# Patient Record
Sex: FEMALE | Race: BLACK OR AFRICAN AMERICAN | Employment: UNEMPLOYED
[De-identification: names, ages, dates, MRNs, and addresses within clinical notes are randomized per-mention and may not be internally consistent; named-entity substitution may affect disease eponyms.]

---

## 2017-02-21 ENCOUNTER — OFFICE VISIT (OUTPATIENT)
Dept: FAMILY MEDICINE CLINIC | Facility: CLINIC | Age: 37
End: 2017-02-21

## 2017-02-21 VITALS
HEIGHT: 63 IN | SYSTOLIC BLOOD PRESSURE: 107 MMHG | DIASTOLIC BLOOD PRESSURE: 70 MMHG | TEMPERATURE: 98.1 F | BODY MASS INDEX: 47.84 KG/M2 | HEART RATE: 91 BPM | WEIGHT: 270 LBS

## 2017-02-21 DIAGNOSIS — G89.29 CHRONIC BILATERAL LOW BACK PAIN WITHOUT SCIATICA: Primary | ICD-10-CM

## 2017-02-21 DIAGNOSIS — Z23 NEED FOR PROPHYLACTIC VACCINATION AGAINST DIPHTHERIA-TETANUS-PERTUSSIS (DTP): ICD-10-CM

## 2017-02-21 DIAGNOSIS — K21.9 GASTROESOPHAGEAL REFLUX DISEASE WITHOUT ESOPHAGITIS: ICD-10-CM

## 2017-02-21 DIAGNOSIS — M54.50 CHRONIC BILATERAL LOW BACK PAIN WITHOUT SCIATICA: Primary | ICD-10-CM

## 2017-02-21 DIAGNOSIS — G43.009 MIGRAINE WITHOUT AURA AND WITHOUT STATUS MIGRAINOSUS, NOT INTRACTABLE: ICD-10-CM

## 2017-02-21 DIAGNOSIS — E66.01 MORBID OBESITY DUE TO EXCESS CALORIES (HCC): ICD-10-CM

## 2017-02-21 DIAGNOSIS — Z12.4 CERVICAL CANCER SCREENING: ICD-10-CM

## 2017-02-21 DIAGNOSIS — Z11.4 SCREENING FOR HIV (HUMAN IMMUNODEFICIENCY VIRUS): ICD-10-CM

## 2017-02-21 PROCEDURE — 90471 IMMUNIZATION ADMIN: CPT | Performed by: INTERNAL MEDICINE

## 2017-02-21 PROCEDURE — 90715 TDAP VACCINE 7 YRS/> IM: CPT | Performed by: INTERNAL MEDICINE

## 2017-02-21 PROCEDURE — 99204 OFFICE O/P NEW MOD 45 MIN: CPT | Performed by: INTERNAL MEDICINE

## 2017-02-21 RX ORDER — OMEPRAZOLE 20 MG/1
20 CAPSULE, DELAYED RELEASE ORAL DAILY
Qty: 30 CAPSULE | Refills: 2 | Status: SHIPPED | OUTPATIENT
Start: 2017-02-21 | End: 2017-06-06

## 2017-02-21 RX ORDER — TOPIRAMATE 100 MG/1
100 TABLET, FILM COATED ORAL 2 TIMES DAILY
Qty: 60 TABLET | Refills: 2 | Status: SHIPPED | OUTPATIENT
Start: 2017-02-21 | End: 2017-03-28

## 2017-02-21 RX ORDER — ACETAMINOPHEN 500 MG
500 TABLET ORAL 3 TIMES DAILY PRN
Qty: 90 TABLET | Refills: 2 | Status: SHIPPED | OUTPATIENT
Start: 2017-02-21 | End: 2017-06-06

## 2017-02-21 RX ORDER — IBUPROFEN 800 MG/1
TABLET ORAL
Refills: 0 | COMMUNITY
Start: 2016-11-14 | End: 2017-03-28

## 2017-02-28 ENCOUNTER — HOSPITAL ENCOUNTER (OUTPATIENT)
Age: 37
Discharge: HOME | End: 2017-02-28
Payer: MEDICARE

## 2017-02-28 DIAGNOSIS — E66.01 MORBID OBESITY DUE TO EXCESS CALORIES (HCC): ICD-10-CM

## 2017-02-28 DIAGNOSIS — Z11.4 SCREENING FOR HIV (HUMAN IMMUNODEFICIENCY VIRUS): ICD-10-CM

## 2017-02-28 LAB
ANION GAP SERPL CALCULATED.3IONS-SCNC: 14 MMOL/L (ref 9–17)
BUN BLDV-MCNC: 11 MG/DL (ref 6–20)
BUN/CREAT BLD: NORMAL (ref 9–20)
CALCIUM SERPL-MCNC: 9.1 MG/DL (ref 8.6–10.4)
CHLORIDE BLD-SCNC: 104 MMOL/L (ref 98–107)
CHOLESTEROL/HDL RATIO: 5.8
CHOLESTEROL: 168 MG/DL
CO2: 23 MMOL/L (ref 20–31)
CREAT SERPL-MCNC: 0.55 MG/DL (ref 0.5–0.9)
ESTIMATED AVERAGE GLUCOSE: 120 MG/DL
GFR AFRICAN AMERICAN: >60 ML/MIN
GFR NON-AFRICAN AMERICAN: >60 ML/MIN
GFR SERPL CREATININE-BSD FRML MDRD: NORMAL ML/MIN/{1.73_M2}
GFR SERPL CREATININE-BSD FRML MDRD: NORMAL ML/MIN/{1.73_M2}
GLUCOSE BLD-MCNC: 76 MG/DL (ref 70–99)
HBA1C MFR BLD: 5.8 % (ref 4–6)
HCT VFR BLD CALC: 40.7 % (ref 36–46)
HDLC SERPL-MCNC: 29 MG/DL
HEMOGLOBIN: 13.7 G/DL (ref 12–16)
HIV AG/AB: NONREACTIVE
LDL CHOLESTEROL: 117 MG/DL (ref 0–130)
MCH RBC QN AUTO: 27.7 PG (ref 26–34)
MCHC RBC AUTO-ENTMCNC: 33.7 G/DL (ref 31–37)
MCV RBC AUTO: 82.4 FL (ref 80–100)
PDW BLD-RTO: 14.4 % (ref 12.5–15.4)
PLATELET # BLD: 318 K/UL (ref 140–450)
PMV BLD AUTO: 8.6 FL (ref 6–12)
POTASSIUM SERPL-SCNC: 3.7 MMOL/L (ref 3.7–5.3)
RBC # BLD: 4.94 M/UL (ref 4–5.2)
SODIUM BLD-SCNC: 141 MMOL/L (ref 135–144)
TRIGL SERPL-MCNC: 111 MG/DL
VLDLC SERPL CALC-MCNC: ABNORMAL MG/DL (ref 1–30)
WBC # BLD: 9.9 K/UL (ref 3.5–11)

## 2017-02-28 PROCEDURE — 80061 LIPID PANEL: CPT

## 2017-02-28 PROCEDURE — 85027 COMPLETE CBC AUTOMATED: CPT

## 2017-02-28 PROCEDURE — 83036 HEMOGLOBIN GLYCOSYLATED A1C: CPT

## 2017-02-28 PROCEDURE — 87389 HIV-1 AG W/HIV-1&-2 AB AG IA: CPT

## 2017-02-28 PROCEDURE — 36415 COLL VENOUS BLD VENIPUNCTURE: CPT

## 2017-02-28 PROCEDURE — 80048 BASIC METABOLIC PNL TOTAL CA: CPT

## 2017-03-28 ENCOUNTER — OFFICE VISIT (OUTPATIENT)
Dept: FAMILY MEDICINE CLINIC | Age: 37
End: 2017-03-28
Payer: MEDICARE

## 2017-03-28 VITALS
HEART RATE: 103 BPM | BODY MASS INDEX: 47.84 KG/M2 | WEIGHT: 270 LBS | TEMPERATURE: 98.1 F | DIASTOLIC BLOOD PRESSURE: 87 MMHG | HEIGHT: 63 IN | SYSTOLIC BLOOD PRESSURE: 125 MMHG

## 2017-03-28 DIAGNOSIS — R73.03 PRE-DIABETES: Primary | ICD-10-CM

## 2017-03-28 PROCEDURE — 99213 OFFICE O/P EST LOW 20 MIN: CPT | Performed by: INTERNAL MEDICINE

## 2017-04-17 ENCOUNTER — OFFICE VISIT (OUTPATIENT)
Dept: OBGYN CLINIC | Age: 37
End: 2017-04-17
Payer: MEDICARE

## 2017-04-17 ENCOUNTER — HOSPITAL ENCOUNTER (OUTPATIENT)
Age: 37
Setting detail: SPECIMEN
Discharge: HOME | End: 2017-04-17
Payer: MEDICARE

## 2017-04-17 VITALS
DIASTOLIC BLOOD PRESSURE: 70 MMHG | BODY MASS INDEX: 46.78 KG/M2 | SYSTOLIC BLOOD PRESSURE: 118 MMHG | HEIGHT: 63 IN | WEIGHT: 264 LBS

## 2017-04-17 DIAGNOSIS — O03.9 SAB (SPONTANEOUS ABORTION): ICD-10-CM

## 2017-04-17 DIAGNOSIS — Z01.419 ENCOUNTER FOR GYNECOLOGICAL EXAMINATION: Primary | ICD-10-CM

## 2017-04-17 PROCEDURE — 99385 PREV VISIT NEW AGE 18-39: CPT | Performed by: OBSTETRICS & GYNECOLOGY

## 2017-04-18 DIAGNOSIS — O03.9 SAB (SPONTANEOUS ABORTION): ICD-10-CM

## 2017-04-21 LAB
HPV SAMPLE: NORMAL
HPV SOURCE: NORMAL
HPV, GENOTYPE 16: NOT DETECTED
HPV, GENOTYPE 18: NOT DETECTED
HPV, HIGH RISK OTHER: NOT DETECTED
HPV, INTERPRETATION: NORMAL

## 2017-04-24 LAB — CYTOLOGY REPORT: NORMAL

## 2017-04-26 ENCOUNTER — HOSPITAL ENCOUNTER (OUTPATIENT)
Age: 37
Discharge: HOME | End: 2017-04-26
Payer: MEDICARE

## 2017-04-26 LAB — HCG QUANTITATIVE: <1 IU/L

## 2017-04-26 PROCEDURE — 36415 COLL VENOUS BLD VENIPUNCTURE: CPT

## 2017-04-26 PROCEDURE — 84702 CHORIONIC GONADOTROPIN TEST: CPT

## 2017-05-16 ENCOUNTER — TELEPHONE (OUTPATIENT)
Dept: FAMILY MEDICINE CLINIC | Age: 37
End: 2017-05-16

## 2017-05-16 DIAGNOSIS — G43.009 MIGRAINE WITHOUT AURA AND WITHOUT STATUS MIGRAINOSUS, NOT INTRACTABLE: Primary | ICD-10-CM

## 2017-05-16 RX ORDER — SUMATRIPTAN 50 MG/1
50 TABLET, FILM COATED ORAL
Qty: 9 TABLET | Refills: 3 | Status: SHIPPED | OUTPATIENT
Start: 2017-05-16 | End: 2017-09-19

## 2017-05-23 RX ORDER — METRONIDAZOLE 500 MG/1
500 TABLET ORAL 2 TIMES DAILY
Qty: 14 TABLET | Refills: 0 | Status: SHIPPED | OUTPATIENT
Start: 2017-05-23 | End: 2017-05-30

## 2017-06-06 ENCOUNTER — OFFICE VISIT (OUTPATIENT)
Dept: FAMILY MEDICINE CLINIC | Age: 37
End: 2017-06-06
Payer: MEDICARE

## 2017-06-06 VITALS
HEART RATE: 91 BPM | RESPIRATION RATE: 16 BRPM | HEIGHT: 63 IN | SYSTOLIC BLOOD PRESSURE: 111 MMHG | BODY MASS INDEX: 46.21 KG/M2 | TEMPERATURE: 98.5 F | DIASTOLIC BLOOD PRESSURE: 84 MMHG | WEIGHT: 260.8 LBS

## 2017-06-06 DIAGNOSIS — N30.00 ACUTE CYSTITIS WITHOUT HEMATURIA: Primary | ICD-10-CM

## 2017-06-06 DIAGNOSIS — E66.01 MORBID OBESITY DUE TO EXCESS CALORIES (HCC): ICD-10-CM

## 2017-06-06 DIAGNOSIS — R73.03 PRE-DIABETES: ICD-10-CM

## 2017-06-06 PROCEDURE — 99214 OFFICE O/P EST MOD 30 MIN: CPT | Performed by: INTERNAL MEDICINE

## 2017-06-06 RX ORDER — NITROFURANTOIN 25; 75 MG/1; MG/1
100 CAPSULE ORAL 2 TIMES DAILY
Qty: 6 CAPSULE | Refills: 0 | Status: SHIPPED | OUTPATIENT
Start: 2017-06-06 | End: 2017-06-09

## 2017-06-06 RX ORDER — PRENATAL VIT 75/IRON/FOLIC/OM3 28-800-223
COMBINATION PACKAGE (EA) ORAL
Refills: 1 | COMMUNITY
Start: 2017-04-19 | End: 2018-03-05

## 2017-06-06 RX ORDER — IBUPROFEN 800 MG/1
800 TABLET ORAL
COMMUNITY
Start: 2017-05-27 | End: 2017-06-06

## 2017-06-06 ASSESSMENT — PATIENT HEALTH QUESTIONNAIRE - PHQ9
SUM OF ALL RESPONSES TO PHQ9 QUESTIONS 1 & 2: 0
SUM OF ALL RESPONSES TO PHQ QUESTIONS 1-9: 0
2. FEELING DOWN, DEPRESSED OR HOPELESS: 0
1. LITTLE INTEREST OR PLEASURE IN DOING THINGS: 0

## 2017-06-22 ENCOUNTER — NURSE ONLY (OUTPATIENT)
Dept: FAMILY MEDICINE CLINIC | Age: 37
End: 2017-06-22

## 2017-06-22 VITALS
DIASTOLIC BLOOD PRESSURE: 75 MMHG | WEIGHT: 259.92 LBS | HEART RATE: 88 BPM | HEIGHT: 63 IN | SYSTOLIC BLOOD PRESSURE: 107 MMHG | RESPIRATION RATE: 16 BRPM | TEMPERATURE: 99.1 F | BODY MASS INDEX: 46.05 KG/M2

## 2017-06-22 DIAGNOSIS — Z11.1 ENCOUNTER FOR PPD TEST: Primary | ICD-10-CM

## 2017-06-29 ENCOUNTER — NURSE ONLY (OUTPATIENT)
Dept: FAMILY MEDICINE CLINIC | Age: 37
End: 2017-06-29
Payer: MEDICARE

## 2017-06-29 VITALS
HEIGHT: 63 IN | HEART RATE: 85 BPM | WEIGHT: 259 LBS | SYSTOLIC BLOOD PRESSURE: 123 MMHG | BODY MASS INDEX: 45.89 KG/M2 | DIASTOLIC BLOOD PRESSURE: 79 MMHG

## 2017-06-29 DIAGNOSIS — Z11.1 ENCOUNTER FOR PPD TEST: Primary | ICD-10-CM

## 2017-06-29 PROCEDURE — 99211 OFF/OP EST MAY X REQ PHY/QHP: CPT | Performed by: INTERNAL MEDICINE

## 2017-06-29 PROCEDURE — 86580 TB INTRADERMAL TEST: CPT | Performed by: INTERNAL MEDICINE

## 2017-06-29 RX ORDER — LIDOCAINE AND PRILOCAINE 25; 25 MG/G; MG/G
CREAM TOPICAL
COMMUNITY
Start: 2017-06-06 | End: 2017-09-19

## 2017-07-01 ENCOUNTER — OFFICE VISIT (OUTPATIENT)
Dept: FAMILY MEDICINE CLINIC | Age: 37
End: 2017-07-01
Payer: MEDICARE

## 2017-07-01 DIAGNOSIS — Z11.1 ENCOUNTER FOR PPD SKIN TEST READING: Primary | ICD-10-CM

## 2017-07-01 PROCEDURE — 86580 TB INTRADERMAL TEST: CPT | Performed by: INTERNAL MEDICINE

## 2017-07-19 ENCOUNTER — HOSPITAL ENCOUNTER (EMERGENCY)
Age: 37
Discharge: HOME | End: 2017-07-19
Attending: EMERGENCY MEDICINE
Payer: MEDICARE

## 2017-07-19 VITALS
SYSTOLIC BLOOD PRESSURE: 140 MMHG | DIASTOLIC BLOOD PRESSURE: 94 MMHG | HEART RATE: 91 BPM | WEIGHT: 260 LBS | OXYGEN SATURATION: 98 % | BODY MASS INDEX: 46.07 KG/M2 | RESPIRATION RATE: 14 BRPM | HEIGHT: 63 IN | TEMPERATURE: 99.1 F

## 2017-07-19 DIAGNOSIS — M05.60: Primary | ICD-10-CM

## 2017-07-19 LAB — HCG QUALITATIVE: NEGATIVE

## 2017-07-19 PROCEDURE — 6370000000 HC RX 637 (ALT 250 FOR IP): Performed by: EMERGENCY MEDICINE

## 2017-07-19 PROCEDURE — G0382 LEV 3 HOSP TYPE B ED VISIT: HCPCS

## 2017-07-19 PROCEDURE — 84703 CHORIONIC GONADOTROPIN ASSAY: CPT

## 2017-07-19 RX ORDER — IBUPROFEN 800 MG/1
800 TABLET ORAL ONCE
Status: COMPLETED | OUTPATIENT
Start: 2017-07-19 | End: 2017-07-19

## 2017-07-19 RX ORDER — IBUPROFEN 800 MG/1
800 TABLET ORAL EVERY 8 HOURS PRN
Qty: 30 TABLET | Refills: 0 | Status: SHIPPED | OUTPATIENT
Start: 2017-07-19 | End: 2018-03-05

## 2017-07-19 RX ADMIN — IBUPROFEN 800 MG: 800 TABLET, FILM COATED ORAL at 12:03

## 2017-07-19 ASSESSMENT — PAIN DESCRIPTION - ORIENTATION: ORIENTATION: LEFT

## 2017-07-19 ASSESSMENT — PAIN DESCRIPTION - DESCRIPTORS: DESCRIPTORS: ACHING

## 2017-07-19 ASSESSMENT — PAIN DESCRIPTION - LOCATION: LOCATION: SHOULDER

## 2017-07-19 ASSESSMENT — PAIN DESCRIPTION - PAIN TYPE: TYPE: ACUTE PAIN;CHRONIC PAIN

## 2017-07-19 ASSESSMENT — PAIN SCALES - GENERAL
PAINLEVEL_OUTOF10: 10
PAINLEVEL_OUTOF10: 8

## 2017-08-29 ENCOUNTER — OFFICE VISIT (OUTPATIENT)
Dept: OBGYN CLINIC | Age: 37
End: 2017-08-29
Payer: MEDICARE

## 2017-08-29 VITALS
HEIGHT: 63 IN | SYSTOLIC BLOOD PRESSURE: 122 MMHG | DIASTOLIC BLOOD PRESSURE: 60 MMHG | BODY MASS INDEX: 46.6 KG/M2 | WEIGHT: 263 LBS

## 2017-08-29 DIAGNOSIS — Z12.39 SCREENING BREAST EXAMINATION: Primary | ICD-10-CM

## 2017-08-29 PROCEDURE — 99214 OFFICE O/P EST MOD 30 MIN: CPT | Performed by: NURSE PRACTITIONER

## 2017-08-29 RX ORDER — CLOTRIMAZOLE AND BETAMETHASONE DIPROPIONATE 10; .64 MG/G; MG/G
CREAM TOPICAL
Qty: 15 G | Refills: 1 | Status: SHIPPED | OUTPATIENT
Start: 2017-08-29 | End: 2017-09-19

## 2017-08-29 ASSESSMENT — ENCOUNTER SYMPTOMS
CONSTIPATION: 0
BLOOD IN STOOL: 0
CHEST TIGHTNESS: 0
DIARRHEA: 0
SHORTNESS OF BREATH: 0
ABDOMINAL DISTENTION: 0
COUGH: 0
BACK PAIN: 0
VOMITING: 0
NAUSEA: 0
WHEEZING: 0
ABDOMINAL PAIN: 0
COLOR CHANGE: 0

## 2017-09-18 DIAGNOSIS — R73.03 PRE-DIABETES: ICD-10-CM

## 2017-09-19 ENCOUNTER — OFFICE VISIT (OUTPATIENT)
Dept: FAMILY MEDICINE CLINIC | Age: 37
End: 2017-09-19
Payer: MEDICARE

## 2017-09-19 VITALS
HEIGHT: 63 IN | DIASTOLIC BLOOD PRESSURE: 67 MMHG | WEIGHT: 263.6 LBS | SYSTOLIC BLOOD PRESSURE: 101 MMHG | HEART RATE: 81 BPM | TEMPERATURE: 97.6 F | BODY MASS INDEX: 46.71 KG/M2 | RESPIRATION RATE: 16 BRPM

## 2017-09-19 DIAGNOSIS — K21.9 GASTROESOPHAGEAL REFLUX DISEASE WITHOUT ESOPHAGITIS: ICD-10-CM

## 2017-09-19 DIAGNOSIS — M54.50 CHRONIC BILATERAL LOW BACK PAIN WITHOUT SCIATICA: ICD-10-CM

## 2017-09-19 DIAGNOSIS — Z23 NEED FOR INFLUENZA VACCINATION: ICD-10-CM

## 2017-09-19 DIAGNOSIS — E66.01 MORBID OBESITY DUE TO EXCESS CALORIES (HCC): ICD-10-CM

## 2017-09-19 DIAGNOSIS — G43.009 MIGRAINE WITHOUT AURA AND WITHOUT STATUS MIGRAINOSUS, NOT INTRACTABLE: ICD-10-CM

## 2017-09-19 DIAGNOSIS — G89.29 CHRONIC BILATERAL LOW BACK PAIN WITHOUT SCIATICA: ICD-10-CM

## 2017-09-19 DIAGNOSIS — R73.03 PRE-DIABETES: ICD-10-CM

## 2017-09-19 DIAGNOSIS — N39.3 STRESS INCONTINENCE OF URINE: Primary | ICD-10-CM

## 2017-09-19 PROCEDURE — 99214 OFFICE O/P EST MOD 30 MIN: CPT | Performed by: INTERNAL MEDICINE

## 2017-09-19 PROCEDURE — 90471 IMMUNIZATION ADMIN: CPT | Performed by: INTERNAL MEDICINE

## 2017-09-19 PROCEDURE — 90686 IIV4 VACC NO PRSV 0.5 ML IM: CPT | Performed by: INTERNAL MEDICINE

## 2017-09-19 RX ORDER — FOLIC ACID 1 MG/1
TABLET ORAL
Refills: 0 | COMMUNITY
Start: 2017-08-24 | End: 2018-03-05

## 2017-09-19 RX ORDER — NABUMETONE 750 MG/1
TABLET, FILM COATED ORAL
Refills: 0 | COMMUNITY
Start: 2017-08-29 | End: 2017-09-19

## 2017-09-19 RX ORDER — OMEPRAZOLE 20 MG/1
CAPSULE, DELAYED RELEASE ORAL
COMMUNITY
Start: 2017-02-21 | End: 2018-03-05

## 2017-10-23 ENCOUNTER — TELEPHONE (OUTPATIENT)
Dept: OBGYN CLINIC | Age: 37
End: 2017-10-23

## 2017-10-23 ENCOUNTER — HOSPITAL ENCOUNTER (EMERGENCY)
Age: 37
Discharge: HOME | End: 2017-10-23
Attending: EMERGENCY MEDICINE
Payer: MEDICARE

## 2017-10-23 VITALS
OXYGEN SATURATION: 98 % | HEIGHT: 63 IN | HEART RATE: 108 BPM | TEMPERATURE: 98.2 F | BODY MASS INDEX: 46.07 KG/M2 | RESPIRATION RATE: 18 BRPM | WEIGHT: 260 LBS

## 2017-10-23 DIAGNOSIS — O46.90 VAGINAL BLEEDING IN PREGNANCY: ICD-10-CM

## 2017-10-23 DIAGNOSIS — O20.0 THREATENED MISCARRIAGE: Primary | ICD-10-CM

## 2017-10-23 DIAGNOSIS — R10.30 LOWER ABDOMINAL PAIN: Primary | ICD-10-CM

## 2017-10-23 DIAGNOSIS — N30.00 ACUTE CYSTITIS WITHOUT HEMATURIA: ICD-10-CM

## 2017-10-23 LAB
-: ABNORMAL
ABSOLUTE EOS #: 0.2 K/UL (ref 0–0.4)
ABSOLUTE IMMATURE GRANULOCYTE: ABNORMAL K/UL (ref 0–0.3)
ABSOLUTE LYMPH #: 1.9 K/UL (ref 1–4.8)
ABSOLUTE MONO #: 0.3 K/UL (ref 0.1–1.2)
AMORPHOUS: ABNORMAL
BACTERIA: ABNORMAL
BASOPHILS # BLD: 0 %
BASOPHILS ABSOLUTE: 0 K/UL (ref 0–0.2)
BILIRUBIN URINE: NEGATIVE
CASTS UA: ABNORMAL /LPF (ref 0–8)
COLOR: YELLOW
COMMENT UA: ABNORMAL
CRYSTALS, UA: ABNORMAL /HPF
DIFFERENTIAL TYPE: ABNORMAL
DIRECT EXAM: ABNORMAL
EOSINOPHILS RELATIVE PERCENT: 2 %
EPITHELIAL CELLS UA: ABNORMAL /HPF (ref 0–5)
GLUCOSE URINE: NEGATIVE
HCG QUANTITATIVE: 524 IU/L
HCT VFR BLD CALC: 37.7 % (ref 36–46)
HEMOGLOBIN: 12.6 G/DL (ref 12–16)
IMMATURE GRANULOCYTES: ABNORMAL %
KETONES, URINE: ABNORMAL
LEUKOCYTE ESTERASE, URINE: ABNORMAL
LYMPHOCYTES # BLD: 17 %
Lab: ABNORMAL
MCH RBC QN AUTO: 27.6 PG (ref 26–34)
MCHC RBC AUTO-ENTMCNC: 33.5 G/DL (ref 31–37)
MCV RBC AUTO: 82.3 FL (ref 80–100)
MONOCYTES # BLD: 3 %
MUCUS: ABNORMAL
NITRITE, URINE: NEGATIVE
OTHER OBSERVATIONS UA: ABNORMAL
PDW BLD-RTO: 14.5 % (ref 12.5–15.4)
PH UA: 5 (ref 5–8)
PLATELET # BLD: 320 K/UL (ref 140–450)
PLATELET ESTIMATE: ABNORMAL
PMV BLD AUTO: 7.9 FL (ref 6–12)
PROTEIN UA: NEGATIVE
RBC # BLD: 4.58 M/UL (ref 4–5.2)
RBC # BLD: ABNORMAL 10*6/UL
RBC UA: ABNORMAL /HPF (ref 0–4)
RENAL EPITHELIAL, UA: ABNORMAL /HPF
SEG NEUTROPHILS: 78 %
SEGMENTED NEUTROPHILS ABSOLUTE COUNT: 8.4 K/UL (ref 1.8–7.7)
SPECIFIC GRAVITY UA: 1.02 (ref 1–1.03)
SPECIMEN DESCRIPTION: ABNORMAL
STATUS: ABNORMAL
TRICHOMONAS: ABNORMAL
TURBIDITY: ABNORMAL
URINE HGB: ABNORMAL
UROBILINOGEN, URINE: NORMAL
WBC # BLD: 10.7 K/UL (ref 3.5–11)
WBC # BLD: ABNORMAL 10*3/UL
WBC UA: ABNORMAL /HPF (ref 0–5)
YEAST: ABNORMAL

## 2017-10-23 PROCEDURE — 85025 COMPLETE CBC W/AUTO DIFF WBC: CPT

## 2017-10-23 PROCEDURE — 87591 N.GONORRHOEAE DNA AMP PROB: CPT

## 2017-10-23 PROCEDURE — 81001 URINALYSIS AUTO W/SCOPE: CPT

## 2017-10-23 PROCEDURE — 87491 CHLMYD TRACH DNA AMP PROBE: CPT

## 2017-10-23 PROCEDURE — 87510 GARDNER VAG DNA DIR PROBE: CPT

## 2017-10-23 PROCEDURE — 87480 CANDIDA DNA DIR PROBE: CPT

## 2017-10-23 PROCEDURE — 99284 EMERGENCY DEPT VISIT MOD MDM: CPT

## 2017-10-23 PROCEDURE — 87660 TRICHOMONAS VAGIN DIR PROBE: CPT

## 2017-10-23 PROCEDURE — 84702 CHORIONIC GONADOTROPIN TEST: CPT

## 2017-10-23 PROCEDURE — 87086 URINE CULTURE/COLONY COUNT: CPT

## 2017-10-23 RX ORDER — CEPHALEXIN 500 MG/1
500 CAPSULE ORAL 4 TIMES DAILY
Qty: 12 CAPSULE | Refills: 0 | Status: SHIPPED | OUTPATIENT
Start: 2017-10-23 | End: 2017-10-26

## 2017-10-23 ASSESSMENT — ENCOUNTER SYMPTOMS
CHEST TIGHTNESS: 0
ABDOMINAL PAIN: 1
RHINORRHEA: 0
NAUSEA: 0
SORE THROAT: 0
VOMITING: 0
SHORTNESS OF BREATH: 0

## 2017-10-23 NOTE — ED NOTES
Rec'd report from [facility]. Pt resting on cot, no distress, awaiting orders.       Roseanne Arvizu RN  10/23/17 8020

## 2017-10-23 NOTE — TELEPHONE ENCOUNTER
Patient phoned office stating has lower abd pain- hx of diarrhea yesterday and feeling some pressure, poss sx of UTI.    Order for UA C&S entered

## 2017-10-23 NOTE — ED PROVIDER NOTES
[facility] ED  Emergency Department Encounter  Non Emergency Medicine Resident     Pt Name: Yan Kinney  MRN: [id number]  [place] 1980  Date of evaluation: 10/23/17  PCP:  Margarette Calloway MD    [address] [place] [address]       Chief Complaint   Patient presents with    Abdominal Pain    Vaginal Bleeding     5 weeks pregnant       HISTORY OF PRESENT ILLNESS  (Location/Symptom, Timing/Onset, Context/Setting, Quality, Duration, Modifying Factors, Severity.)      Yan Kinney is a 30year old [id number] female @5w3/7d based on LMP here with bilateral lower abdominal discomfort and vaginal bleeding. She reports the abdominal pain started last night at 2AM and today she started to have vaginal bleeding which she describes as spotting. She denies passing clots or fetal tissue. She is wearing a panty liner and denies soaking through any pads. She had a positive pregnancy test at home last week and has an appointment with her [facility] provider on November 15th. She has a history of two miscarriages, one as recent as this past April. She has associated dizziness and lightheadedness. She denies any fever, chills, headache, vision changes, CP, palpitations, SOB, N/V/D, or dysuria. She denies any vaginal discharge. PAST MEDICAL / SURGICAL / SOCIAL / FAMILY HISTORY      has a past medical history of Chronic back pain; GERD (gastroesophageal reflux disease); Headache; and Obesity. has no past surgical history on file. Social History     Social History    Marital status: Single     Spouse name: N/A    Number of children: N/A    Years of education: N/A     Occupational History    Not on file. Social History Main Topics    Smoking status: Never Smoker    Smokeless tobacco: Never Used    Alcohol use Yes    Drug use: No    Sexual activity: No     Other Topics Concern    Not on file     Social History Narrative    No narrative on file       No family history on file.     Allergies:  Iodine    Home Pulmonary/Chest: Effort normal and breath sounds normal.   Abdominal: Soft. Bowel sounds are normal. She exhibits no distension. There is no rebound and no guarding. Minimal lower abdominal tenderness bilaterally    Genitourinary:   Genitourinary Comments: Speculum: Normal appearing external genitalia, vulva and vagina without edema, erythema or masses, minimal dark blood in vault, cervix visually closed with thickness  Bimanual: cervix closed, thick, and high. No CMT. No adnexal enlargement. Skin: She is not diaphoretic. DIFFERENTIAL  DIAGNOSIS     PLAN (LABS / IMAGING / EKG):  Orders Placed This Encounter   Procedures    C.trachomatis N.gonorrhoeae DNA    VAGINITIS DNA PROBE    Urine culture clean catch    CBC WITH AUTO DIFFERENTIAL    HCG, QUANTITATIVE, PREGNANCY    UA W/REFLEX CULTURE    Microscopic Urinalysis    HCG, Quantitative, Pregnancy       MEDICATIONS ORDERED:  Orders Placed This Encounter   Medications    cephALEXin (KEFLEX) 500 MG capsule     Sig: Take 1 capsule by mouth 4 times daily for 3 days     Dispense:  12 capsule     Refill:  0       DDX: UTI, STD, miscarriage, ectopic      DIAGNOSTIC RESULTS / EMERGENCY DEPARTMENT COURSE / MDM     LABS:  Results for orders placed or performed during the hospital encounter of 10/23/17   VAGINITIS DNA PROBE   Result Value Ref Range    Specimen Description . VAGINA     Special Requests NOT REPORTED     Direct Exam POSITIVE for Candida sp. (A)     Direct Exam NEGATIVE for Gardnerella vaginalis     Direct Exam NEGATIVE for Trichomonas vaginalis     Direct Exam       Method of testing is a DNA probe intended for detection and identification of    Direct Exam        Candida species, Gardnerella vaginalis, and Trichomonas vaginalis nucleic acid    Direct Exam        in vaginal fluid specimens from patients with symptoms of vaginitis/vaginosis.     Direct Exam       [facility] [address] [address] [phone]    Status FINAL 10/23/2017    CBC WITH AUTO DIFFERENTIAL   Result Value Ref Range    WBC 10.7 3.5 - 11.0 k/uL    RBC 4.58 4.0 - 5.2 m/uL    Hemoglobin 12.6 12.0 - 16.0 g/dL    Hematocrit 37.7 36 - 46 %    MCV 82.3 80 - 100 fL    MCH 27.6 26 - 34 pg    MCHC 33.5 31 - 37 g/dL    RDW 14.5 12.5 - 15.4 %    Platelets 416 809 - 423 k/uL    MPV 7.9 6.0 - 12.0 fL    Differential Type NOT REPORTED     Seg Neutrophils 78 %    Lymphocytes 17 %    Monocytes 3 %    Eosinophils % 2 %    Basophils 0 %    Immature Granulocytes NOT REPORTED 0 %    Segs Absolute 8.40 (H) 1.8 - 7.7 k/uL    Absolute Lymph # 1.90 1.0 - 4.8 k/uL    Absolute Mono # 0.30 0.1 - 1.2 k/uL    Absolute Eos # 0.20 0.0 - 0.4 k/uL    Basophils # 0.00 0.0 - 0.2 k/uL    Absolute Immature Granulocyte NOT REPORTED 0.00 - 0.30 k/uL    WBC Morphology NOT REPORTED     RBC Morphology NOT REPORTED     Platelet Estimate NOT REPORTED    HCG, QUANTITATIVE, PREGNANCY   Result Value Ref Range    hCG Quant 524 (H) <5 IU/L   UA W/REFLEX CULTURE   Result Value Ref Range    Color, UA YELLOW YEL    Turbidity UA CLOUDY (A) CLEAR    Glucose, Ur NEGATIVE NEG    Bilirubin Urine NEGATIVE NEG    Ketones, Urine TRACE (A) NEG    Specific Gravity, UA 1.024 1.005 - 1.030    Urine Hgb LARGE (A) NEG    pH, UA 5.0 5.0 - 8.0    Protein, UA NEGATIVE NEG    Urobilinogen, Urine Normal NORM    Nitrite, Urine NEGATIVE NEG    Leukocyte Esterase, Urine SMALL (A) NEG    Urinalysis Comments NOT REPORTED    Microscopic Urinalysis   Result Value Ref Range    -          WBC, UA 20 TO 50 0 - 5 /HPF    RBC, UA 2 TO 5 0 - 4 /HPF    Casts UA NOT REPORTED 0 - 8 /LPF    Crystals UA NOT REPORTED NONE /HPF    Epithelial Cells UA 10 TO 20 0 - 5 /HPF    Renal Epithelial, Urine NOT REPORTED 0 /HPF    Bacteria, UA FEW (A) NONE    Mucus, UA NOT REPORTED NONE    Trichomonas, UA NOT REPORTED NONE    Amorphous, UA NOT REPORTED NONE    Other Observations UA NOT REPORTED NREQ    Yeast, UA NOT REPORTED NONE       IMPRESSION: UTI, Probably for 3 days, Disp-12 capsule, R-0Print             Ayush Tidwell [facility]  [address] [facility] medicine resident     Ayush Tidwell, [address] [place] [address] Avenue  10/23/17 3737

## 2017-10-24 LAB
C TRACH DNA GENITAL QL NAA+PROBE: NEGATIVE
CULTURE: ABNORMAL
CULTURE: ABNORMAL
Lab: ABNORMAL
N. GONORRHOEAE DNA: NEGATIVE
SPECIMEN DESCRIPTION: ABNORMAL
STATUS: ABNORMAL

## 2017-10-25 ENCOUNTER — HOSPITAL ENCOUNTER (OUTPATIENT)
Age: 37
Discharge: HOME | End: 2017-10-25
Payer: MEDICARE

## 2017-10-25 DIAGNOSIS — O20.0 THREATENED MISCARRIAGE: ICD-10-CM

## 2017-10-25 DIAGNOSIS — O46.90 VAGINAL BLEEDING IN PREGNANCY: ICD-10-CM

## 2017-10-25 LAB — HCG QUANTITATIVE: 493 IU/L

## 2017-10-25 PROCEDURE — 84702 CHORIONIC GONADOTROPIN TEST: CPT

## 2017-10-25 PROCEDURE — 36415 COLL VENOUS BLD VENIPUNCTURE: CPT

## 2017-10-25 NOTE — PROGRESS NOTES
Dear Екатерина Rawls    At the time of your visit to [address] [place] [address] Emergency Room on 10/23/2017 a urine culture was obtained. It was positive for candida. For your treatment you need to start taking fluconazole. Please fill the enclosed prescription at a pharmacy and start taking fluconazole 150 mg one time.         [facility] ED  [address] [facility] [address]  [phone]

## 2017-10-30 ENCOUNTER — TELEPHONE (OUTPATIENT)
Dept: OBGYN CLINIC | Age: 37
End: 2017-10-30

## 2017-10-30 DIAGNOSIS — O03.9 MISCARRIAGE: Primary | ICD-10-CM

## 2017-11-01 NOTE — PROGRESS NOTES
Subjective:      Patient ID: Carolee Bills is a 30 y.o. female. HPIG 3 P0 Presents for office visit after recent miscarriage . This would be her 3rd loss, will follow hCg to negative , then on day 3 check labs , and on day 21 check progesterone,. Lets wait 1-2 cycles to get pregnant so we can check labs & HSG . I did tell her that we will want a semen analysis, also. There would be nor harm her taking extra Folic acid along with her PNV, as well as baby asa til we get results. No family history of pregnancy loss or clotting disorders   Allergy : Iodine  Med HX: GERD  Surgery : None    Review of Systems   Constitutional: Negative for chills, fatigue and fever. HENT: Negative for sinus pressure, sneezing, sore throat, trouble swallowing and voice change. Gastrointestinal: Negative for abdominal distention, abdominal pain, constipation, diarrhea, nausea and vomiting. Genitourinary: Negative for difficulty urinating, dyspareunia, dysuria, flank pain, frequency, genital sores, hematuria, menstrual problem, pelvic pain, vaginal bleeding, vaginal discharge and vaginal pain. Musculoskeletal: Negative for arthralgias, back pain, gait problem and joint swelling. Skin: Negative for color change, pallor and rash. Neurological: Negative for dizziness, tremors, facial asymmetry, light-headedness, numbness and headaches. Hematological: Negative for adenopathy. Does not bruise/bleed easily. Psychiatric/Behavioral: Negative for agitation, behavioral problems, confusion, decreased concentration, dysphoric mood and hallucinations. The patient is not nervous/anxious. Objective:   Physical Exam   Constitutional: She is oriented to person, place, and time. She appears well-developed and well-nourished. HENT:   Head: Normocephalic and atraumatic. Eyes: Pupils are equal, round, and reactive to light. Neck: Normal range of motion. Neck supple.    Cardiovascular: Normal rate, regular rhythm and normal heart

## 2017-11-06 ENCOUNTER — TELEPHONE (OUTPATIENT)
Dept: OBGYN CLINIC | Age: 37
End: 2017-11-06

## 2017-11-06 ENCOUNTER — HOSPITAL ENCOUNTER (OUTPATIENT)
Age: 37
Discharge: HOME | End: 2017-11-06
Payer: MEDICARE

## 2017-11-06 ENCOUNTER — OFFICE VISIT (OUTPATIENT)
Dept: OBGYN CLINIC | Age: 37
End: 2017-11-06
Payer: MEDICARE

## 2017-11-06 DIAGNOSIS — O03.9 MISCARRIAGE: ICD-10-CM

## 2017-11-06 DIAGNOSIS — O03.9 SPONTANEOUS MISCARRIAGE: Primary | ICD-10-CM

## 2017-11-06 LAB — HCG QUANTITATIVE: 140 IU/L

## 2017-11-06 PROCEDURE — 1036F TOBACCO NON-USER: CPT | Performed by: NURSE PRACTITIONER

## 2017-11-06 PROCEDURE — 36415 COLL VENOUS BLD VENIPUNCTURE: CPT

## 2017-11-06 PROCEDURE — G8417 CALC BMI ABV UP PARAM F/U: HCPCS | Performed by: NURSE PRACTITIONER

## 2017-11-06 PROCEDURE — G8484 FLU IMMUNIZE NO ADMIN: HCPCS | Performed by: NURSE PRACTITIONER

## 2017-11-06 PROCEDURE — 99214 OFFICE O/P EST MOD 30 MIN: CPT | Performed by: NURSE PRACTITIONER

## 2017-11-06 PROCEDURE — G8427 DOCREV CUR MEDS BY ELIG CLIN: HCPCS | Performed by: NURSE PRACTITIONER

## 2017-11-06 PROCEDURE — 84702 CHORIONIC GONADOTROPIN TEST: CPT

## 2017-11-06 ASSESSMENT — ENCOUNTER SYMPTOMS
BACK PAIN: 0
ABDOMINAL DISTENTION: 0
VOICE CHANGE: 0
SORE THROAT: 0
NAUSEA: 0
SINUS PRESSURE: 0
CONSTIPATION: 0
DIARRHEA: 0
ABDOMINAL PAIN: 0
TROUBLE SWALLOWING: 0
VOMITING: 0
COLOR CHANGE: 0

## 2017-11-06 NOTE — TELEPHONE ENCOUNTER
patient called stated you was going to call in a prescription to her pharamcy and when she went there was nothing there. Looked into chart/notes nothing was written as to what medication. Patient stated it was serotonin?  (SP)

## 2017-11-07 ENCOUNTER — TELEPHONE (OUTPATIENT)
Dept: OBGYN CLINIC | Age: 37
End: 2017-11-07

## 2017-11-07 DIAGNOSIS — O02.1 MISSED AB: Primary | ICD-10-CM

## 2017-11-15 ENCOUNTER — HOSPITAL ENCOUNTER (OUTPATIENT)
Age: 37
Discharge: HOME | End: 2017-11-15
Payer: MEDICARE

## 2017-11-15 DIAGNOSIS — O02.1 MISSED AB: ICD-10-CM

## 2017-11-15 LAB — HCG QUANTITATIVE: 1 IU/L

## 2017-11-15 PROCEDURE — 36415 COLL VENOUS BLD VENIPUNCTURE: CPT

## 2017-11-15 PROCEDURE — 84702 CHORIONIC GONADOTROPIN TEST: CPT

## 2017-11-16 ENCOUNTER — TELEPHONE (OUTPATIENT)
Dept: OBGYN CLINIC | Age: 37
End: 2017-11-16

## 2017-11-30 NOTE — TELEPHONE ENCOUNTER
My chart message sent- believe patient phoned office and has been aware- will confirm with mychart message

## 2017-12-07 ENCOUNTER — TELEPHONE (OUTPATIENT)
Dept: OBGYN CLINIC | Age: 37
End: 2017-12-07

## 2017-12-07 DIAGNOSIS — N96 MULTIPLE PREGNANCY LOSS, NOT CURRENTLY PREGNANT: Primary | ICD-10-CM

## 2017-12-09 ENCOUNTER — HOSPITAL ENCOUNTER (OUTPATIENT)
Age: 37
Discharge: HOME | End: 2017-12-09
Payer: MEDICARE

## 2017-12-09 LAB
FOLLICLE STIMULATING HORMONE: 8.2 U/L (ref 1.7–21.5)
GLUCOSE FASTING: 100 MG/DL (ref 70–99)
INSULIN COMMENT: NORMAL
INSULIN REFERENCE RANGE:: NORMAL
INSULIN: 14.7 MU/L
PROLACTIN: 8.69 UG/L (ref 4.79–23.3)
THYROXINE, FREE: 1.31 NG/DL (ref 0.93–1.7)
TSH SERPL DL<=0.05 MIU/L-ACNC: 2.39 MIU/L (ref 0.3–5)

## 2017-12-09 PROCEDURE — 85610 PROTHROMBIN TIME: CPT

## 2017-12-09 PROCEDURE — 86147 CARDIOLIPIN ANTIBODY EA IG: CPT

## 2017-12-09 PROCEDURE — 86225 DNA ANTIBODY NATIVE: CPT

## 2017-12-09 PROCEDURE — 81240 F2 GENE: CPT

## 2017-12-09 PROCEDURE — 82947 ASSAY GLUCOSE BLOOD QUANT: CPT

## 2017-12-09 PROCEDURE — 86038 ANTINUCLEAR ANTIBODIES: CPT

## 2017-12-09 PROCEDURE — 85415 FIBRINOLYTIC PLASMINOGEN: CPT

## 2017-12-09 PROCEDURE — 36415 COLL VENOUS BLD VENIPUNCTURE: CPT

## 2017-12-09 PROCEDURE — 81241 F5 GENE: CPT

## 2017-12-09 PROCEDURE — 84146 ASSAY OF PROLACTIN: CPT

## 2017-12-09 PROCEDURE — 83525 ASSAY OF INSULIN: CPT

## 2017-12-09 PROCEDURE — 85300 ANTITHROMBIN III ACTIVITY: CPT

## 2017-12-09 PROCEDURE — 84443 ASSAY THYROID STIM HORMONE: CPT

## 2017-12-09 PROCEDURE — 83001 ASSAY OF GONADOTROPIN (FSH): CPT

## 2017-12-09 PROCEDURE — 86235 NUCLEAR ANTIGEN ANTIBODY: CPT

## 2017-12-09 PROCEDURE — 85730 THROMBOPLASTIN TIME PARTIAL: CPT

## 2017-12-09 PROCEDURE — 81291 MTHFR GENE: CPT

## 2017-12-09 PROCEDURE — 84439 ASSAY OF FREE THYROXINE: CPT

## 2017-12-09 PROCEDURE — 85613 RUSSELL VIPER VENOM DILUTED: CPT

## 2017-12-11 LAB
ANTI DNA DOUBLE STRANDED: 56 IU/ML
ANTI SSA: 22 U/ML
ANTI SSB: 29 U/ML
ANTI-NUCLEAR ANTIBODY (ANA): ABNORMAL
ANTI-SMITH: 24 U/ML

## 2017-12-12 ENCOUNTER — TELEPHONE (OUTPATIENT)
Dept: OBGYN CLINIC | Age: 37
End: 2017-12-12

## 2017-12-12 LAB
ANTICARDIOLIPIN IGA ANTIBODY: 3.3 APU
ANTICARDIOLIPIN IGG ANTIBODY: 12.4 GPU
AT-III ACTIVITY: 73 % (ref 83–122)
CARDIOLIPIN AB IGM: 5.7 MPU
DILUTE RUSSELL VIPER VENOM TIME: NORMAL
INR BLD: 1
LUPUS ANTICOAG: NORMAL
PARTIAL THROMBOPLASTIN TIME: 30.8 SEC (ref 23–31)
PROTHROMBIN TIME: 10 SEC (ref 9.7–11.6)

## 2017-12-15 LAB
FACTOR V LEIDEN MUTATION: NORMAL
MTHFR MUTATION 677T/A1298C: NORMAL
PROTHROMBIN G20210A MUTATION: NORMAL

## 2017-12-18 ENCOUNTER — INITIAL CONSULT (OUTPATIENT)
Dept: OBGYN CLINIC | Age: 37
End: 2017-12-18
Payer: MEDICARE

## 2017-12-18 VITALS
WEIGHT: 261 LBS | BODY MASS INDEX: 46.25 KG/M2 | SYSTOLIC BLOOD PRESSURE: 116 MMHG | HEIGHT: 63 IN | DIASTOLIC BLOOD PRESSURE: 60 MMHG

## 2017-12-18 DIAGNOSIS — E88.02 PLASMINOGEN DEFICIENCY: ICD-10-CM

## 2017-12-18 DIAGNOSIS — M05.20 RHEUMATOID ARTERITIS (HCC): ICD-10-CM

## 2017-12-18 DIAGNOSIS — D68.59 ANTITHROMBIN 3 DEFICIENCY (HCC): ICD-10-CM

## 2017-12-18 DIAGNOSIS — R76.8 ANA POSITIVE: Primary | ICD-10-CM

## 2017-12-18 LAB
SEND OUT REPORT: NORMAL
TEST NAME: NORMAL

## 2017-12-18 PROCEDURE — G8484 FLU IMMUNIZE NO ADMIN: HCPCS | Performed by: OBSTETRICS & GYNECOLOGY

## 2017-12-18 PROCEDURE — G8427 DOCREV CUR MEDS BY ELIG CLIN: HCPCS | Performed by: OBSTETRICS & GYNECOLOGY

## 2017-12-18 PROCEDURE — G8417 CALC BMI ABV UP PARAM F/U: HCPCS | Performed by: OBSTETRICS & GYNECOLOGY

## 2017-12-18 PROCEDURE — 99214 OFFICE O/P EST MOD 30 MIN: CPT | Performed by: OBSTETRICS & GYNECOLOGY

## 2017-12-18 PROCEDURE — 1036F TOBACCO NON-USER: CPT | Performed by: OBSTETRICS & GYNECOLOGY

## 2017-12-18 NOTE — PROGRESS NOTES
Yannick Flores  2017    YOB: 1980          The patient was seen today. She is here regarding labs . Her bowels are regular and she is voiding without difficulty. HPI:  Yannick Flores is a 30 y.o. female [id number] here for lab results-Equivocal JOSE (pt does admit to hx of rheumatoid arthritis), Antithrombin III level, elevated MARIELA-1, is wanting future pregnancy, neg ROS otherwise      Obstetric History       T2      L0     SAB2   TAB0   Ectopic0   Molar0   Multiple0   Live Births0       # Outcome Date GA Lbr Sebastián/2nd Weight Sex Delivery Anes PTL Lv   6 Current            5 Term            4 Term            3 TAB            2 TAB            1 SAB                   Past Medical History:   Diagnosis Date    Chronic back pain     GERD (gastroesophageal reflux disease)     Headache     Obesity     Rheumatoid arteritis        No past surgical history on file. No family history on file. Social History     Social History    Marital status: Single     Spouse name: N/A    Number of children: N/A    Years of education: N/A     Occupational History    Not on file.      Social History Main Topics    Smoking status: Never Smoker    Smokeless tobacco: Never Used    Alcohol use Yes    Drug use: No    Sexual activity: No     Other Topics Concern    Not on file     Social History Narrative    No narrative on file         MEDICATIONS:  Current Outpatient Prescriptions   Medication Sig Dispense Refill    sertraline (ZOLOFT) 50 MG tablet Take 1 tablet by mouth daily 30 tablet 3    omeprazole (PRILOSEC) 20 MG delayed release capsule Take by mouth      folic acid (FOLVITE) 1 MG tablet take 1 tablet by mouth once daily  0    metFORMIN (GLUCOPHAGE) 500 MG tablet take 1 tablet by mouth once daily with food 30 tablet 2    ibuprofen (ADVIL;MOTRIN) 800 MG tablet Take 1 tablet by mouth every 8 hours as needed for Pain 30 tablet 0    Prenatal Vit-Fe Fum-FA-Omega (ONE-A-DAY WOMENS Result Value Ref Range    JOSE (A) NEG     This test is equivocal, which represents a borderline JOSE result.   Suggest   Salas (HARSHAD) Antibody IgG   Result Value Ref Range    Anti-Smith 24 <100 U/mL   Antithrombin III Activity   Result Value Ref Range    AT-III Activity 73 (L) 83 - 122 %   MISCELLANEOUS TESTING   Result Value Ref Range    Test Name PLASMINOGEN ACTIVATOR INHIBITOR TYPE 1     Send Out Report          Anti-DNA Antibody, Double-Stranded   Result Value Ref Range    Anti ds DNA 56 <508 IU/mL   Follicle Stimulating Hormone   Result Value Ref Range    FSH 8.2 1.7 - 21.5 U/L   T4, Free   Result Value Ref Range    Thyroxine, Free 1.31 0.93 - 1.70 ng/dL   Glucose, Fasting   Result Value Ref Range    Glucose, Fasting 100 (H) 70 - 99 mg/dL   Insulin, total   Result Value Ref Range    Insulin Comment FASTING AM     Insulin 14.7 mU/L    Insulin Reference Range:         Lupus Anticoagulant   Result Value Ref Range    Lupus Anticoag NOT REPORTED     Protime 10.0 9.7 - 11.6 sec    INR 1.0     PTT 30.8 23 - 31 sec    Anticardiolipin IgG 12.4 <20 GPU    Cardiolipin Ab IgM 5.7 <20 MPU    Anticardiolipin IgA 3.3 <12 APU    Dilute Viper Venom Time NEGATIVE for Lupus Anticoagulant NLUP   Prolactin   Result Value Ref Range    Prolactin 8.69 4.79 - 23.30 ug/L   Sjogrens syndrome-A extractable nuclear antibody   Result Value Ref Range    Anti SSA 22 <100 U/mL   Sjogrens syndrome-B extractable nuclear antibody   Result Value Ref Range    Anti SSB 29 <100 U/mL   TSH without Reflex   Result Value Ref Range    TSH 2.39 0.30 - 5.00 mIU/L   Factor V Leiden Mutation   Result Value Ref Range    Factor V Leiden Mutation       (NOTE)  [id number] [facility]  MOLECULAR PATHOLOGY LABORATORY  [address] [place] [address], [address] [place], [address]  Tel [phone]) [facility] for Giovanna Bonds MD,   Irina Monreal MD  Specimen(s) Received:  Peripheral blood, FVLI  Clinical Information:  Recurrent Pregnancy Loss    RESULTS:    MOLECULAR GENETIC DIAGNOSIS:     Negative for Factor V Leiden Mutation    INTERPRETATION:    The Factor V Leiden mutation (1691GA)  [c.1601G>A(p.Xmf617Sgu)] was not detected in this study. This patient  may, however, still be at risk for venous thrombosis due to another  genetic predisposition including the Factor II (Prothrombin 30159UL)  mutation or either of the 5, 10-methylenetetrahydrofolate reductase  (MTHFR)  mutations (677T and U5055O)  Additional molecular testing is  available for these mutations in this laboratory. In addition, other  etiologies not studied in this assay may predispose this  patient to  venous thrombosis. Patients and their families undergoing molecular diagnostic testing  should understand that a normal result for such tests does not  preclude the unlikely possibility of genotyping errors occurring as a  result of rare genetic variants which can interfere with the analysis  or an unusual mutation of the gene(s) being studied. This molecular test has been approved for in vitro diagnostic use by  the U.S. FDA. This test is used for clinical purposes. Pursuant to  the requirements of CLIA '88, this laboratory has established and  verified the test's accuracy and precision. It should not be regarded  as investigational or for research. This laboratory is certified  under the [address] (CLIA  '88) as qualified to perform high complexity clinical laboratory  testing. METHODOLOGY: Factor V Leiden is a single point mutation in the Factor  V gene at nucleotide position 1691 involving a G to A substitution  ( 1691GA),  which predicts a single amino acid replacement ([id number])  at one of three activated protein C (APC) cleavage sites in the Factor  Va molecule.   Factor V Leiden is inactivated at an approximately  10-fold slower rate than normal Factor V and persists Negative for MTHFR 677T and  D1419I Mutations    INTERPRETATION:   Using the methodology described, no MTHFR 677T  [c.665C>T(p.Qdx565Ity)] or H4348O [c.1286A>C(p.Cwv215Znp)] mutation  was identified. The patient may, however, still be at risk for venous  thrombosis due to another genetic predisposition including the Factor  V Leiden mutation or Prothrombin 84721F mutation. Additional  molecular testing is available for these mutations in this laboratory. In addition, other etiologies not studied in this assay may  predispose this patient to venous thrombosis. Patients a nd their families undergoing molecular diagnostic testing  should understand that a normal result for such tests does not   preclude the unlikely possibility of genotyping errors occurring as a  result of rare genetic variants which can interfere with the analysis  or an unusual mutation of the gene(s) being studied. This molecular test has been approved for in vitro diagnostic use by  the U.S. FDA. This test is used for clinical purposes. Pursuant to  the requirements of CLIA '88, this laboratory has established and  verified the test's accuracy and precision. It should not be regarded  as investigational or for research. This laboratory is certified  under the [address] (CLIA  '88) as qualified to perform high complexity clinical laboratory  testing. METHODOLOGY: Hyperhomocysteinemia is associated with an increased risk  for cerebrovascular, peripheral vascular, and coronary heart disease. The 5, 10-methylenetetrahydrofolate reduct ase (MTHFR) gene produces an  enzyme that catalyzes the remethylation of homocysteine. The C677T  and L4243V mutations in this gene in either a heterozygous or  homozygous state correlate with reduced enzyme activity.   Increased  plasma homocysteine levels (hyperhomocysteinemia) as a result of  reduced enzyme activity and increased thermolability occur in  individuals homozygous for the C677T mutation or in individuals  carrying one C677T  mutation and one J5964K mutation (compound  heterozygosity). Patient DNA is assayed for the presence of wild type or mutant gene  sequences in the MTHFR gene by the Invader MTHFR test that utilizes  [employer] chemistry for detecting gene-specific sequences. Target  amplification is followed by the signal generation (Invader) phase of  the assay. The Invader assay employs a genetically engineered  nuclease, known as a Cleavasee enzyme, to recognize and cleave  specific genomic structures formed by the addition of two  oligonucleotide probes [W ild Type (WT) or Mutant (Mut)] to a nucleic  acid target. Fluorescence signal intensity is enhanced through a  second Cleavasee cleavage reaction and fluorescence resonance energy  transfer (FRET), with detection of fluorescent signal using a  fluorescence plate reader. Invader and Cleavase are registered  [facility].  This test is performed pursuant to an  agreement with [address] [place] [address].           **Electronically Signed Out**         Belen Tidwell M.D. Prothrombin 11902 Mutation Invader   Result Value Ref Range    Prothrombin Mutation       (NOTE)  [id number]  [facility]  MOLECULAR PATHOLOGY LABORATORY  [address], [address] [place], [address]  Tel [phone] ([facility]) [facility] for Eliecer Islas MD,   Belen Tidwell MD  Specimen(s) Received:  Peripheral blood, PTI  Clinical Information:  Recurrent Pregnancy Loss    RESULTS:  MOLECULAR GENETIC DIAGNOSIS:          Negative for Prothrombin 27641V  Mutation    INTERPRETATION:  The Factor II (Prothrombin) mutation (10894QJ)  [c.*97G>A] was not detected in this study.   This patient may, however,  still be at risk for venous thrombosis due to another genetic  predisposition including the Factor V Leiden (1691GA) mutation or  either of the 5, 10-methylenetetrahydrofolate reductase (MTHFR)  mutations (677T and D2982U). Additional molecular testing is  available for these mutations in this laboratory. In addition, other  etiologies not studied in this assay may predispose this  patient to  venous thrombosis. Patients and their families undergoing molecular diagnostic testing  should understand that a normal result for such tests  does not  preclude the unlikely possibility of genotyping errors occurring as a  result of rare genetic variants which can interfere with the analysis  or an unusual mutation of the gene(s) being studied. This molecular test has been approved for in vitro diagnostic use by  the U.S. FDA. This test is used for clinical purposes. Pursuant to  the requirements of CLIA '88, this laboratory has established and  verified the test's accuracy and precision. It should not be regarded  as investigational or for research. This laboratory is certified  under the [address] (CLIA  '88) as qualified to perform high complexity clinical laboratory  testing. METHODOLOGY:  Prothrombin is a precursor of the enzyme serine protease  thrombin that has procoagulant, anticoagulant, and antifibrinolytic   activities. A single point mutation has been identified in exon 14 at  position 11205 of the prothrombin gene ([id number]). This allele has a  population frequency of 1.2% and is associated with a 2.8 fold  increased risk of venous thrombosis in individuals of [place]   ancestry. Patient DNA is assayed for the presence of wild type or mutant gene  sequences in the Factor II (Prothrombin) gene by the Invader Factor II  test that utilizes [employer] chemistry for detecting gene-specific  sequences. Target amplification is followed by the signal generation  (Invader) phase of the assay.   The Invader assay employs a genetically  engineered nuclease, known as a Cleavasee enzyme, to recognize and  cleave specific genomic structures formed by the addition of two  oligonucleotide probes [Wild Type (WT) or Mutant (Mut)] to a nucleic  acid target. Fluorescence signal intensity is enhanced through a  second Cleavasee cleavage reaction and fluorescence resonance energy  transfer (FRET ), with detection of fluorescent signal using a  fluorescence plate reader. Invader and Cleavase are registered  [facility].  This test is performed pursuant to an  agreement with [address] [place] [address].          **Electronically Signed Out**         Alo Gooden M.D. Assessment:  1. JOSE positive  Mague Nielsen MD, [facility]*    JOSE Screen With Reflex    Antithrombin III Activity   2. Antithrombin 3 deficiency [facility])  Mague Nielsen MD, [facility]*    JOSE Screen With Reflex    Antithrombin III Activity   3. Plasminogen deficiency [facility])  Mague Nielsen MD, [facility]*   4. Rheumatoid arteritis       Patient Active Problem List    Diagnosis Date Noted    JOSE positive 12/18/2017    Antithrombin 3 deficiency ([facility] [address] 12/18/2017    Rheumatoid arteritis 12/18/2017    Pre-diabetes 09/19/2017    Chronic bilateral low back pain without sciatica 02/21/2017    Migraine without aura and without status migrainosus, not intractable 02/21/2017    Gastroesophageal reflux disease without esophagitis 02/21/2017    Morbid obesity due to excess calories ([facility] [address] 02/21/2017           PLAN:  No Follow-up on file. Referral to [facility] for preconceptional counseling,   Begin baby ASA daily, continue folic acid  Repeat labs 3-5 weeks  Repeat Annual every 1 year  Cervical Cytology Evaluation begins at 24years old. If Negative Cytology, Follow-up screening per current guidelines. Return to the office in prn weeks. Counseled on preventative health maintenance follow-up.   Orders Placed This Encounter Procedures    JOSE Screen With Reflex     Standing Status:   Future     Standing Expiration Date:   12/18/2018    Antithrombin III Activity    Tello Pyle MD, [facility] [place]*     Referral Priority:   Routine     Referral Type:   Consult for Advice and Opinion     Referral Reason:   Specialty Services Required     Referred to Provider:   Jewell Pitt MD     Requested Specialty:   Perinatology     Number of Visits Requested:   1       Patient was seen with total face to face time of 25 minutes. More than 50% of this visit was counseling and education regarding The primary encounter diagnosis was JOSE positive. Diagnoses of Antithrombin 3 deficiency ([address] Plasminogen deficiency ([address] and Rheumatoid arteritis were also pertinent to this visit. and Other (go over labs- recent SAB's)   as well as  counseling on preventative health maintenance follow-up.

## 2017-12-27 ENCOUNTER — HOSPITAL ENCOUNTER (OUTPATIENT)
Age: 37
Discharge: HOME | End: 2017-12-27
Payer: MEDICARE

## 2017-12-27 DIAGNOSIS — D68.59 ANTITHROMBIN 3 DEFICIENCY (HCC): ICD-10-CM

## 2017-12-27 DIAGNOSIS — N96 MULTIPLE PREGNANCY LOSS, NOT CURRENTLY PREGNANT: ICD-10-CM

## 2017-12-27 DIAGNOSIS — R76.8 ANA POSITIVE: ICD-10-CM

## 2017-12-27 LAB — PROGESTERONE LEVEL: 5.93 NG/ML

## 2017-12-27 PROCEDURE — 85300 ANTITHROMBIN III ACTIVITY: CPT

## 2017-12-27 PROCEDURE — 86038 ANTINUCLEAR ANTIBODIES: CPT

## 2017-12-27 PROCEDURE — 84144 ASSAY OF PROGESTERONE: CPT

## 2017-12-27 PROCEDURE — 36415 COLL VENOUS BLD VENIPUNCTURE: CPT

## 2017-12-28 LAB — ANTI-NUCLEAR ANTIBODY (ANA): NEGATIVE

## 2017-12-29 LAB — AT-III ACTIVITY: 91 % (ref 83–122)

## 2018-01-23 NOTE — PROGRESS NOTES
Subjective:      Patient ID: Farzaneh Officer is a 30 y.o. female. HPI  G 6 P 2 presents for office visit , with complaints of tan vag discharge, skin is irritated  LMP 1/7/18   , no fever chills, pelvic pain dysuria,  Using Olay body wash switch to unscented . 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 HENT: Negative for sneezing, sore throat, tinnitus and trouble swallowing. Gastrointestinal: Negative for abdominal distention, abdominal pain, constipation, diarrhea, nausea and vomiting. Genitourinary: Negative for decreased urine volume, difficulty urinating, dyspareunia, dysuria, flank pain, frequency, genital sores, menstrual problem, pelvic pain, vaginal bleeding and vaginal discharge. Musculoskeletal: Negative for arthralgias, back pain, gait problem and joint swelling. Skin: Negative for color change, pallor and rash. Neurological: Negative for dizziness, tremors, seizures, syncope, facial asymmetry, light-headedness, numbness and headaches. Hematological: Negative for adenopathy. Does not bruise/bleed easily. Psychiatric/Behavioral: Negative for agitation, behavioral problems, confusion, decreased concentration and dysphoric mood. Objective:   Physical Exam   Constitutional: She is oriented to person, place, and time. She appears well-developed and well-nourished. Abdominal: Soft. Bowel sounds are normal. She exhibits no distension. There is no tenderness. There is no rebound and no guarding. Genitourinary: Uterus normal. Vaginal discharge found. Genitourinary Comments: Mod amount white discharge  Cultures collected no CMT    Musculoskeletal: Normal range of motion. She exhibits no edema or tenderness. Neurological: She is alert and oriented to person, place, and time. Skin: Skin is warm and dry. No rash noted. No erythema.

## 2018-01-24 ENCOUNTER — OFFICE VISIT (OUTPATIENT)
Dept: OBGYN CLINIC | Age: 38
End: 2018-01-24
Payer: MEDICARE

## 2018-01-24 ENCOUNTER — HOSPITAL ENCOUNTER (OUTPATIENT)
Age: 38
Setting detail: SPECIMEN
Discharge: HOME | End: 2018-01-24
Payer: MEDICARE

## 2018-01-24 VITALS
DIASTOLIC BLOOD PRESSURE: 70 MMHG | HEART RATE: 68 BPM | RESPIRATION RATE: 18 BRPM | WEIGHT: 263 LBS | BODY MASS INDEX: 46.59 KG/M2 | SYSTOLIC BLOOD PRESSURE: 116 MMHG

## 2018-01-24 DIAGNOSIS — N89.8 VAGINAL DISCHARGE: Primary | ICD-10-CM

## 2018-01-24 DIAGNOSIS — N89.8 VAGINAL IRRITATION: ICD-10-CM

## 2018-01-24 DIAGNOSIS — N89.8 VAGINAL DISCHARGE: ICD-10-CM

## 2018-01-24 LAB
DIRECT EXAM: ABNORMAL
Lab: ABNORMAL
SPECIMEN DESCRIPTION: ABNORMAL
STATUS: ABNORMAL

## 2018-01-24 PROCEDURE — G8484 FLU IMMUNIZE NO ADMIN: HCPCS | Performed by: NURSE PRACTITIONER

## 2018-01-24 PROCEDURE — 1036F TOBACCO NON-USER: CPT | Performed by: NURSE PRACTITIONER

## 2018-01-24 PROCEDURE — G8417 CALC BMI ABV UP PARAM F/U: HCPCS | Performed by: NURSE PRACTITIONER

## 2018-01-24 PROCEDURE — 99213 OFFICE O/P EST LOW 20 MIN: CPT | Performed by: NURSE PRACTITIONER

## 2018-01-24 PROCEDURE — G8427 DOCREV CUR MEDS BY ELIG CLIN: HCPCS | Performed by: NURSE PRACTITIONER

## 2018-01-24 RX ORDER — CLOTRIMAZOLE AND BETAMETHASONE DIPROPIONATE 10; .64 MG/G; MG/G
CREAM TOPICAL
Qty: 15 G | Refills: 0 | Status: SHIPPED | OUTPATIENT
Start: 2018-01-24 | End: 2018-03-05 | Stop reason: ALTCHOICE

## 2018-01-24 ASSESSMENT — ENCOUNTER SYMPTOMS
CONSTIPATION: 0
SORE THROAT: 0
ABDOMINAL DISTENTION: 0
TROUBLE SWALLOWING: 0
VOMITING: 0
ABDOMINAL PAIN: 0
BACK PAIN: 0
NAUSEA: 0
DIARRHEA: 0
COLOR CHANGE: 0

## 2018-01-25 LAB
C TRACH DNA GENITAL QL NAA+PROBE: NEGATIVE
N. GONORRHOEAE DNA: NEGATIVE

## 2018-01-27 LAB
CULTURE: NORMAL
CULTURE: NORMAL
Lab: NORMAL
SPECIMEN DESCRIPTION: NORMAL
STATUS: NORMAL

## 2018-01-30 ENCOUNTER — TELEPHONE (OUTPATIENT)
Dept: OBGYN CLINIC | Age: 38
End: 2018-01-30

## 2018-01-30 RX ORDER — FLUCONAZOLE 150 MG/1
150 TABLET ORAL WEEKLY
Qty: 2 TABLET | Refills: 0 | Status: SHIPPED | OUTPATIENT
Start: 2018-01-30 | End: 2018-03-05 | Stop reason: ALTCHOICE

## 2018-01-30 NOTE — TELEPHONE ENCOUNTER
CALLED IN TO GET RESULTS ON HER TESTS.  WAS TOLD THAT YEAST WAS FOUND AND THAT WE NEEDED TO KNOW WHAT PHARMACY TO CALL IN A RX.   SHE WOULD LIKE IT CALLED IN TO THE [facility] [address]

## 2018-03-05 ENCOUNTER — INITIAL CONSULT (OUTPATIENT)
Dept: PERINATAL CARE | Age: 38
End: 2018-03-05
Payer: MEDICARE

## 2018-03-05 VITALS
WEIGHT: 261 LBS | BODY MASS INDEX: 46.25 KG/M2 | HEIGHT: 63 IN | SYSTOLIC BLOOD PRESSURE: 100 MMHG | TEMPERATURE: 98.4 F | HEART RATE: 88 BPM | DIASTOLIC BLOOD PRESSURE: 68 MMHG | RESPIRATION RATE: 20 BRPM

## 2018-03-05 DIAGNOSIS — O09.529 ANTEPARTUM MULTIGRAVIDA OF ADVANCED MATERNAL AGE: ICD-10-CM

## 2018-03-05 DIAGNOSIS — Z31.69 GENERAL COUNSELING AND ADVICE FOR PROCREATIVE MANAGEMENT: Primary | ICD-10-CM

## 2018-03-05 DIAGNOSIS — O99.810 ABNORMAL MATERNAL GLUCOSE TOLERANCE, ANTEPARTUM: ICD-10-CM

## 2018-03-05 DIAGNOSIS — O26.20 RECURRENT PREGNANCY LOSS, ANTEPARTUM CONDITION OR COMPLICATION: ICD-10-CM

## 2018-03-05 DIAGNOSIS — O99.210 OBESITY AFFECTING PREGNANCY, ANTEPARTUM: ICD-10-CM

## 2018-03-05 PROCEDURE — G8417 CALC BMI ABV UP PARAM F/U: HCPCS | Performed by: OBSTETRICS & GYNECOLOGY

## 2018-03-05 PROCEDURE — 99242 OFF/OP CONSLTJ NEW/EST SF 20: CPT | Performed by: OBSTETRICS & GYNECOLOGY

## 2018-03-05 PROCEDURE — G8484 FLU IMMUNIZE NO ADMIN: HCPCS | Performed by: OBSTETRICS & GYNECOLOGY

## 2018-03-05 PROCEDURE — G8428 CUR MEDS NOT DOCUMENT: HCPCS | Performed by: OBSTETRICS & GYNECOLOGY

## 2018-03-05 RX ORDER — IBUPROFEN 800 MG/1
800 TABLET ORAL EVERY 6 HOURS PRN
COMMUNITY
End: 2018-06-19

## 2018-03-05 RX ORDER — FOLIC ACID 1 MG/1
1 TABLET ORAL DAILY
Status: ON HOLD | COMMUNITY
End: 2021-11-24 | Stop reason: HOSPADM

## 2018-03-19 ENCOUNTER — HOSPITAL ENCOUNTER (OUTPATIENT)
Age: 38
Discharge: HOME | End: 2018-03-19
Payer: MEDICARE

## 2018-03-19 LAB
GLUCOSE ADMINISTRATION: 50
GLUCOSE TOLERANCE SCREEN 50G: 145 MG/DL (ref 70–135)

## 2018-03-19 PROCEDURE — 82950 GLUCOSE TEST: CPT

## 2018-03-19 PROCEDURE — 83036 HEMOGLOBIN GLYCOSYLATED A1C: CPT

## 2018-03-19 PROCEDURE — 36415 COLL VENOUS BLD VENIPUNCTURE: CPT

## 2018-03-20 LAB
ESTIMATED AVERAGE GLUCOSE: 123 MG/DL
HBA1C MFR BLD: 5.9 % (ref 4–6)

## 2018-03-22 ENCOUNTER — OFFICE VISIT (OUTPATIENT)
Dept: OBGYN CLINIC | Age: 38
End: 2018-03-22
Payer: MEDICARE

## 2018-03-22 VITALS
BODY MASS INDEX: 45.07 KG/M2 | WEIGHT: 264 LBS | SYSTOLIC BLOOD PRESSURE: 116 MMHG | HEIGHT: 64 IN | DIASTOLIC BLOOD PRESSURE: 78 MMHG

## 2018-03-22 DIAGNOSIS — E88.81 INSULIN RESISTANCE: Primary | ICD-10-CM

## 2018-03-22 PROCEDURE — G8482 FLU IMMUNIZE ORDER/ADMIN: HCPCS | Performed by: OBSTETRICS & GYNECOLOGY

## 2018-03-22 PROCEDURE — G8417 CALC BMI ABV UP PARAM F/U: HCPCS | Performed by: OBSTETRICS & GYNECOLOGY

## 2018-03-22 PROCEDURE — G8427 DOCREV CUR MEDS BY ELIG CLIN: HCPCS | Performed by: OBSTETRICS & GYNECOLOGY

## 2018-03-22 PROCEDURE — 99213 OFFICE O/P EST LOW 20 MIN: CPT | Performed by: OBSTETRICS & GYNECOLOGY

## 2018-03-22 PROCEDURE — 1036F TOBACCO NON-USER: CPT | Performed by: OBSTETRICS & GYNECOLOGY

## 2018-03-22 NOTE — PROGRESS NOTES
of 03/22/2018 - Review Complete 03/22/2018   Allergen Reaction Noted    Iodine Hives and Itching 02/21/2017         REVIEW OF SYSTEMS:    neg   A minimum of an eleven point review of systems was completed. Review Of Systems (11 point):  Constitutional: No fever, chills or malaise; No weight change or fatigue  Head and Eyes: No vision, Headache, Dizziness or trauma in last 12 months  ENT ROS: No hearing, Tinnitis, sinus or taste problems  Hematological and Lymphatic ROS:No Lymphoma, Von Willebrand's, Hemophillia or Bleeding History  Psych ROS: No Depression, Homicidal thoughts,suicidal thoughts, or anxiety  Breast ROS: No prior breast abnormalities or lumps  Respiratory ROS: No SOB, Pneumoniae,Cough, or Pulmonary Embolism History  Cardiovascular ROS: No Chest Pain with Exertion, Palpitations, Syncope, Edema, Arrhythmia  Gastrointestinal ROS: No Indigestion, Heartburn, Nausea, vomiting, Diarrhea, Constipation,or Bowel Changes; No Bloody Stools or melena  Genito-Urinary ROS: No Dysuria, Hematuria or Nocturia. No Urinary Incontinence or Vaginal Discharge  Musculoskeletal ROS: No Arthralgia, Arthritis,Gout,Osteoporosis or Rheumatism  Neurological ROS: No CVA, Migraines, Epilepsy, Seizure Hx, or Limb Weakness  Dermatological ROS: No Rash, Itching, Hives, Mole Changes or Cancer          Blood pressure 116/78, height 5' 4\" (1.626 m), weight 264 lb (119.7 kg), last menstrual period 03/10/2018, not currently breastfeeding. Abdomen: Soft non-tender; good bowel sounds. No guarding, rebound or rigidity. No CVA tenderness bilaterally. Extremities: No calf tenderness, DTR 2/4, and No edema bilaterally    Pelvic: Exam deferred. Diagnostics:  No results found.     Lab Results:  Results for orders placed or performed during the hospital encounter of 03/19/18   Glucose tolerance, 1 hour   Result Value Ref Range    GLU ADMN 50     Glucose tolerance screen 50g 145 (H) 70 - 135 mg/dL   Hemoglobin A1C   Result Value Ref

## 2018-04-19 NOTE — TELEPHONE ENCOUNTER
----- Message from Troy Terrell CNP sent at 12/11/2017 10:15 AM EST -----  [facility] is great  Needs to be under 20 when trying for pregnancy ,  She is going to get Progesterone on day 21   Her Blood sugar is slightly elevated  The blood sugar  Insulin ratio is 6 not true insulin resistance , 4 and under is  Insulin resistance [facility] PAIN PROGRAM  FOLLOW UP VISIT    Date of Initial Visit: 3/21/2017    PRIMARY CARE PHYSICIAN:  Indra Dyer MD      Chief Complaint   Patient presents with   • Back Pain   • Hand Pain     susi   • Neck Pain       INTERVAL HISTORY:  Patient is a 52 year old male, who was last seen on 10/19/2017 for low back, neck and diffuse pain. Patient presents today for follow-up of the same.    At the patient's last visit, he reported ongoing pain unimproved.  He was given the formal diagnosis of fibromyalgia, most of the visit was spent counseling and educating.  He reported restless legs as very bothersome, he was switched back from the Lyrica to the gabapentin in hopes of improving this.  He was started on duloxetine.     In the interim, he reported pain unchanged.  He did not tolerate the duloxetine and stopped taking it.     Today, he tells me that his pain has been \"horrible\", especially the back pain.  He is having pain in his cuticles, hands, legs, low back and various places.  He sometimes has pain in his shoulders if he raises his arm too high.  He likes to sit on the computer for about 3 hours at night and he has to shift positions constantly and then when he goes up to bed he is in even more pain.  He has noticed the restless legs seem better since resuming the gabapentin.  He talks the entire visit and then some.  He asks me today if he can have an opioid medication at the next visit to take the edge off.  He did get the TENS unit in the mail, yesterday and hasn't had an opportunity to try it out yet.  He tells me that he smokes \"a bowl every night\" as it helps him to relax.  He doesn't feel that the diclofenac gel he was given by Dr. Viveros does \"anything\" and the lidoderm patches are useless.  He then tells me that he is smoking again and has tried the nicotine patches which don't help either.  He thinks he has a theory that he was once told he has thick skin.  He goes on in this  fashion the entire visit.     REVIEW OF SYSTEMS    General: None  Musculoskeletal:  Joint swelling, Back pain and Joint stiffness  Neurological:  None  Psychological:  None    AVG PAIN LEVEL 6/10 (PREVIOUS 6/10)  FUNCTIONAL PAIN SCORE 65/70 (PREVIOUS 65/70)  MOOD SCORE 7/10 (PREVIOUS 5/10)  PHQ2: 6 :  PHQ9: 22, Letter previously sent to PCP      PREVIOUS TREATMENTS/RESPONSE:  Physical therapy: Not Helpful  Meloxicam: Not Helpful  Tizanidine: Not Helpful  Duloxetine: Nausea  Diclofenac: Not Helpful  Lidoderm patches: Not Helpful    CURRENT MEDICATIONS:  Per Epic Record.    Pain Management prescribes the following medications:  Gabapentin 600 mg tid   Baclofen 10 mg TID PRN   Ibuprofen 800 mg TID PRN (Prilosec 20 mg daily)  Lidoderm Patches PRN       ALLERGIES:   Allergen Reactions   • Sulfa Antibiotics HIVES   • Metformin GI UPSET and NAUSEA     Bloating        PHYSICAL EXAM:  Visit Vitals  /72 (BP Location: [facility], Patient Position: Sitting, Cuff Size: Regular)   Pulse 97   Ht 5' 7\" (1.702 m)   Wt 113.4 kg   SpO2 95%   BMI 39.16 kg/m²     GENERAL: Patient is a 52 year old male. Alert and answers questions appropriately. Does not appear somnolent nor intoxicated. In no apparent distress. AAO x 3. Obese.  HEENT: Pupils equal, are not inappropriately constricted or dilated. Sclerae anicteric without conjunctival injection, no increased lacrimation, no discharge.  RESPIRATORY:  Normal respiration unlabored.   SKIN: Skin is warm and dry. Skin and fingernails are without trophic changes. No visible erythema, lesions, or wounds.  PERIPHERAL VASCULAR: No peripheral edema.   MUSCULOSKELETAL: Patient is able to rise independently to a standing position and ambulates with a cane.        PERTINENT IMAGING AND DIAGNOSTIC STUDIES:  No new tests relevant to today's visit.      CONTROLLED SUBSTANCE RISK ASSESSMENT:  SOAPP= 12  Evidence of past/present substance abuse: None identified  Past aberrant opiate behaviors: Marijuana  use, current  Any new aberrant opiate behaviors: Asking for opioids today 4/19/2018    Last UDS: NA, we are not prescribing opioids.    [place] Drug Rx Monitoring Report Reviewed?: yes, no abberancies noted.    Is the patient a potentially fertile female? No    Has patient been prescribed naloxone for use at home if necessary?  NA      ASSESSMENT:  1. Fibromyalgia    2. Uses marijuana    3. DDD (degenerative disc disease), cervical    4.  Opioid Risk Assessment: Very High        PLAN:   1. Continue gabapentin, Baclofen and Ibuprofen, Lidoderm patches Daily PRN.   2. TENS unit use  3. Letter to be sent to patient for CBD oil use  4. Information regarding low dose naltrexone provided to patient today, will consider at follow up  5. Patient reports he smokes \"a bowl every night\", marijuana use as it helps him to relax.  I did discourage this, gave him information regarding CBD oil use.  He is asking today if the TENS unit and CBD oil don't help, maybe next visit he could be prescribed an opioid.  This is considered drug seeking.    6. Follow up in 3 months    WOODY Lugo

## 2018-04-24 ENCOUNTER — OFFICE VISIT (OUTPATIENT)
Dept: FAMILY MEDICINE CLINIC | Age: 38
End: 2018-04-24
Payer: MEDICARE

## 2018-04-24 VITALS
DIASTOLIC BLOOD PRESSURE: 69 MMHG | HEIGHT: 64 IN | HEART RATE: 102 BPM | BODY MASS INDEX: 46.1 KG/M2 | SYSTOLIC BLOOD PRESSURE: 94 MMHG | TEMPERATURE: 98.1 F | WEIGHT: 270 LBS | RESPIRATION RATE: 16 BRPM

## 2018-04-24 DIAGNOSIS — K58.2 IRRITABLE BOWEL SYNDROME WITH BOTH CONSTIPATION AND DIARRHEA: Primary | ICD-10-CM

## 2018-04-24 PROCEDURE — G8417 CALC BMI ABV UP PARAM F/U: HCPCS | Performed by: INTERNAL MEDICINE

## 2018-04-24 PROCEDURE — G8427 DOCREV CUR MEDS BY ELIG CLIN: HCPCS | Performed by: INTERNAL MEDICINE

## 2018-04-24 PROCEDURE — 1036F TOBACCO NON-USER: CPT | Performed by: INTERNAL MEDICINE

## 2018-04-24 PROCEDURE — 99213 OFFICE O/P EST LOW 20 MIN: CPT | Performed by: INTERNAL MEDICINE

## 2018-04-24 RX ORDER — ZINC OXIDE 13 %
1 CREAM (GRAM) TOPICAL DAILY
Qty: 30 CAPSULE | Refills: 2 | Status: SHIPPED | OUTPATIENT
Start: 2018-04-24 | End: 2018-06-19

## 2018-04-24 RX ORDER — DICYCLOMINE HYDROCHLORIDE 10 MG/1
10 CAPSULE ORAL 2 TIMES DAILY
Qty: 60 CAPSULE | Refills: 2 | Status: SHIPPED | OUTPATIENT
Start: 2018-04-24 | End: 2018-06-19 | Stop reason: SDUPTHER

## 2018-04-24 ASSESSMENT — ENCOUNTER SYMPTOMS
HEMOPTYSIS: 0
DOUBLE VISION: 0
COUGH: 0
ABDOMINAL PAIN: 0
NAUSEA: 0
BLURRED VISION: 0
HEARTBURN: 0

## 2018-05-21 ENCOUNTER — HOSPITAL ENCOUNTER (OUTPATIENT)
Age: 38
Setting detail: SPECIMEN
Discharge: HOME | End: 2018-05-21
Payer: MEDICARE

## 2018-05-21 ENCOUNTER — OFFICE VISIT (OUTPATIENT)
Dept: OBGYN CLINIC | Age: 38
End: 2018-05-21
Payer: MEDICARE

## 2018-05-21 VITALS
WEIGHT: 260 LBS | BODY MASS INDEX: 46.07 KG/M2 | DIASTOLIC BLOOD PRESSURE: 60 MMHG | SYSTOLIC BLOOD PRESSURE: 118 MMHG | HEIGHT: 63 IN

## 2018-05-21 DIAGNOSIS — R10.2 PELVIC PAIN IN FEMALE: ICD-10-CM

## 2018-05-21 DIAGNOSIS — Z01.419 PAP SMEAR, AS PART OF ROUTINE GYNECOLOGICAL EXAMINATION: Primary | ICD-10-CM

## 2018-05-21 PROCEDURE — 99395 PREV VISIT EST AGE 18-39: CPT | Performed by: NURSE PRACTITIONER

## 2018-05-21 ASSESSMENT — ENCOUNTER SYMPTOMS
DIARRHEA: 0
BACK PAIN: 0
SORE THROAT: 0
NAUSEA: 0
CONSTIPATION: 0
TROUBLE SWALLOWING: 0
ABDOMINAL PAIN: 0
STRIDOR: 0
ABDOMINAL DISTENTION: 0
COLOR CHANGE: 0
SHORTNESS OF BREATH: 0
COUGH: 0
VOICE CHANGE: 0

## 2018-05-30 ENCOUNTER — HOSPITAL ENCOUNTER (OUTPATIENT)
Dept: ULTRASOUND IMAGING | Age: 38
Discharge: HOME | End: 2018-06-01
Payer: MEDICARE

## 2018-05-30 DIAGNOSIS — R10.2 PELVIC PAIN IN FEMALE: ICD-10-CM

## 2018-05-30 PROCEDURE — 76856 US EXAM PELVIC COMPLETE: CPT

## 2018-05-30 PROCEDURE — 76830 TRANSVAGINAL US NON-OB: CPT

## 2018-06-16 LAB — CYTOLOGY REPORT: NORMAL

## 2018-06-19 ENCOUNTER — HOSPITAL ENCOUNTER (OUTPATIENT)
Age: 38
Setting detail: SPECIMEN
Discharge: HOME | End: 2018-06-19
Payer: MEDICARE

## 2018-06-19 ENCOUNTER — OFFICE VISIT (OUTPATIENT)
Dept: FAMILY MEDICINE CLINIC | Age: 38
End: 2018-06-19
Payer: MEDICARE

## 2018-06-19 VITALS
TEMPERATURE: 98.5 F | BODY MASS INDEX: 45.79 KG/M2 | DIASTOLIC BLOOD PRESSURE: 84 MMHG | WEIGHT: 258.4 LBS | SYSTOLIC BLOOD PRESSURE: 110 MMHG | HEART RATE: 98 BPM | RESPIRATION RATE: 16 BRPM | HEIGHT: 63 IN

## 2018-06-19 DIAGNOSIS — G89.29 CHRONIC BILATERAL LOW BACK PAIN WITHOUT SCIATICA: Primary | ICD-10-CM

## 2018-06-19 DIAGNOSIS — G89.29 CHRONIC BILATERAL LOW BACK PAIN WITHOUT SCIATICA: ICD-10-CM

## 2018-06-19 DIAGNOSIS — M54.50 CHRONIC BILATERAL LOW BACK PAIN WITHOUT SCIATICA: ICD-10-CM

## 2018-06-19 DIAGNOSIS — M54.50 CHRONIC BILATERAL LOW BACK PAIN WITHOUT SCIATICA: Primary | ICD-10-CM

## 2018-06-19 DIAGNOSIS — K58.2 IRRITABLE BOWEL SYNDROME WITH BOTH CONSTIPATION AND DIARRHEA: ICD-10-CM

## 2018-06-19 PROBLEM — O99.810 ABNORMAL MATERNAL GLUCOSE TOLERANCE, ANTEPARTUM: Status: RESOLVED | Noted: 2018-03-05 | Resolved: 2018-06-19

## 2018-06-19 PROBLEM — Z31.69 GENERAL COUNSELING AND ADVICE FOR PROCREATIVE MANAGEMENT: Status: RESOLVED | Noted: 2018-03-05 | Resolved: 2018-06-19

## 2018-06-19 PROBLEM — O09.529 ANTEPARTUM MULTIGRAVIDA OF ADVANCED MATERNAL AGE: Status: RESOLVED | Noted: 2018-03-05 | Resolved: 2018-06-19

## 2018-06-19 LAB
-: NORMAL
AMORPHOUS: NORMAL
BACTERIA: NORMAL
BILIRUBIN URINE: NEGATIVE
BILIRUBIN, POC: NEGATIVE
BLOOD URINE, POC: NEGATIVE
CASTS UA: NORMAL /LPF (ref 0–8)
CLARITY, POC: ABNORMAL
COLOR, POC: YELLOW
COLOR: YELLOW
COMMENT UA: ABNORMAL
CRYSTALS, UA: NORMAL /HPF
EPITHELIAL CELLS UA: NORMAL /HPF (ref 0–5)
GLUCOSE URINE, POC: NEGATIVE
GLUCOSE URINE: NEGATIVE
KETONES, POC: NEGATIVE
KETONES, URINE: NEGATIVE
LEUKOCYTE EST, POC: ABNORMAL
LEUKOCYTE ESTERASE, URINE: ABNORMAL
MUCUS: NORMAL
NITRITE, POC: NEGATIVE
NITRITE, URINE: NEGATIVE
OTHER OBSERVATIONS UA: NORMAL
PH UA: 5 (ref 5–8)
PH, POC: 5.5
PROTEIN UA: NEGATIVE
PROTEIN, POC: NEGATIVE
RBC UA: NORMAL /HPF (ref 0–4)
RENAL EPITHELIAL, UA: NORMAL /HPF
SPECIFIC GRAVITY UA: 1.02 (ref 1–1.03)
SPECIFIC GRAVITY, POC: 1.03
TRICHOMONAS: NORMAL
TURBIDITY: ABNORMAL
URINE HGB: NEGATIVE
UROBILINOGEN, POC: 0.2
UROBILINOGEN, URINE: NORMAL
WBC UA: NORMAL /HPF (ref 0–5)
YEAST: NORMAL

## 2018-06-19 PROCEDURE — 1036F TOBACCO NON-USER: CPT | Performed by: INTERNAL MEDICINE

## 2018-06-19 PROCEDURE — G8417 CALC BMI ABV UP PARAM F/U: HCPCS | Performed by: INTERNAL MEDICINE

## 2018-06-19 PROCEDURE — 99213 OFFICE O/P EST LOW 20 MIN: CPT | Performed by: INTERNAL MEDICINE

## 2018-06-19 PROCEDURE — G8427 DOCREV CUR MEDS BY ELIG CLIN: HCPCS | Performed by: INTERNAL MEDICINE

## 2018-06-19 PROCEDURE — 96160 PT-FOCUSED HLTH RISK ASSMT: CPT | Performed by: INTERNAL MEDICINE

## 2018-06-19 RX ORDER — NABUMETONE 750 MG/1
TABLET, FILM COATED ORAL
Refills: 0 | COMMUNITY
Start: 2018-04-30 | End: 2018-06-19

## 2018-06-19 RX ORDER — VITAMIN A 3000 MCG
CAPSULE ORAL
Refills: 0 | COMMUNITY
Start: 2018-04-25 | End: 2018-06-19

## 2018-06-19 RX ORDER — DICYCLOMINE HYDROCHLORIDE 10 MG/1
10 CAPSULE ORAL 2 TIMES DAILY
Qty: 60 CAPSULE | Refills: 2 | Status: SHIPPED | OUTPATIENT
Start: 2018-06-19 | End: 2018-07-31 | Stop reason: SDUPTHER

## 2018-06-19 RX ORDER — CEPHALEXIN 500 MG/1
CAPSULE ORAL
Refills: 0 | COMMUNITY
Start: 2018-06-06 | End: 2018-06-19

## 2018-06-19 ASSESSMENT — ENCOUNTER SYMPTOMS
BLURRED VISION: 0
NAUSEA: 0
ABDOMINAL PAIN: 0
HEARTBURN: 0
HEMOPTYSIS: 0
COUGH: 0
DOUBLE VISION: 0

## 2018-06-19 ASSESSMENT — PATIENT HEALTH QUESTIONNAIRE - PHQ9
SUM OF ALL RESPONSES TO PHQ9 QUESTIONS 1 & 2: 0
6. FEELING BAD ABOUT YOURSELF - OR THAT YOU ARE A FAILURE OR HAVE LET YOURSELF OR YOUR FAMILY DOWN: 0
10. IF YOU CHECKED OFF ANY PROBLEMS, HOW DIFFICULT HAVE THESE PROBLEMS MADE IT FOR YOU TO DO YOUR WORK, TAKE CARE OF THINGS AT HOME, OR GET ALONG WITH OTHER PEOPLE: 0
2. FEELING DOWN, DEPRESSED OR HOPELESS: 0
4. FEELING TIRED OR HAVING LITTLE ENERGY: 0
9. THOUGHTS THAT YOU WOULD BE BETTER OFF DEAD, OR OF HURTING YOURSELF: 0
SUM OF ALL RESPONSES TO PHQ QUESTIONS 1-9: 0
3. TROUBLE FALLING OR STAYING ASLEEP: 0
1. LITTLE INTEREST OR PLEASURE IN DOING THINGS: 0
5. POOR APPETITE OR OVEREATING: 0
7. TROUBLE CONCENTRATING ON THINGS, SUCH AS READING THE NEWSPAPER OR WATCHING TELEVISION: 0
8. MOVING OR SPEAKING SO SLOWLY THAT OTHER PEOPLE COULD HAVE NOTICED. OR THE OPPOSITE, BEING SO FIGETY OR RESTLESS THAT YOU HAVE BEEN MOVING AROUND A LOT MORE THAN USUAL: 0

## 2018-06-20 LAB
CULTURE: NORMAL
Lab: NORMAL
SPECIMEN DESCRIPTION: NORMAL
STATUS: NORMAL

## 2018-07-30 ENCOUNTER — TELEPHONE (OUTPATIENT)
Dept: FAMILY MEDICINE CLINIC | Age: 38
End: 2018-07-30

## 2018-07-30 NOTE — TELEPHONE ENCOUNTER
[facility] Transitions Initial Follow Up Call    Outreach made within 2 business days of discharge: Yes    Patient: Koko Alegre Patient : 1980   MRN: [id number]  Reason for Admission: There are no discharge diagnoses documented for the most recent discharge. Discharge Date: 10/23/17       Spoke with: Patient    Discharge department/facility: [facility] Interactive Patient Contact:  Was patient able to fill all prescriptions: Yes, patient prescribed norco   Was patient instructed to bring all medications to the follow-up visit: Yes  Is patient taking all medications as directed in the discharge summary?  Yes  Does patient understand their discharge instructions: unknown   Does patient have questions or concerns that need addressed prior to 7-14 day follow up office visit: no    Scheduled appointment with PCP within 7-14 days    Follow Up  Future Appointments  Date Time Provider Ted Echeverria   2018 3:00 PM MD Prachi Moran [id number]   2018 1:00 PM MD Prachi Moran Ped [id number] [address]

## 2018-07-31 ENCOUNTER — OFFICE VISIT (OUTPATIENT)
Dept: FAMILY MEDICINE CLINIC | Age: 38
End: 2018-07-31
Payer: MEDICARE

## 2018-07-31 ENCOUNTER — TELEPHONE (OUTPATIENT)
Dept: OBGYN CLINIC | Age: 38
End: 2018-07-31

## 2018-07-31 VITALS
HEART RATE: 88 BPM | TEMPERATURE: 98.2 F | DIASTOLIC BLOOD PRESSURE: 76 MMHG | SYSTOLIC BLOOD PRESSURE: 108 MMHG | WEIGHT: 259.4 LBS | RESPIRATION RATE: 16 BRPM | BODY MASS INDEX: 45.96 KG/M2 | HEIGHT: 63 IN

## 2018-07-31 DIAGNOSIS — Z13.220 LIPID SCREENING: ICD-10-CM

## 2018-07-31 DIAGNOSIS — R73.03 PRE-DIABETES: ICD-10-CM

## 2018-07-31 DIAGNOSIS — K80.20 CALCULUS OF GALLBLADDER WITHOUT CHOLECYSTITIS WITHOUT OBSTRUCTION: Primary | ICD-10-CM

## 2018-07-31 DIAGNOSIS — K58.2 IRRITABLE BOWEL SYNDROME WITH BOTH CONSTIPATION AND DIARRHEA: ICD-10-CM

## 2018-07-31 PROBLEM — O26.20 RECURRENT PREGNANCY LOSS, ANTEPARTUM CONDITION OR COMPLICATION: Status: RESOLVED | Noted: 2018-03-05 | Resolved: 2018-07-31

## 2018-07-31 PROBLEM — O99.210 OBESITY AFFECTING PREGNANCY, ANTEPARTUM: Status: RESOLVED | Noted: 2018-03-05 | Resolved: 2018-07-31

## 2018-07-31 PROCEDURE — 1036F TOBACCO NON-USER: CPT | Performed by: INTERNAL MEDICINE

## 2018-07-31 PROCEDURE — G8427 DOCREV CUR MEDS BY ELIG CLIN: HCPCS | Performed by: INTERNAL MEDICINE

## 2018-07-31 PROCEDURE — G8417 CALC BMI ABV UP PARAM F/U: HCPCS | Performed by: INTERNAL MEDICINE

## 2018-07-31 PROCEDURE — 99214 OFFICE O/P EST MOD 30 MIN: CPT | Performed by: INTERNAL MEDICINE

## 2018-07-31 RX ORDER — DICYCLOMINE HYDROCHLORIDE 10 MG/1
10 CAPSULE ORAL 2 TIMES DAILY
Qty: 60 CAPSULE | Refills: 5 | Status: SHIPPED | OUTPATIENT
Start: 2018-07-31 | End: 2018-11-06

## 2018-07-31 RX ORDER — IBUPROFEN 800 MG/1
800 TABLET ORAL EVERY 8 HOURS PRN
Qty: 60 TABLET | Refills: 0 | Status: SHIPPED | OUTPATIENT
Start: 2018-07-31 | End: 2018-10-08

## 2018-07-31 ASSESSMENT — ENCOUNTER SYMPTOMS
BLURRED VISION: 0
NAUSEA: 0
HEARTBURN: 0
HEMOPTYSIS: 0
ABDOMINAL PAIN: 0
DOUBLE VISION: 0
COUGH: 0

## 2018-07-31 NOTE — PROGRESS NOTES
[address] [facility] AND [address] [place] [address]  Dept: [phone]  Dept Fax: [phone]    Office Progress/Follow Up Note  Date of patient's visit: 7/31/2018  Patient's Name:  Srinath Levine YOB: 1980            Patient Care Team:  Margarette De La Garza MD as PCP - General (Internal Medicine)  MIKHAIL Hawkins CNP as Nurse Practitioner (Nurse Practitioner)  ================================================================    REASON FOR VISIT/CHIEF COMPLAINT:  Follow-Up from Hospital ([facility])    HISTORY OF PRESENTING ILLNESS:  History was obtained from: patient. Srinath Levine is a 30 y.o. is here for a follow-up visit. Patient was recently seen at [place] [address] ER for epigastric pain associated with nausea and had a workup done and was told that she has gallstones. She was asked to follow-up with us. She reported her symptoms has improved. She has a history of morbid obesity. Her cholesterol levels within normal limits during last visit a year ago. She has history of prediabetes for which she is on metformin. She is on Bentyl and Motrin for irritable bowel syndrome. She is due for medication refills today. Problem list, medications and blood work reviewed       Patient Active Problem List   Diagnosis    Chronic bilateral low back pain without sciatica    Migraine without aura and without status migrainosus, not intractable    Gastroesophageal reflux disease without esophagitis    Morbid obesity due to excess calories (HCC)    Pre-diabetes    JOSE positive    Antithrombin 3 deficiency (HCC)    Rheumatoid arteritis       There are no preventive care reminders to display for this patient.     Allergies   Allergen Reactions    Iodine Hives and Itching         Current Outpatient Prescriptions   Medication Sig Dispense Refill    metFORMIN (GLUCOPHAGE) 1000 MG tablet Take 1 tablet by mouth 2 times daily (with meals) 60 tablet 5    dicyclomine (BENTYL) 10 MG capsule Take 1 capsule by mouth 2 times daily 60 capsule 5    ibuprofen (ADVIL;MOTRIN) 800 MG tablet Take 1 tablet by mouth every 8 hours as needed for Pain With food 60 tablet 0    Prenatal Multivit-Min-Fe-FA (PRENATAL VITAMINS PO) Take 1 tablet by mouth daily      folic acid (FOLVITE) 1 MG tablet Take 1 mg by mouth daily       No current facility-administered medications for this visit. Social History   Substance Use Topics    Smoking status: Never Smoker    Smokeless tobacco: Never Used    Alcohol use Yes       History reviewed. No pertinent family history. REVIEW OF SYSTEMS:  Review of Systems   Constitutional: Negative for chills and fever. HENT: Negative for congestion, ear discharge and nosebleeds. Eyes: Negative for blurred vision and double vision. Respiratory: Negative for cough and hemoptysis. Cardiovascular: Negative for chest pain and palpitations. Gastrointestinal: Negative for abdominal pain, heartburn and nausea. Genitourinary: Negative for dysuria and urgency. Musculoskeletal: Negative for myalgias and neck pain. Neurological: Negative for dizziness and headaches. Endo/Heme/Allergies: Does not bruise/bleed easily. Psychiatric/Behavioral: Negative for depression and suicidal ideas. PHYSICAL EXAM:  Vitals:    07/31/18 1515   BP: 108/76   Site: Left Arm   Position: Sitting   Cuff Size: Large Adult   Pulse: 88   Resp: 16   Temp: 98.2 °F (36.8 °C)   TempSrc: Oral   Weight: 259 lb 6.4 oz (117.7 kg)   Height: 5' 3\" (1.6 m)     BP Readings from Last 3 Encounters:   07/31/18 108/76   06/19/18 110/84   05/21/18 118/60        Physical Exam   Constitutional: She is oriented to person, place, and time and well-developed, well-nourished, and in no distress. No distress. HENT:   Mouth/Throat: No oropharyngeal exudate. Neck: Normal range of motion. Neck supple. No thyromegaly present.    Cardiovascular: Normal rate, regular rhythm, normal exercise    · Discussed use, benefit, and side effects of prescribed medications. Barriers to medication compliance addressed. All patient questions answered. Pt voiced understanding. · Patient given educational materials - see patient instructions    Margarette Russell MD, KIRILL, [address] [place] [address] Internal Medicine Associate  7/31/2018, 3:52 PM    This note is created with the assistance of a speech-recognition program. While intending to generate a document that actually reflects the content of the visit, the document can still have some mistakes which may not have been identified and corrected by editing.

## 2018-07-31 NOTE — PROGRESS NOTES
Visit Information    Have you changed or started any medications since your last visit including any over-the-counter medicines, vitamins, or herbal medicines? no   Are you having any side effects from any of your medications? -  no  Have you stopped taking any of your medications? Is so, why? -  no    Have you seen any other physician or provider since your last visit? No  Have you had any other diagnostic tests since your last visit? No  Have you been seen in the emergency room and/or had an admission to a hospital since we last saw you? Yes - Records Requested  Have you had your routine dental cleaning in the past 6 months? yes -     Have you activated your [employer] account? If not, what are your barriers?  Yes     Patient Care Team:  Margarette Mercedes MD as PCP - General (Internal Medicine)  MIKHAIL Cooper - CNP as Nurse Practitioner (Nurse Practitioner)    Medical History Review  Past Medical, Family, and Social History reviewed and does not contribute to the patient presenting condition    Health Maintenance   Topic Date Due    Flu vaccine (1) 09/01/2018    A1C test (Diabetic or Prediabetic)  03/19/2019    Cervical cancer screen  05/21/2023    DTaP/Tdap/Td vaccine (2 - Td) 02/21/2027    HIV screen  Completed

## 2018-08-02 ENCOUNTER — HOSPITAL ENCOUNTER (OUTPATIENT)
Age: 38
Discharge: HOME | End: 2018-08-02
Payer: MEDICARE

## 2018-08-02 DIAGNOSIS — Z13.220 LIPID SCREENING: ICD-10-CM

## 2018-08-02 LAB
CHOLESTEROL/HDL RATIO: 5.6
CHOLESTEROL: 158 MG/DL
HDLC SERPL-MCNC: 28 MG/DL
LDL CHOLESTEROL: 103 MG/DL (ref 0–130)
TRIGL SERPL-MCNC: 134 MG/DL
VLDLC SERPL CALC-MCNC: ABNORMAL MG/DL (ref 1–30)

## 2018-08-02 PROCEDURE — 80061 LIPID PANEL: CPT

## 2018-08-02 PROCEDURE — 36415 COLL VENOUS BLD VENIPUNCTURE: CPT

## 2018-08-06 ENCOUNTER — HOSPITAL ENCOUNTER (OUTPATIENT)
Dept: ULTRASOUND IMAGING | Age: 38
Discharge: HOME | End: 2018-08-08
Payer: MEDICARE

## 2018-08-06 DIAGNOSIS — K80.20 CALCULUS OF GALLBLADDER WITHOUT CHOLECYSTITIS WITHOUT OBSTRUCTION: ICD-10-CM

## 2018-08-06 PROCEDURE — 76705 ECHO EXAM OF ABDOMEN: CPT

## 2018-08-07 ENCOUNTER — TELEPHONE (OUTPATIENT)
Dept: FAMILY MEDICINE CLINIC | Age: 38
End: 2018-08-07

## 2018-08-15 ENCOUNTER — TELEPHONE (OUTPATIENT)
Dept: FAMILY MEDICINE CLINIC | Age: 38
End: 2018-08-15

## 2018-08-15 DIAGNOSIS — R10.84 GENERALIZED ABDOMINAL PAIN: Primary | ICD-10-CM

## 2018-08-15 NOTE — TELEPHONE ENCOUNTER
Pt calling office stating that she is still having abdominal pain and would like to know what is the next step in regards to her pain. Please advise thank you.     Health Maintenance   Topic Date Due    Flu vaccine (1) 09/01/2018    A1C test (Diabetic or Prediabetic)  03/19/2019    Cervical cancer screen  05/21/2023    DTaP/Tdap/Td vaccine (2 - Td) 02/21/2027    HIV screen  Completed             (applicable per patient's age: Cancer Screenings, Depression Screening, Fall Risk Screening, Immunizations)    Hemoglobin A1C (%)   Date Value   03/19/2018 5.9   02/28/2017 5.8     LDL Cholesterol (mg/dL)   Date Value   08/02/2018 103     AST (U/L)   Date Value   04/01/2013 25     ALT (U/L)   Date Value   04/01/2013 31     BUN (mg/dL)   Date Value   02/28/2017 11      (goal A1C is < 7)   (goal LDL is <100) need 30-50% reduction from baseline     BP Readings from Last 3 Encounters:   07/31/18 108/76   06/19/18 110/84   05/21/18 118/60    (goal /80)      All Future Testing planned in [facility]:  Lab Frequency Next Occurrence   hCG, Quantitative, Pregnancy     HCG, Quantitative, Pregnancy         Next Visit Date:  Future Appointments  Date Time Provider Ted Echeverria   10/30/2018 3:50 PM Mbonu Hassel Hodgkins, MD [facility]            Patient Active Problem List:     Chronic bilateral low back pain without sciatica     Migraine without aura and without status migrainosus, not intractable     Gastroesophageal reflux disease without esophagitis     Morbid obesity due to excess calories (HCC)     Pre-diabetes     JOSE positive     Antithrombin 3 deficiency (HCC)     Rheumatoid arteritis

## 2018-09-14 ENCOUNTER — OFFICE VISIT (OUTPATIENT)
Dept: OBGYN CLINIC | Age: 38
End: 2018-09-14
Payer: MEDICARE

## 2018-09-14 VITALS
SYSTOLIC BLOOD PRESSURE: 122 MMHG | WEIGHT: 258 LBS | DIASTOLIC BLOOD PRESSURE: 60 MMHG | BODY MASS INDEX: 45.71 KG/M2 | HEIGHT: 63 IN

## 2018-09-14 DIAGNOSIS — Z87.59 HISTORY OF MISCARRIAGE: ICD-10-CM

## 2018-09-14 DIAGNOSIS — K58.9 SYMPTOMS CONSISTENT WITH IRRITABLE BOWEL SYNDROME: Primary | ICD-10-CM

## 2018-09-14 PROCEDURE — 99213 OFFICE O/P EST LOW 20 MIN: CPT | Performed by: OBSTETRICS & GYNECOLOGY

## 2018-09-14 PROCEDURE — 1036F TOBACCO NON-USER: CPT | Performed by: OBSTETRICS & GYNECOLOGY

## 2018-09-14 PROCEDURE — G8417 CALC BMI ABV UP PARAM F/U: HCPCS | Performed by: OBSTETRICS & GYNECOLOGY

## 2018-09-14 PROCEDURE — G8427 DOCREV CUR MEDS BY ELIG CLIN: HCPCS | Performed by: OBSTETRICS & GYNECOLOGY

## 2018-09-14 NOTE — PROGRESS NOTES
Guillermo Sloan  2018    YOB: 1980      The patient was seen today. She is here regarding pelvic/back pain. Discussion about endometriosis . Her bowels are regular and she is voiding without difficulty. HPI:  Guillermo Sloan is a 41 y.o. female [id number]     Pt. Is seen to day to discuss pain. She states that over the past 2 years she has noticed worsening back and pelvic pain. She states that it is in her mid back and that she has suprapubic pain. She states that both just come and go without any real exacerbating factor. She states it is not related to her period. It is sharp pain. She will have diarrhea and constipation that is intermittent. She states that she has also noted fatigue, headaches, and has had 2 miscarriages with the last one being Oct 2017. Her periods are every 30 days and she states she will bleedin for 3 days. She has postcoital pain she states that is in her suprapubic area. She is not on birth control. She states that she is interested in pregnancy in the foreseeable future. She does not want birth control or medications. She is not interested in surgery. Discussed endometriosis and surgical management and diagnosis. Recommend LH test strips to test for ovulation. Recommend GI consult for probable IBS. Also I think it would be good idea to get EMB as patient has had miscarriage x2, risk factors for hyperplasia, pelvic pain, and changes in her period. Obstetric History       T2      L2     SAB1   TAB2   Ectopic0   Molar0   Multiple0   Live Births0       # Outcome Date GA Lbr Sebastián/2nd Weight Sex Delivery Anes PTL Lv   5 SAB            4 Term      Vag-Spont      3 Term      Vag-Spont      2 TAB            1 TAB                   Past Medical History:   Diagnosis Date    Chronic back pain     GERD (gastroesophageal reflux disease)     Headache     Obesity     Rheumatoid arteritis        History reviewed.  No pertinent surgical Palpitations, Syncope, Edema, Arrhythmia  Gastrointestinal ROS: No Indigestion, Heartburn, Nausea, vomiting; No Bloody Stools or melena +diarrhea +constipation +pelvic pain post coital  Genito-Urinary ROS: No Dysuria, Hematuria or Nocturia. No Urinary Incontinence or Vaginal Discharge  Musculoskeletal ROS: No Arthralgia, Arthritis,Gout,Osteoporosis or Rheumatism +back pain  Neurological ROS: No CVA, Migraines, Epilepsy, Seizure Hx, or Limb Weakness  Dermatological ROS: No Rash, Itching, Hives, Mole Changes or Cancer    Blood pressure 122/60, height 5' 3\" (1.6 m), weight 258 lb (117 kg), last menstrual period 08/01/2018, not currently breastfeeding. Abdomen: Soft non-tender; good bowel sounds. No guarding, rebound or rigidity. No CVA tenderness bilaterally. Extremities: No calf tenderness, DTR 2/4, and No edema bilaterally    Pelvic: pt declined until time of EMB    Diagnostics:  No results found. Lab Results:  Results for orders placed or performed during the hospital encounter of 08/02/18   Lipid Panel   Result Value Ref Range    Cholesterol 158 <200 mg/dL    HDL 28 (L) >40 mg/dL    LDL Cholesterol 103 0 - 130 mg/dL    Chol/HDL Ratio 5.6 (H) <5    Triglycerides 134 <150 mg/dL    VLDL NOT REPORTED 1 - 30 mg/dL         Assessment:   Diagnosis Orders   1. Symptoms consistent with irritable bowel syndrome  Tello Travis MD, [facility] [place]   2.  History of miscarriage       Patient Active Problem List    Diagnosis Date Noted    JOSE positive 12/18/2017    Antithrombin 3 deficiency ([facility] [address] 12/18/2017    Rheumatoid arteritis 12/18/2017    Pre-diabetes 09/19/2017    Chronic bilateral low back pain without sciatica 02/21/2017    Migraine without aura and without status migrainosus, not intractable 02/21/2017    Gastroesophageal reflux disease without esophagitis 02/21/2017    Morbid obesity due to excess calories ([address] 02/21/2017       PLAN:  Return in about 2 weeks (around 9/28/2018) for

## 2018-09-26 ENCOUNTER — HOSPITAL ENCOUNTER (OUTPATIENT)
Age: 38
Discharge: HOME | End: 2018-09-26
Payer: MEDICARE

## 2018-09-26 DIAGNOSIS — Z34.90 EARLY STAGE OF PREGNANCY: ICD-10-CM

## 2018-09-26 DIAGNOSIS — Z34.90 EARLY STAGE OF PREGNANCY: Primary | ICD-10-CM

## 2018-09-26 DIAGNOSIS — O03.9 SPONTANEOUS MISCARRIAGE: ICD-10-CM

## 2018-09-26 LAB
HCG QUANTITATIVE: 5863 IU/L
PROGESTERONE LEVEL: 9.04 NG/ML

## 2018-09-26 PROCEDURE — 84144 ASSAY OF PROGESTERONE: CPT

## 2018-09-26 PROCEDURE — 36415 COLL VENOUS BLD VENIPUNCTURE: CPT

## 2018-09-26 PROCEDURE — 84702 CHORIONIC GONADOTROPIN TEST: CPT

## 2018-09-27 ENCOUNTER — TELEPHONE (OUTPATIENT)
Dept: OBGYN CLINIC | Age: 38
End: 2018-09-27

## 2018-10-08 ENCOUNTER — HOSPITAL ENCOUNTER (OUTPATIENT)
Age: 38
Discharge: HOME | End: 2018-10-08
Payer: MEDICARE

## 2018-10-08 DIAGNOSIS — R79.89 LOW SERUM PROGESTERONE: ICD-10-CM

## 2018-10-08 DIAGNOSIS — Z34.90 EARLY STAGE OF PREGNANCY: Primary | ICD-10-CM

## 2018-10-08 DIAGNOSIS — Z34.90 EARLY STAGE OF PREGNANCY: ICD-10-CM

## 2018-10-08 LAB
HCG QUANTITATIVE: ABNORMAL IU/L
PROGESTERONE LEVEL: 12.63 NG/ML

## 2018-10-08 PROCEDURE — 84702 CHORIONIC GONADOTROPIN TEST: CPT

## 2018-10-08 PROCEDURE — 84144 ASSAY OF PROGESTERONE: CPT

## 2018-10-08 PROCEDURE — 36415 COLL VENOUS BLD VENIPUNCTURE: CPT

## 2018-10-10 ENCOUNTER — HOSPITAL ENCOUNTER (OUTPATIENT)
Age: 38
Discharge: HOME | End: 2018-10-10
Payer: MEDICARE

## 2018-10-10 ENCOUNTER — HOSPITAL ENCOUNTER (OUTPATIENT)
Dept: ULTRASOUND IMAGING | Age: 38
Discharge: HOME | End: 2018-10-12
Payer: MEDICARE

## 2018-10-10 PROBLEM — O09.91 HIGH-RISK PREGNANCY IN FIRST TRIMESTER: Status: ACTIVE | Noted: 2018-10-10

## 2018-10-10 PROBLEM — O09.521 AMA (ADVANCED MATERNAL AGE) MULTIGRAVIDA 35+, FIRST TRIMESTER: Status: ACTIVE | Noted: 2018-10-10

## 2018-10-10 LAB
-: ABNORMAL
ABO/RH: NORMAL
ABSOLUTE EOS #: 0.32 K/UL (ref 0–0.44)
ABSOLUTE IMMATURE GRANULOCYTE: 0.08 K/UL (ref 0–0.3)
ABSOLUTE LYMPH #: 3.11 K/UL (ref 1.1–3.7)
ABSOLUTE MONO #: 0.66 K/UL (ref 0.1–1.2)
AMORPHOUS: ABNORMAL
ANTIBODY SCREEN: NEGATIVE
BACTERIA: ABNORMAL
BASOPHILS # BLD: 0 % (ref 0–2)
BASOPHILS ABSOLUTE: 0.06 K/UL (ref 0–0.2)
BILIRUBIN URINE: NEGATIVE
CASTS UA: ABNORMAL /LPF (ref 0–2)
COLOR: YELLOW
COMMENT UA: ABNORMAL
CRYSTALS, UA: ABNORMAL /HPF
DIFFERENTIAL TYPE: ABNORMAL
EOSINOPHILS RELATIVE PERCENT: 2 % (ref 1–4)
EPITHELIAL CELLS UA: ABNORMAL /HPF (ref 0–5)
GLUCOSE BLD-MCNC: 88 MG/DL (ref 70–99)
GLUCOSE URINE: NEGATIVE
HCG QUANTITATIVE: ABNORMAL IU/L
HCT VFR BLD CALC: 37.7 % (ref 36.3–47.1)
HEMOGLOBIN: 12.1 G/DL (ref 11.9–15.1)
HEPATITIS B SURFACE ANTIGEN: NONREACTIVE
HIV AG/AB: NONREACTIVE
IMMATURE GRANULOCYTES: 1 %
KETONES, URINE: NEGATIVE
LEUKOCYTE ESTERASE, URINE: ABNORMAL
LYMPHOCYTES # BLD: 23 % (ref 24–43)
MCH RBC QN AUTO: 27.2 PG (ref 25.2–33.5)
MCHC RBC AUTO-ENTMCNC: 32.1 G/DL (ref 28.4–34.8)
MCV RBC AUTO: 84.7 FL (ref 82.6–102.9)
MONOCYTES # BLD: 5 % (ref 3–12)
MUCUS: ABNORMAL
NITRITE, URINE: NEGATIVE
NRBC AUTOMATED: 0 PER 100 WBC
OTHER OBSERVATIONS UA: ABNORMAL
PDW BLD-RTO: 13.8 % (ref 11.8–14.4)
PH UA: 5 (ref 5–8)
PLATELET # BLD: 340 K/UL (ref 138–453)
PLATELET ESTIMATE: ABNORMAL
PMV BLD AUTO: 10.3 FL (ref 8.1–13.5)
PROTEIN UA: NEGATIVE
RBC # BLD: 4.45 M/UL (ref 3.95–5.11)
RBC # BLD: ABNORMAL 10*6/UL
RBC UA: ABNORMAL /HPF (ref 0–2)
RENAL EPITHELIAL, UA: ABNORMAL /HPF
RUBV IGG SER QL: 295.5 IU/ML
SEG NEUTROPHILS: 69 % (ref 36–65)
SEGMENTED NEUTROPHILS ABSOLUTE COUNT: 9.49 K/UL (ref 1.5–8.1)
SPECIFIC GRAVITY UA: 1.03 (ref 1–1.03)
T. PALLIDUM, IGG: NONREACTIVE
TRICHOMONAS: ABNORMAL
TSH SERPL DL<=0.05 MIU/L-ACNC: 1.54 MIU/L (ref 0.3–5)
TURBIDITY: ABNORMAL
URINE HGB: NEGATIVE
UROBILINOGEN, URINE: NORMAL
WBC # BLD: 13.7 K/UL (ref 3.5–11.3)
WBC # BLD: ABNORMAL 10*3/UL
WBC UA: ABNORMAL /HPF (ref 0–5)
YEAST: ABNORMAL

## 2018-10-10 PROCEDURE — 86901 BLOOD TYPING SEROLOGIC RH(D): CPT

## 2018-10-10 PROCEDURE — 36415 COLL VENOUS BLD VENIPUNCTURE: CPT

## 2018-10-10 PROCEDURE — 86850 RBC ANTIBODY SCREEN: CPT

## 2018-10-10 PROCEDURE — 86900 BLOOD TYPING SEROLOGIC ABO: CPT

## 2018-10-10 PROCEDURE — 86762 RUBELLA ANTIBODY: CPT

## 2018-10-10 PROCEDURE — 85025 COMPLETE CBC W/AUTO DIFF WBC: CPT

## 2018-10-10 PROCEDURE — 76817 TRANSVAGINAL US OBSTETRIC: CPT

## 2018-10-10 PROCEDURE — 87340 HEPATITIS B SURFACE AG IA: CPT

## 2018-10-10 PROCEDURE — 87086 URINE CULTURE/COLONY COUNT: CPT

## 2018-10-10 PROCEDURE — 84443 ASSAY THYROID STIM HORMONE: CPT

## 2018-10-10 PROCEDURE — 84702 CHORIONIC GONADOTROPIN TEST: CPT

## 2018-10-10 PROCEDURE — 82947 ASSAY GLUCOSE BLOOD QUANT: CPT

## 2018-10-10 PROCEDURE — 86780 TREPONEMA PALLIDUM: CPT

## 2018-10-10 PROCEDURE — 81001 URINALYSIS AUTO W/SCOPE: CPT

## 2018-10-10 PROCEDURE — 76801 OB US < 14 WKS SINGLE FETUS: CPT

## 2018-10-10 PROCEDURE — 87389 HIV-1 AG W/HIV-1&-2 AB AG IA: CPT

## 2018-10-11 LAB
CULTURE: NORMAL
Lab: NORMAL
SPECIMEN DESCRIPTION: NORMAL
STATUS: NORMAL

## 2018-10-12 ENCOUNTER — INITIAL PRENATAL (OUTPATIENT)
Dept: OBGYN CLINIC | Age: 38
End: 2018-10-12
Payer: MEDICARE

## 2018-10-12 ENCOUNTER — HOSPITAL ENCOUNTER (OUTPATIENT)
Age: 38
Setting detail: SPECIMEN
Discharge: HOME | End: 2018-10-12
Payer: MEDICARE

## 2018-10-12 VITALS
DIASTOLIC BLOOD PRESSURE: 62 MMHG | SYSTOLIC BLOOD PRESSURE: 124 MMHG | WEIGHT: 260 LBS | HEART RATE: 80 BPM | BODY MASS INDEX: 46.06 KG/M2

## 2018-10-12 DIAGNOSIS — O09.521 AMA (ADVANCED MATERNAL AGE) MULTIGRAVIDA 35+, FIRST TRIMESTER: Primary | ICD-10-CM

## 2018-10-12 DIAGNOSIS — R76.8 ANA POSITIVE: ICD-10-CM

## 2018-10-12 DIAGNOSIS — O09.91 HIGH-RISK PREGNANCY IN FIRST TRIMESTER: ICD-10-CM

## 2018-10-12 DIAGNOSIS — D68.59 ANTITHROMBIN 3 DEFICIENCY (HCC): ICD-10-CM

## 2018-10-12 DIAGNOSIS — O09.521 AMA (ADVANCED MATERNAL AGE) MULTIGRAVIDA 35+, FIRST TRIMESTER: ICD-10-CM

## 2018-10-12 DIAGNOSIS — Z3A.08 8 WEEKS GESTATION OF PREGNANCY: ICD-10-CM

## 2018-10-12 LAB
DIRECT EXAM: ABNORMAL
Lab: ABNORMAL
SPECIMEN DESCRIPTION: ABNORMAL
STATUS: ABNORMAL

## 2018-10-12 PROCEDURE — G8417 CALC BMI ABV UP PARAM F/U: HCPCS | Performed by: NURSE PRACTITIONER

## 2018-10-12 PROCEDURE — G8484 FLU IMMUNIZE NO ADMIN: HCPCS | Performed by: NURSE PRACTITIONER

## 2018-10-12 PROCEDURE — 99214 OFFICE O/P EST MOD 30 MIN: CPT | Performed by: NURSE PRACTITIONER

## 2018-10-12 PROCEDURE — 1036F TOBACCO NON-USER: CPT | Performed by: NURSE PRACTITIONER

## 2018-10-12 PROCEDURE — G8428 CUR MEDS NOT DOCUMENT: HCPCS | Performed by: NURSE PRACTITIONER

## 2018-10-15 LAB
C TRACH DNA GENITAL QL NAA+PROBE: NEGATIVE
CULTURE: NORMAL
Lab: NORMAL
N. GONORRHOEAE DNA: NEGATIVE
SPECIMEN DESCRIPTION: NORMAL
STATUS: NORMAL

## 2018-10-18 ENCOUNTER — TELEPHONE (OUTPATIENT)
Dept: OBGYN CLINIC | Age: 38
End: 2018-10-18

## 2018-10-20 RX ORDER — FLUCONAZOLE 150 MG/1
150 TABLET ORAL ONCE
Qty: 1 TABLET | Refills: 0 | OUTPATIENT
Start: 2018-10-20 | End: 2018-10-20

## 2018-10-30 ENCOUNTER — OFFICE VISIT (OUTPATIENT)
Dept: FAMILY MEDICINE CLINIC | Age: 38
End: 2018-10-30
Payer: MEDICARE

## 2018-10-30 VITALS
HEART RATE: 95 BPM | HEIGHT: 63 IN | DIASTOLIC BLOOD PRESSURE: 70 MMHG | SYSTOLIC BLOOD PRESSURE: 98 MMHG | WEIGHT: 260.6 LBS | TEMPERATURE: 97.9 F | RESPIRATION RATE: 16 BRPM | BODY MASS INDEX: 46.18 KG/M2

## 2018-10-30 DIAGNOSIS — R73.03 PRE-DIABETES: Primary | ICD-10-CM

## 2018-10-30 LAB — HBA1C MFR BLD: 5.9 %

## 2018-10-30 PROCEDURE — G8484 FLU IMMUNIZE NO ADMIN: HCPCS | Performed by: INTERNAL MEDICINE

## 2018-10-30 PROCEDURE — 83036 HEMOGLOBIN GLYCOSYLATED A1C: CPT | Performed by: INTERNAL MEDICINE

## 2018-10-30 PROCEDURE — G8427 DOCREV CUR MEDS BY ELIG CLIN: HCPCS | Performed by: INTERNAL MEDICINE

## 2018-10-30 PROCEDURE — 99213 OFFICE O/P EST LOW 20 MIN: CPT | Performed by: INTERNAL MEDICINE

## 2018-10-30 PROCEDURE — G8417 CALC BMI ABV UP PARAM F/U: HCPCS | Performed by: INTERNAL MEDICINE

## 2018-10-30 PROCEDURE — 1036F TOBACCO NON-USER: CPT | Performed by: INTERNAL MEDICINE

## 2018-10-31 ASSESSMENT — ENCOUNTER SYMPTOMS
DIARRHEA: 0
SHORTNESS OF BREATH: 0
ABDOMINAL PAIN: 0
COLOR CHANGE: 0
NAUSEA: 0
VOMITING: 0
SORE THROAT: 0
PHOTOPHOBIA: 0
CONSTIPATION: 0
EYE PAIN: 0
COUGH: 0
BLOOD IN STOOL: 0
WHEEZING: 0
EYE DISCHARGE: 0

## 2018-10-31 NOTE — PROGRESS NOTES
Visit Information    Have you changed or started any medications since your last visit including any over-the-counter medicines, vitamins, or herbal medicines? no   Are you having any side effects from any of your medications? -  no  Have you stopped taking any of your medications? Is so, why? -  no    Have you seen any other physician or provider since your last visit? No  Have you had any other diagnostic tests since your last visit? No  Have you been seen in the emergency room and/or had an admission to a hospital since we last saw you? No  Have you had your routine dental cleaning in the past 6 months? yes -     Have you activated your [employer] account? If not, what are your barriers?  Yes     Patient Care Team:  Margarette Vasquez MD as PCP - General (Internal Medicine)  MIKHAIL Miller - CNP as Nurse Practitioner (Nurse Practitioner)    Medical History Review  Past Medical, Family, and Social History reviewed and does not contribute to the patient presenting condition    Health Maintenance   Topic Date Due    A1C test (Diabetic or Prediabetic)  03/19/2019    Cervical cancer screen  05/21/2023    DTaP/Tdap/Td vaccine (2 - Td) 02/21/2027    Flu vaccine  Completed    HIV screen  Completed
folic acid (FOLVITE) 1 MG tablet Take 1 mg by mouth daily       No current facility-administered medications for this visit. Social History   Substance Use Topics    Smoking status: Never Smoker    Smokeless tobacco: Never Used    Alcohol use Yes       No family history on file. REVIEW OF SYSTEMS:  Review of Systems   Constitutional: Negative for fatigue and fever. HENT: Negative for congestion and sore throat. Eyes: Negative for photophobia, pain, discharge and visual disturbance. Respiratory: Negative for cough, shortness of breath and wheezing. Cardiovascular: Negative for chest pain, palpitations and leg swelling. Gastrointestinal: Negative for abdominal pain, blood in stool, constipation, diarrhea, nausea and vomiting. Genitourinary: Negative for dysuria, frequency and urgency. Musculoskeletal: Negative for arthralgias and myalgias. Skin: Negative for color change and rash. Neurological: Negative for dizziness, tremors, seizures, syncope, weakness, numbness and headaches. Psychiatric/Behavioral: Negative for agitation, behavioral problems, confusion, sleep disturbance and suicidal ideas. PHYSICAL EXAM:  Vitals:    10/30/18 1606   BP: 98/70   Site: Left Wrist   Position: Sitting   Cuff Size: Small Adult   Pulse: 95   Resp: 16   Temp: 97.9 °F (36.6 °C)   TempSrc: Oral   Weight: 260 lb 9.6 oz (118.2 kg)   Height: 5' 2.99\" (1.6 m)     BP Readings from Last 3 Encounters:   10/30/18 98/70   10/12/18 124/62   09/14/18 122/60        Physical Exam   Constitutional: She is oriented to person, place, and time. No distress. HENT:   Head: Normocephalic and atraumatic. Right Ear: External ear normal.   Left Ear: External ear normal.   Nose: Nose normal.   Mouth/Throat: No oropharyngeal exudate. Eyes: Pupils are equal, round, and reactive to light. EOM are normal.   Neck: Normal range of motion. Neck supple. No thyromegaly present.    Cardiovascular: Normal rate, regular rhythm,

## 2018-11-02 ENCOUNTER — TELEPHONE (OUTPATIENT)
Dept: FAMILY MEDICINE CLINIC | Age: 38
End: 2018-11-02

## 2018-11-02 DIAGNOSIS — H92.03 DISCOMFORT OF BOTH EARS: Primary | ICD-10-CM

## 2018-11-02 NOTE — TELEPHONE ENCOUNTER
Patient would like a referral to ENT stuffiness in right ear. Please advise thank you!        Last visit: 10/30/18  Last Med refill:     Next Visit Date:  Future Appointments  Date Time Provider Ted Echeverria   11/6/2018 8:15 AM ULTRASONOGRAPHER 1 Ulysses Fetal [facility]   11/7/2018 10:15 AM MIKHAIL Flores CNP OB/Gyn [facility]   11/12/2018 9:00 AM CONSULTS Mat Fetal [facility]   11/26/2018 10:00 AM Jenna OfficerMD [facility] [facility] lks [facility]   4/30/2019 1:20 PM Margarette Liang MD [facility] [address] Maintenance   Topic Date Due    A1C test (Diabetic or Prediabetic)  03/19/2019    Cervical cancer screen  05/21/2023    DTaP/Tdap/Td vaccine (2 - Td) 02/21/2027    Flu vaccine  Completed    HIV screen  Completed       Hemoglobin A1C (%)   Date Value   03/19/2018 5.9   02/28/2017 5.8             ( goal A1C is < 7)   No results found for: LABMICR  LDL Cholesterol (mg/dL)   Date Value   08/02/2018 103   02/28/2017 117       (goal LDL is <100)   AST (U/L)   Date Value   04/01/2013 25     ALT (U/L)   Date Value   04/01/2013 31     BUN (mg/dL)   Date Value   02/28/2017 11     BP Readings from Last 3 Encounters:   10/30/18 98/70   10/12/18 124/62   09/14/18 122/60          (goal 120/80)    All Future Testing planned in [facility]  Lab Frequency Next Occurrence   Progesterone Once 10/08/2018   HCG, Quantitative, Pregnancy                 Patient Active Problem List:     Chronic bilateral low back pain without sciatica     Migraine without aura and without status migrainosus, not intractable     Gastroesophageal reflux disease without esophagitis     Morbid obesity due to excess calories (HCC)     Pre-diabetes     JOSE positive     Antithrombin 3 deficiency (HCC)     Rheumatoid arteritis     AMA (advanced maternal age) multigravida 35+, first trimester     High-risk pregnancy in first trimester

## 2018-11-05 ENCOUNTER — TELEPHONE (OUTPATIENT)
Dept: OBGYN CLINIC | Age: 38
End: 2018-11-05

## 2018-11-06 ENCOUNTER — TELEPHONE (OUTPATIENT)
Dept: GASTROENTEROLOGY | Age: 38
End: 2018-11-06

## 2018-11-06 ENCOUNTER — ROUTINE PRENATAL (OUTPATIENT)
Dept: PERINATAL CARE | Age: 38
End: 2018-11-06
Payer: MEDICARE

## 2018-11-06 VITALS
WEIGHT: 262 LBS | HEART RATE: 80 BPM | TEMPERATURE: 98.2 F | SYSTOLIC BLOOD PRESSURE: 113 MMHG | BODY MASS INDEX: 46.42 KG/M2 | RESPIRATION RATE: 20 BRPM | DIASTOLIC BLOOD PRESSURE: 70 MMHG | HEIGHT: 63 IN

## 2018-11-06 DIAGNOSIS — O99.211 OBESITY AFFECTING PREGNANCY IN FIRST TRIMESTER: ICD-10-CM

## 2018-11-06 DIAGNOSIS — O09.521 AMA (ADVANCED MATERNAL AGE) MULTIGRAVIDA 35+, FIRST TRIMESTER: ICD-10-CM

## 2018-11-06 DIAGNOSIS — Z3A.11 11 WEEKS GESTATION OF PREGNANCY: ICD-10-CM

## 2018-11-06 DIAGNOSIS — O02.1 MISSED ABORTION WITH FETAL DEMISE BEFORE 20 COMPLETED WEEKS OF GESTATION: Primary | ICD-10-CM

## 2018-11-06 PROCEDURE — 76817 TRANSVAGINAL US OBSTETRIC: CPT | Performed by: OBSTETRICS & GYNECOLOGY

## 2018-11-07 ENCOUNTER — ROUTINE PRENATAL (OUTPATIENT)
Dept: OBGYN CLINIC | Age: 38
End: 2018-11-07
Payer: MEDICARE

## 2018-11-07 VITALS
SYSTOLIC BLOOD PRESSURE: 116 MMHG | WEIGHT: 262 LBS | HEART RATE: 96 BPM | BODY MASS INDEX: 46.41 KG/M2 | DIASTOLIC BLOOD PRESSURE: 72 MMHG

## 2018-11-07 DIAGNOSIS — R76.8 ANA POSITIVE: ICD-10-CM

## 2018-11-07 DIAGNOSIS — O09.91 HIGH-RISK PREGNANCY IN FIRST TRIMESTER: ICD-10-CM

## 2018-11-07 DIAGNOSIS — Z3A.12 12 WEEKS GESTATION OF PREGNANCY: ICD-10-CM

## 2018-11-07 DIAGNOSIS — O02.1 MISSED ABORTION WITH FETAL DEMISE BEFORE 20 COMPLETED WEEKS OF GESTATION: Primary | ICD-10-CM

## 2018-11-07 DIAGNOSIS — D68.59 ANTITHROMBIN 3 DEFICIENCY (HCC): ICD-10-CM

## 2018-11-07 PROCEDURE — 99213 OFFICE O/P EST LOW 20 MIN: CPT | Performed by: NURSE PRACTITIONER

## 2018-11-07 PROCEDURE — G8428 CUR MEDS NOT DOCUMENT: HCPCS | Performed by: NURSE PRACTITIONER

## 2018-11-07 PROCEDURE — G8484 FLU IMMUNIZE NO ADMIN: HCPCS | Performed by: NURSE PRACTITIONER

## 2018-11-07 PROCEDURE — G8417 CALC BMI ABV UP PARAM F/U: HCPCS | Performed by: NURSE PRACTITIONER

## 2018-11-07 PROCEDURE — 1036F TOBACCO NON-USER: CPT | Performed by: NURSE PRACTITIONER

## 2018-11-09 ENCOUNTER — HOSPITAL ENCOUNTER (OUTPATIENT)
Age: 38
Discharge: HOME | End: 2018-11-09
Payer: MEDICARE

## 2018-11-09 DIAGNOSIS — O02.1 MISSED ABORTION WITH FETAL DEMISE BEFORE 20 COMPLETED WEEKS OF GESTATION: ICD-10-CM

## 2018-11-09 LAB — HCG QUANTITATIVE: 768 IU/L

## 2018-11-09 PROCEDURE — 88280 CHROMOSOME KARYOTYPE STUDY: CPT

## 2018-11-09 PROCEDURE — 36415 COLL VENOUS BLD VENIPUNCTURE: CPT

## 2018-11-09 PROCEDURE — 88305 TISSUE EXAM BY PATHOLOGIST: CPT

## 2018-11-09 PROCEDURE — 84702 CHORIONIC GONADOTROPIN TEST: CPT

## 2018-11-09 PROCEDURE — 88262 CHROMOSOME ANALYSIS 15-20: CPT

## 2018-11-09 PROCEDURE — 88233 TISSUE CULTURE SKIN/BIOPSY: CPT

## 2018-11-12 LAB — SURGICAL PATHOLOGY REPORT: NORMAL

## 2018-11-19 ENCOUNTER — OFFICE VISIT (OUTPATIENT)
Dept: GASTROENTEROLOGY | Age: 38
End: 2018-11-19
Payer: MEDICARE

## 2018-11-19 ENCOUNTER — TELEPHONE (OUTPATIENT)
Dept: OBGYN CLINIC | Age: 38
End: 2018-11-19

## 2018-11-19 VITALS
DIASTOLIC BLOOD PRESSURE: 76 MMHG | WEIGHT: 259 LBS | BODY MASS INDEX: 45.88 KG/M2 | HEART RATE: 86 BPM | SYSTOLIC BLOOD PRESSURE: 118 MMHG

## 2018-11-19 DIAGNOSIS — R10.9 ABDOMINAL PAIN, UNSPECIFIED ABDOMINAL LOCATION: ICD-10-CM

## 2018-11-19 DIAGNOSIS — R19.7 DIARRHEA, UNSPECIFIED TYPE: ICD-10-CM

## 2018-11-19 DIAGNOSIS — K59.00 CONSTIPATION, UNSPECIFIED CONSTIPATION TYPE: ICD-10-CM

## 2018-11-19 PROCEDURE — 99204 OFFICE O/P NEW MOD 45 MIN: CPT | Performed by: INTERNAL MEDICINE

## 2018-11-19 RX ORDER — OMEPRAZOLE 20 MG/1
40 CAPSULE, DELAYED RELEASE ORAL DAILY
Qty: 30 CAPSULE | Refills: 3 | Status: SHIPPED | OUTPATIENT
Start: 2018-11-19 | End: 2019-01-09

## 2018-11-19 RX ORDER — PREDNISONE 1 MG/1
5 TABLET ORAL DAILY
COMMUNITY
End: 2019-08-28

## 2018-11-19 ASSESSMENT — ENCOUNTER SYMPTOMS
SORE THROAT: 0
ABDOMINAL DISTENTION: 0
BACK PAIN: 1
SHORTNESS OF BREATH: 0
BLOOD IN STOOL: 0
ABDOMINAL PAIN: 1
VOMITING: 0
CHEST TIGHTNESS: 0
COUGH: 0
NAUSEA: 1
CONSTIPATION: 1
RECTAL PAIN: 0
DIARRHEA: 1
TROUBLE SWALLOWING: 0
ANAL BLEEDING: 0
SINUS PRESSURE: 0

## 2018-11-19 NOTE — TELEPHONE ENCOUNTER
Patient has hx of SAB in November. Patient phoned office stating that Gaston Ramirez had mentioned she could have genetic testing done if miscarriage occurred. She is now asking for referral to [facility] for genetic testing.

## 2018-11-21 ENCOUNTER — HOSPITAL ENCOUNTER (OUTPATIENT)
Age: 38
Discharge: HOME | End: 2018-11-21
Payer: MEDICARE

## 2018-11-21 DIAGNOSIS — O02.1 MISSED ABORTION WITH FETAL DEMISE BEFORE 20 COMPLETED WEEKS OF GESTATION: ICD-10-CM

## 2018-11-21 DIAGNOSIS — R79.89 LOW SERUM PROGESTERONE: ICD-10-CM

## 2018-11-21 DIAGNOSIS — K59.00 CONSTIPATION, UNSPECIFIED CONSTIPATION TYPE: ICD-10-CM

## 2018-11-21 LAB
ABSOLUTE EOS #: 0.2 K/UL (ref 0–0.44)
ABSOLUTE IMMATURE GRANULOCYTE: 0.06 K/UL (ref 0–0.3)
ABSOLUTE LYMPH #: 1.99 K/UL (ref 1.1–3.7)
ABSOLUTE MONO #: 0.64 K/UL (ref 0.1–1.2)
ALBUMIN SERPL-MCNC: 4.2 G/DL (ref 3.5–5.2)
ALBUMIN/GLOBULIN RATIO: 1.1 (ref 1–2.5)
ALP BLD-CCNC: 109 U/L (ref 35–104)
ALT SERPL-CCNC: 9 U/L (ref 5–33)
ANION GAP SERPL CALCULATED.3IONS-SCNC: 20 MMOL/L (ref 9–17)
AST SERPL-CCNC: 12 U/L
BASOPHILS # BLD: 0 % (ref 0–2)
BASOPHILS ABSOLUTE: 0.04 K/UL (ref 0–0.2)
BILIRUB SERPL-MCNC: <0.1 MG/DL (ref 0.3–1.2)
BILIRUBIN DIRECT: <0.08 MG/DL
BILIRUBIN, INDIRECT: ABNORMAL MG/DL (ref 0–1)
BUN BLDV-MCNC: 10 MG/DL (ref 6–20)
BUN/CREAT BLD: ABNORMAL (ref 9–20)
CALCIUM SERPL-MCNC: 9.2 MG/DL (ref 8.6–10.4)
CHLORIDE BLD-SCNC: 101 MMOL/L (ref 98–107)
CO2: 21 MMOL/L (ref 20–31)
CREAT SERPL-MCNC: 0.57 MG/DL (ref 0.5–0.9)
DIFFERENTIAL TYPE: ABNORMAL
EOSINOPHILS RELATIVE PERCENT: 2 % (ref 1–4)
FOLATE: 13.3 NG/ML
GFR AFRICAN AMERICAN: >60 ML/MIN
GFR NON-AFRICAN AMERICAN: >60 ML/MIN
GFR SERPL CREATININE-BSD FRML MDRD: ABNORMAL ML/MIN/{1.73_M2}
GFR SERPL CREATININE-BSD FRML MDRD: ABNORMAL ML/MIN/{1.73_M2}
GLOBULIN: ABNORMAL G/DL (ref 1.5–3.8)
GLUCOSE BLD-MCNC: 86 MG/DL (ref 70–99)
HCG QUANTITATIVE: 4 IU/L
HCT VFR BLD CALC: 41.2 % (ref 36.3–47.1)
HEMOGLOBIN: 12.7 G/DL (ref 11.9–15.1)
IGA: 332 MG/DL (ref 70–400)
IMMATURE GRANULOCYTES: 1 %
IRON SATURATION: 16 % (ref 20–55)
IRON: 45 UG/DL (ref 37–145)
LYMPHOCYTES # BLD: 16 % (ref 24–43)
MCH RBC QN AUTO: 26.6 PG (ref 25.2–33.5)
MCHC RBC AUTO-ENTMCNC: 30.8 G/DL (ref 28.4–34.8)
MCV RBC AUTO: 86.2 FL (ref 82.6–102.9)
MONOCYTES # BLD: 5 % (ref 3–12)
NRBC AUTOMATED: 0 PER 100 WBC
PDW BLD-RTO: 13.3 % (ref 11.8–14.4)
PLATELET # BLD: 393 K/UL (ref 138–453)
PLATELET ESTIMATE: ABNORMAL
PMV BLD AUTO: 10.4 FL (ref 8.1–13.5)
POTASSIUM SERPL-SCNC: 4.3 MMOL/L (ref 3.7–5.3)
PROGESTERONE LEVEL: <0.05 NG/ML
RBC # BLD: 4.78 M/UL (ref 3.95–5.11)
RBC # BLD: ABNORMAL 10*6/UL
SEG NEUTROPHILS: 76 % (ref 36–65)
SEGMENTED NEUTROPHILS ABSOLUTE COUNT: 9.93 K/UL (ref 1.5–8.1)
SODIUM BLD-SCNC: 142 MMOL/L (ref 135–144)
THYROXINE, FREE: 1.18 NG/DL (ref 0.93–1.7)
TOTAL IRON BINDING CAPACITY: 278 UG/DL (ref 250–450)
TOTAL PROTEIN: 7.9 G/DL (ref 6.4–8.3)
TSH SERPL DL<=0.05 MIU/L-ACNC: 0.49 MIU/L (ref 0.3–5)
UNSATURATED IRON BINDING CAPACITY: 233 UG/DL (ref 112–347)
WBC # BLD: 12.9 K/UL (ref 3.5–11.3)
WBC # BLD: ABNORMAL 10*3/UL

## 2018-11-21 PROCEDURE — 81479 UNLISTED MOLECULAR PATHOLOGY: CPT

## 2018-11-21 PROCEDURE — 86762 RUBELLA ANTIBODY: CPT

## 2018-11-21 PROCEDURE — 36415 COLL VENOUS BLD VENIPUNCTURE: CPT

## 2018-11-21 PROCEDURE — 82397 CHEMILUMINESCENT ASSAY: CPT

## 2018-11-21 PROCEDURE — 88346 IMFLUOR 1ST 1ANTB STAIN PX: CPT

## 2018-11-21 PROCEDURE — 80076 HEPATIC FUNCTION PANEL: CPT

## 2018-11-21 PROCEDURE — 84144 ASSAY OF PROGESTERONE: CPT

## 2018-11-21 PROCEDURE — 82746 ASSAY OF FOLIC ACID SERUM: CPT

## 2018-11-21 PROCEDURE — 86694 HERPES SIMPLEX NES ANTBDY: CPT

## 2018-11-21 PROCEDURE — 86778 TOXOPLASMA ANTIBODY IGM: CPT

## 2018-11-21 PROCEDURE — 80048 BASIC METABOLIC PNL TOTAL CA: CPT

## 2018-11-21 PROCEDURE — 84439 ASSAY OF FREE THYROXINE: CPT

## 2018-11-21 PROCEDURE — 83516 IMMUNOASSAY NONANTIBODY: CPT

## 2018-11-21 PROCEDURE — 88350 IMFLUOR EA ADDL 1ANTB STN PX: CPT

## 2018-11-21 PROCEDURE — 84443 ASSAY THYROID STIM HORMONE: CPT

## 2018-11-21 PROCEDURE — 83550 IRON BINDING TEST: CPT

## 2018-11-21 PROCEDURE — 86645 CMV ANTIBODY IGM: CPT

## 2018-11-21 PROCEDURE — 86140 C-REACTIVE PROTEIN: CPT

## 2018-11-21 PROCEDURE — 83540 ASSAY OF IRON: CPT

## 2018-11-21 PROCEDURE — 83520 IMMUNOASSAY QUANT NOS NONAB: CPT

## 2018-11-21 PROCEDURE — 82784 ASSAY IGA/IGD/IGG/IGM EACH: CPT

## 2018-11-21 PROCEDURE — 86644 CMV ANTIBODY: CPT

## 2018-11-21 PROCEDURE — 86777 TOXOPLASMA ANTIBODY: CPT

## 2018-11-21 PROCEDURE — 85025 COMPLETE CBC W/AUTO DIFF WBC: CPT

## 2018-11-21 PROCEDURE — 84702 CHORIONIC GONADOTROPIN TEST: CPT

## 2018-11-21 PROCEDURE — 82607 VITAMIN B-12: CPT

## 2018-11-22 LAB — VITAMIN B-12: 684 PG/ML (ref 232–1245)

## 2018-11-23 LAB
CMV IGM: 0.3
CYTOMEGALOVIRUS IGG ANTIBODY: 0.2
HERPES SIMPLEX VIRUS 1 IGG: 5.66
HERPES SIMPLEX VIRUS 2 IGG: 0.78
HERPES TYPE 1/2 IGM COMBINED: 0.61
RUBV IGG SER QL: 321.9 IU/ML
TISSUE TRANSGLUTAMINASE IGA: 0.6 U/ML
TOXOPLASM IGM: 0.55 INDEX
TOXOPLASMA BLOOD FOR RATIO: <0.5 IU/ML

## 2018-11-30 LAB — IBD PANEL: NORMAL

## 2018-12-10 DIAGNOSIS — R10.32 LLQ PAIN: Primary | ICD-10-CM

## 2018-12-11 ENCOUNTER — OFFICE VISIT (OUTPATIENT)
Dept: FAMILY MEDICINE CLINIC | Age: 38
End: 2018-12-11
Payer: MEDICARE

## 2018-12-11 VITALS
SYSTOLIC BLOOD PRESSURE: 107 MMHG | HEART RATE: 85 BPM | DIASTOLIC BLOOD PRESSURE: 67 MMHG | RESPIRATION RATE: 16 BRPM | TEMPERATURE: 98 F | WEIGHT: 259 LBS | BODY MASS INDEX: 45.89 KG/M2 | HEIGHT: 63 IN

## 2018-12-11 DIAGNOSIS — M77.8 THUMB TENDONITIS: Primary | ICD-10-CM

## 2018-12-11 LAB — CHROMOSOME STUDY: NORMAL

## 2018-12-11 PROCEDURE — G8427 DOCREV CUR MEDS BY ELIG CLIN: HCPCS | Performed by: PHYSICIAN ASSISTANT

## 2018-12-11 PROCEDURE — G8417 CALC BMI ABV UP PARAM F/U: HCPCS | Performed by: PHYSICIAN ASSISTANT

## 2018-12-11 PROCEDURE — 1036F TOBACCO NON-USER: CPT | Performed by: PHYSICIAN ASSISTANT

## 2018-12-11 PROCEDURE — G8484 FLU IMMUNIZE NO ADMIN: HCPCS | Performed by: PHYSICIAN ASSISTANT

## 2018-12-11 PROCEDURE — 99213 OFFICE O/P EST LOW 20 MIN: CPT | Performed by: PHYSICIAN ASSISTANT

## 2018-12-11 RX ORDER — SUCRALFATE 1 G/1
1 TABLET ORAL
COMMUNITY
Start: 2018-12-02 | End: 2019-07-10

## 2018-12-11 RX ORDER — RANITIDINE 300 MG/1
300 TABLET ORAL
COMMUNITY
Start: 2018-12-02 | End: 2019-07-10

## 2018-12-17 ENCOUNTER — HOSPITAL ENCOUNTER (OUTPATIENT)
Dept: ULTRASOUND IMAGING | Age: 38
Discharge: HOME | End: 2018-12-19
Payer: MEDICARE

## 2018-12-17 DIAGNOSIS — R10.32 LLQ PAIN: ICD-10-CM

## 2018-12-17 PROCEDURE — 76856 US EXAM PELVIC COMPLETE: CPT

## 2018-12-17 PROCEDURE — 76830 TRANSVAGINAL US NON-OB: CPT

## 2019-01-09 ENCOUNTER — OFFICE VISIT (OUTPATIENT)
Dept: OBGYN CLINIC | Age: 39
End: 2019-01-09
Payer: MEDICARE

## 2019-01-09 ENCOUNTER — HOSPITAL ENCOUNTER (OUTPATIENT)
Age: 39
Setting detail: SPECIMEN
Discharge: HOME | End: 2019-01-09
Payer: MEDICARE

## 2019-01-09 VITALS
WEIGHT: 261.25 LBS | RESPIRATION RATE: 16 BRPM | SYSTOLIC BLOOD PRESSURE: 116 MMHG | BODY MASS INDEX: 46.29 KG/M2 | DIASTOLIC BLOOD PRESSURE: 72 MMHG

## 2019-01-09 DIAGNOSIS — Z30.014 ENCOUNTER FOR INITIAL PRESCRIPTION OF INTRAUTERINE CONTRACEPTIVE DEVICE (IUD): Primary | ICD-10-CM

## 2019-01-09 DIAGNOSIS — N89.8 VAGINAL DISCHARGE: ICD-10-CM

## 2019-01-09 DIAGNOSIS — Z01.812 PRE-PROCEDURE LAB EXAM: ICD-10-CM

## 2019-01-09 PROBLEM — M47.816 LUMBAR SPONDYLOSIS: Status: ACTIVE | Noted: 2019-01-09

## 2019-01-09 PROBLEM — M79.10 MUSCLE PAIN: Status: ACTIVE | Noted: 2019-01-09

## 2019-01-09 PROBLEM — M54.16 LUMBAR RADICULOPATHY: Status: ACTIVE | Noted: 2019-01-09

## 2019-01-09 PROBLEM — M06.4 INFLAMMATORY POLYARTHROPATHY (HCC): Status: ACTIVE | Noted: 2019-01-09

## 2019-01-09 PROBLEM — R07.0 PAIN IN THROAT: Status: ACTIVE | Noted: 2019-01-09

## 2019-01-09 LAB
DIRECT EXAM: NORMAL
Lab: NORMAL
SPECIMEN DESCRIPTION: NORMAL
STATUS: NORMAL

## 2019-01-09 PROCEDURE — G8427 DOCREV CUR MEDS BY ELIG CLIN: HCPCS | Performed by: OBSTETRICS & GYNECOLOGY

## 2019-01-09 PROCEDURE — G8417 CALC BMI ABV UP PARAM F/U: HCPCS | Performed by: OBSTETRICS & GYNECOLOGY

## 2019-01-09 PROCEDURE — G8484 FLU IMMUNIZE NO ADMIN: HCPCS | Performed by: OBSTETRICS & GYNECOLOGY

## 2019-01-09 PROCEDURE — 1036F TOBACCO NON-USER: CPT | Performed by: OBSTETRICS & GYNECOLOGY

## 2019-01-09 PROCEDURE — 99213 OFFICE O/P EST LOW 20 MIN: CPT | Performed by: OBSTETRICS & GYNECOLOGY

## 2019-01-09 RX ORDER — OMEPRAZOLE 40 MG/1
CAPSULE, DELAYED RELEASE ORAL
Refills: 0 | COMMUNITY
Start: 2018-12-15 | End: 2019-07-10

## 2019-01-09 RX ORDER — MISOPROSTOL 200 UG/1
TABLET ORAL
Qty: 2 TABLET | Refills: 0 | Status: SHIPPED | OUTPATIENT
Start: 2019-01-09 | End: 2019-07-10

## 2019-01-11 LAB
C TRACH DNA GENITAL QL NAA+PROBE: NEGATIVE
N. GONORRHOEAE DNA: NEGATIVE

## 2019-01-15 ENCOUNTER — OFFICE VISIT (OUTPATIENT)
Dept: FAMILY MEDICINE CLINIC | Age: 39
End: 2019-01-15
Payer: MEDICARE

## 2019-01-15 VITALS
SYSTOLIC BLOOD PRESSURE: 124 MMHG | DIASTOLIC BLOOD PRESSURE: 82 MMHG | BODY MASS INDEX: 46.46 KG/M2 | WEIGHT: 262.2 LBS | HEIGHT: 63 IN | RESPIRATION RATE: 16 BRPM | TEMPERATURE: 98.4 F | HEART RATE: 82 BPM

## 2019-01-15 DIAGNOSIS — H65.01 RIGHT ACUTE SEROUS OTITIS MEDIA, RECURRENCE NOT SPECIFIED: ICD-10-CM

## 2019-01-15 DIAGNOSIS — R10.9 LEFT FLANK PAIN: ICD-10-CM

## 2019-01-15 DIAGNOSIS — J02.9 ACUTE PHARYNGITIS, UNSPECIFIED ETIOLOGY: ICD-10-CM

## 2019-01-15 DIAGNOSIS — R30.0 DYSURIA: Primary | ICD-10-CM

## 2019-01-15 LAB
BILIRUBIN, POC: NEGATIVE
BLOOD URINE, POC: NEGATIVE
CLARITY, POC: NORMAL
COLOR, POC: YELLOW
GLUCOSE URINE, POC: NEGATIVE
KETONES, POC: NEGATIVE
LEUKOCYTE EST, POC: NEGATIVE
NITRITE, POC: NEGATIVE
PH, POC: 6
PROTEIN, POC: NEGATIVE
S PYO AG THROAT QL: NORMAL
SPECIFIC GRAVITY, POC: 1.03
UROBILINOGEN, POC: 0.2

## 2019-01-15 PROCEDURE — G8484 FLU IMMUNIZE NO ADMIN: HCPCS | Performed by: PHYSICIAN ASSISTANT

## 2019-01-15 PROCEDURE — 99213 OFFICE O/P EST LOW 20 MIN: CPT | Performed by: PHYSICIAN ASSISTANT

## 2019-01-15 PROCEDURE — 1036F TOBACCO NON-USER: CPT | Performed by: PHYSICIAN ASSISTANT

## 2019-01-15 PROCEDURE — 87880 STREP A ASSAY W/OPTIC: CPT | Performed by: PHYSICIAN ASSISTANT

## 2019-01-15 PROCEDURE — G8427 DOCREV CUR MEDS BY ELIG CLIN: HCPCS | Performed by: PHYSICIAN ASSISTANT

## 2019-01-15 PROCEDURE — G8417 CALC BMI ABV UP PARAM F/U: HCPCS | Performed by: PHYSICIAN ASSISTANT

## 2019-01-15 RX ORDER — SULFAMETHOXAZOLE AND TRIMETHOPRIM 800; 160 MG/1; MG/1
1 TABLET ORAL 2 TIMES DAILY
Qty: 14 TABLET | Refills: 0 | Status: SHIPPED | OUTPATIENT
Start: 2019-01-15 | End: 2019-01-22

## 2019-01-15 ASSESSMENT — ENCOUNTER SYMPTOMS
BLOOD IN STOOL: 0
EYE DISCHARGE: 0
DIARRHEA: 1
SINUS PRESSURE: 0
CONSTIPATION: 1
VOMITING: 0
TROUBLE SWALLOWING: 0
VOICE CHANGE: 0
EYE ITCHING: 0
NAUSEA: 0
SHORTNESS OF BREATH: 0
EYE REDNESS: 0
SINUS PAIN: 0
BACK PAIN: 1
SORE THROAT: 1
FACIAL SWELLING: 0
ABDOMINAL PAIN: 1
RHINORRHEA: 0
ABDOMINAL DISTENTION: 0
WHEEZING: 0
COUGH: 0
COLOR CHANGE: 0

## 2019-01-21 ENCOUNTER — HOSPITAL ENCOUNTER (OUTPATIENT)
Age: 39
Discharge: HOME | End: 2019-01-21
Payer: MEDICARE

## 2019-01-21 DIAGNOSIS — Z01.812 PRE-PROCEDURE LAB EXAM: ICD-10-CM

## 2019-01-21 LAB — HCG QUANTITATIVE: <1 IU/L

## 2019-01-21 PROCEDURE — 84702 CHORIONIC GONADOTROPIN TEST: CPT

## 2019-01-23 ENCOUNTER — PROCEDURE VISIT (OUTPATIENT)
Dept: OBGYN CLINIC | Age: 39
End: 2019-01-23
Payer: MEDICARE

## 2019-01-23 VITALS — WEIGHT: 262 LBS | BODY MASS INDEX: 46.42 KG/M2 | DIASTOLIC BLOOD PRESSURE: 74 MMHG | SYSTOLIC BLOOD PRESSURE: 128 MMHG

## 2019-01-23 DIAGNOSIS — Z30.431 IUD CHECK UP: ICD-10-CM

## 2019-01-23 DIAGNOSIS — Z30.430 ENCOUNTER FOR INSERTION OF CONTRACEPTIVE DEVICE: Primary | ICD-10-CM

## 2019-01-23 PROCEDURE — 58300 INSERT INTRAUTERINE DEVICE: CPT | Performed by: OBSTETRICS & GYNECOLOGY

## 2019-02-06 ENCOUNTER — HOSPITAL ENCOUNTER (OUTPATIENT)
Dept: ULTRASOUND IMAGING | Facility: CLINIC | Age: 39
Discharge: HOME | End: 2019-02-08
Payer: MEDICARE

## 2019-02-06 DIAGNOSIS — Z30.431 IUD CHECK UP: ICD-10-CM

## 2019-02-06 PROCEDURE — 76856 US EXAM PELVIC COMPLETE: CPT

## 2019-02-07 DIAGNOSIS — K80.20 CALCULUS OF GALLBLADDER WITHOUT CHOLECYSTITIS WITHOUT OBSTRUCTION: ICD-10-CM

## 2019-02-07 RX ORDER — IBUPROFEN 800 MG/1
TABLET ORAL
Qty: 60 TABLET | Refills: 0 | Status: SHIPPED | OUTPATIENT
Start: 2019-02-07 | End: 2019-07-10

## 2019-04-26 ENCOUNTER — OFFICE VISIT (OUTPATIENT)
Dept: GASTROENTEROLOGY | Age: 39
End: 2019-04-26
Payer: MEDICARE

## 2019-04-26 VITALS
WEIGHT: 266 LBS | SYSTOLIC BLOOD PRESSURE: 127 MMHG | BODY MASS INDEX: 47.13 KG/M2 | HEART RATE: 89 BPM | DIASTOLIC BLOOD PRESSURE: 92 MMHG

## 2019-04-26 DIAGNOSIS — R19.7 DIARRHEA, UNSPECIFIED TYPE: Primary | ICD-10-CM

## 2019-04-26 PROCEDURE — 99213 OFFICE O/P EST LOW 20 MIN: CPT | Performed by: INTERNAL MEDICINE

## 2019-04-26 PROCEDURE — G8417 CALC BMI ABV UP PARAM F/U: HCPCS | Performed by: INTERNAL MEDICINE

## 2019-04-26 PROCEDURE — G8427 DOCREV CUR MEDS BY ELIG CLIN: HCPCS | Performed by: INTERNAL MEDICINE

## 2019-04-26 PROCEDURE — 1036F TOBACCO NON-USER: CPT | Performed by: INTERNAL MEDICINE

## 2019-04-26 ASSESSMENT — ENCOUNTER SYMPTOMS
TROUBLE SWALLOWING: 0
ANAL BLEEDING: 0
WHEEZING: 0
RESPIRATORY NEGATIVE: 1
CONSTIPATION: 1
VOICE CHANGE: 0
RECTAL PAIN: 0
BLOOD IN STOOL: 0
ABDOMINAL DISTENTION: 0
ABDOMINAL PAIN: 0
BACK PAIN: 1
NAUSEA: 0
SORE THROAT: 0
SINUS PRESSURE: 0
COUGH: 0
DIARRHEA: 1
VOMITING: 0
CHOKING: 0

## 2019-04-26 NOTE — PROGRESS NOTES
GI CLINIC FOLLOW UP    INTERVAL HISTORY:     dyspepsia, intermittent constipation and diarrhea\"        Chief Complaint   Patient presents with    Constipation     Patient is here today for a 6 month follow up from abdominal pain and constipation. Patient states she is no longer having the abdominal pain but still gets constipated. Patient states that she has approximately 5-8 bowel movements per week. Patient states that it is usually normal and not too hard to get out. HISTORY OF PRESENT ILLNESS: Kandice Lema is a 41 y.o. female with a pasthistory remarkable for intermittent dyspepsia symptoms, non-specific abdominal pain, and intermittent diarrhea, referred for evaluation of her chronic GI symptoms. Patient denies any infectious symptoms       Past Medical,Family, and Social History reviewed and does contribute to the patient presentingcondition. Patient's PMH/PSH,SH,PSYCH Hx, MEDs, ALLERGIES, and ROS were all reviewed and updated in the appropriate sections.     PAST MEDICAL HISTORY:  Past Medical History:   Diagnosis Date    Chronic back pain     GERD (gastroesophageal reflux disease)     Headache     Lumbar radiculopathy 1/9/2019    Lumbar spondylosis 1/9/2019    Obesity     Rheumatoid arteritis        Past Surgical History:   Procedure Laterality Date    OTHER SURGICAL HISTORY  12/10/2018    Fluoroscopy- with esophagas- possible delay of gastric emptying        CURRENT MEDICATIONS:    Current Outpatient Medications:     ibuprofen (ADVIL;MOTRIN) 800 MG tablet, take 1 tablet by mouth every 8 hours if needed for pain with food, Disp: 60 tablet, Rfl: 0    omeprazole (PRILOSEC) 40 MG delayed release capsule, , Disp: , Rfl: 0    predniSONE (DELTASONE) 5 MG tablet, Take 5 mg by mouth daily, Disp: , Rfl:     metFORMIN (GLUCOPHAGE) 1000 MG tablet, Take 1 tablet by mouth 2 times daily (with meals), Disp: 60 tablet, Rfl: 5    folic acid (FOLVITE) 1 MG tablet, Take 1 mg by mouth daily, Disp: , Rfl:     misoprostol (CYTOTEC) 200 MCG tablet, Take 1 tablet by mouth night prior to procedure and 1 tablet by mouth morning of procedure, Disp: 2 tablet, Rfl: 0    ranitidine (ZANTAC) 300 MG tablet, Take 300 mg by mouth, Disp: , Rfl:     sucralfate (CARAFATE) 1 GM tablet, Take 1 g by mouth, Disp: , Rfl:     ALLERGIES:   Allergies   Allergen Reactions    Iodine Hives and Itching       FAMILY HISTORY:       Problem Relation Age of Onset    Elevated Lipids Mother          SOCIAL HISTORY:   Social History     Socioeconomic History    Marital status:      Spouse name: Not on file    Number of children: Not on file    Years of education: Not on file    Highest education level: Not on file   Occupational History    Not on file   Social Needs    Financial resource strain: Not on file    Food insecurity:     Worry: Not on file     Inability: Not on file    Transportation needs:     Medical: Not on file     Non-medical: Not on file   Tobacco Use    Smoking status: Never Smoker    Smokeless tobacco: Never Used   Substance and Sexual Activity    Alcohol use: No    Drug use: No    Sexual activity: Yes     Partners: Male   Lifestyle    Physical activity:     Days per week: Not on file     Minutes per session: Not on file    Stress: Not on file   Relationships    Social connections:     Talks on phone: Not on file     Gets together: Not on file     Attends [employer] service: Not on file     Active member of club or organization: Not on file     Attends meetings of clubs or organizations: Not on file     Relationship status: Not on file    Intimate partner violence:     Fear of current or ex partner: Not on file     Emotionally abused: Not on file     Physically abused: Not on file     Forced sexual activity: Not on file   Other Topics Concern    Not on file   Social History Narrative    Not on file       REVIEW OF SYSTEMS: A 12-point review of systemswas obtained and pertinent positives and negatives were enumerated above in the history of present illness. All other reviewed systems / symptoms were negative. Review of Systems    PHYSICAL EXAMINATION: Vital signs reviewed per the nursing documentation. BP (!) 127/92   Pulse 89   Wt 266 lb (120.7 kg)   BMI 47.13 kg/m²   Body mass index is 47.13 kg/m². Physical Exam   Constitutional: She is oriented to person, place, and time. She appears well-developed and well-nourished. HENT:   Head: Normocephalic and atraumatic. Eyes: Pupils are equal, round, and reactive to light. EOM are normal.   Neck: Normal range of motion. Neck supple. Cardiovascular: Normal rate, regular rhythm, normal heart sounds and intact distal pulses. Pulmonary/Chest: Effort normal and breath sounds normal.   Abdominal: Soft. Bowel sounds are normal. She exhibits no distension and no mass. There is no tenderness. There is no rebound and no guarding. No hernia. Musculoskeletal: Normal range of motion. Neurological: She is alert and oriented to person, place, and time. Skin: Skin is warm. Psychiatric: She has a normal mood and affect.  Her behavior is normal.         LABORATORY DATA: Reviewed  Lab Results   Component Value Date    WBC 14.15 (H) 03/05/2019    HGB 13.1 03/05/2019    HCT 41.0 03/05/2019    MCV 83.5 03/05/2019     03/05/2019     03/05/2019    K 4.0 03/05/2019     03/05/2019    CO2 25 03/05/2019    BUN 11 03/05/2019    CREATININE 0.61 03/05/2019    LABALBU 4.0 03/05/2019    BILITOT 0.2 (L) 03/05/2019    ALKPHOS 98 03/05/2019    AST 14 03/05/2019    ALT 16 03/05/2019    INR 1.0 12/09/2017         Lab Results   Component Value Date    RBC 4.91 03/05/2019    HGB 13.1 03/05/2019    MCV 83.5 03/05/2019    MCH 26.7 (L) 03/05/2019    MCHC 32.0 03/05/2019    RDW 14.4 03/05/2019    MPV 10.4 11/21/2018    BASOPCT 0.2 03/05/2019    LYMPHSABS 1.2 03/05/2019    MONOSABS 0.4 03/05/2019    NEUTROABS 12.4 (H) 03/05/2019    EOSABS 0.1 03/05/2019 BASOSABS 0.0 03/05/2019         DIAGNOSTIC TESTING:           IMPRESSION: Ms. Louann Simmonds is a 41 y.o. female with history RA, on prednisone and DMARD therapy, methotrexate, mild elevation in AP, on prednisone, low dose, 5mg daily, found to have probability of IBD/Crohns disease based on IBD index, seen here for alternative diarrhea and constipation with suspicion for IBS. Clinically doing well. Recent miscarriage, found to have suspicions for Anti-cardiolipin d/o, elevated levels, pending repeat exam. Not on [facility] therapy at this time. Recommendation:  -Clinically doing well from GI standpoint, there may concern for IBD/Crohns given her history and probability index, she also may be partially treated for this with her low dose prednisone, some overlap with RA.  -Will plan on EGD and Colonoscopy as dx procedure, will need duodenal bx and TI bx, RBA explained.  -Will perform in 2 weeks  -RTC in 2 months, may consider MRE if endoscopic procedures are negative. No indication for empiric treatment at this time given dx remains questionable. Thank you for allowing me to participate in the care of Ms. Louann Simmonds. For any further questions please do not hesitate to contact me. I have reviewed and agree with the ROS entered by the MA/LPN.          Connie Brandon MD, MPH   [facility]  Office #: [phone]

## 2019-04-26 NOTE — PROGRESS NOTES
Review of Systems   Constitutional: Positive for fatigue. Negative for appetite change and unexpected weight change. HENT: Negative. Negative for dental problem, postnasal drip, sinus pressure, sore throat, trouble swallowing and voice change. Eyes: Positive for visual disturbance (glasses). Respiratory: Negative. Negative for cough, choking and wheezing. Cardiovascular: Negative. Negative for chest pain, palpitations and leg swelling. Gastrointestinal: Positive for constipation and diarrhea. Negative for abdominal distention, abdominal pain, anal bleeding, blood in stool, nausea, rectal pain and vomiting. Genitourinary: Negative. Negative for difficulty urinating. Musculoskeletal: Positive for arthralgias and back pain. Negative for gait problem and myalgias. Allergic/Immunologic: Negative for environmental allergies and food allergies. Neurological: Positive for headaches. Negative for dizziness, weakness, light-headedness and numbness. Hematological: Negative. Does not bruise/bleed easily. Psychiatric/Behavioral: Positive for sleep disturbance. The patient is not nervous/anxious.

## 2019-05-16 ENCOUNTER — PROCEDURE VISIT (OUTPATIENT)
Dept: OBGYN CLINIC | Age: 39
End: 2019-05-16
Payer: MEDICARE

## 2019-05-16 ENCOUNTER — TELEPHONE (OUTPATIENT)
Dept: GASTROENTEROLOGY | Age: 39
End: 2019-05-16

## 2019-05-16 VITALS
SYSTOLIC BLOOD PRESSURE: 110 MMHG | DIASTOLIC BLOOD PRESSURE: 62 MMHG | BODY MASS INDEX: 49.13 KG/M2 | WEIGHT: 267 LBS | RESPIRATION RATE: 16 BRPM | HEIGHT: 62 IN

## 2019-05-16 DIAGNOSIS — Z30.432 ENCOUNTER FOR IUD REMOVAL: Primary | ICD-10-CM

## 2019-05-16 PROCEDURE — 58301 REMOVE INTRAUTERINE DEVICE: CPT | Performed by: NURSE PRACTITIONER

## 2019-05-16 NOTE — PROGRESS NOTES
SUBJECTIVE:            CC:   Chief Complaint   Patient presents with    Other     IUD Removal       HPI:  The patient is requesting that her IUD be removed as she is planning on becoming pregnant. She is being evaluated by rheumatology again in June for abnormal antithrombin 3 deficiency, is having additional labs done. IS planning after that to try conceive. They also denies any histories of spina bifida or abdominal wall defects; omphalocele's or gastroschisis. The patient was counseled on the procedure. Risks, benefits and alternatives were reviewed. Consent for removal was obtained. She has no other chief complaint today. States has noted increased headaches since IUD placed. She also would like to try to conceive in near future, now that having further evaluation by her rheumatologist.      Objective:  not currently breastfeeding. No LMP recorded. The patient was counseled on the procedure. Risks, benefits and alternatives were reviewed. Consent for removal was obtained. The patient was positioned comfortably on the exam table. After a bi-manual exam; the uterus was found to be  anteverted. There was no cervical motion tenderness or adnexal masses. The bladder was smooth, non-tender and without palpable masses. A sterile speculum was placed without incident and the string was identified at the cervical portio. The strings were grasped with a ring forcep and the IUD was removed without incident. Assessment:  IUD removal   Desire for conception    Plan:  1) Post procedure restrictions were reviewed and given to the patient. 2) She is to have 2-3 normal menstrual cycles before attempting conception. She was instructed to use barrier protection   3) She was given a prescription for prenatal vitamins.    4) She will return for her annual around 1 month  5) The patient is to notify the office when she has had a positive home pregnancy test.

## 2019-05-16 NOTE — TELEPHONE ENCOUNTER
Alondrar received a voicemail from the patient questioning if her insurance would cover a blood pressure machine for her since her and Dr. Deejay Bosch were discussing her blood pressure at her last appointment. Writer attempted to contact the patient back to inform her that I am unsure of that information and that she would need to contact her insurance company to determine her eligibility for that, however, the patient was unavailable and her voicemail box was full.

## 2019-05-16 NOTE — PATIENT INSTRUCTIONS
Patient Education        IUD Removal: Care Instructions  Your Care Instructions    The intrauterine device (IUD) is a method of birth control. It is a small, plastic, T-shaped device that contains copper or hormones. It is placed in your uterus. You may have had your IUD removed because you want to become pregnant. Or maybe it caused pain, bleeding, or an infection. You may have chosen another method of birth control. If you don't want to get pregnant, make sure to use another form of birth control now that your IUD is not in place. Talk to your doctor about other forms of birth control. Follow-up care is a key part of your treatment and safety. Be sure to make and go to all appointments, and call your doctor if you are having problems. It's also a good idea to know your test results and keep a list of the medicines you take. How can you care for yourself at home? · IUD removal does not usually cause any pain or problems if the IUD is removed because you want to become pregnant or because of bleeding. · Once the IUD is taken out, you can become pregnant. If you want to become pregnant, you can start trying to have a baby as soon as you like. · If your doctor prescribed antibiotics because of an infection, take them as directed. Do not stop taking them just because you feel better. You need to take the full course of antibiotics. When should you call for help? Call your doctor now or seek immediate medical care if:    · You have pain in your belly or pelvis.     · You have severe vaginal bleeding. This means that you are soaking through your usual pads or tampons every hour for 2 or more hours.     · You have a fever.     · You have a vaginal discharge that smells bad.    Watch closely for changes in your health, and be sure to contact your doctor if you have any problems. Where can you learn more? Go to https://singh.healthProtoExchange. org and sign in to your [employer] account.  Enter S993 in the Search Health Information box to learn more about \"IUD Removal: Care Instructions. \"     If you do not have an account, please click on the \"Sign Up Now\" link. Current as of: September 5, 2018  Content Version: 12.0  © 5102-6967 Healthwise, Incorporated. Care instructions adapted under license by [facility] ([facility]). If you have questions about a medical condition or this instruction, always ask your healthcare professional. [address] any warranty or liability for your use of this information.

## 2019-05-17 ENCOUNTER — TELEPHONE (OUTPATIENT)
Dept: GASTROENTEROLOGY | Age: 39
End: 2019-05-17

## 2019-05-17 NOTE — TELEPHONE ENCOUNTER
Writer spoke with patient and explained the need to reschedule 5/23 colon/egd due to scheduling restraints at [facility]. Offered patient same date at [facility] but patient was unable to take a later time. She is now r/s to 6/19/19 and office visit moved to 7/17/19. New instructions and appointment reminder mailed to patient.

## 2019-06-11 ENCOUNTER — TELEPHONE (OUTPATIENT)
Dept: GASTROENTEROLOGY | Age: 39
End: 2019-06-11

## 2019-06-11 NOTE — TELEPHONE ENCOUNTER
Called [facility] to confirm egd colon on 06/19/19 with Dr Allie Moffett at [facility], no answer/no voicemail.

## 2019-06-12 NOTE — TELEPHONE ENCOUNTER
Goldie Hernandez cancelled egd colon on 06/19/19 with Dr Ashok Hodges at [facility], no reason given.   [facility] notified

## 2019-06-26 DIAGNOSIS — D68.61 ANTIPHOSPHOLIPID ANTIBODY SYNDROME (HCC): Primary | ICD-10-CM

## 2019-06-27 ENCOUNTER — HOSPITAL ENCOUNTER (OUTPATIENT)
Age: 39
Discharge: HOME | End: 2019-06-27
Payer: MEDICARE

## 2019-06-27 ENCOUNTER — TELEPHONE (OUTPATIENT)
Dept: OBGYN CLINIC | Age: 39
End: 2019-06-27

## 2019-06-27 DIAGNOSIS — N91.2 AMENORRHEA: ICD-10-CM

## 2019-06-27 DIAGNOSIS — Z34.90 EARLY STAGE OF PREGNANCY: Primary | ICD-10-CM

## 2019-06-27 DIAGNOSIS — Z32.01 POSITIVE PREGNANCY TEST: ICD-10-CM

## 2019-06-27 DIAGNOSIS — Z32.01 POSITIVE PREGNANCY TEST: Primary | ICD-10-CM

## 2019-06-27 DIAGNOSIS — O02.1 MISSED ABORTION WITH FETAL DEMISE BEFORE 20 COMPLETED WEEKS OF GESTATION: ICD-10-CM

## 2019-06-27 LAB — HCG QUANTITATIVE: 366 IU/L

## 2019-06-27 PROCEDURE — 36415 COLL VENOUS BLD VENIPUNCTURE: CPT

## 2019-06-27 PROCEDURE — 84702 CHORIONIC GONADOTROPIN TEST: CPT

## 2019-06-27 NOTE — TELEPHONE ENCOUNTER
----- Message from MIKHAIL Joe CNP sent at 6/27/2019  1:01 PM EDT -----  hcg positive very early pregnancy needs us once atleast 2500 I know she is concerned and wants to start lovenox repeat hcg in 3 days

## 2019-06-30 ENCOUNTER — HOSPITAL ENCOUNTER (OUTPATIENT)
Age: 39
Discharge: HOME | End: 2019-06-30
Payer: MEDICARE

## 2019-06-30 LAB — HCG QUANTITATIVE: 1164 IU/L

## 2019-06-30 PROCEDURE — 84702 CHORIONIC GONADOTROPIN TEST: CPT

## 2019-07-01 ENCOUNTER — TELEPHONE (OUTPATIENT)
Dept: OBGYN CLINIC | Age: 39
End: 2019-07-01

## 2019-07-02 ENCOUNTER — TELEPHONE (OUTPATIENT)
Dept: OBGYN CLINIC | Age: 39
End: 2019-07-02

## 2019-07-02 DIAGNOSIS — D68.59 ANTITHROMBIN 3 DEFICIENCY (HCC): ICD-10-CM

## 2019-07-02 DIAGNOSIS — O09.291 HISTORY OF MISCARRIAGE, CURRENTLY PREGNANT, FIRST TRIMESTER: Primary | ICD-10-CM

## 2019-07-02 DIAGNOSIS — R79.89 LOW SERUM PROGESTERONE: ICD-10-CM

## 2019-07-02 NOTE — TELEPHONE ENCOUNTER
Patient had 2nd quant level drawn on Sunday which was 1164.    1st quant on 6-27 was 366. Patient was informed to continue w/q3d quant levels. Has  MFM consult appt on 7-10  Has  IPV appt here 7-22 and U/S here 7-25. Patient phoned office asking if Lovenox was going to be ordered now for her?

## 2019-07-03 ENCOUNTER — HOSPITAL ENCOUNTER (OUTPATIENT)
Age: 39
Discharge: HOME | End: 2019-07-03
Payer: MEDICARE

## 2019-07-03 ENCOUNTER — HOSPITAL ENCOUNTER (OUTPATIENT)
Dept: ULTRASOUND IMAGING | Age: 39
Discharge: HOME | End: 2019-07-05
Payer: MEDICARE

## 2019-07-03 DIAGNOSIS — O09.291 HISTORY OF MISCARRIAGE, CURRENTLY PREGNANT, FIRST TRIMESTER: ICD-10-CM

## 2019-07-03 DIAGNOSIS — Z34.90 EARLY STAGE OF PREGNANCY: ICD-10-CM

## 2019-07-03 LAB — HCG QUANTITATIVE: 3029 IU/L

## 2019-07-03 PROCEDURE — 76801 OB US < 14 WKS SINGLE FETUS: CPT

## 2019-07-03 PROCEDURE — 84702 CHORIONIC GONADOTROPIN TEST: CPT

## 2019-07-03 PROCEDURE — 36415 COLL VENOUS BLD VENIPUNCTURE: CPT

## 2019-07-03 NOTE — TELEPHONE ENCOUNTER
----- Message from Frederick Shields CNP sent at 11/16/2017  7:16 AM EST -----  Neg HCg   She is going to get lab work on day 3 of cycle that includes Fsh, tsh, fbs fi, prolactin, & clotting profile , then on day 21 we will check Progesterone , for now cont PNV and extra folic acid NAUSEA

## 2019-07-03 NOTE — TELEPHONE ENCOUNTER
Phoned patient and made her aware of preliminary results of ultrasound she had done @ [facility] ED Radiology this am.  Informed her of Dr. Marylin Witt recommendations and that scripts have been sent to pharmacy for her for the Lovenox and the vaginal progesterone. If she has any questions she is to phone office. Patient states understanding.

## 2019-07-03 NOTE — TELEPHONE ENCOUNTER
Agree with plan of care. sEcondary to thrombophilia and recurrent miscarriage start Lovenox porphylactic dose once IUP confirmed.

## 2019-07-08 ENCOUNTER — HOSPITAL ENCOUNTER (OUTPATIENT)
Age: 39
Discharge: HOME | End: 2019-07-08
Payer: MEDICARE

## 2019-07-08 DIAGNOSIS — Z34.90 EARLY STAGE OF PREGNANCY: ICD-10-CM

## 2019-07-08 LAB — HCG QUANTITATIVE: ABNORMAL IU/L

## 2019-07-08 PROCEDURE — 84702 CHORIONIC GONADOTROPIN TEST: CPT

## 2019-07-10 ENCOUNTER — ROUTINE PRENATAL (OUTPATIENT)
Dept: PERINATAL CARE | Age: 39
End: 2019-07-10
Payer: MEDICARE

## 2019-07-10 VITALS
TEMPERATURE: 98.3 F | HEIGHT: 63 IN | WEIGHT: 262 LBS | DIASTOLIC BLOOD PRESSURE: 76 MMHG | SYSTOLIC BLOOD PRESSURE: 128 MMHG | RESPIRATION RATE: 20 BRPM | BODY MASS INDEX: 46.42 KG/M2 | HEART RATE: 96 BPM

## 2019-07-10 DIAGNOSIS — O99.211 OBESITY AFFECTING PREGNANCY IN FIRST TRIMESTER: ICD-10-CM

## 2019-07-10 DIAGNOSIS — R76.0 ANTICARDIOLIPIN ANTIBODY AFFECTING PREGNANCY, ANTEPARTUM: ICD-10-CM

## 2019-07-10 DIAGNOSIS — O99.891 ANTICARDIOLIPIN ANTIBODY AFFECTING PREGNANCY, ANTEPARTUM: ICD-10-CM

## 2019-07-10 DIAGNOSIS — O09.521 MULTIGRAVIDA OF ADVANCED MATERNAL AGE IN FIRST TRIMESTER: ICD-10-CM

## 2019-07-10 DIAGNOSIS — O26.20 RECURRENT PREGNANCY LOSS, ANTEPARTUM CONDITION OR COMPLICATION: ICD-10-CM

## 2019-07-10 DIAGNOSIS — O36.80X0 ENCOUNTER TO DETERMINE FETAL VIABILITY OF PREGNANCY, SINGLE OR UNSPECIFIED FETUS: Primary | ICD-10-CM

## 2019-07-10 PROCEDURE — 76817 TRANSVAGINAL US OBSTETRIC: CPT | Performed by: OBSTETRICS & GYNECOLOGY

## 2019-07-10 PROCEDURE — 99242 OFF/OP CONSLTJ NEW/EST SF 20: CPT | Performed by: OBSTETRICS & GYNECOLOGY

## 2019-07-10 PROCEDURE — G8417 CALC BMI ABV UP PARAM F/U: HCPCS | Performed by: OBSTETRICS & GYNECOLOGY

## 2019-07-10 PROCEDURE — G8427 DOCREV CUR MEDS BY ELIG CLIN: HCPCS | Performed by: OBSTETRICS & GYNECOLOGY

## 2019-07-15 ENCOUNTER — HOSPITAL ENCOUNTER (OUTPATIENT)
Age: 39
Discharge: HOME | End: 2019-07-15
Payer: MEDICARE

## 2019-07-15 LAB — HCG QUANTITATIVE: ABNORMAL IU/L

## 2019-07-15 PROCEDURE — 36415 COLL VENOUS BLD VENIPUNCTURE: CPT

## 2019-07-15 PROCEDURE — 84702 CHORIONIC GONADOTROPIN TEST: CPT

## 2019-07-17 ENCOUNTER — TELEPHONE (OUTPATIENT)
Dept: OBGYN CLINIC | Age: 39
End: 2019-07-17

## 2019-07-18 ENCOUNTER — TELEPHONE (OUTPATIENT)
Dept: OBGYN CLINIC | Age: 39
End: 2019-07-18

## 2019-07-18 DIAGNOSIS — O09.291 HISTORY OF MISCARRIAGE, CURRENTLY PREGNANT, FIRST TRIMESTER: Primary | ICD-10-CM

## 2019-07-18 NOTE — TELEPHONE ENCOUNTER
----- Message from Annemarie Haro DO sent at 7/17/2019  4:45 PM EDT -----  Agree, need to follow her clinically and she is supposed to have a follow up ultrasound at [facility]. Thank you.

## 2019-07-22 ENCOUNTER — HOSPITAL ENCOUNTER (OUTPATIENT)
Age: 39
Discharge: HOME | End: 2019-07-22
Payer: MEDICARE

## 2019-07-22 DIAGNOSIS — O09.291 HISTORY OF MISCARRIAGE, CURRENTLY PREGNANT, FIRST TRIMESTER: ICD-10-CM

## 2019-07-22 LAB — HCG QUANTITATIVE: ABNORMAL IU/L

## 2019-07-22 PROCEDURE — 36415 COLL VENOUS BLD VENIPUNCTURE: CPT

## 2019-07-22 PROCEDURE — 84702 CHORIONIC GONADOTROPIN TEST: CPT

## 2019-07-24 ENCOUNTER — ROUTINE PRENATAL (OUTPATIENT)
Dept: PERINATAL CARE | Age: 39
End: 2019-07-24
Payer: MEDICARE

## 2019-07-24 ENCOUNTER — TELEPHONE (OUTPATIENT)
Dept: OBGYN CLINIC | Age: 39
End: 2019-07-24

## 2019-07-24 VITALS
WEIGHT: 266 LBS | DIASTOLIC BLOOD PRESSURE: 70 MMHG | SYSTOLIC BLOOD PRESSURE: 100 MMHG | RESPIRATION RATE: 16 BRPM | BODY MASS INDEX: 47.13 KG/M2 | HEART RATE: 88 BPM | TEMPERATURE: 98.6 F | HEIGHT: 63 IN

## 2019-07-24 DIAGNOSIS — R76.0 ANTICARDIOLIPIN ANTIBODY AFFECTING PREGNANCY, ANTEPARTUM: ICD-10-CM

## 2019-07-24 DIAGNOSIS — O99.211 OBESITY AFFECTING PREGNANCY IN FIRST TRIMESTER: ICD-10-CM

## 2019-07-24 DIAGNOSIS — O99.891 ANTICARDIOLIPIN ANTIBODY AFFECTING PREGNANCY, ANTEPARTUM: ICD-10-CM

## 2019-07-24 DIAGNOSIS — Z3A.08 8 WEEKS GESTATION OF PREGNANCY: ICD-10-CM

## 2019-07-24 DIAGNOSIS — O09.521 MULTIGRAVIDA OF ADVANCED MATERNAL AGE IN FIRST TRIMESTER: ICD-10-CM

## 2019-07-24 DIAGNOSIS — O36.80X0 ENCOUNTER TO DETERMINE FETAL VIABILITY OF PREGNANCY, SINGLE OR UNSPECIFIED FETUS: Primary | ICD-10-CM

## 2019-07-24 PROCEDURE — 76817 TRANSVAGINAL US OBSTETRIC: CPT | Performed by: OBSTETRICS & GYNECOLOGY

## 2019-07-30 ENCOUNTER — HOSPITAL ENCOUNTER (OUTPATIENT)
Age: 39
Setting detail: SPECIMEN
Discharge: HOME | End: 2019-07-30
Payer: MEDICARE

## 2019-07-30 ENCOUNTER — INITIAL PRENATAL (OUTPATIENT)
Dept: OBGYN CLINIC | Age: 39
End: 2019-07-30
Payer: MEDICARE

## 2019-07-30 VITALS — DIASTOLIC BLOOD PRESSURE: 70 MMHG | WEIGHT: 268 LBS | SYSTOLIC BLOOD PRESSURE: 118 MMHG | BODY MASS INDEX: 47.47 KG/M2

## 2019-07-30 DIAGNOSIS — O09.91 HRP (HIGH RISK PREGNANCY), FIRST TRIMESTER: ICD-10-CM

## 2019-07-30 DIAGNOSIS — O09.521 AMA (ADVANCED MATERNAL AGE) MULTIGRAVIDA 35+, FIRST TRIMESTER: ICD-10-CM

## 2019-07-30 DIAGNOSIS — R76.8 ANA POSITIVE: ICD-10-CM

## 2019-07-30 DIAGNOSIS — Z3A.09 9 WEEKS GESTATION OF PREGNANCY: ICD-10-CM

## 2019-07-30 DIAGNOSIS — O09.91 HRP (HIGH RISK PREGNANCY), FIRST TRIMESTER: Primary | ICD-10-CM

## 2019-07-30 DIAGNOSIS — D68.59 ANTITHROMBIN 3 DEFICIENCY (HCC): ICD-10-CM

## 2019-07-30 PROBLEM — R10.9 ABDOMINAL PAIN: Status: RESOLVED | Noted: 2018-11-19 | Resolved: 2019-07-30

## 2019-07-30 PROBLEM — O02.1 MISSED ABORTION WITH FETAL DEMISE BEFORE 20 COMPLETED WEEKS OF GESTATION: Status: RESOLVED | Noted: 2018-11-06 | Resolved: 2019-07-30

## 2019-07-30 PROBLEM — R07.0 PAIN IN THROAT: Status: RESOLVED | Noted: 2019-01-09 | Resolved: 2019-07-30

## 2019-07-30 PROBLEM — K59.00 CONSTIPATION: Status: RESOLVED | Noted: 2018-11-19 | Resolved: 2019-07-30

## 2019-07-30 PROBLEM — R19.7 DIARRHEA: Status: RESOLVED | Noted: 2018-11-19 | Resolved: 2019-07-30

## 2019-07-30 LAB
-: ABNORMAL
AMORPHOUS: ABNORMAL
AMPHETAMINE SCREEN URINE: NEGATIVE
BACTERIA: ABNORMAL
BARBITURATE SCREEN URINE: NEGATIVE
BENZODIAZEPINE SCREEN, URINE: NEGATIVE
BILIRUBIN URINE: NEGATIVE
BUPRENORPHINE URINE: NORMAL
CANNABINOID SCREEN URINE: NEGATIVE
CASTS UA: ABNORMAL /LPF (ref 0–8)
COCAINE METABOLITE, URINE: NEGATIVE
COLOR: YELLOW
COMMENT UA: ABNORMAL
CRYSTALS, UA: ABNORMAL /HPF
DIRECT EXAM: NORMAL
EPITHELIAL CELLS UA: ABNORMAL /HPF (ref 0–5)
GLUCOSE URINE: NEGATIVE
KETONES, URINE: NEGATIVE
LEUKOCYTE ESTERASE, URINE: NEGATIVE
Lab: NORMAL
MDMA URINE: NORMAL
METHADONE SCREEN, URINE: NEGATIVE
METHAMPHETAMINE, URINE: NORMAL
MUCUS: ABNORMAL
NITRITE, URINE: NEGATIVE
OPIATES, URINE: NEGATIVE
OTHER OBSERVATIONS UA: ABNORMAL
OXYCODONE SCREEN URINE: NEGATIVE
PH UA: 5 (ref 5–8)
PHENCYCLIDINE, URINE: NEGATIVE
PROPOXYPHENE, URINE: NORMAL
PROTEIN UA: NEGATIVE
RBC UA: ABNORMAL /HPF (ref 0–4)
RENAL EPITHELIAL, UA: ABNORMAL /HPF
SPECIFIC GRAVITY UA: 1.02 (ref 1–1.03)
SPECIMEN DESCRIPTION: NORMAL
TEST INFORMATION: NORMAL
TRICHOMONAS: ABNORMAL
TRICYCLIC ANTIDEPRESSANTS, UR: NORMAL
TURBIDITY: ABNORMAL
URINE HGB: NEGATIVE
UROBILINOGEN, URINE: NORMAL
WBC UA: ABNORMAL /HPF (ref 0–5)
YEAST: ABNORMAL

## 2019-07-30 PROCEDURE — 99213 OFFICE O/P EST LOW 20 MIN: CPT | Performed by: NURSE PRACTITIONER

## 2019-07-30 NOTE — PATIENT INSTRUCTIONS
knees, place your hands directly under your shoulders and straighten your arms. 2. Tighten your belly muscles by pulling in your belly button toward your spine. Be sure you continue to breathe normally and do not hold your breath. 3. Lift your left knee and bring it toward your elbow. 4. Slowly extend your leg behind you without completely straightening it. Be careful not to let your hip drop down. Avoid arching your back. 5. Hold your leg behind you for about 6 seconds. 6. Return to your starting position. 7. Do the same exercise with your other leg. 8. Repeat 8 to 12 times for each leg. Tailor sitting    1. Sit on the floor. 2. Bring your feet close to your body while crossing your ankles. 3. Hold this position for as long as you are comfortable. Tailor stretching    1. Sit on the floor with your back straight, legs about 12 inches apart, and feet relaxed outward. 2. Stretch your hands forward toward your left foot, then sit up. 3. Stretch your hands straight forward, then sit up. 4. Stretch your hands forward toward your right foot, then sit up. 5. Hold each stretch for 15 to 30 seconds. 6. Repeat 2 to 4 times. Follow-up care is a key part of your treatment and safety. Be sure to make and go to all appointments, and call your doctor if you are having problems. It's also a good idea to know your test results and keep a list of the medicines you take. Where can you learn more? Go to https://AusrapeParking Panda.Secure Computing. org and sign in to your [employer] account. Enter Z462 in the [employer] box to learn more about \"During Pregnancy: Exercises. \"     If you do not have an account, please click on the \"Sign Up Now\" link. Current as of: September 5, 2018  Content Version: 12.0  © 5350-6132 Healthwise, Incorporated. Care instructions adapted under license by [facility] ([facility]).  If you have questions about a medical condition or this instruction, always ask your healthcare professional.

## 2019-07-30 NOTE — PROGRESS NOTES
OB History    Para Term  AB Living   7 2 2 0 4 2   SAB TAB Ectopic Molar Multiple Live Births   2 2              # Outcome Date GA Lbr Sebastián/2nd Weight Sex Delivery Anes PTL Lv   7 Current            6 SAB 18 11w1d          5 SAB            4 Term      Vag-Spont      3 Term      Vag-Spont      2 TAB            1 TAB              Flower  Presents for initial Prenatal Visit. States this was a planned pregnancy. Different FOB from previous 2 deliveries. Gestation 9w1d  Estimated Date of Delivery: 3/2/20  Last PAP MAy 2018- normal, states has had abnormal in past but denies ever having LEEP. HAs hx of Anticardiolipin antibody affecting pregnancy, antepartum she has followed with [facility] already for dating/viability US and consult, given complicated hx,  Her last US on 19 showed IUP  confirmed dating. HAs first trimester screening scheduled for 19.  HAs had 3 SAB and 1 TAB. She was on lovenox but states Dr. Jacki Rodríguez at [facility] discontinued this. She is still on progesterone suppositories currently. 2 previous deliveries at full term and vaginal.   Sister has type 2 diabetes, she is morbidly obese- already given order by [facility] for early 1 hr GTT. Denies hx preeclampsia. Hx RA- on prednisone and cimzia. Allergies   Allergen Reactions    Iodine Hives and Itching     Current Outpatient Medications   Medication Sig Dispense Refill    CIMZIA STARTER KIT 6 X 200 MG/ML KIT       enoxaparin (LOVENOX) 40 MG/0.4ML injection Inject 0.4 mLs into the skin daily (Patient not taking: Reported on 2019) 30 Syringe 1    progesterone (PROMETRIUM) 100 MG capsule Place 1 capsule vaginally nightly 30 capsule 1    predniSONE (DELTASONE) 5 MG tablet Take 5 mg by mouth daily      folic acid (FOLVITE) 1 MG tablet Take 1 mg by mouth daily       No current facility-administered medications for this visit.       Past Medical History:   Diagnosis Date    Chronic back pain     GERD (gastroesophageal reflux disease)     Headache     Lumbar radiculopathy 1/9/2019    Lumbar spondylosis 1/9/2019    Obesity     Rheumatoid arteritis      Past Surgical History:   Procedure Laterality Date    DILATION AND CURETTAGE OF UTERUS  2018    OTHER SURGICAL HISTORY  12/10/2018    Fluoroscopy- with esophagas- possible delay of gastric emptying      Social History     Socioeconomic History    Marital status:      Spouse name: Not on file    Number of children: Not on file    Years of education: Not on file    Highest education level: Not on file   Occupational History    Not on file   Social Needs    Financial resource strain: Not on file    Food insecurity:     Worry: Not on file     Inability: Not on file    Transportation needs:     Medical: Not on file     Non-medical: Not on file   Tobacco Use    Smoking status: Never Smoker    Smokeless tobacco: Never Used   Substance and Sexual Activity    Alcohol use: No    Drug use: No    Sexual activity: Yes     Partners: Male   Lifestyle    Physical activity:     Days per week: Not on file     Minutes per session: Not on file    Stress: Not on file   Relationships    Social connections:     Talks on phone: Not on file     Gets together: Not on file     Attends Latter day service: Not on file     Active member of club or organization: Not on file     Attends meetings of clubs or organizations: Not on file     Relationship status: Not on file    Intimate partner violence:     Fear of current or ex partner: Not on file     Emotionally abused: Not on file     Physically abused: Not on file     Forced sexual activity: Not on file   Other Topics Concern    Not on file   Social History Narrative    Not on file       Swelling: no  Bleeding: no  Discharge: yes - last night states noted some mucus type discharge and some this am no odor or itching.     Dysuria: no  Nausea: yes -  encouraged small frequent meals, and vitamin B6 and unisom if

## 2019-07-31 LAB
CULTURE: NORMAL
Lab: NORMAL
SPECIMEN DESCRIPTION: NORMAL

## 2019-08-01 ENCOUNTER — TELEPHONE (OUTPATIENT)
Dept: OBGYN CLINIC | Age: 39
End: 2019-08-01

## 2019-08-01 LAB
CHLAMYDIA BY THIN PREP: NEGATIVE
N. GONORRHOEAE DNA, THIN PREP: NEGATIVE
SPECIMEN DESCRIPTION: NORMAL

## 2019-08-02 LAB
CYTOLOGY REPORT: NORMAL
HPV SAMPLE: NORMAL
HPV, GENOTYPE 16: NOT DETECTED
HPV, GENOTYPE 18: NOT DETECTED
HPV, HIGH RISK OTHER: NOT DETECTED
HPV, INTERPRETATION: NORMAL
SPECIMEN DESCRIPTION: NORMAL

## 2019-08-05 ENCOUNTER — HOSPITAL ENCOUNTER (OUTPATIENT)
Age: 39
Discharge: HOME | End: 2019-08-05
Payer: MEDICARE

## 2019-08-05 DIAGNOSIS — Z3A.09 9 WEEKS GESTATION OF PREGNANCY: ICD-10-CM

## 2019-08-05 DIAGNOSIS — O09.91 HRP (HIGH RISK PREGNANCY), FIRST TRIMESTER: ICD-10-CM

## 2019-08-05 LAB
GLUCOSE ADMINISTRATION: ABNORMAL
GLUCOSE TOLERANCE SCREEN 50G: 154 MG/DL (ref 70–135)

## 2019-08-05 PROCEDURE — 86762 RUBELLA ANTIBODY: CPT

## 2019-08-05 PROCEDURE — 86901 BLOOD TYPING SEROLOGIC RH(D): CPT

## 2019-08-05 PROCEDURE — 86780 TREPONEMA PALLIDUM: CPT

## 2019-08-05 PROCEDURE — 85025 COMPLETE CBC W/AUTO DIFF WBC: CPT

## 2019-08-05 PROCEDURE — 87340 HEPATITIS B SURFACE AG IA: CPT

## 2019-08-05 PROCEDURE — 86850 RBC ANTIBODY SCREEN: CPT

## 2019-08-05 PROCEDURE — 87389 HIV-1 AG W/HIV-1&-2 AB AG IA: CPT

## 2019-08-05 PROCEDURE — 36415 COLL VENOUS BLD VENIPUNCTURE: CPT

## 2019-08-05 PROCEDURE — 86900 BLOOD TYPING SEROLOGIC ABO: CPT

## 2019-08-05 PROCEDURE — 82950 GLUCOSE TEST: CPT

## 2019-08-06 LAB
ABO/RH: NORMAL
ABSOLUTE EOS #: 0.34 K/UL (ref 0–0.44)
ABSOLUTE IMMATURE GRANULOCYTE: 0.04 K/UL (ref 0–0.3)
ABSOLUTE LYMPH #: 2.41 K/UL (ref 1.1–3.7)
ABSOLUTE MONO #: 0.45 K/UL (ref 0.1–1.2)
ANTIBODY SCREEN: NEGATIVE
BASOPHILS # BLD: 0 % (ref 0–2)
BASOPHILS ABSOLUTE: 0.04 K/UL (ref 0–0.2)
DIFFERENTIAL TYPE: ABNORMAL
EOSINOPHILS RELATIVE PERCENT: 3 % (ref 1–4)
HCT VFR BLD CALC: 38.5 % (ref 36.3–47.1)
HEMOGLOBIN: 12 G/DL (ref 11.9–15.1)
HEPATITIS B SURFACE ANTIGEN: NONREACTIVE
HIV AG/AB: NONREACTIVE
IMMATURE GRANULOCYTES: 0 %
LYMPHOCYTES # BLD: 24 % (ref 24–43)
MCH RBC QN AUTO: 27 PG (ref 25.2–33.5)
MCHC RBC AUTO-ENTMCNC: 31.2 G/DL (ref 28.4–34.8)
MCV RBC AUTO: 86.7 FL (ref 82.6–102.9)
MONOCYTES # BLD: 5 % (ref 3–12)
NRBC AUTOMATED: 0 PER 100 WBC
PDW BLD-RTO: 14.2 % (ref 11.8–14.4)
PLATELET # BLD: 320 K/UL (ref 138–453)
PLATELET ESTIMATE: ABNORMAL
PMV BLD AUTO: 10.6 FL (ref 8.1–13.5)
RBC # BLD: 4.44 M/UL (ref 3.95–5.11)
RBC # BLD: ABNORMAL 10*6/UL
RUBV IGG SER QL: 264.3 IU/ML
SEG NEUTROPHILS: 68 % (ref 36–65)
SEGMENTED NEUTROPHILS ABSOLUTE COUNT: 6.64 K/UL (ref 1.5–8.1)
T. PALLIDUM, IGG: NONREACTIVE
WBC # BLD: 9.9 K/UL (ref 3.5–11.3)
WBC # BLD: ABNORMAL 10*3/UL

## 2019-08-07 ENCOUNTER — TELEPHONE (OUTPATIENT)
Dept: PERINATAL CARE | Age: 39
End: 2019-08-07

## 2019-08-16 ENCOUNTER — TELEPHONE (OUTPATIENT)
Dept: OBGYN CLINIC | Age: 39
End: 2019-08-16

## 2019-08-22 ENCOUNTER — TELEPHONE (OUTPATIENT)
Dept: OBGYN CLINIC | Age: 39
End: 2019-08-22

## 2019-08-22 RX ORDER — FLUCONAZOLE 150 MG/1
150 TABLET ORAL ONCE
Qty: 1 TABLET | Refills: 0 | Status: SHIPPED | OUTPATIENT
Start: 2019-08-22 | End: 2019-08-22

## 2019-08-26 ENCOUNTER — ROUTINE PRENATAL (OUTPATIENT)
Dept: OBGYN CLINIC | Age: 39
End: 2019-08-26
Payer: MEDICARE

## 2019-08-26 VITALS
SYSTOLIC BLOOD PRESSURE: 110 MMHG | DIASTOLIC BLOOD PRESSURE: 70 MMHG | WEIGHT: 276.25 LBS | BODY MASS INDEX: 48.94 KG/M2

## 2019-08-26 DIAGNOSIS — R73.03 PRE-DIABETES: ICD-10-CM

## 2019-08-26 DIAGNOSIS — D68.59 ANTITHROMBIN 3 DEFICIENCY (HCC): ICD-10-CM

## 2019-08-26 DIAGNOSIS — O09.521 AMA (ADVANCED MATERNAL AGE) MULTIGRAVIDA 35+, FIRST TRIMESTER: ICD-10-CM

## 2019-08-26 DIAGNOSIS — O09.91 HIGH-RISK PREGNANCY IN FIRST TRIMESTER: Primary | ICD-10-CM

## 2019-08-26 DIAGNOSIS — R76.8 ANA POSITIVE: ICD-10-CM

## 2019-08-26 DIAGNOSIS — Z3A.13 13 WEEKS GESTATION OF PREGNANCY: ICD-10-CM

## 2019-08-26 DIAGNOSIS — M05.20 RHEUMATOID ARTERITIS (HCC): ICD-10-CM

## 2019-08-26 PROCEDURE — 99213 OFFICE O/P EST LOW 20 MIN: CPT | Performed by: OBSTETRICS & GYNECOLOGY

## 2019-08-27 ENCOUNTER — HOSPITAL ENCOUNTER (OUTPATIENT)
Age: 39
Discharge: HOME | End: 2019-08-27
Payer: MEDICARE

## 2019-08-27 LAB
3 HR GLUCOSE: 116 MG/DL (ref 65–139)
AMOUNT GLUCOSE GIVEN: 100 G
GLUCOSE FASTING: 94 MG/DL (ref 65–94)
GLUCOSE TOLERANCE TEST 1 HOUR: 157 MG/DL (ref 65–179)
GLUCOSE TOLERANCE TEST 2 HOUR: 137 MG/DL (ref 65–154)

## 2019-08-27 PROCEDURE — 82952 GTT-ADDED SAMPLES: CPT

## 2019-08-27 PROCEDURE — 36415 COLL VENOUS BLD VENIPUNCTURE: CPT

## 2019-08-27 PROCEDURE — 82951 GLUCOSE TOLERANCE TEST (GTT): CPT

## 2019-08-28 ENCOUNTER — ROUTINE PRENATAL (OUTPATIENT)
Dept: PERINATAL CARE | Age: 39
End: 2019-08-28
Payer: MEDICARE

## 2019-08-28 ENCOUNTER — HOSPITAL ENCOUNTER (OUTPATIENT)
Age: 39
Setting detail: SPECIMEN
Discharge: HOME | End: 2019-08-28
Payer: MEDICARE

## 2019-08-28 VITALS
WEIGHT: 274 LBS | TEMPERATURE: 98.7 F | HEART RATE: 96 BPM | SYSTOLIC BLOOD PRESSURE: 122 MMHG | RESPIRATION RATE: 16 BRPM | HEIGHT: 63 IN | BODY MASS INDEX: 48.55 KG/M2 | DIASTOLIC BLOOD PRESSURE: 72 MMHG

## 2019-08-28 DIAGNOSIS — O09.521 MULTIGRAVIDA OF ADVANCED MATERNAL AGE IN FIRST TRIMESTER: ICD-10-CM

## 2019-08-28 DIAGNOSIS — Z3A.13 13 WEEKS GESTATION OF PREGNANCY: ICD-10-CM

## 2019-08-28 DIAGNOSIS — R76.0 ANTICARDIOLIPIN ANTIBODY AFFECTING PREGNANCY, ANTEPARTUM: ICD-10-CM

## 2019-08-28 DIAGNOSIS — O99.211 OBESITY AFFECTING PREGNANCY IN FIRST TRIMESTER: ICD-10-CM

## 2019-08-28 DIAGNOSIS — O99.891 ANTICARDIOLIPIN ANTIBODY AFFECTING PREGNANCY, ANTEPARTUM: ICD-10-CM

## 2019-08-28 DIAGNOSIS — Z36.9 FIRST TRIMESTER SCREENING: Primary | ICD-10-CM

## 2019-08-28 PROCEDURE — 76801 OB US < 14 WKS SINGLE FETUS: CPT | Performed by: OBSTETRICS & GYNECOLOGY

## 2019-08-28 PROCEDURE — 1036F TOBACCO NON-USER: CPT | Performed by: OBSTETRICS & GYNECOLOGY

## 2019-08-28 PROCEDURE — G8428 CUR MEDS NOT DOCUMENT: HCPCS | Performed by: OBSTETRICS & GYNECOLOGY

## 2019-08-28 PROCEDURE — G8417 CALC BMI ABV UP PARAM F/U: HCPCS | Performed by: OBSTETRICS & GYNECOLOGY

## 2019-08-28 PROCEDURE — 76813 OB US NUCHAL MEAS 1 GEST: CPT | Performed by: OBSTETRICS & GYNECOLOGY

## 2019-08-28 PROCEDURE — 99213 OFFICE O/P EST LOW 20 MIN: CPT | Performed by: OBSTETRICS & GYNECOLOGY

## 2019-08-29 LAB
SEND OUT REPORT: NORMAL
TEST NAME: NORMAL

## 2019-09-06 ENCOUNTER — TELEPHONE (OUTPATIENT)
Dept: PERINATAL CARE | Age: 39
End: 2019-09-06

## 2019-09-06 NOTE — TELEPHONE ENCOUNTER
Unable to reach patient for results review. Left call back number to schedule results review in person appointment or by phone.

## 2019-09-09 ENCOUNTER — TELEPHONE (OUTPATIENT)
Dept: PERINATAL CARE | Age: 39
End: 2019-09-09

## 2019-09-09 DIAGNOSIS — O28.9 ABNORMAL 1ST TRIMESTER SCREEN: Primary | ICD-10-CM

## 2019-09-10 ENCOUNTER — ROUTINE PRENATAL (OUTPATIENT)
Dept: PERINATAL CARE | Age: 39
End: 2019-09-10
Payer: MEDICARE

## 2019-09-10 VITALS
RESPIRATION RATE: 16 BRPM | BODY MASS INDEX: 48.9 KG/M2 | WEIGHT: 276 LBS | DIASTOLIC BLOOD PRESSURE: 72 MMHG | TEMPERATURE: 98.6 F | HEART RATE: 120 BPM | HEIGHT: 63 IN | SYSTOLIC BLOOD PRESSURE: 114 MMHG

## 2019-09-10 DIAGNOSIS — O28.5 ABNORMAL GENETIC TEST DURING PREGNANCY: Primary | ICD-10-CM

## 2019-09-10 DIAGNOSIS — O09.522 MULTIGRAVIDA OF ADVANCED MATERNAL AGE IN SECOND TRIMESTER: ICD-10-CM

## 2019-09-10 DIAGNOSIS — Z3A.15 15 WEEKS GESTATION OF PREGNANCY: ICD-10-CM

## 2019-09-10 PROCEDURE — G8417 CALC BMI ABV UP PARAM F/U: HCPCS | Performed by: OBSTETRICS & GYNECOLOGY

## 2019-09-10 PROCEDURE — 1036F TOBACCO NON-USER: CPT | Performed by: OBSTETRICS & GYNECOLOGY

## 2019-09-10 PROCEDURE — G8427 DOCREV CUR MEDS BY ELIG CLIN: HCPCS | Performed by: OBSTETRICS & GYNECOLOGY

## 2019-09-10 PROCEDURE — 99213 OFFICE O/P EST LOW 20 MIN: CPT | Performed by: OBSTETRICS & GYNECOLOGY

## 2019-09-10 RX ORDER — MULTIVIT-MIN/IRON/FOLIC ACID/K 18-600-40
CAPSULE ORAL
COMMUNITY
End: 2021-03-29

## 2019-09-13 ENCOUNTER — TELEPHONE (OUTPATIENT)
Dept: OBGYN CLINIC | Age: 39
End: 2019-09-13

## 2019-09-18 ENCOUNTER — TELEPHONE (OUTPATIENT)
Dept: OBGYN CLINIC | Age: 39
End: 2019-09-18

## 2019-09-18 RX ORDER — AZITHROMYCIN 250 MG/1
250 TABLET, FILM COATED ORAL SEE ADMIN INSTRUCTIONS
Qty: 6 TABLET | Refills: 0 | Status: SHIPPED | OUTPATIENT
Start: 2019-09-18 | End: 2019-09-23

## 2019-09-25 ENCOUNTER — ROUTINE PRENATAL (OUTPATIENT)
Dept: OBGYN CLINIC | Age: 39
End: 2019-09-25
Payer: MEDICARE

## 2019-09-25 VITALS
BODY MASS INDEX: 49.44 KG/M2 | SYSTOLIC BLOOD PRESSURE: 116 MMHG | DIASTOLIC BLOOD PRESSURE: 70 MMHG | WEIGHT: 279.13 LBS

## 2019-09-25 DIAGNOSIS — O09.521 AMA (ADVANCED MATERNAL AGE) MULTIGRAVIDA 35+, FIRST TRIMESTER: ICD-10-CM

## 2019-09-25 DIAGNOSIS — Z3A.17 17 WEEKS GESTATION OF PREGNANCY: ICD-10-CM

## 2019-09-25 DIAGNOSIS — O09.91 HIGH-RISK PREGNANCY IN FIRST TRIMESTER: Primary | ICD-10-CM

## 2019-09-25 DIAGNOSIS — D68.59 ANTITHROMBIN 3 DEFICIENCY (HCC): ICD-10-CM

## 2019-09-25 DIAGNOSIS — R76.8 ANA POSITIVE: ICD-10-CM

## 2019-09-25 DIAGNOSIS — R73.03 PRE-DIABETES: ICD-10-CM

## 2019-09-25 PROBLEM — I10 HYPERTENSION: Status: ACTIVE | Noted: 2019-09-25

## 2019-09-25 PROBLEM — L73.2 HIDRADENITIS: Status: ACTIVE | Noted: 2017-01-04

## 2019-09-25 PROBLEM — F98.8 ADD (ATTENTION DEFICIT DISORDER) WITHOUT HYPERACTIVITY: Status: ACTIVE | Noted: 2019-09-25

## 2019-09-25 PROCEDURE — 99213 OFFICE O/P EST LOW 20 MIN: CPT | Performed by: NURSE PRACTITIONER

## 2019-09-25 PROCEDURE — G8427 DOCREV CUR MEDS BY ELIG CLIN: HCPCS | Performed by: NURSE PRACTITIONER

## 2019-09-25 PROCEDURE — G8417 CALC BMI ABV UP PARAM F/U: HCPCS | Performed by: NURSE PRACTITIONER

## 2019-09-25 PROCEDURE — 1036F TOBACCO NON-USER: CPT | Performed by: NURSE PRACTITIONER

## 2019-09-25 NOTE — PATIENT INSTRUCTIONS
your [employer] account. Enter S570 in the [facility] box to learn more about \"Weeks 18 to 22 of Your Pregnancy: Care Instructions. \"     If you do not have an account, please click on the \"Sign Up Now\" link. Current as of: September 5, 2018  Content Version: 12.1  © 8022-7286 [employer]. Care instructions adapted under license by [facility] ([facility]). If you have questions about a medical condition or this instruction, always ask your healthcare professional. [address] any warranty or liability for your use of this information. Patient Education        Iron-Rich Diet: Care Instructions  Your Care Instructions    Your body needs iron to make hemoglobin. Hemoglobin is a substance in red blood cells that carries oxygen from the lungs to cells all through your body. If you do not get enough iron, your body makes fewer and smaller red blood cells. As a result, your body's cells may not get enough oxygen. Adult men need 8 milligrams of iron a day; adult women need 18 milligrams of iron a day. After menopause, women need 8 milligrams of iron a day. A pregnant woman needs 27 milligrams of iron a day. Infants and young children have higher iron needs relative to their size than other age groups. People who have lost blood because of ulcers or heavy menstrual periods may become very low in iron and may develop anemia. Most people can get the iron their bodies need by eating enough of certain iron-rich foods. Your doctor may recommend that you take an iron supplement along with eating an iron-rich diet. Follow-up care is a key part of your treatment and safety. Be sure to make and go to all appointments, and call your doctor if you are having problems. It's also a good idea to know your test results and keep a list of the medicines you take. How can you care for yourself at home? · Make iron-rich foods a part of your daily diet. Iron-rich foods include:  ?  All meats, such

## 2019-10-16 ENCOUNTER — ROUTINE PRENATAL (OUTPATIENT)
Dept: PERINATAL CARE | Age: 39
End: 2019-10-16
Payer: MEDICARE

## 2019-10-16 VITALS
RESPIRATION RATE: 16 BRPM | BODY MASS INDEX: 49.26 KG/M2 | TEMPERATURE: 99 F | WEIGHT: 278 LBS | DIASTOLIC BLOOD PRESSURE: 70 MMHG | SYSTOLIC BLOOD PRESSURE: 106 MMHG | HEART RATE: 104 BPM | HEIGHT: 63 IN

## 2019-10-16 DIAGNOSIS — O35.BXX0 FETAL CARDIAC ECHOGENIC FOCUS, ANTEPARTUM, SINGLE OR UNSPECIFIED FETUS: ICD-10-CM

## 2019-10-16 DIAGNOSIS — Z3A.20 20 WEEKS GESTATION OF PREGNANCY: ICD-10-CM

## 2019-10-16 DIAGNOSIS — O99.212 OBESITY AFFECTING PREGNANCY IN SECOND TRIMESTER: ICD-10-CM

## 2019-10-16 DIAGNOSIS — Z36.86 ENCOUNTER FOR SCREENING FOR RISK OF PRE-TERM LABOR: ICD-10-CM

## 2019-10-16 DIAGNOSIS — O09.522 MULTIGRAVIDA OF ADVANCED MATERNAL AGE IN SECOND TRIMESTER: Primary | ICD-10-CM

## 2019-10-16 DIAGNOSIS — O28.5 ABNORMAL GENETIC TEST DURING PREGNANCY: ICD-10-CM

## 2019-10-16 PROCEDURE — 76817 TRANSVAGINAL US OBSTETRIC: CPT | Performed by: OBSTETRICS & GYNECOLOGY

## 2019-10-16 PROCEDURE — 76811 OB US DETAILED SNGL FETUS: CPT | Performed by: OBSTETRICS & GYNECOLOGY

## 2019-10-21 ENCOUNTER — HOSPITAL ENCOUNTER (OUTPATIENT)
Age: 39
Discharge: HOME | End: 2019-10-21
Payer: MEDICARE

## 2019-10-21 ENCOUNTER — ROUTINE PRENATAL (OUTPATIENT)
Dept: OBGYN CLINIC | Age: 39
End: 2019-10-21
Payer: MEDICARE

## 2019-10-21 VITALS — SYSTOLIC BLOOD PRESSURE: 122 MMHG | WEIGHT: 284 LBS | DIASTOLIC BLOOD PRESSURE: 64 MMHG | BODY MASS INDEX: 50.31 KG/M2

## 2019-10-21 DIAGNOSIS — Z3A.21 21 WEEKS GESTATION OF PREGNANCY: ICD-10-CM

## 2019-10-21 DIAGNOSIS — O09.91 HIGH-RISK PREGNANCY IN FIRST TRIMESTER: Primary | ICD-10-CM

## 2019-10-21 DIAGNOSIS — M05.20 RHEUMATOID ARTERITIS (HCC): ICD-10-CM

## 2019-10-21 DIAGNOSIS — R73.03 PRE-DIABETES: ICD-10-CM

## 2019-10-21 DIAGNOSIS — D68.59 ANTITHROMBIN 3 DEFICIENCY (HCC): ICD-10-CM

## 2019-10-21 DIAGNOSIS — R76.8 ANA POSITIVE: ICD-10-CM

## 2019-10-21 PROCEDURE — 36415 COLL VENOUS BLD VENIPUNCTURE: CPT

## 2019-10-21 PROCEDURE — 99213 OFFICE O/P EST LOW 20 MIN: CPT | Performed by: OBSTETRICS & GYNECOLOGY

## 2019-10-21 PROCEDURE — 82105 ALPHA-FETOPROTEIN SERUM: CPT

## 2019-10-23 LAB
AFP INTERPRETATION: NORMAL
AFP MOM: 0.38
AFP SPECIMEN: NORMAL
AFP: 18 NG/ML
DATE OF BIRTH: NORMAL
DATING METHOD: NORMAL
DETERMINED BY: NORMAL
DIABETIC: NEGATIVE
DUE DATE: NORMAL
ESTIMATED DUE DATE: NORMAL
FAMILY HISTORY NTD: NEGATIVE
GESTATIONAL AGE: NORMAL
INSULIN REQ DIABETES: NO
LAST MENSTRUAL PERIOD: NORMAL
MATERNAL AGE AT EDD: 37.6 YR
MATERNAL WEIGHT: 284
MONOCHORIONIC TWINS: NORMAL
NUMBER OF FETUSES: NORMAL
PATIENT WEIGHT UNITS: NORMAL
PATIENT WEIGHT: NORMAL
RACE (MATERNAL): NORMAL
RACE: NORMAL
REPEAT SPECIMEN?: NORMAL
SMOKING: NORMAL
SMOKING: NORMAL
VALPROIC/CARBAMAZEP: NORMAL
ZZ NTE CLEAN UP: HISTORY: NO

## 2019-11-04 DIAGNOSIS — R09.81 HEAD CONGESTION: Primary | ICD-10-CM

## 2019-11-11 ENCOUNTER — ROUTINE PRENATAL (OUTPATIENT)
Dept: OBGYN CLINIC | Age: 39
End: 2019-11-11
Payer: MEDICARE

## 2019-11-11 VITALS
BODY MASS INDEX: 50.88 KG/M2 | DIASTOLIC BLOOD PRESSURE: 72 MMHG | WEIGHT: 287.25 LBS | SYSTOLIC BLOOD PRESSURE: 116 MMHG

## 2019-11-11 DIAGNOSIS — O09.92 HIGH-RISK PREGNANCY IN SECOND TRIMESTER: Primary | ICD-10-CM

## 2019-11-11 PROCEDURE — 1036F TOBACCO NON-USER: CPT | Performed by: NURSE PRACTITIONER

## 2019-11-11 PROCEDURE — G8417 CALC BMI ABV UP PARAM F/U: HCPCS | Performed by: NURSE PRACTITIONER

## 2019-11-11 PROCEDURE — G8427 DOCREV CUR MEDS BY ELIG CLIN: HCPCS | Performed by: NURSE PRACTITIONER

## 2019-11-11 PROCEDURE — 99213 OFFICE O/P EST LOW 20 MIN: CPT | Performed by: NURSE PRACTITIONER

## 2019-11-11 PROCEDURE — G8484 FLU IMMUNIZE NO ADMIN: HCPCS | Performed by: NURSE PRACTITIONER

## 2019-11-19 ENCOUNTER — HOSPITAL ENCOUNTER (OUTPATIENT)
Age: 39
Discharge: HOME | End: 2019-11-19
Payer: MEDICARE

## 2019-11-19 DIAGNOSIS — O09.92 HIGH-RISK PREGNANCY IN SECOND TRIMESTER: ICD-10-CM

## 2019-11-19 LAB
-: ABNORMAL
3 HR GLUCOSE: 120 MG/DL (ref 65–139)
ABSOLUTE EOS #: 0.34 K/UL (ref 0–0.44)
ABSOLUTE IMMATURE GRANULOCYTE: 0.06 K/UL (ref 0–0.3)
ABSOLUTE LYMPH #: 1.53 K/UL (ref 1.1–3.7)
ABSOLUTE MONO #: 0.45 K/UL (ref 0.1–1.2)
AMORPHOUS: ABNORMAL
AMOUNT GLUCOSE GIVEN: 100 G
BACTERIA: ABNORMAL
BASOPHILS # BLD: 0 % (ref 0–2)
BASOPHILS ABSOLUTE: 0.03 K/UL (ref 0–0.2)
BILIRUBIN URINE: NEGATIVE
CASTS UA: ABNORMAL /LPF (ref 0–8)
COLOR: ABNORMAL
COMMENT UA: ABNORMAL
CRYSTALS, UA: ABNORMAL /HPF
DIFFERENTIAL TYPE: ABNORMAL
EOSINOPHILS RELATIVE PERCENT: 3 % (ref 1–4)
EPITHELIAL CELLS UA: ABNORMAL /HPF (ref 0–5)
GLUCOSE FASTING: 100 MG/DL (ref 65–94)
GLUCOSE TOLERANCE TEST 1 HOUR: 145 MG/DL (ref 65–179)
GLUCOSE TOLERANCE TEST 2 HOUR: 135 MG/DL (ref 65–154)
GLUCOSE URINE: NEGATIVE
HCT VFR BLD CALC: 36.4 % (ref 36.3–47.1)
HEMOGLOBIN: 11.4 G/DL (ref 11.9–15.1)
IMMATURE GRANULOCYTES: 1 %
KETONES, URINE: ABNORMAL
LEUKOCYTE ESTERASE, URINE: ABNORMAL
LYMPHOCYTES # BLD: 15 % (ref 24–43)
MCH RBC QN AUTO: 26.8 PG (ref 25.2–33.5)
MCHC RBC AUTO-ENTMCNC: 31.3 G/DL (ref 28.4–34.8)
MCV RBC AUTO: 85.6 FL (ref 82.6–102.9)
MONOCYTES # BLD: 4 % (ref 3–12)
MUCUS: ABNORMAL
NITRITE, URINE: NEGATIVE
NRBC AUTOMATED: 0 PER 100 WBC
OTHER OBSERVATIONS UA: ABNORMAL
PDW BLD-RTO: 14.2 % (ref 11.8–14.4)
PH UA: 5.5 (ref 5–8)
PLATELET # BLD: 315 K/UL (ref 138–453)
PLATELET ESTIMATE: ABNORMAL
PMV BLD AUTO: 10.4 FL (ref 8.1–13.5)
PROTEIN UA: ABNORMAL
RBC # BLD: 4.25 M/UL (ref 3.95–5.11)
RBC # BLD: ABNORMAL 10*6/UL
RBC UA: ABNORMAL /HPF (ref 0–4)
RENAL EPITHELIAL, UA: ABNORMAL /HPF
SEG NEUTROPHILS: 77 % (ref 36–65)
SEGMENTED NEUTROPHILS ABSOLUTE COUNT: 7.73 K/UL (ref 1.5–8.1)
SPECIFIC GRAVITY UA: 1.02 (ref 1–1.03)
TRICHOMONAS: ABNORMAL
TURBIDITY: ABNORMAL
URINE HGB: NEGATIVE
UROBILINOGEN, URINE: NORMAL
WBC # BLD: 10.1 K/UL (ref 3.5–11.3)
WBC # BLD: ABNORMAL 10*3/UL
WBC UA: ABNORMAL /HPF (ref 0–5)
YEAST: ABNORMAL

## 2019-11-19 PROCEDURE — 85025 COMPLETE CBC W/AUTO DIFF WBC: CPT

## 2019-11-19 PROCEDURE — 36415 COLL VENOUS BLD VENIPUNCTURE: CPT

## 2019-11-19 PROCEDURE — 87086 URINE CULTURE/COLONY COUNT: CPT

## 2019-11-19 PROCEDURE — 82952 GTT-ADDED SAMPLES: CPT

## 2019-11-19 PROCEDURE — 82951 GLUCOSE TOLERANCE TEST (GTT): CPT

## 2019-11-19 PROCEDURE — 81001 URINALYSIS AUTO W/SCOPE: CPT

## 2019-11-20 LAB
CULTURE: NORMAL
Lab: NORMAL
SPECIMEN DESCRIPTION: NORMAL

## 2019-11-25 ENCOUNTER — TELEPHONE (OUTPATIENT)
Dept: OBGYN CLINIC | Age: 39
End: 2019-11-25

## 2019-11-27 ENCOUNTER — ROUTINE PRENATAL (OUTPATIENT)
Dept: PERINATAL CARE | Age: 39
DRG: 566 | End: 2019-11-27
Payer: MEDICARE

## 2019-11-27 VITALS
RESPIRATION RATE: 16 BRPM | HEART RATE: 104 BPM | HEIGHT: 63 IN | SYSTOLIC BLOOD PRESSURE: 100 MMHG | BODY MASS INDEX: 51.56 KG/M2 | WEIGHT: 291 LBS | TEMPERATURE: 98.1 F | DIASTOLIC BLOOD PRESSURE: 68 MMHG

## 2019-11-27 DIAGNOSIS — O99.212 OBESITY AFFECTING PREGNANCY IN SECOND TRIMESTER: ICD-10-CM

## 2019-11-27 DIAGNOSIS — O35.BXX0 FETAL CARDIAC ECHOGENIC FOCUS, ANTEPARTUM, SINGLE OR UNSPECIFIED FETUS: ICD-10-CM

## 2019-11-27 DIAGNOSIS — O99.891 ANTICARDIOLIPIN ANTIBODY AFFECTING PREGNANCY, ANTEPARTUM: ICD-10-CM

## 2019-11-27 DIAGNOSIS — Z13.89 ENCOUNTER FOR ROUTINE SCREENING FOR MALFORMATION USING ULTRASONICS: ICD-10-CM

## 2019-11-27 DIAGNOSIS — R76.0 ANTICARDIOLIPIN ANTIBODY AFFECTING PREGNANCY, ANTEPARTUM: ICD-10-CM

## 2019-11-27 DIAGNOSIS — Z36.4 ANTENATAL SCREENING FOR FETAL GROWTH RETARDATION USING ULTRASONICS: ICD-10-CM

## 2019-11-27 DIAGNOSIS — O09.522 MULTIGRAVIDA OF ADVANCED MATERNAL AGE IN SECOND TRIMESTER: ICD-10-CM

## 2019-11-27 DIAGNOSIS — O28.5 ABNORMAL GENETIC TEST DURING PREGNANCY: Primary | ICD-10-CM

## 2019-11-27 DIAGNOSIS — O40.9XX0 POLYHYDRAMNIOS, ANTEPARTUM, SINGLE OR UNSPECIFIED FETUS: ICD-10-CM

## 2019-11-27 DIAGNOSIS — Z3A.26 26 WEEKS GESTATION OF PREGNANCY: ICD-10-CM

## 2019-11-27 PROCEDURE — 76816 OB US FOLLOW-UP PER FETUS: CPT | Performed by: OBSTETRICS & GYNECOLOGY

## 2019-11-27 PROCEDURE — 76820 UMBILICAL ARTERY ECHO: CPT | Performed by: OBSTETRICS & GYNECOLOGY

## 2019-11-27 PROCEDURE — 76825 ECHO EXAM OF FETAL HEART: CPT | Performed by: OBSTETRICS & GYNECOLOGY

## 2019-11-27 PROCEDURE — G8427 DOCREV CUR MEDS BY ELIG CLIN: HCPCS | Performed by: OBSTETRICS & GYNECOLOGY

## 2019-11-27 PROCEDURE — G8417 CALC BMI ABV UP PARAM F/U: HCPCS | Performed by: OBSTETRICS & GYNECOLOGY

## 2019-11-27 PROCEDURE — 76827 ECHO EXAM OF FETAL HEART: CPT | Performed by: OBSTETRICS & GYNECOLOGY

## 2019-11-27 PROCEDURE — 93325 DOPPLER ECHO COLOR FLOW MAPG: CPT | Performed by: OBSTETRICS & GYNECOLOGY

## 2019-11-27 PROCEDURE — 99211 OFF/OP EST MAY X REQ PHY/QHP: CPT | Performed by: OBSTETRICS & GYNECOLOGY

## 2019-11-29 ENCOUNTER — HOSPITAL ENCOUNTER (OUTPATIENT)
Age: 39
Discharge: HOME | End: 2019-11-29
Payer: MEDICARE

## 2019-11-29 LAB
TOXOPLASM IGM: 0.41 INDEX
TOXOPLASMA BLOOD FOR RATIO: 1.1 IU/ML

## 2019-11-29 PROCEDURE — 86645 CMV ANTIBODY IGM: CPT

## 2019-11-29 PROCEDURE — 86778 TOXOPLASMA ANTIBODY IGM: CPT

## 2019-11-29 PROCEDURE — 86644 CMV ANTIBODY: CPT

## 2019-11-29 PROCEDURE — 86777 TOXOPLASMA ANTIBODY: CPT

## 2019-11-29 PROCEDURE — 36415 COLL VENOUS BLD VENIPUNCTURE: CPT

## 2019-11-29 PROCEDURE — 86747 PARVOVIRUS ANTIBODY: CPT

## 2019-11-29 PROCEDURE — 86658 ENTEROVIRUS ANTIBODY: CPT

## 2019-11-30 ENCOUNTER — APPOINTMENT (OUTPATIENT)
Dept: CT IMAGING | Age: 39
DRG: 566 | End: 2019-11-30
Payer: MEDICARE

## 2019-11-30 ENCOUNTER — HOSPITAL ENCOUNTER (INPATIENT)
Age: 39
LOS: 2 days | Discharge: HOME | DRG: 566 | End: 2019-12-02
Attending: EMERGENCY MEDICINE | Admitting: OBSTETRICS & GYNECOLOGY
Payer: MEDICARE

## 2019-11-30 DIAGNOSIS — O88.212 PULMONARY EMBOLISM AFFECTING PREGNANCY IN SECOND TRIMESTER: Primary | ICD-10-CM

## 2019-11-30 PROBLEM — N96 RECURRENT PREGNANCY LOSS: Status: ACTIVE | Noted: 2019-11-30

## 2019-11-30 PROBLEM — O35.BXX0 ECHOGENIC FOCUS OF HEART OF FETUS AFFECTING ANTEPARTUM CARE OF MOTHER: Status: ACTIVE | Noted: 2019-11-30

## 2019-11-30 PROBLEM — R73.09 ABNORMAL GLUCOSE: Status: ACTIVE | Noted: 2019-11-30

## 2019-11-30 PROBLEM — R76.0 ANTICARDIOLIPIN ANTIBODY POSITIVE: Status: ACTIVE | Noted: 2019-11-30

## 2019-11-30 PROBLEM — I10 HYPERTENSION: Status: RESOLVED | Noted: 2019-09-25 | Resolved: 2019-11-30

## 2019-11-30 PROBLEM — M05.20 RHEUMATOID ARTERITIS (HCC): Status: RESOLVED | Noted: 2017-12-18 | Resolved: 2019-11-30

## 2019-11-30 PROBLEM — R03.0 ELEVATED BP WITHOUT DIAGNOSIS OF HYPERTENSION: Status: ACTIVE | Noted: 2019-11-30

## 2019-11-30 PROBLEM — O28.9 ABNORMAL 1ST TRIMESTER SCREEN: Status: ACTIVE | Noted: 2019-11-30

## 2019-11-30 PROBLEM — R76.8 ANA POSITIVE: Status: RESOLVED | Noted: 2017-12-18 | Resolved: 2019-11-30

## 2019-11-30 PROBLEM — O09.91 HIGH-RISK PREGNANCY IN FIRST TRIMESTER: Status: RESOLVED | Noted: 2018-10-10 | Resolved: 2019-11-30

## 2019-11-30 LAB
-: ABNORMAL
ABSOLUTE EOS #: 0.36 K/UL (ref 0–0.44)
ABSOLUTE IMMATURE GRANULOCYTE: 0.07 K/UL (ref 0–0.3)
ABSOLUTE LYMPH #: 1.72 K/UL (ref 1.1–3.7)
ABSOLUTE MONO #: 0.56 K/UL (ref 0.1–1.2)
ALBUMIN SERPL-MCNC: 3.3 G/DL (ref 3.5–5.2)
ALBUMIN/GLOBULIN RATIO: 0.9 (ref 1–2.5)
ALP BLD-CCNC: 107 U/L (ref 35–104)
ALT SERPL-CCNC: 10 U/L (ref 5–33)
AMORPHOUS: ABNORMAL
ANION GAP SERPL CALCULATED.3IONS-SCNC: 12 MMOL/L (ref 9–17)
AST SERPL-CCNC: 13 U/L
BACTERIA: ABNORMAL
BASOPHILS # BLD: 0 % (ref 0–2)
BASOPHILS ABSOLUTE: <0.03 K/UL (ref 0–0.2)
BILIRUB SERPL-MCNC: <0.1 MG/DL (ref 0.3–1.2)
BILIRUBIN DIRECT: <0.08 MG/DL
BILIRUBIN URINE: NEGATIVE
BILIRUBIN, INDIRECT: ABNORMAL MG/DL (ref 0–1)
BNP INTERPRETATION: NORMAL
BUN BLDV-MCNC: 6 MG/DL (ref 6–20)
BUN/CREAT BLD: ABNORMAL (ref 9–20)
CALCIUM SERPL-MCNC: 9 MG/DL (ref 8.6–10.4)
CASTS UA: ABNORMAL /LPF (ref 0–8)
CHLORIDE BLD-SCNC: 104 MMOL/L (ref 98–107)
CO2: 20 MMOL/L (ref 20–31)
COLOR: YELLOW
CREAT SERPL-MCNC: 0.35 MG/DL (ref 0.5–0.9)
CREATININE URINE: 222.3 MG/DL (ref 28–217)
CRYSTALS, UA: ABNORMAL /HPF
CRYSTALS, UA: ABNORMAL /HPF
DIFFERENTIAL TYPE: ABNORMAL
EOSINOPHILS RELATIVE PERCENT: 3 % (ref 1–4)
EPITHELIAL CELLS UA: ABNORMAL /HPF (ref 0–5)
GFR AFRICAN AMERICAN: >60 ML/MIN
GFR NON-AFRICAN AMERICAN: >60 ML/MIN
GFR SERPL CREATININE-BSD FRML MDRD: ABNORMAL ML/MIN/{1.73_M2}
GFR SERPL CREATININE-BSD FRML MDRD: ABNORMAL ML/MIN/{1.73_M2}
GLOBULIN: ABNORMAL G/DL (ref 1.5–3.8)
GLUCOSE BLD-MCNC: 113 MG/DL (ref 70–99)
GLUCOSE URINE: NEGATIVE
HCT VFR BLD CALC: 35.4 % (ref 36.3–47.1)
HEMOGLOBIN: 11.2 G/DL (ref 11.9–15.1)
IMMATURE GRANULOCYTES: 1 %
KETONES, URINE: NEGATIVE
LACTATE DEHYDROGENASE: 157 U/L (ref 135–214)
LEUKOCYTE ESTERASE, URINE: NEGATIVE
LYMPHOCYTES # BLD: 16 % (ref 24–43)
MCH RBC QN AUTO: 26.8 PG (ref 25.2–33.5)
MCHC RBC AUTO-ENTMCNC: 31.6 G/DL (ref 28.4–34.8)
MCV RBC AUTO: 84.7 FL (ref 82.6–102.9)
MONOCYTES # BLD: 5 % (ref 3–12)
MUCUS: ABNORMAL
NITRITE, URINE: NEGATIVE
NRBC AUTOMATED: 0 PER 100 WBC
OTHER OBSERVATIONS UA: ABNORMAL
PDW BLD-RTO: 14.1 % (ref 11.8–14.4)
PH UA: 6 (ref 5–8)
PLATELET # BLD: 345 K/UL (ref 138–453)
PLATELET ESTIMATE: ABNORMAL
PMV BLD AUTO: 10 FL (ref 8.1–13.5)
POTASSIUM SERPL-SCNC: 3.5 MMOL/L (ref 3.7–5.3)
PRO-BNP: <20 PG/ML
PROTEIN UA: ABNORMAL
RBC # BLD: 4.18 M/UL (ref 3.95–5.11)
RBC # BLD: ABNORMAL 10*6/UL
RBC UA: ABNORMAL /HPF (ref 0–2)
RENAL EPITHELIAL, UA: ABNORMAL /HPF
SEG NEUTROPHILS: 75 % (ref 36–65)
SEGMENTED NEUTROPHILS ABSOLUTE COUNT: 8.12 K/UL (ref 1.5–8.1)
SODIUM BLD-SCNC: 136 MMOL/L (ref 135–144)
SPECIFIC GRAVITY UA: 1.03 (ref 1–1.03)
TOTAL PROTEIN, URINE: 32 MG/DL
TOTAL PROTEIN: 7.1 G/DL (ref 6.4–8.3)
TRICHOMONAS: ABNORMAL
TURBIDITY: ABNORMAL
URIC ACID: 3.7 MG/DL (ref 2.4–5.7)
URINE HGB: NEGATIVE
URINE TOTAL PROTEIN CREATININE RATIO: 0.14 (ref 0–0.2)
UROBILINOGEN, URINE: NORMAL
WBC # BLD: 10.9 K/UL (ref 3.5–11.3)
WBC # BLD: ABNORMAL 10*3/UL
WBC UA: ABNORMAL /HPF (ref 0–5)
YEAST: ABNORMAL

## 2019-11-30 PROCEDURE — 99285 EMERGENCY DEPT VISIT HI MDM: CPT

## 2019-11-30 PROCEDURE — 81001 URINALYSIS AUTO W/SCOPE: CPT

## 2019-11-30 PROCEDURE — 71260 CT THORAX DX C+: CPT

## 2019-11-30 PROCEDURE — 84550 ASSAY OF BLOOD/URIC ACID: CPT

## 2019-11-30 PROCEDURE — 6370000000 HC RX 637 (ALT 250 FOR IP): Performed by: STUDENT IN AN ORGANIZED HEALTH CARE EDUCATION/TRAINING PROGRAM

## 2019-11-30 PROCEDURE — 6360000004 HC RX CONTRAST MEDICATION: Performed by: STUDENT IN AN ORGANIZED HEALTH CARE EDUCATION/TRAINING PROGRAM

## 2019-11-30 PROCEDURE — 6360000002 HC RX W HCPCS: Performed by: STUDENT IN AN ORGANIZED HEALTH CARE EDUCATION/TRAINING PROGRAM

## 2019-11-30 PROCEDURE — 83880 ASSAY OF NATRIURETIC PEPTIDE: CPT

## 2019-11-30 PROCEDURE — 99253 IP/OBS CNSLTJ NEW/EST LOW 45: CPT | Performed by: NURSE PRACTITIONER

## 2019-11-30 PROCEDURE — 80048 BASIC METABOLIC PNL TOTAL CA: CPT

## 2019-11-30 PROCEDURE — 80076 HEPATIC FUNCTION PANEL: CPT

## 2019-11-30 PROCEDURE — 1220000000 HC SEMI PRIVATE OB R&B

## 2019-11-30 PROCEDURE — 84156 ASSAY OF PROTEIN URINE: CPT

## 2019-11-30 PROCEDURE — 87086 URINE CULTURE/COLONY COUNT: CPT

## 2019-11-30 PROCEDURE — 93005 ELECTROCARDIOGRAM TRACING: CPT | Performed by: STUDENT IN AN ORGANIZED HEALTH CARE EDUCATION/TRAINING PROGRAM

## 2019-11-30 PROCEDURE — 85025 COMPLETE CBC W/AUTO DIFF WBC: CPT

## 2019-11-30 PROCEDURE — 2580000003 HC RX 258: Performed by: STUDENT IN AN ORGANIZED HEALTH CARE EDUCATION/TRAINING PROGRAM

## 2019-11-30 PROCEDURE — 83615 LACTATE (LD) (LDH) ENZYME: CPT

## 2019-11-30 PROCEDURE — 96374 THER/PROPH/DIAG INJ IV PUSH: CPT

## 2019-11-30 PROCEDURE — 82570 ASSAY OF URINE CREATININE: CPT

## 2019-11-30 RX ORDER — FOLIC ACID 1 MG/1
1 TABLET ORAL DAILY
Status: DISCONTINUED | OUTPATIENT
Start: 2019-12-01 | End: 2019-12-02 | Stop reason: HOSPADM

## 2019-11-30 RX ORDER — ACETAMINOPHEN 325 MG/1
650 TABLET ORAL EVERY 4 HOURS PRN
Status: DISCONTINUED | OUTPATIENT
Start: 2019-11-30 | End: 2019-12-02 | Stop reason: HOSPADM

## 2019-11-30 RX ORDER — ASPIRIN 81 MG/1
81 TABLET ORAL DAILY
Status: DISCONTINUED | OUTPATIENT
Start: 2019-12-01 | End: 2019-12-02 | Stop reason: HOSPADM

## 2019-11-30 RX ORDER — VITAMIN A, ASCORBIC ACID, CHOLECALCIFEROL, .ALPHA.-TOCOPHEROL ACETATE, DL-, THIAMINE MONONITRATE, RIBOFLAVIN, NIACINAMIDE, PYRIDOXINE HYDROCHLORIDE, FOLIC ACID, CYANOCOBALAMIN, CALCIUM CARBONATE, IRON, ZINC OXIDE, AND CUPRIC OXIDE 4000; 120; 400; 22; 1.84; 3; 20; 10; 1; 12; 200; 29; 25; 2 [IU]/1; MG/1; [IU]/1; [IU]/1; MG/1; MG/1; MG/1; MG/1; MG/1; UG/1; MG/1; MG/1; MG/1; MG/1
1 TABLET ORAL DAILY
Status: DISCONTINUED | OUTPATIENT
Start: 2019-12-01 | End: 2019-12-02 | Stop reason: HOSPADM

## 2019-11-30 RX ORDER — CEPHALEXIN 500 MG/1
500 CAPSULE ORAL EVERY 6 HOURS SCHEDULED
Status: DISCONTINUED | OUTPATIENT
Start: 2019-11-30 | End: 2019-12-02 | Stop reason: HOSPADM

## 2019-11-30 RX ORDER — POTASSIUM CHLORIDE 20 MEQ/1
40 TABLET, EXTENDED RELEASE ORAL ONCE
Status: COMPLETED | OUTPATIENT
Start: 2019-11-30 | End: 2019-11-30

## 2019-11-30 RX ORDER — DIPHENHYDRAMINE HYDROCHLORIDE 50 MG/ML
50 INJECTION INTRAMUSCULAR; INTRAVENOUS ONCE
Status: COMPLETED | OUTPATIENT
Start: 2019-11-30 | End: 2019-11-30

## 2019-11-30 RX ORDER — SODIUM CHLORIDE 0.9 % (FLUSH) 0.9 %
10 SYRINGE (ML) INJECTION EVERY 12 HOURS SCHEDULED
Status: DISCONTINUED | OUTPATIENT
Start: 2019-11-30 | End: 2019-12-02 | Stop reason: HOSPADM

## 2019-11-30 RX ORDER — SODIUM CHLORIDE 0.9 % (FLUSH) 0.9 %
10 SYRINGE (ML) INJECTION PRN
Status: DISCONTINUED | OUTPATIENT
Start: 2019-11-30 | End: 2019-12-02 | Stop reason: HOSPADM

## 2019-11-30 RX ADMIN — IOHEXOL 75 ML: 350 INJECTION, SOLUTION INTRAVENOUS at 17:59

## 2019-11-30 RX ADMIN — ENOXAPARIN SODIUM 135 MG: 150 INJECTION SUBCUTANEOUS at 20:05

## 2019-11-30 RX ADMIN — SODIUM CHLORIDE, PRESERVATIVE FREE 10 ML: 5 INJECTION INTRAVENOUS at 22:00

## 2019-11-30 RX ADMIN — POTASSIUM CHLORIDE 40 MEQ: 1500 TABLET, EXTENDED RELEASE ORAL at 22:00

## 2019-11-30 RX ADMIN — CEPHALEXIN 500 MG: 500 CAPSULE ORAL at 22:00

## 2019-11-30 RX ADMIN — DIPHENHYDRAMINE HYDROCHLORIDE 50 MG: 50 INJECTION, SOLUTION INTRAMUSCULAR; INTRAVENOUS at 16:12

## 2019-11-30 ASSESSMENT — ENCOUNTER SYMPTOMS
VOMITING: 0
ABDOMINAL PAIN: 0
SHORTNESS OF BREATH: 1
COUGH: 0
NAUSEA: 0
BACK PAIN: 0
WHEEZING: 0

## 2019-12-01 LAB
CULTURE: NORMAL
Lab: NORMAL
SPECIMEN DESCRIPTION: NORMAL

## 2019-12-01 PROCEDURE — 94761 N-INVAS EAR/PLS OXIMETRY MLT: CPT

## 2019-12-01 PROCEDURE — 6360000002 HC RX W HCPCS: Performed by: STUDENT IN AN ORGANIZED HEALTH CARE EDUCATION/TRAINING PROGRAM

## 2019-12-01 PROCEDURE — 6370000000 HC RX 637 (ALT 250 FOR IP): Performed by: STUDENT IN AN ORGANIZED HEALTH CARE EDUCATION/TRAINING PROGRAM

## 2019-12-01 PROCEDURE — 1220000000 HC SEMI PRIVATE OB R&B

## 2019-12-01 PROCEDURE — 94640 AIRWAY INHALATION TREATMENT: CPT

## 2019-12-01 PROCEDURE — 6360000002 HC RX W HCPCS

## 2019-12-01 PROCEDURE — 99222 1ST HOSP IP/OBS MODERATE 55: CPT | Performed by: OBSTETRICS & GYNECOLOGY

## 2019-12-01 PROCEDURE — 99232 SBSQ HOSP IP/OBS MODERATE 35: CPT | Performed by: FAMILY MEDICINE

## 2019-12-01 PROCEDURE — 99254 IP/OBS CNSLTJ NEW/EST MOD 60: CPT | Performed by: INTERNAL MEDICINE

## 2019-12-01 PROCEDURE — 2580000003 HC RX 258: Performed by: STUDENT IN AN ORGANIZED HEALTH CARE EDUCATION/TRAINING PROGRAM

## 2019-12-01 RX ORDER — ALBUTEROL SULFATE 2.5 MG/3ML
SOLUTION RESPIRATORY (INHALATION)
Status: COMPLETED
Start: 2019-12-01 | End: 2019-12-01

## 2019-12-01 RX ORDER — ALBUTEROL SULFATE 2.5 MG/3ML
2.5 SOLUTION RESPIRATORY (INHALATION)
Status: DISCONTINUED | OUTPATIENT
Start: 2019-12-01 | End: 2019-12-02 | Stop reason: HOSPADM

## 2019-12-01 RX ADMIN — CEPHALEXIN 500 MG: 500 CAPSULE ORAL at 18:05

## 2019-12-01 RX ADMIN — FOLIC ACID 1 MG: 1 TABLET ORAL at 08:16

## 2019-12-01 RX ADMIN — Medication 1 TABLET: at 08:15

## 2019-12-01 RX ADMIN — CEPHALEXIN 500 MG: 500 CAPSULE ORAL at 13:36

## 2019-12-01 RX ADMIN — Medication 81 MG: at 08:15

## 2019-12-01 RX ADMIN — SODIUM CHLORIDE, PRESERVATIVE FREE 10 ML: 5 INJECTION INTRAVENOUS at 20:39

## 2019-12-01 RX ADMIN — ENOXAPARIN SODIUM 135 MG: 150 INJECTION SUBCUTANEOUS at 20:38

## 2019-12-01 RX ADMIN — ALBUTEROL SULFATE 2.5 MG: 2.5 SOLUTION RESPIRATORY (INHALATION) at 14:37

## 2019-12-01 RX ADMIN — CEPHALEXIN 500 MG: 500 CAPSULE ORAL at 08:16

## 2019-12-01 RX ADMIN — SODIUM CHLORIDE, PRESERVATIVE FREE 10 ML: 5 INJECTION INTRAVENOUS at 08:15

## 2019-12-01 RX ADMIN — Medication 10 ML: at 18:07

## 2019-12-01 RX ADMIN — ENOXAPARIN SODIUM 135 MG: 150 INJECTION SUBCUTANEOUS at 08:15

## 2019-12-02 VITALS
TEMPERATURE: 100.6 F | HEART RATE: 106 BPM | WEIGHT: 291 LBS | OXYGEN SATURATION: 96 % | RESPIRATION RATE: 16 BRPM | BODY MASS INDEX: 51.56 KG/M2 | SYSTOLIC BLOOD PRESSURE: 141 MMHG | DIASTOLIC BLOOD PRESSURE: 81 MMHG | HEIGHT: 63 IN

## 2019-12-02 LAB
CMV IGM: 0.3
CYTOMEGALOVIRUS IGG ANTIBODY: 0.2
EKG ATRIAL RATE: 107 BPM
EKG P AXIS: 18 DEGREES
EKG P-R INTERVAL: 140 MS
EKG Q-T INTERVAL: 338 MS
EKG QRS DURATION: 78 MS
EKG QTC CALCULATION (BAZETT): 451 MS
EKG R AXIS: 18 DEGREES
EKG T AXIS: 4 DEGREES
EKG VENTRICULAR RATE: 107 BPM
ESTIMATED AVERAGE GLUCOSE: 108 MG/DL
HBA1C MFR BLD: 5.4 % (ref 4–6)
PARVOVIRUS B19 IGG ANTIBODY: 6.72 IV
PARVOVIRUS B19 IGM ANTIBODY: 0.18 IV

## 2019-12-02 PROCEDURE — 6360000002 HC RX W HCPCS: Performed by: STUDENT IN AN ORGANIZED HEALTH CARE EDUCATION/TRAINING PROGRAM

## 2019-12-02 PROCEDURE — 99232 SBSQ HOSP IP/OBS MODERATE 35: CPT | Performed by: FAMILY MEDICINE

## 2019-12-02 PROCEDURE — 6370000000 HC RX 637 (ALT 250 FOR IP): Performed by: STUDENT IN AN ORGANIZED HEALTH CARE EDUCATION/TRAINING PROGRAM

## 2019-12-02 PROCEDURE — 83036 HEMOGLOBIN GLYCOSYLATED A1C: CPT

## 2019-12-02 PROCEDURE — 99238 HOSP IP/OBS DSCHRG MGMT 30/<: CPT | Performed by: OBSTETRICS & GYNECOLOGY

## 2019-12-02 PROCEDURE — 2580000003 HC RX 258: Performed by: STUDENT IN AN ORGANIZED HEALTH CARE EDUCATION/TRAINING PROGRAM

## 2019-12-02 PROCEDURE — 93970 EXTREMITY STUDY: CPT

## 2019-12-02 PROCEDURE — 36415 COLL VENOUS BLD VENIPUNCTURE: CPT

## 2019-12-02 RX ORDER — GUAIFENESIN 600 MG/1
600 TABLET, EXTENDED RELEASE ORAL 2 TIMES DAILY
Status: DISCONTINUED | OUTPATIENT
Start: 2019-12-02 | End: 2019-12-02 | Stop reason: HOSPADM

## 2019-12-02 RX ORDER — ALBUTEROL SULFATE 2.5 MG/3ML
2.5 SOLUTION RESPIRATORY (INHALATION) DAILY PRN
Qty: 120 EACH | Refills: 3 | Status: SHIPPED | OUTPATIENT
Start: 2019-12-02 | End: 2021-12-06

## 2019-12-02 RX ORDER — LIDOCAINE AND PRILOCAINE 25; 25 MG/G; MG/G
CREAM TOPICAL
Qty: 1 TUBE | Refills: 1 | Status: SHIPPED | OUTPATIENT
Start: 2019-12-02 | End: 2020-03-11

## 2019-12-02 RX ORDER — ECHINACEA PURPUREA EXTRACT 125 MG
1 TABLET ORAL PRN
Status: DISCONTINUED | OUTPATIENT
Start: 2019-12-02 | End: 2019-12-02 | Stop reason: HOSPADM

## 2019-12-02 RX ADMIN — CEPHALEXIN 500 MG: 500 CAPSULE ORAL at 02:32

## 2019-12-02 RX ADMIN — SODIUM CHLORIDE, PRESERVATIVE FREE 10 ML: 5 INJECTION INTRAVENOUS at 08:48

## 2019-12-02 RX ADMIN — Medication 1 TABLET: at 08:48

## 2019-12-02 RX ADMIN — FOLIC ACID 1 MG: 1 TABLET ORAL at 08:48

## 2019-12-02 RX ADMIN — ENOXAPARIN SODIUM 135 MG: 150 INJECTION SUBCUTANEOUS at 07:30

## 2019-12-02 RX ADMIN — Medication 81 MG: at 08:49

## 2019-12-02 RX ADMIN — GUAIFENESIN 600 MG: 600 TABLET, EXTENDED RELEASE ORAL at 08:48

## 2019-12-02 RX ADMIN — CEPHALEXIN 500 MG: 500 CAPSULE ORAL at 08:48

## 2019-12-02 ASSESSMENT — ENCOUNTER SYMPTOMS
BLOOD IN STOOL: 0
VOMITING: 0
NAUSEA: 0
RHINORRHEA: 0
SHORTNESS OF BREATH: 0
COUGH: 0
DIARRHEA: 0
WHEEZING: 0
CHEST TIGHTNESS: 0
CONSTIPATION: 0
ABDOMINAL PAIN: 0

## 2019-12-03 LAB — COXSACKIE A9 AB: NORMAL

## 2019-12-04 ENCOUNTER — TELEPHONE (OUTPATIENT)
Dept: OBGYN CLINIC | Age: 39
End: 2019-12-04

## 2019-12-04 RX ORDER — FLUCONAZOLE 150 MG/1
150 TABLET ORAL ONCE
Qty: 1 TABLET | Refills: 0 | Status: SHIPPED | OUTPATIENT
Start: 2019-12-04 | End: 2019-12-04

## 2019-12-07 LAB
COXSACKIE B1 ANTIBODY: NORMAL
COXSACKIE B2 ANTIBODY: NORMAL
COXSACKIE B3 ANTIBODY: NORMAL
COXSACKIE B4 ANTIBODY: NORMAL
COXSACKIE B5 ANTIBODY: NORMAL
COXSACKIE B6 ANTIBODY: NORMAL

## 2019-12-09 ENCOUNTER — TELEPHONE (OUTPATIENT)
Dept: ONCOLOGY | Age: 39
End: 2019-12-09

## 2019-12-09 ENCOUNTER — ROUTINE PRENATAL (OUTPATIENT)
Dept: OBGYN CLINIC | Age: 39
End: 2019-12-09
Payer: MEDICARE

## 2019-12-09 VITALS — DIASTOLIC BLOOD PRESSURE: 64 MMHG | WEIGHT: 291 LBS | SYSTOLIC BLOOD PRESSURE: 128 MMHG | BODY MASS INDEX: 51.55 KG/M2

## 2019-12-09 DIAGNOSIS — M05.20 RHEUMATOID ARTERITIS (HCC): ICD-10-CM

## 2019-12-09 DIAGNOSIS — D68.59 ANTITHROMBIN 3 DEFICIENCY (HCC): ICD-10-CM

## 2019-12-09 DIAGNOSIS — O88.212 PULMONARY EMBOLISM AFFECTING PREGNANCY IN SECOND TRIMESTER: ICD-10-CM

## 2019-12-09 DIAGNOSIS — R76.8 ANA POSITIVE: ICD-10-CM

## 2019-12-09 DIAGNOSIS — O09.92 HIGH-RISK PREGNANCY IN SECOND TRIMESTER: Primary | ICD-10-CM

## 2019-12-09 DIAGNOSIS — Z3A.28 28 WEEKS GESTATION OF PREGNANCY: ICD-10-CM

## 2019-12-09 DIAGNOSIS — O09.521 AMA (ADVANCED MATERNAL AGE) MULTIGRAVIDA 35+, FIRST TRIMESTER: ICD-10-CM

## 2019-12-09 DIAGNOSIS — R73.03 PRE-DIABETES: ICD-10-CM

## 2019-12-09 PROCEDURE — 99213 OFFICE O/P EST LOW 20 MIN: CPT | Performed by: NURSE PRACTITIONER

## 2019-12-09 PROCEDURE — G8427 DOCREV CUR MEDS BY ELIG CLIN: HCPCS | Performed by: NURSE PRACTITIONER

## 2019-12-09 PROCEDURE — G8417 CALC BMI ABV UP PARAM F/U: HCPCS | Performed by: NURSE PRACTITIONER

## 2019-12-09 PROCEDURE — G8484 FLU IMMUNIZE NO ADMIN: HCPCS | Performed by: NURSE PRACTITIONER

## 2019-12-09 PROCEDURE — 1111F DSCHRG MED/CURRENT MED MERGE: CPT | Performed by: NURSE PRACTITIONER

## 2019-12-09 PROCEDURE — 1036F TOBACCO NON-USER: CPT | Performed by: NURSE PRACTITIONER

## 2019-12-12 DIAGNOSIS — R89.9 ABNORMAL LABORATORY TEST RESULT: Primary | ICD-10-CM

## 2019-12-14 ENCOUNTER — HOSPITAL ENCOUNTER (OUTPATIENT)
Facility: MEDICAL CENTER | Age: 39
End: 2019-12-14
Payer: MEDICARE

## 2019-12-16 ENCOUNTER — OFFICE VISIT (OUTPATIENT)
Dept: ONCOLOGY | Age: 39
End: 2019-12-16
Payer: MEDICARE

## 2019-12-16 ENCOUNTER — TELEPHONE (OUTPATIENT)
Dept: ONCOLOGY | Age: 39
End: 2019-12-16

## 2019-12-16 VITALS
DIASTOLIC BLOOD PRESSURE: 69 MMHG | WEIGHT: 293 LBS | BODY MASS INDEX: 52.06 KG/M2 | RESPIRATION RATE: 18 BRPM | HEART RATE: 108 BPM | SYSTOLIC BLOOD PRESSURE: 101 MMHG | TEMPERATURE: 98.7 F

## 2019-12-16 DIAGNOSIS — M06.4 INFLAMMATORY POLYARTHROPATHY (HCC): ICD-10-CM

## 2019-12-16 DIAGNOSIS — R76.0 ANTICARDIOLIPIN ANTIBODY POSITIVE: ICD-10-CM

## 2019-12-16 DIAGNOSIS — O88.212 PULMONARY EMBOLISM AFFECTING PREGNANCY IN SECOND TRIMESTER: ICD-10-CM

## 2019-12-16 DIAGNOSIS — E66.01 MORBID OBESITY DUE TO EXCESS CALORIES (HCC): Primary | ICD-10-CM

## 2019-12-16 PROCEDURE — G8427 DOCREV CUR MEDS BY ELIG CLIN: HCPCS | Performed by: INTERNAL MEDICINE

## 2019-12-16 PROCEDURE — 1036F TOBACCO NON-USER: CPT | Performed by: INTERNAL MEDICINE

## 2019-12-16 PROCEDURE — 99211 OFF/OP EST MAY X REQ PHY/QHP: CPT | Performed by: INTERNAL MEDICINE

## 2019-12-16 PROCEDURE — 1111F DSCHRG MED/CURRENT MED MERGE: CPT | Performed by: INTERNAL MEDICINE

## 2019-12-16 PROCEDURE — G8484 FLU IMMUNIZE NO ADMIN: HCPCS | Performed by: INTERNAL MEDICINE

## 2019-12-16 PROCEDURE — G8417 CALC BMI ABV UP PARAM F/U: HCPCS | Performed by: INTERNAL MEDICINE

## 2019-12-16 PROCEDURE — 99214 OFFICE O/P EST MOD 30 MIN: CPT | Performed by: INTERNAL MEDICINE

## 2019-12-18 ENCOUNTER — ROUTINE PRENATAL (OUTPATIENT)
Dept: PERINATAL CARE | Age: 39
End: 2019-12-18
Payer: MEDICARE

## 2019-12-18 VITALS
TEMPERATURE: 98.1 F | SYSTOLIC BLOOD PRESSURE: 104 MMHG | BODY MASS INDEX: 51.74 KG/M2 | DIASTOLIC BLOOD PRESSURE: 78 MMHG | WEIGHT: 292 LBS | HEART RATE: 104 BPM | HEIGHT: 63 IN | RESPIRATION RATE: 16 BRPM

## 2019-12-18 DIAGNOSIS — O09.523 MULTIGRAVIDA OF ADVANCED MATERNAL AGE IN THIRD TRIMESTER: ICD-10-CM

## 2019-12-18 DIAGNOSIS — O99.213 OBESITY AFFECTING PREGNANCY IN THIRD TRIMESTER: ICD-10-CM

## 2019-12-18 DIAGNOSIS — O88.213 PULMONARY EMBOLISM AFFECTING PREGNANCY IN THIRD TRIMESTER: Primary | ICD-10-CM

## 2019-12-18 DIAGNOSIS — O40.9XX0 POLYHYDRAMNIOS, ANTEPARTUM, SINGLE OR UNSPECIFIED FETUS: ICD-10-CM

## 2019-12-18 DIAGNOSIS — O28.5 ABNORMAL GENETIC TEST DURING PREGNANCY: ICD-10-CM

## 2019-12-18 DIAGNOSIS — Z3A.29 29 WEEKS GESTATION OF PREGNANCY: ICD-10-CM

## 2019-12-18 DIAGNOSIS — Z13.89 ENCOUNTER FOR ROUTINE SCREENING FOR MALFORMATION USING ULTRASONICS: ICD-10-CM

## 2019-12-18 DIAGNOSIS — O98.513 VIRAL INFECTION AFFECTING PREGNANCY IN THIRD TRIMESTER: ICD-10-CM

## 2019-12-18 PROCEDURE — G8417 CALC BMI ABV UP PARAM F/U: HCPCS | Performed by: OBSTETRICS & GYNECOLOGY

## 2019-12-18 PROCEDURE — 99243 OFF/OP CNSLTJ NEW/EST LOW 30: CPT | Performed by: OBSTETRICS & GYNECOLOGY

## 2019-12-18 PROCEDURE — G8484 FLU IMMUNIZE NO ADMIN: HCPCS | Performed by: OBSTETRICS & GYNECOLOGY

## 2019-12-18 PROCEDURE — 76821 MIDDLE CEREBRAL ARTERY ECHO: CPT | Performed by: OBSTETRICS & GYNECOLOGY

## 2019-12-18 PROCEDURE — G8427 DOCREV CUR MEDS BY ELIG CLIN: HCPCS | Performed by: OBSTETRICS & GYNECOLOGY

## 2019-12-18 PROCEDURE — 76805 OB US >/= 14 WKS SNGL FETUS: CPT | Performed by: OBSTETRICS & GYNECOLOGY

## 2019-12-18 PROCEDURE — 76820 UMBILICAL ARTERY ECHO: CPT | Performed by: OBSTETRICS & GYNECOLOGY

## 2019-12-18 PROCEDURE — 76819 FETAL BIOPHYS PROFIL W/O NST: CPT | Performed by: OBSTETRICS & GYNECOLOGY

## 2019-12-30 ENCOUNTER — HOSPITAL ENCOUNTER (OUTPATIENT)
Age: 39
Setting detail: SPECIMEN
Discharge: HOME | End: 2019-12-30
Payer: MEDICARE

## 2019-12-30 PROCEDURE — 36415 COLL VENOUS BLD VENIPUNCTURE: CPT

## 2019-12-30 PROCEDURE — 86147 CARDIOLIPIN ANTIBODY EA IG: CPT

## 2019-12-31 LAB
ANTICARDIOLIPIN IGA ANTIBODY: 1.2 APU
ANTICARDIOLIPIN IGG ANTIBODY: 3.7 GPU
CARDIOLIPIN AB IGM: 3.8 MPU

## 2020-01-08 ENCOUNTER — ROUTINE PRENATAL (OUTPATIENT)
Dept: PERINATAL CARE | Age: 40
DRG: 540 | End: 2020-01-08
Payer: MEDICARE

## 2020-01-08 ENCOUNTER — HOSPITAL ENCOUNTER (INPATIENT)
Age: 40
LOS: 4 days | Discharge: HOME | DRG: 540 | End: 2020-01-15
Attending: OBSTETRICS & GYNECOLOGY | Admitting: OBSTETRICS & GYNECOLOGY
Payer: MEDICARE

## 2020-01-08 VITALS
BODY MASS INDEX: 51.91 KG/M2 | DIASTOLIC BLOOD PRESSURE: 78 MMHG | WEIGHT: 293 LBS | SYSTOLIC BLOOD PRESSURE: 108 MMHG | TEMPERATURE: 97.9 F | HEART RATE: 100 BPM | HEIGHT: 63 IN | RESPIRATION RATE: 20 BRPM

## 2020-01-08 PROBLEM — Z3A.32 32 WEEKS GESTATION OF PREGNANCY: Status: ACTIVE | Noted: 2020-01-08

## 2020-01-08 LAB
GLUCOSE BLD-MCNC: 139 MG/DL (ref 65–105)
GLUCOSE BLD-MCNC: 150 MG/DL (ref 65–105)

## 2020-01-08 PROCEDURE — G0378 HOSPITAL OBSERVATION PER HR: HCPCS

## 2020-01-08 PROCEDURE — 6370000000 HC RX 637 (ALT 250 FOR IP): Performed by: STUDENT IN AN ORGANIZED HEALTH CARE EDUCATION/TRAINING PROGRAM

## 2020-01-08 PROCEDURE — 76816 OB US FOLLOW-UP PER FETUS: CPT | Performed by: OBSTETRICS & GYNECOLOGY

## 2020-01-08 PROCEDURE — 82947 ASSAY GLUCOSE BLOOD QUANT: CPT

## 2020-01-08 PROCEDURE — 96372 THER/PROPH/DIAG INJ SC/IM: CPT

## 2020-01-08 PROCEDURE — 76820 UMBILICAL ARTERY ECHO: CPT | Performed by: OBSTETRICS & GYNECOLOGY

## 2020-01-08 PROCEDURE — 76821 MIDDLE CEREBRAL ARTERY ECHO: CPT | Performed by: OBSTETRICS & GYNECOLOGY

## 2020-01-08 PROCEDURE — 76819 FETAL BIOPHYS PROFIL W/O NST: CPT | Performed by: OBSTETRICS & GYNECOLOGY

## 2020-01-08 PROCEDURE — 6360000002 HC RX W HCPCS: Performed by: STUDENT IN AN ORGANIZED HEALTH CARE EDUCATION/TRAINING PROGRAM

## 2020-01-08 RX ORDER — FOLIC ACID 1 MG/1
1 TABLET ORAL DAILY
Status: DISCONTINUED | OUTPATIENT
Start: 2020-01-08 | End: 2020-01-11

## 2020-01-08 RX ORDER — ACETAMINOPHEN 500 MG
1000 TABLET ORAL EVERY 6 HOURS PRN
Status: DISCONTINUED | OUTPATIENT
Start: 2020-01-08 | End: 2020-01-11

## 2020-01-08 RX ORDER — VITAMIN A, ASCORBIC ACID, CHOLECALCIFEROL, .ALPHA.-TOCOPHEROL ACETATE, DL-, THIAMINE MONONITRATE, RIBOFLAVIN, NIACINAMIDE, PYRIDOXINE HYDROCHLORIDE, FOLIC ACID, CYANOCOBALAMIN, CALCIUM CARBONATE, IRON, ZINC OXIDE, AND CUPRIC OXIDE 4000; 120; 400; 22; 1.84; 3; 20; 10; 1; 12; 200; 29; 25; 2 [IU]/1; MG/1; [IU]/1; [IU]/1; MG/1; MG/1; MG/1; MG/1; MG/1; UG/1; MG/1; MG/1; MG/1; MG/1
1 TABLET ORAL DAILY
Status: DISCONTINUED | OUTPATIENT
Start: 2020-01-08 | End: 2020-01-11

## 2020-01-08 RX ORDER — LIDOCAINE 40 MG/G
CREAM TOPICAL PRN
Status: DISCONTINUED | OUTPATIENT
Start: 2020-01-08 | End: 2020-01-11

## 2020-01-08 RX ORDER — ASPIRIN 81 MG/1
81 TABLET, CHEWABLE ORAL DAILY
Status: DISCONTINUED | OUTPATIENT
Start: 2020-01-08 | End: 2020-01-11

## 2020-01-08 RX ORDER — ALBUTEROL SULFATE 2.5 MG/3ML
2.5 SOLUTION RESPIRATORY (INHALATION) DAILY PRN
Status: DISCONTINUED | OUTPATIENT
Start: 2020-01-08 | End: 2020-01-11

## 2020-01-08 RX ADMIN — Medication 1 TABLET: at 17:16

## 2020-01-08 RX ADMIN — ASPIRIN 81 MG: 81 TABLET, CHEWABLE ORAL at 17:16

## 2020-01-08 RX ADMIN — PROGESTERONE 100 MG: 100 INSERT VAGINAL at 22:01

## 2020-01-08 RX ADMIN — FOLIC ACID 1 MG: 1 TABLET ORAL at 17:16

## 2020-01-08 RX ADMIN — ENOXAPARIN SODIUM 150 MG: 150 INJECTION SUBCUTANEOUS at 17:18

## 2020-01-08 NOTE — H&P
[facility]    Date: 2020       Time: 2:53 PM   Patient Name: Olaf De Anda     Patient : 1980  Room/Bed: [facility] [address]    Admission Date/Time: 2020 11:05 AM      CC: Reactive NST w/ deceleratio     HPI: Olaf De Anda is a 42 y.o. [id number] at 33w3d who presents for  testing. She had an 8/8 BPP and a reactive NST but one spontaneous deceleration of about 1 minute 30 seconds was noted. Decision was made to keep the patient for extended monitoring. The patient reports fetal movement is present, denies contractions, denies loss of fluid, denies vaginal bleeding. Pregnancy is complicated by Elevated BP, Abnormal FTS (Tri 21 1:117, NIPT nml), Echogenic focus fetal heart, Hx Pulmonary embolism, Elev early 1hr, nml 3hr, Recurrent pregnancy loss, Low antithrombin III x1, repeat WNL, Anticardiolipin Ab + (26.9 on 3/19/19), AMA (NIPT wnl), Hx migraines, ADD, BMI 51    DATING:  LMP: Patient's last menstrual period was 2019 (exact date).   Estimated Date of Delivery: 3/2/20   Based on: LMP, cw us at 8w 0/7 weeks GA    PREGNANCY RISK FACTORS:  Patient Active Problem List   Diagnosis    Chronic low back pain     Hx migraines     GERD    BMI 51    Pre-diabetes    Low antithrombin III x1, repeat WNL    AMA    Obesity affecting pregnancy in first trimester    Inflammatory polyarthropathy (HCC)    Lumbar radiculopathy    Lumbar spondylosis    Muscle pain    ADD    Hidradenitis    Pulmonary embolism affecting pregnancy in second trimester    Elev early 1hr, nml 3hr     Recurrent pregnancy loss    Abnormal 1st trimester screen (Tri 21 1:117)--NIPT nml     Echogenic focus of heart of fetus affecting antepartum care of mother    Anticardiolipin antibody low 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 (26.9 on 3/19/19    Elevated BP without diagnosis of hypertension    32 weeks gestation of pregnancy        Steroids Given In This Pregnancy:  no     REVIEW OF SYSTEMS:   Constitutional: negative fever, negative chills, negative weight changes   HEENT: negative visual disturbances, negative headaches, negative dizziness, negative hearing loss  Breast: Negative breast abnormalities, negative breast lumps, negative nipple discharge  Respiratory: negative dyspnea, negative cough, negative SOB  Cardiovascular: negative chest pain,  negative palpitations, negative arrhythmia, negative syncope   Gastrointestinal: negative abdominal pain, negative RUQ pain, negative N/V, negative diarrhea, negative constipation, negative bowel changes, negative heartburn   Genitourinary: negative dysuria, negative hematuria, negative urinary incontinence, negative vaginal discharge  Dermatological: negative rash, negative pruritis, negative mole or other skin changes  Hematologic: negative bruising  Immunologic/Lymphatic: negative recent illness, negative recent sick contact  Musculoskeletal: negative back pain, negative myalgias, negative arthralgias  Neurological:  negative dizziness, negative migraines, negative seizures, negative weakness  Behavior/Psych: negative depression, negative anxiety, negative SI, negative HI        OBSTETRICAL HISTORY:   OB History    Para Term  AB Living   7 2 2 0 4 2   SAB TAB Ectopic Molar Multiple Live Births   3 1 0 0 0 0      # Outcome Date GA Lbr Sebastián/2nd Weight Sex Delivery Anes PTL Lv   7 Current            6 SAB 18 11w1d          5 SAB            4 Term      Vag-Spont      3 Term      Vag-Spont      2 TAB            1 SAB                PAST MEDICAL HISTORY:   has a past medical history of ADD (attention deficit disorder) without hyperactivity, Chronic back pain, GERD (gastroesophageal reflux disease), Headache, Hypertension, Lumbar radiculopathy, Lumbar spondylosis, Obesity, and Rheumatoid arteritis ([address] PAST SURGICAL HISTORY:   has a past surgical history that includes other surgical history (12/10/2018).     ALLERGIES:  is detected  Anatomy US: Posterior, 3vc, echogenic focus seen in fetal L ventricle, otherwise normal anatomy    ASSESSMENT & PLAN:  Ria Casillas is a 42 y.o. female [id number] at [address]   - GBS unknown / Rh positive / R immune   - No indication for GBS prophylaxis at this time     Reactive NST w/ deceleration   - VSS   - Will continue to monitor for at least 4 hours    Abnormal FTS    - Tri 21 1:117   - NIPT nml    Echogenic focus fetal heart   - Seen on fetal echo    Hx Pulmonary embolism    - On lovenox 150 mg BID     Elev early 1hr, nml 3hr    - Carb control diet    Recurrent pregnancy loss    Low antithrombin III x1, repeat WNL    Anticardiolipin Ab + (26.9 on 3/19/19)    AMA (NIPT wnl)    Hx migraines    ADD    BMI 51    Patient Active Problem List    Diagnosis Date Noted    32 weeks gestation of pregnancy 01/08/2020    Pulmonary embolism affecting pregnancy in second trimester 11/30/2019    Elev early 1hr, nml 3hr  11/30/2019    Recurrent pregnancy loss 11/30/2019    Abnormal 1st trimester screen (Tri 21 1:117)--NIPT nml  11/30/2019    Echogenic focus of heart of fetus affecting antepartum care of mother 11/30/2019    Anticardiolipin antibody low positive x1 (26.9 on 3/19/19 11/30/2019    Elevated BP without diagnosis of hypertension 11/30/2019    ADD 09/25/2019    Inflammatory polyarthropathy ([facility] [address] 01/09/2019    Lumbar radiculopathy 01/09/2019    Lumbar spondylosis 01/09/2019    Muscle pain 01/09/2019    Obesity affecting pregnancy in first trimester 11/06/2018    AMA 10/10/2018    Low antithrombin III x1, repeat WNL 12/18/2017    Pre-diabetes 09/19/2017    Chronic low back pain  02/21/2017    Hx migraines  02/21/2017    GERD 02/21/2017    BMI 51 02/21/2017    Hidradenitis 01/04/2017       Plan discussed with Dr. Live Saravia, who is agreeable. Steroids given this admission: No    Risks, benefits, alternatives and possible complications have been discussed in detail with the patient.  Admission,

## 2020-01-08 NOTE — PROGRESS NOTES
Obstetric/Gynecology Resident Interval Note    Patient arrived for [facility]ST [facility] where BPP was 8/8 and dopplers were wnl. NST was reactive with deceleration noted, she was kept for extended monitoring. Patient noted to have multiple prolonged decelerations lasting 2-3 minutes about every 30 minutes. Difficult to trace contractions on toco, per patient report she is having rare contractions which she can feel once or twice an hour. Discussed case with attending, will keep patient for overnight monitoring and repeat MFM scan in the AM.    General diet ordered, encourage PO hydration. Continuous monitoring overnight. NPO @ midnight. Ordered patient's home medication including Lovenox BID, PNV, ASA 81, Folic Acid, and vaginal progesterone. Will also order FBS and 2hr PP blood sugars due to 1 elevated value on 3hr GTT with [facility] recommendation to repeat 3hr GTT in two weeks. Vitals stable, will continue to monitor closely.     Vitals:    01/08/20 1106   BP: 120/84   Pulse: 105   Resp: 19   Temp: 98.1 °F (36.7 °C)   TempSrc: Oral        Tim Braden DO  OB/GYN Resident, PGY1  [facility]  1/8/2020, 4:20 PM

## 2020-01-09 PROBLEM — O09.90 HRP (HIGH RISK PREGNANCY): Status: ACTIVE | Noted: 2020-01-09

## 2020-01-09 LAB
A1 LECTIN: NEGATIVE
ABO/RH: NORMAL
ANTIBODY SCREEN: NEGATIVE
ARM BAND NUMBER: NORMAL
EXPIRATION DATE: NORMAL
GLUCOSE BLD-MCNC: 110 MG/DL (ref 65–105)
GLUCOSE BLD-MCNC: 151 MG/DL (ref 65–105)
GLUCOSE BLD-MCNC: 182 MG/DL (ref 65–105)
GLUCOSE BLD-MCNC: 260 MG/DL (ref 65–105)
HCT VFR BLD CALC: 34.8 % (ref 36.3–47.1)
HEMOGLOBIN: 11.1 G/DL (ref 11.9–15.1)
INR BLD: 0.9
MCH RBC QN AUTO: 26.2 PG (ref 25.2–33.5)
MCHC RBC AUTO-ENTMCNC: 31.9 G/DL (ref 28.4–34.8)
MCV RBC AUTO: 82.3 FL (ref 82.6–102.9)
NRBC AUTOMATED: 0 PER 100 WBC
PARTIAL THROMBOPLASTIN TIME: 23.3 SEC (ref 20.5–30.5)
PDW BLD-RTO: 14.9 % (ref 11.8–14.4)
PLATELET # BLD: 320 K/UL (ref 138–453)
PMV BLD AUTO: 10 FL (ref 8.1–13.5)
PROTHROMBIN TIME: 9.4 SEC (ref 9–12)
RBC # BLD: 4.23 M/UL (ref 3.95–5.11)
T. PALLIDUM, IGG: NONREACTIVE
WBC # BLD: 12.8 K/UL (ref 3.5–11.3)

## 2020-01-09 PROCEDURE — 85730 THROMBOPLASTIN TIME PARTIAL: CPT

## 2020-01-09 PROCEDURE — G0378 HOSPITAL OBSERVATION PER HR: HCPCS

## 2020-01-09 PROCEDURE — 6370000000 HC RX 637 (ALT 250 FOR IP): Performed by: STUDENT IN AN ORGANIZED HEALTH CARE EDUCATION/TRAINING PROGRAM

## 2020-01-09 PROCEDURE — 85610 PROTHROMBIN TIME: CPT

## 2020-01-09 PROCEDURE — 76819 FETAL BIOPHYS PROFIL W/O NST: CPT | Performed by: OBSTETRICS & GYNECOLOGY

## 2020-01-09 PROCEDURE — 82947 ASSAY GLUCOSE BLOOD QUANT: CPT

## 2020-01-09 PROCEDURE — 86850 RBC ANTIBODY SCREEN: CPT

## 2020-01-09 PROCEDURE — 86901 BLOOD TYPING SEROLOGIC RH(D): CPT

## 2020-01-09 PROCEDURE — 76820 UMBILICAL ARTERY ECHO: CPT | Performed by: OBSTETRICS & GYNECOLOGY

## 2020-01-09 PROCEDURE — 86900 BLOOD TYPING SEROLOGIC ABO: CPT

## 2020-01-09 PROCEDURE — 6360000002 HC RX W HCPCS: Performed by: STUDENT IN AN ORGANIZED HEALTH CARE EDUCATION/TRAINING PROGRAM

## 2020-01-09 PROCEDURE — 86780 TREPONEMA PALLIDUM: CPT

## 2020-01-09 PROCEDURE — 76821 MIDDLE CEREBRAL ARTERY ECHO: CPT | Performed by: OBSTETRICS & GYNECOLOGY

## 2020-01-09 PROCEDURE — 85027 COMPLETE CBC AUTOMATED: CPT

## 2020-01-09 PROCEDURE — 96372 THER/PROPH/DIAG INJ SC/IM: CPT

## 2020-01-09 PROCEDURE — 2580000003 HC RX 258: Performed by: STUDENT IN AN ORGANIZED HEALTH CARE EDUCATION/TRAINING PROGRAM

## 2020-01-09 PROCEDURE — 87081 CULTURE SCREEN ONLY: CPT

## 2020-01-09 PROCEDURE — 76815 OB US LIMITED FETUS(S): CPT | Performed by: OBSTETRICS & GYNECOLOGY

## 2020-01-09 RX ORDER — NICOTINE POLACRILEX 4 MG
15 LOZENGE BUCCAL PRN
Status: DISCONTINUED | OUTPATIENT
Start: 2020-01-09 | End: 2020-01-11

## 2020-01-09 RX ORDER — DEXTROSE MONOHYDRATE 25 G/50ML
12.5 INJECTION, SOLUTION INTRAVENOUS PRN
Status: DISCONTINUED | OUTPATIENT
Start: 2020-01-09 | End: 2020-01-11

## 2020-01-09 RX ORDER — HEPARIN SODIUM 10000 [USP'U]/ML
10000 INJECTION, SOLUTION INTRAVENOUS; SUBCUTANEOUS EVERY 12 HOURS
Status: DISCONTINUED | OUTPATIENT
Start: 2020-01-09 | End: 2020-01-11

## 2020-01-09 RX ORDER — HEPARIN SODIUM 5000 [USP'U]/ML
10000 INJECTION, SOLUTION INTRAVENOUS; SUBCUTANEOUS EVERY 12 HOURS
Status: DISCONTINUED | OUTPATIENT
Start: 2020-01-09 | End: 2020-01-09

## 2020-01-09 RX ORDER — BETAMETHASONE SODIUM PHOSPHATE AND BETAMETHASONE ACETATE 3; 3 MG/ML; MG/ML
12 INJECTION, SUSPENSION INTRA-ARTICULAR; INTRALESIONAL; INTRAMUSCULAR; SOFT TISSUE ONCE
Status: COMPLETED | OUTPATIENT
Start: 2020-01-10 | End: 2020-01-10

## 2020-01-09 RX ORDER — DEXTROSE MONOHYDRATE 50 MG/ML
100 INJECTION, SOLUTION INTRAVENOUS PRN
Status: DISCONTINUED | OUTPATIENT
Start: 2020-01-09 | End: 2020-01-11

## 2020-01-09 RX ORDER — SODIUM CHLORIDE 0.9 % (FLUSH) 0.9 %
10 SYRINGE (ML) INJECTION 2 TIMES DAILY
Status: DISCONTINUED | OUTPATIENT
Start: 2020-01-09 | End: 2020-01-11

## 2020-01-09 RX ORDER — BETAMETHASONE SODIUM PHOSPHATE AND BETAMETHASONE ACETATE 3; 3 MG/ML; MG/ML
12 INJECTION, SUSPENSION INTRA-ARTICULAR; INTRALESIONAL; INTRAMUSCULAR; SOFT TISSUE ONCE
Status: COMPLETED | OUTPATIENT
Start: 2020-01-09 | End: 2020-01-09

## 2020-01-09 RX ORDER — GLUCAGON 1 MG/ML
1 KIT INJECTION PRN
Status: DISCONTINUED | OUTPATIENT
Start: 2020-01-09 | End: 2020-01-11

## 2020-01-09 RX ADMIN — HEPARIN SODIUM 10000 UNITS: 5000 INJECTION INTRAVENOUS; SUBCUTANEOUS at 10:01

## 2020-01-09 RX ADMIN — HEPARIN SODIUM 10000 UNITS: 10000 INJECTION INTRAVENOUS; SUBCUTANEOUS at 22:02

## 2020-01-09 RX ADMIN — Medication 1 TABLET: at 09:18

## 2020-01-09 RX ADMIN — ASPIRIN 81 MG: 81 TABLET, CHEWABLE ORAL at 09:17

## 2020-01-09 RX ADMIN — BETAMETHASONE SODIUM PHOSPHATE AND BETAMETHASONE ACETATE 12 MG: 3; 3 INJECTION, SUSPENSION INTRA-ARTICULAR; INTRALESIONAL; INTRAMUSCULAR at 10:00

## 2020-01-09 RX ADMIN — FOLIC ACID 1 MG: 1 TABLET ORAL at 09:17

## 2020-01-09 RX ADMIN — INSULIN LISPRO 6 UNITS: 100 INJECTION, SOLUTION INTRAVENOUS; SUBCUTANEOUS at 19:46

## 2020-01-09 RX ADMIN — PROGESTERONE 100 MG: 100 INSERT VAGINAL at 20:56

## 2020-01-09 RX ADMIN — SODIUM CHLORIDE, PRESERVATIVE FREE 10 ML: 5 INJECTION INTRAVENOUS at 20:56

## 2020-01-09 NOTE — DISCHARGE SUMMARY
Obstetric Discharge Summary  [facility]    Patient Name: Isiah Weir  Patient : 1980  Primary Care Physician: Umer Julio MD  Admit Date: 2020    Principal Diagnosis: IUP at [address], admitted for extended fetal monitoring     Her pregnancy has been complicated by:   Patient Active Problem List   Diagnosis    Chronic low back pain     Hx migraines     GERD    Morbid obesity with BMI of 50.0-59.9, adult ([address]    Pre-diabetes    Low antithrombin III x1, repeat WNL    AMA    Obesity affecting pregnancy in first trimester    Inflammatory polyarthropathy ([address]    Lumbar radiculopathy    Lumbar spondylosis    Muscle pain    ADD    Hidradenitis    Pulmonary embolism affecting pregnancy in second trimester    Elev early 1hr, nml 3hr     Recurrent pregnancy loss    Abnormal 1st trimester screen (Tri 21 1:117)--NIPT nml     Echogenic focus of heart of fetus affecting antepartum care of mother    Anticardiolipin antibody low positive x1 (26.9 on 3/19/19    Elevated BP without diagnosis of hypertension    Rh+/RI/GBSunk    Fetal heart rate decelerations affecting management of mother    Anticardiolipin antibody affecting pregnancy, antepartum    Multigravida of advanced maternal age in third trimester    Obesity affecting pregnancy in third trimester    Celestone  & 1/10    Elevated umbilical artery dopplers     Umbilical cord complication    PLTCS 84 M Apg 3/6/8 Wt 4#11    Fetal ascites    Impaired glucose regulation with features of insulin resistance       Infection Present?: No  Hospital Acquired: No    Surgical Operations & Procedures:  [] Pitocin Induction of Labor  [] Pitocin Augmentation of Labor  [] Prostaglandin Induction of Labor  [] Mechanical Induction of Labor  [] Artificial Rupture of Membranes  [] Intrauterine Pressure Catheter  [] Fetal Scalp Electrode  [] Amnioinfusion  Analgesia: spinal  Delivery Type: Spontaneous Vaginal Delivery: See by nebulization daily as needed for Wheezing             Cholecalciferol (VITAMIN D) 2000 units CAPS capsule  Take by mouth             CIMZIA STARTER KIT 6 X 200 MG/ML KIT  Indications: Arthritis              docusate sodium (COLACE) 100 MG capsule  Take 1 capsule by mouth 2 times daily             enoxaparin (LOVENOX) 150 MG/ML injection  Inject 0.93 mLs into the skin 2 times daily             ferrous sulfate (FE TABS) 325 (65 Fe) MG EC tablet  Take 1 tablet by mouth 2 times daily             folic acid (FOLVITE) 1 MG tablet  Take 1 mg by mouth daily             glucose monitoring kit (FREESTYLE) monitoring kit  1 kit by Does not apply route daily Check blood sugar 4 times per day, before meals and at bedtime. ibuprofen (ADVIL;MOTRIN) 800 MG tablet  Take 1 tablet by mouth every 8 hours as needed for Pain             lidocaine-prilocaine (EMLA) 2.5-2.5 % cream  Apply topically as needed. metFORMIN (GLUCOPHAGE) 500 MG tablet  Take 1 tablet by mouth 2 times daily (with meals)             oxyCODONE-acetaminophen (PERCOCET) 5-325 MG per tablet  Take 1 tablet by mouth every 6 hours as needed for Pain for up to 5 days. Prenatal Multivit-Min-Fe-FA (PRENATAL VITAMINS) 0.8 MG TABS  Take 1 tablet by mouth daily             progesterone (PROMETRIUM) 100 MG capsule  place 1 capsule vaginally nightly             UNKNOWN TO PATIENT  Nasal spray. For inflammation                   Activity: pelvic rest x 6 weeks, no driving on narcotics, no lifting greater than 15 lbs  Diet: diabetic diet  Follow up: 2 weeks     Condition on discharge: stable    Discharge date: 1/15/2020    Pebbles Patton DO  Ob/Gyn Resident    Comments:  Home care and follow-up care were reviewed. Pelvic rest, and birth control were reviewed. Signs and symptoms of mastitis and post partum depression were reviewed. The patient is to notify her physician if any of these occur.  The patient was counseled on secondary smoke risks and the increased risk of sudden infant death syndrome and respiratory problems to her baby with exposure. She was counseled on various alternate recommendations to decrease the exposure to secondary smoke to her children.

## 2020-01-09 NOTE — PROGRESS NOTES
Maternal Fetal Medicine   Interval Note    Misty Rosas is a 42 y.o. female [id number] at [address] Report (Verbal): Cephalic, Posterior, BPP 8/8, Elvd UAD    Plan: Repeat MFM scan 1/10/20, Celestone course, cEFM/TOCO  Will switch patient to Heparin 10,000U SQ BID    Dr. Bouchra Hassan updated, OB Residents  updated,  RN updated. Umang Vallejo DO  Ob/Gyn Resident  1/9/2020, 9:07 AM           Attending Physician Statement  I have discussed the care of Misty Rosas, including pertinent history and exam findings,  with the resident. I have seen and examined the patient and the key elements of all parts of the encounter have been performed by me. I agree with the assessment, plan and orders as documented by the resident. (GC Modifier)    Insulin sliding scale. Continue to  Check fasting and 2hr PP glucose. Scheduled insulin per MFM.     Continue Celestone course  MFM for dopplers in AM    Vitals:    01/09/20 0004 01/09/20 0514 01/09/20 0805 01/09/20 1950   BP: 125/71 125/73 102/64 (!) 124/57   Pulse: 112 115 108 125   Resp:   20 18   Temp: 97.7 °F (36.5 °C) 97.9 °F (36.6 °C) 97.7 °F (36.5 °C) 98.1 °F (36.7 °C)   TempSrc: Oral Oral Oral Oral     Recent Results (from the past 24 hour(s))   POC Glucose Fingerstick    Collection Time: 01/09/20  8:05 AM   Result Value Ref Range    POC Glucose 110 (H) 65 - 105 mg/dL   TYPE AND SCREEN    Collection Time: 01/09/20 11:45 AM   Result Value Ref Range    Expiration Date 01/12/2020,3239     Arm Band Number [id number]     ABO/Rh AB POSITIVE     Antibody Screen NEGATIVE     A1 Lectin NEGATIVE    CBC    Collection Time: 01/09/20 11:45 AM   Result Value Ref Range    WBC 12.8 (H) 3.5 - 11.3 k/uL    RBC 4.23 3.95 - 5.11 m/uL    Hemoglobin 11.1 (L) 11.9 - 15.1 g/dL    Hematocrit 34.8 (L) 36.3 - 47.1 %    MCV 82.3 (L) 82.6 - 102.9 fL    MCH 26.2 25.2 - 33.5 pg    MCHC 31.9 28.4 - 34.8 g/dL    RDW 14.9 (H) 11.8 - 14.4 %    Platelets 105 264 - 515 k/uL    MPV 10.0 8.1 - 13.5 fL    NRBC

## 2020-01-10 PROBLEM — O09.90 HIGH-RISK PREGNANCY: Status: ACTIVE | Noted: 2020-01-10

## 2020-01-10 LAB
GLUCOSE BLD-MCNC: 145 MG/DL (ref 65–105)
GLUCOSE BLD-MCNC: 238 MG/DL (ref 65–105)
GLUCOSE BLD-MCNC: 239 MG/DL (ref 65–105)

## 2020-01-10 PROCEDURE — 76820 UMBILICAL ARTERY ECHO: CPT | Performed by: OBSTETRICS & GYNECOLOGY

## 2020-01-10 PROCEDURE — 6370000000 HC RX 637 (ALT 250 FOR IP): Performed by: STUDENT IN AN ORGANIZED HEALTH CARE EDUCATION/TRAINING PROGRAM

## 2020-01-10 PROCEDURE — 76821 MIDDLE CEREBRAL ARTERY ECHO: CPT | Performed by: OBSTETRICS & GYNECOLOGY

## 2020-01-10 PROCEDURE — 6360000002 HC RX W HCPCS: Performed by: STUDENT IN AN ORGANIZED HEALTH CARE EDUCATION/TRAINING PROGRAM

## 2020-01-10 PROCEDURE — 82947 ASSAY GLUCOSE BLOOD QUANT: CPT

## 2020-01-10 PROCEDURE — 2580000003 HC RX 258: Performed by: STUDENT IN AN ORGANIZED HEALTH CARE EDUCATION/TRAINING PROGRAM

## 2020-01-10 PROCEDURE — 96372 THER/PROPH/DIAG INJ SC/IM: CPT

## 2020-01-10 PROCEDURE — 76819 FETAL BIOPHYS PROFIL W/O NST: CPT | Performed by: OBSTETRICS & GYNECOLOGY

## 2020-01-10 PROCEDURE — 99252 IP/OBS CONSLTJ NEW/EST SF 35: CPT | Performed by: PEDIATRICS

## 2020-01-10 PROCEDURE — G0378 HOSPITAL OBSERVATION PER HR: HCPCS

## 2020-01-10 PROCEDURE — 76815 OB US LIMITED FETUS(S): CPT | Performed by: OBSTETRICS & GYNECOLOGY

## 2020-01-10 RX ORDER — 0.9 % SODIUM CHLORIDE 0.9 %
500 INTRAVENOUS SOLUTION INTRAVENOUS ONCE
Status: COMPLETED | OUTPATIENT
Start: 2020-01-10 | End: 2020-01-10

## 2020-01-10 RX ORDER — CALCIUM CARBONATE 200(500)MG
1000 TABLET,CHEWABLE ORAL ONCE
Status: COMPLETED | OUTPATIENT
Start: 2020-01-10 | End: 2020-01-10

## 2020-01-10 RX ADMIN — ASPIRIN 81 MG: 81 TABLET, CHEWABLE ORAL at 08:48

## 2020-01-10 RX ADMIN — HEPARIN SODIUM 10000 UNITS: 10000 INJECTION INTRAVENOUS; SUBCUTANEOUS at 22:33

## 2020-01-10 RX ADMIN — SODIUM CHLORIDE 500 ML: 0.9 INJECTION, SOLUTION INTRAVENOUS at 17:30

## 2020-01-10 RX ADMIN — INSULIN LISPRO 4 UNITS: 100 INJECTION, SOLUTION INTRAVENOUS; SUBCUTANEOUS at 13:06

## 2020-01-10 RX ADMIN — Medication 1 TABLET: at 13:07

## 2020-01-10 RX ADMIN — SODIUM CHLORIDE, PRESERVATIVE FREE 10 ML: 5 INJECTION INTRAVENOUS at 08:49

## 2020-01-10 RX ADMIN — HEPARIN SODIUM 10000 UNITS: 10000 INJECTION INTRAVENOUS; SUBCUTANEOUS at 08:55

## 2020-01-10 RX ADMIN — PROGESTERONE 100 MG: 100 INSERT VAGINAL at 20:59

## 2020-01-10 RX ADMIN — SODIUM CHLORIDE, PRESERVATIVE FREE 10 ML: 5 INJECTION INTRAVENOUS at 20:59

## 2020-01-10 RX ADMIN — ANTACID TABLETS 1000 MG: 500 TABLET, CHEWABLE ORAL at 16:38

## 2020-01-10 RX ADMIN — INSULIN LISPRO 4 UNITS: 100 INJECTION, SOLUTION INTRAVENOUS; SUBCUTANEOUS at 20:53

## 2020-01-10 RX ADMIN — INSULIN LISPRO 2 UNITS: 100 INJECTION, SOLUTION INTRAVENOUS; SUBCUTANEOUS at 08:53

## 2020-01-10 RX ADMIN — FOLIC ACID 1 MG: 1 TABLET ORAL at 08:49

## 2020-01-10 RX ADMIN — BETAMETHASONE SODIUM PHOSPHATE AND BETAMETHASONE ACETATE 12 MG: 3; 3 INJECTION, SUSPENSION INTRA-ARTICULAR; INTRALESIONAL; INTRAMUSCULAR at 10:01

## 2020-01-10 NOTE — PROGRESS NOTES
 ADD 09/25/2019    Inflammatory polyarthropathy ([facility] [address] 01/09/2019    Lumbar radiculopathy 01/09/2019    Lumbar spondylosis 01/09/2019    Muscle pain 01/09/2019    Obesity affecting pregnancy in first trimester 11/06/2018    AMA 10/10/2018    Low antithrombin III x1, repeat WNL 12/18/2017    Pre-diabetes 09/19/2017    Chronic low back pain  02/21/2017    Hx migraines  02/21/2017    GERD 02/21/2017    BMI 51 02/21/2017    Hidradenitis 01/04/2017         Elaine Barrera DO  Ob/Gyn Resident  1/10/2020, 6:23 AM

## 2020-01-11 ENCOUNTER — ANESTHESIA EVENT (OUTPATIENT)
Dept: LABOR AND DELIVERY | Age: 40
DRG: 540 | End: 2020-01-11
Payer: MEDICARE

## 2020-01-11 ENCOUNTER — ANESTHESIA (OUTPATIENT)
Dept: LABOR AND DELIVERY | Age: 40
DRG: 540 | End: 2020-01-11
Payer: MEDICARE

## 2020-01-11 VITALS — SYSTOLIC BLOOD PRESSURE: 110 MMHG | OXYGEN SATURATION: 100 % | DIASTOLIC BLOOD PRESSURE: 69 MMHG

## 2020-01-11 PROBLEM — Z98.891 S/P CESAREAN SECTION: Status: ACTIVE | Noted: 2020-01-11

## 2020-01-11 LAB
ABSOLUTE EOS #: 0.03 K/UL (ref 0–0.44)
ABSOLUTE IMMATURE GRANULOCYTE: 0.27 K/UL (ref 0–0.3)
ABSOLUTE LYMPH #: 1.97 K/UL (ref 1.1–3.7)
ABSOLUTE MONO #: 0.78 K/UL (ref 0.1–1.2)
AMPHETAMINE SCREEN URINE: NEGATIVE
BARBITURATE SCREEN URINE: NEGATIVE
BASOPHILS # BLD: 0 % (ref 0–2)
BASOPHILS ABSOLUTE: 0.03 K/UL (ref 0–0.2)
BENZODIAZEPINE SCREEN, URINE: NEGATIVE
BUPRENORPHINE URINE: NORMAL
CANNABINOID SCREEN URINE: NEGATIVE
COCAINE METABOLITE, URINE: NEGATIVE
DIFFERENTIAL TYPE: ABNORMAL
EOSINOPHILS RELATIVE PERCENT: 0 % (ref 1–4)
GLUCOSE BLD-MCNC: 135 MG/DL (ref 65–105)
GLUCOSE BLD-MCNC: 99 MG/DL (ref 65–105)
HCT VFR BLD CALC: 35.2 % (ref 36.3–47.1)
HEMOGLOBIN: 11 G/DL (ref 11.9–15.1)
IMMATURE GRANULOCYTES: 2 %
LYMPHOCYTES # BLD: 17 % (ref 24–43)
MCH RBC QN AUTO: 27 PG (ref 25.2–33.5)
MCHC RBC AUTO-ENTMCNC: 31.3 G/DL (ref 28.4–34.8)
MCV RBC AUTO: 86.3 FL (ref 82.6–102.9)
MDMA URINE: NORMAL
METHADONE SCREEN, URINE: NEGATIVE
METHAMPHETAMINE, URINE: NORMAL
MONOCYTES # BLD: 7 % (ref 3–12)
NRBC AUTOMATED: 0 PER 100 WBC
OPIATES, URINE: NEGATIVE
OXYCODONE SCREEN URINE: NEGATIVE
PDW BLD-RTO: 14.8 % (ref 11.8–14.4)
PHENCYCLIDINE, URINE: NEGATIVE
PLATELET # BLD: 280 K/UL (ref 138–453)
PLATELET ESTIMATE: ABNORMAL
PMV BLD AUTO: 10.6 FL (ref 8.1–13.5)
PROPOXYPHENE, URINE: NORMAL
RBC # BLD: 4.08 M/UL (ref 3.95–5.11)
RBC # BLD: ABNORMAL 10*6/UL
SEG NEUTROPHILS: 74 % (ref 36–65)
SEGMENTED NEUTROPHILS ABSOLUTE COUNT: 8.6 K/UL (ref 1.5–8.1)
T. PALLIDUM, IGG: NONREACTIVE
TEST INFORMATION: NORMAL
TRICYCLIC ANTIDEPRESSANTS, UR: NORMAL
WBC # BLD: 11.7 K/UL (ref 3.5–11.3)
WBC # BLD: ABNORMAL 10*3/UL

## 2020-01-11 PROCEDURE — 6370000000 HC RX 637 (ALT 250 FOR IP)

## 2020-01-11 PROCEDURE — 80307 DRUG TEST PRSMV CHEM ANLYZR: CPT

## 2020-01-11 PROCEDURE — 85025 COMPLETE CBC W/AUTO DIFF WBC: CPT

## 2020-01-11 PROCEDURE — 6360000002 HC RX W HCPCS: Performed by: STUDENT IN AN ORGANIZED HEALTH CARE EDUCATION/TRAINING PROGRAM

## 2020-01-11 PROCEDURE — 59514 CESAREAN DELIVERY ONLY: CPT | Performed by: OBSTETRICS & GYNECOLOGY

## 2020-01-11 PROCEDURE — 6370000000 HC RX 637 (ALT 250 FOR IP): Performed by: STUDENT IN AN ORGANIZED HEALTH CARE EDUCATION/TRAINING PROGRAM

## 2020-01-11 PROCEDURE — 6360000002 HC RX W HCPCS: Performed by: NURSE ANESTHETIST, CERTIFIED REGISTERED

## 2020-01-11 PROCEDURE — 3700000000 HC ANESTHESIA ATTENDED CARE: Performed by: OBSTETRICS & GYNECOLOGY

## 2020-01-11 PROCEDURE — 76820 UMBILICAL ARTERY ECHO: CPT | Performed by: OBSTETRICS & GYNECOLOGY

## 2020-01-11 PROCEDURE — 2709999900 HC NON-CHARGEABLE SUPPLY: Performed by: OBSTETRICS & GYNECOLOGY

## 2020-01-11 PROCEDURE — 76815 OB US LIMITED FETUS(S): CPT | Performed by: OBSTETRICS & GYNECOLOGY

## 2020-01-11 PROCEDURE — 2580000003 HC RX 258: Performed by: STUDENT IN AN ORGANIZED HEALTH CARE EDUCATION/TRAINING PROGRAM

## 2020-01-11 PROCEDURE — 76819 FETAL BIOPHYS PROFIL W/O NST: CPT | Performed by: OBSTETRICS & GYNECOLOGY

## 2020-01-11 PROCEDURE — 1220000000 HC SEMI PRIVATE OB R&B

## 2020-01-11 PROCEDURE — 7100000000 HC PACU RECOVERY - FIRST 15 MIN: Performed by: OBSTETRICS & GYNECOLOGY

## 2020-01-11 PROCEDURE — 88307 TISSUE EXAM BY PATHOLOGIST: CPT

## 2020-01-11 PROCEDURE — 3700000001 HC ADD 15 MINUTES (ANESTHESIA): Performed by: OBSTETRICS & GYNECOLOGY

## 2020-01-11 PROCEDURE — 76821 MIDDLE CEREBRAL ARTERY ECHO: CPT | Performed by: OBSTETRICS & GYNECOLOGY

## 2020-01-11 PROCEDURE — 82947 ASSAY GLUCOSE BLOOD QUANT: CPT

## 2020-01-11 PROCEDURE — 7100000001 HC PACU RECOVERY - ADDTL 15 MIN: Performed by: OBSTETRICS & GYNECOLOGY

## 2020-01-11 PROCEDURE — 3609079900 HC CESAREAN SECTION: Performed by: OBSTETRICS & GYNECOLOGY

## 2020-01-11 PROCEDURE — 2500000003 HC RX 250 WO HCPCS: Performed by: NURSE ANESTHETIST, CERTIFIED REGISTERED

## 2020-01-11 PROCEDURE — 2580000003 HC RX 258: Performed by: NURSE ANESTHETIST, CERTIFIED REGISTERED

## 2020-01-11 PROCEDURE — 86780 TREPONEMA PALLIDUM: CPT

## 2020-01-11 RX ORDER — MORPHINE SULFATE 1 MG/ML
INJECTION, SOLUTION EPIDURAL; INTRATHECAL; INTRAVENOUS PRN
Status: DISCONTINUED | OUTPATIENT
Start: 2020-01-11 | End: 2020-01-11 | Stop reason: SDUPTHER

## 2020-01-11 RX ORDER — VITAMIN A, ASCORBIC ACID, CHOLECALCIFEROL, .ALPHA.-TOCOPHEROL ACETATE, DL-, THIAMINE MONONITRATE, RIBOFLAVIN, NIACINAMIDE, PYRIDOXINE HYDROCHLORIDE, FOLIC ACID, CYANOCOBALAMIN, CALCIUM CARBONATE, IRON, ZINC OXIDE, AND CUPRIC OXIDE 4000; 120; 400; 22; 1.84; 3; 20; 10; 1; 12; 200; 29; 25; 2 [IU]/1; MG/1; [IU]/1; [IU]/1; MG/1; MG/1; MG/1; MG/1; MG/1; UG/1; MG/1; MG/1; MG/1; MG/1
1 TABLET ORAL DAILY
Status: DISCONTINUED | OUTPATIENT
Start: 2020-01-11 | End: 2020-01-15 | Stop reason: HOSPADM

## 2020-01-11 RX ORDER — OXYCODONE HYDROCHLORIDE AND ACETAMINOPHEN 5; 325 MG/1; MG/1
2 TABLET ORAL EVERY 4 HOURS PRN
Status: DISCONTINUED | OUTPATIENT
Start: 2020-01-12 | End: 2020-01-15 | Stop reason: HOSPADM

## 2020-01-11 RX ORDER — POLYETHYLENE GLYCOL 3350 17 G/17G
17 POWDER, FOR SOLUTION ORAL DAILY PRN
Status: DISCONTINUED | OUTPATIENT
Start: 2020-01-11 | End: 2020-01-15 | Stop reason: HOSPADM

## 2020-01-11 RX ORDER — KETOROLAC TROMETHAMINE 30 MG/ML
30 INJECTION, SOLUTION INTRAMUSCULAR; INTRAVENOUS EVERY 6 HOURS
Status: COMPLETED | OUTPATIENT
Start: 2020-01-11 | End: 2020-01-12

## 2020-01-11 RX ORDER — ONDANSETRON 2 MG/ML
4 INJECTION INTRAMUSCULAR; INTRAVENOUS EVERY 6 HOURS PRN
Status: DISCONTINUED | OUTPATIENT
Start: 2020-01-11 | End: 2020-01-15 | Stop reason: HOSPADM

## 2020-01-11 RX ORDER — SODIUM CHLORIDE 0.9 % (FLUSH) 0.9 %
10 SYRINGE (ML) INJECTION PRN
Status: DISCONTINUED | OUTPATIENT
Start: 2020-01-11 | End: 2020-01-15 | Stop reason: HOSPADM

## 2020-01-11 RX ORDER — PHENYLEPHRINE HYDROCHLORIDE 10 MG/ML
INJECTION INTRAVENOUS PRN
Status: DISCONTINUED | OUTPATIENT
Start: 2020-01-11 | End: 2020-01-11 | Stop reason: SDUPTHER

## 2020-01-11 RX ORDER — BUPIVACAINE HYDROCHLORIDE 7.5 MG/ML
INJECTION, SOLUTION INTRASPINAL PRN
Status: DISCONTINUED | OUTPATIENT
Start: 2020-01-11 | End: 2020-01-11 | Stop reason: SDUPTHER

## 2020-01-11 RX ORDER — TRISODIUM CITRATE DIHYDRATE AND CITRIC ACID MONOHYDRATE 500; 334 MG/5ML; MG/5ML
30 SOLUTION ORAL ONCE
Status: COMPLETED | OUTPATIENT
Start: 2020-01-11 | End: 2020-01-11

## 2020-01-11 RX ORDER — SODIUM CHLORIDE 9 MG/ML
INJECTION, SOLUTION INTRAVENOUS CONTINUOUS
Status: DISCONTINUED | OUTPATIENT
Start: 2020-01-11 | End: 2020-01-15 | Stop reason: HOSPADM

## 2020-01-11 RX ORDER — BISACODYL 10 MG
10 SUPPOSITORY, RECTAL RECTAL DAILY PRN
Status: DISCONTINUED | OUTPATIENT
Start: 2020-01-11 | End: 2020-01-15 | Stop reason: HOSPADM

## 2020-01-11 RX ORDER — SENNA AND DOCUSATE SODIUM 50; 8.6 MG/1; MG/1
2 TABLET, FILM COATED ORAL 2 TIMES DAILY
Status: DISCONTINUED | OUTPATIENT
Start: 2020-01-12 | End: 2020-01-15 | Stop reason: HOSPADM

## 2020-01-11 RX ORDER — LANOLIN 100 %
OINTMENT (GRAM) TOPICAL
Status: DISCONTINUED | OUTPATIENT
Start: 2020-01-11 | End: 2020-01-15 | Stop reason: HOSPADM

## 2020-01-11 RX ORDER — SIMETHICONE 80 MG
80 TABLET,CHEWABLE ORAL EVERY 6 HOURS PRN
Status: DISCONTINUED | OUTPATIENT
Start: 2020-01-11 | End: 2020-01-15 | Stop reason: HOSPADM

## 2020-01-11 RX ORDER — IBUPROFEN 800 MG/1
800 TABLET ORAL EVERY 8 HOURS PRN
Status: DISCONTINUED | OUTPATIENT
Start: 2020-01-12 | End: 2020-01-12

## 2020-01-11 RX ORDER — SODIUM CHLORIDE, SODIUM LACTATE, POTASSIUM CHLORIDE, CALCIUM CHLORIDE 600; 310; 30; 20 MG/100ML; MG/100ML; MG/100ML; MG/100ML
INJECTION, SOLUTION INTRAVENOUS CONTINUOUS PRN
Status: DISCONTINUED | OUTPATIENT
Start: 2020-01-11 | End: 2020-01-11 | Stop reason: SDUPTHER

## 2020-01-11 RX ORDER — SODIUM CHLORIDE 0.9 % (FLUSH) 0.9 %
10 SYRINGE (ML) INJECTION EVERY 12 HOURS SCHEDULED
Status: DISCONTINUED | OUTPATIENT
Start: 2020-01-11 | End: 2020-01-15 | Stop reason: HOSPADM

## 2020-01-11 RX ORDER — NALOXONE HYDROCHLORIDE 0.4 MG/ML
0.4 INJECTION, SOLUTION INTRAMUSCULAR; INTRAVENOUS; SUBCUTANEOUS PRN
Status: DISCONTINUED | OUTPATIENT
Start: 2020-01-11 | End: 2020-01-15 | Stop reason: HOSPADM

## 2020-01-11 RX ORDER — OXYCODONE HYDROCHLORIDE AND ACETAMINOPHEN 5; 325 MG/1; MG/1
1 TABLET ORAL EVERY 4 HOURS PRN
Status: DISCONTINUED | OUTPATIENT
Start: 2020-01-12 | End: 2020-01-15 | Stop reason: HOSPADM

## 2020-01-11 RX ORDER — DIPHENHYDRAMINE HYDROCHLORIDE 50 MG/ML
25 INJECTION INTRAMUSCULAR; INTRAVENOUS EVERY 6 HOURS PRN
Status: DISCONTINUED | OUTPATIENT
Start: 2020-01-11 | End: 2020-01-15 | Stop reason: HOSPADM

## 2020-01-11 RX ORDER — CEFAZOLIN SODIUM 1 G/50ML
1 INJECTION, SOLUTION INTRAVENOUS EVERY 8 HOURS
Status: COMPLETED | OUTPATIENT
Start: 2020-01-11 | End: 2020-01-12

## 2020-01-11 RX ORDER — ACETAMINOPHEN 325 MG/1
650 TABLET ORAL EVERY 4 HOURS PRN
Status: DISCONTINUED | OUTPATIENT
Start: 2020-01-11 | End: 2020-01-15 | Stop reason: HOSPADM

## 2020-01-11 RX ADMIN — Medication 500 ML: at 16:47

## 2020-01-11 RX ADMIN — MORPHINE SULFATE 0.2 MG: 1 INJECTION, SOLUTION EPIDURAL; INTRATHECAL; INTRAVENOUS at 16:20

## 2020-01-11 RX ADMIN — Medication 50 MILLI-UNITS/MIN: at 18:04

## 2020-01-11 RX ADMIN — SODIUM CHLORIDE, POTASSIUM CHLORIDE, SODIUM LACTATE AND CALCIUM CHLORIDE: 600; 310; 30; 20 INJECTION, SOLUTION INTRAVENOUS at 16:47

## 2020-01-11 RX ADMIN — PHENYLEPHRINE HYDROCHLORIDE 100 MCG: 10 INJECTION INTRAVENOUS at 17:01

## 2020-01-11 RX ADMIN — Medication 3 G: at 15:00

## 2020-01-11 RX ADMIN — DIPHENHYDRAMINE HYDROCHLORIDE 25 MG: 50 INJECTION, SOLUTION INTRAMUSCULAR; INTRAVENOUS at 20:42

## 2020-01-11 RX ADMIN — PHENYLEPHRINE HYDROCHLORIDE 100 MCG: 10 INJECTION INTRAVENOUS at 16:48

## 2020-01-11 RX ADMIN — SODIUM CITRATE AND CITRIC ACID MONOHYDRATE 30 ML: 500; 334 SOLUTION ORAL at 15:00

## 2020-01-11 RX ADMIN — SODIUM CHLORIDE: 9 INJECTION, SOLUTION INTRAVENOUS at 17:38

## 2020-01-11 RX ADMIN — SODIUM CHLORIDE, POTASSIUM CHLORIDE, SODIUM LACTATE AND CALCIUM CHLORIDE: 600; 310; 30; 20 INJECTION, SOLUTION INTRAVENOUS at 15:47

## 2020-01-11 RX ADMIN — PHENYLEPHRINE HYDROCHLORIDE 100 MCG: 10 INJECTION INTRAVENOUS at 16:31

## 2020-01-11 RX ADMIN — BUPIVACAINE HYDROCHLORIDE IN DEXTROSE 1.7 ML: 7.5 INJECTION, SOLUTION SUBARACHNOID at 16:20

## 2020-01-11 RX ADMIN — SODIUM CHLORIDE, PRESERVATIVE FREE 10 ML: 5 INJECTION INTRAVENOUS at 08:30

## 2020-01-11 RX ADMIN — Medication 500 MG: at 15:00

## 2020-01-11 RX ADMIN — Medication: at 18:09

## 2020-01-11 RX ADMIN — Medication: at 18:08

## 2020-01-11 RX ADMIN — KETOROLAC TROMETHAMINE 30 MG: 30 INJECTION, SOLUTION INTRAMUSCULAR at 18:35

## 2020-01-11 ASSESSMENT — PULMONARY FUNCTION TESTS
PIF_VALUE: 0
PIF_VALUE: 1
PIF_VALUE: 0
PIF_VALUE: 1
PIF_VALUE: 0
PIF_VALUE: 1
PIF_VALUE: 0
PIF_VALUE: 1

## 2020-01-11 ASSESSMENT — PAIN SCALES - GENERAL
PAINLEVEL_OUTOF10: 0
PAINLEVEL_OUTOF10: 0

## 2020-01-11 NOTE — PROGRESS NOTES
Maternal Fetal Medicine   Interval Note    Pura Tubbs is a 42 y.o. female [id number] at 32w5d     Case discussed with Dr. Juan Lyle. [facility] Sono Report (Verbal): [facility] scan : ceph, post, male,  BPP, fetal abdominal ascites, polyhydramnios, echogenic focus of left ventricle, intermittent reversed UADs, elevated MCAs    Plan: Due to persistent category II tracing with prolonged decelerations and abnormal dopplers, will proceed with  section. Dr. Aisha Mcdonald updated, RN updated.      Peggy Tubbs DO  Ob/Gyn Resident  2020, 2:54 PM

## 2020-01-11 NOTE — ANESTHESIA PRE PROCEDURE
Department of Anesthesiology  Preprocedure Note       Name:  Roderick Elizalde   Age:  42 y.o.  :  1980                                          MRN:  [id number]         Date:  2020      Surgeon: Jose Iyer):  Hernan Vallejo DO    Procedure:  SECTION (N/A Abdomen)    Medications prior to admission:   Prior to Admission medications    Medication Sig Start Date End Date Taking? Authorizing Provider   Prenatal Multivit-Min-Fe-FA (PRENATAL VITAMINS) 0.8 MG TABS Take 1 tablet by mouth daily 1/3/20  Yes Chai Barajas, DO   enoxaparin (LOVENOX) 150 MG/ML injection Inject 0.87 mLs into the skin every 12 hours 19 Yes Ana Pisano, DO   albuterol (PROVENTIL) (2.5 MG/3ML) 0.083% nebulizer solution Take 3 mLs by nebulization daily as needed for Wheezing 19  Yes Fransico Noyola, DO   lidocaine-prilocaine (EMLA) 2.5-2.5 % cream Apply topically as needed. 19  Yes Nino Mir, DO   aspirin 81 MG tablet Take 81 mg by mouth daily   Yes Historical Provider, MD   Cholecalciferol (VITAMIN D) 2000 units CAPS capsule Take by mouth   Yes Historical Provider, MD   progesterone (PROMETRIUM) 100 MG capsule place 1 capsule vaginally nightly 9/3/19  Yes Chai Barajas DO   CIMZIA STARTER KIT 6 X 200 MG/ML KIT Indications: Arthritis  7/10/19  Yes Historical Provider, MD   folic acid (FOLVITE) 1 MG tablet Take 1 mg by mouth daily   Yes Historical Provider, MD   UNKNOWN TO PATIENT Nasal spray.  For inflammation    Historical Provider, MD       Current medications:    Current Facility-Administered Medications   Medication Dose Route Frequency Provider Last Rate Last Dose    azithromycin (ZITHROMAX) 500 mg in dextrose 5% 250 mL IVPB  500 mg Intravenous Once Ana Pisano, DO        citric acid-sodium citrate (BICITRA) solution 30 mL  30 mL Oral Once Ana Pisano, DO        ceFAZolin (ANCEF) 3 g in dextrose 5 % 100 mL IVPB  3 g Intravenous Once Avanthony Pisano, DO        insulin lispro (HUMALOG) injection Hidradenitis L73.2    Pulmonary embolism affecting pregnancy in second trimester O88.212    Elev early 1hr, nml 3hr  R73.09    Recurrent pregnancy loss N96    Abnormal 1st trimester screen (Tri 21 1:117)--NIPT nml  O28.9    Echogenic focus of heart of fetus affecting antepartum care of mother O35. 8XX0    Anticardiolipin antibody low positive x1 (26.9 on 3/19/19 R76.0    Elevated BP without diagnosis of hypertension R03.0    Rh+/RI/GBSunk Z3A.32    Fetal heart rate decelerations affecting management of mother H93.4551    Anticardiolipin antibody affecting pregnancy, antepartum O99.89, R76.0    Multigravida of advanced maternal age in third trimester O09.523    Obesity affecting pregnancy in third trimester O99.213    Celestone 1/9 & 1/10 O09.90    Elevated umbilical artery dopplers  S70.78    Umbilical cord complication X43. 9XX0       Past Medical History:        Diagnosis Date    ADD (attention deficit disorder) without hyperactivity     Chronic back pain     GERD (gastroesophageal reflux disease)     Headache     Hypertension     Lumbar radiculopathy 1/9/2019    Lumbar spondylosis 1/9/2019    Obesity     Rheumatoid arteritis ([address]        Past Surgical History:        Procedure Laterality Date    OTHER SURGICAL HISTORY  12/10/2018    Fluoroscopy- with esophagas- possible delay of gastric emptying     TONSILLECTOMY  1996       Social History:    Social History     Tobacco Use    Smoking status: Never Smoker    Smokeless tobacco: Never Used   Substance Use Topics    Alcohol use:  No                                Counseling given: Not Answered      Vital Signs (Current):   Vitals:    01/11/20 0535 01/11/20 0840 01/11/20 1327 01/11/20 1350   BP: 122/72 120/65 106/64    Pulse: 96 100 80    Resp: 18 16 14    Temp: 36.7 °C (98.1 °F) 36.7 °C (98 °F) 36.6 °C (97.9 °F)    TempSrc: Oral Oral Oral    Weight:    298 lb (135.2 kg)   Height:    5' 3\" (1.6 m) Endo/Other:    (+) : arthritis: rheumatoid. , .                 Abdominal:           Vascular:                                        Anesthesia Plan      general and spinal     ASA 3             Anesthetic plan and risks discussed with patient. Use of blood products discussed with patient whom. Plan discussed with attending.     Attending anesthesiologist reviewed and agrees with [address]KHA[address] - CRNA   1/11/2020

## 2020-01-11 NOTE — PROGRESS NOTES
Resident Progress Note    Onel Tomas is a 42 y.o. female [id number] at [address] [place] [address] Day: 4    Subjective:   Patient has been seen and examined. Patient is resting comfortably in bed. The patient reports fetal movement is present, complains of rare contractions, denies loss of fluid, denies vaginal bleeding. Denies HA, vision changes, fevers, chills, N/V, RUQ pain, increase in swelling, CP, SOB, Dysuria.       Objective:   Vitals:  Vitals:    01/10/20 1616 01/10/20 2004 01/11/20 0101 01/11/20 0535   BP: (!) 112/59 105/68 (!) 131/53 122/72   Pulse: 101 104 101 96   Resp: 16 16 16 18   Temp: 98.3 °F (36.8 °C) 98.1 °F (36.7 °C) 98.4 °F (36.9 °C) 98.1 °F (36.7 °C)   TempSrc:  Oral Oral Oral         Fetal Heart Monitor:  Baseline Heart Rate 150, periods of minimal variability however majority of time moderate variability, present accelerations, decelerations:  1928: brandi to 120, lasting 4 minutes, resolved spontaneously  2016: brandi to 120, lasting 4 minutes, resolved spontaneously  2214: brandi to 90, lasting 4 minutes, resolved spontaneously  2301: brandi to 80, lasting 5 minutes, resolved spontaneously  2344: brandi to 90, lasting 2 minutes, resolved spontaneously  0136: brandi to 120, lasting 6 minutes, resolved spontaneously  0256: brandi to 90, lasting 4 minutes, resolved spontaneously  0516: brandi to 80, lasting 5 minutes, resolved spontaneously    [place]: contractions, rare    Physical Exam:  General appearance:  no apparent distress, alert and cooperative  Neurologic:  alert, oriented, normal speech, no focal findings or movement disorder noted  Lungs:  No increased work of breathing, good air exchange, clear to auscultation bilaterally, no crackles or wheezing  Heart:  regular rate and rhythm and no murmur    Abdomen:  soft, gravid, non-tender, no right upper quadrant tenderness, no CVA tenderness, uterus non-tender, no signs of abruption and no signs of chorioamnionitis  Extremities:  no calf tenderness, non edematous, SCD's on.       DATA:    Diagnostics:     MFM scan 1/9: cephalic, posterior placenta, male, 8/8 BPP, Elevated UAD  MFM scan 1/29: Cephalic, posterior placenta, male, 8/8 BPP, Intermittently absent UADs, Elevated MCAs  MFM scan 1/11: pending this AM    Assessment/Plan:  Nieves Cabrera is a 42 y.o. female [id number] at 30w10d Admitted for Category 2 tracing (prolonged decelerations   - Afebrile, VSS   - cEFM/TOCO with multiple prolonged decels overnight   - MFM scan this AM   - NPO since midnight   - S/P Celestone 1/9 & 1/10   - C/S consent obtained    - Progesterone 100mg PV nightly    Elevated/Intermittent absent UAD's   - Repeat MFM scan this AM    Abnormal 1hr GTT, Normal 3hr GTT   - FBS, 2hr PP   - Hyperglycemia 2/2 Celestone   - Covering with medium dose SS    H/O PE in current pregnancy   - Lovenox switched to Heparin 10,000U BID   - Low Antithrombin III level x 1, repeat WNL    Fetal Echogenic Cardiac Focus   - NIPT WNL    Elevated BP without dx of HTN   - Normotensive        Patient Active Problem List    Diagnosis Date Noted    Elevated umbilical artery dopplers  40/22/4110    Umbilical cord complication     Celestone 1/9 & 1/10 01/09/2020    Fetal heart rate decelerations affecting management of mother     Anticardiolipin antibody affecting pregnancy, antepartum     Multigravida of advanced maternal age in third trimester     Obesity affecting pregnancy in third trimester     Rh+/RI/GBSunk 01/08/2020    Pulmonary embolism affecting pregnancy in second trimester 11/30/2019    Elev early 1hr, nml 3hr  11/30/2019     Overview Note:     1 elevated value, [facility] recommends repeat 3hr GTT in 2 weeks      Recurrent pregnancy loss 11/30/2019    Abnormal 1st trimester screen (Tri 21 1:117)--NIPT nml  11/30/2019    Echogenic focus of heart of fetus affecting antepartum care of mother 11/30/2019    Anticardiolipin antibody low positive x1 (26.9 on 3/19/19 11/30/2019    Elevated BP without diagnosis of hypertension 11/30/2019    ADD 09/25/2019    Inflammatory polyarthropathy ([address] 01/09/2019    Lumbar radiculopathy 01/09/2019    Lumbar spondylosis 01/09/2019    Muscle pain 01/09/2019    Obesity affecting pregnancy in first trimester 11/06/2018    AMA 10/10/2018    Low antithrombin III x1, repeat WNL 12/18/2017    Pre-diabetes 09/19/2017    Chronic low back pain  02/21/2017    Hx migraines  02/21/2017    GERD 02/21/2017    BMI 51 02/21/2017    Hidradenitis 01/04/2017       Will discuss with attending. Nida Bell, DO  Ob/Gyn Resident  [address] [facility], [place]  1/11/2020, 6:47 AM          Attending Physician Statement  I have discussed the care of Ed Perla, including pertinent history and exam findings,  with the resident. I have seen and examined the patient and the key elements of all parts of the encounter have been performed by me. I agree with the assessment, plan and orders as documented by the resident. (GC Modifier)    Pt. Seen and examined. Worsening UAD now intermittent reversed with elevated MCA. Pt. Having more frequent and deeper prolonged decelerations. Delivery indicated and secondary to fetal status C/S indicated. Pt. Understands all questions answered.     Glucose control important  S/p celestone x2

## 2020-01-11 NOTE — OP NOTE
Operative Note  Department of Obstetrics and Gynecology  [facility]     Patient: Gaston Gentile   : 1980  MRN: [id number]       Acct: [de-identified]   PCP: Marylou Franklin MD  Date of Procedure: 20    Pre-operative Diagnosis: 42 y.o. female [id number] at 32w5d with persistent category II tracing with prolonged decelerations, Intermittent-absent UADs, Elevated MCAs, fetal abdominal ascites     Post-operative Diagnosis: Living  infant, Male    Procedure: primary low transverse  section    Indications: Patient is a 42year old at 30w10d that was hospitalized for continuous fetal monitoring due to elevated UADs. She continued to have recurrent prolonged decelerations on the monitor with periods of minimal variability (persistent category II tracing). Repeat MFM ultrasound showed intermittent-absent UADs, elevated MCAs, and fetal abdominal ascites. Decision was made to proceed with  section. Surgeon: Dr. Brea Angel  Assistants: Arlin Nieto DO; Humza Wagner DO    Anesthesia: spinal with duramorph    Procedure Details   The patient was seen pre-operatively. The risks, benefits, complications, treatment options, and expected outcomes were discussed with the patient. The patient concurred with the proposed plan, giving informed consent. The patient was taken to the Operating Room, identified as Gaston Gentile and the procedure verified as  Delivery. A Time Out was held and the above information confirmed. After induction of anesthesia, the patient was draped and prepped in the usual sterile manner. A Pfannenstiel incision was made and carried down through the subcutaneous tissue to the fascia using bovie electrocautery. Fascial incision was made and extended transversely using bovie electrocautery for sharp dissection. The fascia was  from the underlying rectus tissue superiorly and inferiorly using bovie electrocautery.  The peritoneum was identified and entered bluntly. Peritoneal incision was extended longitudinally with blunt stretch, bladder retractor was placed. A low transverse uterine incision was made using a new scalpel blade. Blunt stretch on the hysterotomy incision was made and the amniotomy was performed revealing clear fluid. Delivered from cephalic presentation was a Live Born male infant. The infant was suctioned, dried and the umbilical cord was clamped and cut. The infant was taken to the warmer and attended by NICU for evaluation. A second section of cord was clamped and cut and sent for gases. Cord blood was obtained for evaluation. The placenta was removed spontaneously with gentle traction and appeared intact and that the umbilical cord had three vessels noted. Pitocin was started. The uterine outline appeared normal. The uterus was cleaned of all clots and debris. The uterine incision was closed with running locked sutures of 0 Vicryl. Hemostasis was observed. Bilateral abdominal gutters were cleared of all clots and debris. Bilateral tubes and ovaries were visualized and appeared normal. The hysterotomy was again inspected and found to be hemostatic. Abdomen was copiously irrigated. Gelfoam was placed over the incision. Peritoneum was reapproximated with running suture of 2-0 Vicryl. Rectus muscles were inspected and found to be hemostatic. The fascia was then reapproximated with running sutures of 0 Vicryl. The subcuticular space was irrigated copiously. The subcuticular space was closed using a 2-0 chromic suture in a running fashion. The skin was reapproximated with insorb sutures. The skin was then cleansed and dressed with a Anola Justice in sterile fashion. Instrument, sponge, and needle counts were correct prior the abdominal closure and at the conclusion of the case. The urine remained clear throughout the case. Ancef and azithromycin was given for antibiotic prohylaxis.   SCDs for DVT prophylaxis remain in place for the post operative period. Dr. Luigi Nuñez was present for the entire operation.     Findings:  Viable 4 lb11 oz male infant in cephalic presentation with Apgars of 3 at 1 minute and 6 at five minutes and 8 at 10 minutes, normal appearing uterus tubes and ovaries   Estimated Blood Loss: 600ml  Total IV Fluids: 1300ml  Urine output: 100ml clear urine   Drains:  contreras catheter  Specimens:  placenta sent to pathology, cord blood and cord gases  Instrument and Sponge Count: Correct  Complications: none  Condition: Infant stable, transfer to [facility], Mother stable, transfer to post anesthesia recovery    Jessika Mclaughlin DO  Ob/Gyn Resident  1/11/2020, 5:54 PM

## 2020-01-12 LAB
CREAT SERPL-MCNC: 0.35 MG/DL (ref 0.5–0.9)
CULTURE: NORMAL
GFR AFRICAN AMERICAN: >60 ML/MIN
GFR NON-AFRICAN AMERICAN: >60 ML/MIN
GFR SERPL CREATININE-BSD FRML MDRD: ABNORMAL ML/MIN/{1.73_M2}
GFR SERPL CREATININE-BSD FRML MDRD: ABNORMAL ML/MIN/{1.73_M2}
GLUCOSE BLD-MCNC: 117 MG/DL (ref 65–105)
GLUCOSE BLD-MCNC: 122 MG/DL (ref 65–105)
GLUCOSE BLD-MCNC: 127 MG/DL (ref 65–105)
GLUCOSE BLD-MCNC: 139 MG/DL (ref 65–105)
GLUCOSE BLD-MCNC: 191 MG/DL (ref 65–105)
HCT VFR BLD CALC: 30.7 % (ref 36.3–47.1)
HEMOGLOBIN: 9.8 G/DL (ref 11.9–15.1)
Lab: NORMAL
SPECIMEN DESCRIPTION: NORMAL

## 2020-01-12 PROCEDURE — 6370000000 HC RX 637 (ALT 250 FOR IP)

## 2020-01-12 PROCEDURE — 1220000000 HC SEMI PRIVATE OB R&B

## 2020-01-12 PROCEDURE — 36415 COLL VENOUS BLD VENIPUNCTURE: CPT

## 2020-01-12 PROCEDURE — 6370000000 HC RX 637 (ALT 250 FOR IP): Performed by: STUDENT IN AN ORGANIZED HEALTH CARE EDUCATION/TRAINING PROGRAM

## 2020-01-12 PROCEDURE — 82947 ASSAY GLUCOSE BLOOD QUANT: CPT

## 2020-01-12 PROCEDURE — 2580000003 HC RX 258: Performed by: STUDENT IN AN ORGANIZED HEALTH CARE EDUCATION/TRAINING PROGRAM

## 2020-01-12 PROCEDURE — 82565 ASSAY OF CREATININE: CPT

## 2020-01-12 PROCEDURE — 85014 HEMATOCRIT: CPT

## 2020-01-12 PROCEDURE — 85018 HEMOGLOBIN: CPT

## 2020-01-12 PROCEDURE — 6360000002 HC RX W HCPCS: Performed by: STUDENT IN AN ORGANIZED HEALTH CARE EDUCATION/TRAINING PROGRAM

## 2020-01-12 RX ORDER — MAGNESIUM HYDROXIDE/ALUMINUM HYDROXICE/SIMETHICONE 120; 1200; 1200 MG/30ML; MG/30ML; MG/30ML
30 SUSPENSION ORAL EVERY 6 HOURS PRN
Status: DISCONTINUED | OUTPATIENT
Start: 2020-01-12 | End: 2020-01-15 | Stop reason: HOSPADM

## 2020-01-12 RX ORDER — IBUPROFEN 800 MG/1
800 TABLET ORAL EVERY 8 HOURS PRN
Status: DISCONTINUED | OUTPATIENT
Start: 2020-01-12 | End: 2020-01-15 | Stop reason: HOSPADM

## 2020-01-12 RX ORDER — LANOLIN ALCOHOL/MO/W.PET/CERES
325 CREAM (GRAM) TOPICAL 2 TIMES DAILY
Qty: 90 TABLET | Refills: 3 | Status: SHIPPED | OUTPATIENT
Start: 2020-01-12 | End: 2020-06-25 | Stop reason: ALTCHOICE

## 2020-01-12 RX ORDER — DOCUSATE SODIUM 100 MG/1
100 CAPSULE, LIQUID FILLED ORAL 2 TIMES DAILY
Qty: 60 CAPSULE | Refills: 1 | Status: SHIPPED | OUTPATIENT
Start: 2020-01-12 | End: 2020-02-11

## 2020-01-12 RX ORDER — OXYCODONE HYDROCHLORIDE AND ACETAMINOPHEN 5; 325 MG/1; MG/1
1 TABLET ORAL EVERY 6 HOURS PRN
Qty: 18 TABLET | Refills: 0 | Status: SHIPPED | OUTPATIENT
Start: 2020-01-12 | End: 2020-01-17

## 2020-01-12 RX ORDER — IBUPROFEN 800 MG/1
TABLET ORAL
Status: COMPLETED
Start: 2020-01-12 | End: 2020-01-12

## 2020-01-12 RX ORDER — CALCIUM CARBONATE 200(500)MG
500 TABLET,CHEWABLE ORAL 3 TIMES DAILY PRN
Status: DISCONTINUED | OUTPATIENT
Start: 2020-01-12 | End: 2020-01-15 | Stop reason: HOSPADM

## 2020-01-12 RX ORDER — IBUPROFEN 800 MG/1
800 TABLET ORAL EVERY 8 HOURS PRN
Qty: 30 TABLET | Refills: 1 | Status: SHIPPED | OUTPATIENT
Start: 2020-01-12 | End: 2020-06-25 | Stop reason: ALTCHOICE

## 2020-01-12 RX ADMIN — SENNOSIDES AND DOCUSATE SODIUM 2 TABLET: 8.6; 5 TABLET ORAL at 08:29

## 2020-01-12 RX ADMIN — SENNOSIDES AND DOCUSATE SODIUM 2 TABLET: 8.6; 5 TABLET ORAL at 20:20

## 2020-01-12 RX ADMIN — OXYCODONE HYDROCHLORIDE AND ACETAMINOPHEN 1 TABLET: 5; 325 TABLET ORAL at 17:31

## 2020-01-12 RX ADMIN — MAGNESIUM HYDROXIDE 30 ML: 400 SUSPENSION ORAL at 08:29

## 2020-01-12 RX ADMIN — IBUPROFEN 800 MG: 800 TABLET, FILM COATED ORAL at 13:33

## 2020-01-12 RX ADMIN — ALUMINUM HYDROXIDE, MAGNESIUM HYDROXIDE, AND SIMETHICONE 30 ML: 200; 200; 20 SUSPENSION ORAL at 17:31

## 2020-01-12 RX ADMIN — ENOXAPARIN SODIUM 135 MG: 150 INJECTION SUBCUTANEOUS at 08:29

## 2020-01-12 RX ADMIN — ANTACID TABLETS 500 MG: 500 TABLET, CHEWABLE ORAL at 05:31

## 2020-01-12 RX ADMIN — OXYCODONE HYDROCHLORIDE AND ACETAMINOPHEN 2 TABLET: 5; 325 TABLET ORAL at 22:22

## 2020-01-12 RX ADMIN — KETOROLAC TROMETHAMINE 30 MG: 30 INJECTION, SOLUTION INTRAMUSCULAR at 00:11

## 2020-01-12 RX ADMIN — CEFAZOLIN SODIUM 1 G: 1 INJECTION, SOLUTION INTRAVENOUS at 00:11

## 2020-01-12 RX ADMIN — KETOROLAC TROMETHAMINE 30 MG: 30 INJECTION, SOLUTION INTRAMUSCULAR at 06:58

## 2020-01-12 RX ADMIN — OXYCODONE HYDROCHLORIDE AND ACETAMINOPHEN 2 TABLET: 5; 325 TABLET ORAL at 13:33

## 2020-01-12 RX ADMIN — Medication 10 ML: at 20:20

## 2020-01-12 RX ADMIN — ANTACID TABLETS 500 MG: 500 TABLET, CHEWABLE ORAL at 11:28

## 2020-01-12 RX ADMIN — CEFAZOLIN SODIUM 1 G: 1 INJECTION, SOLUTION INTRAVENOUS at 06:58

## 2020-01-12 RX ADMIN — IBUPROFEN 800 MG: 800 TABLET, FILM COATED ORAL at 22:22

## 2020-01-12 RX ADMIN — INSULIN LISPRO 2 UNITS: 100 INJECTION, SOLUTION INTRAVENOUS; SUBCUTANEOUS at 16:24

## 2020-01-12 RX ADMIN — ENOXAPARIN SODIUM 135 MG: 150 INJECTION SUBCUTANEOUS at 20:21

## 2020-01-12 ASSESSMENT — PAIN SCALES - GENERAL
PAINLEVEL_OUTOF10: 3
PAINLEVEL_OUTOF10: 8
PAINLEVEL_OUTOF10: 8
PAINLEVEL_OUTOF10: 5
PAINLEVEL_OUTOF10: 2

## 2020-01-12 NOTE — PROGRESS NOTES
distress, alert and cooperativeIntake/Output Summary (Last 24 hours) at 1/12/2020 0104  Last data filed at 1/11/2020 1831  Gross per 24 hour   Intake 800 ml   Output 875 ml   Net -75 ml       Physical Exam:  General:  no apparent distress, alert and cooperative  Neurologic:  alert, oriented, normal speech, no focal findings or movement disorder noted  Lungs:  No increased work of breathing, good air exchange, clear to auscultation bilaterally, no crackles or wheezing  Heart:  regular rate and rhythm    Abdomen: abdomen soft, non-distended, non-tender  Fundus: non-tender, normal size, firm, below umbilicus  Incision: clean, dry and Prevena dressing in place and functioning  Extremities:  no calf tenderness, non edematous    Labs:  Lab Results   Component Value Date    WBC 11.7 (H) 01/11/2020    HGB 11.0 (L) 01/11/2020    HCT 35.2 (L) 01/11/2020    MCV 86.3 01/11/2020     01/11/2020       Assessment/Plan:  1. Jose Guadalupe Vasquez is a [id number] POD # 1 s/p PLTCS   - Doing well, VSS    - male infant in [address], circumcision desired   - Encourage ambulation and use of incentive spirometer   - D/C contreras catheter and saline lock IV on POD #1    - CBC awaiting  2. Rh positive/Rubella immune  3. Breast pumping  4. Hx pulmonary embolism  - Will restart Lovenox 125 mg BID  - Will anticoagulate for 6 months PP  5. BMI 51  6. Continue post-op care. Counseling Completed:  Secondary Smoke risks and Sudden Infant Death Syndrome were reviewed with recommendations. Infant sleeping, \"back to sleep\" and avoidance of co-sleeping recommendations were reviewed. Signs and Symptoms of Post Partum Depression were reviewed. The patient is to call if any occur. Signs and symptoms of Mastitis were reviewed. The patient is to call if any occur for follow up.   Discharge instructions including pelvic rest, incision care, 15 lb weight restriction, no driving with pain medicine and office follow-up were reviewed with patient

## 2020-01-12 NOTE — PROGRESS NOTES
Pt arrived via stretcher, moved from bed to stretcher with minimal assist, vitals and assessment as charted. Pt oriented to room and call light, pt denies needs at this time. Pt desires to pump for baby in NICU.  Pump and supplies in room upon arrival. Pt desires to eat and rest first, will offer instruction in a couple of hours

## 2020-01-12 NOTE — CARE COORDINATION
Discharge Planning     Patient is a 42year old at 32w5d that was hospitalized for continuous fetal monitoring due to elevated UADs. She continued to have recurrent prolonged decelerations on the monitor with periods of minimal variability (persistent category II tracing). Repeat MFM ultrasound showed intermittent-absent UADs, elevated MCAs, and fetal abdominal ascites. Decision was made to proceed with  section. Csection 2020. Infant to NICU.  Anticipate dc mom, 1/15/2020

## 2020-01-13 PROBLEM — R73.09 IMPAIRED GLUCOSE REGULATION WITH FEATURES OF INSULIN RESISTANCE: Status: ACTIVE | Noted: 2020-01-13

## 2020-01-13 LAB
GLUCOSE BLD-MCNC: 134 MG/DL (ref 65–105)
GLUCOSE BLD-MCNC: 138 MG/DL (ref 65–105)
GLUCOSE BLD-MCNC: 97 MG/DL (ref 65–105)

## 2020-01-13 PROCEDURE — 82947 ASSAY GLUCOSE BLOOD QUANT: CPT

## 2020-01-13 PROCEDURE — 6370000000 HC RX 637 (ALT 250 FOR IP): Performed by: INTERNAL MEDICINE

## 2020-01-13 PROCEDURE — 6370000000 HC RX 637 (ALT 250 FOR IP): Performed by: STUDENT IN AN ORGANIZED HEALTH CARE EDUCATION/TRAINING PROGRAM

## 2020-01-13 PROCEDURE — 6360000002 HC RX W HCPCS: Performed by: STUDENT IN AN ORGANIZED HEALTH CARE EDUCATION/TRAINING PROGRAM

## 2020-01-13 PROCEDURE — 99253 IP/OBS CNSLTJ NEW/EST LOW 45: CPT | Performed by: INTERNAL MEDICINE

## 2020-01-13 PROCEDURE — 1220000000 HC SEMI PRIVATE OB R&B

## 2020-01-13 RX ADMIN — OXYCODONE HYDROCHLORIDE AND ACETAMINOPHEN 2 TABLET: 5; 325 TABLET ORAL at 07:26

## 2020-01-13 RX ADMIN — ALUMINUM HYDROXIDE, MAGNESIUM HYDROXIDE, AND SIMETHICONE 30 ML: 200; 200; 20 SUSPENSION ORAL at 05:03

## 2020-01-13 RX ADMIN — ENOXAPARIN SODIUM 135 MG: 150 INJECTION SUBCUTANEOUS at 23:29

## 2020-01-13 RX ADMIN — IBUPROFEN 800 MG: 800 TABLET, FILM COATED ORAL at 07:26

## 2020-01-13 RX ADMIN — METFORMIN HYDROCHLORIDE 500 MG: 500 TABLET ORAL at 18:37

## 2020-01-13 RX ADMIN — IBUPROFEN 800 MG: 800 TABLET, FILM COATED ORAL at 16:23

## 2020-01-13 RX ADMIN — ANTACID TABLETS 500 MG: 500 TABLET, CHEWABLE ORAL at 04:23

## 2020-01-13 RX ADMIN — OXYCODONE HYDROCHLORIDE AND ACETAMINOPHEN 2 TABLET: 5; 325 TABLET ORAL at 12:01

## 2020-01-13 RX ADMIN — OXYCODONE HYDROCHLORIDE AND ACETAMINOPHEN 2 TABLET: 5; 325 TABLET ORAL at 16:23

## 2020-01-13 RX ADMIN — Medication 1 TABLET: at 08:48

## 2020-01-13 RX ADMIN — OXYCODONE HYDROCHLORIDE AND ACETAMINOPHEN 2 TABLET: 5; 325 TABLET ORAL at 23:28

## 2020-01-13 RX ADMIN — ENOXAPARIN SODIUM 135 MG: 150 INJECTION SUBCUTANEOUS at 08:49

## 2020-01-13 ASSESSMENT — PAIN SCALES - GENERAL
PAINLEVEL_OUTOF10: 7
PAINLEVEL_OUTOF10: 7
PAINLEVEL_OUTOF10: 8
PAINLEVEL_OUTOF10: 7

## 2020-01-13 NOTE — CARE COORDINATION
Social Work    Sw met with mom in hospital room to introduce self, assess needs, and discuss NICU sw role. Mom reports no current s/s of anxiety or depression but did discuss appropriate feelings with baby being in the NICU. Mom reports she does have other children, but they were never in the NICU. Sw discussed Sw as a support for mom and encouraged her to reach out if Sw can assist in any way. At this time mom denied any current needs or concerns. Sw encouraged mom to reach out if Sw can assist in any way.

## 2020-01-13 NOTE — LACTATION NOTE
Mom shown how to use breast pump correctly mom had been using 24 mm flanges changes to 27 mm flanges. Mom instructed on the initiation cycle again. Observed milk form breasts bilaterally. Mom will call for help as needed.

## 2020-01-13 NOTE — CONSULTS
needed for Pain 1/12/20  Yes Melissa Bowen, DO   docusate sodium (COLACE) 100 MG capsule Take 1 capsule by mouth 2 times daily 1/12/20 2/11/20 Yes Melissa Bowen, DO   ferrous sulfate (FE TABS) 325 (65 Fe) MG EC tablet Take 1 tablet by mouth 2 times daily 1/12/20  Yes Kimber Sprague, DO   enoxaparin (LOVENOX) 150 MG/ML injection Inject 0.93 mLs into the skin 2 times daily 1/12/20 4/11/20 Yes Kimber Sprague, DO   Prenatal Multivit-Min-Fe-FA (PRENATAL VITAMINS) 0.8 MG TABS Take 1 tablet by mouth daily 1/3/20  Yes Tai Barajas DO   albuterol (PROVENTIL) (2.5 MG/3ML) 0.083% nebulizer solution Take 3 mLs by nebulization daily as needed for Wheezing 12/2/19  Yes Melissa Bowen, DO   lidocaine-prilocaine (EMLA) 2.5-2.5 % cream Apply topically as needed. 12/2/19  Yes Nino Tabares, DO   aspirin 81 MG tablet Take 81 mg by mouth daily   Yes Historical Provider, MD   Cholecalciferol (VITAMIN D) 2000 units CAPS capsule Take by mouth   Yes Historical Provider, MD   progesterone (PROMETRIUM) 100 MG capsule place 1 capsule vaginally nightly 9/3/19  Yes Rowan Barajas DO   CIMZIA STARTER KIT 6 X 200 MG/ML KIT Indications: Arthritis  7/10/19  Yes Historical Provider, MD   folic acid (FOLVITE) 1 MG tablet Take 1 mg by mouth daily   Yes Historical Provider, MD   UNKNOWN TO PATIENT Nasal spray. For inflammation    Historical Provider, MD        Allergies:     Iodine    Social History:     Tobacco:    reports that she has never smoked. She has never used smokeless tobacco.  Alcohol:      reports no history of alcohol use. Drug Use:  reports no history of drug use. Family History:     Family History   Problem Relation Age of Onset    Elevated Lipids Mother        Review of Systems:     Positive and Negative as described in HPI.     CONSTITUTIONAL:  negative for fevers, chills, sweats, fatigue, weight loss  HEENT:  negative for vision, hearing changes, runny nose, throat pain  RESPIRATORY:  negative for shortness of breath, cough, obese  Neurologic: There are no new focal motor or sensory deficits, normal muscle tone and bulk, no abnormal sensation, normal speech, cranial nerves II through XII grossly intact  Skin: No gross lesions, rashes, bruising or bleeding on exposed skin area  Extremities:  peripheral pulses palpable, no calf pain with palpation; 2-3+ ble edema  Psych: normal affect    Investigations:      Laboratory Testing:  Recent Results (from the past 24 hour(s))   POC Glucose Fingerstick    Collection Time: 01/12/20  4:21 PM   Result Value Ref Range    POC Glucose 191 (H) 65 - 105 mg/dL   POC Glucose Fingerstick    Collection Time: 01/12/20  8:33 PM   Result Value Ref Range    POC Glucose 139 (H) 65 - 105 mg/dL   POC Glucose Fingerstick    Collection Time: 01/13/20  8:22 AM   Result Value Ref Range    POC Glucose 97 65 - 105 mg/dL   POC Glucose Fingerstick    Collection Time: 01/13/20 12:16 PM   Result Value Ref Range    POC Glucose 134 (H) 65 - 105 mg/dL       Imaging/Diagonstics:  No results found.     Assessment :      Hospital Problems           Last Modified POA    * (Principal) PLTCS 1/11/20 M Apg 3/6/8 Wt 4#11 1/11/2020 Yes    Chronic low back pain  1/8/2020 Yes    Hx migraines  1/8/2020 Yes    GERD 1/8/2020 Yes    Morbid obesity with BMI of 50.0-59.9, adult ([facility] [address] 1/13/2020 Yes    Low antithrombin III x1, repeat WNL 1/8/2020 Yes    AMA 1/8/2020 Yes    ADD 1/8/2020 Yes    Pulmonary embolism affecting pregnancy in second trimester 1/8/2020 Yes    Elev early 1hr, nml 3hr  1/8/2020 Yes    Overview Signed 1/8/2020  4:34 PM by Leland Rothman, DO     1 elevated value, [facility] recommends repeat 3hr GTT in 2 weeks         Recurrent pregnancy loss 1/8/2020 Yes    Abnormal 1st trimester screen (Tri 21 1:117)--NIPT nml  1/8/2020 Yes    Echogenic focus of heart of fetus affecting antepartum care of mother 1/8/2020 Yes    Anticardiolipin antibody low positive x1 (26.9 on 3/19/19 1/8/2020 Yes    Elevated BP without diagnosis of hypertension 1/8/2020 Yes    Rh+/RI/GBSunk 1/8/2020 Yes    Fetal heart rate decelerations affecting management of mother 1/9/2020 Yes    Anticardiolipin antibody affecting pregnancy, antepartum 1/9/2020 Yes    Multigravida of advanced maternal age in third trimester 1/9/2020 Yes    Obesity affecting pregnancy in third trimester 1/9/2020 Yes    Celestone 1/9 & 1/10 1/11/2020 Yes    Elevated umbilical artery dopplers  6/94/7316 Yes    Umbilical cord complication 4/88/8791 Yes    Fetal ascites 1/11/2020 Yes    Impaired glucose regulation with features of insulin resistance 1/13/2020 Yes          Plan:     1. Start metformin therapy for insulin resistance and obesity-script sent to pharmacy  2. Diet/exercise for weight loss  3. Ambulation  4. Agree with anticoagulation as planned for recent PE and hypercoagulable state  5. Discussed with her to monitor edema-if worsens, she should be evaluated  6.  Signing off, f/u pcp    Consultations:   IP CONSULT TO NEONATOLOGY  IP CONSULT TO PERINATOLOGY  IP CONSULT TO LACTATION  IP CONSULT TO INTERNAL MEDICINE      Matthias Reyes,   1/13/2020  12:30 PM    Copy sent to Dr. Violeta Khalil MD

## 2020-01-14 LAB
GLUCOSE BLD-MCNC: 116 MG/DL (ref 65–105)
GLUCOSE BLD-MCNC: 117 MG/DL (ref 65–105)
GLUCOSE BLD-MCNC: 120 MG/DL (ref 65–105)
GLUCOSE BLD-MCNC: 123 MG/DL (ref 65–105)

## 2020-01-14 PROCEDURE — 82947 ASSAY GLUCOSE BLOOD QUANT: CPT

## 2020-01-14 PROCEDURE — 6360000002 HC RX W HCPCS: Performed by: STUDENT IN AN ORGANIZED HEALTH CARE EDUCATION/TRAINING PROGRAM

## 2020-01-14 PROCEDURE — 6370000000 HC RX 637 (ALT 250 FOR IP): Performed by: STUDENT IN AN ORGANIZED HEALTH CARE EDUCATION/TRAINING PROGRAM

## 2020-01-14 PROCEDURE — 6370000000 HC RX 637 (ALT 250 FOR IP): Performed by: INTERNAL MEDICINE

## 2020-01-14 PROCEDURE — 1220000000 HC SEMI PRIVATE OB R&B

## 2020-01-14 RX ADMIN — ENOXAPARIN SODIUM 135 MG: 150 INJECTION SUBCUTANEOUS at 22:47

## 2020-01-14 RX ADMIN — OXYCODONE HYDROCHLORIDE AND ACETAMINOPHEN 2 TABLET: 5; 325 TABLET ORAL at 22:46

## 2020-01-14 RX ADMIN — OXYCODONE HYDROCHLORIDE AND ACETAMINOPHEN 2 TABLET: 5; 325 TABLET ORAL at 13:08

## 2020-01-14 RX ADMIN — SENNOSIDES AND DOCUSATE SODIUM 2 TABLET: 8.6; 5 TABLET ORAL at 00:32

## 2020-01-14 RX ADMIN — Medication 1 TABLET: at 08:33

## 2020-01-14 RX ADMIN — METFORMIN HYDROCHLORIDE 500 MG: 500 TABLET ORAL at 17:21

## 2020-01-14 RX ADMIN — OXYCODONE HYDROCHLORIDE AND ACETAMINOPHEN 2 TABLET: 5; 325 TABLET ORAL at 17:21

## 2020-01-14 RX ADMIN — METFORMIN HYDROCHLORIDE 500 MG: 500 TABLET ORAL at 08:18

## 2020-01-14 RX ADMIN — IBUPROFEN 800 MG: 800 TABLET, FILM COATED ORAL at 13:08

## 2020-01-14 RX ADMIN — IBUPROFEN 800 MG: 800 TABLET, FILM COATED ORAL at 22:46

## 2020-01-14 RX ADMIN — OXYCODONE HYDROCHLORIDE AND ACETAMINOPHEN 2 TABLET: 5; 325 TABLET ORAL at 08:18

## 2020-01-14 RX ADMIN — OXYCODONE HYDROCHLORIDE AND ACETAMINOPHEN 2 TABLET: 5; 325 TABLET ORAL at 03:37

## 2020-01-14 RX ADMIN — IBUPROFEN 800 MG: 800 TABLET, FILM COATED ORAL at 03:02

## 2020-01-14 RX ADMIN — ENOXAPARIN SODIUM 135 MG: 150 INJECTION SUBCUTANEOUS at 08:18

## 2020-01-14 RX ADMIN — SENNOSIDES AND DOCUSATE SODIUM 2 TABLET: 8.6; 5 TABLET ORAL at 22:46

## 2020-01-14 ASSESSMENT — PAIN SCALES - GENERAL
PAINLEVEL_OUTOF10: 6
PAINLEVEL_OUTOF10: 7

## 2020-01-14 NOTE — PROGRESS NOTES
121/74   Pulse: 118 116 115 106   Resp: 16 18 18 18   Temp: 98.4 °F (36.9 °C)   98.7 °F (37.1 °C)   TempSrc: Oral   Oral   SpO2:       Weight:       Height:             Urine Input & Output last 24hrs:   No intake or output data in the 24 hours ending 01/14/20 5450       Physical Exam:   General:  no apparent distress, alert and cooperative  Neurologic:  alert, oriented, normal speech, no focal findings or movement disorder noted  Lungs:  No increased work of breathing, good air exchange, clear to auscultation bilaterally, no crackles or wheezing  Heart:  regular rate and rhythm    Abdomen: abdomen soft, non-distended, non-tender  Fundus: non-tender, normal size, firm, below umbilicus  Extremities:  no calf tenderness, non edematous  Incision: Prevena wound vac clean, dry and intact with good seal     Labs:  Lab Results   Component Value Date    WBC 11.7 (H) 01/11/2020    HGB 9.8 (L) 01/12/2020    HCT 30.7 (L) 01/12/2020    MCV 86.3 01/11/2020     01/11/2020       Assessment/Plan:  1. Onel Tomas is a [id number] POD # 3 s/p PLTCS        - Doing well, VSS               - male infant in NICU, circumcision desired              - Encourage ambulation and use of incentive spirometer  2. Rh positive/Rubella immune  3. Breast pumping              - Denies s/s of mastitis   4. Hx of pulmonary embolism in pregnancy               - Lovenox 135 mg BID x3 months   5. Gestational vs pregestational DM               - Carb control diet               - S/p IM consult    - Metformin 500 BID    - POC glucose AC/HS  1. Continue post-op care. Counseling Completed:  Secondary Smoke risks and Sudden Infant Death Syndrome were reviewed with recommendations. Infant sleeping, \"back to sleep\" and avoidance of co-sleeping recommendations were reviewed. Signs and Symptoms of Post Partum Depression were reviewed. The patient is to call if any occur. Signs and symptoms of Mastitis were reviewed.  The patient is to call if any occur for

## 2020-01-14 NOTE — LACTATION NOTE
Mom spent a few hours in nicu, had lunch and now ready to pump for first time today. Set mom up with 27 mm flanges, reviewed initiation phase, cleaning of pump parts and may need to switch to 30 mm after a couple of pumps. Labels and cleaning supplies brought to bedside. Mom plans to take EBM to nicu for 5 pm feed. Reminded of importance of q 3 hours pumping. New schedule on white board.

## 2020-01-15 VITALS
HEIGHT: 63 IN | OXYGEN SATURATION: 100 % | WEIGHT: 293 LBS | BODY MASS INDEX: 51.91 KG/M2 | RESPIRATION RATE: 18 BRPM | TEMPERATURE: 98.2 F | HEART RATE: 106 BPM | SYSTOLIC BLOOD PRESSURE: 106 MMHG | DIASTOLIC BLOOD PRESSURE: 72 MMHG

## 2020-01-15 LAB
GLUCOSE BLD-MCNC: 131 MG/DL (ref 65–105)
GLUCOSE BLD-MCNC: 75 MG/DL (ref 65–105)

## 2020-01-15 PROCEDURE — 6370000000 HC RX 637 (ALT 250 FOR IP): Performed by: STUDENT IN AN ORGANIZED HEALTH CARE EDUCATION/TRAINING PROGRAM

## 2020-01-15 PROCEDURE — 6360000002 HC RX W HCPCS: Performed by: STUDENT IN AN ORGANIZED HEALTH CARE EDUCATION/TRAINING PROGRAM

## 2020-01-15 PROCEDURE — 82947 ASSAY GLUCOSE BLOOD QUANT: CPT

## 2020-01-15 PROCEDURE — 6370000000 HC RX 637 (ALT 250 FOR IP): Performed by: INTERNAL MEDICINE

## 2020-01-15 RX ORDER — BLOOD-GLUCOSE METER
1 KIT MISCELLANEOUS DAILY
Qty: 1 KIT | Refills: 0 | Status: SHIPPED | OUTPATIENT
Start: 2020-01-15 | End: 2020-08-24

## 2020-01-15 RX ORDER — LANCETS 30 GAUGE
1 EACH MISCELLANEOUS 4 TIMES DAILY
Qty: 300 EACH | Refills: 1 | Status: SHIPPED | OUTPATIENT
Start: 2020-01-15 | End: 2020-08-24

## 2020-01-15 RX ORDER — GLUCOSAMINE HCL/CHONDROITIN SU 500-400 MG
CAPSULE ORAL
Qty: 100 STRIP | Refills: 0 | Status: SHIPPED | OUTPATIENT
Start: 2020-01-15 | End: 2020-08-24

## 2020-01-15 RX ADMIN — OXYCODONE HYDROCHLORIDE AND ACETAMINOPHEN 2 TABLET: 5; 325 TABLET ORAL at 12:46

## 2020-01-15 RX ADMIN — IBUPROFEN 800 MG: 800 TABLET, FILM COATED ORAL at 06:58

## 2020-01-15 RX ADMIN — OXYCODONE HYDROCHLORIDE AND ACETAMINOPHEN 1 TABLET: 5; 325 TABLET ORAL at 06:58

## 2020-01-15 RX ADMIN — ENOXAPARIN SODIUM 135 MG: 150 INJECTION SUBCUTANEOUS at 08:48

## 2020-01-15 RX ADMIN — Medication 1 TABLET: at 08:49

## 2020-01-15 RX ADMIN — METFORMIN HYDROCHLORIDE 500 MG: 500 TABLET ORAL at 08:49

## 2020-01-15 ASSESSMENT — PAIN SCALES - GENERAL
PAINLEVEL_OUTOF10: 6
PAINLEVEL_OUTOF10: 7

## 2020-01-15 NOTE — PROGRESS NOTES
POST OPERATIVE DAY # 4    Wilmer Vallejo is a 42 y.o. female   This patient was seen and examined today. Her pregnancy was complicated by:   Patient Active Problem List   Diagnosis    Chronic low back pain     Hx migraines     GERD    Morbid obesity with BMI of 50.0-59.9, adult (HCC)    Pre-diabetes    Low antithrombin III x1, repeat WNL    AMA    Obesity affecting pregnancy in first trimester    Inflammatory polyarthropathy (HCC)    Lumbar radiculopathy    Lumbar spondylosis    Muscle pain    ADD    Hidradenitis    Pulmonary embolism affecting pregnancy in second trimester    Elev early 1hr, nml 3hr     Recurrent pregnancy loss    Abnormal 1st trimester screen (Tri 21 1:117)--NIPT nml     Echogenic focus of heart of fetus affecting antepartum care of mother    Anticardiolipin antibody low positive x1 (26.9 on 3/19/19    Elevated BP without diagnosis of hypertension    Rh+/RI/GBSunk    Fetal heart rate decelerations affecting management of mother    Anticardiolipin antibody affecting pregnancy, antepartum    Multigravida of advanced maternal age in third trimester    Obesity affecting pregnancy in third trimester    Celestone 1/9 & 1/10    Elevated umbilical artery dopplers     Umbilical cord complication    PLTCS 1/98/73 M Apg 3/6/8 Wt 4#11    Fetal ascites    Impaired glucose regulation with features of insulin resistance       Today she is doing well without any chief complaint. Her lochia is light. She denies chest pain, shortness of breath, headache and blurred vision. She is  breast feeding and she denies any breast tenderness. She is ambulating well. Her voiding pattern is normal. I reviewed signs and symptoms of post partum depression with the patient, she currently denies any of these symptoms. She is tolerating solids. Flatus present. Bowel movement present.     Vital Signs:  Vitals:    01/13/20 1623 01/13/20 2000 01/14/20 0800 01/14/20 2000   BP: 116/85 121/74 (!) 91/51 128/83   Pulse: 115 106 91 106   Resp: 18 18 18 18   Temp:  98.7 °F (37.1 °C) 98.1 °F (36.7 °C) 98.2 °F (36.8 °C)   TempSrc:  Oral Oral Oral   SpO2:       Weight:       Height:             Urine Input & Output last 24hrs:   No intake or output data in the 24 hours ending 01/15/20 0122    Physical Exam:   General:  no apparent distress, alert and cooperative  Neurologic:  alert, oriented, normal speech, no focal findings or movement disorder noted  Lungs:  No increased work of breathing, good air exchange, clear to auscultation bilaterally, no crackles or wheezing  Heart:  regular rate and rhythm    Abdomen: abdomen soft, non-distended, non-tender  Fundus: non-tender, normal size, firm, below umbilicus  Extremities:  no calf tenderness, non edematous  Incision: Prevena wound vac clean, dry and intact with good seal     Labs:  Lab Results   Component Value Date    WBC 11.7 (H) 01/11/2020    HGB 9.8 (L) 01/12/2020    HCT 30.7 (L) 01/12/2020    MCV 86.3 01/11/2020     01/11/2020       Assessment/Plan:  1. Hallie Self is a [id number] POD # 4 s/p PLTCS        - Doing well, VSS               - male infant in NICU, circumcision desired              - Encourage ambulation and use of incentive spirometer  2. Rh positive/Rubella immune  3. Breast pumping              - Denies s/s of mastitis   4. Hx of pulmonary embolism in pregnancy               - Lovenox 135 mg BID x3 months   5. Gestational vs pregestational DM               - Carb control diet               - S/p IM consult               - Metformin 500 BID    - POC glucose AC/HS   - Follow up with IM   6. Continue post-op care. Counseling Completed:  Secondary Smoke risks and Sudden Infant Death Syndrome were reviewed with recommendations. Infant sleeping, \"back to sleep\" and avoidance of co-sleeping recommendations were reviewed. Signs and Symptoms of Post Partum Depression were reviewed. The patient is to call if any occur.   Signs and symptoms of Mastitis were reviewed. The patient is to call if any occur for follow up.   Discharge instructions including pelvic rest, incision care, 15 lb weight restriction, no driving with pain medicine and office follow-up were reviewed with patient     Attending Physician: Dr. Alayna Ferrari DO  Ob/Gyn Resident  1/15/2020, 1:22 AM

## 2020-01-16 LAB — SURGICAL PATHOLOGY REPORT: NORMAL

## 2020-01-16 NOTE — PROGRESS NOTES
CLINICAL PHARMACY NOTE: MEDS TO [address] Select Patient?: Yes  Total # of Prescriptions Filled: 1   The following medications were delivered to the patient:  · Enoxaparin 150mg /ml   Total # of Interventions Completed: 1  Time Spent (min): 60    Additional Documentation:We delivered the balance that we owed from 1/15/20 (50 ml)

## 2020-01-17 ENCOUNTER — NURSE ONLY (OUTPATIENT)
Dept: OBGYN CLINIC | Age: 40
End: 2020-01-17

## 2020-01-17 PROCEDURE — 99024 POSTOP FOLLOW-UP VISIT: CPT | Performed by: OBSTETRICS & GYNECOLOGY

## 2020-01-17 NOTE — PROGRESS NOTES
Bessy removal- nurse visit only. Incision looked great. Bandage was removed, incision cleaned and steri strips placed. Slight pain on the right side but insicion was closed and healing. She feels good, no complaints. Left hospital today- baby is in NICU at 's.  She will come back in for her postpartum and incision check with Dr. Daniel Chiu.

## 2020-01-21 ENCOUNTER — OFFICE VISIT (OUTPATIENT)
Dept: DERMATOLOGY | Age: 40
End: 2020-01-21
Payer: MEDICARE

## 2020-01-21 VITALS
HEART RATE: 93 BPM | HEIGHT: 63 IN | DIASTOLIC BLOOD PRESSURE: 77 MMHG | OXYGEN SATURATION: 98 % | BODY MASS INDEX: 50.14 KG/M2 | SYSTOLIC BLOOD PRESSURE: 118 MMHG | WEIGHT: 283 LBS

## 2020-01-21 PROCEDURE — G8427 DOCREV CUR MEDS BY ELIG CLIN: HCPCS | Performed by: DERMATOLOGY

## 2020-01-21 PROCEDURE — G8417 CALC BMI ABV UP PARAM F/U: HCPCS | Performed by: DERMATOLOGY

## 2020-01-21 PROCEDURE — 1111F DSCHRG MED/CURRENT MED MERGE: CPT | Performed by: DERMATOLOGY

## 2020-01-21 PROCEDURE — G8484 FLU IMMUNIZE NO ADMIN: HCPCS | Performed by: DERMATOLOGY

## 2020-01-21 PROCEDURE — 99202 OFFICE O/P NEW SF 15 MIN: CPT | Performed by: DERMATOLOGY

## 2020-01-21 PROCEDURE — 1036F TOBACCO NON-USER: CPT | Performed by: DERMATOLOGY

## 2020-01-21 RX ORDER — AMMONIUM LACTATE 12 G/100G
LOTION TOPICAL
Qty: 222 ML | Refills: 11 | Status: SHIPPED | OUTPATIENT
Start: 2020-01-21 | End: 2020-01-24 | Stop reason: SDUPTHER

## 2020-01-21 NOTE — PATIENT INSTRUCTIONS
1. Apply Amlactin daily  2. Use and over-the-counter antihistamine or allergy nasal spray        *Acanthosis nigricans is a brown to black, poorly defined, velvety hyperpigmentation of the skin. It is usually found in body folds, such as the posterior and lateral folds of the neck, the armpits, groin, navel, forehead and other areas. *Allergic shiners are dark circles under the eyes caused by congestion of the nose and sinuses. They're usually described as dark, shadowy pigments that resemble bruises. *Dermatosis papulosa nigra (DPN) is a benign epidermal growth that presents as hyperpigmented or skin-colored papules that develop on the face and neck beginning in adolescence.

## 2020-01-22 NOTE — PROGRESS NOTES
Action is as Follows:  Assessment 1. Acanthosis nigricans  - Education and discussion of association with insulin resistance  - Amlactin    2. Allergic shiners  - Trial of OTC antihistamine or flonase    3. Dermatosis papulosa nigra  - Education          Patient Instructions   1. Apply Amlactin daily  2. Use and over-the-counter antihistamine or allergy nasal spray        *Acanthosis nigricans is a brown to black, poorly defined, velvety hyperpigmentation of the skin. It is usually found in body folds, such as the posterior and lateral folds of the neck, the armpits, groin, navel, forehead and other areas. *Allergic shiners are dark circles under the eyes caused by congestion of the nose and sinuses. They're usually described as dark, shadowy pigments that resemble bruises. *Dermatosis papulosa nigra (DPN) is a benign epidermal growth that presents as hyperpigmented or skin-colored papules that develop on the face and neck beginning in adolescence. Photo surveillance performed: No    Follow-up: PRN    This note was created with the assistance of aspeech-recognition program.  Although the intention is to generate a document that actually reflects thecontent of the visit, no guarantees can be provided that every mistake has been identified and corrected by editing.     Electronically signed by Elvin Vora MD on 1/22/20 at 8:02 AM

## 2020-01-24 ENCOUNTER — TELEPHONE (OUTPATIENT)
Dept: OBGYN CLINIC | Age: 40
End: 2020-01-24

## 2020-01-24 RX ORDER — AMMONIUM LACTATE 12 G/100G
LOTION TOPICAL
Qty: 1 BOTTLE | Refills: 0 | Status: SHIPPED | OUTPATIENT
Start: 2020-01-24 | End: 2021-06-17

## 2020-01-24 NOTE — TELEPHONE ENCOUNTER
Prenatal vitamins signed. What is the lidocaine for? I am assuming it is from her appointment with Dr. Raul Brown. I have written for Amlactin as recommended in his note.

## 2020-01-31 ENCOUNTER — POSTPARTUM VISIT (OUTPATIENT)
Dept: OBGYN CLINIC | Age: 40
End: 2020-01-31

## 2020-01-31 VITALS — BODY MASS INDEX: 48.89 KG/M2 | WEIGHT: 276 LBS | SYSTOLIC BLOOD PRESSURE: 120 MMHG | DIASTOLIC BLOOD PRESSURE: 72 MMHG

## 2020-01-31 PROCEDURE — 99024 POSTOP FOLLOW-UP VISIT: CPT | Performed by: OBSTETRICS & GYNECOLOGY

## 2020-01-31 NOTE — PROGRESS NOTES
Tala Castillo  12:52 PM  20    The patient was seen. She has no chief complaints today. She delivered by  section on 20. She is  breast feeding and there is not any signs or symptoms of mastitis. The patient completed the E.P.D.S. Evaluation form and scored 7. She does not have any signs or symptoms of post partum depression. She denies any suicidal thoughts with a plan, intent to harm others and delusional ideas. Today her lochia is light she denies any dizziness or shortness of breath.       Her pregnancy was complicated by:   Patient Active Problem List    Diagnosis Date Noted    Impaired glucose regulation with features of insulin resistance 2020    PLTCS 20 M Apg 3/6/8 Wt 4#11 2020    Fetal ascites     Elevated umbilical artery dopplers      Umbilical cord complication     Celestone  & 1/10 2020    Fetal heart rate decelerations affecting management of mother     Anticardiolipin antibody affecting pregnancy, antepartum     Multigravida of advanced maternal age in third trimester     Obesity affecting pregnancy in third trimester     Rh+/RI/GBSunk 2020    Pulmonary embolism affecting pregnancy in second trimester 2019    Elev early 1hr, nml 3hr  2019     Overview Note:     1 elevated value, [facility] recommends repeat 3hr GTT in 2 weeks      Recurrent pregnancy loss 2019    Abnormal 1st trimester screen (Tri 21 1:117)--NIPT nml  2019    Echogenic focus of heart of fetus affecting antepartum care of mother 2019    Anticardiolipin antibody low positive x1 (26.9 on 3/19/19 2019    Elevated BP without diagnosis of hypertension 2019    ADD 2019    Inflammatory polyarthropathy ([address] 2019    Lumbar radiculopathy 2019    Lumbar spondylosis 2019    Muscle pain 2019    Obesity affecting pregnancy in first trimester 2018    AMA 10/10/2018    Low antithrombin III

## 2020-02-03 ENCOUNTER — HOSPITAL ENCOUNTER (OUTPATIENT)
Facility: MEDICAL CENTER | Age: 40
Discharge: HOME | End: 2020-02-03
Payer: MEDICARE

## 2020-02-03 ENCOUNTER — OFFICE VISIT (OUTPATIENT)
Dept: ONCOLOGY | Age: 40
End: 2020-02-03
Payer: MEDICARE

## 2020-02-03 ENCOUNTER — TELEPHONE (OUTPATIENT)
Dept: ONCOLOGY | Age: 40
End: 2020-02-03

## 2020-02-03 VITALS
SYSTOLIC BLOOD PRESSURE: 116 MMHG | BODY MASS INDEX: 48.64 KG/M2 | TEMPERATURE: 98.6 F | RESPIRATION RATE: 16 BRPM | WEIGHT: 274.6 LBS | HEART RATE: 97 BPM | DIASTOLIC BLOOD PRESSURE: 79 MMHG

## 2020-02-03 DIAGNOSIS — O88.212 PULMONARY EMBOLISM AFFECTING PREGNANCY IN SECOND TRIMESTER: ICD-10-CM

## 2020-02-03 LAB
ABSOLUTE EOS #: 0.38 K/UL (ref 0–0.44)
ABSOLUTE IMMATURE GRANULOCYTE: 0.07 K/UL (ref 0–0.3)
ABSOLUTE LYMPH #: 1.75 K/UL (ref 1.1–3.7)
ABSOLUTE MONO #: 0.34 K/UL (ref 0.1–1.2)
BASOPHILS # BLD: 0 % (ref 0–2)
BASOPHILS ABSOLUTE: <0.03 K/UL (ref 0–0.2)
DIFFERENTIAL TYPE: ABNORMAL
EOSINOPHILS RELATIVE PERCENT: 4 % (ref 1–4)
HCT VFR BLD CALC: 37.8 % (ref 36.3–47.1)
HEMOGLOBIN: 11.7 G/DL (ref 11.9–15.1)
IMMATURE GRANULOCYTES: 1 %
LYMPHOCYTES # BLD: 18 % (ref 24–43)
MCH RBC QN AUTO: 25.9 PG (ref 25.2–33.5)
MCHC RBC AUTO-ENTMCNC: 31 G/DL (ref 28.4–34.8)
MCV RBC AUTO: 83.6 FL (ref 82.6–102.9)
MONOCYTES # BLD: 4 % (ref 3–12)
NRBC AUTOMATED: 0 PER 100 WBC
PDW BLD-RTO: 14.2 % (ref 11.8–14.4)
PLATELET # BLD: 448 K/UL (ref 138–453)
PLATELET ESTIMATE: ABNORMAL
PMV BLD AUTO: 9.3 FL (ref 8.1–13.5)
RBC # BLD: 4.52 M/UL (ref 3.95–5.11)
RBC # BLD: ABNORMAL 10*6/UL
SEG NEUTROPHILS: 73 % (ref 36–65)
SEGMENTED NEUTROPHILS ABSOLUTE COUNT: 7.24 K/UL (ref 1.5–8.1)
WBC # BLD: 9.8 K/UL (ref 3.5–11.3)
WBC # BLD: ABNORMAL 10*3/UL

## 2020-02-03 PROCEDURE — 99214 OFFICE O/P EST MOD 30 MIN: CPT | Performed by: INTERNAL MEDICINE

## 2020-02-03 PROCEDURE — G8484 FLU IMMUNIZE NO ADMIN: HCPCS | Performed by: INTERNAL MEDICINE

## 2020-02-03 PROCEDURE — G8417 CALC BMI ABV UP PARAM F/U: HCPCS | Performed by: INTERNAL MEDICINE

## 2020-02-03 PROCEDURE — G8427 DOCREV CUR MEDS BY ELIG CLIN: HCPCS | Performed by: INTERNAL MEDICINE

## 2020-02-03 PROCEDURE — 85025 COMPLETE CBC W/AUTO DIFF WBC: CPT

## 2020-02-03 PROCEDURE — 1111F DSCHRG MED/CURRENT MED MERGE: CPT | Performed by: INTERNAL MEDICINE

## 2020-02-03 PROCEDURE — 1036F TOBACCO NON-USER: CPT | Performed by: INTERNAL MEDICINE

## 2020-02-03 PROCEDURE — 99211 OFF/OP EST MAY X REQ PHY/QHP: CPT | Performed by: INTERNAL MEDICINE

## 2020-02-03 PROCEDURE — 36415 COLL VENOUS BLD VENIPUNCTURE: CPT

## 2020-02-03 NOTE — PROGRESS NOTES
Onel Tomas                                                                                                                  2/3/2020  MRN:   [id number]  YOB: 1980  PCP:                           Margarette Muniz MD  Referring Physician: No ref. 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 o.female is a patient with chief complaint of shortness of breath. Patient is 26-week pregnant. Presented with pelvic pain urinary frequency and dyspnea for about 2 days. Patient underwent CT chest which showed nonobstructive small left lower lobe pulmonary embolism. Patient denies any previous history of blood clots. Patient has had thrombophilia work-up done due to multiple pregnancy losses in the past which has not been clinically significant. Patient has been started on Lovenox which she is tolerating well. Patient has BMI of 51. Initial testing showed anticardiolipin IgG antibody to be 26.9 and in 12/2019 dropped to 3.7      Interim History: The patient presents for follow up today for thromboembolism. She reports she delivered prematurely at 32 weeks due to low fetal heart rate and circulation issue with the umbilical cord. She continues to take Lovenox as directed. Her RA is not well controlled with Cimzia. She has very minimal bruising around finger nails. She has enough Lovenox to last for 6 weeks after delivery. During this visit patient's allergy, social, medical, surgical history and medications were reviewed and updated.     Past Medical History:   Past Medical History:   Diagnosis Date    ADD (attention deficit disorder) without hyperactivity     Chronic back pain     GERD (gastroesophageal reflux disease)     Headache     Hypertension     Lumbar radiculopathy 2019    Lumbar spondylosis 2019    Obesity     Rheumatoid arteritis ([facility] [address]        Past Surgical History:     Past Surgical History:   Procedure Laterality Date     SECTION Bilateral 2020     SECTION performed by Dominik Haro DO at [facility] L[facility]D OR    OTHER SURGICAL HISTORY  12/10/2018    Fluoroscopy- with esophagas- possible delay of gastric emptying     TONSILLECTOMY         Patient Family Social History:     Social History     Socioeconomic History    Marital status:      Spouse name: None    Number of children: None    Years of education: None    Highest education level: None   Occupational History    None   Social Needs    Financial resource strain: None    Food insecurity:     Worry: None     Inability: None    Transportation needs:     Medical: None     Non-medical: None   Tobacco Use    Smoking status: Never Smoker    Smokeless tobacco: Never Used   Substance and Sexual Activity    Alcohol use: No    Drug use: No    Sexual activity: Yes     Partners: Male   Lifestyle    Physical activity:     Days per week: None     Minutes per session: None    Stress: None   Relationships    Social connections:     Talks on phone: None     Gets together: None     Attends Congregational service: None     Active member of club or organization: None     Attends meetings of clubs or organizations: None     Relationship status: None    Intimate partner violence:     Fear of current or ex partner: None     Emotionally abused: None     Physically abused: None     Forced sexual activity: None   Other Topics Concern    None   Social History Narrative    None       Family History   Problem Relation Age of Onset    Elevated Lipids Mother        Current Medications: Current Outpatient Medications   Medication Sig Dispense Refill    Prenatal Multivit-Min-Fe-FA (PRENATAL VITAMINS) 0.8 MG TABS Take 1 tablet by mouth daily 30 tablet 12    ammonium lactate (LAC-HYDRIN) 12 % lotion Apply topically daily. 1 Bottle 0    glucose monitoring kit (FREESTYLE) monitoring kit 1 kit by Does not apply route daily Check blood sugar 4 times per day, before meals and at bedtime. 1 kit 0    blood glucose monitor strips Test 4 times a day & as needed for symptoms of irregular blood glucose. 100 strip 0    Lancets MISC 1 each by Does not apply route 4 times daily 300 each 1    metFORMIN (GLUCOPHAGE) 500 MG tablet Take 1 tablet by mouth 2 times daily (with meals) 60 tablet 3    ibuprofen (ADVIL;MOTRIN) 800 MG tablet Take 1 tablet by mouth every 8 hours as needed for Pain 30 tablet 1    docusate sodium (COLACE) 100 MG capsule Take 1 capsule by mouth 2 times daily 60 capsule 1    ferrous sulfate (FE TABS) 325 (65 Fe) MG EC tablet Take 1 tablet by mouth 2 times daily 90 tablet 3    enoxaparin (LOVENOX) 150 MG/ML injection Inject 0.93 mLs into the skin 2 times daily 167.4 mL 0    albuterol (PROVENTIL) (2.5 MG/3ML) 0.083% nebulizer solution Take 3 mLs by nebulization daily as needed for Wheezing 120 each 3    lidocaine-prilocaine (EMLA) 2.5-2.5 % cream Apply topically as needed. 1 Tube 1    UNKNOWN TO PATIENT Nasal spray. For inflammation      Cholecalciferol (VITAMIN D) 2000 units CAPS capsule Take by mouth      CIMZIA STARTER KIT 6 X 200 MG/ML KIT Indications: Arthritis       folic acid (FOLVITE) 1 MG tablet Take 1 mg by mouth daily       No current facility-administered medications for this visit. Allergies:   Iodine    Review of Systems:    Constitutional: No fever or chills.  No night sweats, no weight loss   Eyes: No eye discharge, double vision, or eye pain   HEENT: negative for sore mouth, sore throat, hoarseness and voice change   Respiratory: negative for cough , sputum, dyspnea, wheezing, hemoptysis, chest pain   Cardiovascular: negative for chest pain, dyspnea, palpitations, orthopnea, PND   Gastrointestinal: negative for nausea, vomiting, diarrhea, constipation, abdominal pain, Dysphagia, hematemesis and hematochezia   Genitourinary: negative for frequency, dysuria, nocturia, urinary incontinence, and hematuria   Integument: negative for rash, skin lesions, bruises.    Hematologic/Lymphatic: negative for easy bruising, bleeding, lymphadenopathy, or petechiae   Endocrine: negative for heat or cold intolerance,weight changes, change in bowel habits and hair loss   Musculoskeletal: negative for myalgias, arthralgias, pain, joint swelling,and bone pain   Neurological: negative for headaches, dizziness, seizures, weakness, numbness        Physical Exam:    Vitals: /79 (Site: Left Upper Arm, Position: Sitting, Cuff Size: Medium Adult)   Pulse 97   Temp 98.6 °F (37 °C) (Temporal)   Resp 16   Wt 274 lb 9.6 oz (124.6 kg)   LMP 05/27/2019 (Exact Date)   BMI 48.64 kg/m²   General appearance - well appearing, no in pain or distress  Mental status - AAO X3  Eyes - pupils equal and reactive, extraocular eye movements intact  Mouth - mucous membranes moist, pharynx normal without lesions  Neck - supple, no significant adenopathy  Lymphatics - no palpable lymphadenopathy, no hepatosplenomegaly  Chest - clear to auscultation, no wheezes, rales or rhonchi, symmetric air entry  Heart - normal rate, regular rhythm, normal S1, S2, no murmurs  Abdomen - soft, nontender, nondistended, no masses or organomegaly  Neurological - alert, oriented, normal speech, no focal findings or movement disorder noted  Extremities - peripheral pulses normal, no pedal edema, no clubbing or cyanosis  Skin - normal coloration and turgor, no rashes, no suspicious skin lesions noted  - small bruising around nails      DATA:    Results for orders placed or performed during the hospital encounter of 01/08/20 STREP B SCREEN, VAGINAL / RECTAL   Result Value Ref Range    Specimen Description . VAGINA     Special Requests NOT REPORTED     Culture NEGATIVE FOR GROUP B STREPTOCOCCI    CBC   Result Value Ref Range    WBC 12.8 (H) 3.5 - 11.3 k/uL    RBC 4.23 3.95 - 5.11 m/uL    Hemoglobin 11.1 (L) 11.9 - 15.1 g/dL    Hematocrit 34.8 (L) 36.3 - 47.1 %    MCV 82.3 (L) 82.6 - 102.9 fL    MCH 26.2 25.2 - 33.5 pg    MCHC 31.9 28.4 - 34.8 g/dL    RDW 14.9 (H) 11.8 - 14.4 %    Platelets 409 126 - 777 k/uL    MPV 10.0 8.1 - 13.5 fL    NRBC Automated 0.0 0.0 per 100 WBC   T. PALLIDUM AB   Result Value Ref Range    T. pallidum, IgG NONREACTIVE NONREACTIVE   PROTIME-INR   Result Value Ref Range    Protime 9.4 9.0 - 12.0 sec    INR 0.9    APTT   Result Value Ref Range    PTT 23.3 20.5 - 30.5 sec   T.  PALLIDUM AB   Result Value Ref Range    T. pallidum, IgG NONREACTIVE NONREACTIVE   CBC Auto Differential   Result Value Ref Range    WBC 11.7 (H) 3.5 - 11.3 k/uL    RBC 4.08 3.95 - 5.11 m/uL    Hemoglobin 11.0 (L) 11.9 - 15.1 g/dL    Hematocrit 35.2 (L) 36.3 - 47.1 %    MCV 86.3 82.6 - 102.9 fL    MCH 27.0 25.2 - 33.5 pg    MCHC 31.3 28.4 - 34.8 g/dL    RDW 14.8 (H) 11.8 - 14.4 %    Platelets 351 944 - 668 k/uL    MPV 10.6 8.1 - 13.5 fL    NRBC Automated 0.0 0.0 per 100 WBC    Differential Type NOT REPORTED     Seg Neutrophils 74 (H) 36 - 65 %    Lymphocytes 17 (L) 24 - 43 %    Monocytes 7 3 - 12 %    Eosinophils % 0 (L) 1 - 4 %    Basophils 0 0 - 2 %    Immature Granulocytes 2 (H) 0 %    Segs Absolute 8.60 (H) 1.50 - 8.10 k/uL    Absolute Lymph # 1.97 1.10 - 3.70 k/uL    Absolute Mono # 0.78 0.10 - 1.20 k/uL    Absolute Eos # 0.03 0.00 - 0.44 k/uL    Basophils Absolute 0.03 0.00 - 0.20 k/uL    Absolute Immature Granulocyte 0.27 0.00 - 0.30 k/uL    WBC Morphology NOT REPORTED     RBC Morphology ANISOCYTOSIS PRESENT     Platelet Estimate NOT REPORTED    Urine Drug Screen   Result Value Ref Range    Amphetamine Screen, Ur NEGATIVE NEGATIVE Barbiturate Screen, Ur NEGATIVE NEGATIVE    Benzodiazepine Screen, Urine NEGATIVE NEGATIVE    Cocaine Metabolite, Urine NEGATIVE NEGATIVE    Methadone Screen, Urine NEGATIVE NEGATIVE    Opiates, Urine NEGATIVE NEGATIVE    Phencyclidine, Urine NEGATIVE NEGATIVE    Propoxyphene, Urine NOT REPORTED NEGATIVE    Cannabinoid Scrn, Ur NEGATIVE NEGATIVE    Oxycodone Screen, Ur NEGATIVE NEGATIVE    Methamphetamine, Urine NOT REPORTED NEGATIVE    Tricyclic Antidepressants, Urine NOT REPORTED NEGATIVE    MDMA, Urine NOT REPORTED NEGATIVE    Buprenorphine Urine NOT REPORTED NEGATIVE    Test Information       Assay provides medical screening only. The absence of expected drug(s) and/or metabolite(s) may indicate diluted or adulterated urine, limitations of testing or timing of collection. Hemoglobin and hematocrit, blood   Result Value Ref Range    Hemoglobin 9.8 (L) 11.9 - 15.1 g/dL    Hematocrit 30.7 (L) 36.3 - 47.1 %   Creatinine, Serum   Result Value Ref Range    CREATININE 0.35 (L) 0.50 - 0.90 mg/dL    GFR Non-African American >60 >60 mL/min    GFR African American >60 >60 mL/min    GFR Comment          GFR Staging NOT REPORTED    Surgical Pathology   Result Value Ref Range    Surgical Pathology Report       [id number]  [email or web address]  [facility]  ANATOMIC PATHOLOGY  [address] [place] [address]. [place], [address]  [phone]  Fax: [phone]    [facility]     Patient Name: Sarah Schneider: [id number]  Path Number: [id number]  Collected: 2020  Received: 2020  Reported: 2020 06:56    -- Diagnosis --  PLACENTA, 32- 5/7 WEEKS:   PLACENTA WITH UNREMARKABLE MEMBRANES  AND THREE-VESSEL UMBILICAL CORD.         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 She continues with Lovenox with no issue. I discussed factors that mitigate risk for recurrent clotting including regular exercise, avoiding prolonged periods of sitting, and weight loss. Recommend continuation of anticoagulation using Lovenox 6 weeks postpartum and then discontinuation of anticoagulation. Do not recommend thrombophilia work-up given patient venous thromboembolism was provoked. Patient was counseled on active lifestyle and encouraged to achieve ideal body weight. She may follow with PCP for further management. Return as needed. Kristen Whatley      This note is created with the assistance of a speech recognition program.  While intending to generate a document that actually reflects the content of the visit, the document can still have some errors including those of syntax and sound a like substitutions which may escape proof reading. It such instances, actual meaning can be extrapolated by contextual diversion.

## 2020-02-27 ENCOUNTER — POSTPARTUM VISIT (OUTPATIENT)
Dept: OBGYN CLINIC | Age: 40
End: 2020-02-27

## 2020-02-27 VITALS — DIASTOLIC BLOOD PRESSURE: 72 MMHG | BODY MASS INDEX: 47.65 KG/M2 | SYSTOLIC BLOOD PRESSURE: 126 MMHG | WEIGHT: 269 LBS

## 2020-02-27 PROCEDURE — 99024 POSTOP FOLLOW-UP VISIT: CPT | Performed by: OBSTETRICS & GYNECOLOGY

## 2020-02-27 NOTE — PROGRESS NOTES
low back pain  2017    Hx migraines  2017    GERD 2017    Morbid obesity with BMI of 50.0-59.9, adult ([facility] [address] 2017    Hidradenitis 2017         She does admit to having good home support. Her bowels are regular and she denies any urinary tract symptomology. OB History    Para Term  AB Living   7 3 2 1 4 3   SAB TAB Ectopic Molar Multiple Live Births   3 1 0 0 0 1           Blood pressure 126/72, weight 269 lb (122 kg), last menstrual period 2019, not currently breastfeeding. Abdomen: Soft and non-tender; good bowel sounds; no guarding, rebound or rigidity; no CVA tenderness bilaterally. Incision: Clean, Dry and Intact without signs or symptoms of infection. Extremities: No calf tenderness bilaterally. DTR 2/4 bilaterally. No edema. Assessment:   Diagnosis Orders   1. PLTCS 20 M Apg 3/6/8 Wt 4#11       Chief Complaint   Patient presents with    Postpartum Care       Plan:  1. Return to the office for annual exam  2. Signs & Symptoms of mastitis reviewed; notify if occurs  3. Secondary smoke risks reviewed. Increased risks of respiratory problems, Sudden     infant death syndrome, and potential malignancies. 4. Restrictions lifted  5.  Family planning counseling and STD counseling completed - referral placed for paragaurd, avoid estrogen history of VTE, progesterone gives headache

## 2020-03-06 ENCOUNTER — NURSE TRIAGE (OUTPATIENT)
Dept: OTHER | Facility: CLINIC | Age: 40
End: 2020-03-06

## 2020-03-11 ENCOUNTER — HOSPITAL ENCOUNTER (OUTPATIENT)
Dept: ULTRASOUND IMAGING | Facility: CLINIC | Age: 40
Discharge: HOME | End: 2020-03-13
Payer: MEDICARE

## 2020-03-11 ENCOUNTER — OFFICE VISIT (OUTPATIENT)
Dept: FAMILY MEDICINE CLINIC | Age: 40
End: 2020-03-11
Payer: MEDICARE

## 2020-03-11 VITALS
OXYGEN SATURATION: 96 % | HEART RATE: 97 BPM | HEIGHT: 63 IN | WEIGHT: 275 LBS | SYSTOLIC BLOOD PRESSURE: 118 MMHG | TEMPERATURE: 97.9 F | DIASTOLIC BLOOD PRESSURE: 83 MMHG | BODY MASS INDEX: 48.73 KG/M2

## 2020-03-11 PROBLEM — M47.817 SPONDYLOSIS WITHOUT MYELOPATHY OR RADICULOPATHY, LUMBOSACRAL REGION: Status: ACTIVE | Noted: 2019-04-16

## 2020-03-11 PROBLEM — E66.01 MORBID OBESITY (HCC): Status: ACTIVE | Noted: 2017-02-21

## 2020-03-11 PROBLEM — D68.61 ANTIPHOSPHOLIPID SYNDROME IN PREGNANCY (HCC): Status: ACTIVE | Noted: 2017-12-18

## 2020-03-11 PROBLEM — M05.9 RHEUMATOID ARTHRITIS WITH RHEUMATOID FACTOR, UNSPECIFIED (HCC): Status: ACTIVE | Noted: 2019-01-09

## 2020-03-11 PROBLEM — O99.119 ANTIPHOSPHOLIPID SYNDROME IN PREGNANCY (HCC): Status: ACTIVE | Noted: 2017-12-18

## 2020-03-11 PROBLEM — F90.0 ATTENTION DEFICIT HYPERACTIVITY DISORDER, PREDOMINANTLY INATTENTIVE TYPE: Status: ACTIVE | Noted: 2019-09-25

## 2020-03-11 PROCEDURE — 99213 OFFICE O/P EST LOW 20 MIN: CPT | Performed by: FAMILY MEDICINE

## 2020-03-11 PROCEDURE — 93971 EXTREMITY STUDY: CPT

## 2020-03-11 ASSESSMENT — ENCOUNTER SYMPTOMS
RESPIRATORY NEGATIVE: 1
GASTROINTESTINAL NEGATIVE: 1
EYES NEGATIVE: 1

## 2020-03-11 ASSESSMENT — PATIENT HEALTH QUESTIONNAIRE - PHQ9
SUM OF ALL RESPONSES TO PHQ QUESTIONS 1-9: 0
SUM OF ALL RESPONSES TO PHQ9 QUESTIONS 1 & 2: 0
2. FEELING DOWN, DEPRESSED OR HOPELESS: 0
SUM OF ALL RESPONSES TO PHQ QUESTIONS 1-9: 0
1. LITTLE INTEREST OR PLEASURE IN DOING THINGS: 0

## 2020-03-11 NOTE — PROGRESS NOTES
[address] PRIMARY CARE  [address] [facility] [address]  Dept: [phone]  Dept Fax: [phone]    Maren Russo is a 42 y.o. female who presents today for her medical conditions/complaints as noted below. Maren Russo is c/o of   Chief Complaint   Patient presents with    Establish Care    Hip Pain     left    Dizziness         HPI:     The patient complains of intermittent dizziness. The dizziness comes on at random every couple days. It is described as everything spinning around her. The patient will occasionally get white spots in her line of sight. She will also have intermittent episodes where she will forget words. This started before she was pregnant. She states the word drop occurs a couple of times a day. The patient also complains of left hip pain that began around 2 months. The patient states it happened after she had her son 2 months ago. The patient had a blood clot in her lungs. The patient does have some swelling in her left ankle that began within the last week. She is currently off of anticoagulation. Hip Pain    The incident occurred more than 1 week ago. The incident occurred at home. There was no injury mechanism. The pain is present in the left hip. The quality of the pain is described as shooting and stabbing. Pertinent negatives include no inability to bear weight, loss of motion, loss of sensation, muscle weakness, numbness or tingling. The symptoms are aggravated by movement and palpation. Dizziness   Associated symptoms include fatigue and headaches. Pertinent negatives include no numbness or weakness.        Hemoglobin A1C (%)   Date Value   12/02/2019 5.4   10/30/2018 5.9   03/19/2018 5.9             ( goal A1Cis < 7)   No results found for: LABMICR  LDL Cholesterol (mg/dL)   Date Value   08/02/2018 103   02/28/2017 117   04/01/2013 86       (goal LDL is <100)   AST (U/L)   Date Value   11/30/2019 13     ALT (U/L) Date Value   2019 10     BUN (mg/dL)   Date Value   2019 6     BP Readings from Last 3 Encounters:   20 118/83   20 126/72   20 116/79          (goal 120/80)    Past Medical History:   Diagnosis Date    ADD (attention deficit disorder) without hyperactivity     Chronic back pain     GERD (gastroesophageal reflux disease)     Headache     Hypertension     Lumbar radiculopathy 2019    Lumbar spondylosis 2019    Obesity     Rheumatoid arteritis ([address]       Past Surgical History:   Procedure Laterality Date     SECTION Bilateral 2020     SECTION performed by Sd Rodriguez DO at [facility] L[facility]D OR    OTHER SURGICAL HISTORY  12/10/2018    Fluoroscopy- with esophagas- possible delay of gastric emptying     TONSILLECTOMY         Family History   Problem Relation Age of Onset    Elevated Lipids Mother        Social History     Tobacco Use    Smoking status: Never Smoker    Smokeless tobacco: Never Used   Substance Use Topics    Alcohol use: No      Current Outpatient Medications   Medication Sig Dispense Refill    Prenatal Multivit-Min-Fe-FA (PRENATAL VITAMINS) 0.8 MG TABS Take 1 tablet by mouth daily 30 tablet 12    ammonium lactate (LAC-HYDRIN) 12 % lotion Apply topically daily. 1 Bottle 0    glucose monitoring kit (FREESTYLE) monitoring kit 1 kit by Does not apply route daily Check blood sugar 4 times per day, before meals and at bedtime. 1 kit 0    blood glucose monitor strips Test 4 times a day & as needed for symptoms of irregular blood glucose.  100 strip 0    Lancets MISC 1 each by Does not apply route 4 times daily 300 each 1    metFORMIN (GLUCOPHAGE) 500 MG tablet Take 1 tablet by mouth 2 times daily (with meals) 60 tablet 3    ibuprofen (ADVIL;MOTRIN) 800 MG tablet Take 1 tablet by mouth every 8 hours as needed for Pain 30 tablet 1    ferrous sulfate (FE TABS) 325 (65 Fe) MG EC tablet Take 1 tablet by mouth 2 times daily 90 tablet 3  albuterol (PROVENTIL) (2.5 MG/3ML) 0.083% nebulizer solution Take 3 mLs by nebulization daily as needed for Wheezing 120 each 3    Cholecalciferol (VITAMIN D) 2000 units CAPS capsule Take by mouth      CIMZIA STARTER KIT 6 X 200 MG/ML KIT Indications: Arthritis       folic acid (FOLVITE) 1 MG tablet Take 1 mg by mouth daily       No current facility-administered medications for this visit. Allergies   Allergen Reactions    Iodine Hives and Itching       Health Maintenance   Topic Date Due    Varicella vaccine (1 of 2 - 2-dose childhood series) 04/01/2020 (Originally 8/3/1981)    A1C test (Diabetic or Prediabetic)  12/02/2020    Cervical cancer screen  07/30/2024    DTaP/Tdap/Td vaccine (2 - Td) 02/21/2027    Shingles Vaccine (1 of 2) 08/03/2032    Flu vaccine  Completed    HIV screen  Completed    Hepatitis A vaccine  Aged Out    Hepatitis B vaccine  Aged Out    Hib vaccine  Aged Out    Meningococcal (ACWY) vaccine  Aged Out    Pneumococcal 0-64 years Vaccine  Aged Out       Subjective:     Review of Systems   Constitutional: Positive for fatigue. HENT: Negative. Eyes: Negative. Last eye exam has been within the last year   Respiratory: Negative. Cardiovascular: Negative. Gastrointestinal: Negative. Neurological: Positive for dizziness, speech difficulty and headaches. Negative for tingling, tremors, seizures, syncope, facial asymmetry, weakness, light-headedness and numbness. All other systems reviewed and are negative. Objective:     Physical Exam  Vitals signs reviewed. Constitutional:       Appearance: Normal appearance. She is obese. HENT:      Head: Normocephalic and atraumatic. Eyes:      Extraocular Movements: Extraocular movements intact. Pupils: Pupils are equal, round, and reactive to light. Cardiovascular:      Rate and Rhythm: Normal rate and regular rhythm. Pulses: Normal pulses. Heart sounds: Normal heart sounds.    Pulmonary: Patient given educational materials - see patient instructions. Discussed use, benefit, and side effects of prescribed medications. All patientquestions answered. Pt voiced understanding. Reviewed health maintenance. Instructedto continue current medications, diet and exercise. Patient agreed with treatmentplan. Follow up as directed.      Electronically signed by Demond Lala MD on 3/11/2020 at 1:00 PM

## 2020-03-11 NOTE — PATIENT INSTRUCTIONS
your top knee, but keep your feet together. Do not let your hips roll back. Your legs should open up like a clamshell. 3. Hold for 6 seconds. 4. Slowly lower your knee back down. Rest for 10 seconds. 5. Repeat 8 to 12 times. Follow-up care is a key part of your treatment and safety. Be sure to make and go to all appointments, and call your doctor if you are having problems. It's also a good idea to know your test results and keep a list of the medicines you take. Where can you learn more? Go to https://[email or web address]. org and sign in to your [employer] account. Enter E145 in the [employer] box to learn more about \"Hip Bursitis: Exercises. \"     If you do not have an account, please click on the \"Sign Up Now\" link. Current as of: June 26, 2019  Content Version: 12.3  © 8029-2806 Healthwise, Incorporated. Care instructions adapted under license by [facility] ([facility]). If you have questions about a medical condition or this instruction, always ask your healthcare professional. [place] [address] any warranty or liability for your use of this information.

## 2020-03-27 ENCOUNTER — HOSPITAL ENCOUNTER (OUTPATIENT)
Dept: MRI IMAGING | Age: 40
Discharge: HOME | End: 2020-03-29
Payer: MEDICARE

## 2020-03-27 PROCEDURE — 70551 MRI BRAIN STEM W/O DYE: CPT

## 2020-05-05 ENCOUNTER — TELEPHONE (OUTPATIENT)
Dept: OBGYN CLINIC | Age: 40
End: 2020-05-05

## 2020-05-06 ENCOUNTER — HOSPITAL ENCOUNTER (OUTPATIENT)
Age: 40
Setting detail: SPECIMEN
Discharge: HOME | End: 2020-05-06
Payer: MEDICARE

## 2020-05-06 LAB — HCG QUANTITATIVE: <1 IU/L

## 2020-05-07 ENCOUNTER — PROCEDURE VISIT (OUTPATIENT)
Dept: OBGYN CLINIC | Age: 40
End: 2020-05-07
Payer: MEDICARE

## 2020-05-07 ENCOUNTER — HOSPITAL ENCOUNTER (OUTPATIENT)
Age: 40
Setting detail: SPECIMEN
Discharge: HOME | End: 2020-05-07
Payer: MEDICARE

## 2020-05-07 VITALS
WEIGHT: 276.13 LBS | HEIGHT: 63 IN | SYSTOLIC BLOOD PRESSURE: 116 MMHG | BODY MASS INDEX: 48.93 KG/M2 | RESPIRATION RATE: 16 BRPM | DIASTOLIC BLOOD PRESSURE: 74 MMHG

## 2020-05-07 PROBLEM — Z97.5 IUD (INTRAUTERINE DEVICE) IN PLACE: Status: ACTIVE | Noted: 2020-05-07

## 2020-05-07 LAB
DIRECT EXAM: NORMAL
Lab: NORMAL
SPECIMEN DESCRIPTION: NORMAL

## 2020-05-07 PROCEDURE — 58300 INSERT INTRAUTERINE DEVICE: CPT | Performed by: NURSE PRACTITIONER

## 2020-05-07 NOTE — PROGRESS NOTES
Department if she experiences Abdominal Pain, Temperatures more than 101 F, Odiferous Vaginal Discharge, Dizziness or Shortness of breath. ASSESSMENT:  IUD insertion    PLAN:  1.  IUD counseling was done with [facility]. The  Mirena booklet was given to the patient. The consent was signed and scanned into the chart. The Lot # is charted on the CC. [facility] was given the IUD identification card. 2.  IUD warning signs and symptoms were reviewed with [facility]. 3.  Post-insertion instructions were also given   4. She will return in 1 month for follow up or as needed              Post procedure restrictions were reviewed and given to the patient.

## 2020-05-08 LAB
C TRACH DNA GENITAL QL NAA+PROBE: NEGATIVE
N. GONORRHOEAE DNA: NEGATIVE
SPECIMEN DESCRIPTION: NORMAL

## 2020-06-09 ENCOUNTER — APPOINTMENT (OUTPATIENT)
Dept: CT IMAGING | Age: 40
End: 2020-06-09
Payer: MEDICARE

## 2020-06-09 ENCOUNTER — HOSPITAL ENCOUNTER (EMERGENCY)
Age: 40
Discharge: HOME | End: 2020-06-09
Attending: EMERGENCY MEDICINE
Payer: MEDICARE

## 2020-06-09 VITALS
BODY MASS INDEX: 48.37 KG/M2 | SYSTOLIC BLOOD PRESSURE: 119 MMHG | HEIGHT: 63 IN | TEMPERATURE: 98.6 F | DIASTOLIC BLOOD PRESSURE: 82 MMHG | WEIGHT: 273 LBS | OXYGEN SATURATION: 99 % | RESPIRATION RATE: 11 BRPM | HEART RATE: 88 BPM

## 2020-06-09 LAB
ABSOLUTE EOS #: 0.8 K/UL (ref 0–0.4)
ABSOLUTE IMMATURE GRANULOCYTE: ABNORMAL K/UL (ref 0–0.3)
ABSOLUTE LYMPH #: 1.8 K/UL (ref 1–4.8)
ABSOLUTE MONO #: 0.3 K/UL (ref 0.1–1.2)
ALBUMIN SERPL-MCNC: 3.9 G/DL (ref 3.5–5.2)
ALBUMIN/GLOBULIN RATIO: 1.1 (ref 1–2.5)
ALP BLD-CCNC: 103 U/L (ref 35–104)
ALT SERPL-CCNC: <5 U/L (ref 5–33)
ANION GAP SERPL CALCULATED.3IONS-SCNC: 10 MMOL/L (ref 9–17)
AST SERPL-CCNC: 14 U/L
BASOPHILS # BLD: 0 % (ref 0–2)
BASOPHILS ABSOLUTE: 0 K/UL (ref 0–0.2)
BILIRUB SERPL-MCNC: 0.25 MG/DL (ref 0.3–1.2)
BILIRUBIN DIRECT: 0.08 MG/DL
BILIRUBIN, INDIRECT: 0.17 MG/DL (ref 0–1)
BUN BLDV-MCNC: 8 MG/DL (ref 6–20)
BUN/CREAT BLD: ABNORMAL (ref 9–20)
CALCIUM SERPL-MCNC: 8.9 MG/DL (ref 8.6–10.4)
CHLORIDE BLD-SCNC: 104 MMOL/L (ref 98–107)
CO2: 25 MMOL/L (ref 20–31)
CREAT SERPL-MCNC: 0.49 MG/DL (ref 0.5–0.9)
DIFFERENTIAL TYPE: ABNORMAL
EOSINOPHILS RELATIVE PERCENT: 9 % (ref 1–4)
GFR AFRICAN AMERICAN: >60 ML/MIN
GFR NON-AFRICAN AMERICAN: >60 ML/MIN
GFR SERPL CREATININE-BSD FRML MDRD: ABNORMAL ML/MIN/{1.73_M2}
GFR SERPL CREATININE-BSD FRML MDRD: ABNORMAL ML/MIN/{1.73_M2}
GLOBULIN: ABNORMAL G/DL (ref 1.5–3.8)
GLUCOSE BLD-MCNC: 106 MG/DL (ref 70–99)
HCG QUALITATIVE: NEGATIVE
HCT VFR BLD CALC: 41.2 % (ref 36–46)
HEMOGLOBIN: 13.5 G/DL (ref 12–16)
IMMATURE GRANULOCYTES: ABNORMAL %
LIPASE: 22 U/L (ref 13–60)
LYMPHOCYTES # BLD: 20 % (ref 24–44)
MCH RBC QN AUTO: 26 PG (ref 26–34)
MCHC RBC AUTO-ENTMCNC: 32.7 G/DL (ref 31–37)
MCV RBC AUTO: 79.6 FL (ref 80–100)
MONOCYTES # BLD: 3 % (ref 2–11)
NRBC AUTOMATED: ABNORMAL PER 100 WBC
PDW BLD-RTO: 16.7 % (ref 12.5–15.4)
PLATELET # BLD: 399 K/UL (ref 140–450)
PLATELET ESTIMATE: ABNORMAL
PMV BLD AUTO: 7.4 FL (ref 6–12)
POTASSIUM SERPL-SCNC: 4 MMOL/L (ref 3.7–5.3)
RBC # BLD: 5.18 M/UL (ref 4–5.2)
RBC # BLD: ABNORMAL 10*6/UL
SEG NEUTROPHILS: 68 % (ref 36–66)
SEGMENTED NEUTROPHILS ABSOLUTE COUNT: 6.5 K/UL (ref 1.8–7.7)
SODIUM BLD-SCNC: 139 MMOL/L (ref 135–144)
TOTAL PROTEIN: 7.3 G/DL (ref 6.4–8.3)
TROPONIN INTERP: ABNORMAL
TROPONIN INTERP: ABNORMAL
TROPONIN T: ABNORMAL NG/ML
TROPONIN T: ABNORMAL NG/ML
TROPONIN, HIGH SENSITIVITY: 16 NG/L (ref 0–14)
TROPONIN, HIGH SENSITIVITY: 17 NG/L (ref 0–14)
WBC # BLD: 9.5 K/UL (ref 3.5–11)
WBC # BLD: ABNORMAL 10*3/UL

## 2020-06-09 PROCEDURE — 2580000003 HC RX 258: Performed by: EMERGENCY MEDICINE

## 2020-06-09 PROCEDURE — 6360000002 HC RX W HCPCS: Performed by: EMERGENCY MEDICINE

## 2020-06-09 PROCEDURE — 84703 CHORIONIC GONADOTROPIN ASSAY: CPT

## 2020-06-09 PROCEDURE — 83690 ASSAY OF LIPASE: CPT

## 2020-06-09 PROCEDURE — 71260 CT THORAX DX C+: CPT

## 2020-06-09 PROCEDURE — 85025 COMPLETE CBC W/AUTO DIFF WBC: CPT

## 2020-06-09 PROCEDURE — 36415 COLL VENOUS BLD VENIPUNCTURE: CPT

## 2020-06-09 PROCEDURE — 93005 ELECTROCARDIOGRAM TRACING: CPT | Performed by: EMERGENCY MEDICINE

## 2020-06-09 PROCEDURE — 80076 HEPATIC FUNCTION PANEL: CPT

## 2020-06-09 PROCEDURE — 80048 BASIC METABOLIC PNL TOTAL CA: CPT

## 2020-06-09 PROCEDURE — 84484 ASSAY OF TROPONIN QUANT: CPT

## 2020-06-09 PROCEDURE — 96374 THER/PROPH/DIAG INJ IV PUSH: CPT

## 2020-06-09 PROCEDURE — 99285 EMERGENCY DEPT VISIT HI MDM: CPT

## 2020-06-09 PROCEDURE — 6360000004 HC RX CONTRAST MEDICATION: Performed by: EMERGENCY MEDICINE

## 2020-06-09 RX ORDER — KETOROLAC TROMETHAMINE 15 MG/ML
15 INJECTION, SOLUTION INTRAMUSCULAR; INTRAVENOUS ONCE
Status: COMPLETED | OUTPATIENT
Start: 2020-06-09 | End: 2020-06-09

## 2020-06-09 RX ORDER — PREDNISONE 1 MG/1
2.5 TABLET ORAL 3 TIMES DAILY
COMMUNITY
End: 2021-11-09

## 2020-06-09 RX ORDER — 0.9 % SODIUM CHLORIDE 0.9 %
80 INTRAVENOUS SOLUTION INTRAVENOUS ONCE
Status: COMPLETED | OUTPATIENT
Start: 2020-06-09 | End: 2020-06-09

## 2020-06-09 RX ORDER — SODIUM CHLORIDE 0.9 % (FLUSH) 0.9 %
10 SYRINGE (ML) INJECTION PRN
Status: DISCONTINUED | OUTPATIENT
Start: 2020-06-09 | End: 2020-06-09 | Stop reason: HOSPADM

## 2020-06-09 RX ORDER — 0.9 % SODIUM CHLORIDE 0.9 %
1000 INTRAVENOUS SOLUTION INTRAVENOUS ONCE
Status: COMPLETED | OUTPATIENT
Start: 2020-06-09 | End: 2020-06-09

## 2020-06-09 RX ORDER — DIPHENHYDRAMINE HYDROCHLORIDE 50 MG/ML
25 INJECTION INTRAMUSCULAR; INTRAVENOUS ONCE
Status: COMPLETED | OUTPATIENT
Start: 2020-06-09 | End: 2020-06-09

## 2020-06-09 RX ORDER — PANTOPRAZOLE SODIUM 20 MG/1
20 TABLET, DELAYED RELEASE ORAL DAILY
Qty: 30 TABLET | Refills: 0 | Status: SHIPPED | OUTPATIENT
Start: 2020-06-09 | End: 2020-10-15

## 2020-06-09 RX ADMIN — IOVERSOL 75 ML: 741 INJECTION INTRA-ARTERIAL; INTRAVENOUS at 13:33

## 2020-06-09 RX ADMIN — DIPHENHYDRAMINE HYDROCHLORIDE 25 MG: 50 INJECTION, SOLUTION INTRAMUSCULAR; INTRAVENOUS at 12:26

## 2020-06-09 RX ADMIN — SODIUM CHLORIDE 1000 ML: 9 INJECTION, SOLUTION INTRAVENOUS at 12:24

## 2020-06-09 RX ADMIN — SODIUM CHLORIDE 80 ML: 9 INJECTION, SOLUTION INTRAVENOUS at 13:37

## 2020-06-09 RX ADMIN — Medication 10 ML: at 13:37

## 2020-06-09 RX ADMIN — KETOROLAC TROMETHAMINE 15 MG: 15 INJECTION, SOLUTION INTRAMUSCULAR; INTRAVENOUS at 12:26

## 2020-06-09 ASSESSMENT — PAIN SCALES - GENERAL
PAINLEVEL_OUTOF10: 7
PAINLEVEL_OUTOF10: 5
PAINLEVEL_OUTOF10: 7

## 2020-06-09 NOTE — ED PROVIDER NOTES
6/9/2020  EXAMINATION: CTA OF THE CHEST 6/9/2020 1:31 pm TECHNIQUE: CTA of the chest was performed after the administration of intravenous contrast.  Multiplanar reformatted images are provided for review. MIP images are provided for review. Dose modulation, iterative reconstruction, and/or weight based adjustment of the mA/kV was utilized to reduce the radiation dose to as low as reasonably achievable. COMPARISON: None. HISTORY: ORDERING SYSTEM PROVIDED HISTORY: r/o PE TECHNOLOGIST PROVIDED HISTORY: r/o PE Reason for Exam: left side abdominal pain and left lower chest pain. hx of previous PE Acuity: Acute Type of Exam: Initial FINDINGS: Pulmonary Arteries: Pulmonary arteries are adequately opacified for evaluation. No evidence of intraluminal filling defect to suggest pulmonary embolism. Main pulmonary artery is normal in caliber. Mediastinum: No evidence of mediastinal lymphadenopathy. The heart and pericardium demonstrate no acute abnormality. There is no acute abnormality of the thoracic aorta. Lungs/pleura: The lungs are without acute process. No focal consolidation or pulmonary edema. No evidence of pleural effusion or pneumothorax. Upper Abdomen: Partially visualized periportal lymph nodes. The spleen appears normal in size. Soft Tissues/Bones: Multiple prominent axillary lymph nodes are noted bilaterally. There also numerous small inferior cervical lymph nodes. A representative left axillary lymph node measures 17 mm in short axis and 19 mm in the right axilla. No osseous abnormality identified. No evidence of pulmonary embolism or acute pulmonary abnormality. Similar appearance of numerous mildly prominent axillary lymph nodes and multiple small inferior cervical lymph nodes. This may be due to a chronic inflammatory process, lymphoproliferative disorder or or neoplastic process.        LABS:  Results for orders placed or performed during the hospital encounter of 43/91/28   Basic Metabolic

## 2020-06-10 LAB
EKG ATRIAL RATE: 84 BPM
EKG P AXIS: 16 DEGREES
EKG P-R INTERVAL: 146 MS
EKG Q-T INTERVAL: 382 MS
EKG QRS DURATION: 74 MS
EKG QTC CALCULATION (BAZETT): 451 MS
EKG R AXIS: 10 DEGREES
EKG T AXIS: -4 DEGREES
EKG VENTRICULAR RATE: 84 BPM

## 2020-06-25 ENCOUNTER — HOSPITAL ENCOUNTER (OUTPATIENT)
Age: 40
Setting detail: SPECIMEN
Discharge: HOME | End: 2020-06-25
Payer: MEDICARE

## 2020-06-25 ENCOUNTER — OFFICE VISIT (OUTPATIENT)
Dept: FAMILY MEDICINE CLINIC | Age: 40
End: 2020-06-25
Payer: MEDICARE

## 2020-06-25 VITALS
OXYGEN SATURATION: 98 % | DIASTOLIC BLOOD PRESSURE: 76 MMHG | RESPIRATION RATE: 16 BRPM | HEART RATE: 95 BPM | TEMPERATURE: 97.3 F | WEIGHT: 277 LBS | BODY MASS INDEX: 49.07 KG/M2 | SYSTOLIC BLOOD PRESSURE: 118 MMHG

## 2020-06-25 PROBLEM — O35.BXX0 ECHOGENIC FOCUS OF HEART OF FETUS AFFECTING ANTEPARTUM CARE OF MOTHER: Status: RESOLVED | Noted: 2019-11-30 | Resolved: 2020-06-25

## 2020-06-25 PROBLEM — R73.09 ABNORMAL GLUCOSE: Status: RESOLVED | Noted: 2019-11-30 | Resolved: 2020-06-25

## 2020-06-25 PROBLEM — Z3A.32 32 WEEKS GESTATION OF PREGNANCY: Status: RESOLVED | Noted: 2020-01-08 | Resolved: 2020-06-25

## 2020-06-25 PROBLEM — O99.211 OBESITY AFFECTING PREGNANCY IN FIRST TRIMESTER: Status: RESOLVED | Noted: 2018-11-06 | Resolved: 2020-06-25

## 2020-06-25 PROBLEM — O09.90 HIGH-RISK PREGNANCY: Status: RESOLVED | Noted: 2020-01-10 | Resolved: 2020-06-25

## 2020-06-25 PROBLEM — O09.90 HRP (HIGH RISK PREGNANCY): Status: RESOLVED | Noted: 2020-01-09 | Resolved: 2020-06-25

## 2020-06-25 PROBLEM — O28.9 ABNORMAL 1ST TRIMESTER SCREEN: Status: RESOLVED | Noted: 2019-11-30 | Resolved: 2020-06-25

## 2020-06-25 PROBLEM — O88.212 PULMONARY EMBOLISM AFFECTING PREGNANCY IN SECOND TRIMESTER: Status: RESOLVED | Noted: 2019-11-30 | Resolved: 2020-06-25

## 2020-06-25 LAB
ABSOLUTE EOS #: 1.03 K/UL (ref 0–0.44)
ABSOLUTE IMMATURE GRANULOCYTE: 0.04 K/UL (ref 0–0.3)
ABSOLUTE LYMPH #: 2.37 K/UL (ref 1.1–3.7)
ABSOLUTE MONO #: 0.48 K/UL (ref 0.1–1.2)
ALBUMIN SERPL-MCNC: 3.7 G/DL (ref 3.5–5.2)
ALBUMIN/GLOBULIN RATIO: 1.2 (ref 1–2.5)
ALP BLD-CCNC: 91 U/L (ref 35–104)
ALT SERPL-CCNC: 18 U/L (ref 5–33)
ANION GAP SERPL CALCULATED.3IONS-SCNC: 12 MMOL/L (ref 9–17)
AST SERPL-CCNC: 13 U/L
BASOPHILS # BLD: 0 % (ref 0–2)
BASOPHILS ABSOLUTE: 0.03 K/UL (ref 0–0.2)
BILIRUB SERPL-MCNC: <0.1 MG/DL (ref 0.3–1.2)
BUN BLDV-MCNC: 12 MG/DL (ref 6–20)
BUN/CREAT BLD: ABNORMAL (ref 9–20)
CALCIUM SERPL-MCNC: 8.8 MG/DL (ref 8.6–10.4)
CHLORIDE BLD-SCNC: 106 MMOL/L (ref 98–107)
CHOLESTEROL, FASTING: 154 MG/DL
CHOLESTEROL/HDL RATIO: 4.5
CO2: 22 MMOL/L (ref 20–31)
CREAT SERPL-MCNC: 0.5 MG/DL (ref 0.5–0.9)
DIFFERENTIAL TYPE: ABNORMAL
EOSINOPHILS RELATIVE PERCENT: 10 % (ref 1–4)
GFR AFRICAN AMERICAN: >60 ML/MIN
GFR NON-AFRICAN AMERICAN: >60 ML/MIN
GFR SERPL CREATININE-BSD FRML MDRD: ABNORMAL ML/MIN/{1.73_M2}
GFR SERPL CREATININE-BSD FRML MDRD: ABNORMAL ML/MIN/{1.73_M2}
GLUCOSE FASTING: 98 MG/DL (ref 70–99)
HCT VFR BLD CALC: 40.2 % (ref 36.3–47.1)
HDLC SERPL-MCNC: 34 MG/DL
HEMOGLOBIN: 12.5 G/DL (ref 11.9–15.1)
IMMATURE GRANULOCYTES: 0 %
LDL CHOLESTEROL: 103 MG/DL (ref 0–130)
LYMPHOCYTES # BLD: 24 % (ref 24–43)
MCH RBC QN AUTO: 25.8 PG (ref 25.2–33.5)
MCHC RBC AUTO-ENTMCNC: 31.1 G/DL (ref 28.4–34.8)
MCV RBC AUTO: 82.9 FL (ref 82.6–102.9)
MONOCYTES # BLD: 5 % (ref 3–12)
NRBC AUTOMATED: 0 PER 100 WBC
PDW BLD-RTO: 15 % (ref 11.8–14.4)
PLATELET # BLD: 452 K/UL (ref 138–453)
PLATELET ESTIMATE: ABNORMAL
PMV BLD AUTO: 10.3 FL (ref 8.1–13.5)
POTASSIUM SERPL-SCNC: 4.2 MMOL/L (ref 3.7–5.3)
RBC # BLD: 4.85 M/UL (ref 3.95–5.11)
RBC # BLD: ABNORMAL 10*6/UL
SEG NEUTROPHILS: 61 % (ref 36–65)
SEGMENTED NEUTROPHILS ABSOLUTE COUNT: 6.09 K/UL (ref 1.5–8.1)
SODIUM BLD-SCNC: 140 MMOL/L (ref 135–144)
TOTAL PROTEIN: 6.9 G/DL (ref 6.4–8.3)
TRIGLYCERIDE, FASTING: 85 MG/DL
TSH SERPL DL<=0.05 MIU/L-ACNC: 1.41 MIU/L (ref 0.3–5)
VLDLC SERPL CALC-MCNC: ABNORMAL MG/DL (ref 1–30)
WBC # BLD: 10 K/UL (ref 3.5–11.3)
WBC # BLD: ABNORMAL 10*3/UL

## 2020-06-25 PROCEDURE — G8427 DOCREV CUR MEDS BY ELIG CLIN: HCPCS | Performed by: FAMILY MEDICINE

## 2020-06-25 PROCEDURE — G8417 CALC BMI ABV UP PARAM F/U: HCPCS | Performed by: FAMILY MEDICINE

## 2020-06-25 PROCEDURE — 1036F TOBACCO NON-USER: CPT | Performed by: FAMILY MEDICINE

## 2020-06-25 PROCEDURE — 99214 OFFICE O/P EST MOD 30 MIN: CPT | Performed by: FAMILY MEDICINE

## 2020-06-25 ASSESSMENT — ENCOUNTER SYMPTOMS
EYES NEGATIVE: 1
RESPIRATORY NEGATIVE: 1
GASTROINTESTINAL NEGATIVE: 1

## 2020-06-25 NOTE — PROGRESS NOTES
 Celestone  & 1/10 2020    Chronic back pain     Echogenic focus of heart of fetus affecting antepartum care of mother 2019   [place] early 1hr, nml 3hr  2019    1 elevated value, MFM recommends repeat 3hr GTT in 2 weeks    Elevated umbilical artery dopplers  1/10/2020    Fetal ascites     Fetal heart rate decelerations affecting management of mother     GERD (gastroesophageal reflux disease)     Headache     Hypertension     IUD (intrauterine device) in place 2020    Adina Isabel Kapadia    Lumbar radiculopathy 2019    Lumbar spondylosis 2019    Multigravida of advanced maternal age in third trimester     Obesity     Obesity affecting pregnancy in first trimester 2018    Obesity affecting pregnancy in third trimester     Pulmonary embolism affecting pregnancy in second trimester 2019    Rh+/RI/GBSunk 2020    Rheumatoid arteritis ([facility] [address]     Umbilical cord complication       Past Surgical History:   Procedure Laterality Date     SECTION Bilateral 2020     SECTION performed by Fahad Bhakta DO at [facility]    INTRAUTERINE DEVICE INSERTION  2020    Mirena    OTHER SURGICAL HISTORY  12/10/2018    Fluoroscopy- with esophagas- possible delay of gastric emptying     TONSILLECTOMY  1996       Family History   Problem Relation Age of Onset    Elevated Lipids Mother        Social History     Tobacco Use    Smoking status: Never Smoker    Smokeless tobacco: Never Used   Substance Use Topics    Alcohol use: No      Current Outpatient Medications   Medication Sig Dispense Refill    predniSONE (DELTASONE) 5 MG tablet Take 5 mg by mouth 2 times daily      pantoprazole (PROTONIX) 20 MG tablet Take 1 tablet by mouth daily 30 tablet 0    Prenatal Multivit-Min-Fe-FA (PRENATAL VITAMINS) 0.8 MG TABS Take 1 tablet by mouth daily 30 tablet 12    ammonium lactate (LAC-HYDRIN) 12 % lotion Apply topically daily.  1 Bottle 0    glucose Allergic/Immunologic: Negative for environmental allergies, food allergies and immunocompromised state. Neurological: Positive for speech difficulty. Psychiatric/Behavioral: Negative. Objective:     Physical Exam  Vitals signs and nursing note reviewed. Constitutional:       General: She is awake. She is not in acute distress. Appearance: Normal appearance. She is obese. She is not toxic-appearing. HENT:      Head: Normocephalic and atraumatic. Right Ear: External ear normal.      Left Ear: External ear normal.      Nose: Nose normal.      Mouth/Throat:      Mouth: Mucous membranes are moist.   Eyes:      Extraocular Movements: Extraocular movements intact. Conjunctiva/sclera: Conjunctivae normal.   Neck:      Musculoskeletal: Normal range of motion and neck supple. Cardiovascular:      Rate and Rhythm: Normal rate and regular rhythm. Pulses: Normal pulses. Heart sounds: Normal heart sounds. No murmur. No friction rub. No gallop. Pulmonary:      Effort: Pulmonary effort is normal.      Breath sounds: Normal breath sounds. Abdominal:      General: Abdomen is flat. Bowel sounds are normal.      Palpations: Abdomen is soft. Musculoskeletal: Normal range of motion. Neurological:      General: No focal deficit present. Mental Status: She is alert and oriented to person, place, and time. Psychiatric:         Attention and Perception: Attention normal.         Mood and Affect: Mood and affect normal.         Speech: Speech normal.         Behavior: Behavior normal.       /76   Pulse 107   Temp 97.3 °F (36.3 °C)   Resp 16   Wt 277 lb (125.6 kg)   LMP 06/05/2020   SpO2 98%   BMI 49.07 kg/m²     Assessment:       Diagnosis Orders   1. Spell of change in speech  TSH With Reflex Ft4    CBC Auto Differential    Taqueria Tillman MD, Neurology, [place]   2.  Migraine without aura, not refractory  Taqueria Tillman MD, Neurology, [facility] pod Geoffrey [address] Zainab Maldonado MD, Neurology, [facility]     Referral Priority:   Routine     Referral Type:   Eval and Treat     Referral Reason:   Specialty Services Required     Referred to Provider:   Mia Manuel MD     Requested Specialty:   Neurology     Number of Visits Requested:   1    [facility]) - Torres Ratliff MD, Cardiology, [facility]     Referral Priority:   Routine     Referral Type:   Eval and Treat     Referral Reason:   Specialty Services Required     Referred to Provider:   Alirio Marie MD     Requested Specialty:   Cardiology     Number of Visits Requested:   1     No orders of the defined types were placed in this encounter. Patient given educational materials - see patient instructions. Discussed use, benefit, and side effects of prescribed medications. All patientquestions answered. Pt voiced understanding. Reviewed health maintenance. Instructedto continue current medications, diet and exercise. Patient agreed with treatmentplan. Follow up as directed.      Electronically signed by Mackenzie Luz MD on 6/25/2020 at 10:44 AM

## 2020-07-17 ENCOUNTER — HOSPITAL ENCOUNTER (OUTPATIENT)
Dept: ULTRASOUND IMAGING | Age: 40
Discharge: HOME | End: 2020-07-19
Payer: MEDICARE

## 2020-07-17 ENCOUNTER — HOSPITAL ENCOUNTER (OUTPATIENT)
Dept: MAMMOGRAPHY | Age: 40
Discharge: HOME | End: 2020-07-19
Payer: MEDICARE

## 2020-07-17 PROCEDURE — 76642 ULTRASOUND BREAST LIMITED: CPT

## 2020-07-17 PROCEDURE — G0279 TOMOSYNTHESIS, MAMMO: HCPCS

## 2020-07-30 ENCOUNTER — APPOINTMENT (OUTPATIENT)
Dept: CT IMAGING | Age: 40
End: 2020-07-30
Payer: MEDICARE

## 2020-07-30 ENCOUNTER — HOSPITAL ENCOUNTER (EMERGENCY)
Age: 40
Discharge: HOME | End: 2020-07-30
Attending: EMERGENCY MEDICINE
Payer: MEDICARE

## 2020-07-30 VITALS
HEIGHT: 63 IN | OXYGEN SATURATION: 97 % | BODY MASS INDEX: 48.73 KG/M2 | WEIGHT: 275 LBS | TEMPERATURE: 98.6 F | DIASTOLIC BLOOD PRESSURE: 79 MMHG | HEART RATE: 92 BPM | SYSTOLIC BLOOD PRESSURE: 115 MMHG | RESPIRATION RATE: 16 BRPM

## 2020-07-30 LAB
ABSOLUTE EOS #: 1.9 K/UL (ref 0–0.4)
ABSOLUTE IMMATURE GRANULOCYTE: ABNORMAL K/UL (ref 0–0.3)
ABSOLUTE LYMPH #: 1.55 K/UL (ref 1–4.8)
ABSOLUTE MONO #: 0.24 K/UL (ref 0.1–0.8)
ANION GAP SERPL CALCULATED.3IONS-SCNC: 11 MMOL/L (ref 9–17)
BASOPHILS # BLD: 0 % (ref 0–2)
BASOPHILS ABSOLUTE: 0 K/UL (ref 0–0.2)
BUN BLDV-MCNC: 12 MG/DL (ref 6–20)
BUN/CREAT BLD: NORMAL (ref 9–20)
CALCIUM SERPL-MCNC: 9.4 MG/DL (ref 8.6–10.4)
CHLORIDE BLD-SCNC: 103 MMOL/L (ref 98–107)
CO2: 23 MMOL/L (ref 20–31)
CREAT SERPL-MCNC: 0.52 MG/DL (ref 0.5–0.9)
D-DIMER QUANTITATIVE: 2.05 MG/L FEU
DIFFERENTIAL TYPE: ABNORMAL
EOSINOPHILS RELATIVE PERCENT: 16 % (ref 1–4)
GFR AFRICAN AMERICAN: >60 ML/MIN
GFR NON-AFRICAN AMERICAN: >60 ML/MIN
GFR SERPL CREATININE-BSD FRML MDRD: NORMAL ML/MIN/{1.73_M2}
GFR SERPL CREATININE-BSD FRML MDRD: NORMAL ML/MIN/{1.73_M2}
GLUCOSE BLD-MCNC: 93 MG/DL (ref 70–99)
HCT VFR BLD CALC: 39.2 % (ref 36–46)
HEMOGLOBIN: 12.9 G/DL (ref 12–16)
IMMATURE GRANULOCYTES: ABNORMAL %
LYMPHOCYTES # BLD: 13 % (ref 24–44)
MCH RBC QN AUTO: 25.9 PG (ref 26–34)
MCHC RBC AUTO-ENTMCNC: 32.8 G/DL (ref 31–37)
MCV RBC AUTO: 79 FL (ref 80–100)
MONOCYTES # BLD: 2 % (ref 1–7)
MORPHOLOGY: NORMAL
NRBC AUTOMATED: ABNORMAL PER 100 WBC
PDW BLD-RTO: 15.2 % (ref 12.5–15.4)
PLATELET # BLD: 412 K/UL (ref 140–450)
PLATELET ESTIMATE: ABNORMAL
PMV BLD AUTO: 7.7 FL (ref 6–12)
POTASSIUM SERPL-SCNC: 4.2 MMOL/L (ref 3.7–5.3)
RBC # BLD: 4.97 M/UL (ref 4–5.2)
RBC # BLD: ABNORMAL 10*6/UL
SEG NEUTROPHILS: 69 % (ref 36–66)
SEGMENTED NEUTROPHILS ABSOLUTE COUNT: 8.21 K/UL (ref 1.8–7.7)
SODIUM BLD-SCNC: 137 MMOL/L (ref 135–144)
TROPONIN INTERP: ABNORMAL
TROPONIN INTERP: NORMAL
TROPONIN T: ABNORMAL NG/ML
TROPONIN T: NORMAL NG/ML
TROPONIN, HIGH SENSITIVITY: 14 NG/L (ref 0–14)
TROPONIN, HIGH SENSITIVITY: 15 NG/L (ref 0–14)
WBC # BLD: 11.9 K/UL (ref 3.5–11)
WBC # BLD: ABNORMAL 10*3/UL

## 2020-07-30 PROCEDURE — 96372 THER/PROPH/DIAG INJ SC/IM: CPT

## 2020-07-30 PROCEDURE — 36415 COLL VENOUS BLD VENIPUNCTURE: CPT

## 2020-07-30 PROCEDURE — 84484 ASSAY OF TROPONIN QUANT: CPT

## 2020-07-30 PROCEDURE — 93005 ELECTROCARDIOGRAM TRACING: CPT | Performed by: PHYSICIAN ASSISTANT

## 2020-07-30 PROCEDURE — 80048 BASIC METABOLIC PNL TOTAL CA: CPT

## 2020-07-30 PROCEDURE — 99285 EMERGENCY DEPT VISIT HI MDM: CPT

## 2020-07-30 PROCEDURE — 85025 COMPLETE CBC W/AUTO DIFF WBC: CPT

## 2020-07-30 PROCEDURE — 85379 FIBRIN DEGRADATION QUANT: CPT

## 2020-07-30 PROCEDURE — 6360000002 HC RX W HCPCS: Performed by: PHYSICIAN ASSISTANT

## 2020-07-30 PROCEDURE — 71260 CT THORAX DX C+: CPT

## 2020-07-30 PROCEDURE — 2580000003 HC RX 258: Performed by: PHYSICIAN ASSISTANT

## 2020-07-30 PROCEDURE — 96374 THER/PROPH/DIAG INJ IV PUSH: CPT

## 2020-07-30 PROCEDURE — 6360000004 HC RX CONTRAST MEDICATION: Performed by: PHYSICIAN ASSISTANT

## 2020-07-30 RX ORDER — TRIAMCINOLONE ACETONIDE 1 MG/G
CREAM TOPICAL 2 TIMES DAILY
COMMUNITY
Start: 2020-07-24 | End: 2021-06-17 | Stop reason: ALTCHOICE

## 2020-07-30 RX ORDER — SODIUM CHLORIDE 0.9 % (FLUSH) 0.9 %
10 SYRINGE (ML) INJECTION PRN
Status: DISCONTINUED | OUTPATIENT
Start: 2020-07-30 | End: 2020-07-30 | Stop reason: HOSPADM

## 2020-07-30 RX ORDER — DIPHENHYDRAMINE HYDROCHLORIDE 50 MG/ML
50 INJECTION INTRAMUSCULAR; INTRAVENOUS ONCE
Status: COMPLETED | OUTPATIENT
Start: 2020-07-30 | End: 2020-07-30

## 2020-07-30 RX ORDER — 0.9 % SODIUM CHLORIDE 0.9 %
80 INTRAVENOUS SOLUTION INTRAVENOUS ONCE
Status: COMPLETED | OUTPATIENT
Start: 2020-07-30 | End: 2020-07-30

## 2020-07-30 RX ORDER — METHYLPREDNISOLONE SODIUM SUCCINATE 40 MG/ML
40 INJECTION, POWDER, LYOPHILIZED, FOR SOLUTION INTRAMUSCULAR; INTRAVENOUS ONCE
Status: COMPLETED | OUTPATIENT
Start: 2020-07-30 | End: 2020-07-30

## 2020-07-30 RX ORDER — PREDNISONE 10 MG/1
TABLET ORAL
Qty: 20 TABLET | Refills: 0 | Status: SHIPPED | OUTPATIENT
Start: 2020-07-30 | End: 2020-08-09

## 2020-07-30 RX ORDER — NABUMETONE 750 MG/1
TABLET, FILM COATED ORAL
COMMUNITY
End: 2021-06-17 | Stop reason: ALTCHOICE

## 2020-07-30 RX ADMIN — DIPHENHYDRAMINE HYDROCHLORIDE 50 MG: 50 INJECTION INTRAMUSCULAR; INTRAVENOUS at 18:38

## 2020-07-30 RX ADMIN — Medication 10 ML: at 18:43

## 2020-07-30 RX ADMIN — METHYLPREDNISOLONE SODIUM SUCCINATE 40 MG: 40 INJECTION, POWDER, FOR SOLUTION INTRAMUSCULAR; INTRAVENOUS at 17:13

## 2020-07-30 RX ADMIN — IOVERSOL 75 ML: 741 INJECTION INTRA-ARTERIAL; INTRAVENOUS at 18:43

## 2020-07-30 RX ADMIN — SODIUM CHLORIDE 80 ML: 9 INJECTION, SOLUTION INTRAVENOUS at 18:43

## 2020-07-30 ASSESSMENT — PAIN DESCRIPTION - PAIN TYPE: TYPE: ACUTE PAIN

## 2020-07-30 ASSESSMENT — PAIN DESCRIPTION - ORIENTATION: ORIENTATION: RIGHT;UPPER

## 2020-07-30 ASSESSMENT — PAIN DESCRIPTION - LOCATION: LOCATION: CHEST

## 2020-07-30 ASSESSMENT — PAIN DESCRIPTION - DESCRIPTORS: DESCRIPTORS: DISCOMFORT

## 2020-07-30 ASSESSMENT — PAIN DESCRIPTION - FREQUENCY: FREQUENCY: CONTINUOUS

## 2020-07-30 ASSESSMENT — PAIN SCALES - GENERAL: PAINLEVEL_OUTOF10: 5

## 2020-07-30 NOTE — ED PROVIDER NOTES
[id number] [facility] ED  [address] [facility] [address] [place] [address]  Phone: [phone]  Fax: [address]      Pt Name: Jolynn Humphrey  MRN: [id number]  [place] 1980  Date of evaluation: 7/30/20      CHIEF COMPLAINT:  Chief Complaint   Patient presents with    Chest Pain     Right Upper chest discomfort that radiates to back, denies shortness of breath or cough or fever. HISTORY OF PRESENT ILLNESS    Jolynn Humphrey is a 42 y.o. female who presents with RIB/PLEURITIC PAIN complaint:       Context/Setting:   Right pleuritic chest pain  Location/Symptom:   Patient here for evaluation of nontraumatic/afebrile right upper chest pleuritic pain over the last 2 to 3 days. Patient denies any overexertion or other eliciting incidents. Patient is having no fevers or feeling of illness. She is having no new cough/other respiratory symptoms. Patient does report she has a remote history of pregnancy related pulmonary embolus in November 2019, she was taken off her anticoagulants in March 2020. Patient states at that time she was having significant shortness of breath which she denies today. Patient was also here just this past June for some left upper quadrant abdominal pain, at that time she had some elevated troponins. She was discharged home in good condition was to follow-up with cardiology. This happened has not happened as of yet, actually was supposed to have appointment tomorrow but she had to cancel and reschedule this. She denies any other personal or strong family cardiac history. Patient does take Mirena for contraception. She has no other history of DVT and denies any other leg edema/calf pain. Nursing Notes were reviewed. REVIEW OF SYSTEMS       Constitutional:  Per HPI  Eyes: No visual changes. Neck: No neck pain. Respiratory:  Per HPI  Cardiac:  Per HPI   GI:  Denies abdominal pain. Denies nausea. Denies vomiting. Denies diarrhea.    : Denies dysuria. Musculoskeletal: Denies back pain. Denies focal weakness. Neurologic: Denies headache or focal weakness. Skin:  Denies any rash. Negative in 10 essential Systems except as mentioned above and in the HPI. 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 hyperactivity, Celestone  & 1/10, Chronic back pain, Echogenic focus of heart of fetus affecting antepartum care of mother, [place] early 1hr, nml 3hr , Elevated umbilical artery dopplers , Fetal ascites, Fetal heart rate decelerations affecting management of mother, GERD (gastroesophageal reflux disease), Headache, Hypertension, IUD (intrauterine device) in place, Lumbar radiculopathy, Lumbar spondylosis, Multigravida of advanced maternal age in third trimester, Obesity, Obesity affecting pregnancy in first trimester, Obesity affecting pregnancy in third trimester, Pulmonary embolism affecting pregnancy in second trimester, Rh+/RI/GBSunk, Rheumatoid arteritis ([facility] [address] and Umbilical cord complication. Surgical History:  has a past surgical history that includes other surgical history (12/10/2018); Tonsillectomy ();  section (Bilateral, 2020); and intrauterine device insertion (2020). Social History:  reports that she has never smoked. She has never used smokeless tobacco. She reports current drug use. Frequency: 2.00 times per week. Drug: Marijuana. She reports that she does not drink alcohol. Family History: Noncontributory at this time  Psychiatric History: Noncontributory at this time    Allergies:is allergic to iodine.       PHYSICAL EXAM     INITIAL VITALS: /79   Pulse 92   Temp 98.6 °F (37 °C) (Oral)   Resp 16   Ht 5' 3\" (1.6 m)   Wt 124.7 kg (275 lb)   SpO2 97%   BMI 48.71 kg/m²   Constitutional:  Well developed   Eyes:  Pupils equal/round  HENT:  Atraumatic, external ears normal, nose normal.   Respiratory:  Clear to auscultation bilaterally with good air exchange. No W/R/R. Cardiovascular:  RRR with normal S1 and S2  Gastrointestinal/Abdomen:  Soft, NT.  BS present. Musculoskeletal:  No edema, no tenderness, no deformities of extremities. Back:  No CVA tenderness. No midline TTP. Normal to inspection. Integument:  No rash. Neurologic:  Alert & age appropriate mentation, no focal deficits noted       DIAGNOSTIC RESULTS     EKG: All EKG's are interpreted by the Emergency Department Physician who either signs or Co-signs this chart in the absence of a cardiologist.  Not indicated, or per attending note    RADIOLOGY:   Reviewed the radiologist:  CT CHEST PULMONARY EMBOLISM W CONTRAST   Final Result   1. No evidence of acute pulmonary embolism or acute aortic disease. 2. Bilateral axillary lymphadenopathy with fairly large lymph nodes. 3. Increased density in the anterior mediastinum which may represent residual   thymic tissue or lymphatic tissue. 4. Borderline supraclavicular adenopathy. 5. No active lung parenchyma or pleural disease. RECOMMENDATIONS:   Axillary lymph node biopsy to rule out lymphoproliferative disease. LABS:  Labs Reviewed   CBC WITH AUTO DIFFERENTIAL - Abnormal; Notable for the following components:       Result Value    WBC 11.9 (*)     MCV 79.0 (*)     MCH 25.9 (*)     Seg Neutrophils 69 (*)     Lymphocytes 13 (*)     Eosinophils % 16 (*)     Segs Absolute 8.21 (*)     Absolute Eos # 1.90 (*)     All other components within normal limits   TROPONIN - Abnormal; Notable for the following components:    Troponin, High Sensitivity 15 (*)     All other components within normal limits   BASIC METABOLIC PANEL   TROPONIN   D-DIMER, QUANTITATIVE         EMERGENCY DEPARTMENT COURSE:     1619  Cardiac/PE workup initiated. FUNMI RANGEL. Hx of PE last year, just off anticoags March 2020.   Was here in June for abd pain with slightly elevated Troponin. Hasn't had f/u with Cardiology yet. She looks great. 5  Pt confirms she has had Benadryl pre-treatment for contrast studies before w/o incident. Hives were her only symptoms, never resp issues. Giving Solumedrol as well as Benadryl for her CT Chest PE study. 1950  I have reviewed the disposition diagnosis with the patient and or their family/guardian. I have answered their questions and given discharge instructions. They voiced understanding of these instructions and did not have any further questions or complaints. Symptomatic care and PCP f/u in 2-3 days. Return to ED for worsening symptoms. Orders Placed This Encounter   Medications    methylPREDNISolone sodium (SOLU-MEDROL) injection 40 mg    diphenhydrAMINE (BENADRYL) injection 50 mg    ioversol (OPTIRAY) 74 % injection 75 mL    0.9 % sodium chloride bolus    sodium chloride flush 0.9 % injection 10 mL    predniSONE (DELTASONE) 10 MG tablet     Sig: Take 4 tablets by mouth once daily for 5 days     Dispense:  20 tablet     Refill:  0       CONSULTS:  None      FINAL IMPRESSION      1. Pleuritic pain          DISPOSITION/PLAN:  DISPOSITION Decision To Discharge 07/30/2020 07:36:56 PM        PATIENT REFERRED TO:  Willie Bender, [address] [place] [address]  [phone]    Schedule an appointment as soon as possible for a visit in 2 days  for re-evaluation of your symptoms    [facility] ED  [address] [facility]   [address] [place] [address]  [phone]  Go to   for worsening of your symptoms      DISCHARGE MEDICATIONS:  New Prescriptions    PREDNISONE (DELTASONE) 10 MG TABLET    Take 4 tablets by mouth once daily for 5 days       (Please note that portions of this note were completed with a voice recognition program.  Efforts were made to edit the dictations but occasionally words are mis-transcribed.)    VANESSA Alegria PA-C  07/30/20 1939

## 2020-07-30 NOTE — ED NOTES
CANDICE Moreno at bedside to update with results and plan of care for discharge.      Winnie Brown RN  07/30/20 3771

## 2020-08-01 LAB
EKG ATRIAL RATE: 91 BPM
EKG P AXIS: 11 DEGREES
EKG P-R INTERVAL: 138 MS
EKG Q-T INTERVAL: 348 MS
EKG QRS DURATION: 76 MS
EKG QTC CALCULATION (BAZETT): 428 MS
EKG R AXIS: 11 DEGREES
EKG VENTRICULAR RATE: 91 BPM

## 2020-08-03 NOTE — ED PROVIDER NOTES
[address] [facility] ED  [address] [address] [facility] [address]  Phone: [phone]  Fax: [phone]      Attending Physician Attestation    I performed a history and physical examination of the patient and discussed management with the mid level provider. I reviewed the mid level provider's note and agree with the documented findings and plan of care. Any areas of disagreement are noted on the chart. I was personally present for the key portions of any procedures. I have documented in the chart those procedures where I was not present during the key portions. I have reviewed the emergency nurses triage note. I agree with the chief complaint, past medical history, past surgical history, allergies, medications, social and family history as documented unless otherwise noted below. Documentation of the HPI, Physical Exam and Medical Decision Making performed by mid level providers is based on my personal performance of the HPI, PE and MDM. For Physician Assistant/ Nurse Practitioner cases/documentation I have personally evaluated this patient and have completed at least one if not all key elements of the E/M (history, physical exam, and MDM). Additional findings are as noted. CHIEF COMPLAINT       Chief Complaint   Patient presents with    Chest Pain     Right Upper chest discomfort that radiates to back, denies shortness of breath or cough or fever.        DIAGNOSTIC RESULTS     LABS:  Labs Reviewed   CBC WITH AUTO DIFFERENTIAL - Abnormal; Notable for the following components:       Result Value    WBC 11.9 (*)     MCV 79.0 (*)     MCH 25.9 (*)     Seg Neutrophils 69 (*)     Lymphocytes 13 (*)     Eosinophils % 16 (*)     Segs Absolute 8.21 (*)     Absolute Eos # 1.90 (*)     All other components within normal limits   TROPONIN - Abnormal; Notable for the following components:    Troponin, High Sensitivity 15 (*)     All other components within normal limits   BASIC METABOLIC PANEL TROPONIN   D-DIMER, QUANTITATIVE       All other labs were within normal range or not returned as of this dictation. RADIOLOGY:  CT CHEST PULMONARY EMBOLISM W CONTRAST   Final Result   1. No evidence of acute pulmonary embolism or acute aortic disease. 2. Bilateral axillary lymphadenopathy with fairly large lymph nodes. 3. Increased density in the anterior mediastinum which may represent residual   thymic tissue or lymphatic tissue. 4. Borderline supraclavicular adenopathy. 5. No active lung parenchyma or pleural disease. RECOMMENDATIONS:   Axillary lymph node biopsy to rule out lymphoproliferative disease. EMERGENCY DEPARTMENT COURSE:   Vitals:    Vitals:    07/30/20 1546 07/30/20 1752 07/30/20 1859   BP: 131/88 115/79    Pulse: 98 95 92   Resp: 16 18 16   Temp: 98.6 °F (37 °C)     TempSrc: Oral     SpO2: 97% 97% 97%   Weight: 124.7 kg (275 lb)     Height: 5' 3\" (1.6 m)       -------------------------  BP: 115/79, Temp: 98.6 °F (37 °C), Pulse: 92, Resp: 16             PERTINENT ATTENDING PHYSICIAN COMMENTS:    Patient presented to the emergency department with the complaint of right upper/pleuritic chest pain. She was not having any fever or respiratory symptoms. She had a remote history of a pregnancy related pulmonary embolus and this was concerning to her. Her physical exam is grossly unremarkable and I have very low suspicion for an acute coronary syndrome or pulmonary embolus, however, she does have history of PE so we will need to rule this out. D-dimer was initially little bit elevated, we did have to pretreat her because of a contrast allergy which she says is very mild. CT for PE was negative and troponins were negative in the ED.   Patient discharged with atypical chest pain and follow-up information      (Please note that portions of this note were completed with a voice recognition program.  Efforts were made to edit the dictations but occasionally words are FREE:[LAST:[christi PMD],PHONE:[(   )    -],FAX:[(   )    -]],TOKEN:'[id number]'

## 2020-08-11 ENCOUNTER — OFFICE VISIT (OUTPATIENT)
Dept: NEUROLOGY | Age: 40
End: 2020-08-11
Payer: MEDICARE

## 2020-08-11 VITALS
DIASTOLIC BLOOD PRESSURE: 85 MMHG | SYSTOLIC BLOOD PRESSURE: 122 MMHG | TEMPERATURE: 97.4 F | HEIGHT: 63 IN | BODY MASS INDEX: 49.26 KG/M2 | HEART RATE: 108 BPM | WEIGHT: 278 LBS

## 2020-08-11 PROCEDURE — G8427 DOCREV CUR MEDS BY ELIG CLIN: HCPCS | Performed by: PSYCHIATRY & NEUROLOGY

## 2020-08-11 PROCEDURE — G8417 CALC BMI ABV UP PARAM F/U: HCPCS | Performed by: PSYCHIATRY & NEUROLOGY

## 2020-08-11 PROCEDURE — 1036F TOBACCO NON-USER: CPT | Performed by: PSYCHIATRY & NEUROLOGY

## 2020-08-11 PROCEDURE — 99204 OFFICE O/P NEW MOD 45 MIN: CPT | Performed by: PSYCHIATRY & NEUROLOGY

## 2020-08-11 NOTE — PROGRESS NOTES
REVIEW OF SYSTEMS    Constitutional Weight: absent, Appetite: absent, Fatigue: present   HEENT Visual disturbance: absent, Ears: normal   Respiratory Shortness of breath: absent, Cough: absent   Cardiovascular Chest pain: absent, Leg swelling :present   GI Constipation: absent, Diarrhea: present, Swallowing change: absent    Urinary frequency: present, Urinary urgency: present,    Musculoskeletal Neck pain: absent, Back pain: present, Stiffness: present, Muscle pain: present, Joint pain: present   Dermatological Hair loss: present, Skin changes: absent   Neurological Memory loss: present, Confusion: absent, Seizures: absent, Trouble walking or imbalance: present, Dizziness: present, Weakness: present, Numbness: present Tremor: absent, Spasm: present, Speech difficulty: present, Headache: present, Light sensitivity: present   Psychiatric Anxiety: present, Hallucination: absent, Depression, present   Hematologic Abnormal bleeding/Bruising: absent, Anemia: absent

## 2020-08-11 NOTE — PROGRESS NOTES
[facility], [address] [facility], [address] [place] [address]  Ph: [phone] or [phone]  FAX: [phone]    Chief Complaint: migraines, forgetfulness, episodes of slurred speech     Dear Andrea West MD     I had the pleasure of seeing your patient Tee Joy in neurologic evaluation. As you would recall Tee Joy is a 47 y.o. female. The patient is here with three distinct neurological issues. She describes episodes during normal speech where she will become confused and slur her speech. These episodes will last a few seconds. This symptom began happening recently and now happens daily. She is unsure if she loses track of time or stares during these episodes. She denies numbness or tingling in her extremities, except when she is laying prone. She also reports difficulty with her memory for many years. She states that she will forget things told to her and needs to write things down. Lastly she reports having daily headaches. She does not feel rested after sleeping. She denies SHELLEY. She recently gave birth 6 months ago and affirms depression and anxiety- more anxiety than depression. MRI brain 3/27/2020 Normal MRI brain.  No acute intracranial abnormality.       REVIEW OF SYSTEMS   Constitutional Weight: absent, Appetite: absent, Fatigue: present   HEENT Visual disturbance: absent, Ears: normal   Respiratory Shortness of breath: absent, Cough: absent   Cardiovascular Chest pain: absent, Leg swelling :present   GI Constipation: absent, Diarrhea: present, Swallowing change: absent    Urinary frequency: present, Urinary urgency: present,    Musculoskeletal Neck pain: absent, Back pain: present, Stiffness: present, Muscle pain: present, Joint pain: present   Dermatological Hair loss: present, Skin changes: absent   Neurological Memory loss: present, Confusion: absent, Seizures: absent, Trouble walking or imbalance: present, Dizziness: present, Weakness:  Other      Sensitive to bug bites, mosquitos etc.     Family History   Problem Relation Age of Onset    Elevated Lipids Mother       Social History     Socioeconomic History    Marital status:      Spouse name: Not on file    Number of children: Not on file    Years of education: Not on file    Highest education level: Not on file   Occupational History    Not on file   Social Needs    Financial resource strain: Not on file    Food insecurity     Worry: Not on file     Inability: Not on file    Transportation needs     Medical: Not on file     Non-medical: Not on file   Tobacco Use    Smoking status: Never Smoker    Smokeless tobacco: Never Used   Substance and Sexual Activity    Alcohol use: No    Drug use: Yes     Frequency: 2.0 times per week     Types: Marijuana    Sexual activity: Yes     Partners: Male   Lifestyle    Physical activity     Days per week: Not on file     Minutes per session: Not on file    Stress: Not on file   Relationships    Social connections     Talks on phone: Not on file     Gets together: Not on file     Attends Congregational service: Not on file     Active member of club or organization: Not on file     Attends meetings of clubs or organizations: Not on file     Relationship status: Not on file    Intimate partner violence     Fear of current or ex partner: Not on file     Emotionally abused: Not on file     Physically abused: Not on file     Forced sexual activity: Not on file   Other Topics Concern    Not on file   Social History Narrative    Not on file      /85 (Site: Left Lower Arm, Position: Sitting)   Pulse 108   Temp 97.4 °F (36.3 °C)   Ht 5' 3\" (1.6 m)   Wt 278 lb (126.1 kg)   BMI 49.25 kg/m²      Physical examination:  General appearance: Normal. Well groomed.  In no acute distress    Head: Normocephalic, atraumatic  Eyes: Extraocular movements intact, eye lids normal  Lungs: Respirations unlabored, chest wall no deformity  ENT: Normal questions about the patient care. Scribe Attestation:   By signing my name below, I, Aniya Cruz, attest that this documentation has been prepared under the direction and in the presence of Rubio Dutton MD.     Electronically Signed: Aniya Cruz. 8/11/20. 2:15 PM EDT. Physician Attestation:   Penny Bergman MD, personally performed the services described in this documentation. All medical record entries made by the scribe were at my direction and in my presence. I have reviewed the chart and discharge instructions (if applicable) and agree that the record reflects my personal performance and is accurate and complete.     Electronically Signed: Ml Pettit 8/17/2020 7:49 AM    Diplomate, American Board of Psychiatry and Neurology  Diplomate, American Board of Clinical Neurophysiology  Diplomate, American Board of Epilepsy

## 2020-08-12 ENCOUNTER — OFFICE VISIT (OUTPATIENT)
Dept: SURGERY | Age: 40
End: 2020-08-12
Payer: MEDICARE

## 2020-08-12 VITALS
TEMPERATURE: 97.4 F | WEIGHT: 283.6 LBS | HEIGHT: 63 IN | HEART RATE: 103 BPM | SYSTOLIC BLOOD PRESSURE: 105 MMHG | DIASTOLIC BLOOD PRESSURE: 76 MMHG | OXYGEN SATURATION: 97 % | BODY MASS INDEX: 50.25 KG/M2

## 2020-08-12 PROCEDURE — 1036F TOBACCO NON-USER: CPT | Performed by: STUDENT IN AN ORGANIZED HEALTH CARE EDUCATION/TRAINING PROGRAM

## 2020-08-12 PROCEDURE — G8417 CALC BMI ABV UP PARAM F/U: HCPCS | Performed by: STUDENT IN AN ORGANIZED HEALTH CARE EDUCATION/TRAINING PROGRAM

## 2020-08-12 PROCEDURE — 99202 OFFICE O/P NEW SF 15 MIN: CPT | Performed by: STUDENT IN AN ORGANIZED HEALTH CARE EDUCATION/TRAINING PROGRAM

## 2020-08-12 PROCEDURE — G8427 DOCREV CUR MEDS BY ELIG CLIN: HCPCS | Performed by: STUDENT IN AN ORGANIZED HEALTH CARE EDUCATION/TRAINING PROGRAM

## 2020-08-12 NOTE — PROGRESS NOTES
[facility]   History and Physical      PATIENT NAME: Shawn Arroyo   YOB: 1980     TODAY'S DATE: 8/12/2020    CHIEF COMPLAINT:  Axillary lymphadenopathy    HISTORY OF PRESENT ILLNESS:  This is a 47 y.o. female with PMH significant for PE during pregnancy, antiphospholipid and waxing and waning axillary lymphadenopathy. The patient states that she has had swelling in her right armpit which gets better and worse periodically over the past several years. The patient had a recent CT scan performed for chest pain, to rule out PE due to her history and was noted to have bilateral axillary lymphadenopathy roughly 1.8 cm on right and left as well as anterior mediastinal adenopathy and borderline adenopathy in the right supraclavicular region. She denies any pain. Denies any open wounds. Denies any fever, chills, weight loss, night sweats or other b symptoms. Patient denies any personal or family history of lymphoma or leukemia. Denies any personal or family history of breast, colon or other types of cancer.       Past Medical History:        Diagnosis Date    Abnormal 1st trimester screen (Tri 21 1:117)--NIPT nml  11/30/2019    ADD (attention deficit disorder) without hyperactivity     Celestone 1/9 & 1/10 1/9/2020    Chronic back pain     Echogenic focus of heart of fetus affecting antepartum care of mother 11/30/2019   Brain Slot early 1hr, nml 3hr  11/30/2019    1 elevated value, M recommends repeat 3hr GTT in 2 weeks    Elevated umbilical artery dopplers  1/10/2020    Fetal ascites     Fetal heart rate decelerations affecting management of mother     GERD (gastroesophageal reflux disease)     Headache     Hypertension     IUD (intrauterine device) in place 05/07/2020    Adina Barrientos    Lumbar radiculopathy 1/9/2019    Lumbar spondylosis 1/9/2019    Multigravida of advanced maternal age in third trimester     Obesity     Obesity affecting pregnancy in first trimester 2018    Obesity affecting pregnancy in third trimester     Pulmonary embolism affecting pregnancy in second trimester 2019    Rh+/RI/GBSunk 2020    Rheumatoid arteritis ([address]     Umbilical cord complication        Past Surgical History:        Procedure Laterality Date     SECTION Bilateral 2020     SECTION performed by Zainab [facility] DO at [facility] [address]  2020    Mirena    OTHER SURGICAL HISTORY  12/10/2018    Fluoroscopy- with esophagas- possible delay of gastric emptying     TONSILLECTOMY         Medications:  Scheduled Meds:   levonorgestrel  1 each Intrauterine Once     Continuous Infusions:  PRN Meds:.     Allergies:  Iodine and Other    Social History:   Social History     Socioeconomic History    Marital status:      Spouse name: Not on file    Number of children: Not on file    Years of education: Not on file    Highest education level: Not on file   Occupational History    Not on file   Social Needs    Financial resource strain: Not on file    Food insecurity     Worry: Not on file     Inability: Not on file    Transportation needs     Medical: Not on file     Non-medical: Not on file   Tobacco Use    Smoking status: Never Smoker    Smokeless tobacco: Never Used   Substance and Sexual Activity    Alcohol use: No    Drug use: Yes     Frequency: 2.0 times per week     Types: Marijuana    Sexual activity: Yes     Partners: Male   Lifestyle    Physical activity     Days per week: Not on file     Minutes per session: Not on file    Stress: Not on file   Relationships    Social connections     Talks on phone: Not on file     Gets together: Not on file     Attends [employer] service: Not on file     Active member of club or organization: Not on file     Attends meetings of clubs or organizations: Not on file     Relationship status: Not on file    Intimate partner violence     Fear of current or ex partner: Not on file     Emotionally abused: Not on file     Physically abused: Not on file     Forced sexual activity: Not on file   Other Topics Concern    Not on file   Social History Narrative    Not on file       Family History:       Problem Relation Age of Onset    Elevated Lipids Mother        REVIEW OF SYSTEMS:      General: No recent weight gain/loss. Energy level normal for pt. HEENT: Denies sore throat, sinus problems, allergic rhinosinusitis  Cardiovascular: Denies chest pain, palpitations  Pulmonary: Denies cough, shortness of breath  GI:  Denies history of constipation, diarrhea, hematochezia or melena  : Denies polyuria, dysuria, hematuria  Endocrine: Denies diabetes, thyroid problems.   Hem/Onc: Positive hx PE during pregnancy, antiphospholipid  Neurological: Denies h/o CVA, TIA  Skin: Denies rashes, ulcers  Musculoskeletal: Denies muscle, joint, back pain      PHYSICAL EXAM:    VITALS:  /76 (Site: Left Upper Arm, Position: Sitting, Cuff Size: Large Adult) Comment: machine  Pulse 103   Temp 97.4 °F (36.3 °C) (Temporal)   Ht 5' 3\" (1.6 m)   Wt 283 lb 9.6 oz (128.6 kg)   SpO2 97%   BMI 50.24 kg/m²   INTAKE/OUTPUT:   No intake or output data in the 24 hours ending 08/12/20 1043      CONSTITUTIONAL:  Alert and oriented, no acute distress  HEAD: normocephalic, atraumatic  EYES: Pupils equal and reactive to light, Extraocular muscles intact, sclera non icteric  ENT: Mucus membranes moist, No otorrhea, no rhinorrhea  NECK:  supple, symmetrical, trachea midline   LUNGS:  Good air movement bilaterally, unlabored respirations, no wheezes or rhonchi  CARDIOVASCULAR: Regular rate and rhythm  ABDOMEN: obese, soft, non tender, non distended  MUSCULOSKELETAL:  Equal strength bilaterally, normal muscle tone  LYMPHATICS: No submental, anterior or posterior cervical lymphadenopathy, bilateral supraclavicular fullness without obvious adenopathy, palpable right axillary lymphadenopathy without tenderness, unable to palpate adenopathy on the left  SKIN: No abscess or rash, no open wounds to bilateral upper or lower extremities  NEUROLOGIC:  Cranial nerves 2-12 grossly intact, no focal deficits  PSYCH: affect appropriate      Pertinent Radiology:  Narrative    EXAMINATION:    CTA OF THE CHEST 7/30/2020 6:37 pm         TECHNIQUE:    CTA of the chest was performed after the administration of intravenous    contrast.  Multiplanar reformatted images are provided for review.  MIP    images are provided for review. Dose modulation, iterative reconstruction,    and/or weight based adjustment of the mA/kV was utilized to reduce the    radiation dose to as low as reasonably achievable.         COMPARISON:    June 9, 2020         HISTORY:    ORDERING SYSTEM PROVIDED HISTORY: Chest pain    TECHNOLOGIST PROVIDED HISTORY:    Chest pain    Reason for Exam: Upper chest discomfort, elevated d-dimer    Acuity: Acute    Type of Exam: Initial    Relevant Medical/Surgical History: Hx: PE         FINDINGS:    Pulmonary Arteries: Pulmonary arteries are adequately opacified for    evaluation.  No evidence of intraluminal filling defect to suggest pulmonary    embolism.  Main pulmonary artery is normal in caliber.         Mediastinum: There is increased density in the anterior mediastinum although    no discrete nodules are seen.  This may represent residual thymic tissue or    lymph node tissue.  The heart and pericardium demonstrate no acute    abnormality.  There is no acute abnormality of the thoracic aorta.         Lungs/pleura: The lungs are without acute process.  No focal consolidation or    pulmonary edema.  No evidence of pleural effusion or pneumothorax.         Upper Abdomen: Limited images of the upper abdomen are unremarkable.         Soft Tissues/Bones: There is evidence of bilateral axillary lymphadenopathy.     Axillary nodes on the right measure up to 1.8 cm as well as 1.8 cm on the    left.  Visualized portions of the breasts appear unremarkable.  Mild left    supraclavicular adenopathy with nodes measuring up to 1 cm.              Impression    1. No evidence of acute pulmonary embolism or acute aortic disease. 2. Bilateral axillary lymphadenopathy with fairly large lymph nodes. 3. Increased density in the anterior mediastinum which may represent residual    thymic tissue or lymphatic tissue. 4. Borderline supraclavicular adenopathy. 5. No active lung parenchyma or pleural disease.         RECOMMENDATIONS:    Axillary lymph node biopsy to rule out lymphoproliferative disease.               ASSESSMENT   Patient Active Problem List   Diagnosis    Chronic low back pain    Migraine without aura, not refractory    GERD    Adult body mass index 40 and over    Prediabetes    Low antithrombin III x1, repeat WNL    Antiphospholipid syndrome in pregnancy ([facility] [address]    AMA    Rheumatoid arthritis with rheumatoid factor, unspecified (HCC)    Lumbar radiculopathy    Lumbar spondylosis    Muscle pain    Attention deficit hyperactivity disorder, predominantly inattentive type    Hidradenitis    Recurrent pregnancy loss    Anticardiolipin antibody low positive x1 (26.9 on 3/19/19    Elevated blood-pressure reading without diagnosis of hypertension    Anticardiolipin antibody affecting pregnancy, antepartum    H/O:  section    Impaired glucose regulation with features of insulin resistance    Morbid obesity (HCC)    Spondylosis without myelopathy or radiculopathy, lumbosacral region    IUD (intrauterine device) in place     47year old female with hx of RA, antiphospholipid syndrome, bilateral axillary lymphadenopathy, mediastinal lymphadenopathy    PLAN    1. At this time, we recommend the patient have IR US guided lymph node biopsy  2. Should patient require further tissue sample, would reevaluate for excisional biopsy. Did discuss the risks of excisional biopsy with the patient including lymphedema  3.  We recommend the patient follow up with Dr Aly Chaudhary of Hematology Oncology for further workup as patient's axillary lymphadenopathy is increased in size since imaging performed in June and is also showing new areas of mediastinal adenopathy as well as possible supraclavicular adenopathy  4. Routine follow up imaging for breast cancer screening - mammogram and bilateral ultrasound BIRADS 2    Patient's case was discussed with Dr. Angelo Tello. Electronically signed by Tyree Luciano MD  on 8/12/2020 at 10:43 AM     I have reviewed the resident's note and I either performed the key elements of the medical history and physical exam or was present with the resident when the key elements of the medical history and physical exam were performed. I have discussed the findings, established the care plan and recommendations with Resident.     Electronically signed by Susan Reynolds IV, DO on 8/19/20 at 10:26 AM EDT

## 2020-08-12 NOTE — PROGRESS NOTES
Visit Information    Have you changed or started any medications since your last visit including any over-the-counter medicines, vitamins, or herbal medicines? Yes- see list   Have you stopped taking any of your medications? Is so, why? -  yes - see list   Are you having any side effects from any of your medications? - no    Have you seen any other physician or provider since your last visit? NEW PATIENT   Have you had any other diagnostic tests since your last visit?  no   Have you been seen in the emergency room and/or had an admission in a hospital since we last saw you? [place]-pain right side of chest (2 weeks ago)   Have you had your routine dental cleaning in the past 6 months?  no     Do you have an active MyChart account? If no, what is the barrier?   Yes    Patient Care Team:  Laina Skelton MD as PCP - General (Family Medicine)  Laina Skelton MD [facility] P[facility]  MIKHAIL Alexander - CNP as Nurse Practitioner (Nurse Practitioner)  Brodie Lundborg, MD as Obstetrician (Perinatology)  Ivory Prince MD as Consulting Physician (Gastroenterology)  Kiah Reid DO as Consulting Physician (General Surgery)    Medical History Review  Past Medical, Family, and Social History reviewed and does not contribute to the patient presenting condition    Health Maintenance   Topic Date Due    Varicella vaccine (1 of 2 - 2-dose childhood series) 08/03/1983    Flu vaccine (1) 09/01/2020    A1C test (Diabetic or Prediabetic)  12/02/2020    Cervical cancer screen  07/30/2024    DTaP/Tdap/Td vaccine (2 - Td) 02/21/2027    HIV screen  Completed    Hepatitis A vaccine  Aged Out    Hepatitis B vaccine  Aged Out    Hib vaccine  Aged Out    Meningococcal (ACWY) vaccine  Aged Out    Pneumococcal 0-64 years Vaccine  Aged Out

## 2020-08-13 ENCOUNTER — TELEPHONE (OUTPATIENT)
Dept: ONCOLOGY | Age: 40
End: 2020-08-13

## 2020-08-13 NOTE — TELEPHONE ENCOUNTER
#1 ATTEMPTED TO CONTACT PT TO SCHEDULE CONSULTATION APPT, NO ANSWER & MAILBOX IS FULL. WILL TRY AGAIN.

## 2020-08-17 ENCOUNTER — HOSPITAL ENCOUNTER (OUTPATIENT)
Age: 40
Discharge: HOME | End: 2020-08-17
Payer: MEDICARE

## 2020-08-17 ENCOUNTER — HOSPITAL ENCOUNTER (OUTPATIENT)
Dept: NEUROLOGY | Age: 40
Discharge: HOME | End: 2020-08-17
Payer: MEDICARE

## 2020-08-17 LAB
FOLATE: 16.3 NG/ML
VITAMIN B-12: 556 PG/ML (ref 232–1245)

## 2020-08-17 PROCEDURE — 36415 COLL VENOUS BLD VENIPUNCTURE: CPT

## 2020-08-17 PROCEDURE — 82746 ASSAY OF FOLIC ACID SERUM: CPT

## 2020-08-17 PROCEDURE — 95819 EEG AWAKE AND ASLEEP: CPT | Performed by: PSYCHIATRY & NEUROLOGY

## 2020-08-17 PROCEDURE — 95819 EEG AWAKE AND ASLEEP: CPT

## 2020-08-17 PROCEDURE — 82607 VITAMIN B-12: CPT

## 2020-08-17 NOTE — PROCEDURES
[address], [address]                          ELECTROENCEPHALOGRAM REPORT    PATIENT NAME: Kevin Medina                      :        1980  MED REC NO:   [id number]                              ROOM:  ACCOUNT NO:   [de-identified]                           ADMIT DATE: 2020  PROVIDER:     Randel Blizzard, MD    DATE OF EE2020    ATTENDING OF RECORD:  Dr. Leola Arnold and Dr. Norma Razo. REASON FOR STUDY:  This is a 24-year-old patient with episodes of  confusion, migraine. EEG FINDINGS:  This is a 16-channel EEG with one EKG channel recording  performed in a patient described to be awake, drowsy and asleep. The  patient shows normal waking rhythms. Background activity consists of  well-regulated 9 Hz activity in the 40-60 microvolt range, more  prominent over the posterior head areas showing good reactivity to eye  opening and closing. Over the anterior head regions, there are 15-20 Hz  activity in the 20-30 microvolt range. The patient does have mild  intermittent medium amplitude sharp wave formation over the right  parietotemporal head area admixed with theta activity in the medium  amplitude range. With drowsiness and sleep, there is further intrusion  of slower frequencies in the theta and delta band accompanied by vertex  wave activity. Hyperventilation shows no change in the record. Photic  stimulation shows no change in the record. IMPRESSION:  This EEG does show mild intermittent right parietotemporal  slowing and sharp wave formation, which may be concordant with  underlying possible structural process. Sharp wave formation may be  potentially epileptiform.   Clinical correlation is warranted        Marva Campos MD    D: 2020 18:43:46       T: 2020 18:46:57     [email or web address]  Job#: [id number]     Doc#: [id number]    CC:

## 2020-08-19 ENCOUNTER — HOSPITAL ENCOUNTER (OUTPATIENT)
Dept: ULTRASOUND IMAGING | Age: 40
Discharge: HOME | End: 2020-08-21
Payer: MEDICARE

## 2020-08-19 VITALS — HEART RATE: 92 BPM | OXYGEN SATURATION: 100 % | RESPIRATION RATE: 16 BRPM

## 2020-08-19 PROCEDURE — 88185 FLOWCYTOMETRY/TC ADD-ON: CPT

## 2020-08-19 PROCEDURE — 76942 ECHO GUIDE FOR BIOPSY: CPT

## 2020-08-19 PROCEDURE — 88184 FLOWCYTOMETRY/ TC 1 MARKER: CPT

## 2020-08-19 PROCEDURE — 88342 IMHCHEM/IMCYTCHM 1ST ANTB: CPT

## 2020-08-19 PROCEDURE — 88341 IMHCHEM/IMCYTCHM EA ADD ANTB: CPT

## 2020-08-19 PROCEDURE — 88172 CYTP DX EVAL FNA 1ST EA SITE: CPT

## 2020-08-19 PROCEDURE — 88305 TISSUE EXAM BY PATHOLOGIST: CPT

## 2020-08-19 PROCEDURE — 88177 CYTP FNA EVAL EA ADDL: CPT

## 2020-08-19 PROCEDURE — 2709999900 US BIOPSY LYMPH NODE

## 2020-08-19 PROCEDURE — 88333 PATH CONSLTJ SURG CYTO XM 1: CPT

## 2020-08-19 PROCEDURE — 88173 CYTOPATH EVAL FNA REPORT: CPT

## 2020-08-19 NOTE — BRIEF OP NOTE
Brief Postoperative Note    Estee Neely  YOB: 1980  [id number]    Pre-operative Diagnosis: Axillary lymphadenopathy    Post-operative Diagnosis: Same    Procedure: Lymph node bx    Anesthesia: Local    Surgeons/Assistants: Corin Alvarado MD and Evonne Costello PA-C    Estimated Blood Loss: less than 50     Complications: None    Specimens: Was Obtained:     Findings: Technically successful bx of right lymph node. Several core biopsies and FNA samples obtained. Pt tolerated well, dressings applied.      Electronically signed by CANDICE Nguyen on 8/19/2020 at 12:05 PM

## 2020-08-19 NOTE — PROGRESS NOTES
Pt arrives to [address] [facility] [address] for Rt axillary LN biopsy  Dr Petrona Ramirez and BRIANNA HARVEY to room   RS RDMS to bedside  Site prepped and draped  Access obtained and specimens given to pathology team  Tolerated well  Access removed and site covered with band aid  Ice pack provided for comfort  Dc home with family

## 2020-08-21 LAB
SURGICAL PATHOLOGY REPORT: NORMAL
SURGICAL PATHOLOGY REPORT: NORMAL

## 2020-08-24 ENCOUNTER — HOSPITAL ENCOUNTER (OUTPATIENT)
Facility: MEDICAL CENTER | Age: 40
Discharge: HOME | End: 2020-08-24
Payer: MEDICARE

## 2020-08-24 ENCOUNTER — TELEPHONE (OUTPATIENT)
Dept: ONCOLOGY | Age: 40
End: 2020-08-24

## 2020-08-24 ENCOUNTER — OFFICE VISIT (OUTPATIENT)
Dept: ONCOLOGY | Age: 40
End: 2020-08-24
Payer: MEDICARE

## 2020-08-24 VITALS — HEART RATE: 106 BPM | SYSTOLIC BLOOD PRESSURE: 123 MMHG | DIASTOLIC BLOOD PRESSURE: 89 MMHG | TEMPERATURE: 97.5 F

## 2020-08-24 DIAGNOSIS — M06.9 RHEUMATOID ARTHRITIS, INVOLVING UNSPECIFIED SITE, UNSPECIFIED RHEUMATOID FACTOR PRESENCE: ICD-10-CM

## 2020-08-24 DIAGNOSIS — O88.212 PULMONARY EMBOLISM AFFECTING PREGNANCY IN SECOND TRIMESTER: ICD-10-CM

## 2020-08-24 DIAGNOSIS — R59.1 LYMPHADENOPATHY: ICD-10-CM

## 2020-08-24 DIAGNOSIS — E66.01 MORBID OBESITY DUE TO EXCESS CALORIES (HCC): ICD-10-CM

## 2020-08-24 LAB
ABSOLUTE EOS #: 2.81 K/UL (ref 0–0.44)
ABSOLUTE IMMATURE GRANULOCYTE: 0.12 K/UL (ref 0–0.3)
ABSOLUTE LYMPH #: 2.93 K/UL (ref 1.1–3.7)
ABSOLUTE MONO #: 0.47 K/UL (ref 0.1–1.2)
ABSOLUTE RETIC #: 0.04 M/UL (ref 0.03–0.08)
ALBUMIN SERPL-MCNC: 3.6 G/DL (ref 3.5–5.2)
ALBUMIN/GLOBULIN RATIO: ABNORMAL (ref 1–2.5)
ALP BLD-CCNC: 117 U/L (ref 35–104)
ALT SERPL-CCNC: 45 U/L (ref 5–33)
ANION GAP SERPL CALCULATED.3IONS-SCNC: 11 MMOL/L (ref 9–17)
AST SERPL-CCNC: 21 U/L
BASOPHILS # BLD: 0 % (ref 0–2)
BASOPHILS ABSOLUTE: 0 K/UL (ref 0–0.2)
BILIRUB SERPL-MCNC: 0.15 MG/DL (ref 0.3–1.2)
BUN BLDV-MCNC: 13 MG/DL (ref 6–20)
BUN/CREAT BLD: 24 (ref 9–20)
C-REACTIVE PROTEIN: 28.9 MG/L (ref 0–5)
CALCIUM SERPL-MCNC: 8.7 MG/DL (ref 8.6–10.4)
CHLORIDE BLD-SCNC: 108 MMOL/L (ref 98–107)
CO2: 23 MMOL/L (ref 20–31)
CREAT SERPL-MCNC: 0.54 MG/DL (ref 0.5–0.9)
DIFFERENTIAL TYPE: ABNORMAL
EOSINOPHILS RELATIVE PERCENT: 24 % (ref 1–4)
GFR AFRICAN AMERICAN: >60 ML/MIN
GFR NON-AFRICAN AMERICAN: >60 ML/MIN
GFR SERPL CREATININE-BSD FRML MDRD: ABNORMAL ML/MIN/{1.73_M2}
GFR SERPL CREATININE-BSD FRML MDRD: ABNORMAL ML/MIN/{1.73_M2}
GLUCOSE BLD-MCNC: 106 MG/DL (ref 70–99)
HCT VFR BLD CALC: 41 % (ref 36.3–47.1)
HEMOGLOBIN: 12.6 G/DL (ref 11.9–15.1)
IMMATURE GRANULOCYTES: 1 %
IMMATURE RETIC FRACT: 15.8 % (ref 2.7–18.3)
LYMPHOCYTES # BLD: 25 % (ref 24–43)
MCH RBC QN AUTO: 25.6 PG (ref 25.2–33.5)
MCHC RBC AUTO-ENTMCNC: 30.7 G/DL (ref 28.4–34.8)
MCV RBC AUTO: 83.3 FL (ref 82.6–102.9)
MONOCYTES # BLD: 4 % (ref 3–12)
NRBC AUTOMATED: 0 PER 100 WBC
PDW BLD-RTO: 15.4 % (ref 11.8–14.4)
PLATELET # BLD: 371 K/UL (ref 138–453)
PLATELET ESTIMATE: ABNORMAL
PMV BLD AUTO: 9.3 FL (ref 8.1–13.5)
POTASSIUM SERPL-SCNC: 3.8 MMOL/L (ref 3.7–5.3)
RBC # BLD: 4.92 M/UL (ref 3.95–5.11)
RBC # BLD: ABNORMAL 10*6/UL
RETIC %: 0.8 % (ref 0.5–1.9)
RETIC HEMOGLOBIN: 29.9 PG (ref 28.2–35.7)
SEDIMENTATION RATE, ERYTHROCYTE: 48 MM (ref 0–20)
SEG NEUTROPHILS: 46 % (ref 36–65)
SEGMENTED NEUTROPHILS ABSOLUTE COUNT: 5.37 K/UL (ref 1.5–8.1)
SODIUM BLD-SCNC: 142 MMOL/L (ref 135–144)
TOTAL PROTEIN: 7 G/DL (ref 6.4–8.3)
WBC # BLD: 11.7 K/UL (ref 3.5–11.3)
WBC # BLD: ABNORMAL 10*3/UL

## 2020-08-24 PROCEDURE — 85045 AUTOMATED RETICULOCYTE COUNT: CPT

## 2020-08-24 PROCEDURE — 36415 COLL VENOUS BLD VENIPUNCTURE: CPT

## 2020-08-24 PROCEDURE — 99214 OFFICE O/P EST MOD 30 MIN: CPT | Performed by: INTERNAL MEDICINE

## 2020-08-24 PROCEDURE — 88185 FLOWCYTOMETRY/TC ADD-ON: CPT

## 2020-08-24 PROCEDURE — 88184 FLOWCYTOMETRY/ TC 1 MARKER: CPT

## 2020-08-24 PROCEDURE — 99211 OFF/OP EST MAY X REQ PHY/QHP: CPT | Performed by: INTERNAL MEDICINE

## 2020-08-24 PROCEDURE — 85025 COMPLETE CBC W/AUTO DIFF WBC: CPT

## 2020-08-24 PROCEDURE — 86140 C-REACTIVE PROTEIN: CPT

## 2020-08-24 PROCEDURE — 1036F TOBACCO NON-USER: CPT | Performed by: INTERNAL MEDICINE

## 2020-08-24 PROCEDURE — G8427 DOCREV CUR MEDS BY ELIG CLIN: HCPCS | Performed by: INTERNAL MEDICINE

## 2020-08-24 PROCEDURE — 80053 COMPREHEN METABOLIC PANEL: CPT

## 2020-08-24 PROCEDURE — G8417 CALC BMI ABV UP PARAM F/U: HCPCS | Performed by: INTERNAL MEDICINE

## 2020-08-24 PROCEDURE — 85652 RBC SED RATE AUTOMATED: CPT

## 2020-08-24 NOTE — TELEPHONE ENCOUNTER
Oleksandr Butler FOR MD VISIT  DR FLORES IN TO SEE PATIENT  ORDERS RECEIVED  LABS TODAY  RV 1 WEEK  LABS ON EXIT CDP CMP SED RATE C-REACT PROTEIN PATH REVIEW SMEAR FLOW CYTOMETRY  MD VISIT 8/31/20 @9:15AM  AVS PRINTED AND GIVEN TO PATIENT WITH INSTRUCTIONS  PATIENT DISCHARGED AMBULATORY

## 2020-08-24 NOTE — PROGRESS NOTES
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 o.female is a patient with chief complaint of shortness of breath. Patient is 26-week pregnant. Presented with pelvic pain urinary frequency and dyspnea for about 2 days. Patient underwent CT chest which showed nonobstructive small left lower lobe pulmonary embolism. Patient denies any previous history of blood clots. Patient has had thrombophilia work-up done due to multiple pregnancy losses in the past which has not been clinically significant. Patient has been started on Lovenox which she is tolerating well. Patient has BMI of 51. Initial testing showed anticardiolipin IgG antibody to be 26.9 and in 12/2019 dropped to 3.7      Interim History:     Patient presents to the clinic for a follow-up visit and to discuss results of her lab work-up and other relevant clinical data. Patient was last seen in the office in February. She discontinued anticoagulation in March and has been doing well. Patient did go to the ER for an episode of difficulty speech. MRI did not suggest a stroke.   Patient again went to the ER in July for an episode of chest pain we did not show pulmonary embolism but showed bilateral axillary and supraclavicular lymphadenopathy. Patient also underwent a mammogram which was read as BI-RADS 2. Patient has been started on methotrexate by her rheumatologist for rheumatoid arthritis. During this visit patient's allergy, social, medical, surgical history and medications were reviewed and updated.     Past Medical History:   Past Medical History:   Diagnosis Date    Abnormal 1st trimester screen (Tri 21 1:117)--NIPT nml  2019    ADD (attention deficit disorder) without hyperactivity     Celestone  & 1/10 2020    Chronic back pain     Echogenic focus of heart of fetus affecting antepartum care of mother 2019   Liliam Clines early 1hr, nml 3hr  2019    1 elevated value, [facility] recommends repeat 3hr GTT in 2 weeks    Elevated umbilical artery dopplers  1/10/2020    Fetal ascites     Fetal heart rate decelerations affecting management of mother     GERD (gastroesophageal reflux disease)     Headache     Hypertension     IUD (intrauterine device) in place 2020    Adina Yuntomasz Garza    Lumbar radiculopathy 2019    Lumbar spondylosis 2019    Multigravida of advanced maternal age in third trimester     Obesity     Obesity affecting pregnancy in first trimester 2018    Obesity affecting pregnancy in third trimester     Pulmonary embolism affecting pregnancy in second trimester 2019    Rh+/RI/GBSunk 2020    Rheumatoid arteritis ([address]     Umbilical cord complication        Past Surgical History:     Past Surgical History:   Procedure Laterality Date     SECTION Bilateral 2020     SECTION performed by Betsey Mayorga DO at [facility] [address]  2020    Mirena    OTHER SURGICAL HISTORY  12/10/2018    Fluoroscopy- with esophagas- possible delay of gastric emptying     TONSILLECTOMY      US LYMPH NODE BIOPSY  2020    US LYMPH NODE BIOPSY 2020 [facility]TRAS[facility]       Patient Family Social intact  Mouth - mucous membranes moist, pharynx normal without lesions  Neck - supple, no significant adenopathy  Lymphatics - no palpable lymphadenopathy, no hepatosplenomegaly  Chest - clear to auscultation, no wheezes, rales or rhonchi, symmetric air entry  Heart - normal rate, regular rhythm, normal S1, S2, no murmurs  Abdomen - soft, nontender, nondistended, no masses or organomegaly  Neurological - alert, oriented, normal speech, no focal findings or movement disorder noted  Extremities - peripheral pulses normal, no pedal edema, no clubbing or cyanosis  Skin - normal coloration and turgor, no rashes, no suspicious skin lesions noted  - small bruising around nails      DATA:    Results for orders placed or performed during the hospital encounter of 08/19/20   Surgical Pathology   Result Value Ref Range    Surgical Pathology Report       -- Diagnosis --   FINE NEEDLE ASPIRATION RT AXILLARY:          NEGATIVE FOR MALIGNANCY. Shyla Kaur M.D  **Electronically Signed Out**         des/8/21/2020       Clinical Information  RT axillary arm swelling, 3.1 cm in size. Pain on palp. Source of Specimen  1: FINE NEEDLE ASPIRATION RT AXILLARY    Gross Description  \"RT FUANFFWW\" Specimen received in CytoLyt solution, colorless fluid  with small white flecks, 2 DQ slides prepared. IMMEDIATE EVALUATION:    Evaluation episode:  FNA:  Pass 1: No cellular material.  Pass 2: Lymphocytes present. Pass 3: All submitted in Flow (no slides). Eleuterio Pearce MD.      MICROSCOPIC DESCRIPTION     Microscopic examination performed. The DiffQuik slides show rare  small bening appearing lymphocytes. The ThinPrep slide and cell block  slide are acellular. Please see corresponding biopsy report  [id number]. [address]       Patient Name: Mason Washburn   Med Rec: [id number]   Path Number: [id number]    [facility]  [facility] [address] [place], [address]  [phone]  Fax: [phone]     Surgical Pathology   Result Value Ref Range    Surgical Pathology Report       [id number]  [address], [facility] [address] [place], [address]  [phone]  Fax: [phone]  0 [facility]    Patient Name: Rocio Travis  MR#: [id number]  Specimen #[id number]    Procedures/Addenda  FLOW CYTOMETRY REPORT     Date Ordered:     8/21/2020     Status:  Signed Out       Date Complete:     8/21/2020     By: Drake Way M.D. Date Reported:     8/21/2020       INTERPRETATION  FLOW CYTOMETRIC IMMUNOPHENOTYPING ANALYSIS OF RIGHT AXILLARY LYMPH  NODE FNA LYMPHOCYTE POPULATION IS NEGATIVE FOR B-CELL MONOCLONALITY  AND T-CELL ABERRANCY (REACTIVE LYMPHOCYTES). THE SPECIMEN IS  HYPOCELLULAR, THEREFORE, ADDITIONAL IHC STAINS ARE RECOMMENDED TO  FINALIZE THE DIAGNOSIS. RESULTS-COMMENTS  ANALYSIS  Sample type:  Right Axillary Lymph Node FNA     Flow cytometric immunophenotyping analysis is performed on  hypocellular right axillary lymph node FNA following rbc lysis  procedu re. The cells are labeled by direct ten color immunostaining  procedure and analyzed on a [employer] flow cytometer. Thoracentesis fluid cytocentrifuge differential cell count       82% Lymphocyte           18% Other cell types         Viability:  99%    Marker Panels:            B-cell Tube:  CD5, CD10, CD19, CD20, CD22, CD23, CD45,  , Kappa, Lambda            T-cell Tube:  CD2, CD3, CD4, CD5, CD7, CD8, CD16, CD45,  CD56, CD57                This leuk/lymph immunophenotyping test was developed and its  performance characteristics determined by [address] [place] [address]. It has not been cleared or approved by the U.S. Food and Drug Administration. The FDA has determined that such  clearance or approval is not necessary.   This test is used for  clinical purposes. It should not be regarded as investigational or  for research. This laboratory is certified under the [address] (CLIA) as qualified to  perform high complex ity clinical laboratory testing. Monalisa Verduzco M.D. Final Diagnosis  RIGHT AXILLARY LYMPH NODE, NEEDLE CORE BIOPSY:  -CONSISTENT WITH BENIGN REACTIVE LYMPH NODE. Jayda Abernathy M.D  **Electronically Signed Out**         des/8/20/2020  Clinical Information  Pre-op Diagnosis:  RT AXILLARY ARM SWELLING, NO PAIN ON PALP. , f IN  SIZE SINCE JUNE 2020, 3.1 CM   Operative Findings:  RT AXILLARY ARM  Operation Performed:  FNA R AXILLA LN; CORE NEEDLE BX R AXILLA LN    Source:  1: R AXILLARY LN    Gross Description  \"SABRINA ADRIEL, R AXILLARY LN\" Five tan-white cores from 0.3 to 0.6 cm  in length, < 0.1 cm in diameter. Entirely 2cs. mpb tm       Intraoperative Diagnosis  FNA:     Pass 1:  No cellular material.     Pass 2:  Lymphocytes present. Pass 3:  (In flow), all submitted  for flow (no slide). Core Bx:       Pass 1:  Lymphoid cells. Pass 2:  Lymphoid cells. Pass 3:  Lymphoid cells. Pass 4:  Lymphoid cells. Pass 5:  Lymphoid cells. Pass  6:  (In flow), mostly blood. Pass 7:  Lymphoid cells.  (JET)      Microscopic Description  Microscopic examination performed. Sections show cores of lymph node  tissue. The CD20 immunostain highlights the B-cell areas. The CD3  and CD5 immunostains highlight the T-cell areas. CD10 and BCL6  immunostains highlight the follicular germinal centers. The germinal  centers are negative for BCL2. Lymphoid tissue is negative for cyclin  D1 immunostain. CD21 immunostain highlights the dendritic cells in  the follicles. Cytokeratin RUSS immunostain reveals no infiltrating  malignant epithelial cells. Controls react as expected.   The combined  immuno-morphologic features are consistent with a benign reactive  lymph node. For , slides also reviewed by Franky Driscoll,  and Armen Alexander, who concur with the diagnosis. 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 Biopsy including core biopsy as well as FNA which does not show any malignancy. Patient has had chronic lymphadenopathy going for his back as November 2019. Etiology is not clear could be reactive. Additionally patient also has leukocytosis. Due to suspicion for myeloproliferative disorder I will order flow cytometry. I will also recheck CBC and order peripheral smear. Obtain inflammatory markers including ESR and CRP. If there is work-up are nondiagnostic we will consider repeat imaging may be with a CT PET. If peripheral blood abnormalities worsen may consider bone marrow biopsy as well. Regarding B symptoms patient does have lymphadenopathy as well as complains of fatigue but does not have any unintentional weight loss fevers or drenching night sweats. Patient continues to do well off anticoagulation. Patient was encouraged on active lifestyle. Patient continues to be on methotrexate per her rheumatologist.  Continue to monitor lab work-up including hemoglobin. Reviewed results of lymph node      SONNY FLORES      This note is created with the assistance of a speech recognition program.  While intending to generate a document that actually reflects the content of the visit, the document can still have some errors including those of syntax and sound a like substitutions which may escape proof reading.   It such instances, actual meaning can be extrapolated by contextual diversion.

## 2020-08-25 ENCOUNTER — HOSPITAL ENCOUNTER (OUTPATIENT)
Facility: MEDICAL CENTER | Age: 40
End: 2020-08-25
Payer: MEDICARE

## 2020-08-25 LAB
FLOW CYTOMETRY SOURCE: NORMAL
FLOW CYTOMETRY, NODE/FLUID: NORMAL
SURGICAL PATHOLOGY REPORT: NORMAL

## 2020-08-26 ENCOUNTER — HOSPITAL ENCOUNTER (OUTPATIENT)
Dept: PREADMISSION TESTING | Age: 40
Setting detail: SPECIMEN
Discharge: HOME | End: 2020-08-30
Payer: MEDICARE

## 2020-08-26 LAB — FLOW CYTOMETRY BL: NORMAL

## 2020-08-26 PROCEDURE — U0003 INFECTIOUS AGENT DETECTION BY NUCLEIC ACID (DNA OR RNA); SEVERE ACUTE RESPIRATORY SYNDROME CORONAVIRUS 2 (SARS-COV-2) (CORONAVIRUS DISEASE [COVID-19]), AMPLIFIED PROBE TECHNIQUE, MAKING USE OF HIGH THROUGHPUT TECHNOLOGIES AS DESCRIBED BY CMS-2020-01-R: HCPCS

## 2020-08-27 LAB — SARS-COV-2, NAA: NOT DETECTED

## 2020-08-29 ENCOUNTER — HOSPITAL ENCOUNTER (OUTPATIENT)
Dept: SLEEP CENTER | Age: 40
Discharge: HOME | End: 2020-08-31
Payer: MEDICARE

## 2020-08-29 PROCEDURE — 95810 POLYSOM 6/> YRS 4/> PARAM: CPT | Performed by: PSYCHIATRY & NEUROLOGY

## 2020-08-29 PROCEDURE — 95810 POLYSOM 6/> YRS 4/> PARAM: CPT

## 2020-08-30 VITALS
BODY MASS INDEX: 49.26 KG/M2 | TEMPERATURE: 97.6 F | HEIGHT: 63 IN | DIASTOLIC BLOOD PRESSURE: 85 MMHG | RESPIRATION RATE: 18 BRPM | HEART RATE: 107 BPM | OXYGEN SATURATION: 97 % | SYSTOLIC BLOOD PRESSURE: 122 MMHG | WEIGHT: 278 LBS

## 2020-08-30 ASSESSMENT — SLEEP AND FATIGUE QUESTIONNAIRES
HOW LIKELY ARE YOU TO NOD OFF OR FALL ASLEEP IN A CAR, WHILE STOPPED FOR A FEW MINUTES IN TRAFFIC: 1
HOW LIKELY ARE YOU TO NOD OFF OR FALL ASLEEP WHILE SITTING QUIETLY AFTER LUNCH WITHOUT ALCOHOL: 1
HOW LIKELY ARE YOU TO NOD OFF OR FALL ASLEEP WHEN YOU ARE A PASSENGER IN A CAR FOR AN HOUR WITHOUT A BREAK: 3
HOW LIKELY ARE YOU TO NOD OFF OR FALL ASLEEP WHILE SITTING INACTIVE IN A PUBLIC PLACE: 0
HOW LIKELY ARE YOU TO NOD OFF OR FALL ASLEEP WHILE WATCHING TV: 1
HOW LIKELY ARE YOU TO NOD OFF OR FALL ASLEEP WHILE LYING DOWN TO REST IN THE AFTERNOON WHEN CIRCUMSTANCES PERMIT: 3
HOW LIKELY ARE YOU TO NOD OFF OR FALL ASLEEP WHILE SITTING AND READING: 1
ESS TOTAL SCORE: 10
HOW LIKELY ARE YOU TO NOD OFF OR FALL ASLEEP WHILE SITTING AND TALKING TO SOMEONE: 0

## 2020-08-31 ENCOUNTER — OFFICE VISIT (OUTPATIENT)
Dept: ONCOLOGY | Age: 40
End: 2020-08-31
Payer: MEDICARE

## 2020-08-31 ENCOUNTER — TELEPHONE (OUTPATIENT)
Dept: ONCOLOGY | Age: 40
End: 2020-08-31

## 2020-08-31 VITALS
HEART RATE: 90 BPM | WEIGHT: 278.5 LBS | TEMPERATURE: 97.3 F | BODY MASS INDEX: 49.33 KG/M2 | DIASTOLIC BLOOD PRESSURE: 82 MMHG | SYSTOLIC BLOOD PRESSURE: 117 MMHG

## 2020-08-31 PROCEDURE — G8427 DOCREV CUR MEDS BY ELIG CLIN: HCPCS | Performed by: INTERNAL MEDICINE

## 2020-08-31 PROCEDURE — G8417 CALC BMI ABV UP PARAM F/U: HCPCS | Performed by: INTERNAL MEDICINE

## 2020-08-31 PROCEDURE — 99214 OFFICE O/P EST MOD 30 MIN: CPT | Performed by: INTERNAL MEDICINE

## 2020-08-31 PROCEDURE — 1036F TOBACCO NON-USER: CPT | Performed by: INTERNAL MEDICINE

## 2020-08-31 PROCEDURE — 99212 OFFICE O/P EST SF 10 MIN: CPT | Performed by: INTERNAL MEDICINE

## 2020-08-31 NOTE — PROGRESS NOTES
Vimal Morris                                                                                                                  8/31/2020  MRN:   [id number]  YOB: 1980  PCP:                           Krystian Paniagua MD  Referring Physician: No ref. 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 o.female is a patient with chief complaint of shortness of breath. Patient is 26-week pregnant. Presented with pelvic pain urinary frequency and dyspnea for about 2 days. Patient underwent CT chest which showed nonobstructive small left lower lobe pulmonary embolism. Patient denies any previous history of blood clots. Patient has had thrombophilia work-up done due to multiple pregnancy losses in the past which has not been clinically significant. Patient has been started on Lovenox which she is tolerating well. Patient has BMI of 51. Initial testing showed anticardiolipin IgG antibody to be 26.9 and in 12/2019 dropped to 3.7      Interim History:     Patient presents to the clinic for a follow-up visit and to discuss results of her lab work-up. Patient continues to complain of fatigue. Denies any ER visit hospitalization. Continues to be on methotrexate. Joint pain is manageable. Flow cytometry is negative. During this visit patient's allergy, social, medical, surgical history and medications were reviewed and updated.     Past Medical History:   Past Medical History:   Diagnosis Date    Abnormal 1st trimester screen (Tri 21 1:117)--NIPT nml  11/30/2019    ADD Smokeless tobacco: Never Used   Substance and Sexual Activity    Alcohol use: No    Drug use: Yes     Frequency: 2.0 times per week     Types: Marijuana    Sexual activity: Yes     Partners: Male   Lifestyle    Physical activity     Days per week: None     Minutes per session: None    Stress: None   Relationships    Social connections     Talks on phone: None     Gets together: None     Attends Alevism service: None     Active member of club or organization: None     Attends meetings of clubs or organizations: None     Relationship status: None    Intimate partner violence     Fear of current or ex partner: None     Emotionally abused: None     Physically abused: None     Forced sexual activity: None   Other Topics Concern    None   Social History Narrative    None       Family History   Problem Relation Age of Onset    Elevated Lipids Mother        Current Medications:     Current Outpatient Medications   Medication Sig Dispense Refill    methotrexate (RHEUMATREX) 2.5 MG chemo tablet Take 5 tab every week orally for 90 days      nabumetone (RELAFEN) 750 MG tablet nabumetone 750 mg tablet   Take 1 tablet twice a day by oral route for 30 days.  triamcinolone (KENALOG) 0.1 % cream Apply topically 2 times daily      predniSONE (DELTASONE) 5 MG tablet Take 2.5 mg by mouth 2 times daily       pantoprazole (PROTONIX) 20 MG tablet Take 1 tablet by mouth daily 30 tablet 0    Prenatal Multivit-Min-Fe-FA (PRENATAL VITAMINS) 0.8 MG TABS Take 1 tablet by mouth daily 30 tablet 12    ammonium lactate (LAC-HYDRIN) 12 % lotion Apply topically daily.  1 Bottle 0    Cholecalciferol (VITAMIN D) 2000 units CAPS capsule Take by mouth      folic acid (FOLVITE) 1 MG tablet Take 1 mg by mouth daily      albuterol (PROVENTIL) (2.5 MG/3ML) 0.083% nebulizer solution Take 3 mLs by nebulization daily as needed for Wheezing (Patient not taking: Reported on 8/26/2020) 120 each 3     Current Facility-Administered Medications   Medication Dose Route Frequency Provider Last Rate Last Dose    levonorgestrel (MIRENA) IUD 52 mg 1 each  1 each Intrauterine Once Mary Alice Slim, APRN - CNP   1 each at 05/07/20 1459       Allergies:   Iodine and Other    Review of Systems:    Constitutional: No fever or chills. No night sweats, no weight loss   Eyes: No eye discharge, double vision, or eye pain   HEENT: negative for sore mouth, sore throat, hoarseness and voice change   Respiratory: negative for cough , sputum, dyspnea, wheezing, hemoptysis, chest pain   Cardiovascular: negative for chest pain, dyspnea, palpitations, orthopnea, PND   Gastrointestinal: negative for nausea, vomiting, diarrhea, constipation, abdominal pain, Dysphagia, hematemesis and hematochezia   Genitourinary: negative for frequency, dysuria, nocturia, urinary incontinence, and hematuria   Integument: negative for rash, skin lesions, bruises. Hematologic/Lymphatic: negative for easy bruising, bleeding,  or petechiae.   Positive swollen lymph nodes in the armpits  Endocrine: negative for heat or cold intolerance,weight changes, change in bowel habits and hair loss   Musculoskeletal: negative for myalgias, arthralgias, pain, joint swelling,and bone pain   Neurological: negative for headaches, dizziness, seizures, weakness, numbness        Physical Exam:    Vitals: /82   Pulse 90   Temp 97.3 °F (36.3 °C) (Temporal)   Wt 278 lb 8 oz (126.3 kg)   BMI 49.33 kg/m²   General appearance - well appearing, no in pain or distress  Mental status - AAO X3  Eyes - pupils equal and reactive, extraocular eye movements intact  Mouth - mucous membranes moist, pharynx normal without lesions  Neck - supple, no significant adenopathy  Lymphatics - no palpable lymphadenopathy, no hepatosplenomegaly  Chest - clear to auscultation, no wheezes, rales or rhonchi, symmetric air entry  Heart - normal rate, regular rhythm, normal S1, S2, no murmurs  Abdomen - soft, nontender, nondistended, no masses or organomegaly  Neurological - alert, oriented, normal speech, no focal findings or movement disorder noted  Extremities - peripheral pulses normal, no pedal edema, no clubbing or cyanosis  Skin - normal coloration and turgor, no rashes, no suspicious skin lesions noted  - small bruising around nails      DATA:    INTERPRETATION   PERIPHERAL BLOOD:      -  SEVERE EOSINOPHILIA (2800/UL)      -  NORMAL ABSOLUTE LYMPHOCYTE COUNT (2930/UL); FLOW CYTOMETRY   NEGATIVE (REACTIVE         LYMPHOCYTES). Impression    1. No evidence of acute pulmonary embolism or acute aortic disease. 2. Bilateral axillary lymphadenopathy with fairly large lymph nodes. 3. Increased density in the anterior mediastinum which may represent residual    thymic tissue or lymphatic tissue. 4. Borderline supraclavicular adenopathy. 5. No active lung parenchyma or pleural disease.         RECOMMENDATIONS:    Axillary lymph node biopsy to rule out lymphoproliferative disease.               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 Patient has completed anticoagulation. Patient has a levonorgestrel/Mirena IUD. Flow cytometry is negative for any lymphoproliferative disorder. Patient continues to be on methotrexate. Continues to have swollen lymph node and fatigue but denies any unintentional weight loss fevers or drenching night sweats. We plan to repeat imaging studies to reassess status of swollen lymph nodes/lymphadenopathy. We will also obtain CT abdomen pelvis    Patient was encouraged on active lifestyle.     Patient continues to be on methotrexate per her rheumatologist.  Continue to monitor lab work-up including hemoglobin. Reviewed results of lymph node      SONNY FLORES      This note is created with the assistance of a speech recognition program.  While intending to generate a document that actually reflects the content of the visit, the document can still have some errors including those of syntax and sound a like substitutions which may escape proof reading. It such instances, actual meaning can be extrapolated by contextual diversion.

## 2020-08-31 NOTE — TELEPHONE ENCOUNTER
Jeffrey Barney FOR MD VISIT  DR FLORES IN TO SEE PATIENT  ORDERS RECEIVED  RV 2 MONTHS W/ SCANS PRIOR  LABS CDP CMP TRYPTASE 10/12/20  CT SCAN C/A/P 10/12/20 @ 1:30PM ARRIVE @ 12:30PM  MD VISIT 10/26/20 @ 3:15 PM  AVS PRINTED AND GIVEN TO PATIENT W/ INSTRUCTIONS  PATIENT DISCHARGED AMBULATORY

## 2020-09-02 ENCOUNTER — TELEPHONE (OUTPATIENT)
Dept: OBGYN CLINIC | Age: 40
End: 2020-09-02

## 2020-09-02 LAB — STATUS: NORMAL

## 2020-09-02 RX ORDER — FLUCONAZOLE 150 MG/1
150 TABLET ORAL ONCE
Qty: 1 TABLET | Refills: 0 | Status: SHIPPED | OUTPATIENT
Start: 2020-09-02 | End: 2020-09-02

## 2020-09-02 NOTE — TELEPHONE ENCOUNTER
Pt has a yeast infection- white discharge itching- requesting diflucan- pt aware if this does not clear up in 7 days to call for appt- rx pending

## 2020-09-16 ENCOUNTER — TELEPHONE (OUTPATIENT)
Dept: ONCOLOGY | Age: 40
End: 2020-09-16

## 2020-09-16 NOTE — TELEPHONE ENCOUNTER
Spoke with [facility] via phone. She describes having had \"lower left sharp stomach pains at times with intermittent diarrhea and was wondering if I could have my labs and ct scan moved up from 10/12/2020? \"    Denied fever and currently symptom free. CDP CMP TRYPTASE CT SCAN C/A/P is what is scheduled with md follow up 10/26/2020. Please advise?

## 2020-09-17 ENCOUNTER — OFFICE VISIT (OUTPATIENT)
Dept: BARIATRICS/WEIGHT MGMT | Age: 40
End: 2020-09-17
Payer: MEDICARE

## 2020-09-17 VITALS
HEIGHT: 63 IN | SYSTOLIC BLOOD PRESSURE: 118 MMHG | HEART RATE: 76 BPM | WEIGHT: 282 LBS | DIASTOLIC BLOOD PRESSURE: 72 MMHG | TEMPERATURE: 96.9 F | BODY MASS INDEX: 49.96 KG/M2

## 2020-09-17 PROCEDURE — 99214 OFFICE O/P EST MOD 30 MIN: CPT | Performed by: SURGERY

## 2020-09-17 PROCEDURE — G8427 DOCREV CUR MEDS BY ELIG CLIN: HCPCS | Performed by: SURGERY

## 2020-09-17 PROCEDURE — G8417 CALC BMI ABV UP PARAM F/U: HCPCS | Performed by: SURGERY

## 2020-09-17 PROCEDURE — 1036F TOBACCO NON-USER: CPT | Performed by: SURGERY

## 2020-09-17 NOTE — LETTER
Visit Date: 9/17/2020    Patient: Dilma Albright  YOB: 1980    Dear Dr. Willie Bender MD,      I had the pleasure of seeing Maryjo Ruchi in the office today for a consult for weight loss surgery. Her current Weight: 282 lb (127.9 kg), which gives her a Body mass index is 49.95 kg/m². .  We had a long discussion regarding surgical options for weight loss and improvement in co-morbidities. She is considering a bariatric  procedure. We will start the preoperative workup, which includes bloodwork, psychological evaluation, support group attendance, and preoperative medical clearance from you. We will also initiate pre-certification for surgery, which requires a letter of medical necessity from you and often office notes documenting a weight history and co-morbidities. Maryjo Hopper will require 3 months of medically supervised visits as specified by the patient's insurance. Thank you for allowing me to participate in the care of your patient. If you have any questions or concerns, please do not hesitate to call.       Sincerely,     Maryjo Hopper DO  Director of Bariatric and Minimally Invasive Surgery  ZHOU GALLARDO [facility], [address] [place] [address], [address] [place] [address] Sharan: [phone]  F: [phone]

## 2020-09-21 ENCOUNTER — TELEPHONE (OUTPATIENT)
Dept: ONCOLOGY | Age: 40
End: 2020-09-21

## 2020-09-21 ENCOUNTER — HOSPITAL ENCOUNTER (OUTPATIENT)
Age: 40
Setting detail: SPECIMEN
Discharge: HOME | End: 2020-09-21
Payer: MEDICARE

## 2020-09-21 ENCOUNTER — OFFICE VISIT (OUTPATIENT)
Dept: OBGYN CLINIC | Age: 40
End: 2020-09-21
Payer: MEDICARE

## 2020-09-21 VITALS
WEIGHT: 278.25 LBS | RESPIRATION RATE: 16 BRPM | BODY MASS INDEX: 49.29 KG/M2 | DIASTOLIC BLOOD PRESSURE: 72 MMHG | SYSTOLIC BLOOD PRESSURE: 122 MMHG

## 2020-09-21 PROCEDURE — G8427 DOCREV CUR MEDS BY ELIG CLIN: HCPCS | Performed by: OBSTETRICS & GYNECOLOGY

## 2020-09-21 PROCEDURE — 99213 OFFICE O/P EST LOW 20 MIN: CPT | Performed by: OBSTETRICS & GYNECOLOGY

## 2020-09-21 PROCEDURE — 1036F TOBACCO NON-USER: CPT | Performed by: OBSTETRICS & GYNECOLOGY

## 2020-09-21 PROCEDURE — G8417 CALC BMI ABV UP PARAM F/U: HCPCS | Performed by: OBSTETRICS & GYNECOLOGY

## 2020-09-21 RX ORDER — GREEN TEA/HOODIA GORDONII 315-12.5MG
1 CAPSULE ORAL DAILY
Qty: 90 TABLET | Refills: 3 | Status: SHIPPED | OUTPATIENT
Start: 2020-09-21 | End: 2020-10-15

## 2020-09-21 RX ORDER — NYSTATIN 100000 [USP'U]/G
POWDER TOPICAL
Qty: 1 BOTTLE | Refills: 1 | Status: SHIPPED | OUTPATIENT
Start: 2020-09-21 | End: 2020-12-10 | Stop reason: DRUGHIGH

## 2020-09-21 NOTE — TELEPHONE ENCOUNTER
Spoke with Nataliia Craven in [address] [facility] . Upon questions that are asked when scheduling CT Chest Abdomen Pelvis W Contrast     ALLERGY TO IDOINE      The oral prep orders/ protocol followed is as follows  Prednisone 50 mg po 13 hours prior to test  Prednisone 50 mg po 6 hours piror to test  Prednisone 50 mg po 1 hour prior to test  Benadryl 50 mg po immediately prior to test    Is this ok to call in?   [facility] is currently scheduled for CT 10/01/2020

## 2020-09-21 NOTE — PROGRESS NOTES
Alycia Maza  2020    YOB: 1980      The patient was seen today. She is here regarding vaginal discharge/irritation . Her bowels are regular and she is voiding without difficulty. HPI:  Alycia Maza is a 47 y.o. female [id number]     Pt. States she has had two weeks of white thick discharge, +odor, +irritation, +itching. States it has remained the same despite OTC medication and a dose of diflucan. She has labs pending. Discussed importance of checking glucose as abnormal glucose or DM II can predispose to recurrent vaginitis. Pt also states that she felt a protrusion the other day when using the bathroom and is concerned about POP. She states she has mild urinary urgency. States that it is only mildly inconvenient but she is concerned about pelvic floor weakness.       OB History    Para Term  AB Living   7 3 2 1 4 3   SAB TAB Ectopic Molar Multiple Live Births   3 1 0 0 0 1      # Outcome Date GA Lbr Sebastián/2nd Weight Sex Delivery Anes PTL Lv   7  20 32w5d  4 lb 11.8 oz (2.15 kg) M CS-LTranv Spinal N SLOAN      Name: Tedi Schilder: 3  Apgar5: 6   6 SAB 18 11w1d          5 SAB            4 Term      Vag-Spont      3 Term      Vag-Spont      2 TAB            1 SAB                Past Medical History:   Diagnosis Date    Abnormal 1st trimester screen (Tri 21 1:117)--NIPT nml  2019    ADD (attention deficit disorder) without hyperactivity     Celestone  & 1/10 2020    Chronic back pain     Echogenic focus of heart of fetus affecting antepartum care of mother 2019   Celi Johnson early 1hr, nml 3hr  2019    1 elevated value, [facility] recommends repeat 3hr GTT in 2 weeks    Elevated umbilical artery dopplers  1/10/2020    Fetal ascites     Fetal heart rate decelerations affecting management of mother     GERD (gastroesophageal reflux disease)     Headache     Hypertension     IUD (intrauterine device) in place 2020    Adina LEONG    Lumbar radiculopathy 2019    Lumbar spondylosis 2019    Multigravida of advanced maternal age in third trimester     Obesity     Obesity affecting pregnancy in first trimester 2018    Obesity affecting pregnancy in third trimester     SHELLEY (obstructive sleep apnea)     Pulmonary embolism affecting pregnancy in second trimester 2019    Rh+/RI/GBSunk 2020    Rheumatoid arteritis ([address]     Umbilical cord complication        Past Surgical History:   Procedure Laterality Date     SECTION Bilateral 2020     SECTION performed by Elier Richardson DO at [place] [facility] OR    INTRAUTERINE DEVICE INSERTION  2020    Mirena    OTHER SURGICAL HISTORY  12/10/2018    Fluoroscopy- with esophagas- possible delay of gastric emptying     TONSILLECTOMY      US LYMPH NODE BIOPSY  2020    US LYMPH NODE BIOPSY 2020 [facility]       Family History   Problem Relation Age of Onset    Elevated Lipids Mother        Social History     Socioeconomic History    Marital status:      Spouse name: Not on file    Number of children: Not on file    Years of education: Not on file    Highest education level: Not on file   Occupational History    Not on file   Social Needs    Financial resource strain: Not on file    Food insecurity     Worry: Not on file     Inability: Not on file    Transportation needs     Medical: Not on file     Non-medical: Not on file   Tobacco Use    Smoking status: Never Smoker    Smokeless tobacco: Never Used   Substance and Sexual Activity    Alcohol use: No    Drug use: Yes     Frequency: 2.0 times per week     Types: Marijuana    Sexual activity: Yes     Partners: Male   Lifestyle    Physical activity     Days per week: Not on file     Minutes per session: Not on file    Stress: Not on file   Relationships    Social connections     Talks on phone: Not on file     Gets together: Not on file Attends [employer]ism service: Not on file     Active member of club or organization: Not on file     Attends meetings of clubs or organizations: Not on file     Relationship status: Not on file    Intimate partner violence     Fear of current or ex partner: Not on file     Emotionally abused: Not on file     Physically abused: Not on file     Forced sexual activity: Not on file   Other Topics Concern    Not on file   Social History Narrative    Not on file         MEDICATIONS:  Current Outpatient Medications   Medication Sig Dispense Refill    nystatin (MYCOSTATIN) 879392 UNIT/GM powder Apply 3 times daily. 1 Bottle 1    Probiotic Acidophilus (FLORANEX) TABS Take 1 tablet by mouth daily 90 tablet 3    methotrexate (RHEUMATREX) 2.5 MG chemo tablet Take 5 tab every week orally for 90 days      nabumetone (RELAFEN) 750 MG tablet nabumetone 750 mg tablet   Take 1 tablet twice a day by oral route for 30 days.  triamcinolone (KENALOG) 0.1 % cream Apply topically 2 times daily      predniSONE (DELTASONE) 5 MG tablet Take 2.5 mg by mouth 2 times daily       pantoprazole (PROTONIX) 20 MG tablet Take 1 tablet by mouth daily 30 tablet 0    Prenatal Multivit-Min-Fe-FA (PRENATAL VITAMINS) 0.8 MG TABS Take 1 tablet by mouth daily 30 tablet 12    ammonium lactate (LAC-HYDRIN) 12 % lotion Apply topically daily.  1 Bottle 0    albuterol (PROVENTIL) (2.5 MG/3ML) 0.083% nebulizer solution Take 3 mLs by nebulization daily as needed for Wheezing 120 each 3    Cholecalciferol (VITAMIN D) 2000 units CAPS capsule Take by mouth      folic acid (FOLVITE) 1 MG tablet Take 1 mg by mouth daily       Current Facility-Administered Medications   Medication Dose Route Frequency Provider Last Rate Last Dose    levonorgestrel (MIRENA) IUD 52 mg 1 each  1 each Intrauterine Once MIKHAIL Jeffers - CNP   1 each at 05/07/20 8046             ALLERGIES:  Allergies as of 09/21/2020 - Review Complete 09/21/2020   Allergen Reaction Noted    Iodine Hives and Itching 02/21/2017    Other  08/11/2020         REVIEW OF SYSTEMS:       A minimum of an eleven point review of systems was completed. Review Of Systems (11 point):  Constitutional: No fever, chills or malaise; No weight change or fatigue  Head and Eyes: No vision, Headache, Dizziness or trauma in last 12 months  ENT ROS: No hearing, Tinnitis, sinus or taste problems  Hematological and Lymphatic ROS:No Lymphoma, Von Willebrand's, Hemophillia or Bleeding History  Psych ROS: No Depression, Homicidal thoughts,suicidal thoughts, or anxiety  Breast ROS: No prior breast abnormalities or lumps  Respiratory ROS: No SOB, Pneumoniae,Cough, or Pulmonary Embolism History  Cardiovascular ROS: No Chest Pain with Exertion, Palpitations, Syncope, Edema, Arrhythmia  Gastrointestinal ROS: No Indigestion, Heartburn, Nausea, vomiting, Diarrhea, Constipation,or Bowel Changes; No Bloody Stools or melena  Genito-Urinary ROS: No Dysuria, Hematuria or Nocturia. No Urinary Incontinence or Vaginal Discharge  Musculoskeletal ROS: No Arthralgia, Arthritis,Gout,Osteoporosis or Rheumatism  Neurological ROS: No CVA, Migraines, Epilepsy, Seizure Hx, or Limb Weakness  Dermatological ROS: No Rash, Itching, Hives, Mole Changes or Cancer          Blood pressure 122/72, resp. rate 16, weight 278 lb 4 oz (126.2 kg), currently breastfeeding. Abdomen: Soft non-tender; good bowel sounds. No guarding, rebound or rigidity. No CVA tenderness bilaterally. Extremities: No calf tenderness, DTR 2/4, and No edema bilaterally    Pelvic: Vulva and vagina appear normal. Labia do have decreased skin pigmentation, inflammed - appears like yeast.  Small amount of thick white/grey discharge in vagina. No prolapse seen,  Good pelvic support. Bimanual exam reveals normal uterus and adnexa. Diagnostics:  No results found.     Lab Results:  Results for orders placed or performed in visit on 09/08/20   CBC with Differential   Result Patient Active Problem List    Diagnosis Date Noted    SHELLEY (obstructive sleep apnea)     Headache disorder     IUD (intrauterine device) in place 2020     Adina LEONG      Impaired glucose regulation with features of insulin resistance 2020    H/O:  section 2020    Anticardiolipin antibody affecting pregnancy, antepartum     Recurrent pregnancy loss 2019    Anticardiolipin antibody low positive x1 (26.9 on 3/19/19 2019    Elevated blood-pressure reading without diagnosis of hypertension 2019    Attention deficit hyperactivity disorder, predominantly inattentive type 2019    Spondylosis without myelopathy or radiculopathy, lumbosacral region 2019    Rheumatoid arthritis with rheumatoid factor, unspecified ([facility] 2019    Lumbar radiculopathy 2019    Lumbar spondylosis 2019    Muscle pain 2019    AMA 10/10/2018    Low antithrombin III x1, repeat WNL 2017    Antiphospholipid syndrome in pregnancy ([facility] 2017    Prediabetes 2017    Chronic low back pain 2017    Migraine without aura, not refractory 2017    GERD 2017    Adult body mass index 40 and over 2017    Morbid obesity ([address] 2017    Hidradenitis 2017       PLAN:  Return if symptoms worsen or fail to improve, for annual.  PFT - urinary urgency  Nystatin powder  Counseled on non scented soaps and probiotic use  Vaginal cultures obtained  Repeat Annual every 1 year  Cervical Cytology Evaluation begins at 24years old. If Negative Cytology, Follow-up screening per current guidelines. Counseled on preventative health maintenance follow-up.   Orders Placed This Encounter   Procedures    VAGINITIS DNA PROBE    C.trachomatis N.gonorrhoeae DNA     Standing Status:   Future     Standing Expiration Date:   2021   [facility] - [place]     Referral Priority:   Routine Referral Type:   Eval and Treat     Referral Reason:   Specialty Services Required     Referred to Provider:   Karl Dominguez PT     Requested Specialty:   Physical Therapy     Number of Visits Requested:   1       Patient was seen with total face to face time of 15 minutes. More than 50% of this visit was counseling and education regarding The primary encounter diagnosis was Vaginal discharge. Diagnoses of Vagina itching, Urgency of urination, and Stress incontinence were also pertinent to this visit. and Vaginal Discharge and Vaginal Odor   as well as  counseling on preventative health maintenance follow-up.

## 2020-09-22 ENCOUNTER — HOSPITAL ENCOUNTER (OUTPATIENT)
Dept: PHYSICAL THERAPY | Facility: CLINIC | Age: 40
Setting detail: THERAPIES SERIES
Discharge: HOME | End: 2020-09-22
Payer: MEDICARE

## 2020-09-22 LAB
DIRECT EXAM: ABNORMAL
Lab: ABNORMAL
SPECIMEN DESCRIPTION: ABNORMAL

## 2020-09-22 PROCEDURE — 97110 THERAPEUTIC EXERCISES: CPT

## 2020-09-22 PROCEDURE — 97161 PT EVAL LOW COMPLEX 20 MIN: CPT

## 2020-09-22 RX ORDER — PREDNISONE 50 MG/1
50 TABLET ORAL SEE ADMIN INSTRUCTIONS
Qty: 3 TABLET | Refills: 0 | Status: SHIPPED | OUTPATIENT
Start: 2020-09-22 | End: 2020-10-12 | Stop reason: ALTCHOICE

## 2020-09-22 NOTE — TELEPHONE ENCOUNTER
Per discussion with Dr. Rozina Stern orders to call in script. Confirmed Flower had Benadryl at home and does not need script for Benadryl.     The oral prep orders/ protocol followed is as follows  Prednisone 50 mg po 13 hours prior to test  Prednisone 50 mg po 6 hours piror to test  Prednisone 50 mg po 1 hour prior to test  Benadryl 50 mg po immediately prior to test

## 2020-09-24 ENCOUNTER — TELEPHONE (OUTPATIENT)
Dept: OBGYN CLINIC | Age: 40
End: 2020-09-24

## 2020-09-24 RX ORDER — FLUCONAZOLE 150 MG/1
150 TABLET ORAL ONCE
Qty: 1 TABLET | Refills: 1 | Status: SHIPPED | OUTPATIENT
Start: 2020-09-24 | End: 2020-09-24

## 2020-09-25 ENCOUNTER — HOSPITAL ENCOUNTER (OUTPATIENT)
Facility: MEDICAL CENTER | Age: 40
End: 2020-09-25
Payer: MEDICARE

## 2020-09-27 NOTE — PROGRESS NOTES
[facility]  [facility]  [address] [address]  Dept: [phone]    SURGICAL WEIGHT MANAGEMENT PROGRAM  PROGRESS NOTE INITIAL EVALUATION     Patient: Claudene Guernsey        Service Date:  9/17/2020     HPI:     Chief Complaint   Patient presents with    Bariatric, Initial Visit    Weight Loss     The patient is a pleasant 47y.o. year old female  with morbid obesity, who stands Height: 5' 3\" (160 cm) tall with a weight of Weight: 282 lb (127.9 kg) , resulting in a BMI of Body mass index is 49.95 kg/m². The patient suffers from multiple co-morbidities as a result of morbid obesity, including: Type 2 Diabetes Mellitus, Hypertension, Obstructive Sleep Apnea, GERD and Degenerative Joint Disease (DJD). She has suffered from obesity for many years. The patient denies  a history of myocardial infarction, renal failure, hepatic failure, stroke and seizure. She had a PE during pregnancy. The patient has failed multiple attempts at non-surgical weight loss, and is now seeking surgical intervention to promote permanent and consistent weight loss. She  has chosen Ruth-en-Y Gastric Bypass. She is well educated regarding it, as she has recently viewed our weight loss surgery informational seminar .      Medical History:  Past Medical History:   Diagnosis Date    Abnormal 1st trimester screen (Tri 21 1:117)--NIPT nml  11/30/2019    ADD (attention deficit disorder) without hyperactivity     Celestone 1/9 & 1/10 1/9/2020    Chronic back pain     Echogenic focus of heart of fetus affecting antepartum care of mother 11/30/2019   Emily Kayser early 1hr, nml 3hr  11/30/2019    1 elevated value, [facility] recommends repeat 3hr GTT in 2 weeks    Elevated umbilical artery dopplers  1/10/2020    Fetal ascites     Fetal heart rate decelerations affecting management of mother     GERD (gastroesophageal reflux disease)     Headache     Hypertension     IUD (intrauterine device) in place 2020    Adina Gracia    Lumbar radiculopathy 2019    Lumbar spondylosis 2019   Clarence Rodrigez of advanced maternal age in third trimester     Obesity     Obesity affecting pregnancy in first trimester 2018    Obesity affecting pregnancy in third trimester     SHELLEY (obstructive sleep apnea)     Pulmonary embolism affecting pregnancy in second trimester 2019    Rh+/RI/GBSunk 2020    Rheumatoid arteritis ([address]     Umbilical cord complication        Surgical History:  Past Surgical History:   Procedure Laterality Date     SECTION Bilateral 2020     SECTION performed by Dagoberto Mckenzie DO at [facility] [address]  2020    Mirena    OTHER SURGICAL HISTORY  12/10/2018    Fluoroscopy- with esophagas- possible delay of gastric emptying     TONSILLECTOMY      US LYMPH NODE BIOPSY  2020    US LYMPH NODE BIOPSY 2020 [facility]       Family History:      Problem Relation Age of Onset    Elevated Lipids Mother        Social History:   Social History     Tobacco Use    Smoking status: Never Smoker    Smokeless tobacco: Never Used   Substance Use Topics    Alcohol use: No    Drug use: Yes     Frequency: 2.0 times per week     Types: Marijuana       Current Med List:  Current Outpatient Medications   Medication Sig Dispense Refill    methotrexate (RHEUMATREX) 2.5 MG chemo tablet Take 5 tab every week orally for 90 days      nabumetone (RELAFEN) 750 MG tablet nabumetone 750 mg tablet   Take 1 tablet twice a day by oral route for 30 days.       triamcinolone (KENALOG) 0.1 % cream Apply topically 2 times daily      predniSONE (DELTASONE) 5 MG tablet Take 2.5 mg by mouth 2 times daily       pantoprazole (PROTONIX) 20 MG tablet Take 1 tablet by mouth daily 30 tablet 0    Prenatal Multivit-Min-Fe-FA (PRENATAL VITAMINS) 0.8 MG TABS Take 1 tablet by mouth daily 30 tablet 12    ammonium lactate (LAC-HYDRIN) 12 % lotion Apply topically daily. 1 Bottle 0    albuterol (PROVENTIL) (2.5 MG/3ML) 0.083% nebulizer solution Take 3 mLs by nebulization daily as needed for Wheezing 120 each 3    Cholecalciferol (VITAMIN D) 2000 units CAPS capsule Take by mouth      folic acid (FOLVITE) 1 MG tablet Take 1 mg by mouth daily      predniSONE (DELTASONE) 50 MG tablet Take 1 tablet by mouth See Admin Instructions for 3 doses The oral prep orders/ protocol followed is as follows  Prednisone 50 mg po 13 hours prior to test  Prednisone 50 mg po 6 hours piror to test  Prednisone 50 mg po 1 hour prior to test  Benadryl 50 mg po immediately prior to test 3 tablet 0    nystatin (MYCOSTATIN) 877304 UNIT/GM powder Apply 3 times daily. 1 Bottle 1    Probiotic Acidophilus (FLORANEX) TABS Take 1 tablet by mouth daily 90 tablet 3     Current Facility-Administered Medications   Medication Dose Route Frequency Provider Last Rate Last Dose    levonorgestrel (MIRENA) IUD 52 mg 1 each  1 each Intrauterine Once Dian Ovalle, APRN - CNP   1 each at 05/07/20 1459        Allergies   Allergen Reactions    Iodine Hives and Itching    Other      Sensitive to bug bites, mosquitos etc.       SOCIAL:      This patient is alone for the evaluation today.       [] HIV Risk Factors (i.e.) intravenous drug abuser; at risk sexual behavior; received blood products    [] TB Risk Factors (i.e.) Medically underserved, institutional care, foreign born, endemic area; exposure to active case    [] Hepatitis B&C Risk Factors (i.e.) Received blood transfusion prior to 1992; recreational drug use; high risk sexual behaviors; tattoos or body piercings; contact with blood or needle sticks in the workplace    Comprehension    Ability to grasp concepts and respond to questions:   [x] High   [] Medium   [] Low    Motivation    [x] Asks Questions; eager to learn   [] Needs education   [] Extreme anxiety    [] uncooperative   [] Denies need for education    English Speaking Ability    [x] Speaks English well   [x] Reads English well   [x] Understands spoken english    [] Understands written English   [] No need for interpretive support      [] Might benefit from interpretive support   []  required for all services     REVIEW OF SYSTEMS: (Negative unless marked otherwise)       Do you or have you had any of the following?   Cardiovascular YES NO Respiratory YES NO   High Blood Pressure   []   [] COPD   []   []   Heart Attack   []   [] TB/Positive skin Test   []   []   Congestive Heart Failure   []   [] Obstructive Sleep Apnea   []   []   Coronary Artery Disease   []   [] Asthma   []   [x]   Circulation Problems   []   []      Activity Intolerance   []   [] Gastrointestinal YES NO   Peripheral Vascular Disease   []   [] Gastric Problems   []   []        Colorectal problems   []   []   Hematological YES NO Ulcer disease   []   []   Bleeding Tendencies   []   [] Liver disease   []   []   Blood Transfusion last 30d   []   [] Gallstones   []   []   Anemia   [x]   [] Refulx or Heartburn   [x]   []   Blood Clots   [x]   []      High Cholesterol   []   [] Muscoloskeletal YES NO   High Triglycerides   []   [] Joint Limitations   []   []      Muscle Weakness   []   []   Eyes, Ears, Nose, Throat YES NO Multiple Sclerosis   []   []   Cataracts   []   [] Arthritis   [x]   []   Glasses   [x]   []      Blurred Vision   []   [] Cancer   []   []   Hearing Aids   []   [] Type:     Ringing in Ears   []   []      Difficulty Swallowing   []   [] Encodrine YES NO      Diabetes   []   []   Neurological YES NO Thyroid   []   []   Stroke   []   []      Seizure   []   [] Psychiatric Disorder YES NO   Dizziness/Blackouts/Fainting   []   [] Depression   [x]   []   Memory Impairement   []   [] Bipolar   []   []   Parkinson's   []   [] Anxiety disorder   [x]   []           Genitourinary/Gyn YES NO Skin Intact   [x]   []   Urinary Infection   []   []      Stones   []   [] Sleep YES NO   Kidney Disease   []   [] Excessive daytime sleepiness   []   [x]   Incontinent   []   [] Snoring   [x]   []   Irregular menstrual cycles   []   [] Unrefreshed sleep   [x]   []   Possibly Pregnant? []     [] Other:      Date of LMP:   Preferred location:            PRESENT ILLNESS:     Weight Parameters  Weight 282 lb (127.9 kg)   Height 5' 3\" (1.6 m)   BMI Body mass index is 49.95 kg/m². IBW     EBW               IMMUNIZATION STATUS  Immunization History   Administered Date(s) Administered    Hepatitis B 05/29/2017    Influenza Vaccine, unspecified formulation 10/09/2015, 11/21/2016    Influenza Virus Vaccine 11/01/2016, 10/16/2018, 10/17/2019    Influenza, Quadv, IM, PF (6 mo and older Fluzone, Flulaval, Fluarix, and 3 yrs and older Afluria) 09/19/2017    PPD Test 06/29/2017    Tdap (Boostrix, Adacel) 02/21/2017       FALLS ASSESSMENT    [x] LOW RISK FOR FALLS    [] MODERATE RISK FOR FALLS    [] Difficulty walking/selfcare    [] Falls in the past 2 months    [] Suspicion of Clinician    [] Other:      SMOKING CESSATION     [x] Not needed     [] Instructed to stop smoking    [] Pamphlet community resources given     VTE SCREEN    [] Family hx DVT/PE  /   [] Personal hx of DVT/PE    [x] Denies any family or personal hx of DVT/PE    Physician Review    [x] Past medical, family, & social history reviewed and discussed with patient. Review of surgery and post-surgical changes (by surgeon for surgical patients only)    [x] Lifelong diet expectations reviewed with patient    [x] Need for lifelong vitamin supplementation reviewed with patient    PHYSICAL EXAMINATION:      /72   Pulse 76   Temp 96.9 °F (36.1 °C)   Ht 5' 3\" (1.6 m)   Wt 282 lb (127.9 kg)   BMI 49.95 kg/m²     Constitutional:  Vital signs are normal. The patient appears well-developed   HEENT:      Head: Normocephalic. Atraumatic     Eyes: pupils are equal and reactive. No scleral icterus is present.      Neck: No mass and no thyromegaly present. Cardiovascular: Normal rate, regular rhythm, S1 normal and S2 normal.  Bilateral pulses present. Pulmonary/Chest: Effort normal and breath sounds normal. No retractions. Abdominal: Soft. Normal appearance. There is no organomegaly. No tenderness. There is no rigidity, no rebound, no guarding and no Herrera's sign. Musculoskeletal:      Right lower leg: Normal. No tenderness and no edema. Left lower leg: Normal. No tenderness and no edema. Lymphadenopathy:     No cervical adenopathy, No Exrtemity Adenopathy. Neurological: The patient is alert and oriented. Moving all four extremities equally, sensation grossly intact bilateral.  Skin: Skin is warm, dry and intact. Psychiatric: The patient has a normal mood and affect. Speech is normal and behavior is normal. Judgment and thought content normal. Cognition and memory are normal.     RECOMMENDATIONS:     We spent a great deal of time discussing the risks and benefits of Ruth-en-Y Gastric Bypass, including but not limited to injury to intra-abdominal organs, breakdown of the gastric staple line, the need for re-operative therapy,  prolonged hospitalization,  mechanical ventilation,  and death. We discussed the possibility of bleeding, the need for blood transfusions, blood clots, hospital-acquired and intra-abdominal infection, anastomotic stricture, and worsening GERD. And we discussed the need for post-operative visit compliance, behavior modifications and diet changes, protein and vitamin supplementation, as well as routine scheduled and dedicated exercise. I instructed the patient to utilize the exercise log that will be given to them at their fist dietician appointment. We discussed the potential weight loss benefit of approximately 60-70% of her excess body weight at 12-18 months post-op, as well as the possibility of insufficient weight loss or weight gain after 2 years post-operative time.      PLAN:       Diagnosis Orders   1. GERD  Comprehensive Metabolic Panel    Ferritin    Hemoglobin A1C    Iron and TIBC    TSH without Reflex    T4, Free    PTH, Intact    Magnesium    Lipid Panel    Vitamin A    Vitamin B1    Vitamin B12 & Folate    Vitamin D 25 Hydroxy    Zinc    Urine Drug Screen   2. Prediabetes     3. Antiphospholipid syndrome in pregnancy ([facility] [address]     4. Morbid obesity with BMI of 45.0-49.9, adult (HCC)  Comprehensive Metabolic Panel    Ferritin    Hemoglobin A1C    Iron and TIBC    TSH without Reflex    T4, Free    PTH, Intact    Magnesium    Lipid Panel    Vitamin A    Vitamin B1    Vitamin B12 & Folate    Vitamin D 25 Hydroxy    Zinc    Urine Drug Screen          Initial Testing     Primary Procedure: Ruth-en-Y Gastric Bypass     Other Procedures:None    Labwork: Initial Pre-surgical Lab Tests (CMP, TSH, Fasting Lipid Profile, Mg, Zinc, Vit B1 (whole blood), Vit B12, 25-OH Vit D, Fe,  Ferritin,  Folate) and Negative serum nicotine prior to submission for pre-auth    Imaging: None    Endoscopic Studies: Upper GI Endoscopy for GERD which has been untreated. Psychological Assessment: Psychological Evaluation and Clearance    Nutrition Assessment: Bariatric Nutrition Assessment and Clearance    Other  Consultations: PCP clearance  Hematology clearance    Physician Supervised Diet and Exercise required by the patients insurance company: 3 months.       Surgical Diet requirement:  2 weeks    Final Testing  Screening Chest Xray  and EKG within 6 months of date of surgery    Labwork:  Final Lab Tests  within 3 months of date of surgery (CBC, PT/PTT, BMP)     Electronically signed by Matteo Duque DO on 9/27/2020 at 12:52 PM

## 2020-10-01 ENCOUNTER — HOSPITAL ENCOUNTER (OUTPATIENT)
Age: 40
Discharge: HOME | End: 2020-10-01
Payer: MEDICARE

## 2020-10-01 ENCOUNTER — HOSPITAL ENCOUNTER (OUTPATIENT)
Dept: CT IMAGING | Age: 40
Discharge: HOME | End: 2020-10-03
Payer: MEDICARE

## 2020-10-01 LAB
ABSOLUTE EOS #: <0.03 K/UL (ref 0–0.44)
ABSOLUTE IMMATURE GRANULOCYTE: 0.1 K/UL (ref 0–0.3)
ABSOLUTE LYMPH #: 1.01 K/UL (ref 1.1–3.7)
ABSOLUTE MONO #: 0.27 K/UL (ref 0.1–1.2)
ALBUMIN SERPL-MCNC: 3.9 G/DL (ref 3.5–5.2)
ALBUMIN/GLOBULIN RATIO: 1.1 (ref 1–2.5)
ALP BLD-CCNC: 109 U/L (ref 35–104)
ALT SERPL-CCNC: 13 U/L (ref 5–33)
ANION GAP SERPL CALCULATED.3IONS-SCNC: 10 MMOL/L (ref 9–17)
AST SERPL-CCNC: 12 U/L
BASOPHILS # BLD: 0 % (ref 0–2)
BASOPHILS ABSOLUTE: <0.03 K/UL (ref 0–0.2)
BILIRUB SERPL-MCNC: <0.1 MG/DL (ref 0.3–1.2)
BUN BLDV-MCNC: 9 MG/DL (ref 6–20)
BUN/CREAT BLD: ABNORMAL (ref 9–20)
CALCIUM SERPL-MCNC: 9.5 MG/DL (ref 8.6–10.4)
CHLORIDE BLD-SCNC: 105 MMOL/L (ref 98–107)
CO2: 22 MMOL/L (ref 20–31)
CREAT SERPL-MCNC: 0.49 MG/DL (ref 0.5–0.9)
DIFFERENTIAL TYPE: ABNORMAL
EOSINOPHILS RELATIVE PERCENT: 0 % (ref 1–4)
GFR AFRICAN AMERICAN: >60 ML/MIN
GFR NON-AFRICAN AMERICAN: >60 ML/MIN
GFR SERPL CREATININE-BSD FRML MDRD: ABNORMAL ML/MIN/{1.73_M2}
GFR SERPL CREATININE-BSD FRML MDRD: ABNORMAL ML/MIN/{1.73_M2}
GLUCOSE BLD-MCNC: 124 MG/DL (ref 70–99)
HCT VFR BLD CALC: 40.3 % (ref 36.3–47.1)
HEMOGLOBIN: 12.6 G/DL (ref 11.9–15.1)
IMMATURE GRANULOCYTES: 1 %
LYMPHOCYTES # BLD: 8 % (ref 24–43)
MCH RBC QN AUTO: 25.9 PG (ref 25.2–33.5)
MCHC RBC AUTO-ENTMCNC: 31.3 G/DL (ref 28.4–34.8)
MCV RBC AUTO: 82.9 FL (ref 82.6–102.9)
MONOCYTES # BLD: 2 % (ref 3–12)
NRBC AUTOMATED: 0 PER 100 WBC
PDW BLD-RTO: 16.2 % (ref 11.8–14.4)
PLATELET # BLD: 503 K/UL (ref 138–453)
PLATELET ESTIMATE: ABNORMAL
PMV BLD AUTO: 9.5 FL (ref 8.1–13.5)
POTASSIUM SERPL-SCNC: 4.2 MMOL/L (ref 3.7–5.3)
RBC # BLD: 4.86 M/UL (ref 3.95–5.11)
RBC # BLD: ABNORMAL 10*6/UL
SEG NEUTROPHILS: 89 % (ref 36–65)
SEGMENTED NEUTROPHILS ABSOLUTE COUNT: 11.93 K/UL (ref 1.5–8.1)
SODIUM BLD-SCNC: 137 MMOL/L (ref 135–144)
TOTAL PROTEIN: 7.3 G/DL (ref 6.4–8.3)
WBC # BLD: 13.4 K/UL (ref 3.5–11.3)
WBC # BLD: ABNORMAL 10*3/UL

## 2020-10-01 PROCEDURE — 71260 CT THORAX DX C+: CPT

## 2020-10-01 PROCEDURE — 80053 COMPREHEN METABOLIC PANEL: CPT

## 2020-10-01 PROCEDURE — 85025 COMPLETE CBC W/AUTO DIFF WBC: CPT

## 2020-10-01 PROCEDURE — 36415 COLL VENOUS BLD VENIPUNCTURE: CPT

## 2020-10-01 PROCEDURE — 83520 IMMUNOASSAY QUANT NOS NONAB: CPT

## 2020-10-01 PROCEDURE — 2580000003 HC RX 258: Performed by: INTERNAL MEDICINE

## 2020-10-01 PROCEDURE — 6360000004 HC RX CONTRAST MEDICATION: Performed by: INTERNAL MEDICINE

## 2020-10-01 RX ORDER — SODIUM CHLORIDE 0.9 % (FLUSH) 0.9 %
10 SYRINGE (ML) INJECTION ONCE
Status: COMPLETED | OUTPATIENT
Start: 2020-10-01 | End: 2020-10-01

## 2020-10-01 RX ADMIN — IOPAMIDOL 75 ML: 755 INJECTION, SOLUTION INTRAVENOUS at 16:20

## 2020-10-01 RX ADMIN — Medication 10 ML: at 16:20

## 2020-10-01 RX ADMIN — IOHEXOL 30 ML: 300 INJECTION, SOLUTION INTRAVENOUS at 16:21

## 2020-10-02 ENCOUNTER — HOSPITAL ENCOUNTER (OUTPATIENT)
Dept: PHYSICAL THERAPY | Facility: CLINIC | Age: 40
Setting detail: THERAPIES SERIES
Discharge: HOME | End: 2020-10-02
Payer: MEDICARE

## 2020-10-02 NOTE — FLOWSHEET NOTE
[] [address]  [address]    [phone]  F: [phone]   [] [address]  [facility]   [address]  P: [phone]  F: [phone]  [] [address]  [address] [facility]  P: [phone]  F: [phone]  [] [address]  [facility]   [address]   [place]: [phone]  F: [phone]   [] [place] [id number] [facility] [address]  [place]: [phone]   F: [phone]     Physical Therapy Cancel/No Show note    Date: 10/2/2020  Patient: Petty Guo  : 1980  MRN: [id number]    Cancels/No Shows to date: 1    For today's appointment patient:    [x]  Cancelled    [] Rescheduled appointment    [] No-show     Reason given by patient:    []  Patient ill    []  Conflicting appointment    [] No transportation      [] Conflict with work    [x] No reason given    [] Weather related    [] COVID-19    [] Other:      Comments:        [x] Next appointment was confirmed    Electronically signed by: Beny Streeter PTA

## 2020-10-04 LAB — TRYPTASE: 4 UG/L

## 2020-10-06 ENCOUNTER — HOSPITAL ENCOUNTER (OUTPATIENT)
Facility: MEDICAL CENTER | Age: 40
End: 2020-10-06
Payer: MEDICARE

## 2020-10-07 ENCOUNTER — HOSPITAL ENCOUNTER (OUTPATIENT)
Dept: PHYSICAL THERAPY | Facility: CLINIC | Age: 40
Setting detail: THERAPIES SERIES
Discharge: HOME | End: 2020-10-07
Payer: MEDICARE

## 2020-10-07 ENCOUNTER — OFFICE VISIT (OUTPATIENT)
Dept: NEUROLOGY | Age: 40
End: 2020-10-07
Payer: MEDICARE

## 2020-10-07 VITALS
DIASTOLIC BLOOD PRESSURE: 85 MMHG | SYSTOLIC BLOOD PRESSURE: 115 MMHG | WEIGHT: 287.8 LBS | BODY MASS INDEX: 50.99 KG/M2 | HEART RATE: 94 BPM | HEIGHT: 63 IN | TEMPERATURE: 97.7 F

## 2020-10-07 PROCEDURE — 90911 HC BFB TRAING W/EMG &/MANOMETRY 1ST 15 MIN CNTCT: CPT

## 2020-10-07 PROCEDURE — 90912 BFB TRAINING 1ST 15 MIN: CPT

## 2020-10-07 PROCEDURE — G8484 FLU IMMUNIZE NO ADMIN: HCPCS | Performed by: PSYCHIATRY & NEUROLOGY

## 2020-10-07 PROCEDURE — G8417 CALC BMI ABV UP PARAM F/U: HCPCS | Performed by: PSYCHIATRY & NEUROLOGY

## 2020-10-07 PROCEDURE — G8427 DOCREV CUR MEDS BY ELIG CLIN: HCPCS | Performed by: PSYCHIATRY & NEUROLOGY

## 2020-10-07 PROCEDURE — G0283 ELEC STIM OTHER THAN WOUND: HCPCS

## 2020-10-07 PROCEDURE — 1036F TOBACCO NON-USER: CPT | Performed by: PSYCHIATRY & NEUROLOGY

## 2020-10-07 PROCEDURE — 99214 OFFICE O/P EST MOD 30 MIN: CPT | Performed by: PSYCHIATRY & NEUROLOGY

## 2020-10-07 RX ORDER — AMITRIPTYLINE HYDROCHLORIDE 25 MG/1
25 TABLET, FILM COATED ORAL NIGHTLY
Qty: 30 TABLET | Refills: 2 | Status: SHIPPED | OUTPATIENT
Start: 2020-10-07 | End: 2021-01-11

## 2020-10-07 NOTE — PROGRESS NOTES
[facility], [address], [address] [facility]  Ph: [phone] or [phone]  FAX: [phone]    Chief Complaint: migraines, forgetfulness, episodes of slurred speech     Deadaniela Roper MD     I had the pleasure of seeing your patient Beth Haley in neurologic evaluation. As you would recall Beth Haley is a 47 y.o. female. The patient is here with three distinct neurological issues. She describes episodes during normal speech where she will become confused and slur her speech. These episodes will last a few seconds. This symptom began happening recently and now happens daily. She is unsure if she loses track of time or stares during these episodes. She denies numbness or tingling in her extremities, except when she is laying prone. She also reports difficulty with her memory for many years. She states that she will forget things told to her and needs to write things down. Lastly she reports having daily headaches. She does not feel rested after sleeping. She denies SHELLEY. She recently gave birth 6 months ago and affirms depression and anxiety- more anxiety than depression. MRI brain 3/27/2020 Normal MRI brain.  No acute intracranial abnormality.       REVIEW OF SYSTEMS   Constitutional Weight: absent, Appetite: absent, Fatigue: present   HEENT Visual disturbance: absent, Ears: normal   Respiratory Shortness of breath: absent, Cough: absent   Cardiovascular Chest pain: absent, Leg swelling :present   GI Constipation: absent, Diarrhea: present, Swallowing change: absent    Urinary frequency: present, Urinary urgency: present,    Musculoskeletal Neck pain: absent, Back pain: present, Stiffness: present, Muscle pain: present, Joint pain: present   Dermatological Hair loss: present, Skin changes: absent   Neurological Memory loss: present, Confusion: absent, Seizures: absent, Trouble walking or imbalance: present, Dizziness: present, Weakness: present, Numbness: present Tremor: absent, Spasm: present, Speech difficulty: present, Headache: present, Light sensitivity: present   Psychiatric Anxiety: present, Hallucination: absent, Depression, present   Hematologic Abnormal bleeding/Bruising: absent, Anemia: absent       Neurological work up:  CT head  CTA head and neck  MRI brain 3/27/2020 Normal MRI brain.  No acute intracranial abnormality. 2 D echo  EEG 2020 This EEG does show mild intermittent right parietotemporal slowing and sharp wave formation, which may be concordant with underlying possible structural process. Sharp wave formation may bepotentially epileptiform.   Clinical correlation is warranted     Past Medical History:   Diagnosis Date    Abnormal 1st trimester screen (Tri 21 1:117)--NIPT nml  2019    ADD (attention deficit disorder) without hyperactivity     Celestone  & 1/10 2020    Chronic back pain     Echogenic focus of heart of fetus affecting antepartum care of mother 2019   Adriana Castro early 1hr, nml 3hr  2019    1 elevated value, [facility] recommends repeat 3hr GTT in 2 weeks    Elevated umbilical artery dopplers  1/10/2020    Fetal ascites     Fetal heart rate decelerations affecting management of mother     GERD (gastroesophageal reflux disease)     Headache     Hypertension     IUD (intrauterine device) in place 2020    Adina Quiroga Antes    Lumbar radiculopathy 2019    Lumbar spondylosis 2019    Multigravida of advanced maternal age in third trimester     Obesity     Obesity affecting pregnancy in first trimester 2018    Obesity affecting pregnancy in third trimester     SHELLEY (obstructive sleep apnea)     Pulmonary embolism affecting pregnancy in second trimester 2019    Rh+/RI/GBSunk 2020    Rheumatoid arteritis ([address]     Umbilical cord complication      Past Surgical History:   Procedure Laterality Date     SECTION Bilateral 2020     SECTION performed by [facility] Felipe DO at [facility]    INTRAUTERINE DEVICE INSERTION  05/07/2020    Mirena    OTHER SURGICAL HISTORY  12/10/2018    Fluoroscopy- with esophagas- possible delay of gastric emptying     TONSILLECTOMY  1996    US LYMPH NODE BIOPSY  8/19/2020    US LYMPH NODE BIOPSY 8/19/2020 [facility] ULTRASOUND     Allergies   Allergen Reactions    Iodine Hives and Itching    Other      Sensitive to bug bites, mosquitos etc.     Family History   Problem Relation Age of Onset    Elevated Lipids Mother       Social History     Socioeconomic History    Marital status:      Spouse name: Not on file    Number of children: Not on file    Years of education: Not on file    Highest education level: Not on file   Occupational History    Not on file   Social Needs    Financial resource strain: Not on file    Food insecurity     Worry: Not on file     Inability: Not on file    Transportation needs     Medical: Not on file     Non-medical: Not on file   Tobacco Use    Smoking status: Never Smoker    Smokeless tobacco: Never Used   Substance and Sexual Activity    Alcohol use: No    Drug use: Yes     Frequency: 2.0 times per week     Types: Marijuana    Sexual activity: Yes     Partners: Male   Lifestyle    Physical activity     Days per week: Not on file     Minutes per session: Not on file    Stress: Not on file   Relationships    Social connections     Talks on phone: Not on file     Gets together: Not on file     Attends Bahai service: Not on file     Active member of club or organization: Not on file     Attends meetings of clubs or organizations: Not on file     Relationship status: Not on file    Intimate partner violence     Fear of current or ex partner: Not on file     Emotionally abused: Not on file     Physically abused: Not on file     Forced sexual activity: Not on file   Other Topics Concern    Not on file   Social History Narrative    Not on file      There were no vitals 04/01/2013     Lab Results   Component Value Date    HDL 34 (L) 06/25/2020    HDL 28 (L) 08/02/2018    HDL 29 (L) 02/28/2017     No results found for: LDLCALC  No results found for: LABVLDL  Lab Results   Component Value Date    LABA1C 5.4 12/02/2019     Lab Results   Component Value Date     12/02/2019     Lab Results   Component Value Date    AZIWFCNA04 556 08/17/2020        Maximum score 5 Score 5 What is the year, season, date, day, month   Maximum score 5 Score 5 Where are we: State, county, town, hospital, floor? Maximum score 3 Score 3-0 Name 3 objects: ball, pen, car. Ask patient to repeat 3 objects. Maximum score 5 Score 5-1 Serial sevens from 100:93, 86, 79, 72, 65   Maximum score 3 Score 3-1 Recall 3 objects   Maximum score 2 Score 2 Name a pencil and watch. Maximum score 1 Score 1 Repeat the following: No ifs ands or buts.      Maximum score 3 Score 3 Follow 3 stage command take a paper in your hand, fold it in half, and put it on the floor. Maximum score 1  Score 1 Read and obey the following: Close your eyes     Maximum score 1 Score 1 Write a sentence. Maximum score 1 Score 1 Copy a design below. Max 30   Patients score 28    All of patient's labs were personally reviewed. All the imaging studies were personally reviewed and discussed with the patient. Assessment Recommendations:  Episodes of speech dysarthria with confusion, etiology unclear  Subjective cognitive impairment  Chronic migraine WO Aura, intractable, WO Status Migrainosus    Recent TSH and MRI of the brain are normal. I would obtain an EEG to rule out seizures. The patient's MMSE is 28/30. I would obtain vitamin B12 and Folate levels to rule out deficiency. I recommend stopping marijuana to reduce cognitive deficit. I recommend the patient have a repeat sleep study to rule out obstructive sleep apnea, which may be contributing to her headaches.   Both the patient's headaches and cognitive impairment may be worsened by postpartum depression. She will return in 6 weeks or sooner if the patient's symptoms worsen or if there are any side effects. Siomara Bryant MD I would like to thank you for the consult. Please do not hesitate if you have any questions about the patient care. Scribe Attestation:   By signing my name below, Yadira Borges, attest that this documentation has been prepared under the direction and in the presence of Noemi Kraft MD.     Electronically Signed: Malcom Clark.     Physician Attestation:   Rocio Scott MD, personally performed the services described in this documentation. All medical record entries made by the scribe were at my direction and in my presence. I have reviewed the chart and discharge instructions (if applicable) and agree that the record reflects my personal performance and is accurate and complete. Electronically Signed: Mary Grewal.  10/7/2020 9:57 AM    Diplomate, American Board of Psychiatry and Neurology  Diplomate, American Board of Clinical Neurophysiology  Diplomate, American Board of Epilepsy

## 2020-10-12 ENCOUNTER — OFFICE VISIT (OUTPATIENT)
Dept: ONCOLOGY | Age: 40
End: 2020-10-12
Payer: MEDICARE

## 2020-10-12 VITALS
BODY MASS INDEX: 50.88 KG/M2 | RESPIRATION RATE: 16 BRPM | SYSTOLIC BLOOD PRESSURE: 111 MMHG | DIASTOLIC BLOOD PRESSURE: 64 MMHG | WEIGHT: 287.2 LBS | HEART RATE: 105 BPM | TEMPERATURE: 97.3 F

## 2020-10-12 PROCEDURE — 99211 OFF/OP EST MAY X REQ PHY/QHP: CPT | Performed by: INTERNAL MEDICINE

## 2020-10-12 PROCEDURE — G8427 DOCREV CUR MEDS BY ELIG CLIN: HCPCS | Performed by: INTERNAL MEDICINE

## 2020-10-12 PROCEDURE — G8484 FLU IMMUNIZE NO ADMIN: HCPCS | Performed by: INTERNAL MEDICINE

## 2020-10-12 PROCEDURE — 1036F TOBACCO NON-USER: CPT | Performed by: INTERNAL MEDICINE

## 2020-10-12 PROCEDURE — 99214 OFFICE O/P EST MOD 30 MIN: CPT | Performed by: INTERNAL MEDICINE

## 2020-10-12 PROCEDURE — G8417 CALC BMI ABV UP PARAM F/U: HCPCS | Performed by: INTERNAL MEDICINE

## 2020-10-12 NOTE — PROGRESS NOTES
Karina Farias                                                                                                                  10/12/2020  MRN:   [id number]  YOB: 1980  PCP:                           Paola Magaña MD  Referring Physician: No ref. 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 o.female is a patient with chief complaint of shortness of breath. Patient is 26-week pregnant. Presented with pelvic pain urinary frequency and dyspnea for about 2 days. Patient underwent CT chest which showed nonobstructive small left lower lobe pulmonary embolism. Patient denies any previous history of blood clots. Patient has had thrombophilia work-up done due to multiple pregnancy losses in the past which has not been clinically significant. Patient has been started on Lovenox which she is tolerating well. Patient has BMI of 51. Initial testing showed anticardiolipin IgG antibody to be 26.9 and in 12/2019 dropped to 3.7      Interim History:     Patient presents to the clinic for a follow-up visit and to discuss results of her lab work-up and imaging. The pain from her arms down to her hands persists. She has been seen by neurology for migraines and EEG is scheduled, initial one showed some abnormality. She planning on weight loss surgery, she has been enrolled in the process and is consulting with the dietician. She is in process with sleep study currently.      During this visit patient's allergy, social, medical, surgical history and medications were reviewed and updated.     Past Medical History:   Past Medical History:   Diagnosis Date    Abnormal 1st trimester screen (Tri 21 1:117)--NIPT nml  2019    ADD (attention deficit disorder) without hyperactivity     Celestone  & 1/10 2020    Chronic back pain     Echogenic focus of heart of fetus affecting antepartum care of mother 2019   Yuly Anna early 1hr, nml 3hr  2019    1 elevated value, M recommends repeat 3hr GTT in 2 weeks    Elevated umbilical artery dopplers  1/10/2020    Fetal ascites     Fetal heart rate decelerations affecting management of mother     GERD (gastroesophageal reflux disease)     Headache     Hypertension     IUD (intrauterine device) in place 2020    Adina Naveed Kedar    Lumbar radiculopathy 2019    Lumbar spondylosis 2019    Multigravida of advanced maternal age in third trimester     Obesity     Obesity affecting pregnancy in first trimester 2018    Obesity affecting pregnancy in third trimester     SHELLEY (obstructive sleep apnea)     Pulmonary embolism affecting pregnancy in second trimester 2019    Rh+/RI/GBSunk 2020    Rheumatoid arteritis ([address]     Umbilical cord complication        Past Surgical History:     Past Surgical History:   Procedure Laterality Date     SECTION Bilateral 2020     SECTION performed by Radha Cabral DO at [facility] L&D [facility] [address]  2020    Mirena    OTHER SURGICAL HISTORY  12/10/2018    Fluoroscopy- with esophagas- possible delay of gastric emptying     TONSILLECTOMY      US LYMPH NODE BIOPSY  2020    US LYMPH NODE BIOPSY 2020 [facility]       Patient Family Social History:     Social History     Socioeconomic History    Marital status:      Spouse name: None    Number of children: None    Years of education: None    Highest education level: None   Occupational History    None   Social Needs    Financial resource strain: None    Food insecurity     Worry: None     Inability: None    Transportation needs     Medical: None     Non-medical: None   Tobacco Use    Smoking status: Never Smoker    Smokeless tobacco: Never Used   Substance and Sexual Activity    Alcohol use: No    Drug use: Yes     Frequency: 2.0 times per week     Types: Marijuana    Sexual activity: Yes     Partners: Male   Lifestyle    Physical activity     Days per week: None     Minutes per session: None    Stress: None   Relationships    Social connections     Talks on phone: None     Gets together: None     Attends Lutheran service: None     Active member of club or organization: None     Attends meetings of clubs or organizations: None     Relationship status: None    Intimate partner violence     Fear of current or ex partner: None     Emotionally abused: None     Physically abused: None     Forced sexual activity: None   Other Topics Concern    None   Social History Narrative    None       Family History   Problem Relation Age of Onset    Elevated Lipids Mother        Current Medications:     Current Outpatient Medications   Medication Sig Dispense Refill    amitriptyline (ELAVIL) 25 MG tablet Take 1 tablet by mouth nightly 30 tablet 2    nystatin (MYCOSTATIN) 939937 UNIT/GM powder Apply 3 times daily. 1 Bottle 1    methotrexate (RHEUMATREX) 2.5 MG chemo tablet Take 5 tab every week orally for 90 days      nabumetone (RELAFEN) 750 MG tablet nabumetone 750 mg tablet   Take 1 tablet twice a day by oral route for 30 days.  triamcinolone (KENALOG) 0.1 % cream Apply topically 2 times daily      predniSONE (DELTASONE) 5 MG tablet Take 2.5 mg by mouth 3 times daily       pantoprazole (PROTONIX) 20 MG tablet Take 1 tablet by mouth daily 30 tablet 0    Prenatal Multivit-Min-Fe-FA (PRENATAL VITAMINS) 0.8 MG TABS Take 1 tablet by mouth daily 30 tablet 12    ammonium lactate (LAC-HYDRIN) 12 % lotion Apply topically daily.  1 Bottle distress  Mental status - AAO X3  Eyes - pupils equal and reactive, extraocular eye movements intact  Mouth - mucous membranes moist, pharynx normal without lesions  Neck - supple, no significant adenopathy  Lymphatics - no palpable lymphadenopathy, no hepatosplenomegaly  Chest - clear to auscultation, no wheezes, rales or rhonchi, symmetric air entry  Heart - normal rate, regular rhythm, normal S1, S2, no murmurs  Abdomen - soft, nontender, nondistended, no masses or organomegaly  Neurological - alert, oriented, normal speech, no focal findings or movement disorder noted  Extremities - peripheral pulses normal, no pedal edema, no clubbing or cyanosis  Skin - normal coloration and turgor, no rashes, no suspicious skin lesions noted  - small bruising around nails      DATA:  Lab Results   Component Value Date    WBC 13.4 (H) 10/01/2020    HGB 12.6 10/01/2020    HCT 40.3 10/01/2020    MCV 82.9 10/01/2020     (H) 10/01/2020       Chemistry        Component Value Date/Time     10/01/2020 1629    K 4.2 10/01/2020 1629     10/01/2020 1629    CO2 22 10/01/2020 1629    BUN 9 10/01/2020 1629    CREATININE 0.49 (L) 10/01/2020 1629        Component Value Date/Time    CALCIUM 9.5 10/01/2020 1629    ALKPHOS 109 (H) 10/01/2020 1629    AST 12 10/01/2020 1629    ALT 13 10/01/2020 1629    BILITOT <0.10 (L) 10/01/2020 1629        Ct Chest Abdomen Pelvis W Contrast    Result Date: 10/1/2020  EXAMINATION: CT OF THE CHEST, ABDOMEN, AND PELVIS WITH CONTRAST 10/1/2020 3:56 pm TECHNIQUE: CT of the chest, abdomen and pelvis was performed with the administration of intravenous contrast. Multiplanar reformatted images are provided for review. Dose modulation, iterative reconstruction, and/or weight based adjustment of the mA/kV was utilized to reduce the radiation dose to as low as reasonably achievable.  COMPARISON: 07/30/2020 HISTORY: ORDERING SYSTEM PROVIDED HISTORY: Lymphadenopathy TECHNOLOGIST PROVIDED HISTORY: Reason for Exam: Pt states follow up for lymph adenopathy and enlarged thyroid. H/O C section Acuity: Chronic Type of Exam: Subsequent/Follow-up FINDINGS: Chest: Mediastinum: Heart size is normal without pericardial effusion. Thoracic aorta and main pulmonary artery are normal in caliber. No focal thyroid lesion is visualized by CT. No mediastinal adenopathy. There are bilateral enlarged axillary lymph nodes, similar in appearance to prior examination. Lungs/pleura: No consolidation. No pleural effusion or pneumothorax. No suspicious pulmonary nodule/mass. Soft Tissues/Bones: No acute osseous abnormality. Abdomen/Pelvis: Organs: The liver is diffusely hypoattenuating. No acute abnormality within the spleen, pancreas, or adrenal glands. Cholelithiasis without pericholecystic fluid or wall thickening. No acute abnormality within the kidneys. GI/Bowel: Small hiatal hernia. The small bowel is nondilated. The colon is normal in caliber. The appendix is within normal limits. Pelvis: Bladder is partially distended without vesicular stone. IUD is noted within the uterus. Peritoneum/Retroperitoneum: No ascites or pneumoperitoneum. Abdominal aorta is normal in caliber. There are shotty retroperitoneal lymph nodes. Bones/Soft Tissues: No acute osseous abnormality. 1. Enlarged bilateral axillary lymph nodes are similar in appearance to prior chest CT. 2. No acute intrathoracic abnormality. 3. No acute intra-abdominal abnormality. 4. Hepatic steatosis. 5. Cholelithiasis without CT evidence of cholecystitis.        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 We reivewed her recent CT which showed no changes in previously seen enlarged axillary lymph nodes, no new lymphadenopathy. Previous core biopsy was negative on the right side. I am recommending excisional  biopsy to assess further, if negative we will continue to monitor. Return to discuss results. Lexi Alvarez      This note is created with the assistance of a speech recognition program.  While intending to generate a document that actually reflects the content of the visit, the document can still have some errors including those of syntax and sound a like substitutions which may escape proof reading. It such instances, actual meaning can be extrapolated by contextual diversion.

## 2020-10-13 ENCOUNTER — TELEPHONE (OUTPATIENT)
Dept: BARIATRICS/WEIGHT MGMT | Age: 40
End: 2020-10-13

## 2020-10-13 ENCOUNTER — TELEPHONE (OUTPATIENT)
Dept: ONCOLOGY | Age: 40
End: 2020-10-13

## 2020-10-13 NOTE — TELEPHONE ENCOUNTER
Oncology called about a referral that they just put in for a L axillary incisional bx; the patient was seen by Dr. Valerie Herrera for bariatric eval 9/17/20: Dr. Valerie Herrera does she need to be seen in the office prior to surgery for an evaluation?  Please advise

## 2020-10-13 NOTE — TELEPHONE ENCOUNTER
CALL PLACED TO DR Leonila Falcon OFFICE @ [phone] @ SPOKE TO Alex Carty. BARBARA HAS THE REFERRAL IN HER WORK QUE AND WILL CALL THE PATIENT TODAY TO SCHEDULE AN APPOINTMENT.

## 2020-10-15 ENCOUNTER — NURSE ONLY (OUTPATIENT)
Dept: BARIATRICS/WEIGHT MGMT | Age: 40
End: 2020-10-15

## 2020-10-15 ENCOUNTER — INITIAL CONSULT (OUTPATIENT)
Dept: BARIATRICS/WEIGHT MGMT | Age: 40
End: 2020-10-15
Payer: MEDICARE

## 2020-10-15 VITALS
DIASTOLIC BLOOD PRESSURE: 64 MMHG | TEMPERATURE: 97.1 F | WEIGHT: 283 LBS | BODY MASS INDEX: 50.14 KG/M2 | SYSTOLIC BLOOD PRESSURE: 110 MMHG | HEIGHT: 63 IN | HEART RATE: 80 BPM

## 2020-10-15 PROCEDURE — 99213 OFFICE O/P EST LOW 20 MIN: CPT | Performed by: SURGERY

## 2020-10-15 PROCEDURE — G8484 FLU IMMUNIZE NO ADMIN: HCPCS | Performed by: SURGERY

## 2020-10-15 PROCEDURE — G8427 DOCREV CUR MEDS BY ELIG CLIN: HCPCS | Performed by: SURGERY

## 2020-10-15 PROCEDURE — 1036F TOBACCO NON-USER: CPT | Performed by: SURGERY

## 2020-10-15 PROCEDURE — G8417 CALC BMI ABV UP PARAM F/U: HCPCS | Performed by: SURGERY

## 2020-10-15 NOTE — PROGRESS NOTES
Medical Nutrition Therapy  Initial Nutrition Assessment for Metabolic/ Bariatric Surgery  Required insurance visit prior to surgery:  3  Shared with patient the importance of documenting exercise and staying at or below start weight during visits. Pt reports:     Phillip Tracy is a 47 y.o. female with a date of birth of 1980. There were no vitals filed for this visit. BMI: There is no height or weight on file to calculate BMI. Obesity Classification: Class III    Weight History: Wt Readings from Last 3 Encounters:   10/15/20 283 lb (128.4 kg)   10/12/20 287 lb 3.2 oz (130.3 kg)   10/07/20 287 lb 12.8 oz (130.5 kg)        How does your weight affect your daily activities?joint pain, back pain, fatigue and short of breath with activity      What would be different in your life if you felt healthier and fit? Decreased pain in her body, total lifestyle change       Why is that important to you now? Baby is now 9 months, feels it is a good time   Do you drink alcohol? . YES, occasionally (mixed drinks)    Do you use tobacco in the form of cigarettes, cigars, chew or any vapor appliance? No    Weight History      Flower Daniel's highest adult weight was 282 lbs at age 47. Patient was at her highest weight for . Patient's triggers/known causes to her highest weight are . Flower Daniel's lowest adult weight was 215 lbs at age . Patient was at her lowest weight for. The lowest weight was achieved through just what Flower weighed . Physical Activity  Do you participate in a structured exercise program, step counting or regular physical activity? no      Instructions and exercise logs were provided to patient today see goal sheet and plan. Previous weight loss attempts  Patient has participated in the following weight loss programs:  Vegan, Salad Diet     Nutrition History  Have you ever been diagnosed with an eating disorder?  No  Have you ever had problems tolerating a multivitamin or mineral eating checklist by my next visit.               0  2 I will make my pschological evaluation appoinment. 0  3 I will bring this goal card to every appointment. x 4 I will eliminate all tobacco/nicotine. x 5 I will limit alcoholic beverages to 3-7JF per week. x 6 I will limit dining out to 3 times per week or less. 7 I will eliminate sugary beverages. x 8 I will eliminate carbonated beverages. x 9 I will eliminate drinking with a straw. x 10 I will limit caffeinated beverages to 16oz daily. x 11 I will limit cold cereals prepared with milk. 12 I will do a 5 minute reflection. 13 I will food journal daily. 0  14 I will log my exercise daily. 15 I will determine my  calcium and multivitamin plan. 16 I will purchase multivitamin. 17 I will start taking multivitamins following my plan. 18 I will have 1-2 servings of protein present at each meal.                 19 I will eat every 3-5 hours. 20 I will drink 64oz of fluid daily. 21 I will follow the 15-30-15 guideline. 22 I will eat protein first at all meals followed by vegetables  Fruit and lastly whole grains. 21 My first one diet neutral approach is:                 24 my second diet neutral approach is:                 25 My third diet neutral approach is:                     Do you understand your goals? y    Do you have the information you need to achieve your goals? y    Do you have any questions  right now? n        Plan    Exercise for Health 15 easy exercises to do at home with 6 activity logs were provided to the patient with verbal and written instructions on how to carry this out. Goal number 14 was provided to the patient on this visit please see above. Will follow up each month and provide support as patient begins to add physical activity to life style. Monitor and review goals adjust as needed. Follow up monthly supervised diet and exercise.        Ghassan Vidal

## 2020-10-21 ENCOUNTER — HOSPITAL ENCOUNTER (OUTPATIENT)
Dept: PHYSICAL THERAPY | Facility: CLINIC | Age: 40
Setting detail: THERAPIES SERIES
Discharge: HOME | End: 2020-10-21
Payer: MEDICARE

## 2020-10-21 PROCEDURE — 90911 HC BFB TRAING W/EMG &/MANOMETRY 1ST 15 MIN CNTCT: CPT

## 2020-10-21 PROCEDURE — 97110 THERAPEUTIC EXERCISES: CPT

## 2020-10-21 PROCEDURE — 90912 BFB TRAINING 1ST 15 MIN: CPT

## 2020-10-21 PROCEDURE — G0283 ELEC STIM OTHER THAN WOUND: HCPCS

## 2020-10-23 ENCOUNTER — HOSPITAL ENCOUNTER (OUTPATIENT)
Dept: NEUROLOGY | Age: 40
Discharge: HOME | End: 2020-10-23
Payer: MEDICARE

## 2020-10-23 PROCEDURE — 95813 EEG EXTND MNTR 61-119 MIN: CPT

## 2020-10-23 PROCEDURE — 95819 EEG AWAKE AND ASLEEP: CPT | Performed by: PSYCHIATRY & NEUROLOGY

## 2020-10-23 NOTE — PROGRESS NOTES
Pt arrived late for EEG , ordered as 1 hr explained and completed report to follow. Pt with multiple hair extensions in place making it hard for electrodes to stay at preferred connections.

## 2020-10-24 ENCOUNTER — HOSPITAL ENCOUNTER (OUTPATIENT)
Dept: PREADMISSION TESTING | Age: 40
Setting detail: SPECIMEN
Discharge: HOME | End: 2020-10-28
Payer: MEDICARE

## 2020-10-24 LAB
SARS-COV-2, RAPID: NORMAL
SARS-COV-2: NORMAL
SARS-COV-2: NOT DETECTED
SOURCE: NORMAL

## 2020-10-24 PROCEDURE — U0003 INFECTIOUS AGENT DETECTION BY NUCLEIC ACID (DNA OR RNA); SEVERE ACUTE RESPIRATORY SYNDROME CORONAVIRUS 2 (SARS-COV-2) (CORONAVIRUS DISEASE [COVID-19]), AMPLIFIED PROBE TECHNIQUE, MAKING USE OF HIGH THROUGHPUT TECHNOLOGIES AS DESCRIBED BY CMS-2020-01-R: HCPCS

## 2020-10-24 NOTE — PROCEDURES
[address]                 [address] [facility], [address]                          ELECTROENCEPHALOGRAM REPORT    PATIENT NAME: Rossy Lake                      :        1980  MED REC NO:   [id number]                              ROOM:  ACCOUNT NO:   [de-identified]                           ADMIT DATE: 10/23/2020  PROVIDER:     Steve Harvey MD    DATE OF EEG:  10/23/2020    ATTENDING OF RECORD:  Karuna Kramer MD and Coni Merritt MD    REASON FOR STUDY:  This is a 73-year-old lady with migraines, memory  disturbance. MEDICATIONS:  Include Elavil, Rheumatrex, Relafen, Deltasone, Proventil,  Folvite. EEG FINDINGS:  This is a 16-channel EEG with one EKG channel recording  performed in a patient described to be awake, drowsy and asleep. The  patient shows normal waking rhythms. Background activity consists of  well-regulated 11 Hz activity in the 40-60 microvolt range more  prominent over the posterior head areas showing good reactivity to eye  opening and closing. Over the anterior head regions, there are 15-20 Hz  activity in the 20-30 microvolt range. With drowsiness and sleep, there  is further intrusion of slower frequencies in the theta and to a lesser  degree in the delta band accompanied by vertex wave activity and sleep  spindles. This record is not lateralized or epileptiform. Hyperventilation shows no change in the record. Photic stimulation  shows no change in the record. IMPRESSION:  This EEG is within normal limits for an awake, drowsy and  sleepy patient. No lateralized or epileptiform disturbance is seen.         Jimena Alvarez MD    D: 10/24/2020 11:23:17       T: 10/24/2020 11:33:56     [id number]  Job#: [id number]     Doc#: [id number]    CC:

## 2020-10-25 NOTE — PROGRESS NOTES
[] Draining    [] Incisional  [] Rash   [] Hair Loss  [] Other:      Physical Exam:  /64   Pulse 80   Temp 97.1 °F (36.2 °C)   Ht 5' 3\" (1.6 m)   Wt 283 lb (128.4 kg)   BMI 50.13 kg/m²   Constitutional:  Vital signs are normal. The patient appears well-developed and well-nourished. HEENT:   Head: Normocephalic. Atraumatic  Eyes: pupils are equal and reactive. No scleral icterus is present. Neck: No mass and no thyromegaly present. Cardiovascular: Normal rate, regular rhythm, S1 normal and S2 normal.  Radial pulses present   Pulmonary/Chest: Effort normal and breath sounds normal. No retractions  Abdominal: Soft. Normal appearance. There is no organomegaly. No tenderness. There is no rigidity, no rebound, no guarding and no Herrera's sign. Musculoskeletal:        Right lower leg: Normal. No tenderness and no edema. Left lower leg: Normal. No tenderness and no edema. Lymphadenopathy:     No cervical adenopathy, No Exrtemity Adenopathy. Very obese difficult to palpate in axilla  Neurological: The patient is alert and oriented. Moving all 4 extremities, sensation grossly intact bilateral  Skin: Skin is warm, dry and intact. Psychiatric: The patient has a normal mood and affect.  Speech is normal and behavior is normal. Judgment and thought content normal. Cognition and memory are normal.     Assessment:  Lymphadenopathy  Morbid obesity  Rheumatologic disease    Plan:  Plan for excision  Risks discussed

## 2020-10-28 ENCOUNTER — HOSPITAL ENCOUNTER (OUTPATIENT)
Dept: SLEEP CENTER | Age: 40
Discharge: HOME | End: 2020-10-30
Payer: MEDICARE

## 2020-10-28 PROCEDURE — 95811 POLYSOM 6/>YRS CPAP 4/> PARM: CPT

## 2020-10-28 PROCEDURE — 95811 POLYSOM 6/>YRS CPAP 4/> PARM: CPT | Performed by: PSYCHIATRY & NEUROLOGY

## 2020-10-28 NOTE — PAYOR INFORMATION
Verified Demographics with Patient? No   If no why? Already verified  [facility] Completed? No    If not why? Already completed  Verified Insurance through: Already verified  [employer] Completed? Not required  Auth Verification #:[id number]  Liability Due: $0  Discount Offered: na%       Previous Balance: $ 0   Discount Offered: na%  Site Collect Status: no liability  Patient Response: no liability  Financial Aid Offered?  Yes Pt declined  Cards Scanned: yes

## 2020-10-29 VITALS
OXYGEN SATURATION: 98 % | TEMPERATURE: 97.6 F | RESPIRATION RATE: 18 BRPM | HEIGHT: 63 IN | HEART RATE: 89 BPM | WEIGHT: 278 LBS | BODY MASS INDEX: 49.26 KG/M2

## 2020-11-01 NOTE — CONSULTS
[] [facility]) - [facility] &  Therapy  [address]  [phone]  F: [phone] [] [address]  [place] [address]   [address]  P: [phone]  F: [phone] [x] [address] &  Therapy  [address]  P: [phone]  F: [phone] [] [address]  P: [phone]  F: [phone] [] [address]  [facility]   [address]   [place]: [phone]  F: [phone]      Physical Therapy General Evaluation    Date:  2020  Patient: Gerry Coburn  : 1980  MRN: [id number]  Physician: Pavel Gauthier     Insurance: [employer] (30 vs)  Medical Diagnosis: Urgency of urination, stress Incontinence    Rehab Codes: M62.50, N39.3  Onset Date: 2020                                  Next 's appt: PRN    Subjective:   CC:Pt is Mary Alice Green female who presents with complaints stress/urge incontinence that has been ongoing about 1-2 years but feels symptoms have recently worsened and worries about POP. Recent  in 2020.    HPI: (onset date: 2020)      PMHx: []???? Unremarkable []???? Diabetes []???? HTN  []???? Pacemaker  []???? MI/Heart Problems []???? Cancer [x]???? Arthritis []???? Asthma   [x]???? refer to full medical chart In [facility]  []???? Other:         Tests: []???? X-Ray: []???? MRI:  []???? Other:      Medications: [x]???? Refer to full medical record []???? None []???? Other:  Allergies: [x]???? Refer to full medical record []???? None []???? Other:      Function: Hand Dominance []???? Right []???? Left  Working: []???? Normal Duty []???? Light Duty []???? Off D/T Condition []???? Retired   [x]???? Not Employed []???? Disability []???? Other:   Return to work:   Job/ADL Description:   Pain: []???? Yes [x]???? No Location:  Pain Rating: (0-10 scale)0 /10  Pain altered Tx: []???? Yes [x]???? No Action:      Symptoms: []???? Improving []???? Worsening [x]???? Same  Better: []???? AM []???? PM []???? Sit []???? Rise/Sit []????Stand []???? Walk []???? Lying []???? Other:  Worse: []???? AM []???? PM []???? Sit []???? Rise/Sit []????Stand []???? Walk []???? Lying []???? Bend   []???? Valsalva []???? Other:  Sleep: []???? OK []???? Disturbed      Objective:                      OBSERVATION  No Deficit  Deficit  Not Tested  Comments    External  X          Posture  X          Pelvic alignment  X          Muscle Spasm  X          Scarring  X          Diastasis  X          Introitus    X   Min gaping    Perineal Descent    X   Min descent    Skin Condition  X          Excursion    X   Min excursion    External Clock  X          Internal             Prolapse Test    X   Grade 2 cysto   Internal clock  X          Vaginal Vault    X   roomy    Muscle bulk    X   decreased    Muscle Power    X   2-3/5    Muscle Endurance    X   3 sec with muscle flickering   Quality of Contraction    X   slow rise, decreased hold    Specificity    X   Min overflow of glutes, adductors    Coordination    x       Sensation  x          FUNCTION  Normal  Difficult  Unable        Cough/Sneeze    X         Supine-Sit    X         Squatting    X         Bending/ stooping    X         Stairs    X         Hopping    X         Jumping    X         Running    X         Lifting/carrying    X              Assessment:Pt noted with weak PF musculature and inability to use correct musculature for strength improvement. Will provide ex prgm, biofeedback and education as appropriate. May need clinic/home e-stim if ex alone is not enough. Problems:   []???? ? Pain:  []???? ? ROM:  [x]???? ? Strength:  [x]???? ? Function:  [x]???? Other:  . STG: (to be met in 4 treatments)  1.  Able to isolate PF musculature  2. ? Strength:PF 3/5  3. ? Function:no reports of leaking with cough, sneeze and laugh  4. Independent with Home Exercise Programs  LTG: (to be met in 8 treatments)  1. PF 4/5  2. Able to perform all advanced ADL's without leaking  3. Able to maintain voiding schedule of 3-4 hours      Patient goals: Better bladder control, no urgency      Rehab Potential: [x]???? Good []???? Fair []???? Poor   Suggested Professional Referral: [x]???? No []???? Yes:  Barriers to Goal Achievement[de-identified] [x]???? No []???? Yes:  Domestic Concerns: [x]???? No []???? Yes:      Pt. Education: [x]???? Plans/Goals, Risks/Benefits discussed [x]???? Home exercise program  Method of Education: [x]???? Verbal [x]???? Demo [x]???? Written(Pf ex, knack ex, urine stop test,)  Comprehension of Education:  [x]???? Verbalizes understanding. [x]???? Demonstrates understanding.  []???? Needs Review.  []???? Demonstrates/verbalizes understanding of HEP/Ed previously given.      Treatment Plan:  [x]??? Therapeutic Exercise   21837             []??? Iontophoresis: 4 mg/mL Dexamethasone Sodium Phosphate  mAmin  29656   []??? Therapeutic Activity  52454 []??? Vasopneumatic cold with compression  74173               []??? Gait Training    44637 []??? Ultrasound        22035   [x]? ?? Neuromuscular Re-education  [id number] [x]? ?? Electrical Stimulation Unattended  40527   []??? Manual Therapy  59995 []??? Electrical Stimulation Attended  [id number]   [x]? ?? Instruction in HEP       []??? Lumbar/Cervical Traction  [id number]   []??? Aquatic Therapy           [id number] []??? Cold/hotpack     []??? Massage   [id number]      []??? Dry Needling, 1 or 2 muscles  98166   [x]? ?? Biofeedback, first 15 minutes   [id number]  []??? Biofeedback, additional 15 minutes   00667 []??? Dry Needling, 3 or more muscles  20561         Frequency: 1 x/week for 8 visits      Todays Treatment:  Modalities:   Exercises:  Exercise Reps/ Time Weight/ Level Comments   Pelvic model explanation            Urine stop test            PF ex: long and quick             The selma ex                          Other:      Specific Instructions for next treatment:Biofeedback., ES, TA ex, bridges, clamshell ex      Treatment Charges: Mins Units   [x]???? Evaluation(low complex) 20 1   []???? Modalities         [x]? ??? Ther Exercise 15 1   []???? Manual Therapy         []???? Ther Activities       []???? Aquatics         []???? Vasocompression         []???? Other             TOTAL TREATMENT TIME: 35      Time in: 0900 Time out: [address]      Electronically signed by: Venice Cates PT      Physician Signature:________________________________Date:__________________  By signing above or cosigning this note, I have reviewed this plan of care and certify a need for medically necessary rehabilitation services.       *PLEASE SIGN ABOVE AND FAX BACK ALL PAGES*

## 2020-11-01 NOTE — FLOWSHEET NOTE
[] [facility]) [facility] &  Therapy  [address]  [phone]  F: [phone] [] [address]  [facility]   [address]  P: [phone]  F: [phone] [] [address] &  Therapy  [address]  P: [phone]  F: [phone] [] [address]  P: [phone]  F: [phone] [] [address]  [facility]   [address]   [place]: [phone]  F: [phone]      Physical Therapy Daily Treatment Note    Date:  10/7/2020  Patient Name:  Beth Haley    :  1980  MRN: [id number]  Physician: Rodriguez Dalton                                   Insurance: [employer] (30 vs)  Medical Diagnosis: Urgency of urination, stress Incontinence                      Rehab Codes: M62.50, N39.3  Onset Date: 2020                                  Next 's appt: PRN     Visit# / total visits: 2/8  Cancels/No Shows: 1    Subjective:    Pain:  [] Yes  [x] No Location:  N/A Pain Rating: (0-10 scale) 0/10  Pain altered Tx:  [x] No  [] Yes  Action:  Comments: Pt states she has been working on HEP but not much improvement yet. Objective:  Modalities:   Precautions:  Exercises:  Exercise Reps/ Time Weight/ Level Comments   Pelvic model explanation            Urine stop test            PF ex: long and quick             The knack ex                          Other:      Treatment Charges: Mins Units   [x]  Modalities:  15 1   []  Ther Exercise     []  Manual Therapy     []  Ther Activities     []  Aquatics     []  Vasocompression     [x]  Other: BF 15 1   Total Treatment time 30 2       Assessment: [x] Progressing toward goals. [] No change. [x] Other:Hooked pt up to biofeedback with internal sensor. Good isolation of PF.   Hold time of about 3 sec

## 2020-11-01 NOTE — FLOWSHEET NOTE
isolation of PF. Hold time of about 3-4 sec avg then noted muscular re-engagement to attempt to hold the contraction. Fair but improved resting tone. [address] coordination. Performed ES after biofeedback. 5:10 for 15 min. Pt contracted with machine last 10 minutes. Instructed in new ex of bridge and clam shell ex with PF contraction. HO provided. Reviewed HEP. Will have pt return in about 3-4 weeks to allow ample time to work on HEP. STG: (to be met in 4 treatments)  1. Able to isolate PF musculature  2. ? Strength:PF 3/5  3. ? Function:no reports of leaking with cough, sneeze and laugh  4. Independent with Home Exercise Programs  LTG: (to be met in 8 treatments)  1. PF 4/5  2. Able to perform all advanced ADL's without leaking  3. Able to maintain voiding schedule of 3-4 hours      Patient goals: Better bladder control, no urgency    Pt. Education:  [x] Yes  [] No  [x] Reviewed Prior HEP/Ed  Method of Education: [x] Verbal  [x] Demo  [x] Written(  bridge and clam shell ex with PF contraction)  Comprehension of Education:  [x] Verbalizes understanding. [x] Demonstrates understanding. [] Needs review. [] Demonstrates/verbalizes HEP/Ed previously given. Plan: [x] Continue current frequency toward long and short term goals.     [x] Specific Instructions for subsequent treatments:Biofeedback., ES, TA ex, bridges, clamshell ex       Time In:1100            Time [facility]:1653     Electronically signed by:  Shan Ervin, PT

## 2020-11-02 ENCOUNTER — HOSPITAL ENCOUNTER (OUTPATIENT)
Facility: MEDICAL CENTER | Age: 40
End: 2020-11-02
Payer: MEDICARE

## 2020-11-02 ENCOUNTER — HOSPITAL ENCOUNTER (OUTPATIENT)
Age: 40
Discharge: HOME | End: 2020-11-02
Payer: MEDICARE

## 2020-11-02 LAB
ALBUMIN SERPL-MCNC: 3.9 G/DL (ref 3.5–5.2)
ALBUMIN/GLOBULIN RATIO: 1.1 (ref 1–2.5)
ALP BLD-CCNC: 118 U/L (ref 35–104)
ALT SERPL-CCNC: 16 U/L (ref 5–33)
AMPHETAMINE SCREEN URINE: NEGATIVE
ANION GAP SERPL CALCULATED.3IONS-SCNC: 11 MMOL/L (ref 9–17)
AST SERPL-CCNC: 9 U/L
BARBITURATE SCREEN URINE: NEGATIVE
BENZODIAZEPINE SCREEN, URINE: NEGATIVE
BILIRUB SERPL-MCNC: 0.23 MG/DL (ref 0.3–1.2)
BUN BLDV-MCNC: 11 MG/DL (ref 6–20)
BUN/CREAT BLD: ABNORMAL (ref 9–20)
BUPRENORPHINE URINE: NORMAL
CALCIUM SERPL-MCNC: 8.9 MG/DL (ref 8.6–10.4)
CANNABINOID SCREEN URINE: NEGATIVE
CHLORIDE BLD-SCNC: 103 MMOL/L (ref 98–107)
CHOLESTEROL/HDL RATIO: 6
CHOLESTEROL: 181 MG/DL
CO2: 24 MMOL/L (ref 20–31)
COCAINE METABOLITE, URINE: NEGATIVE
CREAT SERPL-MCNC: 0.54 MG/DL (ref 0.5–0.9)
ESTIMATED AVERAGE GLUCOSE: 128 MG/DL
FERRITIN: 72 UG/L (ref 13–150)
FOLATE: 11.4 NG/ML
GFR AFRICAN AMERICAN: >60 ML/MIN
GFR NON-AFRICAN AMERICAN: >60 ML/MIN
GFR SERPL CREATININE-BSD FRML MDRD: ABNORMAL ML/MIN/{1.73_M2}
GFR SERPL CREATININE-BSD FRML MDRD: ABNORMAL ML/MIN/{1.73_M2}
GLUCOSE BLD-MCNC: 80 MG/DL (ref 70–99)
HBA1C MFR BLD: 6.1 % (ref 4–6)
HDLC SERPL-MCNC: 30 MG/DL
IRON SATURATION: 18 % (ref 20–55)
IRON: 50 UG/DL (ref 37–145)
LDL CHOLESTEROL: 124 MG/DL (ref 0–130)
MAGNESIUM: 1.9 MG/DL (ref 1.6–2.6)
MDMA URINE: NORMAL
METHADONE SCREEN, URINE: NEGATIVE
METHAMPHETAMINE, URINE: NORMAL
OPIATES, URINE: NEGATIVE
OXYCODONE SCREEN URINE: NEGATIVE
PHENCYCLIDINE, URINE: NEGATIVE
POTASSIUM SERPL-SCNC: 4 MMOL/L (ref 3.7–5.3)
PROPOXYPHENE, URINE: NORMAL
PTH INTACT: 42.36 PG/ML (ref 15–65)
SODIUM BLD-SCNC: 138 MMOL/L (ref 135–144)
TEST INFORMATION: NORMAL
THYROXINE, FREE: 1.4 NG/DL (ref 0.93–1.7)
TOTAL IRON BINDING CAPACITY: 271 UG/DL (ref 250–450)
TOTAL PROTEIN: 7.4 G/DL (ref 6.4–8.3)
TRICYCLIC ANTIDEPRESSANTS, UR: NORMAL
TRIGL SERPL-MCNC: 135 MG/DL
TSH SERPL DL<=0.05 MIU/L-ACNC: 0.61 MIU/L (ref 0.3–5)
UNSATURATED IRON BINDING CAPACITY: 221 UG/DL (ref 112–347)
VITAMIN B-12: 523 PG/ML (ref 232–1245)
VITAMIN D 25-HYDROXY: 17.9 NG/ML (ref 30–100)
VLDLC SERPL CALC-MCNC: ABNORMAL MG/DL (ref 1–30)

## 2020-11-02 PROCEDURE — 80053 COMPREHEN METABOLIC PANEL: CPT

## 2020-11-02 PROCEDURE — 36415 COLL VENOUS BLD VENIPUNCTURE: CPT

## 2020-11-02 PROCEDURE — 82306 VITAMIN D 25 HYDROXY: CPT

## 2020-11-02 PROCEDURE — 82746 ASSAY OF FOLIC ACID SERUM: CPT

## 2020-11-02 PROCEDURE — 84590 ASSAY OF VITAMIN A: CPT

## 2020-11-02 PROCEDURE — 80061 LIPID PANEL: CPT

## 2020-11-02 PROCEDURE — 83540 ASSAY OF IRON: CPT

## 2020-11-02 PROCEDURE — 82728 ASSAY OF FERRITIN: CPT

## 2020-11-02 PROCEDURE — 83970 ASSAY OF PARATHORMONE: CPT

## 2020-11-02 PROCEDURE — 84630 ASSAY OF ZINC: CPT

## 2020-11-02 PROCEDURE — 83735 ASSAY OF MAGNESIUM: CPT

## 2020-11-02 PROCEDURE — 84439 ASSAY OF FREE THYROXINE: CPT

## 2020-11-02 PROCEDURE — 84443 ASSAY THYROID STIM HORMONE: CPT

## 2020-11-02 PROCEDURE — 84425 ASSAY OF VITAMIN B-1: CPT

## 2020-11-02 PROCEDURE — 80307 DRUG TEST PRSMV CHEM ANLYZR: CPT

## 2020-11-02 PROCEDURE — 83036 HEMOGLOBIN GLYCOSYLATED A1C: CPT

## 2020-11-02 PROCEDURE — 83550 IRON BINDING TEST: CPT

## 2020-11-02 PROCEDURE — 82607 VITAMIN B-12: CPT

## 2020-11-05 ENCOUNTER — TELEPHONE (OUTPATIENT)
Dept: BARIATRICS/WEIGHT MGMT | Age: 40
End: 2020-11-05

## 2020-11-05 LAB
RETINYL PALMITATE: 0.04 MG/L (ref 0–0.1)
VITAMIN A LEVEL: 0.43 MG/L (ref 0.3–1.2)
VITAMIN A, INTERP: NORMAL

## 2020-11-06 LAB
STATUS: NORMAL
VITAMIN B1 WHOLE BLOOD: 96 NMOL/L (ref 70–180)
ZINC: 66 UG/DL (ref 60–120)

## 2020-11-09 ENCOUNTER — OFFICE VISIT (OUTPATIENT)
Dept: ONCOLOGY | Age: 40
End: 2020-11-09
Payer: MEDICARE

## 2020-11-09 ENCOUNTER — TELEPHONE (OUTPATIENT)
Dept: ONCOLOGY | Age: 40
End: 2020-11-09

## 2020-11-09 VITALS
TEMPERATURE: 98.3 F | HEART RATE: 92 BPM | DIASTOLIC BLOOD PRESSURE: 76 MMHG | RESPIRATION RATE: 18 BRPM | WEIGHT: 284.4 LBS | BODY MASS INDEX: 50.38 KG/M2 | SYSTOLIC BLOOD PRESSURE: 118 MMHG

## 2020-11-09 PROCEDURE — 99211 OFF/OP EST MAY X REQ PHY/QHP: CPT | Performed by: INTERNAL MEDICINE

## 2020-11-09 PROCEDURE — G8417 CALC BMI ABV UP PARAM F/U: HCPCS | Performed by: INTERNAL MEDICINE

## 2020-11-09 PROCEDURE — 1036F TOBACCO NON-USER: CPT | Performed by: INTERNAL MEDICINE

## 2020-11-09 PROCEDURE — G8484 FLU IMMUNIZE NO ADMIN: HCPCS | Performed by: INTERNAL MEDICINE

## 2020-11-09 PROCEDURE — 99214 OFFICE O/P EST MOD 30 MIN: CPT | Performed by: INTERNAL MEDICINE

## 2020-11-09 PROCEDURE — G8427 DOCREV CUR MEDS BY ELIG CLIN: HCPCS | Performed by: INTERNAL MEDICINE

## 2020-11-09 NOTE — PROGRESS NOTES
(attention deficit disorder) without hyperactivity     [place]  & 1/10 2020    Chronic back pain     Echogenic focus of heart of fetus affecting antepartum care of mother 2019   Delle Handing early 1hr, nml 3hr  2019    1 elevated value, MFM recommends repeat 3hr GTT in 2 weeks    Elevated umbilical artery dopplers  1/10/2020    Fetal ascites     Fetal heart rate decelerations affecting management of mother     GERD (gastroesophageal reflux disease)     Headache     Hypertension     IUD (intrauterine device) in place 2020    Adina Vargas Councilman    Lumbar radiculopathy 2019    Lumbar spondylosis 2019    Multigravida of advanced maternal age in third trimester     Obesity     Obesity affecting pregnancy in first trimester 2018    Obesity affecting pregnancy in third trimester     SHELLEY (obstructive sleep apnea)     Pulmonary embolism affecting pregnancy in second trimester 2019    Rh+/RI/GBSunk 2020    Rheumatoid arteritis ([address]     Umbilical cord complication        Past Surgical History:     Past Surgical History:   Procedure Laterality Date     SECTION Bilateral 2020     SECTION [facility] Tristan [facility] DO [facility]  2020    Mirena    OTHER SURGICAL HISTORY  12/10/2018    Fluoroscopy- with esophagas- possible delay of gastric emptying     TONSILLECTOMY      US LYMPH NODE BIOPSY  2020    US LYMPH NODE BIOPSY 2020 [facility]       Patient Family Social History:     Social History     Socioeconomic History    Marital status:      Spouse name: None    Number of children: None    Years of education: None    Highest education level: None   Occupational History    None   Social Needs    Financial resource strain: None    Food insecurity     Worry: None     Inability: None    Transportation needs     Medical: None     Non-medical: None   Tobacco Use    Smoking status: Never Smoker    Smokeless tobacco: Never Used   Substance and Sexual Activity    Alcohol use: No    Drug use: Yes     Frequency: 2.0 times per week     Types: Marijuana    Sexual activity: Yes     Partners: Male   Lifestyle    Physical activity     Days per week: None     Minutes per session: None    Stress: None   Relationships    Social connections     Talks on phone: None     Gets together: None     Attends [employer] service: None     Active member of club or organization: None     Attends meetings of clubs or organizations: None     Relationship status: None    Intimate partner violence     Fear of current or ex partner: None     Emotionally abused: None     Physically abused: None     Forced sexual activity: None   Other Topics Concern    None   Social History Narrative    None       Family History   Problem Relation Age of Onset    Elevated Lipids Mother        Current Medications:     Current Outpatient Medications   Medication Sig Dispense Refill    amitriptyline (ELAVIL) 25 MG tablet Take 1 tablet by mouth nightly 30 tablet 2    nystatin (MYCOSTATIN) 804215 UNIT/GM powder Apply 3 times daily. 1 Bottle 1    nabumetone (RELAFEN) 750 MG tablet nabumetone 750 mg tablet   Take 1 tablet twice a day by oral route for 30 days.  triamcinolone (KENALOG) 0.1 % cream Apply topically 2 times daily      predniSONE (DELTASONE) 5 MG tablet Take 2.5 mg by mouth 3 times daily       Prenatal Multivit-Min-Fe-FA (PRENATAL VITAMINS) 0.8 MG TABS Take 1 tablet by mouth daily 30 tablet 12    ammonium lactate (LAC-HYDRIN) 12 % lotion Apply topically daily.  1 Bottle 0    albuterol (PROVENTIL) (2.5 MG/3ML) 0.083% nebulizer solution Take 3 mLs by nebulization daily as needed for Wheezing 120 each 3    Cholecalciferol (VITAMIN D) 2000 units CAPS capsule Take by mouth      folic acid (FOLVITE) 1 MG tablet Take 1 mg by mouth daily      methotrexate (RHEUMATREX) 2.5 MG chemo tablet Take 5 tab every week orally for 90 days       Current Facility-Administered Medications   Medication Dose Route Frequency Provider Last Rate Last Dose    levonorgestrel (MIRENA) IUD 52 mg 1 each  1 each Intrauterine Once MIKHAIL Watkins - CNP   1 each at 05/07/20 7249       Allergies:   Iodine and Other    Review of Systems:    Constitutional: No fever or chills. No night sweats, no weight loss   Eyes: No eye discharge, double vision, or eye pain   HEENT: negative for sore mouth, sore throat, hoarseness and voice change   Respiratory: negative for cough , sputum, dyspnea, wheezing, hemoptysis, chest pain   Cardiovascular: negative for chest pain, dyspnea, palpitations, orthopnea, PND   Gastrointestinal: negative for nausea, vomiting, diarrhea, constipation, abdominal pain, Dysphagia, hematemesis and hematochezia   Genitourinary: negative for frequency, dysuria, nocturia, urinary incontinence, and hematuria   Integument: negative for rash, skin lesions, bruises. Hematologic/Lymphatic: negative for easy bruising, bleeding,  or petechiae.   Positive swollen lymph nodes in the armpits  Endocrine: negative for heat or cold intolerance,weight changes, change in bowel habits and hair loss   Musculoskeletal: negative for myalgias, arthralgias, pain, joint swelling,and bone pain   Neurological: negative for headaches, dizziness, seizures, weakness, numbness        Physical Exam:    Vitals: /76   Pulse 92   Temp 98.3 °F (36.8 °C) (Oral)   Resp 18   Wt 284 lb 6.4 oz (129 kg)   BMI 50.38 kg/m²   General appearance - well appearing, no in pain or distress  Mental status - AAO X3  Eyes - pupils equal and reactive, extraocular eye movements intact  Mouth - mucous membranes moist, pharynx normal without lesions  Neck - supple, no significant adenopathy  Lymphatics - no palpable lymphadenopathy, no hepatosplenomegaly  Chest - clear to auscultation, no wheezes, rales or rhonchi, symmetric air entry  Heart - normal rate, enlarged axillary lymph nodes, similar in appearance to prior examination. Lungs/pleura: No consolidation. No pleural effusion or pneumothorax. No suspicious pulmonary nodule/mass. Soft Tissues/Bones: No acute osseous abnormality. Abdomen/Pelvis: Organs: The liver is diffusely hypoattenuating. No acute abnormality within the spleen, pancreas, or adrenal glands. Cholelithiasis without pericholecystic fluid or wall thickening. No acute abnormality within the kidneys. GI/Bowel: Small hiatal hernia. The small bowel is nondilated. The colon is normal in caliber. The appendix is within normal limits. Pelvis: Bladder is partially distended without vesicular stone. IUD is noted within the uterus. Peritoneum/Retroperitoneum: No ascites or pneumoperitoneum. Abdominal aorta is normal in caliber. There are shotty retroperitoneal lymph nodes. Bones/Soft Tissues: No acute osseous abnormality. 1. Enlarged bilateral axillary lymph nodes are similar in appearance to prior chest CT. 2. No acute intrathoracic abnormality. 3. No acute intra-abdominal abnormality. 4. Hepatic steatosis. 5. Cholelithiasis without CT evidence of cholecystitis. Impression:    Left lower lobe pulmonary embolism, nonobstructive, provoked by pregnancy-12/2019  Mildly positive IgG anticardiolipin antibody-3/2019 with normalization of IgG anticardiolipin antibody-12/2019  Rheumatological disease/rheumatoid arthritis per patient on Cimzia  BMI 52  Lymphadenopathy    Plan:  Reviewed results of labs. Labs show iron def . Continues to have axillary LAD. awaiting excisonal biopsy   Her lab work was reivewed and discussed, her counts show leukocytosis and thrombocytosis, her eosinophils normalized.  Continue iron supplemnat   RTC after biopsy         SONNY FLORES      This note is created with the assistance of a speech recognition program.  While intending to generate a document that actually reflects the content of the visit, the document can

## 2020-11-09 NOTE — TELEPHONE ENCOUNTER
Neena Alarcon MD VISIT  DR FLORES IN TO SEE PATIENT  ORDERS RECEIVED  RV AFTER BX TO DISCUSS RESULTS  PT TO CONTACT MD TO SCHEDULE BX  RV TBD  AVS PRINTED AND GIVEN TO PATIENT WITH INSTRUCTIONS  PATIENT DISCHARGED AMBULATORY

## 2020-11-11 ENCOUNTER — HOSPITAL ENCOUNTER (OUTPATIENT)
Dept: PHYSICAL THERAPY | Facility: CLINIC | Age: 40
Setting detail: THERAPIES SERIES
Discharge: HOME | End: 2020-11-11
Payer: MEDICARE

## 2020-11-11 NOTE — FLOWSHEET NOTE
[] [address]  [address]    [phone]  F: [phone]   [] [address]  [facility]   [address]  P: [phone]  F: [phone]  [] [place]  [address]  P: [phone]  F: [phone]  [] [address]  [address] [facility] [address]   [address]   [place]: [phone]  F: [phone]   [] [place] [address] [facility] [address]  [place]: [phone]   F: [phone]     Physical Therapy Cancel/No Show note    Date: 2020  Patient: Jeimy Chen  : 1980  MRN: [id number]    Cancels/No Shows to date: 2    For today's appointment patient:    [x]  Cancelled    [] Rescheduled appointment    [] No-show     Reason given by patient:    []  Patient ill    []  Conflicting appointment    [] No transportation      [] Conflict with work    [x] No reason given    [] Weather related    [] COVID-19    [] Other:      Comments:  Patient called at 10:45 am to cx her 10:30 appt.      [x] Next appointment was confirmed    Electronically signed by: Ricky Larose

## 2020-11-12 ENCOUNTER — OFFICE VISIT (OUTPATIENT)
Dept: BARIATRICS/WEIGHT MGMT | Age: 40
End: 2020-11-12
Payer: MEDICARE

## 2020-11-12 VITALS
RESPIRATION RATE: 20 BRPM | BODY MASS INDEX: 49.79 KG/M2 | WEIGHT: 281 LBS | HEART RATE: 72 BPM | SYSTOLIC BLOOD PRESSURE: 120 MMHG | DIASTOLIC BLOOD PRESSURE: 72 MMHG | HEIGHT: 63 IN

## 2020-11-12 PROCEDURE — G8417 CALC BMI ABV UP PARAM F/U: HCPCS | Performed by: NURSE PRACTITIONER

## 2020-11-12 PROCEDURE — 99213 OFFICE O/P EST LOW 20 MIN: CPT | Performed by: NURSE PRACTITIONER

## 2020-11-12 PROCEDURE — 1036F TOBACCO NON-USER: CPT | Performed by: NURSE PRACTITIONER

## 2020-11-12 PROCEDURE — G8427 DOCREV CUR MEDS BY ELIG CLIN: HCPCS | Performed by: NURSE PRACTITIONER

## 2020-11-12 PROCEDURE — G8484 FLU IMMUNIZE NO ADMIN: HCPCS | Performed by: NURSE PRACTITIONER

## 2020-11-12 RX ORDER — ERGOCALCIFEROL 1.25 MG/1
50000 CAPSULE ORAL WEEKLY
Qty: 8 CAPSULE | Refills: 0 | Status: SHIPPED | OUTPATIENT
Start: 2020-11-12 | End: 2021-01-04

## 2020-11-12 NOTE — PROGRESS NOTES
Medical Nutrition Therapy   Metabolic and Bariatric Surgery         Supervised diet and exercise preparation  Visit 1 out of 3  Pt reports:     Pt currently following structured meal plan 8pro/3veg/2fr/6 starch/3fat from education binder diet for weight management. Reviewed with pt. Vitals: Wt Readings from Last 3 Encounters:   11/12/20 281 lb (127.5 kg)   11/09/20 284 lb 6.4 oz (129 kg)   10/29/20 278 lb (126.1 kg)     lost 2 lbs over 1 month (initial RD assessment). Nutrition Assessment:   PES: Knowledge deficit related to healthy behaviors that support weight management post weight loss surgery as evidenced by Body mass index is 49.78 kg/m². Nutrition Assessment of Goal Attainment:  TREATMENT GOALS:    1. Pt  Completed 1 out of 4 goals. 2.TREATMENT GOALS FOR UPCOMING WEEK: continue all previous goals and add: # 23    All goals were planned with and agreed on by the patient. Goals    All goals were planned with and agreed on by the patient. I want to improve my health because I want to be around for my kids. appt # NA G What is your next step? C 1 2 3 4 5 6 7 8 9     1  one I will read the education binder provided to me and the  Emotional eating checklist by my next visit. 50             1  2 I will make my pschological evaluation appoinment. 0             1  3 I will bring this goal card to every appointment. 100              x 4 I will eliminate all tobacco/nicotine. x 5 I will limit alcoholic beverages to 6-2UL per week. x 6 I will limit dining out to 3 times per week or less. 7 I will eliminate sugary beverages. x 8 I will eliminate carbonated beverages. x 9 I will eliminate drinking with a straw. x 10 I will limit caffeinated beverages to 16oz daily. x 11 I will limit cold cereals prepared with milk. 12 I will do a 5 minute reflection.                  15 I will food journal daily. 1  14 I will log my exercise daily. 50               15 I will determine my  calcium and multivitamin plan. 16 I will purchase multivitamin. 17 I will start taking multivitamins following my plan. 18 I will have 1-2 servings of protein present at each meal.                 19 I will eat every 3-5 hours. 20 I will drink 64oz of fluid daily. 21 I will follow the 15-30-15 guideline. 22 I will eat protein first at all meals followed by vegetables  Fruit and lastly whole grains. 1  23 My first one diet neutral approach is:  I will eat breakfast daily. 24 my second diet neutral approach is:                 25 My third diet neutral approach is:                                                                        Do you understand your goals? y    Do you have the information you need to achieve your goals? y    Do you have any questions  right now? n        []  Consistent goal achievement in the program thus far and further success with goals is expected. [x]  Unable to consistently make progress in goal achievement. At this time patient is not moving forward  in developing the skills needed for success after surgery. Plan:    Continue to follow monthly and review goals.          [x]  Nutrition visits complete    []

## 2020-11-12 NOTE — PROGRESS NOTES
embolism affecting pregnancy in second trimester 2019    Rh+/RI/GBSunk 2020    Rheumatoid arteritis ([address]     Umbilical cord complication    .     Past Surgical History:  Past Surgical History:   Procedure Laterality Date     SECTION Bilateral 2020     SECTION performed by Pranav Mead DO at [place] [facility] OR    INTRAUTERINE DEVICE INSERTION  2020    Mirena    OTHER SURGICAL HISTORY  12/10/2018    Fluoroscopy- with esophagas- possible delay of gastric emptying     TONSILLECTOMY      US LYMPH NODE BIOPSY  2020    US LYMPH NODE BIOPSY 2020 [facility]       Family History:  Family History   Problem Relation Age of Onset    Elevated Lipids Mother        Social History:  Social History     Socioeconomic History    Marital status:      Spouse name: Not on file    Number of children: Not on file    Years of education: Not on file    Highest education level: Not on file   Occupational History    Not on file   Social Needs    Financial resource strain: Not on file    Food insecurity     Worry: Not on file     Inability: Not on file    Transportation needs     Medical: Not on file     Non-medical: Not on file   Tobacco Use    Smoking status: Never Smoker    Smokeless tobacco: Never Used   Substance and Sexual Activity    Alcohol use: No    Drug use: Yes     Frequency: 2.0 times per week     Types: Marijuana    Sexual activity: Yes     Partners: Male   Lifestyle    Physical activity     Days per week: Not on file     Minutes per session: Not on file    Stress: Not on file   Relationships    Social connections     Talks on phone: Not on file     Gets together: Not on file     Attends Taoist service: Not on file     Active member of club or organization: Not on file     Attends meetings of clubs or organizations: Not on file     Relationship status: Not on file    Intimate partner violence     Fear of current or ex partner: Not on file Emotionally abused: Not on file     Physically abused: Not on file     Forced sexual activity: Not on file   Other Topics Concern    Not on file   Social History Narrative    Not on file       Current Medications:  Current Outpatient Medications   Medication Sig Dispense Refill    vitamin D (ERGOCALCIFEROL) 1.25 MG (95171 UT) CAPS capsule Take 1 capsule by mouth once a week for 8 doses 8 capsule 0    amitriptyline (ELAVIL) 25 MG tablet Take 1 tablet by mouth nightly 30 tablet 2    nystatin (MYCOSTATIN) 010886 UNIT/GM powder Apply 3 times daily. 1 Bottle 1    nabumetone (RELAFEN) 750 MG tablet nabumetone 750 mg tablet   Take 1 tablet twice a day by oral route for 30 days.  triamcinolone (KENALOG) 0.1 % cream Apply topically 2 times daily      predniSONE (DELTASONE) 5 MG tablet Take 2.5 mg by mouth 3 times daily       Prenatal Multivit-Min-Fe-FA (PRENATAL VITAMINS) 0.8 MG TABS Take 1 tablet by mouth daily 30 tablet 12    ammonium lactate (LAC-HYDRIN) 12 % lotion Apply topically daily. 1 Bottle 0    albuterol (PROVENTIL) (2.5 MG/3ML) 0.083% nebulizer solution Take 3 mLs by nebulization daily as needed for Wheezing 120 each 3    Cholecalciferol (VITAMIN D) 2000 units CAPS capsule Take by mouth      folic acid (FOLVITE) 1 MG tablet Take 1 mg by mouth daily      methotrexate (RHEUMATREX) 2.5 MG chemo tablet Take 5 tab every week orally for 90 days       Current Facility-Administered Medications   Medication Dose Route Frequency Provider Last Rate Last Dose    levonorgestrel (MIRENA) IUD 52 mg 1 each  1 each Intrauterine Once Anne Camarena, APRN - CNP   1 each at 05/07/20 1459       Vital Signs:  /72 (Site: Right Upper Arm, Position: Sitting, Cuff Size: Large Adult)   Pulse 72   Resp 20   Ht 5' 3\" (1.6 m)   Wt 281 lb (127.5 kg)   BMI 49.78 kg/m²     BMI/Height/Weight:  Body mass index is 49.78 kg/m². Review of Systems - A review of systems was performed.   All was negative unless otherwise documented in HPI. Constitutional: Negative for fever, chills and diaphoresis. HENT: Negative for hearing loss and trouble swallowing. Eyes: Negative for photophobia and visual disturbance. Respiratory: Negative for cough, shortness of breath and wheezing. Cardiovascular: Negative for chest pain and palpitations. Gastrointestinal: Negative for nausea, vomiting, abdominal pain, diarrhea, constipation, blood in stool and abdominal distention. Endocrine: Negative for polydipsia, polyphagia and polyuria. Genitourinary: Negative for dysuria, frequency, hematuria and difficulty urinating. Musculoskeletal: Negative for myalgias, joint swelling. Skin: Negative for pallor and rash. Neurological: Negative for dizziness, tremors, light-headedness and headaches. Psychiatric/Behavioral: Negative for sleep disturbance and dysphoric mood. Objective:      Physical Exam   Vital signs reviewed. General: Well-developed and well-nourished. No acute distress. Skin: Warm, dry and intact. HEENT: Normocephalic. EOMs intact. Conjunctivae normal. Neck supple. Cardiovascular: Normal rate, regular rhythm. Pulmonary/Chest: Normal effort. Lungs clear to auscultation. No rales, rhonchi or wheezing. Abdominal: Positive bowel sounds. Soft, nontender. Nondistended. Musculoskeletal: Movement x4. No edema. Neurological: Gait normal. Alert and oriented to person, place, and time. Psychiatric: Normal mood and affect. Speech and behavior normal. Judgment and thought content normal. Cognition and memory intact. Assessment:       Diagnosis Orders   1. SHELLEY (obstructive sleep apnea)     2. GERD     3. Attention deficit hyperactivity disorder, predominantly inattentive type     4. Morbid obesity ([address]     5. Chronic bilateral low back pain, unspecified whether sciatica present     6. Migraine without aura, not refractory     7. Rheumatoid arthritis with rheumatoid factor, unspecified ([address]     8. Hidradenitis     9. Prediabetes     10. Vitamin D deficiency  vitamin D (ERGOCALCIFEROL) 1.25 MG (28542 UT) CAPS capsule       Plan:    Dietitian visit today. Patient was encouraged to journal all food intake. Keep calorie level at approximately 4809-5924. Protein intake is to be a minimum of 60-80 grams per day. Water drinking was encouraged with a goal of 64oz-128oz daily. Beverages to be calorie free except for milk. Every other beverage should be water. Avoid soda. Continue to increase level of physical activity. Encouraged use of exercise log. Labs reviewed and discussed with patient. Vitamin D level low and e-prescribed. A1C 6.1%, indicating prediabetes. Follow-up  Return in about 1 month (around 12/12/2020). Orders this encounter:  No orders of the defined types were placed in this encounter.       Prescriptions this encounter:  Orders Placed This Encounter   Medications    vitamin D (ERGOCALCIFEROL) 1.25 MG (76324 UT) CAPS capsule     Sig: Take 1 capsule by mouth once a week for 8 doses     Dispense:  8 capsule     Refill:  0       Electronically signed by:  Glenn Avendaño CNP

## 2020-11-12 NOTE — TELEPHONE ENCOUNTER
Per Dr. Francisco Greene he spoke with Dr. Angelo Villanueva and is referring patient to see her. Spoke with patient and gave her Dr. Jeniffer Amador phone number and Dr. Angelo Villanueva is willing to see her on Monday - patient will call and make the appointment time.

## 2020-11-19 ENCOUNTER — HOSPITAL ENCOUNTER (OUTPATIENT)
Facility: MEDICAL CENTER | Age: 40
End: 2020-11-19
Payer: MEDICARE

## 2020-11-20 ENCOUNTER — TELEPHONE (OUTPATIENT)
Dept: ONCOLOGY | Age: 40
End: 2020-11-20

## 2020-11-20 NOTE — TELEPHONE ENCOUNTER
Galen Leblanc with Dr. Theresa Moreno office called back. Per pathologist patient does not need biopsy and Dr. Steve Mcgill told pt to follow up with Dr. Daisy Mckay. Galen Leblanc states Dr. Theresa Moreno note should be done and avail ible in epic with in the next few days.

## 2020-11-20 NOTE — TELEPHONE ENCOUNTER
I called and left message at Dr. Juani Hernandez office to check on status of biopsy being scheduled.

## 2020-11-23 ENCOUNTER — OFFICE VISIT (OUTPATIENT)
Dept: ONCOLOGY | Age: 40
End: 2020-11-23
Payer: MEDICARE

## 2020-11-23 ENCOUNTER — HOSPITAL ENCOUNTER (OUTPATIENT)
Dept: PHYSICAL THERAPY | Facility: CLINIC | Age: 40
Setting detail: THERAPIES SERIES
Discharge: HOME | End: 2020-11-23
Payer: MEDICARE

## 2020-11-23 ENCOUNTER — TELEPHONE (OUTPATIENT)
Dept: ONCOLOGY | Age: 40
End: 2020-11-23

## 2020-11-23 VITALS
BODY MASS INDEX: 51.48 KG/M2 | DIASTOLIC BLOOD PRESSURE: 79 MMHG | TEMPERATURE: 97.2 F | HEART RATE: 107 BPM | SYSTOLIC BLOOD PRESSURE: 108 MMHG | WEIGHT: 290.6 LBS | RESPIRATION RATE: 16 BRPM

## 2020-11-23 PROCEDURE — G8427 DOCREV CUR MEDS BY ELIG CLIN: HCPCS | Performed by: INTERNAL MEDICINE

## 2020-11-23 PROCEDURE — G8484 FLU IMMUNIZE NO ADMIN: HCPCS | Performed by: INTERNAL MEDICINE

## 2020-11-23 PROCEDURE — G0283 ELEC STIM OTHER THAN WOUND: HCPCS

## 2020-11-23 PROCEDURE — 99211 OFF/OP EST MAY X REQ PHY/QHP: CPT

## 2020-11-23 PROCEDURE — G8417 CALC BMI ABV UP PARAM F/U: HCPCS | Performed by: INTERNAL MEDICINE

## 2020-11-23 PROCEDURE — 99214 OFFICE O/P EST MOD 30 MIN: CPT | Performed by: INTERNAL MEDICINE

## 2020-11-23 PROCEDURE — 90912 BFB TRAINING 1ST 15 MIN: CPT

## 2020-11-23 PROCEDURE — 1036F TOBACCO NON-USER: CPT | Performed by: INTERNAL MEDICINE

## 2020-11-23 RX ORDER — FERROUS SULFATE 325(65) MG
325 TABLET ORAL EVERY OTHER DAY
Qty: 30 TABLET | Refills: 1 | Status: SHIPPED | OUTPATIENT
Start: 2020-11-23 | End: 2021-07-07

## 2020-11-23 NOTE — TELEPHONE ENCOUNTER
Raine Rosen MD VISIT  DR FLORES IN TO SEE PATIENT  ORDERS RECEIVED  RV 4 MONTHS W/CDP CMP  LABS CDP CMP 03/15/21  MD VISIT 03/15/21 @11AM  SCRIPT SENT TO PATIENTS PHARMACY  AVS PRINTED AND GIVEN TO PATIENT WITH INSTRUCTIONS  PATIENT DISCHARGED AMBULATORY

## 2020-11-23 NOTE — PROGRESS NOTES
Spouse name: None    Number of children: None    Years of education: None    Highest education level: None   Occupational History    None   Social Needs    Financial resource strain: None    Food insecurity     Worry: None     Inability: None    Transportation needs     Medical: None     Non-medical: None   Tobacco Use    Smoking status: Never Smoker    Smokeless tobacco: Never Used   Substance and Sexual Activity    Alcohol use: No    Drug use: Yes     Frequency: 2.0 times per week     Types: Marijuana    Sexual activity: Yes     Partners: Male   Lifestyle    Physical activity     Days per week: None     Minutes per session: None    Stress: None   Relationships    Social connections     Talks on phone: None     Gets together: None     Attends [employer] service: None     Active member of club or organization: None     Attends meetings of clubs or organizations: None     Relationship status: None    Intimate partner violence     Fear of current or ex partner: None     Emotionally abused: None     Physically abused: None     Forced sexual activity: None   Other Topics Concern    None   Social History Narrative    None       Family History   Problem Relation Age of Onset    Elevated Lipids Mother        Current Medications:     Current Outpatient Medications   Medication Sig Dispense Refill    Nslnbr-QrFoq-DiZci-FA-CA-Omega (TRIVEEN-DUO DHA) 29-1-200 & 400 MG MISC take 1 capsule and take 1 tablet by mouth once daily 60 each 3    vitamin D (ERGOCALCIFEROL) 1.25 MG (87803 UT) CAPS capsule Take 1 capsule by mouth once a week for 8 doses 8 capsule 0    amitriptyline (ELAVIL) 25 MG tablet Take 1 tablet by mouth nightly (Patient taking differently: Take 25 mg by mouth nightly Takes PRN) 30 tablet 2    nystatin (MYCOSTATIN) 441017 UNIT/GM powder Apply 3 times daily.  1 Bottle 1    methotrexate (RHEUMATREX) 2.5 MG chemo tablet Take 5 tab every week orally for 90 days      nabumetone (RELAFEN) 750 MG tablet nabumetone 750 mg tablet   Take 1 tablet twice a day by oral route for 30 days.  triamcinolone (KENALOG) 0.1 % cream Apply topically 2 times daily      predniSONE (DELTASONE) 5 MG tablet Take 2.5 mg by mouth 3 times daily       ammonium lactate (LAC-HYDRIN) 12 % lotion Apply topically daily. 1 Bottle 0    albuterol (PROVENTIL) (2.5 MG/3ML) 0.083% nebulizer solution Take 3 mLs by nebulization daily as needed for Wheezing 120 each 3    Cholecalciferol (VITAMIN D) 2000 units CAPS capsule Take by mouth      folic acid (FOLVITE) 1 MG tablet Take 1 mg by mouth daily       Current Facility-Administered Medications   Medication Dose Route Frequency Provider Last Rate Last Dose    levonorgestrel (MIRENA) IUD 52 mg 1 each  1 each Intrauterine Once [facility], APRN - CNP   1 each at 05/07/20 1459       Allergies:   Iodine and Other    Review of Systems:    Constitutional: No fever or chills. No night sweats, no weight loss   Eyes: No eye discharge, double vision, or eye pain   HEENT: negative for sore mouth, sore throat, hoarseness and voice change   Respiratory: negative for cough , sputum, dyspnea, wheezing, hemoptysis, chest pain   Cardiovascular: negative for chest pain, dyspnea, palpitations, orthopnea, PND   Gastrointestinal: negative for nausea, vomiting, diarrhea, constipation, abdominal pain, Dysphagia, hematemesis and hematochezia   Genitourinary: negative for frequency, dysuria, nocturia, urinary incontinence, and hematuria   Integument: negative for rash, skin lesions, bruises. Hematologic/Lymphatic: negative for easy bruising, bleeding,  or petechiae.   Positive swollen lymph nodes in the armpits  Endocrine: negative for heat or cold intolerance,weight changes, change in bowel habits and hair loss   Musculoskeletal: negative for myalgias, arthralgias, pain, joint swelling,and bone pain   Neurological: negative for headaches, dizziness, seizures, weakness, numbness        Physical Exam:    Vitals: /79   Pulse 107   Temp 97.2 °F (36.2 °C) (Temporal)   Resp 16   Wt 290 lb 9.6 oz (131.8 kg)   BMI 51.48 kg/m²   General appearance - well appearing, no in pain or distress  Mental status - AAO X3  Eyes - pupils equal and reactive, extraocular eye movements intact  Mouth - mucous membranes moist, pharynx normal without lesions  Neck - supple, no significant adenopathy  Lymphatics - no palpable lymphadenopathy, no hepatosplenomegaly  Chest - clear to auscultation, no wheezes, rales or rhonchi, symmetric air entry  Heart - normal rate, regular rhythm, normal S1, S2, no murmurs  Abdomen - soft, nontender, nondistended, no masses or organomegaly  Neurological - alert, oriented, normal speech, no focal findings or movement disorder noted  Extremities - peripheral pulses normal, no pedal edema, no clubbing or cyanosis  Skin - normal coloration and turgor, no rashes, no suspicious skin lesions noted  - small bruising around nails      DATA:  Lab Results   Component Value Date    WBC 13.90 (H) 11/19/2020    HGB 12.6 11/19/2020    HCT 39.9 11/19/2020    MCV 83.8 11/19/2020     (H) 11/19/2020       Chemistry        Component Value Date/Time     11/19/2020 1459    K 3.4 (L) 11/19/2020 1459     11/19/2020 1459    CO2 26 11/19/2020 1459    BUN 14 11/19/2020 1459    CREATININE 0.59 (L) 11/19/2020 1459        Component Value Date/Time    CALCIUM 9.2 11/19/2020 1459    ALKPHOS 95 11/19/2020 1459    AST 18 11/19/2020 1459    ALT 35 11/19/2020 1459    BILITOT 0.3 11/19/2020 1459        Ct Chest Abdomen Pelvis W Contrast    Result Date: 10/1/2020  EXAMINATION: CT OF THE CHEST, ABDOMEN, AND PELVIS WITH CONTRAST 10/1/2020 3:56 pm TECHNIQUE: CT of the chest, abdomen and pelvis was performed with the administration of intravenous contrast. Multiplanar reformatted images are provided for review.  Dose modulation, iterative reconstruction, and/or weight based adjustment of the mA/kV was utilized to reduce the radiation dose to as low as reasonably achievable. COMPARISON: 07/30/2020 HISTORY: ORDERING SYSTEM PROVIDED HISTORY: Lymphadenopathy TECHNOLOGIST PROVIDED HISTORY: Reason for Exam: Pt states follow up for lymph adenopathy and enlarged thyroid. H/O C section Acuity: Chronic Type of Exam: Subsequent/Follow-up FINDINGS: Chest: Mediastinum: Heart size is normal without pericardial effusion. Thoracic aorta and main pulmonary artery are normal in caliber. No focal thyroid lesion is visualized by CT. No mediastinal adenopathy. There are bilateral enlarged axillary lymph nodes, similar in appearance to prior examination. Lungs/pleura: No consolidation. No pleural effusion or pneumothorax. No suspicious pulmonary nodule/mass. Soft Tissues/Bones: No acute osseous abnormality. Abdomen/Pelvis: Organs: The liver is diffusely hypoattenuating. No acute abnormality within the spleen, pancreas, or adrenal glands. Cholelithiasis without pericholecystic fluid or wall thickening. No acute abnormality within the kidneys. GI/Bowel: Small hiatal hernia. The small bowel is nondilated. The colon is normal in caliber. The appendix is within normal limits. Pelvis: Bladder is partially distended without vesicular stone. IUD is noted within the uterus. Peritoneum/Retroperitoneum: No ascites or pneumoperitoneum. Abdominal aorta is normal in caliber. There are shotty retroperitoneal lymph nodes. Bones/Soft Tissues: No acute osseous abnormality. 1. Enlarged bilateral axillary lymph nodes are similar in appearance to prior chest CT. 2. No acute intrathoracic abnormality. 3. No acute intra-abdominal abnormality. 4. Hepatic steatosis. 5. Cholelithiasis without CT evidence of cholecystitis.        Impression:    Left lower lobe pulmonary embolism, nonobstructive, provoked by pregnancy-12/2019  Mildly positive IgG anticardiolipin antibody-3/2019 with normalization of IgG anticardiolipin antibody-12/2019  Rheumatological disease/rheumatoid arthritis   BMI 52  Lymphadenopathy    Plan:  Reviewed results of lab work-up and other relevant clinical data  Patient was evaluated by surgery and it was felt that excisional biopsy will not provide any further information. Patient has had FNA and core biopsy of the lymph node which were negative for malignancy. CBC continues to shows neutrophilia. Recommend continued surveillance with physical exam and repeat cell counts in 4 months. Okay to proceed with bariatric surgery from hematology oncology standpoint. Continue iron supplementation. She was also given a refill on iron supplements. Return to clinic in 4 months. Tequila Szymanski      This note is created with the assistance of a speech recognition program.  While intending to generate a document that actually reflects the content of the visit, the document can still have some errors including those of syntax and sound a like substitutions which may escape proof reading. It such instances, actual meaning can be extrapolated by contextual diversion.

## 2020-12-09 ENCOUNTER — TELEPHONE (OUTPATIENT)
Dept: FAMILY MEDICINE CLINIC | Age: 40
End: 2020-12-09

## 2020-12-09 RX ORDER — TOBRAMYCIN 3 MG/ML
1 SOLUTION/ DROPS OPHTHALMIC EVERY 4 HOURS
Qty: 5 ML | Refills: 0 | Status: SHIPPED | OUTPATIENT
Start: 2020-12-09 | End: 2020-12-19

## 2020-12-10 ENCOUNTER — OFFICE VISIT (OUTPATIENT)
Dept: FAMILY MEDICINE CLINIC | Age: 40
End: 2020-12-10
Payer: MEDICARE

## 2020-12-10 VITALS
WEIGHT: 293 LBS | OXYGEN SATURATION: 97 % | SYSTOLIC BLOOD PRESSURE: 124 MMHG | BODY MASS INDEX: 51.91 KG/M2 | DIASTOLIC BLOOD PRESSURE: 76 MMHG | TEMPERATURE: 97.1 F | HEIGHT: 63 IN | HEART RATE: 100 BPM

## 2020-12-10 PROCEDURE — G8417 CALC BMI ABV UP PARAM F/U: HCPCS | Performed by: FAMILY MEDICINE

## 2020-12-10 PROCEDURE — G8482 FLU IMMUNIZE ORDER/ADMIN: HCPCS | Performed by: FAMILY MEDICINE

## 2020-12-10 PROCEDURE — G8427 DOCREV CUR MEDS BY ELIG CLIN: HCPCS | Performed by: FAMILY MEDICINE

## 2020-12-10 PROCEDURE — 90471 IMMUNIZATION ADMIN: CPT | Performed by: FAMILY MEDICINE

## 2020-12-10 PROCEDURE — 99214 OFFICE O/P EST MOD 30 MIN: CPT | Performed by: FAMILY MEDICINE

## 2020-12-10 PROCEDURE — 90686 IIV4 VACC NO PRSV 0.5 ML IM: CPT | Performed by: FAMILY MEDICINE

## 2020-12-10 PROCEDURE — 1036F TOBACCO NON-USER: CPT | Performed by: FAMILY MEDICINE

## 2020-12-10 RX ORDER — FLUCONAZOLE 150 MG/1
150 TABLET ORAL
Qty: 2 TABLET | Refills: 0 | Status: SHIPPED | OUTPATIENT
Start: 2020-12-10 | End: 2021-02-20

## 2020-12-10 RX ORDER — PANTOPRAZOLE SODIUM 20 MG/1
TABLET, DELAYED RELEASE ORAL
COMMUNITY
End: 2020-12-10 | Stop reason: DRUGHIGH

## 2020-12-10 RX ORDER — PANTOPRAZOLE SODIUM 40 MG/1
40 TABLET, DELAYED RELEASE ORAL
Qty: 30 TABLET | Refills: 5 | Status: SHIPPED | OUTPATIENT
Start: 2020-12-10 | End: 2021-05-10

## 2020-12-10 RX ORDER — ERGOCALCIFEROL 1.25 MG/1
CAPSULE ORAL
COMMUNITY
End: 2020-12-10

## 2020-12-10 RX ORDER — ADALIMUMAB 40MG/0.4ML
KIT SUBCUTANEOUS
COMMUNITY
End: 2021-11-09

## 2020-12-10 RX ORDER — NYSTATIN 100000 U/G
CREAM TOPICAL
Qty: 30 G | Refills: 0 | Status: SHIPPED | OUTPATIENT
Start: 2020-12-10 | End: 2021-06-17 | Stop reason: ALTCHOICE

## 2020-12-10 RX ORDER — CETIRIZINE HYDROCHLORIDE 10 MG/1
TABLET ORAL DAILY PRN
COMMUNITY
Start: 2020-11-18 | End: 2022-05-04 | Stop reason: ALTCHOICE

## 2020-12-10 ASSESSMENT — ENCOUNTER SYMPTOMS
RESPIRATORY NEGATIVE: 1
GASTROINTESTINAL NEGATIVE: 1

## 2020-12-10 NOTE — PROGRESS NOTES
[address] [facility]  [address] [place] [address] [address] [place] [address]  Dept: [phone]  Dept Fax: [phone]    Teo Hester is a 47 y.o. female who presents today for her medical conditions/complaints as noted below. Teo Hester is c/o of   Chief Complaint   Patient presents with    Hernia         HPI:     The patient presents for check up. The patient is being followed by rheumatology, allergy/immunology, bariatric surgery. She recently began Humira which per the patient has significantly improved her pain and quality of life. Her joints are not hurting as much. She is set to go back every 2 weeks for the Humira injection. She was sent to see Dr. Jerrod Murillo due to axillary adenopathy but there was no further work up due to the provider stating that there was no further information that could be added by a tissue diagnosis. The patient is in the process of getting the work up for bariatric surgery through Dr. Jeimy Leone. The patient was told that she has a hiatal hernia from a CT of the abdomen/pelvis. The patient states the heartburn has been intermittent for the past few months. The symptoms have been increasing over the past couple of weeks. The patient has been on protonix in the past.  She has burning, heartburn, belching frequently. She was on protonix which did help initially but it has not been working as well over the past few weeks. She does take tums and mylanta for breakthrough symptoms. The patient had mascara on her eyes a couple of days ago and developed redness and discharge from the eye. The patient states the eyes are red and itchy.         Hemoglobin A1C (%)   Date Value   11/02/2020 6.1 (H)   12/02/2019 5.4   10/30/2018 5.9             ( goal A1Cis < 7)   No results found for: LABMICR  LDL Cholesterol (mg/dL)   Date Value   11/02/2020 124   06/25/2020 103   08/02/2018 103       (goal LDL is <100)   AST (IU/L)   Date Value 2020 18     ALT (IU/L)   Date Value   2020 35     BUN (mg/dL)   Date Value   2020 14     BP Readings from Last 3 Encounters:   20 110/72   12/10/20 124/76   20 108/79          (goal 120/80)    Past Medical History:   Diagnosis Date    Abnormal 1st trimester screen (Tri 21 1:117)--NIPT nml  2019    ADD (attention deficit disorder) without hyperactivity     Cel[place]  & 1/10 2020    Chronic back pain     Echogenic focus of heart of fetus affecting antepartum care of mother 2019   Matthew Sanchez early 1hr, nml 3hr  2019    1 elevated value, MFM recommends repeat 3hr GTT in 2 weeks    Elevated umbilical artery dopplers  1/10/2020    Fetal ascites     Fetal heart rate decelerations affecting management of mother     GERD (gastroesophageal reflux disease)     Headache     Hypertension     IUD (intrauterine device) in place 2020    Adina Amos Hodgkin    Lumbar radiculopathy 2019    Lumbar spondylosis 2019    Multigravida of advanced maternal age in third trimester     Obesity     Obesity affecting pregnancy in first trimester 2018    Obesity affecting pregnancy in third trimester     SHELLEY (obstructive sleep apnea)     Pulmonary embolism affecting pregnancy in second trimester 2019    Rh+/RI/GBSunk 2020    Rheumatoid arteritis ([address]     Umbilical cord complication       Past Surgical History:   Procedure Laterality Date     SECTION Bilateral 2020     SECTION performed by Víctor Cummins DO at [facility]  [facility]    Mirena    OTHER SURGICAL HISTORY  12/10/2018    Fluoroscopy- with esophagas- possible delay of gastric emptying     TONSILLECTOMY      US LYMPH NODE BIOPSY  2020    US LYMPH NODE BIOPSY 2020 [facility]       Family History   Problem Relation Age of Onset    Elevated Lipids Mother        Social History     Tobacco Use  Smoking status: Never Smoker    Smokeless tobacco: Never Used   Substance Use Topics    Alcohol use: No      Current Outpatient Medications   Medication Sig Dispense Refill    pantoprazole (PROTONIX) 40 MG tablet Take 1 tablet by mouth every morning (before breakfast) 30 tablet 5    fluconazole (DIFLUCAN) 150 MG tablet Take 1 tablet by mouth every 72 hours for 6 days 2 tablet 0    nystatin (MYCOSTATIN) 637443 UNIT/GM cream Apply topically 2 times daily. 30 g 0    tobramycin (TOBREX) 0.3 % ophthalmic solution Place 1 drop into both eyes every 4 hours for 10 days 5 mL 0    ferrous sulfate (IRON 325) 325 (65 Fe) MG tablet Take 1 tablet by mouth every other day 30 tablet 1    Mqmjxu-IfEee-ZzXgw-FA-CA-Omega (TRIVEEN-DUO DHA) 29-1-200 & 400 MG MISC take 1 capsule and take 1 tablet by mouth once daily 60 each 3    vitamin D (ERGOCALCIFEROL) 1.25 MG (41430 UT) CAPS capsule Take 1 capsule by mouth once a week for 8 doses 8 capsule 0    amitriptyline (ELAVIL) 25 MG tablet Take 1 tablet by mouth nightly (Patient taking differently: Take 25 mg by mouth nightly Takes PRN) 30 tablet 2    methotrexate (RHEUMATREX) 2.5 MG chemo tablet Take 5 tab every week orally for 90 days      nabumetone (RELAFEN) 750 MG tablet nabumetone 750 mg tablet   Take 1 tablet twice a day by oral route for 30 days.  triamcinolone (KENALOG) 0.1 % cream Apply topically 2 times daily      predniSONE (DELTASONE) 5 MG tablet Take 2.5 mg by mouth 3 times daily       ammonium lactate (LAC-HYDRIN) 12 % lotion Apply topically daily.  1 Bottle 0    albuterol (PROVENTIL) (2.5 MG/3ML) 0.083% nebulizer solution Take 3 mLs by nebulization daily as needed for Wheezing 120 each 3    Cholecalciferol (VITAMIN D) 2000 units CAPS capsule Take by mouth      folic acid (FOLVITE) 1 MG tablet Take 1 mg by mouth daily      famotidine (PEPCID) 20 MG tablet Take 1 tablet by mouth nightly 30 tablet 3  Adalimumab (HUMIRA PEN) 40 MG/0.4ML PNKT Humira(CF) Pen 40 mg/0.4 mL subcutaneous kit      cetirizine (ZYRTEC) 10 MG tablet take 1 tablet by mouth once daily       Current Facility-Administered Medications   Medication Dose Route Frequency Provider Last Rate Last Admin    levonorgestrel (MIRENA) IUD 52 mg 1 each  1 each Intrauterine Once Valetta Fillmore, APRN - CNP   1 each at 05/07/20 1459     Allergies   Allergen Reactions    Cat Hair Extract     Dog Epithelium     Iodine Hives and Itching    Other      Sensitive to bug bites, mosquitos etc.       Health Maintenance   Topic Date Due    Varicella vaccine (1 of 2 - 2-dose childhood series) 12/31/2020 (Originally 8/3/1981)    A1C test (Diabetic or Prediabetic)  11/02/2021    Cervical cancer screen  07/30/2024    DTaP/Tdap/Td vaccine (2 - Td) 02/21/2027    Flu vaccine  Completed    HIV screen  Completed    Hepatitis A vaccine  Aged Out    Hepatitis B vaccine  Aged Out    Hib vaccine  Aged Out    Meningococcal (ACWY) vaccine  Aged Out    Pneumococcal 0-64 years Vaccine  Aged Out       Subjective:     Review of Systems   Constitutional: Negative. HENT: Negative. Eyes: Positive for discharge, redness and itching. Negative for photophobia, pain and visual disturbance. Respiratory: Negative. Cardiovascular: Negative. Gastrointestinal: Negative. Genitourinary: Negative. Musculoskeletal: Negative. Skin: Negative. Allergic/Immunologic: Negative for environmental allergies, food allergies and immunocompromised state. Neurological: Negative. Psychiatric/Behavioral: Negative. Objective:     Physical Exam  Vitals signs reviewed. Constitutional:       General: She is awake. She is not in acute distress. Appearance: Normal appearance. She is obese. She is not ill-appearing, toxic-appearing or diaphoretic. HENT:      Head: Normocephalic and atraumatic.       Right Ear: External ear normal. Left Ear: External ear normal.      Nose: Nose normal.      Mouth/Throat:      Mouth: Mucous membranes are moist.   Eyes:      General: No scleral icterus. Right eye: Discharge present. Left eye: No discharge. Extraocular Movements: Extraocular movements intact. Neck:      Musculoskeletal: Normal range of motion. Cardiovascular:      Rate and Rhythm: Normal rate and regular rhythm. Pulses: Normal pulses. Heart sounds: Normal heart sounds. No murmur. No friction rub. No gallop. Pulmonary:      Effort: Pulmonary effort is normal. No respiratory distress. Breath sounds: Normal breath sounds. No stridor. No wheezing, rhonchi or rales. Chest:      Chest wall: No tenderness. Abdominal:      General: Abdomen is flat. Bowel sounds are normal.      Palpations: Abdomen is soft. Musculoskeletal: Normal range of motion. Neurological:      General: No focal deficit present. Mental Status: She is alert and oriented to person, place, and time. Mental status is at baseline. Cranial Nerves: No cranial nerve deficit. Motor: No weakness. Gait: Gait normal.   Psychiatric:         Attention and Perception: Attention normal.         Mood and Affect: Mood and affect normal.         Speech: Speech normal.         Behavior: Behavior normal.         Thought Content: Thought content normal.         Judgment: Judgment normal.       /76   Pulse 100   Temp 97.1 °F (36.2 °C) Comment: scanned  Ht 5' 3\" (1.6 m)   Wt 294 lb (133.4 kg)   SpO2 97%   BMI 52.08 kg/m²     Assessment:       Diagnosis Orders   1. Hiatal hernia  pantoprazole (PROTONIX) 40 MG tablet   2. Eye irritation     3. Vaginal irritation  fluconazole (DIFLUCAN) 150 MG tablet    nystatin (MYCOSTATIN) 785606 UNIT/GM cream   4. Morbid obesity ([address]     5. Prediabetes     6. SHELLEY (obstructive sleep apnea)     7.  GERD 8. Need for influenza vaccination  INFLUENZA, QUADV, 3 YRS AND OLDER, IM PF, PREFILL SYR OR SDV, 0.5ML (AFLURIA QUADV, PF)             Plan:    Hiatal hernia - Protonix dose increased. Continue pepcid. F/u with Dr. Latia Rowley irritation - use eye drops as prescribed    Vaginal irritation - use diflucan, use mycostatin as well. Weight loss would help prevent future recurrence    Obesity - patient encouraged to continue to follow up with bariatric surgery and to pursue weight loss through diet and exercise    Prediabetes - a1c 6.1 last check, patient pursuing weight loss, will need to consider metformin    SHELLEY - encouraged weight loss     GERD - pepcid and protonis    Rheumatoid arthritis - continue on humira and follow up with rheumatology  Return in about 6 months (around 6/10/2021). Orders Placed This Encounter   Procedures    INFLUENZA, QUADV, 3 YRS AND OLDER, IM PF, PREFILL SYR OR SDV, 0.5ML (AFLURIA QUADV, PF)     Orders Placed This Encounter   Medications    pantoprazole (PROTONIX) 40 MG tablet     Sig: Take 1 tablet by mouth every morning (before breakfast)     Dispense:  30 tablet     Refill:  5    fluconazole (DIFLUCAN) 150 MG tablet     Sig: Take 1 tablet by mouth every 72 hours for 6 days     Dispense:  2 tablet     Refill:  0    nystatin (MYCOSTATIN) 509540 UNIT/GM cream     Sig: Apply topically 2 times daily. Dispense:  30 g     Refill:  0       Patient given educational materials - see patient instructions. Discussed use, benefit, and side effects of prescribed medications. All patientquestions answered. Pt voiced understanding. Reviewed health maintenance. Instructedto continue current medications, diet and exercise. Patient agreed with treatmentplan. Follow up as directed.      Electronically signed by Fernanda Albright MD on 12/16/2020 at 6:47 PM

## 2020-12-14 ENCOUNTER — OFFICE VISIT (OUTPATIENT)
Dept: BARIATRICS/WEIGHT MGMT | Age: 40
End: 2020-12-14
Payer: MEDICARE

## 2020-12-14 VITALS
BODY MASS INDEX: 51.21 KG/M2 | WEIGHT: 289 LBS | SYSTOLIC BLOOD PRESSURE: 110 MMHG | HEART RATE: 86 BPM | HEIGHT: 63 IN | RESPIRATION RATE: 18 BRPM | DIASTOLIC BLOOD PRESSURE: 72 MMHG

## 2020-12-14 PROCEDURE — G8417 CALC BMI ABV UP PARAM F/U: HCPCS | Performed by: NURSE PRACTITIONER

## 2020-12-14 PROCEDURE — 99213 OFFICE O/P EST LOW 20 MIN: CPT | Performed by: NURSE PRACTITIONER

## 2020-12-14 PROCEDURE — 1036F TOBACCO NON-USER: CPT | Performed by: NURSE PRACTITIONER

## 2020-12-14 PROCEDURE — G8482 FLU IMMUNIZE ORDER/ADMIN: HCPCS | Performed by: NURSE PRACTITIONER

## 2020-12-14 PROCEDURE — G8427 DOCREV CUR MEDS BY ELIG CLIN: HCPCS | Performed by: NURSE PRACTITIONER

## 2020-12-14 RX ORDER — FAMOTIDINE 20 MG/1
20 TABLET, FILM COATED ORAL NIGHTLY
Qty: 30 TABLET | Refills: 3 | Status: SHIPPED | OUTPATIENT
Start: 2020-12-14 | End: 2021-04-19 | Stop reason: SDUPTHER

## 2020-12-14 NOTE — PROGRESS NOTES
 Pulmonary embolism affecting pregnancy in second trimester 2019    Rh+/RI/GBSunk 2020    Rheumatoid arteritis ([address]     Umbilical cord complication    .     Past Surgical History:  Past Surgical History:   Procedure Laterality Date     SECTION Bilateral 2020     SECTION performed by James Nunez DO at [facility]    INTRAUTERINE DEVICE INSERTION  2020    Mirena    OTHER SURGICAL HISTORY  12/10/2018    Fluoroscopy- with esophagas- possible delay of gastric emptying     TONSILLECTOMY      US LYMPH NODE BIOPSY  2020    US LYMPH NODE BIOPSY 2020 [facility]       Family History:  Family History   Problem Relation Age of Onset    Elevated Lipids Mother        Social History:  Social History     Socioeconomic History    Marital status:      Spouse name: Not on file    Number of children: Not on file    Years of education: Not on file    Highest education level: Not on file   Occupational History    Not on file   Social Needs    Financial resource strain: Not on file    Food insecurity     Worry: Not on file     Inability: Not on file    Transportation needs     Medical: Not on file     Non-medical: Not on file   Tobacco Use    Smoking status: Never Smoker    Smokeless tobacco: Never Used   Substance and Sexual Activity    Alcohol use: No    Drug use: Yes     Frequency: 2.0 times per week     Types: Marijuana    Sexual activity: Yes     Partners: Male   Lifestyle    Physical activity     Days per week: Not on file     Minutes per session: Not on file    Stress: Not on file   Relationships    Social connections     Talks on phone: Not on file     Gets together: Not on file     Attends Muslim service: Not on file     Active member of club or organization: Not on file     Attends meetings of clubs or organizations: Not on file     Relationship status: Not on file    Intimate partner violence Fear of current or ex partner: Not on file     Emotionally abused: Not on file     Physically abused: Not on file     Forced sexual activity: Not on file   Other Topics Concern    Not on file   Social History Narrative    Not on file       Current Medications:  Current Outpatient Medications   Medication Sig Dispense Refill    famotidine (PEPCID) 20 MG tablet Take 1 tablet by mouth nightly 30 tablet 3    Adalimumab (HUMIRA PEN) 40 MG/0.4ML PNKT Humira(CF) Pen 40 mg/0.4 mL subcutaneous kit      cetirizine (ZYRTEC) 10 MG tablet take 1 tablet by mouth once daily      pantoprazole (PROTONIX) 40 MG tablet Take 1 tablet by mouth every morning (before breakfast) 30 tablet 5    fluconazole (DIFLUCAN) 150 MG tablet Take 1 tablet by mouth every 72 hours for 6 days 2 tablet 0    nystatin (MYCOSTATIN) 700262 UNIT/GM cream Apply topically 2 times daily. 30 g 0    tobramycin (TOBREX) 0.3 % ophthalmic solution Place 1 drop into both eyes every 4 hours for 10 days 5 mL 0    ferrous sulfate (IRON 325) 325 (65 Fe) MG tablet Take 1 tablet by mouth every other day 30 tablet 1    Zdhiid-RqAxv-VaQpw-FA-CA-Omega (TRIVEEN-DUO DHA) 29-1-200 & 400 MG MISC take 1 capsule and take 1 tablet by mouth once daily 60 each 3    vitamin D (ERGOCALCIFEROL) 1.25 MG (22030 UT) CAPS capsule Take 1 capsule by mouth once a week for 8 doses 8 capsule 0    methotrexate (RHEUMATREX) 2.5 MG chemo tablet Take 5 tab every week orally for 90 days      nabumetone (RELAFEN) 750 MG tablet nabumetone 750 mg tablet   Take 1 tablet twice a day by oral route for 30 days.  triamcinolone (KENALOG) 0.1 % cream Apply topically 2 times daily      predniSONE (DELTASONE) 5 MG tablet Take 2.5 mg by mouth 3 times daily       ammonium lactate (LAC-HYDRIN) 12 % lotion Apply topically daily.  1 Bottle 0    albuterol (PROVENTIL) (2.5 MG/3ML) 0.083% nebulizer solution Take 3 mLs by nebulization daily as needed for Wheezing 120 each 3  folic acid (FOLVITE) 1 MG tablet Take 1 mg by mouth daily      amitriptyline (ELAVIL) 25 MG tablet Take 1 tablet by mouth nightly (Patient taking differently: Take 25 mg by mouth nightly Takes PRN) 30 tablet 2    Cholecalciferol (VITAMIN D) 2000 units CAPS capsule Take by mouth       Current Facility-Administered Medications   Medication Dose Route Frequency Provider Last Rate Last Admin    levonorgestrel (MIRENA) IUD 52 mg 1 each  1 each Intrauterine Once Jason Alegre, APRN - CNP   1 each at 05/07/20 1459       Vital Signs:  /72 (Site: Left Upper Arm, Position: Sitting, Cuff Size: Large Adult)   Pulse 86   Resp 18   Ht 5' 3\" (1.6 m)   Wt 289 lb (131.1 kg)   BMI 51.19 kg/m²     BMI/Height/Weight:  Body mass index is 51.19 kg/m². Review of Systems - A review of systems was performed. All was negative unless otherwise documented in HPI. Constitutional: Negative for fever, chills and diaphoresis. HENT: Negative for hearing loss and trouble swallowing. Eyes: Negative for photophobia and visual disturbance. Respiratory: Negative for cough, shortness of breath and wheezing. Cardiovascular: Negative for chest pain and palpitations. Gastrointestinal: Negative for nausea, vomiting, abdominal pain, diarrhea, constipation, blood in stool and abdominal distention. Endocrine: Negative for polydipsia, polyphagia and polyuria. Genitourinary: Negative for dysuria, frequency, hematuria and difficulty urinating. Musculoskeletal: Negative for myalgias, joint swelling. Skin: Negative for pallor and rash. Neurological: Negative for dizziness, tremors, light-headedness and headaches. Psychiatric/Behavioral: Negative for sleep disturbance and dysphoric mood. Objective:      Physical Exam   Vital signs reviewed. General: Well-developed and well-nourished. No acute distress. Skin: Warm, dry and intact. HEENT: Normocephalic. EOMs intact. Conjunctivae normal. Neck supple. Cardiovascular: Normal rate, regular rhythm. Pulmonary/Chest: Normal effort. Lungs clear to auscultation. No rales, rhonchi or wheezing. Abdominal: Positive bowel sounds. Soft, nontender. Nondistended. Musculoskeletal: Movement x4. No edema. Neurological: Gait normal. Alert and oriented to person, place, and time. Psychiatric: Normal mood and affect. Speech and behavior normal. Judgment and thought content normal. Cognition and memory intact. Assessment:       Diagnosis Orders   1. SHELLEY (obstructive sleep apnea)  famotidine (PEPCID) 20 MG tablet   2. Morbid obesity (HCC)  famotidine (PEPCID) 20 MG tablet   3. GERD  famotidine (PEPCID) 20 MG tablet   4. Prediabetes  famotidine (PEPCID) 20 MG tablet   5. Rheumatoid arthritis with rheumatoid factor, unspecified (HCC)  famotidine (PEPCID) 20 MG tablet   6. Lumbar radiculopathy  famotidine (PEPCID) 20 MG tablet       Plan:    Dietitian visit today. Patient was encouraged to journal all food intake. Keep calorie level at approximately 5881-2191. Protein intake is to be a minimum of 60-80 grams per day. Water drinking was encouraged with a goal of 64oz-128oz daily. Beverages to be calorie free except for milk. Every other beverage should be water. Avoid soda. Continue to increase level of physical activity. Encouraged use of exercise log. Continue protonix. Pepcid added for breakthrough GERD symptoms. Follow-up  Return in about 1 month (around 1/14/2021). Orders this encounter:  No orders of the defined types were placed in this encounter.       Prescriptions this encounter:  Orders Placed This Encounter   Medications    famotidine (PEPCID) 20 MG tablet     Sig: Take 1 tablet by mouth nightly     Dispense:  30 tablet     Refill:  3       Electronically signed by:  Dario Mckeon CNP

## 2020-12-14 NOTE — PROGRESS NOTES
Medical Nutrition Therapy   Metabolic and Bariatric Surgery         Supervised diet and exercise preparation  Visit 2 out of 6  Pt reports:      Pt currently following structured meal plan 8pro/3veg/2fr/6 starch/3fat from education binder diet for weight management. Reviewed with pt. Vitals: Wt Readings from Last 3 Encounters:   12/14/20 289 lb (131.1 kg)   12/10/20 294 lb (133.4 kg)   11/23/20 290 lb 9.6 oz (131.8 kg)           Nutrition Assessment:   PES: Knowledge deficit related to healthy behaviors that support weight management post weight loss surgery as evidenced by Body mass index is 51.19 kg/m². Nutrition Assessment of Goal Attainment:  TREATMENT GOALS:    1. Pt  Completed 4 out of 5 goals. 2.TREATMENT GOALS FOR UPCOMING WEEK: continue all previous goals and add: # 15    All goals were planned with and agreed on by the patient. I want to improve my health because I want to be around for my kids. appt # NA G What is your next step? C 1 2 3 4 5 6 7 8 9     1  one I will read the education binder provided to me and the  Emotional eating checklist by my next visit. x 50 100            1  2 I will make my pschological evaluation appoinment. x 0 100            2  3 I will bring this goal card to every appointment. 100 100             x 4 I will eliminate all tobacco/nicotine. x 5 I will limit alcoholic beverages to 6-6JC per week. x 6 I will limit dining out to 3 times per week or less. 7 I will eliminate sugary beverages. x 8 I will eliminate carbonated beverages. x 9 I will eliminate drinking with a straw. x 10 I will limit caffeinated beverages to 16oz daily. x 11 I will limit cold cereals prepared with milk. 12 I will do a 5 minute reflection. 13 I will food journal daily. 2  14 I will log my exercise daily.   50 100 2  15 I will determine my  calcium and multivitamin plan. 16 I will purchase multivitamin. 17 I will start taking multivitamins following my plan. 18 I will have 1-2 servings of protein present at each meal.                 19 I will eat every 3-5 hours. 20 I will drink 64oz of fluid daily. 21 I will follow the 15-30-15 guideline. 22 I will eat protein first at all meals followed by vegetables  Fruit and lastly whole grains. 1  23 My first one diet neutral approach is:  I will eat breakfast daily. x  20              24 my second diet neutral approach is:                 25 My third diet neutral approach is:                                                                                                                             Do you understand your goals? y    Do you have the information you need to achieve your goals? y    Do you have any questions  right now? n        [x]  Consistent goal achievement in the program thus far and further success with goals is expected. []  Unable to consistently make progress in goal achievement. At this time patient is not moving forward  in developing the skills needed for success after surgery. Plan:    Continue to follow monthly and review goals.          [x]  Nutrition visits complete    []

## 2020-12-16 ASSESSMENT — ENCOUNTER SYMPTOMS
EYE ITCHING: 1
EYE DISCHARGE: 1
EYE REDNESS: 1
PHOTOPHOBIA: 0
EYE PAIN: 0

## 2020-12-22 ENCOUNTER — HOSPITAL ENCOUNTER (OUTPATIENT)
Dept: PHYSICAL THERAPY | Facility: CLINIC | Age: 40
Setting detail: THERAPIES SERIES
Discharge: HOME | End: 2020-12-22
Payer: MEDICARE

## 2020-12-22 NOTE — FLOWSHEET NOTE
[] [address]  [address]    [phone]  F: [phone]   [] [address]  [facility]   [address]  P: [phone]  F: [phone]  [] [place]  [address] [facility]  P: [phone]  F: [phone]  [] [address]  [facility]   [place]: [phone]  F: [phone]   [] [address]  [place]: [phone]   F: [phone]     Physical Therapy Cancel/No Show note    Date: 2020  Patient: Azeem Crespo  : 1980  MRN: [id number]    Cancels/No Shows to date: 3    For today's appointment patient:    [x]  Cancelled    [x] Rescheduled appointment    [] No-show     Reason given by patient:    []  Patient ill    []  Conflicting appointment    [] No transportation      [] Conflict with work    [x] No reason given    [] Weather related    [] COVID-19    [] Other:      Comments:       [] Next appointment was confirmed    Electronically signed by: Ricardo Boucher

## 2021-01-02 DIAGNOSIS — E55.9 VITAMIN D DEFICIENCY: ICD-10-CM

## 2021-01-04 RX ORDER — ERGOCALCIFEROL 1.25 MG/1
CAPSULE ORAL
Qty: 8 CAPSULE | Refills: 0 | Status: SHIPPED | OUTPATIENT
Start: 2021-01-04 | End: 2021-02-24

## 2021-01-06 ENCOUNTER — HOSPITAL ENCOUNTER (OUTPATIENT)
Dept: PHYSICAL THERAPY | Facility: CLINIC | Age: 41
Setting detail: THERAPIES SERIES
Discharge: HOME | End: 2021-01-06
Payer: MEDICARE

## 2021-01-06 NOTE — FLOWSHEET NOTE
[] [address]  [address]    [phone]  F: [phone]   [] [address]  [facility]   [address]  P: [phone]  F: [phone]  [x] [address]  [address] [facility]  P: [phone]  F: [phone]  [] [address]  [facility]   [place]: [phone]  F: [phone]   [] [address]  [place]: [phone]   F: [phone]     Physical Therapy Cancel/No Show note    Date: 2021  Patient: Fatou Raza  : 1980  MRN: [id number]    Cancels/No Shows to date: 3    For today's appointment patient:    [x]  Cancelled    [] Rescheduled appointment    [] No-show     Reason given by patient:    []  Patient ill    []  Conflicting appointment    [] No transportation      [] Conflict with work    [x] No reason given    [] Weather related    [] [id number]    [] Other:      Comments: Stated she would call back to reschedule.       [] Next appointment was confirmed    Electronically signed by: Paula Olivo PT

## 2021-01-07 ENCOUNTER — OFFICE VISIT (OUTPATIENT)
Dept: NEUROLOGY | Age: 41
End: 2021-01-07
Payer: MEDICARE

## 2021-01-07 VITALS
BODY MASS INDEX: 49.43 KG/M2 | HEART RATE: 79 BPM | OXYGEN SATURATION: 97 % | WEIGHT: 279 LBS | HEIGHT: 63 IN | DIASTOLIC BLOOD PRESSURE: 90 MMHG | TEMPERATURE: 97.3 F | SYSTOLIC BLOOD PRESSURE: 132 MMHG

## 2021-01-07 DIAGNOSIS — R46.89 SPELL OF ABNORMAL BEHAVIOR: Primary | ICD-10-CM

## 2021-01-07 PROCEDURE — G8427 DOCREV CUR MEDS BY ELIG CLIN: HCPCS | Performed by: PSYCHIATRY & NEUROLOGY

## 2021-01-07 PROCEDURE — G8482 FLU IMMUNIZE ORDER/ADMIN: HCPCS | Performed by: PSYCHIATRY & NEUROLOGY

## 2021-01-07 PROCEDURE — G8417 CALC BMI ABV UP PARAM F/U: HCPCS | Performed by: PSYCHIATRY & NEUROLOGY

## 2021-01-07 PROCEDURE — 99214 OFFICE O/P EST MOD 30 MIN: CPT | Performed by: PSYCHIATRY & NEUROLOGY

## 2021-01-07 PROCEDURE — 1036F TOBACCO NON-USER: CPT | Performed by: PSYCHIATRY & NEUROLOGY

## 2021-01-07 NOTE — PROGRESS NOTES
embolism affecting pregnancy in second trimester 2019    Rh+/RI/GBSunk 2020    [address]oid [address] [address]     Umbilical cord complication      Past Surgical History:   Procedure Laterality Date     SECTION Bilateral 2020     SE[facility] performed by Eli [facility] at [facility]    INTRAUTERINE DEVICE INSERTION  2020    Mirena    OTHER SURGICAL HISTORY  12/10/2018    Fluoroscopy- with esophagas- possible delay of gastric emptying     TONSILLECTOMY      US LYMPH NODE BIOPSY  2020    US LYMPH NODE BIOPSY 2020 [facility]     Allergies   Allergen Reactions    Cat Hair Extract     Dog Epithelium     Iodine Hives and Itching    Other      Sensitive to bug bites, mosquitos etc.     Family History   Problem Relation Age of Onset    Elevated Lipids Mother       Social History     Socioeconomic History    Marital status:      Spouse name: Not on file    Number of children: Not on file    Years of education: Not on file    Highest education level: Not on file   Occupational History    Not on file   Social Needs    Financial resource strain: Not on file    Food insecurity     Worry: Not on file     Inability: Not on file    Transportation needs     Medical: Not on file     Non-medical: Not on file   Tobacco Use    Smoking status: Never Smoker    Smokeless tobacco: Never Used   Substance and Sexual Activity    Alcohol use: No    Drug use: Yes     Frequency: 2.0 times per week     Types: Marijuana    Sexual activity: Yes     Partners: Male   Lifestyle    Physical activity     Days per week: Not on file     Minutes per session: Not on file    Stress: Not on file   Relationships    Social connections     Talks on phone: Not on file     Gets together: Not on file     Attends Tenriism service: Not on file     Active member of club or organization: Not on file     Attends meetings of clubs or organizations: Not on file     Relationship status: proprioception     Cerebellar Intact fine motor movement. No involuntary movements or tremors     Reflex function Intact 2+ DTR and symmetric. Negative Babinski     Gait                  Normal station and gait       Lab Results   Component Value Date    LDLCHOLESTEROL 124 11/02/2020     No components found for: CHLPL  Lab Results   Component Value Date    TRIG 135 11/02/2020    TRIG 134 08/02/2018    TRIG 111 02/28/2017     Lab Results   Component Value Date    HDL 30 (L) 11/02/2020    HDL 34 (L) 06/25/2020    HDL 28 (L) 08/02/2018     No results found for: LDLCALC  No results found for: LABVLDL  Lab Results   Component Value Date    LABA1C 6.1 (H) 11/02/2020     Lab Results   Component Value Date     11/02/2020     Lab Results   Component Value Date    IXUKPDGF07 523 11/02/2020        Maximum score 5 Score 5 What is the year, season, date, day, month   Maximum score 5 Score 5 Where are we: State, county, town, hospital, floor? Maximum score 3 Score 3-0 Name 3 objects: ball, pen, car. Ask patient to repeat 3 objects. Maximum score 5 Score 5-0 Serial sevens from 100:93, 86, 79, 72, 65   Maximum score 3 Score 3-1 Recall 3 objects   Maximum score 2 Score 2-0 Name a pencil and watch. Maximum score 1 Score 1 Repeat the following: No ifs ands or buts.      Maximum score 3 Score 3 Follow 3 stage command take a paper in your hand, fold it in half, and put it on the floor. Maximum score 1  Score 1 Read and obey the following: Close your eyes     Maximum score 1 Score 1 Write a sentence. Maximum score 1 Score 1 Copy a design below. Max 30   Patients score 29    All of patient's labs were personally reviewed. All the imaging studies were personally reviewed and discussed with the patient.      Assessment Recommendations:  Episodes of speech dysarthria with confusion, etiology unclear  Subjective cognitive impairment  Chronic migraine WO Aura, intractable, WO Status Migrainosus    Recent

## 2021-01-11 RX ORDER — AMITRIPTYLINE HYDROCHLORIDE 25 MG/1
25 TABLET, FILM COATED ORAL NIGHTLY
Qty: 30 TABLET | Refills: 2 | Status: ON HOLD | OUTPATIENT
Start: 2021-01-11 | End: 2021-01-22

## 2021-01-11 NOTE — TELEPHONE ENCOUNTER
Pharmacy requesting refill of Elavil.       Medication active on med list yes      Date of last fill: 10/7/20  verified on 1/11/2021   verified by [facility] LPN      Date of last appointment 1/7/21    Next Visit Date:  5/5/2021

## 2021-01-12 ENCOUNTER — TELEPHONE (OUTPATIENT)
Dept: NEUROLOGY | Age: 41
End: 2021-01-12

## 2021-01-12 NOTE — TELEPHONE ENCOUNTER
Pt called in stating that she had contacted [facility] about her CPAP order and was informed that they do not take her insurance. She is asking that the order be sent to [facility]. I called [facility], LM for [place] Luu asking her to fax the order to them.

## 2021-01-13 NOTE — TELEPHONE ENCOUNTER
Denis Lane from [facility] sleep lab called the office this afternoon. She stated that she would send the order to the new DME.

## 2021-01-18 ENCOUNTER — HOSPITAL ENCOUNTER (OUTPATIENT)
Dept: LAB | Age: 41
Setting detail: SPECIMEN
Discharge: HOME | End: 2021-01-18
Payer: MEDICARE

## 2021-01-18 DIAGNOSIS — Z01.818 PREOP TESTING: Primary | ICD-10-CM

## 2021-01-18 PROCEDURE — U0003 INFECTIOUS AGENT DETECTION BY NUCLEIC ACID (DNA OR RNA); SEVERE ACUTE RESPIRATORY SYNDROME CORONAVIRUS 2 (SARS-COV-2) (CORONAVIRUS DISEASE [COVID-19]), AMPLIFIED PROBE TECHNIQUE, MAKING USE OF HIGH THROUGHPUT TECHNOLOGIES AS DESCRIBED BY CMS-2020-01-R: HCPCS

## 2021-01-19 ENCOUNTER — ANESTHESIA EVENT (OUTPATIENT)
Dept: OPERATING ROOM | Age: 41
End: 2021-01-19
Payer: MEDICARE

## 2021-01-20 LAB — SARS-COV-2, NAA: NOT DETECTED

## 2021-01-22 ENCOUNTER — HOSPITAL ENCOUNTER (OUTPATIENT)
Age: 41
Setting detail: OUTPATIENT SURGERY
Discharge: HOME | End: 2021-01-22
Attending: SURGERY | Admitting: SURGERY
Payer: MEDICARE

## 2021-01-22 ENCOUNTER — ANESTHESIA (OUTPATIENT)
Dept: OPERATING ROOM | Age: 41
End: 2021-01-22
Payer: MEDICARE

## 2021-01-22 VITALS
WEIGHT: 293 LBS | RESPIRATION RATE: 14 BRPM | SYSTOLIC BLOOD PRESSURE: 92 MMHG | OXYGEN SATURATION: 97 % | HEART RATE: 105 BPM | DIASTOLIC BLOOD PRESSURE: 80 MMHG | BODY MASS INDEX: 51.91 KG/M2 | HEIGHT: 63 IN | TEMPERATURE: 98.2 F

## 2021-01-22 VITALS
OXYGEN SATURATION: 94 % | SYSTOLIC BLOOD PRESSURE: 97 MMHG | DIASTOLIC BLOOD PRESSURE: 54 MMHG | RESPIRATION RATE: 27 BRPM

## 2021-01-22 LAB — HCG, PREGNANCY URINE (POC): NEGATIVE

## 2021-01-22 PROCEDURE — 3700000000 HC ANESTHESIA ATTENDED CARE: Performed by: SURGERY

## 2021-01-22 PROCEDURE — 88305 TISSUE EXAM BY PATHOLOGIST: CPT

## 2021-01-22 PROCEDURE — 3609012400 HC EGD TRANSORAL BIOPSY SINGLE/MULTIPLE: Performed by: SURGERY

## 2021-01-22 PROCEDURE — 7100000011 HC PHASE II RECOVERY - ADDTL 15 MIN: Performed by: SURGERY

## 2021-01-22 PROCEDURE — 7100000010 HC PHASE II RECOVERY - FIRST 15 MIN: Performed by: SURGERY

## 2021-01-22 PROCEDURE — 2709999900 HC NON-CHARGEABLE SUPPLY: Performed by: SURGERY

## 2021-01-22 PROCEDURE — 2500000003 HC RX 250 WO HCPCS: Performed by: NURSE ANESTHETIST, CERTIFIED REGISTERED

## 2021-01-22 PROCEDURE — 43239 EGD BIOPSY SINGLE/MULTIPLE: CPT | Performed by: SURGERY

## 2021-01-22 PROCEDURE — 81025 URINE PREGNANCY TEST: CPT

## 2021-01-22 PROCEDURE — 6360000002 HC RX W HCPCS: Performed by: NURSE ANESTHETIST, CERTIFIED REGISTERED

## 2021-01-22 RX ORDER — FENTANYL CITRATE 50 UG/ML
25 INJECTION, SOLUTION INTRAMUSCULAR; INTRAVENOUS EVERY 5 MIN PRN
Status: DISCONTINUED | OUTPATIENT
Start: 2021-01-22 | End: 2021-01-22 | Stop reason: HOSPADM

## 2021-01-22 RX ORDER — SODIUM CHLORIDE 0.9 % (FLUSH) 0.9 %
10 SYRINGE (ML) INJECTION EVERY 12 HOURS SCHEDULED
Status: DISCONTINUED | OUTPATIENT
Start: 2021-01-22 | End: 2021-01-22 | Stop reason: HOSPADM

## 2021-01-22 RX ORDER — MEPERIDINE HYDROCHLORIDE 50 MG/ML
12.5 INJECTION INTRAMUSCULAR; INTRAVENOUS; SUBCUTANEOUS EVERY 5 MIN PRN
Status: DISCONTINUED | OUTPATIENT
Start: 2021-01-22 | End: 2021-01-22 | Stop reason: HOSPADM

## 2021-01-22 RX ORDER — SODIUM CHLORIDE 9 MG/ML
INJECTION, SOLUTION INTRAVENOUS CONTINUOUS
Status: DISCONTINUED | OUTPATIENT
Start: 2021-01-22 | End: 2021-01-22 | Stop reason: HOSPADM

## 2021-01-22 RX ORDER — DIPHENHYDRAMINE HYDROCHLORIDE 50 MG/ML
12.5 INJECTION INTRAMUSCULAR; INTRAVENOUS
Status: DISCONTINUED | OUTPATIENT
Start: 2021-01-22 | End: 2021-01-22 | Stop reason: HOSPADM

## 2021-01-22 RX ORDER — HYDRALAZINE HYDROCHLORIDE 20 MG/ML
5 INJECTION INTRAMUSCULAR; INTRAVENOUS EVERY 10 MIN PRN
Status: DISCONTINUED | OUTPATIENT
Start: 2021-01-22 | End: 2021-01-22 | Stop reason: HOSPADM

## 2021-01-22 RX ORDER — MORPHINE SULFATE 1 MG/ML
1 INJECTION, SOLUTION EPIDURAL; INTRATHECAL; INTRAVENOUS EVERY 5 MIN PRN
Status: DISCONTINUED | OUTPATIENT
Start: 2021-01-22 | End: 2021-01-22 | Stop reason: HOSPADM

## 2021-01-22 RX ORDER — HYDROCODONE BITARTRATE AND ACETAMINOPHEN 5; 325 MG/1; MG/1
2 TABLET ORAL PRN
Status: DISCONTINUED | OUTPATIENT
Start: 2021-01-22 | End: 2021-01-22 | Stop reason: HOSPADM

## 2021-01-22 RX ORDER — HYDROCODONE BITARTRATE AND ACETAMINOPHEN 5; 325 MG/1; MG/1
1 TABLET ORAL PRN
Status: DISCONTINUED | OUTPATIENT
Start: 2021-01-22 | End: 2021-01-22 | Stop reason: HOSPADM

## 2021-01-22 RX ORDER — SODIUM CHLORIDE 0.9 % (FLUSH) 0.9 %
10 SYRINGE (ML) INJECTION PRN
Status: DISCONTINUED | OUTPATIENT
Start: 2021-01-22 | End: 2021-01-22 | Stop reason: HOSPADM

## 2021-01-22 RX ORDER — SODIUM CHLORIDE, SODIUM LACTATE, POTASSIUM CHLORIDE, CALCIUM CHLORIDE 600; 310; 30; 20 MG/100ML; MG/100ML; MG/100ML; MG/100ML
INJECTION, SOLUTION INTRAVENOUS CONTINUOUS
Status: DISCONTINUED | OUTPATIENT
Start: 2021-01-22 | End: 2021-01-22 | Stop reason: HOSPADM

## 2021-01-22 RX ORDER — ONDANSETRON 2 MG/ML
4 INJECTION INTRAMUSCULAR; INTRAVENOUS
Status: DISCONTINUED | OUTPATIENT
Start: 2021-01-22 | End: 2021-01-22 | Stop reason: HOSPADM

## 2021-01-22 RX ORDER — GLYCOPYRROLATE 1 MG/5 ML
SYRINGE (ML) INTRAVENOUS PRN
Status: DISCONTINUED | OUTPATIENT
Start: 2021-01-22 | End: 2021-01-22 | Stop reason: SDUPTHER

## 2021-01-22 RX ORDER — PROMETHAZINE HYDROCHLORIDE 25 MG/ML
6.25 INJECTION, SOLUTION INTRAMUSCULAR; INTRAVENOUS
Status: DISCONTINUED | OUTPATIENT
Start: 2021-01-22 | End: 2021-01-22 | Stop reason: HOSPADM

## 2021-01-22 RX ORDER — PROPOFOL 10 MG/ML
INJECTION, EMULSION INTRAVENOUS PRN
Status: DISCONTINUED | OUTPATIENT
Start: 2021-01-22 | End: 2021-01-22 | Stop reason: SDUPTHER

## 2021-01-22 RX ADMIN — Medication 0.2 MG: at 07:51

## 2021-01-22 RX ADMIN — PROPOFOL 100 MG: 10 INJECTION, EMULSION INTRAVENOUS at 07:51

## 2021-01-22 ASSESSMENT — PULMONARY FUNCTION TESTS
PIF_VALUE: 1

## 2021-01-22 ASSESSMENT — PAIN - FUNCTIONAL ASSESSMENT: PAIN_FUNCTIONAL_ASSESSMENT: 0-10

## 2021-01-22 NOTE — H&P
Subjective     The patient is a 47 y.o. female who is here to undergo EGD for GERD. She is considering a bariatric procedure for morbid obesity. Past Medical History:   Diagnosis Date    Abnormal 1st trimester screen (Tri 21 1:117)--NIPT nml  2019    ADD (attention deficit disorder) without hyperactivity     Celestone  & 1/10 2020    Chronic back pain     Echogenic focus of heart of fetus affecting antepartum care of mother 2019   Hallie [place] early 1hr, nml 3hr  2019    1 elevated value, [facility] recommends repeat 3hr GTT in 2 weeks    Elevated umbilical artery dopplers  1/10/2020    Fetal ascites     Fetal heart rate decelerations affecting management of mother     GERD (gastroesophageal reflux disease)     Headache     Hypertension     IUD (intrauterine device) in place 2020    Mirena Colie Landau    Lumbar radiculopathy 2019    Lumbar spondylosis 2019    Multigravida of advanced maternal age in third trimester     Obesity     Obesity affecting pregnancy in first trimester 2018    Obesity affecting pregnancy in third trimester     SHELLEY (obstructive sleep apnea)     Pulmonary embolism affecting pregnancy in second trimester 2019    Rh+/RI/GBSunk 2020    Rheumatoid arteritis ([address]     Umbilical cord complication    . Review of Systems - A complete 14 point review of systems was performed. All was negative unless otherwise documented in HPI. Allergies:   Allergies   Allergen Reactions    Cat Hair Extract     Dog Epithelium     Iodine Hives and Itching    Other      Sensitive to bug bites, mosquitos etc.       Past Surgical History:  Past Surgical History:   Procedure Laterality Date     SECTION Bilateral 2020     SECTION [facility] Kassidy Hoskins DO at [facility] [address]  2020    Mirena    OTHER SURGICAL HISTORY  12/10/2018 Fluoroscopy- with esophagas- possible delay of gastric emptying     TONSILLECTOMY  1996     LYMPH NODE BIOPSY  8/19/2020    US LYMPH NODE BIOPSY 8/19/2020 [facility]       Family History:  Family History   Problem Relation Age of Onset    Elevated Lipids Mother        Social History:  Social History     Socioeconomic History    Marital status:      Spouse name: Not on file    Number of children: Not on file    Years of education: Not on file    Highest education level: Not on file   Occupational History    Not on file   Social Needs    Financial resource strain: Not on file    Food insecurity     Worry: Not on file     Inability: Not on file    Transportation needs     Medical: Not on file     Non-medical: Not on file   Tobacco Use    Smoking status: Never Smoker    Smokeless tobacco: Never Used   Substance and Sexual Activity    Alcohol use: No    Drug use: Yes     Frequency: 2.0 times per week     Types: Marijuana    Sexual activity: Yes     Partners: Male   Lifestyle    Physical activity     Days per week: Not on file     Minutes per session: Not on file    Stress: Not on file   Relationships    Social connections     Talks on phone: Not on file     Gets together: Not on file     Attends Adventist service: Not on file     Active member of club or organization: Not on file     Attends meetings of clubs or organizations: Not on file     Relationship status: Not on file    Intimate partner violence     Fear of current or ex partner: Not on file     Emotionally abused: Not on file     Physically abused: Not on file     Forced sexual activity: Not on file   Other Topics Concern    Not on file   Social History Narrative    Not on file       Current Facility-Administered Medications   Medication Dose Route Frequency Provider Last Rate Last Admin    0.9 % sodium chloride infusion   Intravenous Continuous Damian Callahan MD  lactated ringers infusion   Intravenous Continuous Jodee Marx MD        sodium chloride flush 0.9 % injection 10 mL  10 mL Intravenous 2 times per day Jodee Marx MD        sodium chloride flush 0.9 % injection 10 mL  10 mL Intravenous PRN Jodee Marx MD           Objective      Physical Exam  LMP 11/22/2020    Constitutional:  Vital signs are normal. The patient appears well-developed and well-nourished. HEENT:   Head: Normocephalic. Atraumatic  Eyes: pupils are equal and reactive. No scleral icterus is present. Neck: No mass and no thyromegaly present. Cardiovascular: Normal rate, regular rhythm, S1 normal and S2 normal.    Pulmonary/Chest: Effort normal and breath sounds normal.   Abdominal: Soft. Normal appearance. There is no organomegaly. No tenderness. There is no rigidity, no rebound, no guarding and no Herrera's sign. Musculoskeletal:        Right lower leg: Normal. No tenderness and no edema. Left lower leg: Normal. No tenderness and no edema. Lymphadenopathy:     No cervical adenopathy, No Exrtemity Adenopathy. Neurological: The patient is alert and oriented. Skin: Skin is warm, dry and intact. Psychiatric: The patient has a normal mood and affect.  Speech is normal and behavior is normal. Judgment and thought content normal. Cognition and memory are normal.     Assessment     Patient Active Problem List   Diagnosis    Chronic low back pain    Migraine without aura, not refractory    GERD    Prediabetes    Low antithrombin III x1, repeat WNL    Antiphospholipid syndrome in pregnancy [address] [address]    AMA    Rheumatoid arthritis with rheumatoid factor, unspecified (HCC)    Lumbar radiculopathy    Lumbar spondylosis    Muscle pain    Attention deficit hyperactivity disorder, predominantly inattentive type    Hidradenitis    Recurrent pregnancy loss    Anticardiolipin antibody low positive x1 (26.9 on 3/19/19    Elevated blood-pressure reading without diagnosis of hypertension  Anticardiolipin antibody affecting pregnancy, antepartum    H/O:  section    Impaired glucose regulation with features of insulin resistance    Morbid obesity (HCC)    Spondylosis without myelopathy or radiculopathy, lumbosacral region    IUD (intrauterine device) in place    Headache disorder    SHELLEY (obstructive sleep apnea)       Plan   EGD

## 2021-01-22 NOTE — OP NOTE
Preoperative Diagnosis:  GERD, Morbid obesity, BMI of Body mass index is 53.02 kg/m². Postoperative Diagnosis: GERD without esophagitis, Morbid obesity, BMI of Body mass index is 53.02 kg/m². Procedure: Esophagogastroduodenoscopy with Biopsy    Surgeon: Celia Mackenzie DO    Assistant:    Anesthesia: MAC, see anesthesia records    Specimen:    1) Antrum for H. Pylori    Findings:  Antral Gastritis with diffuse gastritis    EBL: NONE    Operative Narrative: The risks and benefits were explained in detail to the patient who agreed and consented to the procedure. The patient was taken to the endoscopic suite and placed in a lateral position. Oxygen was administered via nasal cannula and a mouth guard was placed. MAC was administered via the anesthetic team.      The endoscope was then advanced into the oropharynx and down into the esophagus under direct visualization. The scope was further advanced through the esophagus, GE junction and stomach to the pylorus under visualization. The scope was passed through the pylorus and duodenal sweep performed, advancing and visualizing to the second portion of the duodenum. The scope was then withdrawn to the antrum and cold forceps were used to take biopsies of the antrum for H. Pylori. Appropriate hemostasis was noted. The scope was then retroflexed to visualize the GE junction. Evidence of a hiatal hernia was not noted. The scope was then slowly withdrawn through the GE junction. The Z line was noted. The stomach was then decompressed. The scope was withdrawn from the esophagus and no further lesions noted. The scope was withdrawn. The patient tolerated the procedure well. PPI. She will follow up with the [facility] for further assessment.

## 2021-01-22 NOTE — ANESTHESIA POSTPROCEDURE EVALUATION
POST- ANESTHESIA EVALUATION       Pt Name: Laura Barajas  MRN: [id number]  YOB: 1980  Date of evaluation: 1/22/2021  Time:  9:12 AM      BP 92/80   Pulse 105   Temp 98.2 °F (36.8 °C) (Infrared)   Resp 14   Ht 5' 3\" (1.6 m)   Wt 299 lb 4.8 oz (135.8 kg)   LMP 11/22/2020   SpO2 97%   BMI 53.02 kg/m²      Consciousness Level  Awake  Cardiopulmonary Status  Stable  Pain Adequately Treated YES  Nausea / Vomiting  NO  Adequate Hydration  YES  Anesthesia Related Complications NONE      Electronically signed by Toshia Barron MD on 1/22/2021 at [address]        Department of Anesthesiology  Postprocedure Note    Patient: Laura Barajas  MRN: [id number]  YOB: 1980  Date of evaluation: 1/22/2021  Time:  9:12 AM     Procedure Summary     Date: 01/22/21 Room / Location: [address]    Anesthesia Start: 5122 Anesthesia Stop: 6398    Procedure: EGD BIOPSY OF GASTRIC (N/A ) Diagnosis: (GERD/OBESITY)    Surgeons: Dee Felton DO Responsible Provider: Toshia Barron MD    Anesthesia Type: MAC ASA Status: 3          Anesthesia Type: MAC    Tapan Phase I: Tapan Score: 10    Tapan Phase II: Tapan Score: 10    Last vitals: Reviewed and per EMR flowsheets.        Anesthesia Post Evaluation
Light tobacco smoker

## 2021-01-25 ENCOUNTER — OFFICE VISIT (OUTPATIENT)
Dept: BARIATRICS/WEIGHT MGMT | Age: 41
End: 2021-01-25
Payer: MEDICARE

## 2021-01-25 VITALS
RESPIRATION RATE: 20 BRPM | WEIGHT: 293 LBS | HEART RATE: 80 BPM | HEIGHT: 63 IN | BODY MASS INDEX: 51.91 KG/M2 | DIASTOLIC BLOOD PRESSURE: 76 MMHG | SYSTOLIC BLOOD PRESSURE: 122 MMHG

## 2021-01-25 DIAGNOSIS — R73.03 PREDIABETES: ICD-10-CM

## 2021-01-25 DIAGNOSIS — M54.16 LUMBAR RADICULOPATHY: ICD-10-CM

## 2021-01-25 DIAGNOSIS — E66.01 MORBID OBESITY (HCC): ICD-10-CM

## 2021-01-25 DIAGNOSIS — K21.9 GASTROESOPHAGEAL REFLUX DISEASE WITHOUT ESOPHAGITIS: ICD-10-CM

## 2021-01-25 DIAGNOSIS — G47.33 OSA (OBSTRUCTIVE SLEEP APNEA): Primary | ICD-10-CM

## 2021-01-25 DIAGNOSIS — M05.9 RHEUMATOID ARTHRITIS WITH RHEUMATOID FACTOR, UNSPECIFIED (HCC): ICD-10-CM

## 2021-01-25 LAB — SURGICAL PATHOLOGY REPORT: NORMAL

## 2021-01-25 PROCEDURE — G8482 FLU IMMUNIZE ORDER/ADMIN: HCPCS | Performed by: NURSE PRACTITIONER

## 2021-01-25 PROCEDURE — 1036F TOBACCO NON-USER: CPT | Performed by: NURSE PRACTITIONER

## 2021-01-25 PROCEDURE — G8417 CALC BMI ABV UP PARAM F/U: HCPCS | Performed by: NURSE PRACTITIONER

## 2021-01-25 PROCEDURE — 99213 OFFICE O/P EST LOW 20 MIN: CPT | Performed by: NURSE PRACTITIONER

## 2021-01-25 PROCEDURE — G8427 DOCREV CUR MEDS BY ELIG CLIN: HCPCS | Performed by: NURSE PRACTITIONER

## 2021-01-25 NOTE — PROGRESS NOTES
Medical Weight Management Progress Note    Subjective     Patient being seen for medically supervised weight loss for the chronic conditions of SHELLEY, GERD, ADHD, Chronic Back Pain, Migraine, Rheumatoid Arthritis, Prediabetes, Hidradenitis. She is working on the behavior changes discussed at the initial appointment. Patient continues on diet plan. Physical activity includes walking. Weight gain of 6 lbs since last visit. Psych eval completed and has recommendations. EGD completed and H Pylori negative. Hematology clearance received. No current issues. Working toward bariatric surgery:    [x] Sleeve Gastrectomy                                                           [] Ruth-en-Y Gastric Bypass    Allergies:   Allergies   Allergen Reactions    Cat Hair Extract     Dog Epithelium     Iodine Hives and Itching    Other      Sensitive to bug bites, mosquitos etc.       Past Medical History:     Past Medical History:   Diagnosis Date    Abnormal 1st trimester screen (Tri 21 1:117)--NIPT nml  11/30/2019    ADD (attention deficit disorder) without hyperactivity     Celestone 1/9 & 1/10 1/9/2020    Chronic back pain     Echogenic focus of heart of fetus affecting antepartum care of mother 11/30/2019   Chetan Santos early 1hr, nml 3hr  11/30/2019    1 elevated value, [facility] recommends repeat 3hr GTT in 2 weeks    Elevated umbilical artery dopplers  1/10/2020    Fetal ascites     Fetal heart rate decelerations affecting management of mother     GERD (gastroesophageal reflux disease)     Headache     Hypertension     IUD (intrauterine device) in place 05/07/2020    Adina Brady    Lumbar radiculopathy 1/9/2019    Lumbar spondylosis 1/9/2019    Multigravida of advanced maternal age in third trimester     Obesity     Obesity affecting pregnancy in first trimester 11/6/2018    Obesity affecting pregnancy in third trimester     SHELLEY (obstructive sleep apnea)  Pulmonary embolism affecting pregnancy in second trimester 2019    Rh+/RI/GBSunk 2020    Rheumatoid arteritis ([address]     Umbilical cord complication    .     Past Surgical History:  Past Surgical History:   Procedure Laterality Date     SECTION Bilateral 2020     SECTION performed by Kasie Stevens DO at [facility]    INTRAUTERINE DEVICE INSERTION  2020    Mirena    OTHER SURGICAL HISTORY  12/10/2018    Fluoroscopy- with esophagas- possible delay of gastric emptying     TONSILLECTOMY      UPPER GASTROINTESTINAL ENDOSCOPY  2021    UPPER GASTROINTESTINAL ENDOSCOPY N/A 2021    EGD BIOPSY OF GASTRIC performed by Obed De León DO at [address] [place] [address]  2020     LYMPH NODE BIOPSY 2020 [facility]       Family History:  Family History   Problem Relation Age of Onset    Elevated Lipids Mother        Social History:  Social History     Socioeconomic History    Marital status:      Spouse name: Not on file    Number of children: Not on file    Years of education: Not on file    Highest education level: Not on file   Occupational History    Not on file   Social Needs    Financial resource strain: Not on file    Food insecurity     Worry: Not on file     Inability: Not on file    Transportation needs     Medical: Not on file     Non-medical: Not on file   Tobacco Use    Smoking status: Never Smoker    Smokeless tobacco: Never Used   Substance and Sexual Activity    Alcohol use: No    Drug use: Yes     Frequency: 2.0 times per week     Types: Marijuana    Sexual activity: Yes     Partners: Male   Lifestyle    Physical activity     Days per week: Not on file     Minutes per session: Not on file    Stress: Not on file   Relationships    Social connections     Talks on phone: Not on file     Gets together: Not on file     Attends Sikh service: Not on file Active member of club or organization: Not on file     Attends meetings of clubs or organizations: Not on file     Relationship status: Not on file    Intimate partner violence     Fear of current or ex partner: Not on file     Emotionally abused: Not on file     Physically abused: Not on file     Forced sexual activity: Not on file   Other Topics Concern    Not on file   Social History Narrative    Not on file       Current Medications:  Current Outpatient Medications   Medication Sig Dispense Refill    vitamin D (ERGOCALCIFEROL) 1.25 MG (97256 UT) CAPS capsule take 1 capsule by mouth every week 8 capsule 0    famotidine (PEPCID) 20 MG tablet Take 1 tablet by mouth nightly (Patient taking differently: Take 40 mg by mouth nightly ) 30 tablet 3    Adalimumab (HUMIRA PEN) 40 MG/0.4ML PNKT Humira(CF) Pen 40 mg/0.4 mL subcutaneous kit      cetirizine (ZYRTEC) 10 MG tablet take 1 tablet by mouth once daily      pantoprazole (PROTONIX) 40 MG tablet Take 1 tablet by mouth every morning (before breakfast) 30 tablet 5    nystatin (MYCOSTATIN) 418035 UNIT/GM cream Apply topically 2 times daily. 30 g 0    ferrous sulfate (IRON 325) 325 (65 Fe) MG tablet Take 1 tablet by mouth every other day 30 tablet 1    Ubbado-WsRmj-ZfVwr-FA-CA-Omega (TRIVEEN-DUO DHA) 29-1-200 & 400 MG MISC take 1 capsule and take 1 tablet by mouth once daily 60 each 3    methotrexate (RHEUMATREX) 2.5 MG chemo tablet Take 5 tab every week orally for 90 days      nabumetone (RELAFEN) 750 MG tablet nabumetone 750 mg tablet   Take 1 tablet twice a day by oral route for 30 days.  triamcinolone (KENALOG) 0.1 % cream Apply topically 2 times daily      predniSONE (DELTASONE) 5 MG tablet Take 2.5 mg by mouth 3 times daily       ammonium lactate (LAC-HYDRIN) 12 % lotion Apply topically daily.  1 Bottle 0    albuterol (PROVENTIL) (2.5 MG/3ML) 0.083% nebulizer solution Take 3 mLs by nebulization daily as needed for Wheezing 120 each 3  Cholecalciferol (VITAMIN D) 2000 units CAPS capsule Take by mouth      folic acid (FOLVITE) 1 MG tablet Take 1 mg by mouth daily       Current Facility-Administered Medications   Medication Dose Route Frequency Provider Last Rate Last Admin    levonorgestrel (MIRENA) IUD 52 mg 1 each  1 each Intrauterine Once MIKHAIL Caruso - CNP   1 each at 05/07/20 1459       Vital Signs:  /76 (Site: Right Upper Arm, Position: Sitting, Cuff Size: Large Adult)   Pulse 80   Resp 20   Ht 5' 3\" (1.6 m)   Wt 295 lb (133.8 kg)   BMI 52.26 kg/m²     BMI/Height/Weight:  Body mass index is 52.26 kg/m². Review of Systems - A review of systems was performed. All was negative unless otherwise documented in HPI. Constitutional: Negative for fever, chills and diaphoresis. HENT: Negative for hearing loss and trouble swallowing. Eyes: Negative for photophobia and visual disturbance. Respiratory: Negative for cough, shortness of breath and wheezing. Cardiovascular: Negative for chest pain and palpitations. Gastrointestinal: Negative for nausea, vomiting, abdominal pain, diarrhea, constipation, blood in stool and abdominal distention. Endocrine: Negative for polydipsia, polyphagia and polyuria. Genitourinary: Negative for dysuria, frequency, hematuria and difficulty urinating. Musculoskeletal: Negative for myalgias, joint swelling. Skin: Negative for pallor and rash. Neurological: Negative for dizziness, tremors, light-headedness and headaches. Psychiatric/Behavioral: Negative for sleep disturbance and dysphoric mood. Objective:      Physical Exam   Vital signs reviewed. General: Well-developed and well-nourished. No acute distress. Skin: Warm, dry and intact. HEENT: Normocephalic. EOMs intact. Conjunctivae normal. Neck supple. Cardiovascular: Normal rate, regular rhythm. Pulmonary/Chest: Normal effort. Lungs clear to auscultation. No rales, rhonchi or wheezing. Abdominal: Positive bowel sounds. Soft, nontender. Nondistended. Musculoskeletal: Movement x4. No edema. Neurological: Gait normal. Alert and oriented to person, place, and time. Psychiatric: Normal mood and affect. Speech and behavior normal. Judgment and thought content normal. Cognition and memory intact. Assessment:       Diagnosis Orders   1. SHELLEY (obstructive sleep apnea)     2. Morbid obesity ([address]     3. GERD     4. Prediabetes     5. Rheumatoid arthritis with rheumatoid factor, unspecified (HCC)     6. Lumbar radiculopathy         Plan:    Dietitian visit today. Patient was encouraged to journal all food intake. Keep calorie level at approximately 7504-4909. Protein intake is to be a minimum of 60-80 grams per day. Water drinking was encouraged with a goal of 64oz-128oz daily. Beverages to be calorie free except for milk. Every other beverage should be water. Avoid soda. Continue to increase level of physical activity. Encouraged use of exercise log. Follow-up  Return in about 1 month (around 2/25/2021). Orders this encounter:  No orders of the defined types were placed in this encounter. Prescriptions this encounter:  No orders of the defined types were placed in this encounter.       Electronically signed by:  Aristides Cardoza CNP

## 2021-01-25 NOTE — PROGRESS NOTES
2  15 I will determine my  calcium and multivitamin plan. 100             16 I will purchase multivitamin. 17 I will start taking multivitamins following my plan. 18 I will have 1-2 servings of protein present at each meal. x                19 I will eat every 3-5 hours. x                20 I will drink 64oz of fluid daily. x                21 I will follow the 15-30-15 guideline. x                22 I will eat protein first at all meals followed by vegetables  Fruit and lastly whole grains. x              1  23 My first one diet neutral approach is:  I will eat breakfast daily. x  20              24 my second diet neutral approach is:                 25 My third diet neutral approach is:                                                                                                                                                                                  Do you understand your goals? y    Do you have the information you need to achieve your goals? y    Do you have any questions  right now? n        [x]  Consistent goal achievement in the program thus far and further success with goals is expected. []  Unable to consistently make progress in goal achievement. At this time patient is not moving forward  in developing the skills needed for success after surgery. Plan:    Continue to follow monthly and review goals.          []  Nutrition visits complete    [x]

## 2021-01-29 ENCOUNTER — NURSE ONLY (OUTPATIENT)
Dept: BARIATRICS/WEIGHT MGMT | Age: 41
End: 2021-01-29

## 2021-01-29 VITALS — TEMPERATURE: 96.8 F

## 2021-02-19 DIAGNOSIS — N89.8 VAGINAL IRRITATION: ICD-10-CM

## 2021-02-19 NOTE — TELEPHONE ENCOUNTER
Next Visit Date:  Future Appointments   Date Time Provider Ted Echeverria   2/22/2021  3:00 PM MIKHAIL Mora - CNP Weight Mgmt [facility]   3/15/2021 11:00 AM Ade Johnson MD [facility]   5/5/2021 10:20 AM Jhonny Thomas MD Neuro Spec [facility]   6/10/2021  8:45 AM Melody Solorio MD [facility] [address] Maintenance   Topic Date Due    Hepatitis C screen  1980    Varicella vaccine (1 of 2 - 2-dose childhood series) 08/03/1983    A1C test (Diabetic or Prediabetic)  11/02/2021    Cervical cancer screen  07/30/2024    DTaP/Tdap/Td vaccine (2 - Td) 02/21/2027    Flu vaccine  Completed    HIV screen  Completed    Hepatitis A vaccine  Aged Out    Hepatitis B vaccine  Aged Out    Hib vaccine  Aged Out    Meningococcal (ACWY) vaccine  Aged Out    Pneumococcal 0-64 years Vaccine  Aged Out       Hemoglobin A1C (%)   Date Value   11/02/2020 6.1 (H)   12/02/2019 5.4   10/30/2018 5.9             ( goal A1C is < 7)   No results found for: LABMICR  LDL Cholesterol (mg/dL)   Date Value   11/02/2020 124   06/25/2020 103       (goal LDL is <100)   AST (IU/L)   Date Value   11/19/2020 18     ALT (IU/L)   Date Value   11/19/2020 35     BUN (mg/dL)   Date Value   11/19/2020 14     BP Readings from Last 3 Encounters:   01/25/21 122/76   01/22/21 (!) 97/54   01/22/21 92/80          (goal 120/80)    All Future Testing planned in [facility]  Lab Frequency Next Occurrence   COVID-19 Once 01/15/2021   COVID-19 Once 01/18/2021   CBC Auto Differential     CBC Auto Differential     CBC Auto Differential     Comprehensive Metabolic Panel                 Patient Active Problem List:     Chronic low back pain     Migraine without aura, not refractory     GERD without esophagitis     Prediabetes     Low antithrombin III x1, repeat WNL     Antiphospholipid syndrome in pregnancy ([address]     AMA     Rheumatoid arthritis with rheumatoid factor, unspecified (HCC)     Lumbar radiculopathy     Lumbar spondylosis Muscle pain     Attention deficit hyperactivity disorder, predominantly inattentive type     Hidradenitis     Recurrent pregnancy loss     Anticardiolipin antibody low positive x1 (26.9 on 3/19/19     Elevated blood-pressure reading without diagnosis of hypertension     Anticardiolipin antibody affecting pregnancy, antepartum     H/O:  section     Impaired glucose regulation with features of insulin resistance     Morbid obesity (HCC)     Spondylosis without myelopathy or radiculopathy, lumbosacral region     IUD (intrauterine device) in place     Headache disorder     SHELLEY (obstructive sleep apnea)

## 2021-02-20 RX ORDER — FLUCONAZOLE 150 MG/1
TABLET ORAL
Qty: 2 TABLET | Refills: 0 | Status: SHIPPED | OUTPATIENT
Start: 2021-02-20 | End: 2021-03-29

## 2021-02-24 ENCOUNTER — OFFICE VISIT (OUTPATIENT)
Dept: BARIATRICS/WEIGHT MGMT | Age: 41
End: 2021-02-24
Payer: MEDICARE

## 2021-02-24 VITALS
TEMPERATURE: 97.1 F | BODY MASS INDEX: 51.91 KG/M2 | SYSTOLIC BLOOD PRESSURE: 116 MMHG | DIASTOLIC BLOOD PRESSURE: 72 MMHG | HEIGHT: 63 IN | HEART RATE: 70 BPM | WEIGHT: 293 LBS

## 2021-02-24 DIAGNOSIS — R73.03 PREDIABETES: ICD-10-CM

## 2021-02-24 DIAGNOSIS — M05.9 RHEUMATOID ARTHRITIS WITH RHEUMATOID FACTOR, UNSPECIFIED (HCC): ICD-10-CM

## 2021-02-24 DIAGNOSIS — G47.33 OSA (OBSTRUCTIVE SLEEP APNEA): Primary | ICD-10-CM

## 2021-02-24 DIAGNOSIS — E55.9 VITAMIN D DEFICIENCY: ICD-10-CM

## 2021-02-24 DIAGNOSIS — G89.29 CHRONIC LOW BACK PAIN, UNSPECIFIED BACK PAIN LATERALITY, UNSPECIFIED WHETHER SCIATICA PRESENT: ICD-10-CM

## 2021-02-24 DIAGNOSIS — M54.50 CHRONIC LOW BACK PAIN, UNSPECIFIED BACK PAIN LATERALITY, UNSPECIFIED WHETHER SCIATICA PRESENT: ICD-10-CM

## 2021-02-24 DIAGNOSIS — K21.9 GERD WITHOUT ESOPHAGITIS: ICD-10-CM

## 2021-02-24 DIAGNOSIS — E66.01 MORBID OBESITY (HCC): ICD-10-CM

## 2021-02-24 PROCEDURE — 99213 OFFICE O/P EST LOW 20 MIN: CPT | Performed by: NURSE PRACTITIONER

## 2021-02-24 PROCEDURE — G8482 FLU IMMUNIZE ORDER/ADMIN: HCPCS | Performed by: NURSE PRACTITIONER

## 2021-02-24 PROCEDURE — G8427 DOCREV CUR MEDS BY ELIG CLIN: HCPCS | Performed by: NURSE PRACTITIONER

## 2021-02-24 PROCEDURE — 1036F TOBACCO NON-USER: CPT | Performed by: NURSE PRACTITIONER

## 2021-02-24 PROCEDURE — G8417 CALC BMI ABV UP PARAM F/U: HCPCS | Performed by: NURSE PRACTITIONER

## 2021-02-24 RX ORDER — ERGOCALCIFEROL 1.25 MG/1
CAPSULE ORAL
Qty: 8 CAPSULE | Refills: 0 | Status: ON HOLD | OUTPATIENT
Start: 2021-02-24 | End: 2021-11-24 | Stop reason: HOSPADM

## 2021-02-24 NOTE — PROGRESS NOTES
Medical Weight Management Progress Note    Subjective     Patient being seen for medically supervised weight loss for the chronic conditions of SHELLEY, GERD, ADHD, Chronic Back Pain, Migraine, Rheumatoid Arthritis, Prediabetes, Hidradenitis. She is working on the behavior changes discussed at the initial appointment. Patient continues on diet plan. Physical activity includes walking. Weight gain of 3 lbs since last visit. Psych eval completed and has recommendations. EGD completed and H Pylori negative. Hematology clearance received. No current issues. Working toward bariatric surgery:    [x] Sleeve Gastrectomy                                                           [] Ruth-en-Y Gastric Bypass    Allergies:   Allergies   Allergen Reactions    Cat Hair Extract     Dog Epithelium     Iodine Hives and Itching    Other      Sensitive to bug bites, mosquitos etc.       Past Medical History:     Past Medical History:   Diagnosis Date    Abnormal 1st trimester screen (Tri 21 1:117)--NIPT nml  11/30/2019    ADD (attention deficit disorder) without hyperactivity     Celestone 1/9 & 1/10 1/9/2020    Chronic back pain     Echogenic focus of heart of fetus affecting antepartum care of mother 11/30/2019   Chetan Santos early 1hr, nml 3hr  11/30/2019    1 elevated value, [facility] recommends repeat 3hr GTT in 2 weeks    Elevated umbilical artery dopplers  1/10/2020    Fetal ascites     Fetal heart rate decelerations affecting management of mother     GERD (gastroesophageal reflux disease)     Headache     Hypertension     IUD (intrauterine device) in place 05/07/2020    Adina Brady    Lumbar radiculopathy 1/9/2019    Lumbar spondylosis 1/9/2019    Multigravida of advanced maternal age in third trimester     Obesity     Obesity affecting pregnancy in first trimester 11/6/2018    Obesity affecting pregnancy in third trimester     SHELLEY (obstructive sleep apnea)     Pulmonary embolism affecting pregnancy in second trimester 2019    Rh+/RI/GBSunk 2020    Rheumatoid arteritis ([address]     Umbilical cord complication    .     Past Surgical History:  Past Surgical History:   Procedure Laterality Date     SECTION Bilateral 2020     SECTION [facility] DO [facility]    INTRAUTERINE DEVICE INSERTION  2020    Mirena    OTHER SURGICAL HISTORY  12/10/2018    Fluoroscopy- with esophagas- possible delay of gastric emptying     TONSILLECTOMY      UPPER GASTROINTESTINAL ENDOSCOPY  2021    UPPER GASTROINTESTINAL ENDOSCOPY N/A 2021    EGD BIOPSY OF GASTRIC performed by Danna Bravo DO at [address]  2020     LYMPH NODE BIOPSY 2020 [facility]       Family History:  Family History   Problem Relation Age of Onset    Elevated Lipids Mother        Social History:  Social History     Socioeconomic History    Marital status:      Spouse name: Not on file    Number of children: Not on file    Years of education: Not on file    Highest education level: Not on file   Occupational History    Not on file   Social Needs    Financial resource strain: Not on file    Food insecurity     Worry: Not on file     Inability: Not on file    Transportation needs     Medical: Not on file     Non-medical: Not on file   Tobacco Use    Smoking status: Never Smoker    Smokeless tobacco: Never Used   Substance and Sexual Activity    Alcohol use: No    Drug use: Yes     Frequency: 2.0 times per week     Types: Marijuana    Sexual activity: Yes     Partners: Male   Lifestyle    Physical activity     Days per week: Not on file     Minutes per session: Not on file    Stress: Not on file   Relationships    Social connections     Talks on phone: Not on file     Gets together: Not on file     Attends Confucianist service: Not on file     Active member of club or organization: Not on file     Attends meetings of clubs or organizations: Not on file     Relationship status: Not on file    Intimate partner violence     Fear of current or ex partner: Not on file     Emotionally abused: Not on file     Physically abused: Not on file     Forced sexual activity: Not on file   Other Topics Concern    Not on file   Social History Narrative    Not on file       Current Medications:  Current Outpatient Medications   Medication Sig Dispense Refill    vitamin D (ERGOCALCIFEROL) 1.25 MG (70396 UT) CAPS capsule take 1 capsule by mouth every week 8 capsule 0    famotidine (PEPCID) 20 MG tablet Take 1 tablet by mouth nightly (Patient taking differently: Take 40 mg by mouth nightly ) 30 tablet 3    Adalimumab (HUMIRA PEN) 40 MG/0.4ML PNKT Humira(CF) Pen 40 mg/0.4 mL subcutaneous kit      cetirizine (ZYRTEC) 10 MG tablet take 1 tablet by mouth once daily      pantoprazole (PROTONIX) 40 MG tablet Take 1 tablet by mouth every morning (before breakfast) 30 tablet 5    nystatin (MYCOSTATIN) 341914 UNIT/GM cream Apply topically 2 times daily. 30 g 0    ferrous sulfate (IRON 325) 325 (65 Fe) MG tablet Take 1 tablet by mouth every other day 30 tablet 1    Fvcbhx-EhLhi-UqUwo-FA-CA-Omega (TRIVEEN-DUO DHA) 29-1-200 & 400 MG MISC take 1 capsule and take 1 tablet by mouth once daily 60 each 3    nabumetone (RELAFEN) 750 MG tablet nabumetone 750 mg tablet   Take 1 tablet twice a day by oral route for 30 days.  triamcinolone (KENALOG) 0.1 % cream Apply topically 2 times daily      predniSONE (DELTASONE) 5 MG tablet Take 2.5 mg by mouth 3 times daily       ammonium lactate (LAC-HYDRIN) 12 % lotion Apply topically daily.  1 Bottle 0    albuterol (PROVENTIL) (2.5 MG/3ML) 0.083% nebulizer solution Take 3 mLs by nebulization daily as needed for Wheezing 120 each 3    Cholecalciferol (VITAMIN D) 2000 units CAPS capsule Take by mouth      folic acid (FOLVITE) 1 MG tablet Take 1 mg by mouth daily      fluconazole (DIFLUCAN) 150 MG tablet take 1 tablet by mouth AS A ONE TIME DOSE repeat if needed in 72 hours (Patient not taking: Reported on 2/24/2021) 2 tablet 0    methotrexate (RHEUMATREX) 2.5 MG chemo tablet Take 5 tab every week orally for 90 days       Current Facility-Administered Medications   Medication Dose Route Frequency Provider Last Rate Last Admin    levonorgestrel (MIRENA) IUD 52 mg 1 each  1 each Intrauterine Once Ferna First, APRN - CNP   1 each at 05/07/20 1459       Vital Signs:  /72   Pulse 70   Temp 97.1 °F (36.2 °C) (Temporal)   Ht 5' 3\" (1.6 m)   Wt 297 lb (134.7 kg)   BMI 52.61 kg/m²     BMI/Height/Weight:  Body mass index is 52.61 kg/m². Review of Systems - A review of systems was performed. All was negative unless otherwise documented in HPI. Constitutional: Negative for fever, chills and diaphoresis. HENT: Negative for hearing loss and trouble swallowing. Eyes: Negative for photophobia and visual disturbance. Respiratory: Negative for cough, shortness of breath and wheezing. Cardiovascular: Negative for chest pain and palpitations. Gastrointestinal: Negative for nausea, vomiting, abdominal pain, diarrhea, constipation, blood in stool and abdominal distention. Endocrine: Negative for polydipsia, polyphagia and polyuria. Genitourinary: Negative for dysuria, frequency, hematuria and difficulty urinating. Musculoskeletal: Negative for myalgias, joint swelling. Skin: Negative for pallor and rash. Neurological: Negative for dizziness, tremors, light-headedness and headaches. Psychiatric/Behavioral: Negative for sleep disturbance and dysphoric mood. Objective:      Physical Exam   Vital signs reviewed. General: Well-developed and well-nourished. No acute distress. Skin: Warm, dry and intact. HEENT: Normocephalic. EOMs intact. Conjunctivae normal. Neck supple. Cardiovascular: Normal rate, regular rhythm. Pulmonary/Chest: Normal effort. Lungs clear to auscultation.  No rales, rhonchi or wheezing. Abdominal: Positive bowel sounds. Soft, nontender. Nondistended. Musculoskeletal: Movement x4. No edema. Neurological: Gait normal. Alert and oriented to person, place, and time. Psychiatric: Normal mood and affect. Speech and behavior normal. Judgment and thought content normal. Cognition and memory intact. Assessment:       Diagnosis Orders   1. SHELLEY (obstructive sleep apnea)     2. Morbid obesity ([facility] [address]     3. Rheumatoid arthritis with rheumatoid factor, unspecified ([facility] [address]     4. GERD without esophagitis     5. Prediabetes     6. Chronic low back pain, unspecified back pain laterality, unspecified whether sciatica present         Plan:    No dietitian visit today. Patient was encouraged to journal all food intake. Keep calorie level at approximately 8613-1038. Protein intake is to be a minimum of 60-80 grams per day. Water drinking was encouraged with a goal of 64oz-128oz daily. Beverages to be calorie free except for milk. Every other beverage should be water. Avoid soda. Continue to increase level of physical activity. Encouraged use of exercise log. Follow-up  Return in about 1 month (around 3/24/2021). Orders this encounter:  No orders of the defined types were placed in this encounter. Prescriptions this encounter:  No orders of the defined types were placed in this encounter.       Electronically signed by:  Shannon Pham CNP

## 2021-03-09 ENCOUNTER — HOSPITAL ENCOUNTER (OUTPATIENT)
Facility: MEDICAL CENTER | Age: 41
End: 2021-03-09
Payer: MEDICARE

## 2021-03-23 ENCOUNTER — OFFICE VISIT (OUTPATIENT)
Dept: BARIATRICS/WEIGHT MGMT | Age: 41
End: 2021-03-23
Payer: MEDICARE

## 2021-03-23 VITALS
TEMPERATURE: 97.2 F | DIASTOLIC BLOOD PRESSURE: 80 MMHG | WEIGHT: 293 LBS | HEART RATE: 88 BPM | HEIGHT: 63 IN | BODY MASS INDEX: 51.91 KG/M2 | SYSTOLIC BLOOD PRESSURE: 118 MMHG

## 2021-03-23 DIAGNOSIS — K21.9 GERD WITHOUT ESOPHAGITIS: ICD-10-CM

## 2021-03-23 DIAGNOSIS — R73.03 PREDIABETES: ICD-10-CM

## 2021-03-23 DIAGNOSIS — G89.29 CHRONIC LOW BACK PAIN, UNSPECIFIED BACK PAIN LATERALITY, UNSPECIFIED WHETHER SCIATICA PRESENT: ICD-10-CM

## 2021-03-23 DIAGNOSIS — M54.50 CHRONIC LOW BACK PAIN, UNSPECIFIED BACK PAIN LATERALITY, UNSPECIFIED WHETHER SCIATICA PRESENT: ICD-10-CM

## 2021-03-23 DIAGNOSIS — G47.33 OSA (OBSTRUCTIVE SLEEP APNEA): Primary | ICD-10-CM

## 2021-03-23 DIAGNOSIS — M54.16 LUMBAR RADICULOPATHY: ICD-10-CM

## 2021-03-23 DIAGNOSIS — E66.01 MORBID OBESITY (HCC): ICD-10-CM

## 2021-03-23 DIAGNOSIS — G43.009 MIGRAINE WITHOUT AURA, NOT REFRACTORY: ICD-10-CM

## 2021-03-23 PROCEDURE — 1036F TOBACCO NON-USER: CPT | Performed by: NURSE PRACTITIONER

## 2021-03-23 PROCEDURE — G8482 FLU IMMUNIZE ORDER/ADMIN: HCPCS | Performed by: NURSE PRACTITIONER

## 2021-03-23 PROCEDURE — G8427 DOCREV CUR MEDS BY ELIG CLIN: HCPCS | Performed by: NURSE PRACTITIONER

## 2021-03-23 PROCEDURE — G8417 CALC BMI ABV UP PARAM F/U: HCPCS | Performed by: NURSE PRACTITIONER

## 2021-03-23 PROCEDURE — 99213 OFFICE O/P EST LOW 20 MIN: CPT | Performed by: NURSE PRACTITIONER

## 2021-03-23 RX ORDER — LACTOBACILLUS ACIDOPHILUS 0.5 MG
TABLET ORAL
COMMUNITY
Start: 2021-02-19 | End: 2021-06-17 | Stop reason: SDUPTHER

## 2021-03-23 RX ORDER — LIDOCAINE AND PRILOCAINE 25; 25 MG/G; MG/G
CREAM TOPICAL
COMMUNITY
End: 2021-06-17 | Stop reason: ALTCHOICE

## 2021-03-23 RX ORDER — PANTOPRAZOLE SODIUM 20 MG/1
TABLET, DELAYED RELEASE ORAL
COMMUNITY
Start: 2021-03-18 | End: 2021-03-23

## 2021-03-23 NOTE — PROGRESS NOTES
Medical Weight Management Progress Note    Subjective     Patient being seen for medically supervised weight loss for the chronic conditions of SHELLEY, GERD, ADHD, Chronic Back Pain, Migraine, Rheumatoid Arthritis, Prediabetes, Hidradenitis. She is working on the behavior changes discussed at the initial appointment. Patient continues on diet plan. Physical activity includes walking. Weight gain of 2 lbs since last visit. Psych eval completed and clearance received. EGD completed and H Pylori negative. Hematology clearance received. No current issues. Working toward bariatric surgery:    [x] Sleeve Gastrectomy                                                           [] Ruth-en-Y Gastric Bypass    Allergies:   Allergies   Allergen Reactions    Cat Hair Extract     Dog Epithelium     Iodine Hives and Itching    Other      Sensitive to bug bites, mosquitos etc.       Past Medical History:     Past Medical History:   Diagnosis Date    Abnormal 1st trimester screen (Tri 21 1:117)--NIPT nml  11/30/2019    ADD (attention deficit disorder) without hyperactivity     Celestone 1/9 & 1/10 1/9/2020    Chronic back pain     Echogenic focus of heart of fetus affecting antepartum care of mother 11/30/2019   Roddy Landers early 1hr, nml 3hr  11/30/2019    1 elevated value, [facility] recommends repeat 3hr GTT in 2 weeks    Elevated umbilical artery dopplers  1/10/2020    Fetal ascites     Fetal heart rate decelerations affecting management of mother     GERD (gastroesophageal reflux disease)     Headache     Hypertension     IUD (intrauterine device) in place 05/07/2020    Adina Nick    Lumbar radiculopathy 1/9/2019    Lumbar spondylosis 1/9/2019    Multigravida of advanced maternal age in third trimester     Obesity     Obesity affecting pregnancy in first trimester 11/6/2018    Obesity affecting pregnancy in third trimester     SHELLEY (obstructive sleep apnea)     Pulmonary embolism affecting pregnancy in second trimester 2019    Rh+/RI/GBSunk 2020    Rheumatoid arteritis ([address]     Umbilical cord complication    .     Past Surgical History:  Past Surgical History:   Procedure Laterality Date     SECTION Bilateral 2020     SE[facility]bin [facility]    INTRAUTERINE DEVICE INSERTION  2020    Mirena    OTHER SURGICAL HISTORY  12/10/2018    Fluoroscopy- with esophagas- possible delay of gastric emptying     TONSILLECTOMY      UPPER GASTROINTESTINAL ENDOSCOPY  2021    UPPER GASTROINTESTINAL ENDOSCOPY N/A 2021    EGD BIOPSY OF GASTRIC performed by Krystle Barlow DO at [address] [facility]  2020     LYMPH NODE BIOPSY 2020 [facility]       Family History:  Family History   Problem Relation Age of Onset    Elevated Lipids Mother        Social History:  Social History     Socioeconomic History    Marital status:      Spouse name: Not on file    Number of children: Not on file    Years of education: Not on file    Highest education level: Not on file   Occupational History    Not on file   Social Needs    Financial resource strain: Not on file    Food insecurity     Worry: Not on file     Inability: Not on file    Transportation needs     Medical: Not on file     Non-medical: Not on file   Tobacco Use    Smoking status: Never Smoker    Smokeless tobacco: Never Used   Substance and Sexual Activity    Alcohol use: No    Drug use: Yes     Frequency: 2.0 times per week     Types: Marijuana    Sexual activity: Yes     Partners: Male   Lifestyle    Physical activity     Days per week: Not on file     Minutes per session: Not on file    Stress: Not on file   Relationships    Social connections     Talks on phone: Not on file     Gets together: Not on file     Attends Caodaism service: Not on file     Active member of club or organization: Not on file     Attends meetings of (FOLVITE) 1 MG tablet Take 1 mg by mouth daily      lidocaine-prilocaine (EMLA) 2.5-2.5 % cream lidocaine-prilocaine 2.5 %-2.5 % topical cream      Lactobacillus (ACIDOPHILUS PROBIOTIC) 0.5 MG TABS take 1 tablet by mouth once daily      ACIDOPHILUS LACTOBACILLUS PO 0.5 mg (100 million cells ) 1x a day      fluconazole (DIFLUCAN) 150 MG tablet take 1 tablet by mouth AS A ONE TIME DOSE repeat if needed in 72 hours (Patient not taking: Reported on 2/24/2021) 2 tablet 0     Current Facility-Administered Medications   Medication Dose Route Frequency Provider Last Rate Last Admin    levonorgestrel (MIRENA) IUD 52 mg 1 each  1 each Intrauterine Once Michelle MIKHAIL Mckinley - CNP   1 each at 05/07/20 1459       Vital Signs:  /80 (Site: Right Upper Arm, Position: Sitting, Cuff Size: Large Adult)   Pulse 88   Temp 97.2 °F (36.2 °C)   Ht 5' 3\" (1.6 m)   Wt 299 lb (135.6 kg)   BMI 52.97 kg/m²     BMI/Height/Weight:  Body mass index is 52.97 kg/m². Review of Systems - A review of systems was performed. All was negative unless otherwise documented in HPI. Constitutional: Negative for fever, chills and diaphoresis. HENT: Negative for hearing loss and trouble swallowing. Eyes: Negative for photophobia and visual disturbance. Respiratory: Negative for cough, shortness of breath and wheezing. Cardiovascular: Negative for chest pain and palpitations. Gastrointestinal: Negative for nausea, vomiting, abdominal pain, diarrhea, constipation, blood in stool and abdominal distention. Endocrine: Negative for polydipsia, polyphagia and polyuria. Genitourinary: Negative for dysuria, frequency, hematuria and difficulty urinating. Musculoskeletal: Negative for myalgias, joint swelling. Skin: Negative for pallor and rash. Neurological: Negative for dizziness, tremors, light-headedness and headaches. Psychiatric/Behavioral: Negative for sleep disturbance and dysphoric mood.      Objective: Physical Exam   Vital signs reviewed. General: Well-developed and well-nourished. No acute distress. Skin: Warm, dry and intact. HEENT: Normocephalic. EOMs intact. Conjunctivae normal. Neck supple. Cardiovascular: Normal rate, regular rhythm. Pulmonary/Chest: Normal effort. Lungs clear to auscultation. No rales, rhonchi or wheezing. Abdominal: Positive bowel sounds. Soft, nontender. Nondistended. Musculoskeletal: Movement x4. No edema. Neurological: Gait normal. Alert and oriented to person, place, and time. Psychiatric: Normal mood and affect. Speech and behavior normal. Judgment and thought content normal. Cognition and memory intact. Assessment:       Diagnosis Orders   1. SHELLEY (obstructive sleep apnea)     2. GERD without esophagitis     3. Morbid obesity ([address]     4. Chronic low back pain, unspecified back pain laterality, unspecified whether sciatica present     5. Prediabetes     6. Migraine without aura, not refractory     7. Lumbar radiculopathy         Plan:    Dietitian visit today. Patient was encouraged to journal all food intake. Keep calorie level at approximately 7314-8265. Protein intake is to be a minimum of 60-80 grams per day. Water drinking was encouraged with a goal of 64oz-128oz daily. Beverages to be calorie free except for milk. Every other beverage should be water. Avoid soda. Continue to increase level of physical activity. Encouraged use of exercise log. Weight addressed. She will work with the dietitian on weight loss, using the pre-op diet. Follow-up  Return in about 1 month (around 4/23/2021). Orders this encounter:  No orders of the defined types were placed in this encounter. Prescriptions this encounter:  No orders of the defined types were placed in this encounter.       Electronically signed by:  Sandee Zarate CNP

## 2021-03-29 ENCOUNTER — HOSPITAL ENCOUNTER (OUTPATIENT)
Facility: MEDICAL CENTER | Age: 41
Discharge: HOME | End: 2021-03-29
Payer: MEDICARE

## 2021-03-29 ENCOUNTER — OFFICE VISIT (OUTPATIENT)
Dept: ONCOLOGY | Age: 41
End: 2021-03-29
Payer: MEDICARE

## 2021-03-29 ENCOUNTER — TELEPHONE (OUTPATIENT)
Dept: ONCOLOGY | Age: 41
End: 2021-03-29

## 2021-03-29 VITALS
BODY MASS INDEX: 51.91 KG/M2 | SYSTOLIC BLOOD PRESSURE: 123 MMHG | HEIGHT: 63 IN | DIASTOLIC BLOOD PRESSURE: 89 MMHG | TEMPERATURE: 96.8 F | HEART RATE: 83 BPM | WEIGHT: 293 LBS | RESPIRATION RATE: 16 BRPM

## 2021-03-29 DIAGNOSIS — R59.1 LYMPHADENOPATHY: ICD-10-CM

## 2021-03-29 DIAGNOSIS — O88.212 PULMONARY EMBOLISM AFFECTING PREGNANCY IN SECOND TRIMESTER: ICD-10-CM

## 2021-03-29 DIAGNOSIS — R76.0 ANTICARDIOLIPIN ANTIBODY POSITIVE: Primary | ICD-10-CM

## 2021-03-29 DIAGNOSIS — E66.01 MORBID OBESITY DUE TO EXCESS CALORIES (HCC): ICD-10-CM

## 2021-03-29 DIAGNOSIS — E61.1 IRON DEFICIENCY: ICD-10-CM

## 2021-03-29 LAB
ABSOLUTE EOS #: 1.33 K/UL (ref 0–0.44)
ABSOLUTE IMMATURE GRANULOCYTE: 0.04 K/UL (ref 0–0.3)
ABSOLUTE LYMPH #: 2.3 K/UL (ref 1.1–3.7)
ABSOLUTE MONO #: 0.26 K/UL (ref 0.1–1.2)
ALBUMIN SERPL-MCNC: 3.9 G/DL (ref 3.5–5.2)
ALBUMIN/GLOBULIN RATIO: ABNORMAL (ref 1–2.5)
ALP BLD-CCNC: 80 U/L (ref 35–104)
ALT SERPL-CCNC: 13 U/L (ref 5–33)
ANION GAP SERPL CALCULATED.3IONS-SCNC: 12 MMOL/L (ref 9–17)
AST SERPL-CCNC: 13 U/L
BASOPHILS # BLD: 0 % (ref 0–2)
BASOPHILS ABSOLUTE: <0.03 K/UL (ref 0–0.2)
BILIRUB SERPL-MCNC: 0.16 MG/DL (ref 0.3–1.2)
BUN BLDV-MCNC: 10 MG/DL (ref 6–20)
BUN/CREAT BLD: 19 (ref 9–20)
CALCIUM SERPL-MCNC: 9.1 MG/DL (ref 8.6–10.4)
CHLORIDE BLD-SCNC: 105 MMOL/L (ref 98–107)
CO2: 22 MMOL/L (ref 20–31)
CREAT SERPL-MCNC: 0.53 MG/DL (ref 0.5–0.9)
DIFFERENTIAL TYPE: ABNORMAL
EOSINOPHILS RELATIVE PERCENT: 13 % (ref 1–4)
GFR AFRICAN AMERICAN: >60 ML/MIN
GFR NON-AFRICAN AMERICAN: >60 ML/MIN
GFR SERPL CREATININE-BSD FRML MDRD: ABNORMAL ML/MIN/{1.73_M2}
GFR SERPL CREATININE-BSD FRML MDRD: ABNORMAL ML/MIN/{1.73_M2}
GLUCOSE BLD-MCNC: 107 MG/DL (ref 70–99)
HCT VFR BLD CALC: 39.6 % (ref 36.3–47.1)
HEMOGLOBIN: 12.2 G/DL (ref 11.9–15.1)
IMMATURE GRANULOCYTES: 0 %
LYMPHOCYTES # BLD: 23 % (ref 24–43)
MCH RBC QN AUTO: 27.2 PG (ref 25.2–33.5)
MCHC RBC AUTO-ENTMCNC: 30.8 G/DL (ref 28.4–34.8)
MCV RBC AUTO: 88.4 FL (ref 82.6–102.9)
MONOCYTES # BLD: 3 % (ref 3–12)
NRBC AUTOMATED: 0 PER 100 WBC
PDW BLD-RTO: 14.1 % (ref 11.8–14.4)
PLATELET # BLD: 342 K/UL (ref 138–453)
PLATELET ESTIMATE: ABNORMAL
PMV BLD AUTO: 9.3 FL (ref 8.1–13.5)
POTASSIUM SERPL-SCNC: 3.9 MMOL/L (ref 3.7–5.3)
RBC # BLD: 4.48 M/UL (ref 3.95–5.11)
RBC # BLD: ABNORMAL 10*6/UL
SEG NEUTROPHILS: 61 % (ref 36–65)
SEGMENTED NEUTROPHILS ABSOLUTE COUNT: 5.94 K/UL (ref 1.5–8.1)
SODIUM BLD-SCNC: 139 MMOL/L (ref 135–144)
TOTAL PROTEIN: 7 G/DL (ref 6.4–8.3)
WBC # BLD: 9.9 K/UL (ref 3.5–11.3)
WBC # BLD: ABNORMAL 10*3/UL

## 2021-03-29 PROCEDURE — G8482 FLU IMMUNIZE ORDER/ADMIN: HCPCS | Performed by: INTERNAL MEDICINE

## 2021-03-29 PROCEDURE — 99211 OFF/OP EST MAY X REQ PHY/QHP: CPT | Performed by: INTERNAL MEDICINE

## 2021-03-29 PROCEDURE — G8417 CALC BMI ABV UP PARAM F/U: HCPCS | Performed by: INTERNAL MEDICINE

## 2021-03-29 PROCEDURE — 85025 COMPLETE CBC W/AUTO DIFF WBC: CPT

## 2021-03-29 PROCEDURE — 99214 OFFICE O/P EST MOD 30 MIN: CPT | Performed by: INTERNAL MEDICINE

## 2021-03-29 PROCEDURE — 80053 COMPREHEN METABOLIC PANEL: CPT

## 2021-03-29 PROCEDURE — G8427 DOCREV CUR MEDS BY ELIG CLIN: HCPCS | Performed by: INTERNAL MEDICINE

## 2021-03-29 PROCEDURE — 1036F TOBACCO NON-USER: CPT | Performed by: INTERNAL MEDICINE

## 2021-03-29 PROCEDURE — 36415 COLL VENOUS BLD VENIPUNCTURE: CPT

## 2021-03-29 NOTE — TELEPHONE ENCOUNTER
Gissel Caban MD VISIT  DR FLORES IN TO SEE PATIENT  ORDERS RECEIVED  RV 6 MONTHS W/LABS BEFORE  LABS CDP BMP FE TIBC FERRITIN  VITAMIN B12 & FOLATE TO BE DONE AROUND 9/20/21, ORDERS GIVEN TO PATIENT  MD VISIT 9/27/21 @1:15PM  AVS PRINTED AND GIVEN TO PATIENT WITH INSTRUCTIONS  PATIENT DISCHARGED AMBULATORY

## 2021-03-29 NOTE — PROGRESS NOTES
Tierra Henderson                                                                                                                  3/29/2021  MRN:   [id number]  YOB: 1980  PCP:                           Armando Call MD  Referring Physician: No ref. 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 o.female is a patient with chief complaint of shortness of breath. Patient is 26-week pregnant. Presented with pelvic pain urinary frequency and dyspnea for about 2 days. Patient underwent CT chest which showed nonobstructive small left lower lobe pulmonary embolism. Patient denies any previous history of blood clots. Patient has had thrombophilia work-up done due to multiple pregnancy losses in the past which has not been clinically significant. Patient has been started on Lovenox which she is tolerating well. Patient has BMI of 51. Initial testing showed anticardiolipin IgG antibody to be 26.9 and in 12/2019 dropped to 3.7      Interim History:     Patient presents to the clinic for a follow-up visit and to review lab work. She reports recurrence of arm aches a few days ago that have since resolved. She awaiting insurance approval for weight loss surgery. She is taking oral iron every other day as directed. She is taking Humira and methotrexate with well controlled RA. Denies unintentional weight loss drenching night sweats or noticing swollen glands. During this visit patient's allergy, social, medical, surgical history and medications were reviewed and updated.     Past Medical History:   Past Medical History:   Diagnosis Date    Abnormal 1st trimester screen (Tri 21 1:117)--NIPT nml  2019    ADD (attention deficit disorder) without hyperactivity     Celestone  & 1/10 2020    Chronic back pain     Echogenic focus of heart of fetus affecting antepartum care of mother 2019   Reather Guppy early 1hr, nml 3hr  2019    1 elevated value, [facility] recommends repeat 3hr GTT in 2 weeks    Elevated umbilical artery dopplers  1/10/2020    Fetal ascites     Fetal heart rate decelerations affecting management of mother     GERD (gastroesophageal reflux disease)     Headache     Hypertension     IUD (intrauterine device) in place 2020    Mirena Moustapha Rao    Lumbar radiculopathy 2019    Lumbar spondylosis 2019    Multigravida of advanced maternal age in third trimester     Obesity     Obesity affecting pregnancy in first trimester 2018    Obesity affecting pregnancy in third trimester     SHELLEY (obstructive sleep apnea)     Pulmonary embolism affecting pregnancy in second trimester 2019    Rh+/RI/GBSunk 2020    Rheumatoid arteritis ([address]     Umbilical cord complication        Past Surgical History:     Past Surgical History:   Procedure Laterality Date     SECTION Bilateral 2020     SECTION performed by Neville Rosario DO at [facility] L[facility]D [address] [place] [address]  2020    Mirena    OTHER SURGICAL HISTORY  12/10/2018    Fluoroscopy- with esophagas- possible delay of gastric emptying     TONSILLECTOMY  1996    UPPER GASTROINTESTINAL ENDOSCOPY  2021    UPPER GASTROINTESTINAL ENDOSCOPY N/A 2021    EGD BIOPSY OF GASTRIC performed by Becky Atkinson DO at [address] [place] [address]  2020    US LYMPH NODE BIOPSY 2020 [facility] ULTRASOUND       Patient Family Social History:     Social History     Socioeconomic History    Marital status:      Spouse name: None    (MYCOSTATIN) 700220 UNIT/GM cream Apply topically 2 times daily. 30 g 0    ferrous sulfate (IRON 325) 325 (65 Fe) MG tablet Take 1 tablet by mouth every other day 30 tablet 1    Xmlvvw-PlAxj-GmSll-FA-CA-Omega (TRIVEEN-DUO DHA) 29-1-200 & 400 MG MISC take 1 capsule and take 1 tablet by mouth once daily 60 each 3    methotrexate (RHEUMATREX) 2.5 MG chemo tablet Take 5 tab every week orally for 90 days      nabumetone (RELAFEN) 750 MG tablet nabumetone 750 mg tablet   Take 1 tablet twice a day by oral route for 30 days.  predniSONE (DELTASONE) 5 MG tablet Take 2.5 mg by mouth 3 times daily       ammonium lactate (LAC-HYDRIN) 12 % lotion Apply topically daily. 1 Bottle 0    folic acid (FOLVITE) 1 MG tablet Take 1 mg by mouth daily      Lactobacillus (ACIDOPHILUS PROBIOTIC) 0.5 MG TABS take 1 tablet by mouth once daily      cetirizine (ZYRTEC) 10 MG tablet take 1 tablet by mouth once daily      triamcinolone (KENALOG) 0.1 % cream Apply topically 2 times daily      albuterol (PROVENTIL) (2.5 MG/3ML) 0.083% nebulizer solution Take 3 mLs by nebulization daily as needed for Wheezing (Patient not taking: Reported on 3/29/2021) 120 each 3     Current Facility-Administered Medications   Medication Dose Route Frequency Provider Last Rate Last Admin    levonorgestrel (MIRENA) IUD 52 mg 1 each  1 each Intrauterine Once Efrain Mcgill APRN - CNP   1 each at 05/07/20 9329       Allergies:   Cat hair extract, Dog epithelium, Iodine, and Other    Review of Systems:    Constitutional: No fever or chills.  No night sweats, no weight loss   Eyes: No eye discharge, double vision, or eye pain   HEENT: negative for sore mouth, sore throat, hoarseness and voice change   Respiratory: negative for cough , sputum, dyspnea, wheezing, hemoptysis, chest pain   Cardiovascular: negative for chest pain, dyspnea, palpitations, orthopnea, PND   Gastrointestinal: negative for nausea, vomiting, diarrhea, constipation, abdominal pain, Dysphagia, hematemesis and hematochezia   Genitourinary: negative for frequency, dysuria, nocturia, urinary incontinence, and hematuria   Integument: negative for rash, skin lesions, bruises. Hematologic/Lymphatic: negative for easy bruising, bleeding, lymphadenopathy or petechiae.    Endocrine: negative for heat or cold intolerance,weight changes, change in bowel habits and hair loss   Musculoskeletal: negative for myalgias, arthralgias, pain, joint swelling,and bone pain; +arm pain - resolved   Neurological: negative for headaches, dizziness, seizures, weakness, numbness        Physical Exam:    Vitals: /89   Pulse 83   Temp 96.8 °F (36 °C) (Temporal)   Resp 16   Ht 5' 3\" (1.6 m)   Wt 297 lb (134.7 kg)   BMI 52.61 kg/m²   General appearance - well appearing, no in pain or distress  Mental status - AAO X3  Eyes - pupils equal and reactive, extraocular eye movements intact  Mouth - mucous membranes moist, pharynx normal without lesions  Neck - supple, no significant adenopathy  Lymphatics - no hepatosplenomegaly, lymphadenopathy unchanged  Chest - clear to auscultation, no wheezes, rales or rhonchi, symmetric air entry  Heart - normal rate, regular rhythm, normal S1, S2, no murmurs  Abdomen - soft, nontender, nondistended, no masses or organomegaly  Neurological - alert, oriented, normal speech, no focal findings or movement disorder noted  Extremities - peripheral pulses normal, no pedal edema, no clubbing or cyanosis  Skin - normal coloration and turgor, no rashes, no suspicious skin lesions noted  - small bruising around nails      DATA:  Lab Results   Component Value Date    WBC 9.9 03/29/2021    HGB 12.2 03/29/2021    HCT 39.6 03/29/2021    MCV 88.4 03/29/2021     03/29/2021       Chemistry        Component Value Date/Time     03/29/2021 1231    K 3.9 03/29/2021 1231     03/29/2021 1231    CO2 22 03/29/2021 1231    BUN 10 03/29/2021 1231    CREATININE 0.53 03/29/2021 1231 Component Value Date/Time    CALCIUM 9.1 03/29/2021 1231    ALKPHOS 80 03/29/2021 1231    AST 13 03/29/2021 1231    ALT 13 03/29/2021 1231    BILITOT 0.16 (L) 03/29/2021 1231      CT chest:    Impression   1. Enlarged bilateral axillary lymph nodes are similar in appearance to prior   chest CT. 2. No acute intrathoracic abnormality. 3. No acute intra-abdominal abnormality. 4. Hepatic steatosis. 5. Cholelithiasis without CT evidence of cholecystitis. 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 Her pre-surgical EGD showed some chronic gastritis, negative otherwise. We reivewed her recent lab work which showed hemoglobin platelet count as well as total white cell count to be within normal range. Patient denies any B symptoms. Examination does not reveal any lymphadenopathy however examination is limited by body habitus. Patient absolute lymphocyte count and absolute neutrophil count are also within range. However patient repeat CT scan previously showed persistent lymphadenopathy. Patient FNA as well as core biopsy were negative. Given lack of any concerning symptoms recommend conservative management with surveillance. If patient starts developing any concerning symptoms or shows any concerning labs we will proceed with excision biopsy. Okay to proceed with gastric bypass from oncology standpoint. Patient continues to do well off anticoagulation. Counseled on risk factor associated venous thromboembolism.   Return to clinic in 6 months        [address]      This note is created with the assistance of a speech recognition program.  While intending to generate a document that actually reflects the content of the visit, the document can still have some errors including those of syntax and sound a like substitutions which may escape proof reading. It such instances, actual meaning can be extrapolated by contextual diversion.

## 2021-04-16 NOTE — TELEPHONE ENCOUNTER
Pharmacy requesting refill of Amitriptyline.       Medication active on med list: no      Date of last fill: 1/11/21 for #30 and 2 refills  verified on 4/16/2021    verified by Yaw Guevara LPN      Date of last appointment 1/7/2021    Next Visit Date:  5/5/2021

## 2021-04-19 DIAGNOSIS — M54.16 LUMBAR RADICULOPATHY: ICD-10-CM

## 2021-04-19 DIAGNOSIS — E66.01 MORBID OBESITY (HCC): ICD-10-CM

## 2021-04-19 DIAGNOSIS — K21.9 GASTROESOPHAGEAL REFLUX DISEASE WITHOUT ESOPHAGITIS: ICD-10-CM

## 2021-04-19 DIAGNOSIS — M05.9 RHEUMATOID ARTHRITIS WITH RHEUMATOID FACTOR, UNSPECIFIED (HCC): ICD-10-CM

## 2021-04-19 DIAGNOSIS — R73.03 PREDIABETES: ICD-10-CM

## 2021-04-19 DIAGNOSIS — G47.33 OSA (OBSTRUCTIVE SLEEP APNEA): ICD-10-CM

## 2021-04-19 RX ORDER — FAMOTIDINE 20 MG/1
20 TABLET, FILM COATED ORAL NIGHTLY
Qty: 30 TABLET | Refills: 3 | Status: SHIPPED | OUTPATIENT
Start: 2021-04-19 | End: 2021-06-17 | Stop reason: ALTCHOICE

## 2021-04-19 RX ORDER — AMITRIPTYLINE HYDROCHLORIDE 25 MG/1
25 TABLET, FILM COATED ORAL NIGHTLY
Qty: 30 TABLET | Refills: 0 | Status: SHIPPED | OUTPATIENT
Start: 2021-04-19 | End: 2021-05-17

## 2021-05-05 NOTE — TELEPHONE ENCOUNTER
----- Message from [facility] [place], APRN - CNP sent at 7/16/2019 12:09 PM EDT -----  Her HCG has increased but would have liked it to be a little higher if it was doubling every 2- 3 days we will monitor closely have her recheck in 2 days as this hcg was from yesterday. She had US at [facility] last week showed gestational sac no fetal pole or cardiac activity, she also had us week prior. She has US scheduled for 24th, but we need to watch her HCG levels and have her monitor for abdominal cramping and bleeding.
The patient has been re-examined and I agree with the above assessment or I updated with my findings.

## 2021-05-10 ENCOUNTER — OFFICE VISIT (OUTPATIENT)
Dept: BARIATRICS/WEIGHT MGMT | Age: 41
End: 2021-05-10
Payer: MEDICARE

## 2021-05-10 VITALS
WEIGHT: 293 LBS | BODY MASS INDEX: 51.91 KG/M2 | HEIGHT: 63 IN | HEART RATE: 93 BPM | SYSTOLIC BLOOD PRESSURE: 118 MMHG | DIASTOLIC BLOOD PRESSURE: 84 MMHG

## 2021-05-10 DIAGNOSIS — K21.9 GERD WITHOUT ESOPHAGITIS: ICD-10-CM

## 2021-05-10 DIAGNOSIS — G47.33 OSA (OBSTRUCTIVE SLEEP APNEA): Primary | ICD-10-CM

## 2021-05-10 DIAGNOSIS — R73.03 PREDIABETES: ICD-10-CM

## 2021-05-10 DIAGNOSIS — M54.16 LUMBAR RADICULOPATHY: ICD-10-CM

## 2021-05-10 DIAGNOSIS — M05.9 RHEUMATOID ARTHRITIS WITH RHEUMATOID FACTOR, UNSPECIFIED (HCC): ICD-10-CM

## 2021-05-10 DIAGNOSIS — E66.01 MORBID OBESITY (HCC): ICD-10-CM

## 2021-05-10 PROCEDURE — 99213 OFFICE O/P EST LOW 20 MIN: CPT | Performed by: NURSE PRACTITIONER

## 2021-05-10 PROCEDURE — G8427 DOCREV CUR MEDS BY ELIG CLIN: HCPCS | Performed by: NURSE PRACTITIONER

## 2021-05-10 PROCEDURE — G8417 CALC BMI ABV UP PARAM F/U: HCPCS | Performed by: NURSE PRACTITIONER

## 2021-05-10 PROCEDURE — 1036F TOBACCO NON-USER: CPT | Performed by: NURSE PRACTITIONER

## 2021-05-10 RX ORDER — PANTOPRAZOLE SODIUM 20 MG/1
TABLET, DELAYED RELEASE ORAL
COMMUNITY
Start: 2021-04-16 | End: 2021-06-17 | Stop reason: ALTCHOICE

## 2021-05-10 NOTE — PROGRESS NOTES
Medical Weight Management Progress Note    Subjective     Patient being seen for medically supervised weight loss for the chronic conditions of SHELLEY, GERD, ADHD, Chronic Back Pain, Migraine, Rheumatoid Arthritis, Prediabetes, Hidradenitis. She is working on the behavior changes discussed at the initial appointment. Patient continues on diet plan. Physical activity includes walking. Weight loss of 5 lbs since last visit. Psych eval completed and clearance received. EGD completed and H Pylori negative. Hematology clearance received. No current issues. Working toward bariatric surgery:    [x] Sleeve Gastrectomy                                                           [] Ruth-en-Y Gastric Bypass    Allergies:   Allergies   Allergen Reactions    Cat Hair Extract     Dog Epithelium     Iodine Hives and Itching    Other      Sensitive to bug bites, mosquitos etc.       Past Medical History:     Past Medical History:   Diagnosis Date    Abnormal 1st trimester screen (Tri 21 1:117)--NIPT nml  11/30/2019    ADD (attention deficit disorder) without hyperactivity     Celestone 1/9 & 1/10 1/9/2020    Chronic back pain     Echogenic focus of heart of fetus affecting antepartum care of mother 11/30/2019   Bal Amas early 1hr, nml 3hr  11/30/2019    1 elevated value, [facility] recommends repeat 3hr GTT in 2 weeks    Elevated umbilical artery dopplers  1/10/2020    Fetal ascites     Fetal heart rate decelerations affecting management of mother     GERD (gastroesophageal reflux disease)     Headache     Hypertension     IUD (intrauterine device) in place 05/07/2020    Adina Dobbins    Lumbar radiculopathy 1/9/2019    Lumbar spondylosis 1/9/2019    Multigravida of advanced maternal age in third trimester     Obesity     Obesity affecting pregnancy in first trimester 11/6/2018    Obesity affecting pregnancy in third trimester     SHELLEY (obstructive sleep apnea)     Pulmonary embolism affecting pregnancy in second trimester 2019    Rh+/RI/GBSunk 2020    Rheumatoid arteritis ([address]     Umbilical cord complication    .     Past Surgical History:  Past Surgical History:   Procedure Laterality Date     SECTION Bilateral 2020     SECTION performed by Faby [facility]ir DO at [facility]    INTRAUTERINE DEVICE INSERTION  2020    Mirena    OTHER SURGICAL HISTORY  12/10/2018    Fluoroscopy- with esophagas- possible delay of gastric emptying     TONSILLECTOMY      UPPER GASTROINTESTINAL ENDOSCOPY  2021    UPPER GASTROINTESTINAL ENDOSCOPY N/A 2021    EGD BIOPSY OF GASTRIC performed by Monisha Lopes DO at [facility]  2020     LYMPH NODE BIOPSY 2020 [facility]TRASOUND       Family History:  Family History   Problem Relation Age of Onset    Elevated Lipids Mother        Social History:  Social History     Socioeconomic History    Marital status:      Spouse name: Not on file    Number of children: Not on file    Years of education: Not on file    Highest education level: Not on file   Occupational History    Not on file   Social Needs    Financial resource strain: Not on file    Food insecurity     Worry: Not on file     Inability: Not on file    Transportation needs     Medical: Not on file     Non-medical: Not on file   Tobacco Use    Smoking status: Never Smoker    Smokeless tobacco: Never Used   Substance and Sexual Activity    Alcohol use: No    Drug use: Yes     Frequency: 2.0 times per week     Types: Marijuana    Sexual activity: Yes     Partners: Male   Lifestyle    Physical activity     Days per week: Not on file     Minutes per session: Not on file    Stress: Not on file   Relationships    Social connections     Talks on phone: Not on file     Gets together: Not on file     Attends Hinduism service: Not on file     Active member of club or organization: Not on file     Attends meetings of clubs or organizations: Not on file     Relationship status: Not on file    Intimate partner violence     Fear of current or ex partner: Not on file     Emotionally abused: Not on file     Physically abused: Not on file     Forced sexual activity: Not on file   Other Topics Concern    Not on file   Social History Narrative    Not on file       Current Medications:  Current Outpatient Medications   Medication Sig Dispense Refill    famotidine (PEPCID) 20 MG tablet Take 1 tablet by mouth nightly 30 tablet 3    Lactobacillus (ACIDOPHILUS PROBIOTIC) 0.5 MG TABS take 1 tablet by mouth once daily      ACIDOPHILUS LACTOBACILLUS PO 0.5 mg (100 million cells ) 1x a day      vitamin D (ERGOCALCIFEROL) 1.25 MG (53819 UT) CAPS capsule take 1 capsule by mouth every week 8 capsule 0    Adalimumab (HUMIRA PEN) 40 MG/0.4ML PNKT Humira(CF) Pen 40 mg/0.4 mL subcutaneous kit      ferrous sulfate (IRON 325) 325 (65 Fe) MG tablet Take 1 tablet by mouth every other day 30 tablet 1    Cfubwj-UoKzo-MdTjf-FA-CA-Omega (TRIVEEN-DUO DHA) 29-1-200 & 400 MG MISC take 1 capsule and take 1 tablet by mouth once daily 60 each 3    methotrexate (RHEUMATREX) 2.5 MG chemo tablet Take 5 tab every week orally for 90 days      nabumetone (RELAFEN) 750 MG tablet nabumetone 750 mg tablet   Take 1 tablet twice a day by oral route for 30 days.  predniSONE (DELTASONE) 5 MG tablet Take 2.5 mg by mouth 3 times daily       folic acid (FOLVITE) 1 MG tablet Take 1 mg by mouth daily      pantoprazole (PROTONIX) 20 MG tablet take 1 tablet by mouth once daily      amitriptyline (ELAVIL) 25 MG tablet take 1 tablet by mouth nightly (Patient not taking: Reported on 5/10/2021) 30 tablet 0    lidocaine-prilocaine (EMLA) 2.5-2.5 % cream lidocaine-prilocaine 2.5 %-2.5 % topical cream      cetirizine (ZYRTEC) 10 MG tablet take 1 tablet by mouth once daily      nystatin (MYCOSTATIN) 476560 UNIT/GM cream Apply topically 2 times daily.  (Patient not taking: Reported on 5/10/2021) 30 g 0    triamcinolone (KENALOG) 0.1 % cream Apply topically 2 times daily      ammonium lactate (LAC-HYDRIN) 12 % lotion Apply topically daily. (Patient not taking: Reported on 5/10/2021) 1 Bottle 0    albuterol (PROVENTIL) (2.5 MG/3ML) 0.083% nebulizer solution Take 3 mLs by nebulization daily as needed for Wheezing (Patient not taking: Reported on 5/10/2021) 120 each 3     Current Facility-Administered Medications   Medication Dose Route Frequency Provider Last Rate Last Admin    levonorgestrel (MIRENA) IUD 52 mg 1 each  1 each Intrauterine Once Kamilah Rea, APRN - CNP   1 each at 05/07/20 1459       Vital Signs:  /84 (Site: Left Upper Arm, Position: Sitting, Cuff Size: Large Adult)   Pulse 93   Ht 5' 3\" (1.6 m)   Wt 294 lb (133.4 kg)   BMI 52.08 kg/m²     BMI/Height/Weight:  Body mass index is 52.08 kg/m². Review of Systems - A review of systems was performed. All was negative unless otherwise documented in HPI. Constitutional: Negative for fever, chills and diaphoresis. HENT: Negative for hearing loss and trouble swallowing. Eyes: Negative for photophobia and visual disturbance. Respiratory: Negative for cough, shortness of breath and wheezing. Cardiovascular: Negative for chest pain and palpitations. Gastrointestinal: Negative for nausea, vomiting, abdominal pain, diarrhea, constipation, blood in stool and abdominal distention. Endocrine: Negative for polydipsia, polyphagia and polyuria. Genitourinary: Negative for dysuria, frequency, hematuria and difficulty urinating. Musculoskeletal: Negative for myalgias, joint swelling. Skin: Negative for pallor and rash. Neurological: Negative for dizziness, tremors, light-headedness and headaches. Psychiatric/Behavioral: Negative for sleep disturbance and dysphoric mood. Objective:      Physical Exam   Vital signs reviewed. General: Well-developed and well-nourished. No acute distress. Skin: Warm, dry and intact. HEENT: Normocephalic. EOMs intact. Conjunctivae normal. Neck supple. Cardiovascular: Normal rate, regular rhythm. Pulmonary/Chest: Normal effort. Lungs clear to auscultation. No rales, rhonchi or wheezing. Abdominal: Positive bowel sounds. Soft, nontender. Nondistended. Musculoskeletal: Movement x4. No edema. Neurological: Gait normal. Alert and oriented to person, place, and time. Psychiatric: Normal mood and affect. Speech and behavior normal. Judgment and thought content normal. Cognition and memory intact. Assessment:       Diagnosis Orders   1. SHELLEY (obstructive sleep apnea)     2. GERD without esophagitis     3. Morbid obesity ([address]     4. Prediabetes     5. Rheumatoid arthritis with rheumatoid factor, unspecified (HCC)     6. Lumbar radiculopathy         Plan:    Dietitian visit today. Patient was encouraged to journal all food intake. Keep calorie level at approximately 2431-5214. Protein intake is to be a minimum of 60-80 grams per day. Water drinking was encouraged with a goal of 64oz-128oz daily. Beverages to be calorie free except for milk. Every other beverage should be water. Avoid soda. Continue to increase level of physical activity. Encouraged use of exercise log. She will continue to work on weight loss. Follow-up  Return in about 1 month (around 6/10/2021). Orders this encounter:  No orders of the defined types were placed in this encounter. Prescriptions this encounter:  No orders of the defined types were placed in this encounter.       Electronically signed by:  Rajinder Serna CNP

## 2021-05-10 NOTE — PROGRESS NOTES
Medical Nutrition Therapy   Metabolic and Bariatric Surgery         Supervised diet and exercise preparation  Visit 6 out of 3  Pt reports: No concerns     Changes in eating patterns to promote health are noted below on the goals number 22-25    Vitals: Wt Readings from Last 3 Encounters:   05/10/21 294 lb (133.4 kg)   03/29/21 297 lb (134.7 kg)   03/23/21 299 lb (135.6 kg)         Nutrition Assessment:   PES: Knowledge deficit related to healthy behaviors that support weight management post weight loss surgery as evidenced by Body mass index is 52.08 kg/m². Nutrition Assessment of Goal Attainment:  TREATMENT GOALS:  Nutrition goals complete. Continue following bariatric behaviors. Do you understand your goals? y    Do you have the information you need to achieve your goals? y    Do you have any questions  right now? n        [x]  Consistent goal achievement in the program thus far and further success with goals is expected. []  Unable to consistently make progress in goal achievement. At this time patient is not moving forward  in developing the skills needed for success after surgery. Plan:    Continue to follow monthly and review goals.          []  Nutrition visits complete    [x]

## 2021-05-11 ENCOUNTER — TELEPHONE (OUTPATIENT)
Dept: BARIATRICS/WEIGHT MGMT | Age: 41
End: 2021-05-11

## 2021-05-11 NOTE — TELEPHONE ENCOUNTER
Jason Keto and RD on 10/15/2020    Appears checklist is completed    Start wt 282lb   On 3/23/2021 pt was 299lb advised above start weight and f/u scheduled in two weeks; pt canceled appt and was not seen in April for supervised diet and exercise visit    Weight on 5/10/2020 was 294lb    Upcoming appt 6/4/2021    [facility]

## 2021-05-12 NOTE — TELEPHONE ENCOUNTER
Is she still on Humira? Is she still on Prednisone? Is she still on Methotrexate? If she is, then she would need to be off steroids for 6 weeks prior to surgery.  She would also need to be off Humira and Methotrexate for 4 weeks each

## 2021-05-12 NOTE — TELEPHONE ENCOUNTER
Patient is on all 3 medications, notified she will need to stop as noted per the guidelines of dr Nadege Bishop .  Per dr Nadege lundy to submit patient's record for authorization for surgery

## 2021-05-17 NOTE — TELEPHONE ENCOUNTER
Pharmacy requesting refill of Elavil.       Medication active on med list yes      Date of last fill: 4/19/21  verified on 5/17/2021   verified by [facility] LPN      Date of last appointment 1/7/21    Next Visit Date:  7/21/2021

## 2021-05-18 RX ORDER — AMITRIPTYLINE HYDROCHLORIDE 25 MG/1
TABLET, FILM COATED ORAL
Qty: 30 TABLET | Refills: 1 | Status: SHIPPED | OUTPATIENT
Start: 2021-05-18 | End: 2021-06-17 | Stop reason: ALTCHOICE

## 2021-06-17 ENCOUNTER — OFFICE VISIT (OUTPATIENT)
Dept: FAMILY MEDICINE CLINIC | Age: 41
End: 2021-06-17
Payer: MEDICARE

## 2021-06-17 VITALS
HEIGHT: 63 IN | WEIGHT: 293 LBS | SYSTOLIC BLOOD PRESSURE: 110 MMHG | TEMPERATURE: 97 F | DIASTOLIC BLOOD PRESSURE: 78 MMHG | BODY MASS INDEX: 51.91 KG/M2

## 2021-06-17 DIAGNOSIS — I26.09 OTHER PULMONARY EMBOLISM WITH ACUTE COR PULMONALE, UNSPECIFIED CHRONICITY (HCC): ICD-10-CM

## 2021-06-17 DIAGNOSIS — Z11.59 NEED FOR HEPATITIS C SCREENING TEST: ICD-10-CM

## 2021-06-17 DIAGNOSIS — Z13.220 LIPID SCREENING: ICD-10-CM

## 2021-06-17 DIAGNOSIS — O99.119 ANTIPHOSPHOLIPID SYNDROME IN PREGNANCY (HCC): ICD-10-CM

## 2021-06-17 DIAGNOSIS — E66.01 MORBID OBESITY (HCC): Primary | ICD-10-CM

## 2021-06-17 DIAGNOSIS — R73.03 PREDIABETES: ICD-10-CM

## 2021-06-17 DIAGNOSIS — M05.9 RHEUMATOID ARTHRITIS WITH RHEUMATOID FACTOR, UNSPECIFIED (HCC): ICD-10-CM

## 2021-06-17 DIAGNOSIS — D68.61 ANTIPHOSPHOLIPID SYNDROME IN PREGNANCY (HCC): ICD-10-CM

## 2021-06-17 DIAGNOSIS — G47.33 OSA (OBSTRUCTIVE SLEEP APNEA): ICD-10-CM

## 2021-06-17 DIAGNOSIS — K21.9 GERD WITHOUT ESOPHAGITIS: ICD-10-CM

## 2021-06-17 LAB — HBA1C MFR BLD: 5.7 %

## 2021-06-17 PROCEDURE — 83036 HEMOGLOBIN GLYCOSYLATED A1C: CPT | Performed by: FAMILY MEDICINE

## 2021-06-17 PROCEDURE — 1036F TOBACCO NON-USER: CPT | Performed by: FAMILY MEDICINE

## 2021-06-17 PROCEDURE — 99214 OFFICE O/P EST MOD 30 MIN: CPT | Performed by: FAMILY MEDICINE

## 2021-06-17 PROCEDURE — G8417 CALC BMI ABV UP PARAM F/U: HCPCS | Performed by: FAMILY MEDICINE

## 2021-06-17 PROCEDURE — G8427 DOCREV CUR MEDS BY ELIG CLIN: HCPCS | Performed by: FAMILY MEDICINE

## 2021-06-17 SDOH — ECONOMIC STABILITY: TRANSPORTATION INSECURITY
IN THE PAST 12 MONTHS, HAS LACK OF TRANSPORTATION KEPT YOU FROM MEETINGS, WORK, OR FROM GETTING THINGS NEEDED FOR DAILY LIVING?: YES

## 2021-06-17 SDOH — ECONOMIC STABILITY: FOOD INSECURITY: WITHIN THE PAST 12 MONTHS, YOU WORRIED THAT YOUR FOOD WOULD RUN OUT BEFORE YOU GOT MONEY TO BUY MORE.: NEVER TRUE

## 2021-06-17 SDOH — ECONOMIC STABILITY: TRANSPORTATION INSECURITY
IN THE PAST 12 MONTHS, HAS THE LACK OF TRANSPORTATION KEPT YOU FROM MEDICAL APPOINTMENTS OR FROM GETTING MEDICATIONS?: YES

## 2021-06-17 SDOH — ECONOMIC STABILITY: FOOD INSECURITY: WITHIN THE PAST 12 MONTHS, THE FOOD YOU BOUGHT JUST DIDN'T LAST AND YOU DIDN'T HAVE MONEY TO GET MORE.: NEVER TRUE

## 2021-06-17 ASSESSMENT — PATIENT HEALTH QUESTIONNAIRE - PHQ9
1. LITTLE INTEREST OR PLEASURE IN DOING THINGS: 0
SUM OF ALL RESPONSES TO PHQ QUESTIONS 1-9: 0
SUM OF ALL RESPONSES TO PHQ QUESTIONS 1-9: 0
SUM OF ALL RESPONSES TO PHQ9 QUESTIONS 1 & 2: 0
2. FEELING DOWN, DEPRESSED OR HOPELESS: 0
SUM OF ALL RESPONSES TO PHQ QUESTIONS 1-9: 0

## 2021-06-17 ASSESSMENT — SOCIAL DETERMINANTS OF HEALTH (SDOH): HOW HARD IS IT FOR YOU TO PAY FOR THE VERY BASICS LIKE FOOD, HOUSING, MEDICAL CARE, AND HEATING?: NOT HARD AT ALL

## 2021-06-17 NOTE — PROGRESS NOTES
[address] PRIMARY CARE  [address] [place] [id number]  Dept: [phone]  Dept Fax: [phone]    Tori Burdick is a 47 y.o. female who is a Established patient, who presents today for her medical conditions/complaints as noted below:  Chief Complaint   Patient presents with    Other     pre diabetes    Dizziness         HPI:     The patient is a 46-year-old female with a past medical history significant for SHELLEY, GERD, migraines, rheumatoid arthritis, antiphospholipid syndrome, prediabetes, obesity who presents today for 6-month checkup. Since her last visit the patient states she has noticed an episode of dizziness. She states it was positional nature when she turns her head to the right. It has since resolved. It lasted about 1 to 2 days but she is concerned that it may return. She denies any fevers, chills, ear pain, recent cold symptoms, chest pain, shortness of breath. She is following with rheumatology for her rheumatoid arthritis. They do labs every 3 months due to the patient's medication she is on. She states that Humira, methotrexate help with her joint pain significantly. She still has intermittent pain in different doing such as her hands but has been well controlled on current medications.       Hemoglobin A1C (%)   Date Value   06/17/2021 5.7   11/02/2020 6.1 (H)   12/02/2019 5.4             ( goal A1Cis < 7)   No results found for: LABMICR  LDL Cholesterol (mg/dL)   Date Value   11/02/2020 124   06/25/2020 103   08/02/2018 103       (goal LDL is <100)   AST (U/L)   Date Value   03/29/2021 13     ALT (U/L)   Date Value   03/29/2021 13     BUN (mg/dL)   Date Value   06/20/2021 8     BP Readings from Last 3 Encounters:   06/20/21 117/85   06/17/21 110/78   05/10/21 118/84          (goal 120/80)    Past Medical History:   Diagnosis Date    Abnormal 1st trimester screen (Tri 21 1:117)--NIPT nml  11/30/2019    ADD (attention deficit disorder) without mg (100 million cells ) 1x a day      vitamin D (ERGOCALCIFEROL) 1.25 MG (31722 UT) CAPS capsule take 1 capsule by mouth every week 8 capsule 0    Adalimumab (HUMIRA PEN) 40 MG/0.4ML PNKT Humira(CF) Pen 40 mg/0.4 mL subcutaneous kit      cetirizine (ZYRTEC) 10 MG tablet take 1 tablet by mouth once daily      ferrous sulfate (IRON 325) 325 (65 Fe) MG tablet Take 1 tablet by mouth every other day 30 tablet 1    methotrexate (RHEUMATREX) 2.5 MG chemo tablet Take 5 tab every week orally for 90 days      predniSONE (DELTASONE) 5 MG tablet Take 2.5 mg by mouth 3 times daily       albuterol (PROVENTIL) (2.5 MG/3ML) 0.083% nebulizer solution Take 3 mLs by nebulization daily as needed for Wheezing 423 each 3    folic acid (FOLVITE) 1 MG tablet Take 1 mg by mouth daily       Current Facility-Administered Medications   Medication Dose Route Frequency Provider Last Rate Last Admin    levonorgestrel (MIRENA) IUD 52 mg 1 each  1 each Intrauterine Once MIKHAIL Walden - CNP   1 each at 05/07/20 1459     Allergies   Allergen Reactions    Cat Hair Extract     Dog Epithelium     Iodine Hives and Itching    Other      Sensitive to bug bites, mosquitos etc.       Health Maintenance   Topic Date Due    Hepatitis C screen  Never done    Varicella vaccine (1 of 2 - 2-dose childhood series) Never done    COVID-19 Vaccine (1) Never done    A1C test (Diabetic or Prediabetic)  06/17/2022    Cervical cancer screen  07/30/2024    DTaP/Tdap/Td vaccine (2 - Td or Tdap) 02/21/2027    Flu vaccine  Completed    HIV screen  Completed    Hepatitis A vaccine  Aged Out    Hepatitis B vaccine  Aged Out    Hib vaccine  Aged Out    Meningococcal (ACWY) vaccine  Aged Out    Pneumococcal 0-64 years Vaccine  Aged Out       Subjective:     Review of Systems   Constitutional: Negative. HENT: Negative. Eyes: Negative. Respiratory: Negative. Cardiovascular: Negative. Gastrointestinal: Negative.     Genitourinary: Negative. Musculoskeletal: Negative. Skin: Negative. Allergic/Immunologic: Negative for environmental allergies, food allergies and immunocompromised state. Neurological: Positive for dizziness. Negative for tremors, seizures, syncope, facial asymmetry, speech difficulty, weakness, light-headedness, numbness and headaches. Psychiatric/Behavioral: Negative. Objective:     Physical Exam  Vitals and nursing note reviewed. Constitutional:       General: She is awake. She is not in acute distress. Appearance: Normal appearance. She is obese. She is not toxic-appearing. HENT:      Head: Normocephalic and atraumatic. Right Ear: Tympanic membrane, ear canal and external ear normal.      Left Ear: Tympanic membrane, ear canal and external ear normal.      Nose: Nose normal.      Mouth/Throat:      Mouth: Mucous membranes are moist.   Eyes:      Extraocular Movements: Extraocular movements intact. Conjunctiva/sclera: Conjunctivae normal.      Pupils: Pupils are equal, round, and reactive to light. Cardiovascular:      Rate and Rhythm: Normal rate and regular rhythm. Pulses: Normal pulses. Heart sounds: Normal heart sounds. No murmur heard. Pulmonary:      Effort: Pulmonary effort is normal.      Breath sounds: Normal breath sounds. Abdominal:      General: Abdomen is flat. Bowel sounds are normal.      Palpations: Abdomen is soft. Musculoskeletal:         General: Normal range of motion. Cervical back: Normal range of motion and neck supple. Skin:     General: Skin is warm and dry. Capillary Refill: Capillary refill takes less than 2 seconds. Neurological:      General: No focal deficit present. Mental Status: She is alert and oriented to person, place, and time.    Psychiatric:         Attention and Perception: Attention normal.         Mood and Affect: Mood and affect normal.         Speech: Speech normal.         Behavior: Behavior normal. Thought Content: Thought content normal.         Judgment: Judgment normal.       /78 (Site: Right Upper Arm, Position: Sitting, Cuff Size: Large Adult)   Temp 97 °F (36.1 °C) (Temporal)   Ht 5' 3\" (1.6 m)   Wt 293 lb (132.9 kg)   BMI 51.90 kg/m²     Assessment:       Diagnosis Orders   1. Morbid obesity ([facility]     2. Prediabetes  POCT glycosylated hemoglobin (Hb A1C)    Comprehensive Metabolic Panel, Fasting    Hemoglobin A1C   3. Other pulmonary embolism with acute cor pulmonale, unspecified chronicity ([facility]     4. Antiphospholipid syndrome in pregnancy ([facility]     5. SHELLEY (obstructive sleep apnea)     6. GERD without esophagitis     7. [facility] with [facility] factor, [facility]     8. Lipid screening  Lipid, Fasting   9. Need for hepatitis C screening test  Hepatitis C Antibody             Plan: Morbid obesitydiscussed with patient the importance of weight loss through diet and exercise. Discussed with her different options for weight loss including weight management services. Patient verbalized understanding of need for weight loss and will continue to work at it. PrediabetesA1c is increased since last check. The patient was advised if her A1c increases we will need to start Metformin. History of PEno signs or symptoms of recurrent PE. Patient encouraged to be active. She is to take medications per rheumatology recommendations as well. SHELLEYpatient encouraged to be compliant with treatment. Weight loss will help with this as well. GERDwell-controlled. Continue medications as needed. Dizzinesssymptoms sound consistent with vertigo. Encourage patient to make sure she is adequately hydrated, take meclizine as needed and if the symptoms recur the patient is to return. She denies any tinnitus or ear pain. Monitor for any signs or symptoms of Ménière's or hearing loss. Rheumatoid arthritispatient is stable on methotrexate, Humira and prednisone.   Patient to continue to follow with rheumatology. Return in about 6 months (around 12/17/2021) for follow up prediabetes. Orders Placed This Encounter   Procedures    Hepatitis C Antibody     Standing Status:   Future     Standing Expiration Date:   6/17/2022    Lipid, Fasting     Standing Status:   Future     Standing Expiration Date:   6/17/2022    Comprehensive Metabolic Panel, Fasting     Standing Status:   Future     Standing Expiration Date:   6/17/2022    Hemoglobin A1C     Standing Status:   Future     Standing Expiration Date:   6/17/2022    POCT glycosylated hemoglobin (Hb A1C)     No orders of the defined types were placed in this encounter. Patient given educational materials - see patient instructions. Discussed use, benefit, and side effects of prescribed medications. All patientquestions answered. Pt voiced understanding. Reviewed health maintenance. Instructedto continue current medications, diet and exercise. Patient agreed with treatmentplan. Follow up as directed.      Electronically signed by Indiana Bowman MD on 6/24/2021 at 7:56 AM

## 2021-06-17 NOTE — LETTER
[facility]  [address] [facility] [address]  Phone: [phone]  Fax: [phone]    Ronit Farnsworth MD        June 17, 2021     Patient: Vianey Wallace   YOB: 1980   Date of Visit: 6/17/2021       To Whom It May Concern: It is my medical opinion that Evelyn Rubinstein has ADHD and requires modifications in the classroom such as extended test time, ability to gain access to notes outside of class. .    If you have any questions or concerns, please don't hesitate to call.     Sincerely,        Ronit Farnsworth MD

## 2021-06-20 ENCOUNTER — APPOINTMENT (OUTPATIENT)
Dept: CT IMAGING | Age: 41
End: 2021-06-20
Payer: MEDICARE

## 2021-06-20 ENCOUNTER — HOSPITAL ENCOUNTER (EMERGENCY)
Age: 41
Discharge: HOME | End: 2021-06-20
Attending: EMERGENCY MEDICINE
Payer: MEDICARE

## 2021-06-20 ENCOUNTER — APPOINTMENT (OUTPATIENT)
Dept: GENERAL RADIOLOGY | Age: 41
End: 2021-06-20
Payer: MEDICARE

## 2021-06-20 VITALS
HEIGHT: 63 IN | DIASTOLIC BLOOD PRESSURE: 85 MMHG | RESPIRATION RATE: 27 BRPM | TEMPERATURE: 99.3 F | OXYGEN SATURATION: 94 % | HEART RATE: 104 BPM | WEIGHT: 293 LBS | SYSTOLIC BLOOD PRESSURE: 117 MMHG | BODY MASS INDEX: 51.91 KG/M2

## 2021-06-20 DIAGNOSIS — R07.81 PLEURITIC CHEST PAIN: Primary | ICD-10-CM

## 2021-06-20 LAB
-: ABNORMAL
ABSOLUTE EOS #: 1.22 K/UL (ref 0–0.44)
ABSOLUTE IMMATURE GRANULOCYTE: 0.04 K/UL (ref 0–0.3)
ABSOLUTE LYMPH #: 2.11 K/UL (ref 1.1–3.7)
ABSOLUTE MONO #: 0.36 K/UL (ref 0.1–1.2)
AMORPHOUS: ABNORMAL
ANION GAP SERPL CALCULATED.3IONS-SCNC: 11 MMOL/L (ref 9–17)
BACTERIA: ABNORMAL
BASOPHILS # BLD: 0 % (ref 0–2)
BASOPHILS ABSOLUTE: <0.03 K/UL (ref 0–0.2)
BILIRUBIN URINE: NEGATIVE
BUN BLDV-MCNC: 8 MG/DL (ref 6–20)
BUN/CREAT BLD: ABNORMAL (ref 9–20)
CALCIUM SERPL-MCNC: 8.8 MG/DL (ref 8.6–10.4)
CASTS UA: ABNORMAL /LPF (ref 0–8)
CHLORIDE BLD-SCNC: 105 MMOL/L (ref 98–107)
CO2: 24 MMOL/L (ref 20–31)
COLOR: YELLOW
CREAT SERPL-MCNC: 0.58 MG/DL (ref 0.5–0.9)
CRYSTALS, UA: ABNORMAL /HPF
DIFFERENTIAL TYPE: ABNORMAL
EOSINOPHILS RELATIVE PERCENT: 10 % (ref 1–4)
EPITHELIAL CELLS UA: ABNORMAL /HPF (ref 0–5)
GFR AFRICAN AMERICAN: >60 ML/MIN
GFR NON-AFRICAN AMERICAN: >60 ML/MIN
GFR SERPL CREATININE-BSD FRML MDRD: ABNORMAL ML/MIN/{1.73_M2}
GFR SERPL CREATININE-BSD FRML MDRD: ABNORMAL ML/MIN/{1.73_M2}
GLUCOSE BLD-MCNC: 111 MG/DL (ref 70–99)
GLUCOSE URINE: NEGATIVE
HCG QUALITATIVE: NEGATIVE
HCT VFR BLD CALC: 40.3 % (ref 36.3–47.1)
HEMOGLOBIN: 12.7 G/DL (ref 11.9–15.1)
IMMATURE GRANULOCYTES: 0 %
KETONES, URINE: NEGATIVE
LEUKOCYTE ESTERASE, URINE: ABNORMAL
LYMPHOCYTES # BLD: 17 % (ref 24–43)
MCH RBC QN AUTO: 27 PG (ref 25.2–33.5)
MCHC RBC AUTO-ENTMCNC: 31.5 G/DL (ref 28.4–34.8)
MCV RBC AUTO: 85.6 FL (ref 82.6–102.9)
MONOCYTES # BLD: 3 % (ref 3–12)
MUCUS: ABNORMAL
NITRITE, URINE: NEGATIVE
NRBC AUTOMATED: 0 PER 100 WBC
OTHER OBSERVATIONS UA: ABNORMAL
PDW BLD-RTO: 14.1 % (ref 11.8–14.4)
PH UA: 8.5 (ref 5–8)
PLATELET # BLD: 363 K/UL (ref 138–453)
PLATELET ESTIMATE: ABNORMAL
PMV BLD AUTO: 9.7 FL (ref 8.1–13.5)
POTASSIUM SERPL-SCNC: 3.7 MMOL/L (ref 3.7–5.3)
PROTEIN UA: NEGATIVE
RBC # BLD: 4.71 M/UL (ref 3.95–5.11)
RBC # BLD: ABNORMAL 10*6/UL
RBC UA: ABNORMAL /HPF (ref 0–4)
RENAL EPITHELIAL, UA: ABNORMAL /HPF
SEG NEUTROPHILS: 70 % (ref 36–65)
SEGMENTED NEUTROPHILS ABSOLUTE COUNT: 8.49 K/UL (ref 1.5–8.1)
SODIUM BLD-SCNC: 140 MMOL/L (ref 135–144)
SPECIFIC GRAVITY UA: 1.02 (ref 1–1.03)
TRICHOMONAS: ABNORMAL
TROPONIN INTERP: ABNORMAL
TROPONIN INTERP: NORMAL
TROPONIN T: ABNORMAL NG/ML
TROPONIN T: NORMAL NG/ML
TROPONIN, HIGH SENSITIVITY: 13 NG/L (ref 0–14)
TROPONIN, HIGH SENSITIVITY: 15 NG/L (ref 0–14)
TURBIDITY: ABNORMAL
URINE HGB: NEGATIVE
UROBILINOGEN, URINE: NORMAL
WBC # BLD: 12.2 K/UL (ref 3.5–11.3)
WBC # BLD: ABNORMAL 10*3/UL
WBC UA: ABNORMAL /HPF (ref 0–5)
YEAST: ABNORMAL

## 2021-06-20 PROCEDURE — 96376 TX/PRO/DX INJ SAME DRUG ADON: CPT

## 2021-06-20 PROCEDURE — 84484 ASSAY OF TROPONIN QUANT: CPT

## 2021-06-20 PROCEDURE — 85025 COMPLETE CBC W/AUTO DIFF WBC: CPT

## 2021-06-20 PROCEDURE — 6360000004 HC RX CONTRAST MEDICATION: Performed by: GENERAL PRACTICE

## 2021-06-20 PROCEDURE — 71045 X-RAY EXAM CHEST 1 VIEW: CPT

## 2021-06-20 PROCEDURE — 81001 URINALYSIS AUTO W/SCOPE: CPT

## 2021-06-20 PROCEDURE — 96374 THER/PROPH/DIAG INJ IV PUSH: CPT

## 2021-06-20 PROCEDURE — 80048 BASIC METABOLIC PNL TOTAL CA: CPT

## 2021-06-20 PROCEDURE — 93005 ELECTROCARDIOGRAM TRACING: CPT

## 2021-06-20 PROCEDURE — 84703 CHORIONIC GONADOTROPIN ASSAY: CPT

## 2021-06-20 PROCEDURE — 71260 CT THORAX DX C+: CPT

## 2021-06-20 PROCEDURE — 96375 TX/PRO/DX INJ NEW DRUG ADDON: CPT

## 2021-06-20 PROCEDURE — 99284 EMERGENCY DEPT VISIT MOD MDM: CPT

## 2021-06-20 PROCEDURE — 6360000002 HC RX W HCPCS: Performed by: STUDENT IN AN ORGANIZED HEALTH CARE EDUCATION/TRAINING PROGRAM

## 2021-06-20 PROCEDURE — 6360000002 HC RX W HCPCS: Performed by: GENERAL PRACTICE

## 2021-06-20 RX ORDER — METHYLPREDNISOLONE SODIUM SUCCINATE 125 MG/2ML
40 INJECTION, POWDER, LYOPHILIZED, FOR SOLUTION INTRAMUSCULAR; INTRAVENOUS ONCE
Status: COMPLETED | OUTPATIENT
Start: 2021-06-20 | End: 2021-06-20

## 2021-06-20 RX ORDER — DIPHENHYDRAMINE HYDROCHLORIDE 50 MG/ML
50 INJECTION INTRAMUSCULAR; INTRAVENOUS EVERY 6 HOURS PRN
Status: DISCONTINUED | OUTPATIENT
Start: 2021-06-20 | End: 2021-06-21 | Stop reason: HOSPADM

## 2021-06-20 RX ORDER — KETOROLAC TROMETHAMINE 15 MG/ML
15 INJECTION, SOLUTION INTRAMUSCULAR; INTRAVENOUS ONCE
Status: COMPLETED | OUTPATIENT
Start: 2021-06-20 | End: 2021-06-20

## 2021-06-20 RX ADMIN — METHYLPREDNISOLONE SODIUM SUCCINATE 40 MG: 125 INJECTION, POWDER, FOR SOLUTION INTRAMUSCULAR; INTRAVENOUS at 22:19

## 2021-06-20 RX ADMIN — METHYLPREDNISOLONE SODIUM SUCCINATE 40 MG: 125 INJECTION, POWDER, FOR SOLUTION INTRAMUSCULAR; INTRAVENOUS at 16:56

## 2021-06-20 RX ADMIN — KETOROLAC TROMETHAMINE 15 MG: 15 INJECTION, SOLUTION INTRAMUSCULAR; INTRAVENOUS at 20:56

## 2021-06-20 RX ADMIN — DIPHENHYDRAMINE HYDROCHLORIDE 50 MG: 50 INJECTION INTRAMUSCULAR; INTRAVENOUS at 19:58

## 2021-06-20 RX ADMIN — IOPAMIDOL 75 ML: 755 INJECTION, SOLUTION INTRAVENOUS at 22:30

## 2021-06-20 ASSESSMENT — PAIN SCALES - GENERAL: PAINLEVEL_OUTOF10: 8

## 2021-06-20 NOTE — ED PROVIDER NOTES
[facility] ED  Emergency Department Encounter  EmergencyMedicine Resident     Pt Mahnaz Barrientos  MRN: [id number]  [place] 1980  Date of evaluation: 21  PCP:  Edilberto Raines MD    [address] [place] [address]       Chief Complaint   Patient presents with    Shortness of Breath       HISTORY OF PRESENT ILLNESS  (Location/Symptom, Timing/Onset, Context/Setting, Quality, Duration, Modifying Factors, Severity.)      Boone Guardado is a 47 y.o. female who presents with complaints of left-sided flank pain and shortness of breath. Pain came on yesterday and is sharp, 8 out of 10, nonradiating. Worse with deep inspiration. .  She has not tried anything to make the pain better. Patient has a history of prior pulmonary embolism in 2019, while pregnant. She has a history of antiphospholipid antibody syndrome and RA    PAST MEDICAL / SURGICAL / SOCIAL / FAMILY HISTORY      has a past medical history of Abnormal 1st trimester screen (Tri 21 1:117)--NIPT nml , ADD (attention deficit disorder) without hyperactivity, [place]  & 1/10, Chronic back pain, Echogenic focus of heart of fetus affecting antepartum care of mother, Wendy Holms early 1hr, nml 3hr , Elevated umbilical artery dopplers , Fetal ascites, Fetal heart rate decelerations affecting management of mother, GERD (gastroesophageal reflux disease), Headache, Hypertension, IUD (intrauterine device) in place, Lumbar radiculopathy, Lumbar spondylosis, Multigravida of advanced maternal age in third trimester, Obesity, Obesity affecting pregnancy in first trimester, Obesity affecting pregnancy in third trimester, SHELLEY (obstructive sleep apnea), Pulmonary embolism affecting pregnancy in second trimester, Rh+/RI/GBSunk, Rheumatoid arteritis ([facility] and Umbilical cord complication. Denies further past medical hx     has a past surgical history that includes other surgical history (12/10/2018); Tonsillectomy ();   section (Bilateral, 2020); intrauterine device insertion (05/07/2020); US BIOPSY LYMPH NODE (8/19/2020); Upper gastrointestinal endoscopy (01/22/2021); and Upper gastrointestinal endoscopy (N/A, 1/22/2021). Denies further past surgical hx    Social History     Socioeconomic History    Marital status:      Spouse name: Not on file    Number of children: Not on file    Years of education: Not on file    Highest education level: Not on file   Occupational History    Not on file   Tobacco Use    Smoking status: Never Smoker    Smokeless tobacco: Never Used   Vaping Use    Vaping Use: Never used   Substance and Sexual Activity    Alcohol use: No    Drug use: Yes     Frequency: 2.0 times per week     Types: Marijuana    Sexual activity: Yes     Partners: Male   Other Topics Concern    Not on file   Social History Narrative    Not on file     Social Determinants of Health     Financial Resource Strain: Low Risk     Difficulty of Paying Living Expenses: Not hard at all   Food Insecurity: No Food Insecurity    Worried About Running Out of Food in the Last Year: Never true    Dylan of Food in the Last Year: Never true   Transportation Needs: Unmet Transportation Needs    Lack of Transportation (Medical):  Yes    Lack of Transportation (Non-Medical): Yes   Physical Activity:     Days of Exercise per Week:     Minutes of Exercise per Session:    Stress:     Feeling of Stress :    Social Connections:     Frequency of Communication with Friends and Family:     Frequency of Social Gatherings with Friends and Family:     Attends Anabaptist Services:     Active Member of Clubs or Organizations:     Attends Club or Organization Meetings:     Marital Status:    Intimate Partner Violence:     Fear of Current or Ex-Partner:     Emotionally Abused:     Physically Abused:     Sexually Abused:        Family History   Problem Relation Age of Onset    Elevated Lipids Mother        Allergies:  Cat hair extract, Dog epithelium, Iodine, and Other    Home Medications:  Prior to Admission medications    Medication Sig Start Date End Date Taking? Authorizing Provider   ACIDOPHILUS LACTOBACILLUS PO 0.5 mg (100 million cells ) 1x a day   Yes Historical Provider, MD   Adalimumab (HUMIRA PEN) 40 MG/0.4ML PNKT Humira(CF) Pen 40 mg/0.4 mL subcutaneous kit   Yes Historical Provider, MD   vitamin D (ERGOCALCIFEROL) 1.25 MG (50830 UT) CAPS capsule take 1 capsule by mouth every week 2/24/21   Francis Woods APRN - CNP   cetirizine (ZYRTEC) 10 MG tablet take 1 tablet by mouth once daily 11/18/20   Historical Provider, MD   ferrous sulfate (IRON 325) 325 (65 Fe) MG tablet Take 1 tablet by mouth every other day 11/23/20   Larry Vyas MD   methotrexate (RHEUMATREX) 2.5 MG chemo tablet Take 5 tab every week orally for 90 days    Historical Provider, MD   predniSONE (DELTASONE) 5 MG tablet Take 2.5 mg by mouth 3 times daily     Historical Provider, MD   albuterol (PROVENTIL) (2.5 MG/3ML) 0.083% nebulizer solution Take 3 mLs by nebulization daily as needed for Wheezing 12/2/19   Olya Stern DO   folic acid (FOLVITE) 1 MG tablet Take 1 mg by mouth daily    Historical Provider, MD       REVIEW OF SYSTEMS    (2-9 systems for level 4, 10 or more for level 5)      Review of Systems    Review of Systems   Constitutional: Negative for chills and fever. HENT: Negative for sore throat. Eyes: Negative for pain. Respiratory: Positive for shortness of breath  Cardiovascular: Positive for chest pain and shortness of breath  Gastrointestinal: Negative for abdominal pain, nausea and vomiting. Genitourinary: Negative for dysuria. Musculoskeletal: Negative for gait problem. Skin: Negative for wound. Neurological: Negative for headaches.        PHYSICAL EXAM   (up to 7 for level 4, 8 or more for level 5)      INITIAL VITALS:   /85   Pulse 104   Temp 99.3 °F (37.4 °C) (Oral)   Resp 27   Ht 5' 3\" (1.6 m)   Wt 293 lb (132.9 kg)   SpO2 94%   BMI Lymphocytes 17 (L) 24 - 43 %    Monocytes 3 3 - 12 %    Eosinophils % 10 (H) 1 - 4 %    Basophils 0 0 - 2 %    Immature Granulocytes 0 0 %    Segs Absolute 8.49 (H) 1.50 - 8.10 k/uL    Absolute Lymph # 2.11 1.10 - 3.70 k/uL    Absolute Mono # 0.36 0.10 - 1.20 k/uL    Absolute Eos # 1.22 (H) 0.00 - 0.44 k/uL    Basophils Absolute <0.03 0.00 - 0.20 k/uL    Absolute Immature Granulocyte 0.04 0.00 - 0.30 k/uL    WBC Morphology NOT REPORTED     RBC Morphology NOT REPORTED     Platelet Estimate NOT REPORTED    Basic Metabolic Panel w/ Reflex to MG   Result Value Ref Range    Glucose 111 (H) 70 - 99 mg/dL    BUN 8 6 - 20 mg/dL    CREATININE 0.58 0.50 - 0.90 mg/dL    Bun/Cre Ratio NOT REPORTED 9 - 20    Calcium 8.8 8.6 - 10.4 mg/dL    Sodium 140 135 - 144 mmol/L    Potassium 3.7 3.7 - 5.3 mmol/L    Chloride 105 98 - 107 mmol/L    CO2 24 20 - 31 mmol/L    Anion Gap 11 9 - 17 mmol/L    GFR Non-African American >60 >60 mL/min    GFR African American >60 >60 mL/min    GFR Comment          GFR Staging NOT REPORTED    HCG Qualitative, Serum   Result Value Ref Range    hCG Qual NEGATIVE NEGATIVE   Troponin   Result Value Ref Range    Troponin, High Sensitivity 15 (H) 0 - 14 ng/L    Troponin T NOT REPORTED <0.03 ng/mL    Troponin Interp NOT REPORTED    Urinalysis with Microscopic   Result Value Ref Range    Color, UA YELLOW YELLOW    Turbidity UA CLOUDY (A) CLEAR    Glucose, Ur NEGATIVE NEGATIVE    Bilirubin Urine NEGATIVE NEGATIVE    Ketones, Urine NEGATIVE NEGATIVE    Specific Gravity, UA 1.021 1.005 - 1.030    Urine Hgb NEGATIVE NEGATIVE    pH, UA 8.5 (H) 5.0 - 8.0    Protein, UA NEGATIVE NEGATIVE    Urobilinogen, Urine Normal Normal    Nitrite, Urine NEGATIVE NEGATIVE    Leukocyte Esterase, Urine MODERATE (A) NEGATIVE    -          WBC, UA 50  0 - 5 /HPF    RBC, UA 2 TO 5 0 - 4 /HPF    Casts UA  0 - 8 /LPF     10 TO 20 HYALINE Reference range defined for non-centrifuged specimen.     Crystals, UA NOT REPORTED None /HPF Epithelial Cells UA 10 TO 20 0 - 5 /HPF    Renal Epithelial, UA NOT REPORTED 0 /HPF    Bacteria, UA MODERATE (A) None    Mucus, UA NOT REPORTED None    Trichomonas, UA NOT REPORTED None    Amorphous, UA NOT REPORTED None    Other Observations UA NOT REPORTED NOT REQ. Yeast, UA NOT REPORTED None   Troponin   Result Value Ref Range    Troponin, High Sensitivity 13 0 - 14 ng/L    Troponin T NOT REPORTED <0.03 ng/mL    Troponin Interp NOT REPORTED    EKG 12 Lead   Result Value Ref Range    Ventricular Rate 111 BPM    Atrial Rate 111 BPM    P-R Interval 142 ms    QRS Duration 80 ms    Q-T Interval 336 ms    QTc Calculation (Bazett) 456 ms    P Axis 24 degrees    R Axis 11 degrees    T Axis -7 degrees       RADIOLOGY:  None    EKG  None    All EKG's are interpreted by the Emergency Department Physician who either signs or Co-signs this chart in the absence of a cardiologist.    Ankit Tim MDM:  47 y.o. female who presents with shortness of breath and left-sided rib pain that is pleuritic. History of prior PE. Not on anticoagulation. Will obtain cardiac work-up along with PE study. Patient has a history of allergies to dye, thus will need pretreatment with Solu-Medrol and Benadryl    Turned over to oncoming resident. Plan for PE study and if negative patient will be DC'd home           PROCEDURES:  None    CONSULTS:  None    CRITICAL CARE:  None    FINAL IMPRESSION      1.  Pleuritic chest pain              DISPOSITION / PLAN     DISPOSITION Decision To Discharge 06/20/2021 11:22:13 PM      PATIENT REFERRED TO:  Silas Diana, [address] [place] [address]  [phone]    Schedule an appointment as soon as possible for a visit       [facility]  [address] [place] [address]  [phone]    As needed, If symptoms worsen      DISCHARGE MEDICATIONS:  Discharge Medication List as of 6/20/2021 11:23 PM          Rody Boss DO  Emergency Medicine Resident    (Please note that portions of thisnote were completed with a voice recognition program.  Efforts were made to edit the dictations but occasionally words are mis-transcribed.)        Patrick Gonzales DO  Resident  06/21/21 1000

## 2021-06-20 NOTE — ED PROVIDER NOTES
[facility]  Emergency Department  Emergency Medicine Resident Sign-out     Care of Mike Almaraz was assumed from Dr. Darryn Collins and is being seen for Shortness of Breath  . The patient's initial evaluation and plan have been discussed with the prior provider who initially evaluated the patient. EMERGENCY DEPARTMENT COURSE / MEDICAL DECISION MAKING:       MEDICATIONS GIVEN:  Orders Placed This Encounter   Medications    methylPREDNISolone sodium (SOLU-MEDROL) injection 40 mg    diphenhydrAMINE (BENADRYL) injection 50 mg    methylPREDNISolone sodium (SOLU-MEDROL) injection 40 mg    ketorolac (TORADOL) injection 15 mg    iopamidol (ISOVUE-370) 76 % injection 75 mL       LABS / RADIOLOGY:     Labs Reviewed   CBC WITH AUTO DIFFERENTIAL - Abnormal; Notable for the following components:       Result Value    WBC 12.2 (*)     Seg Neutrophils 70 (*)     Lymphocytes 17 (*)     Eosinophils % 10 (*)     Segs Absolute 8.49 (*)     Absolute Eos # 1.22 (*)     All other components within normal limits   BASIC METABOLIC PANEL W/ REFLEX TO MG FOR LOW K - Abnormal; Notable for the following components:    Glucose 111 (*)     All other components within normal limits   TROPONIN - Abnormal; Notable for the following components:    Troponin, High Sensitivity 15 (*)     All other components within normal limits   URINALYSIS WITH MICROSCOPIC - Abnormal; Notable for the following components:    Turbidity UA CLOUDY (*)     pH, UA 8.5 (*)     Leukocyte Esterase, Urine MODERATE (*)     Bacteria, UA MODERATE (*)     All other components within normal limits   HCG, SERUM, QUALITATIVE   TROPONIN       XR CHEST PORTABLE    Result Date: 6/20/2021  EXAMINATION: ONE XRAY VIEW OF THE CHEST 6/20/2021 5:26 pm COMPARISON: None. HISTORY: ORDERING SYSTEM PROVIDED HISTORY: SOB TECHNOLOGIST PROVIDED HISTORY: SOB Reason for Exam: sob FINDINGS: The lungs are without acute focal process. There is no effusion or pneumothorax.  The

## 2021-06-20 NOTE — ED PROVIDER NOTES
[facility]  ED  eMERGENCY dEPARTMENT eNCOUnter   Attending Attestation     Pt Name: Tori Burdick  MRN: [id number]  [place] 1980  Date of evaluation: 6/20/21       Tori Burdick is a 47 y.o. female who presents with Shortness of Breath      History: Patient is with shortness of breath and left-sided chest pain. Patient has history of PE. Patient has feels similar. Exam: Heart rate and rhythm are regular. Lungs are clear to auscultation bilaterally. Abdomen is soft, nontender. Plan for    PE study, ACS screening. EKG shows sinus tachycardia with 111 bpm.  Normal axis. No ST elevation or depression. T waves are inverted in lead III. No blocks arrhythmias. Nonspecific EKG. I performed a history and physical examination of the patient and discussed management with the resident. I reviewed the residents note and agree with the documented findings and plan of care. Any areas of disagreement are noted on the chart. I was personally present for the key portions of any procedures. I have documented in the chart those procedures where I was not present during the key portions. I have personally reviewed all images and agree with the resident's interpretation. I have reviewed the emergency nurses triage note. I agree with the chief complaint, past medical history, past surgical history, allergies, medications, social and family history as documented unless otherwise noted below. Documentation of the HPI, Physical Exam and Medical Decision Making performed by medical students or scribes is based on my personal performance of the HPI, PE and MDM. For Phys Assistant/ Nurse Practitioner cases/documentation I have had a face to face evaluation of this patient and have completed at least one if not all key elements of the E/M (history, physical exam, and MDM). Additional findings are as noted.     For APC cases I have personally evaluated and examined the patient in conjunction with the APC and

## 2021-06-20 NOTE — ED NOTES
Now solu-medrol  Hour 3 hours later benadryl (2000)  Hour 4 solu-medol (2100)     Jenise Ricci RN  06/20/21 [id number]

## 2021-06-21 NOTE — ED NOTES
Pt to CT and back to ED room with writer. Denies any SOB or itching at this time. Pt informed to notify staff should she develop any trouble breathing, itching, or any CP worse than previously. No obvious rash or hives seen at this time.      Brandan Banerjee RN  06/20/21 7366

## 2021-06-21 NOTE — ED PROVIDER NOTES
Handoff taken on the following patient from prior Attending Physician:    Pt Name: Tori Burdick    PCP:  Irene Jefferson MD         Attestation    I was available and discussed any additional care issues that arose and coordinated the management plans with the resident(s) caring for the patient during my duty period. Any areas of disagreement with residents documentation of care or procedures are noted on the chart. I was personally present for the key portions of any/all procedures during my duty period. I have documented in the chart those procedures where I was not present during the key portions. The patient awaiting a PE study at this time. Prep 4 hours administered.         Francois Ha,   06/20/21 [id number]

## 2021-06-21 NOTE — ED NOTES
Dr. Roosvelt Leyden notified of patient's request for pain medication. Toradol ordered.      Kayden Camara RN  06/20/21 2045

## 2021-06-24 ASSESSMENT — ENCOUNTER SYMPTOMS
EYES NEGATIVE: 1
RESPIRATORY NEGATIVE: 1
GASTROINTESTINAL NEGATIVE: 1

## 2021-06-28 LAB
EKG ATRIAL RATE: 111 BPM
EKG P AXIS: 24 DEGREES
EKG P-R INTERVAL: 142 MS
EKG Q-T INTERVAL: 336 MS
EKG QRS DURATION: 80 MS
EKG QTC CALCULATION (BAZETT): 456 MS
EKG R AXIS: 11 DEGREES
EKG T AXIS: -7 DEGREES
EKG VENTRICULAR RATE: 111 BPM

## 2021-07-05 DIAGNOSIS — D70.9 EOSINOPENIA (HCC): ICD-10-CM

## 2021-07-05 DIAGNOSIS — E66.01 MORBID OBESITY DUE TO EXCESS CALORIES (HCC): ICD-10-CM

## 2021-07-05 DIAGNOSIS — O88.212 PULMONARY EMBOLISM AFFECTING PREGNANCY IN SECOND TRIMESTER: ICD-10-CM

## 2021-07-05 DIAGNOSIS — R59.1 LYMPHADENOPATHY: ICD-10-CM

## 2021-07-05 DIAGNOSIS — E61.1 IRON DEFICIENCY: ICD-10-CM

## 2021-07-07 RX ORDER — FERROUS SULFATE 325(65) MG
TABLET ORAL
Qty: 30 TABLET | Refills: 1 | Status: SHIPPED | OUTPATIENT
Start: 2021-07-07 | End: 2021-11-19

## 2021-07-15 ENCOUNTER — TELEPHONE (OUTPATIENT)
Dept: BARIATRICS/WEIGHT MGMT | Age: 41
End: 2021-07-15

## 2021-07-15 NOTE — TELEPHONE ENCOUNTER
Pt reports that a preapproval letter for bariatric surgery was sent over 6/1/21 to us; I did not see it in the chart ; if we do not have it then we can call rosa m at [phone]; is requesting a return call and I let her know that she can expect a call the middle of next week at the earliest

## 2021-07-20 NOTE — TELEPHONE ENCOUNTER
Called and left voice message for Lala at [facility]. Requesting copy of the approval letter  Patient notified that I should have w/in 24 hours.

## 2021-07-21 ENCOUNTER — OFFICE VISIT (OUTPATIENT)
Dept: NEUROLOGY | Age: 41
End: 2021-07-21
Payer: MEDICARE

## 2021-07-21 VITALS
BODY MASS INDEX: 51.91 KG/M2 | SYSTOLIC BLOOD PRESSURE: 122 MMHG | DIASTOLIC BLOOD PRESSURE: 87 MMHG | HEART RATE: 90 BPM | HEIGHT: 63 IN | WEIGHT: 293 LBS

## 2021-07-21 DIAGNOSIS — R51.9 HEADACHE DISORDER: Primary | ICD-10-CM

## 2021-07-21 PROCEDURE — G8417 CALC BMI ABV UP PARAM F/U: HCPCS | Performed by: PSYCHIATRY & NEUROLOGY

## 2021-07-21 PROCEDURE — 99214 OFFICE O/P EST MOD 30 MIN: CPT | Performed by: PSYCHIATRY & NEUROLOGY

## 2021-07-21 PROCEDURE — G8427 DOCREV CUR MEDS BY ELIG CLIN: HCPCS | Performed by: PSYCHIATRY & NEUROLOGY

## 2021-07-21 PROCEDURE — 1036F TOBACCO NON-USER: CPT | Performed by: PSYCHIATRY & NEUROLOGY

## 2021-07-21 NOTE — PROGRESS NOTES
[facility], [address], [address] [place] [address]  Ph: [phone] or [phone]  FAX: [phone]    Chief Complaint: migraines, forgetfulness, episodes of slurred speech     Dear Fernanda Albright MD     I had the pleasure of seeing your patient Petty Guo in neurologic evaluation. As you would recall Petty Guo is a 47 y.o. female. The patient is here with three distinct neurological issues. She describes episodes during normal speech where she will become confused and slur her speech. These episodes will last a few seconds. This symptom began happened daily. She is unsure if she loses track of time or stares during these episodes. She denies numbness or tingling in her extremities except when she is laying prone. She also reports difficulty with her memory for many years. She states that she will forget things told to her and needs to write things down. She was having daily headaches. She wouldn't feel rested after sleeping. She denies SHELLEY. She recently gave birth andaffirms depression and anxiety- more anxiety than depression. Today, the patient reports that's she experienced an ER visit on 06/20/2021 due to shortness of breath. She reports experiencing headaches more than 15 days a month. Patient admits to using a peppermint oil ointment around her ears to relief headaches. Patient admits to phonophobia and photophobia. No nausea or emesis associated with headaches. She admits to blurred vision which may be due to allergies. The patient reports that her CPAP machine has improved sleep and cognition. The patient is satisfied with her current mediation regiment. EEG on 10/23/2020: This EEG is within normal limits for an awake, drowsy and sleepy patient. No lateralized or epileptiform disturbance is seen. MRI brain 3/27/2020 Normal MRI brain.  No acute intracranial abnormality.     REVIEW OF SYSTEMS   Constitutional Weight: absent, Appetite: absent, Fatigue: present   HEENT Visual disturbance: absent, Ears: normal   Respiratory Shortness of breath: absent, Cough: absent   Cardiovascular Chest pain: absent, Leg swelling :present   GI Constipation: absent, Diarrhea: present, Swallowing change: absent    Urinary frequency: present, Urinary urgency: present,    Musculoskeletal Neck pain: absent, Back pain: present, Stiffness: present, Muscle pain: present, Joint pain: present   Dermatological Hair loss: present, Skin changes: absent   Neurological Memory loss: present, Confusion: absent, Seizures: absent, Trouble walking or imbalance: present, Dizziness: present, Weakness: present, Numbness: present Tremor: absent, Spasm: present, Speech difficulty: present, Headache: present, Light sensitivity: present   Psychiatric Anxiety: present, Hallucination: absent, Depression, present   Hematologic Abnormal bleeding/Bruising: absent, Anemia: absent       Neurological work up:  CT head  CTA head and neck  MRI brain 3/27/2020 Normal MRI brain.  No acute intracranial abnormality. 2 D echo  EEG 8/17/2020 This EEG does show mild intermittent right parietotemporal slowing and sharp wave formation, which may be concordant with underlying possible structural process. Sharp wave formation may bepotentially epileptiform.   Clinical correlation is warranted     Past Medical History:   Diagnosis Date    Abnormal 1st trimester screen (Tri 21 1:117)--NIPT nml  11/30/2019    ADD (attention deficit disorder) without hyperactivity     Celestone 1/9 & 1/10 1/9/2020    Chronic back pain     Echogenic focus of heart of fetus affecting antepartum care of mother 11/30/2019   Uri Solomon early 1hr, nml 3hr  11/30/2019    1 elevated value, [facility] recommends repeat 3hr GTT in 2 weeks    Elevated umbilical artery dopplers  1/10/2020    Fetal ascites     Fetal heart rate decelerations affecting management of mother     GERD (gastroesophageal reflux disease)     Headache     Hypertension     IUD (intrauterine device) in place 2020    Adina Johnson    Lumbar radiculopathy 2019    Lumbar spondylosis 2019   Abhinav Hampton of advanced maternal age in third trimester     Obesity     Obesity affecting pregnancy in first trimester 2018    Obesity affecting pregnancy in third trimester     SHELLEY (obstructive sleep apnea)     Pulmonary embolism affecting pregnancy in second trimester 2019    Rh+/RI/GBSunk 2020    Rheumatoid arteritis ([address]     Umbilical cord complication      Past Surgical History:   Procedure Laterality Date     SECTION Bilateral 2020     SECTION performed by Nisha Napoles DO at [facility] [address]  2020    Mirena    OTHER SURGICAL HISTORY  12/10/2018    Fluoroscopy- with esophagas- possible delay of gastric emptying     TONSILLECTOMY      UPPER GASTROINTESTINAL ENDOSCOPY  2021    UPPER GASTROINTESTINAL ENDOSCOPY N/A 2021    EGD BIOPSY OF GASTRIC performed by Mary Jane Greenwood DO at [address] [facility]  2020    US LYMPH NODE BIOPSY 2020 [facility]TR[facility]     Allergies   Allergen Reactions    Cat Hair Extract     Dog Epithelium     Iodine Hives and Itching    Other      Sensitive to bug bites, mosquitos etc.     Family History   Problem Relation Age of Onset    Elevated Lipids Mother       Social History     Socioeconomic History    Marital status:      Spouse name: Not on file    Number of children: Not on file    Years of education: Not on file    Highest education level: Not on file   Occupational History    Not on file   Tobacco Use    Smoking status: Never Smoker    Smokeless tobacco: Never Used   Vaping Use    Vaping Use: Never used   Substance and Sexual Activity    Alcohol use: No    Drug use: Yes     Frequency: 2.0 times per week     Types: Marijuana    Sexual activity: Yes     Partners: Male   Other Topics Concern    Not on file   Social History Narrative    Not on file     Social Determinants of Health     Financial Resource Strain: Low Risk     Difficulty of Paying Living Expenses: Not hard at all   Food Insecurity: No Food Insecurity    Worried About Running Out of Food in the Last Year: Never true    Dylan of Food in the Last Year: Never true   Transportation Needs: Unmet Transportation Needs    Lack of Transportation (Medical): Yes    Lack of Transportation (Non-Medical): Yes   Physical Activity:     Days of Exercise per Week:     Minutes of Exercise per Session:    Stress:     Feeling of Stress :    Social Connections:     Frequency of Communication with Friends and Family:     Frequency of Social Gatherings with Friends and Family:     Attends Jehovah's witness Services:     Active Member of Clubs or Organizations:     Attends Club or Organization Meetings:     Marital Status:    Intimate Partner Violence:     Fear of Current or Ex-Partner:     Emotionally Abused:     Physically Abused:     Sexually Abused: There were no vitals taken for this visit.      HEENT [] Hearing Loss  [x] Visual Disturbance  [] Tinnitus  [] Eye pain   Respiratory [] Shortness of Breath  [] Cough  [] Snoring   Cardiovascular [] Chest Pain  [] Palpitations  [] Lightheaded   GI [] Constipation  [] Diarrhea  [] Swallowing change  [] Nausea/vomiting    [] Urinary Frequency  [] Urinary Urgency   Musculoskeletal [] Neck pain  [] Back pain  [] Muscle pain  [] Restless legs   Dermatologic [] Skin changes   Neurologic [x] Memory loss/confusion  [] Seizures  [] Trouble walking or imbalance  [] Dizziness  [x] Sleep disturbance  [] Weakness  [] Numbness  [] Tremors  [] Speech Difficulty  [x] Headaches  [x] Light Sensitivity  [x] Sound Sensitivity   Endocrinology []Excessive thirst  []Excessive hunger   Psychiatric [] Anxiety/Depression  [] Hallucination   Allergy/immunology []Hives/environmental allergies   Hematologic/lymph [] Abnormal bleeding  [] Abnormal bruising       Physical examination:  General appearance: Normal. Well groomed. In no acute distress    Head: Normocephalic, atraumatic  Eyes: Extraocular movements intact, eye lids normal  Lungs: Respirations unlabored, chest wall no deformity  ENT: Normal external ear canals, no sinus tenderness  Heart: Regular rate rhythm  Abdomen: No masses, tenderness  Extremities: No cyanosis or edema, 2+ pulses  Musculoskeletal: Normal range of motion in all joints  Skin: Intact, normal skin color    Neurological examination:    Mental status   Alert and oriented; intact memory with no confusion, speech or language problems; no hallucinations or delusions     Cranial nerves   II - visual fields intact to confrontation                                                III, IV, VI - extra-ocular muscles full: no pupillary defect; no SHEILA, no nystagmus, no ptosis   V - normal facial sensation                                                               VII - normal facial symmetry                                                             VIII - intact hearing                                                                             IX, X - symmetrical palate                                                                  XI - symmetrical shoulder shrug                                                       XII - midline tongue without atrophy or fasciculation     Motor function  Normal muscle bulk and tone; normal power 5/5, including fine motor movements     Sensory function Intact to touch, pin, vibration, proprioception     Cerebellar Intact fine motor movement. No involuntary movements or tremors     Reflex function Intact 2+ DTR and symmetric.  Negative Babinski     Gait                  Normal station and gait       Lab Results   Component Value Date    LDLCHOLESTEROL 124 11/02/2020     No components found for: CHLPL  Lab Results   Component Value Date    TRIG 135 11/02/2020    TRIG 134 08/02/2018 TRIG 111 02/28/2017     Lab Results   Component Value Date    HDL 30 (L) 11/02/2020    HDL 34 (L) 06/25/2020    HDL 28 (L) 08/02/2018     No results found for: LDLCALC  No results found for: LABVLDL  Lab Results   Component Value Date    LABA1C 5.7 06/17/2021     Lab Results   Component Value Date     11/02/2020     Lab Results   Component Value Date    ZNFTWYYG90 523 11/02/2020        Maximum score 5 Score 5 What is the year, season, date, day, month   Maximum score 5 Score 5 Where are we: State, county, town, hospital, floor? Maximum score 3 Score 3-0 Name 3 objects: ball, pen, car. Ask patient to repeat 3 objects. Maximum score 5 Score 5-2 Serial sevens from 100:93, 86, 79, 72, 65   Maximum score 3 Score 3-0 Recall 3 objects   Maximum score 2 Score 2-0 Name a pencil and watch. Maximum score 1 Score 1 Repeat the following: No ifs ands or buts.      Maximum score 3 Score 3 Follow 3 stage command take a paper in your hand, fold it in half, and put it on the floor. Maximum score 1  Score 1 Read and obey the following: Close your eyes     Maximum score 1 Score 1 Write a sentence. Maximum score 1 Score 1 Copy a design below. Max 30   Patients score 30    All of patient's labs were personally reviewed. All the imaging studies were personally reviewed and discussed with the patient. Assessment Recommendations:  Episodes of speech dysarthria with confusion, etiology unclear, now resolved  Subjective cognitive impairment  Chronic migraine WO Aura, intractable, WO Status Migrainosus    On today's evaluation, the patient reports improvement in her mental status. Her MMSE today is normal.  The patient reports improved stressors in her life which in turn has caused improvement in her symptoms. Her neurological work-up including MRI scan of the brain and lab work-up has been unremarkable. The patient began using a CPAP machine.  The patient reports experiencing more than 15 headaches monthly, but is satisfied with current medication regiment. I discussed the possibility of initiating another medication for headache relief if symptoms worsen or do not improve. She will return in 1 year or sooner if the patient's symptoms worsen. Keith Mott MD I would like to thank you for the consult. Please do not hesitate if you have any questions about the patient care. Scribe Attestation:   By signing my name below, Colette Huber, attest that this documentation has been prepared under the direction and in the presence of Lon Styles MD.    Electronically Signed: Bisi Nix 7/21/2021 9:25 AM      Physician Attestation:   Vear Halsted MD, personally performed the services described in this documentation. All medical record entries made by the scribe were at my direction and in my presence. I have reviewed the chart and discharge instructions (if applicable) and agree that the record reflects my personal performance and is accurate and complete.     Electronically Signed: Maddison Doan 7/21/2021 9:25 AM    Diplomate, American Board of Psychiatry and Neurology  Diplomate, American Board of Clinical Neurophysiology  Diplomate, American Board of Epilepsy

## 2021-07-23 ENCOUNTER — HOSPITAL ENCOUNTER (OUTPATIENT)
Age: 41
Discharge: HOME | End: 2021-07-23
Payer: MEDICARE

## 2021-07-23 DIAGNOSIS — Z11.59 NEED FOR HEPATITIS C SCREENING TEST: ICD-10-CM

## 2021-07-23 DIAGNOSIS — R73.03 PREDIABETES: ICD-10-CM

## 2021-07-23 DIAGNOSIS — Z13.220 LIPID SCREENING: ICD-10-CM

## 2021-07-23 LAB
ALBUMIN SERPL-MCNC: 3.8 G/DL (ref 3.5–5.2)
ALBUMIN/GLOBULIN RATIO: 1.2 (ref 1–2.5)
ALP BLD-CCNC: 97 U/L (ref 35–104)
ALT SERPL-CCNC: 15 U/L (ref 5–33)
ANION GAP SERPL CALCULATED.3IONS-SCNC: 9 MMOL/L (ref 9–17)
AST SERPL-CCNC: 13 U/L
BILIRUB SERPL-MCNC: 0.3 MG/DL (ref 0.3–1.2)
BUN BLDV-MCNC: 10 MG/DL (ref 6–20)
BUN/CREAT BLD: NORMAL (ref 9–20)
CALCIUM SERPL-MCNC: 8.9 MG/DL (ref 8.6–10.4)
CHLORIDE BLD-SCNC: 104 MMOL/L (ref 98–107)
CHOLESTEROL, FASTING: 155 MG/DL
CHOLESTEROL/HDL RATIO: 6.5
CO2: 25 MMOL/L (ref 20–31)
CREAT SERPL-MCNC: 0.56 MG/DL (ref 0.5–0.9)
GFR AFRICAN AMERICAN: >60 ML/MIN
GFR NON-AFRICAN AMERICAN: >60 ML/MIN
GFR SERPL CREATININE-BSD FRML MDRD: NORMAL ML/MIN/{1.73_M2}
GFR SERPL CREATININE-BSD FRML MDRD: NORMAL ML/MIN/{1.73_M2}
GLUCOSE FASTING: 90 MG/DL (ref 70–99)
HDLC SERPL-MCNC: 24 MG/DL
HEPATITIS C ANTIBODY: NONREACTIVE
LDL CHOLESTEROL: 98 MG/DL (ref 0–130)
POTASSIUM SERPL-SCNC: 4.1 MMOL/L (ref 3.7–5.3)
SODIUM BLD-SCNC: 138 MMOL/L (ref 135–144)
TOTAL PROTEIN: 7.1 G/DL (ref 6.4–8.3)
TRIGLYCERIDE, FASTING: 164 MG/DL
VLDLC SERPL CALC-MCNC: ABNORMAL MG/DL (ref 1–30)

## 2021-07-23 PROCEDURE — 36415 COLL VENOUS BLD VENIPUNCTURE: CPT

## 2021-07-23 PROCEDURE — 80053 COMPREHEN METABOLIC PANEL: CPT

## 2021-07-23 PROCEDURE — 86803 HEPATITIS C AB TEST: CPT

## 2021-07-23 PROCEDURE — 80061 LIPID PANEL: CPT

## 2021-07-23 PROCEDURE — 83036 HEMOGLOBIN GLYCOSYLATED A1C: CPT

## 2021-07-25 ENCOUNTER — HOSPITAL ENCOUNTER (EMERGENCY)
Age: 41
Discharge: HOME | End: 2021-07-25
Attending: EMERGENCY MEDICINE
Payer: MEDICARE

## 2021-07-25 VITALS
RESPIRATION RATE: 18 BRPM | HEIGHT: 63 IN | WEIGHT: 290 LBS | HEART RATE: 104 BPM | SYSTOLIC BLOOD PRESSURE: 128 MMHG | TEMPERATURE: 97.5 F | DIASTOLIC BLOOD PRESSURE: 98 MMHG | OXYGEN SATURATION: 96 % | BODY MASS INDEX: 51.38 KG/M2

## 2021-07-25 DIAGNOSIS — S91.311A LACERATION OF RIGHT FOOT, INITIAL ENCOUNTER: Primary | ICD-10-CM

## 2021-07-25 LAB
ESTIMATED AVERAGE GLUCOSE: 134 MG/DL
HBA1C MFR BLD: 6.3 % (ref 4–6)

## 2021-07-25 PROCEDURE — 99284 EMERGENCY DEPT VISIT MOD MDM: CPT

## 2021-07-25 PROCEDURE — 6370000000 HC RX 637 (ALT 250 FOR IP): Performed by: STUDENT IN AN ORGANIZED HEALTH CARE EDUCATION/TRAINING PROGRAM

## 2021-07-25 PROCEDURE — 12002 RPR S/N/AX/GEN/TRNK2.6-7.5CM: CPT

## 2021-07-25 RX ORDER — CIPROFLOXACIN 500 MG/1
500 TABLET, FILM COATED ORAL 2 TIMES DAILY
Qty: 14 TABLET | Refills: 0 | Status: SHIPPED | OUTPATIENT
Start: 2021-07-25 | End: 2021-08-01

## 2021-07-25 RX ORDER — IBUPROFEN 400 MG/1
400 TABLET ORAL ONCE
Status: COMPLETED | OUTPATIENT
Start: 2021-07-25 | End: 2021-07-25

## 2021-07-25 RX ORDER — CIPROFLOXACIN 500 MG/1
500 TABLET, FILM COATED ORAL ONCE
Status: COMPLETED | OUTPATIENT
Start: 2021-07-25 | End: 2021-07-25

## 2021-07-25 RX ADMIN — IBUPROFEN 400 MG: 400 TABLET, FILM COATED ORAL at 23:11

## 2021-07-25 RX ADMIN — CIPROFLOXACIN 500 MG: 500 TABLET, FILM COATED ORAL at 23:11

## 2021-07-25 ASSESSMENT — PAIN SCALES - GENERAL
PAINLEVEL_OUTOF10: 6
PAINLEVEL_OUTOF10: 8

## 2021-07-25 ASSESSMENT — ENCOUNTER SYMPTOMS: BACK PAIN: 0

## 2021-07-25 ASSESSMENT — PAIN DESCRIPTION - LOCATION: LOCATION: FOOT

## 2021-07-26 NOTE — ED PROVIDER NOTES
[facility]     Emergency Department     Faculty Attestation    I performed a history and physical examination of the patient and discussed management with the resident. I reviewed the residents note and agree with the documented findings and plan of care. Any areas of disagreement are noted on the chart. I was personally present for the key portions of any procedures. I have documented in the chart those procedures where I was not present during the key portions. I have reviewed the emergency nurses triage note. I agree with the chief complaint, past medical history, past surgical history, allergies, medications, social and family history as documented unless otherwise noted below. For Physician Assistant/ Nurse Practitioner cases/documentation I have personally evaluated this patient and have completed at least one if not all key elements of the E/M (history, physical exam, and MDM). Additional findings are as noted. I have personally seen and evaluated the patient. I find the patient's history and physical exam are consistent with the NP/PA documentation. I agree with the care provided, treatment rendered, disposition and follow-up plan. Patient has a laceration to her right foot this been thoroughly irrigated puncture through a slipper no rubber involved however will do empiric antibiotics as well. Critical Care     Jose D Lagos M.D.   Attending Emergency  Physician              Jose C Ram MD  07/25/21 [id number]

## 2021-07-26 NOTE — ED PROVIDER NOTES
[facility] ED  Emergency Department Encounter  Emergency Medicine Resident     Pt Name: Radha Aldridge  MRN: [id number]  [place] 1980  Date of evaluation: 21  PCP:  Lamar Umaña MD    [address] [place] [address]       Chief Complaint   Patient presents with    Laceration       HISTORY Benji  (Location/Symptom, Timing/Onset, Context/Setting, Quality, Duration, Modifying Factors,Severity.)      Radha Aldridge is a 47 y. o.yo female who presents with laceration to the R foot. Patient here with laceration to the right plantar aspect of the foot, states that she stepped on food cutter, was a clean cut, states her vaccinations are up-to-date has had tetanus shot within 10 years. Denies any other traumatic injuries, states it did bleed but became hemostatic. States that she was wearing shoes, cut through the shoes, 10 of 10 pain in the right foot. PAST MEDICAL / SURGICAL / SOCIAL / FAMILY HISTORY      has a past medical history of Abnormal 1st trimester screen (Tri 21 1:117)--NIPT nml , ADD (attention deficit disorder) without hyperactivity, Celestone  & 1/10, Chronic back pain, Echogenic focus of heart of fetus affecting antepartum care of mother, Thor [place] early 1hr, nml 3hr , Elevated umbilical artery dopplers , Fetal ascites, Fetal heart rate decelerations affecting management of mother, GERD (gastroesophageal reflux disease), Headache, Hypertension, IUD (intrauterine device) in place, Lumbar radiculopathy, Lumbar spondylosis, Multigravida of advanced maternal age in third trimester, Obesity, Obesity affecting pregnancy in first trimester, Obesity affecting pregnancy in third trimester, SHELLEY (obstructive sleep apnea), Pulmonary embolism affecting pregnancy in second trimester, Rh+/RI/GBSunk, Rheumatoid arteritis ([facility] [address] and Umbilical cord complication. has a past surgical history that includes other surgical history (12/10/2018); Tonsillectomy ();   section (Bilateral, 1/11/2020); intrauterine device insertion (05/07/2020); US BIOPSY LYMPH NODE (8/19/2020); Upper gastrointestinal endoscopy (01/22/2021); and Upper gastrointestinal endoscopy (N/A, 1/22/2021). Social History     Socioeconomic History    Marital status:      Spouse name: Not on file    Number of children: Not on file    Years of education: Not on file    Highest education level: Not on file   Occupational History    Not on file   Tobacco Use    Smoking status: Never Smoker    Smokeless tobacco: Never Used   Vaping Use    Vaping Use: Never used   Substance and Sexual Activity    Alcohol use: No    Drug use: Yes     Frequency: 2.0 times per week     Types: Marijuana    Sexual activity: Yes     Partners: Male   Other Topics Concern    Not on file   Social History Narrative    Not on file     Social Determinants of Health     Financial Resource Strain: Low Risk     Difficulty of Paying Living Expenses: Not hard at all   Food Insecurity: No Food Insecurity    Worried About Running Out of Food in the Last Year: Never true    Dylan of Food in the Last Year: Never true   Transportation Needs: Unmet Transportation Needs    Lack of Transportation (Medical):  Yes    Lack of Transportation (Non-Medical): Yes   Physical Activity:     Days of Exercise per Week:     Minutes of Exercise per Session:    Stress:     Feeling of Stress :    Social Connections:     Frequency of Communication with Friends and Family:     Frequency of Social Gatherings with Friends and Family:     Attends Taoist Services:     Active Member of Clubs or Organizations:     Attends Club or Organization Meetings:     Marital Status:    Intimate Partner Violence:     Fear of Current or Ex-Partner:     Emotionally Abused:     Physically Abused:     Sexually Abused:        Family History   Problem Relation Age of Onset    Elevated Lipids Mother         Allergies:  Cat hair extract, Dog epithelium, Iodine, and Other    Home Medications:  Prior to Admission medications    Medication Sig Start Date End Date Taking? Authorizing Provider   ciprofloxacin (CIPRO) 500 MG tablet Take 1 tablet by mouth 2 times daily for 7 days 7/25/21 8/1/21 Yes Jovita Caldera MD   FEROSUL 325 (65 Fe) MG tablet take 1 tablet by mouth every other day 7/7/21   Jo Goel MD   ACIDOPHILUS LACTOBACILLUS PO 0.5 mg (100 million cells ) 1x a day    Historical Provider, MD   vitamin D (ERGOCALCIFEROL) 1.25 MG (95530 UT) CAPS capsule take 1 capsule by mouth every week 2/24/21   Sunni Lu, APRN - CNP   Adalimumab (HUMIRA PEN) 40 MG/0.4ML PNKT Humira(CF) Pen 40 mg/0.4 mL subcutaneous kit    Historical Provider, MD   cetirizine (ZYRTEC) 10 MG tablet take 1 tablet by mouth once daily 11/18/20   Historical Provider, MD   methotrexate (RHEUMATREX) 2.5 MG chemo tablet Take 5 tab every week orally for 90 days    Historical Provider, MD   predniSONE (DELTASONE) 5 MG tablet Take 2.5 mg by mouth 3 times daily     Historical Provider, MD   albuterol (PROVENTIL) (2.5 MG/3ML) 0.083% nebulizer solution Take 3 mLs by nebulization daily as needed for Wheezing 12/2/19   Lili Meals, DO   folic acid (FOLVITE) 1 MG tablet Take 1 mg by mouth daily    Historical Provider, MD       REVIEW OFSYSTEMS    (2-9 systems for level 4, 10 or more for level 5)      Review of Systems   Constitutional: Negative for fatigue and fever. Musculoskeletal: Negative for back pain. Skin: Positive for wound. PHYSICAL EXAM   (up to 7 for level 4, 8 or more forlevel 5)      ED TRIAGE VITALS BP: (!) 128/98, Temp: 97.5 °F (36.4 °C), Pulse: 104, Resp: 18, SpO2: 96 %    Vitals:    07/25/21 2157   BP: (!) 128/98   Pulse: 104   Resp: 18   Temp: 97.5 °F (36.4 °C)   SpO2: 96%   Weight: 290 lb (131.5 kg)   Height: 5' 3\" (1.6 m)       Physical Exam  HENT:      Head: Normocephalic. Musculoskeletal:        Feet:    Skin:     Capillary Refill: Capillary refill takes less than 2 seconds. Findings: Laceration present. Neurological:      Mental Status: She is alert and oriented to person, place, and time. DIFFERENTIAL  DIAGNOSIS     PLAN (LABS / IMAGING / EKG):  No orders of the defined types were placed in this encounter. MEDICATIONS ORDERED:  Orders Placed This Encounter   Medications    ciprofloxacin (CIPRO) tablet 500 mg     Order Specific Question:   Antimicrobial Indications     Answer:   Skin and Soft Tissue Infection    ibuprofen (ADVIL;MOTRIN) tablet 400 mg    ciprofloxacin (CIPRO) 500 MG tablet     Sig: Take 1 tablet by mouth 2 times daily for 7 days     Dispense:  14 tablet     Refill:  0       DDX:     laceartion    Initial MDM/Plan: 47 y.o. female who presents with foot laceration. Laceration:  Clean laceration  No foreign body  Irrigated with 1 L of normal saline  Did go through the sole of the shoe  Started on Cipro, outpatient for 1 week  Repaired with running suture  Wrapped in clean dressing, bacitracin applied  Ibuprofen and Tylenol for pain control    DIAGNOSTIC RESULTS / EMERGENCYDEPARTMENT COURSE / MDM     LABS:  No results found for this visit on 07/25/21. RADIOLOGY:  No orders to display         EMERGENCY DEPARTMENT COURSE:  ED Course as of Jul 25 2316   Sun Jul 25, 2021   2210 Patient seen and assessed in the emergency department no acute respiratory cardiovascular distress. Patient here with laceration to the right plantar aspect of the foot, states that she stepped on food cutter, was a clean cut, states her vaccinations are up-to-date has had tetanus shot within 10 years. Denies any other traumatic injuries, states it did bleed but became hemostatic.     [PS]   2577 5 cm laceration to the right plantar aspect of the foot nongaping, plan for Dermabond repair    [PS]   8835 After irrigation appears to be more gaping, plan for suture closure    [PS]   2305 1 running suture placed, 4-0 silk    [PS]   2313 Patient states that the blade did cut through sole of her booties -plan for Cipro 1 week    [PS]      ED Course User Index  [PS] Guerline Berry MD          PROCEDURES:  Lac Repair    Date/Time: 7/25/2021 11:16 PM  Performed by: Guerline Berry MD  Authorized by: Inder Riojas MD     Laceration details:     Location:  Foot    Foot location:  Sole of R foot    Length (cm):  5  Repair type:     Repair type:  Simple  Exploration:     Wound exploration: wound explored through full range of motion    Treatment:     Area cleansed with:  Saline    Amount of cleaning:  Extensive    Irrigation solution:  Sterile saline    Irrigation method:  Pressure wash  Skin repair:     Repair method:  Sutures    Suture size:  4-0    Suture material:  Nylon    Suture technique:  Running  Approximation:     Approximation:  Close  Post-procedure details:     Dressing:  Antibiotic ointment and bulky dressing    Patient tolerance of procedure: Tolerated well, no immediate complications        CONSULTS:  None    CRITICAL CARE:  Please see attending note    FINAL IMPRESSION      1.  Laceration of right foot, initial encounter          DISPOSITION / PLAN     DISPOSITION Decision To Discharge 07/25/2021 11:10:14 PM       PATIENT REFERRED TO:  Gurinder Gruber, [address] [place] [address]  [phone]    In 2 weeks  For suture removal    [facility] ED  [address] [address]  [phone]    As needed, If symptoms worsen      DISCHARGE MEDICATIONS:  Discharge Medication List as of 7/25/2021 11:09 PM      START taking these medications    Details   ciprofloxacin (CIPRO) 500 MG tablet Take 1 tablet by mouth 2 times daily for 7 days, Disp-14 tablet, R-0Print             Guerline Berry MD  Emergency Medicine Resident    (Please note that portions of this note were completed with a voice recognition program.Efforts were made to edit the dictations but occasionally words are mis-transcribed.)       Guerline Berry MD  Resident  07/25/21 [address] [address]

## 2021-07-26 NOTE — ED NOTES
Patient presents to ED with lac to bottom of right foot, patient states she stepped on  blade approx 2100 this evening, pain 8/10. Patient alert and oriented x 3 resp e/u, skin PWD, NADN.      Jonah Adams RN  07/25/21 2200

## 2021-07-27 NOTE — TELEPHONE ENCOUNTER
Received approval scheduled for 9/14/21  Patient is NOT covid vaccinated  Will contact rheumatologist about stopping Humira

## 2021-08-11 ENCOUNTER — HOSPITAL ENCOUNTER (EMERGENCY)
Age: 41
Discharge: HOME | End: 2021-08-11
Attending: EMERGENCY MEDICINE
Payer: MEDICARE

## 2021-08-11 VITALS
RESPIRATION RATE: 16 BRPM | HEART RATE: 105 BPM | OXYGEN SATURATION: 97 % | TEMPERATURE: 99.9 F | SYSTOLIC BLOOD PRESSURE: 120 MMHG | DIASTOLIC BLOOD PRESSURE: 76 MMHG

## 2021-08-11 DIAGNOSIS — Z48.02 ENCOUNTER FOR REMOVAL OF SUTURES: Primary | ICD-10-CM

## 2021-08-11 PROCEDURE — 99284 EMERGENCY DEPT VISIT MOD MDM: CPT

## 2021-08-11 ASSESSMENT — ENCOUNTER SYMPTOMS
VOMITING: 0
NAUSEA: 0

## 2021-08-11 NOTE — ED PROVIDER NOTES
[address]  Emergency Department Encounter     Pt Name: Jovanni Kam  MRN: [id number]  [place] 1980  Date of evaluation: 21  PCP:  Tasneem Ríos MD    [address] [place] [address]       Chief Complaint   Patient presents with    Wound Check     stitches out       HISTORY OF PRESENT ILLNESS  (Location/Symptom, Timing/Onset, Context/Setting, Quality, Duration, Modifying Factors, Severity.)    Jovanni Kam is a 46 y.o. female who presents with need for suture removal.  Patient had stepped on the blade of a  and cut the bottom of her right foot. She was seen and had stitches placed on . Has been keeping the area clean and dry. Does not have any concerns for infection. No other medical complaints. PAST MEDICAL / SURGICAL / SOCIAL / FAMILY HISTORY    has a past medical history of Abnormal 1st trimester screen (Tri 21 1:117)--NIPT nml , ADD (attention deficit disorder) without hyperactivity, Celestone  & 1/10, Chronic back pain, Echogenic focus of heart of fetus affecting antepartum care of mother, Eldon Acevedo early 1hr, nml 3hr , Elevated umbilical artery dopplers , Fetal ascites, Fetal heart rate decelerations affecting management of mother, GERD (gastroesophageal reflux disease), Headache, Hypertension, IUD (intrauterine device) in place, Lumbar radiculopathy, Lumbar spondylosis, Multigravida of advanced maternal age in third trimester, Obesity, Obesity affecting pregnancy in first trimester, Obesity affecting pregnancy in third trimester, SHELLEY (obstructive sleep apnea), Pulmonary embolism affecting pregnancy in second trimester, Rh+/RI/GBSunk, Rheumatoid arteritis ([facility] [address] and Umbilical cord complication. has a past surgical history that includes other surgical history (12/10/2018); Tonsillectomy ();  section (Bilateral, 2020); intrauterine device insertion (2020); US BIOPSY LYMPH NODE (2020);  Upper gastrointestinal endoscopy (01/22/2021); and Upper gastrointestinal endoscopy (N/A, 1/22/2021). Social History     Socioeconomic History    Marital status:      Spouse name: Not on file    Number of children: Not on file    Years of education: Not on file    Highest education level: Not on file   Occupational History    Not on file   Tobacco Use    Smoking status: Never Smoker    Smokeless tobacco: Never Used   Vaping Use    Vaping Use: Never used   Substance and Sexual Activity    Alcohol use: No    Drug use: Yes     Frequency: 2.0 times per week     Types: Marijuana    Sexual activity: Yes     Partners: Male   Other Topics Concern    Not on file   Social History Narrative    Not on file     Social Determinants of Health     Financial Resource Strain: Low Risk     Difficulty of Paying Living Expenses: Not hard at all   Food Insecurity: No Food Insecurity    Worried About Running Out of Food in the Last Year: Never true    Dylan of Food in the Last Year: Never true   Transportation Needs: Unmet Transportation Needs    Lack of Transportation (Medical): Yes    Lack of Transportation (Non-Medical): Yes   Physical Activity:     Days of Exercise per Week:     Minutes of Exercise per Session:    Stress:     Feeling of Stress :    Social Connections:     Frequency of Communication with Friends and Family:     Frequency of Social Gatherings with Friends and Family:     Attends Hinduism Services:     Active Member of Clubs or Organizations:     Attends Club or Organization Meetings:     Marital Status:    Intimate Partner Violence:     Fear of Current or Ex-Partner:     Emotionally Abused:     Physically Abused:     Sexually Abused:        Family History   Problem Relation Age of Onset    Elevated Lipids Mother        Allergies:    Cat hair extract, Dog epithelium, Iodine, and Other    Home Medications:  Prior to Admission medications    Medication Sig Start Date End Date Taking?  Authorizing Provider FEROSUL 325 (65 Fe) MG tablet take 1 tablet by mouth every other day 7/7/21   Magy Arevalo MD   ACIDOPHILUS LACTOBACILLUS PO 0.5 mg (100 million cells ) 1x a day    Historical Provider, MD   vitamin D (ERGOCALCIFEROL) 1.25 MG (92172 UT) CAPS capsule take 1 capsule by mouth every week 2/24/21   Sriram Carmona, APRN - CNP   Adalimumab (HUMIRA PEN) 40 MG/0.4ML PNKT Humira(CF) Pen 40 mg/0.4 mL subcutaneous kit    Historical Provider, MD   cetirizine (ZYRTEC) 10 MG tablet take 1 tablet by mouth once daily 11/18/20   Historical Provider, MD   methotrexate (RHEUMATREX) 2.5 MG chemo tablet Take 5 tab every week orally for 90 days    Historical Provider, MD   predniSONE (DELTASONE) 5 MG tablet Take 2.5 mg by mouth 3 times daily     Historical Provider, MD   albuterol (PROVENTIL) (2.5 MG/3ML) 0.083% nebulizer solution Take 3 mLs by nebulization daily as needed for Wheezing 12/2/19   Isabella Kay DO   folic acid (FOLVITE) 1 MG tablet Take 1 mg by mouth daily    Historical Provider, MD       REVIEW OF SYSTEMS    (2-9 systems for level 4, 10 or more for level 5)    Review of Systems   Constitutional: Negative for chills, diaphoresis and fever. Gastrointestinal: Negative for nausea and vomiting. Skin: Positive for wound. Neurological: Negative for numbness. Hematological: Does not bruise/bleed easily. PHYSICAL EXAM   (up to 7 for level 4, 8 or more for level 5)    VITALS:   Vitals:    08/11/21 1213   BP: 120/76   Pulse: 105   Resp: 16   Temp: 99.9 °F (37.7 °C)   TempSrc: Oral   SpO2: 97%       Physical Exam  Vitals and nursing note reviewed. Constitutional:       General: She is not in acute distress. Appearance: She is well-developed. She is obese. She is not diaphoretic. HENT:      Head: Normocephalic and atraumatic. Eyes:      Conjunctiva/sclera: Conjunctivae normal.   Pulmonary:      Effort: Pulmonary effort is normal. No respiratory distress. Abdominal:      Palpations: Abdomen is soft. Musculoskeletal:         General: Normal range of motion. Feet:    Feet:      Comments: Diagonal laceration with single running suture that is intact. Wound appears to be healing appropriately, no signs of concerning secondary infection. Skin:     General: Skin is warm and dry. Neurological:      General: No focal deficit present. Mental Status: She is alert. Psychiatric:         Behavior: Behavior normal.         DIFFERENTIAL  DIAGNOSIS   PLAN (LABS / IMAGING / EKG):  No orders of the defined types were placed in this encounter. MEDICATIONS ORDERED:  No orders of the defined types were placed in this encounter. DIAGNOSTIC RESULTS / EMERGENCYDEPARTMENT COURSE / MDM   LABS:  Labs Reviewed - No data to display    RADIOLOGY:  No results found. EMERGENCY DEPARTMENT COURSE:       MDM  Number of Diagnoses or Management Options     Amount and/or Complexity of Data Reviewed  Review and summarize past medical records: yes    Patient Progress  Patient progress: stable      PROCEDURES:  Procedures     CONSULTS:  None    CRITICAL CARE:  NONE    FINAL IMPRESSION     1. Encounter for removal of sutures       DISPOSITION / PLAN   DISPOSITION Decision To Discharge 08/11/2021 12:20:03 PM      Evaluation and treatment course in the ED, and plan of care upon discharge was discussed in length with the patient. Patient had no further questions prior to being discharged and was instructed to return to the ED for new or worsening symptoms. Any changes to existing medications or new prescriptions were reviewed with patient and they expressed understanding of how to correctly take their medications and the possible side effects. PATIENT REFERRED TO:  Darylene Console, MD  [address] [place] [address]  [facility] [address] ED  [address]          DISCHARGE MEDICATIONS:  New Prescriptions    No medications on file       Freida LOVE Margo James DO  Emergency Medicine Physician    (Please note that portions of this note were completed with a voice recognition program.  Efforts were made to edit the dictations but occasionally words are mis-transcribed.)        Molina Munoz 1721,   08/11/21 1221

## 2021-08-11 NOTE — ED TRIAGE NOTES
Pt here for suture removal    NAD, resp even and non labored. speech clear and appropriate. A&Ox4. pt ambulatory with steady gait. side rails up x 2 call light within reach.     Dr Kitty Carr at bedside to remove sutures

## 2021-08-16 ENCOUNTER — TELEPHONE (OUTPATIENT)
Dept: BARIATRICS/WEIGHT MGMT | Age: 41
End: 2021-08-16

## 2021-08-23 DIAGNOSIS — N89.8 VAGINAL IRRITATION: ICD-10-CM

## 2021-08-23 NOTE — TELEPHONE ENCOUNTER
Next Visit Date:  Future Appointments   Date Time Provider Ted Echeverria   8/30/2021  9:00 AM SCHEDULE, [facility]IATRIC SURG bariatric heredia [facility]   9/7/2021 10:30 AM [facility] PAT RM 2 [facility] PAT [facility]   9/8/2021 11:15 AM Anjel Laurent, DO bariatric heredia [facility]   9/10/2021 11:30 AM SCHEDULE, [facility] COVID SCREENING [facility] COV [facility]   9/22/2021 11:00 AM Anjel Laurent DO bariatric heredia [facility]   9/27/2021  1:15 PM Aly Buckley MD [facility]   12/20/2021 10:15 AM Marco A Hu MD [facility]   7/26/2022 10:00 AM Tanner Chavez MD Neuro Spec [address] Maintenance   Topic Date Due    Varicella vaccine (1 of 2 - 2-dose childhood series) Never done    Pneumococcal 0-64 years Vaccine (1 of 2 - PPSV23) Never done    COVID-19 Vaccine (1) Never done    Flu vaccine (1) 09/01/2021    A1C test (Diabetic or Prediabetic)  07/23/2022    Cervical cancer screen  07/30/2024    DTaP/Tdap/Td vaccine (2 - Td or Tdap) 02/21/2027    Hepatitis C screen  Completed    HIV screen  Completed    Hepatitis A vaccine  Aged Out    Hepatitis B vaccine  Aged Out    Hib vaccine  Aged Out    Meningococcal (ACWY) vaccine  Aged Out       Hemoglobin A1C (%)   Date Value   07/23/2021 6.3 (H)   06/17/2021 5.7   11/02/2020 6.1 (H)             ( goal A1C is < 7)   No results found for: LABMICR  LDL Cholesterol (mg/dL)   Date Value   07/23/2021 98   11/02/2020 124       (goal LDL is <100)   AST (U/L)   Date Value   07/23/2021 13     ALT (U/L)   Date Value   07/23/2021 15     BUN (mg/dL)   Date Value   07/23/2021 10     BP Readings from Last 3 Encounters:   08/11/21 120/76   07/25/21 (!) 128/98   07/21/21 122/87          (goal 120/80)    All Future Testing planned in [facility]  Lab Frequency Next Occurrence   COVID-19 Once 01/15/2021   COVID-19 Once 01/18/2021   CBC Auto Differential Once 72/67/8357   Basic Metabolic Panel Once 03/22/1230   Ferritin Once 03/29/2021   CBC Auto Differential     CBC Auto Differential     CBC Auto Differential     Comprehensive Metabolic Panel                 Patient Active Problem List:     Chronic low back pain     Migraine without aura, not refractory     GERD without esophagitis     Prediabetes     Low antithrombin III x1, repeat WNL     Antiphospholipid syndrome in pregnancy ([address]     AMA     Rheumatoid arthritis with rheumatoid factor, unspecified (HCC)     Lumbar radiculopathy     Lumbar spondylosis     Muscle pain     Attention deficit hyperactivity disorder, predominantly inattentive type     Hidradenitis     Recurrent pregnancy loss     Anticardiolipin antibody low 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

## 2021-08-24 RX ORDER — NYSTATIN 100000 U/G
CREAM TOPICAL
Qty: 30 G | Refills: 0 | Status: SHIPPED | OUTPATIENT
Start: 2021-08-24 | End: 2021-11-09

## 2021-08-30 ENCOUNTER — NURSE ONLY (OUTPATIENT)
Dept: BARIATRICS/WEIGHT MGMT | Age: 41
End: 2021-08-30

## 2021-09-02 RX ORDER — SODIUM CHLORIDE, SODIUM LACTATE, POTASSIUM CHLORIDE, CALCIUM CHLORIDE 600; 310; 30; 20 MG/100ML; MG/100ML; MG/100ML; MG/100ML
1000 INJECTION, SOLUTION INTRAVENOUS CONTINUOUS
Status: CANCELLED | OUTPATIENT
Start: 2021-09-02

## 2021-09-07 ENCOUNTER — HOSPITAL ENCOUNTER (OUTPATIENT)
Dept: GENERAL RADIOLOGY | Age: 41
Discharge: HOME | End: 2021-09-09
Payer: MEDICARE

## 2021-09-07 ENCOUNTER — HOSPITAL ENCOUNTER (OUTPATIENT)
Dept: PREADMISSION TESTING | Age: 41
Discharge: HOME | End: 2021-09-11
Payer: MEDICARE

## 2021-09-07 VITALS
BODY MASS INDEX: 51.03 KG/M2 | SYSTOLIC BLOOD PRESSURE: 122 MMHG | HEART RATE: 92 BPM | RESPIRATION RATE: 16 BRPM | WEIGHT: 288 LBS | HEIGHT: 63 IN | TEMPERATURE: 97.6 F | OXYGEN SATURATION: 98 % | DIASTOLIC BLOOD PRESSURE: 87 MMHG

## 2021-09-07 LAB
ANION GAP SERPL CALCULATED.3IONS-SCNC: 12 MMOL/L (ref 9–17)
BUN BLDV-MCNC: 8 MG/DL (ref 6–20)
BUN/CREAT BLD: NORMAL (ref 9–20)
CALCIUM SERPL-MCNC: 9.2 MG/DL (ref 8.6–10.4)
CHLORIDE BLD-SCNC: 105 MMOL/L (ref 98–107)
CO2: 20 MMOL/L (ref 20–31)
CREAT SERPL-MCNC: 0.52 MG/DL (ref 0.5–0.9)
GFR AFRICAN AMERICAN: >60 ML/MIN
GFR NON-AFRICAN AMERICAN: >60 ML/MIN
GFR SERPL CREATININE-BSD FRML MDRD: NORMAL ML/MIN/{1.73_M2}
GFR SERPL CREATININE-BSD FRML MDRD: NORMAL ML/MIN/{1.73_M2}
GLUCOSE BLD-MCNC: 92 MG/DL (ref 70–99)
HCT VFR BLD CALC: 40.6 % (ref 36.3–47.1)
HEMOGLOBIN: 12.9 G/DL (ref 11.9–15.1)
INR BLD: 1
MCH RBC QN AUTO: 27.3 PG (ref 25.2–33.5)
MCHC RBC AUTO-ENTMCNC: 31.8 G/DL (ref 28.4–34.8)
MCV RBC AUTO: 85.8 FL (ref 82.6–102.9)
NRBC AUTOMATED: 0 PER 100 WBC
PARTIAL THROMBOPLASTIN TIME: 24.9 SEC (ref 20.5–30.5)
PDW BLD-RTO: 13.4 % (ref 11.8–14.4)
PLATELET # BLD: 396 K/UL (ref 138–453)
PMV BLD AUTO: 9.5 FL (ref 8.1–13.5)
POTASSIUM SERPL-SCNC: 4 MMOL/L (ref 3.7–5.3)
PROTHROMBIN TIME: 10.4 SEC (ref 9.1–12.3)
RBC # BLD: 4.73 M/UL (ref 3.95–5.11)
SODIUM BLD-SCNC: 137 MMOL/L (ref 135–144)
WBC # BLD: 10.3 K/UL (ref 3.5–11.3)

## 2021-09-07 PROCEDURE — 85027 COMPLETE CBC AUTOMATED: CPT

## 2021-09-07 PROCEDURE — 36415 COLL VENOUS BLD VENIPUNCTURE: CPT

## 2021-09-07 PROCEDURE — G0480 DRUG TEST DEF 1-7 CLASSES: HCPCS

## 2021-09-07 PROCEDURE — 93005 ELECTROCARDIOGRAM TRACING: CPT | Performed by: SURGERY

## 2021-09-07 PROCEDURE — 71046 X-RAY EXAM CHEST 2 VIEWS: CPT

## 2021-09-07 PROCEDURE — 85730 THROMBOPLASTIN TIME PARTIAL: CPT

## 2021-09-07 PROCEDURE — 85610 PROTHROMBIN TIME: CPT

## 2021-09-07 PROCEDURE — 80048 BASIC METABOLIC PNL TOTAL CA: CPT

## 2021-09-07 ASSESSMENT — PAIN DESCRIPTION - DESCRIPTORS: DESCRIPTORS: CRAMPING

## 2021-09-07 ASSESSMENT — PAIN SCALES - GENERAL: PAINLEVEL_OUTOF10: 6

## 2021-09-07 ASSESSMENT — PAIN DESCRIPTION - PAIN TYPE: TYPE: CHRONIC PAIN

## 2021-09-07 ASSESSMENT — PAIN DESCRIPTION - LOCATION: LOCATION: OTHER (COMMENT)

## 2021-09-07 ASSESSMENT — PAIN DESCRIPTION - FREQUENCY: FREQUENCY: CONTINUOUS

## 2021-09-07 NOTE — PROGRESS NOTES
Anesthesia Focused Assessment    Has patient ever tested positive for COVID? No    STOP-BANG Sleep Apnea Questionnaire    SNORE loudly (heard through closed doors)? Yes  TIRED, fatigued, sleepy during daytime? Yes  OBSERVED stopping breathing during sleep? Yes  High blood PRESSURE being treated? No    BMI over 35? Yes  AGE over 48? No  NECK circumference over 16\"? Yes  GENDER (male)? No             Total 5  High risk 5-8  Intermediate risk 3-4  Low risk 0-2    Obstructive Sleep Apnea: yes  If YES, machine used: cpap-instructed to bring day of surgery     Type 1 DM:   no  T2DM:  prediabetes    Coronary Artery Disease:  no  Hypertension:  No, history of tachycardia    Active smoker:  no  Drinks Alcohol:  No  History of marijuana    Dentition: benign    Defib / AICD / Pacemaker: no      Renal Failure/dialysis:  no    Patient was evaluated in PAT & anesthesia guidelines were applied. NPO guidelines, medication instructions and scheduled arrival time were reviewed with patient. Hx of anesthesia complications:  no  Family hx of anesthesia complications:  Mom-PONV                                                                                                                     Anesthesia contacted:   no  Medical or cardiac clearance ordered: pcp clearance copy to be requested for chart. Hematology clearance to be requested for chart.     Roxana Mazariegos PA-C  9/7/21  11:09 AM

## 2021-09-07 NOTE — H&P
History and Physical    Pt Name: Irma Rojas  MRN: [id number]  YOB: 1980  Date of evaluation: 2021    SUBJECTIVE:   History of Chief Complaint:    Patient presents for PAT appointment. Patient complains of difficulty with ability to lose weight and maintain that weight loss despite several attempts in the past.  She has several comorbid conditions that would be helped with weight loss. She has been following with bariatrics and has been scheduled for sleeve gastrectomy. Past Medical History    has a past medical history of Abnormal 1st trimester screen (Tri 21 1:117)--NIPT nml , ADD (attention deficit disorder) without hyperactivity, Celestone  & 1/10, Chronic back pain, Echogenic focus of heart of fetus affecting antepartum care of mother, Rodriguez Alderman early 1hr, nml 3hr , Elevated umbilical artery dopplers , Eosinopenia ([address] Fetal ascites, Fetal heart rate decelerations affecting management of mother, GERD (gastroesophageal reflux disease), Headache, Hypertension, Iron deficiency, IUD (intrauterine device) in place, Lumbar radiculopathy, Lumbar spondylosis, Multigravida of advanced maternal age in third trimester, Obesity, SHELLEY (obstructive sleep apnea), Personal history of other medical treatment, Pre-diabetes, Pulmonary embolism affecting pregnancy in second trimester, Rh+/RI/GBSunk, Rheumatoid arteritis ([address] Tachycardia, Umbilical cord complication, Under care of team, Under care of team, Under care of team, and Wears glasses. Past Surgical History   has a past surgical history that includes Tonsillectomy ();  section (Bilateral, 2020); intrauterine device insertion (2020); US BIOPSY LYMPH NODE (2020); Upper gastrointestinal endoscopy (N/A, 2021); Endoscopy, colon, diagnostic; and [place] tooth extraction. Medications  Prior to Admission medications    Medication Sig Start Date End Date Taking?  Authorizing Provider   nystatin (MYCOSTATIN) [id number] UNIT/GM cream []Diarrhea  [x] negative  :  [] Dysuria   []Frequency  []Hematuria  []Discharge  [x] negative  Musculoskeletal:  []Back pain  []Neck pain  []Recent Injury [x] negative  Skin:  []Rash  [] Itching  [x] negative  Neurologic:  [] Headache  [] Focal weakness  [] Sensory changes [x]negative  Endocrine:  [] Polyuria  [] Polydipsia  [] Hair Loss  [x] negative  Lymphatic:   [] Swollen glands [x] negative  Psychiatric:  [] Depression  [] Suicidal ideation  [] Homicidal ideation  [] Anxiety [x] negative  All systems negative except as marked. OBJECTIVE:   VITALS:  height is 5' 3\" (1.6 m) and weight is 288 lb (130.6 kg). Her temporal temperature is 97.6 °F (36.4 °C). Her blood pressure is 122/87 and her pulse is 92. Her respiration is 16 and oxygen saturation is 98%. CONSTITUTIONAL:alert & cooperative, no acute distress. SKIN:  Warm and dry, no rashes on exposed areas of skin. HEAD:  Normocephalic, atraumatic. EYES: EOMs intact. EARS:  Hearing grossly WNL. NOSE:  Nares patent. No rhinorrhea. MOUTH/THROAT:  benign  NECK:supple, good ROM. LUNGS: Clear to auscultation bilaterally, no wheezes. CARDIOVASCULAR: Heart sounds are normal.  Regular rate and rhythm without murmur. ABDOMEN: soft, non tender, non distended. Rotund/obese. EXTREMITIES: no edema bilateral lower extremities.     Testing:   EK21  Labs pending: drawn 2021   Chest XRay:  21    IMPRESSIONS:   1. obesity  2.  has a past medical history of Abnormal 1st trimester screen (Tri 21 1:117)--NIPT nml  (2019), ADD (attention deficit disorder) without hyperactivity, Celestone  & 1/10 (2020), Chronic back pain, Echogenic focus of heart of fetus affecting antepartum care of mother (2019), Elev early 1hr, nml 3hr  (), Elevated umbilical artery dopplers  (01/10/2020), E[address]openia ([facility] Fetal ascites, Fetal heart rate decelerations affecting management of mother, GERD (gastroesophageal reflux disease), Headache, Hypertension, Iron deficiency, IUD (intrauterine device) in place (05/07/2020), Lumbar radiculopathy (01/09/2019), Lumbar spondylosis (01/09/2019), Multigravida of advanced maternal age in third trimester, Obesity, SHELLEY (obstructive sleep apnea), Personal history of other medical treatment, Pre-diabetes, Pulmonary embolism affecting pregnancy in second trimester (11/30/2019), Rh+/RI/GBSunk (01/08/2020), Rheumatoid arteritis ([facility] Tachycardia, Umbilical cord complication, Under care of team (09/07/2021), Under care of team (09/07/2021), Under care of team (09/07/2021), and Wears glasses. PLANS:   1.  Sleeve gastrectomy    ROYAL Cardoza PA-C  Electronically signed 9/7/2021 at 12:04 PM

## 2021-09-08 ENCOUNTER — OFFICE VISIT (OUTPATIENT)
Dept: BARIATRICS/WEIGHT MGMT | Age: 41
End: 2021-09-08
Payer: MEDICARE

## 2021-09-08 ENCOUNTER — TELEPHONE (OUTPATIENT)
Dept: INFUSION THERAPY | Facility: MEDICAL CENTER | Age: 41
End: 2021-09-08

## 2021-09-08 VITALS
BODY MASS INDEX: 50.5 KG/M2 | SYSTOLIC BLOOD PRESSURE: 122 MMHG | WEIGHT: 285 LBS | DIASTOLIC BLOOD PRESSURE: 84 MMHG | HEART RATE: 80 BPM | HEIGHT: 63 IN | RESPIRATION RATE: 20 BRPM

## 2021-09-08 DIAGNOSIS — Z98.84 S/P BARIATRIC SURGERY: Primary | ICD-10-CM

## 2021-09-08 DIAGNOSIS — M05.9 RHEUMATOID ARTHRITIS WITH RHEUMATOID FACTOR, UNSPECIFIED (HCC): ICD-10-CM

## 2021-09-08 DIAGNOSIS — K21.9 GASTROESOPHAGEAL REFLUX DISEASE WITHOUT ESOPHAGITIS: Primary | ICD-10-CM

## 2021-09-08 DIAGNOSIS — E66.01 MORBID OBESITY WITH BMI OF 50.0-59.9, ADULT (HCC): ICD-10-CM

## 2021-09-08 LAB
EKG ATRIAL RATE: 90 BPM
EKG P AXIS: 17 DEGREES
EKG P-R INTERVAL: 134 MS
EKG Q-T INTERVAL: 368 MS
EKG QRS DURATION: 78 MS
EKG QTC CALCULATION (BAZETT): 450 MS
EKG R AXIS: 16 DEGREES
EKG T AXIS: 2 DEGREES
EKG VENTRICULAR RATE: 90 BPM

## 2021-09-08 PROCEDURE — 99213 OFFICE O/P EST LOW 20 MIN: CPT | Performed by: SURGERY

## 2021-09-08 PROCEDURE — G8417 CALC BMI ABV UP PARAM F/U: HCPCS | Performed by: SURGERY

## 2021-09-08 PROCEDURE — G8427 DOCREV CUR MEDS BY ELIG CLIN: HCPCS | Performed by: SURGERY

## 2021-09-08 PROCEDURE — 1036F TOBACCO NON-USER: CPT | Performed by: SURGERY

## 2021-09-08 PROCEDURE — 93010 ELECTROCARDIOGRAM REPORT: CPT | Performed by: INTERNAL MEDICINE

## 2021-09-08 NOTE — TELEPHONE ENCOUNTER
Message from [place] at pre surgery area, states a hematology clearance is not needed for the pt's surgery a clearance was done in Mar

## 2021-09-08 NOTE — PATIENT INSTRUCTIONS
Enchanced Recovery After Surgery ( ERAS):    The aim of the protocol is to improve patient outcomes after surgery- specifically regarding dehydration, postoperative pain, and preservation of nutrition and metabolic function. Instructions:    · Drink 8oz of G2 Gatorade at 8PM the night before surgery  · Drink 8oz of G2 Gatorade 2 hours prior to scheduled surgery time.

## 2021-09-09 LAB
3-OH-COTININE: <2 NG/ML
COTININE: <2 NG/ML
NICOTINE: <2 NG/ML

## 2021-09-10 ENCOUNTER — HOSPITAL ENCOUNTER (OUTPATIENT)
Dept: LAB | Age: 41
Setting detail: SPECIMEN
Discharge: HOME | End: 2021-09-10
Payer: MEDICARE

## 2021-09-10 DIAGNOSIS — Z20.822 COVID-19 RULED OUT BY LABORATORY TESTING: Primary | ICD-10-CM

## 2021-09-10 PROCEDURE — U0005 INFEC AGEN DETEC AMPLI PROBE: HCPCS

## 2021-09-10 PROCEDURE — U0003 INFECTIOUS AGENT DETECTION BY NUCLEIC ACID (DNA OR RNA); SEVERE ACUTE RESPIRATORY SYNDROME CORONAVIRUS 2 (SARS-COV-2) (CORONAVIRUS DISEASE [COVID-19]), AMPLIFIED PROBE TECHNIQUE, MAKING USE OF HIGH THROUGHPUT TECHNOLOGIES AS DESCRIBED BY CMS-2020-01-R: HCPCS

## 2021-09-12 LAB
SARS-COV-2: NORMAL
SARS-COV-2: NOT DETECTED
SOURCE: NORMAL

## 2021-09-12 NOTE — PROGRESS NOTES
[facility]  [facility]  [address]  [address]  [place] [address]  Dept: [phone]    SURGICAL WEIGHT MANAGEMENT PROGRAM   PROGRESS NOTE - SURGICAL EVALUATION    CC: Weight Loss       Patient: Beth Haley        Service Date: 9/8/2021       Medical Record #: [id number]    Patient History/Assessment Summary:    The patient is a pleasant 46y.o. year old female  with morbid obesity, who stands Height: 5' 3\" (160 cm) tall with a weight of Weight: 285 lb (129.3 kg) , resulting in a BMI of Body mass index is 50.49 kg/m². .     This patient is alone for the evaluation today. The patient is being evaluated to undergo weight loss surgery to treat the following comorbid conditions caused by her morbid obesity: Hypertension, GERD and Degenerative Joint Disease (DJD)    She attended the weight loss surgery seminar, and attended bariatric education    Last Visit Weight:   Wt Readings from Last 3 Encounters:   09/07/21 288 lb (130.6 kg)   09/08/21 285 lb (129.3 kg)   07/25/21 290 lb (131.5 kg)     Today's weight is decreased from the last visit. Highest Weight: 290    The patient is being evaluated for Laparoscopic Sleeve Gastrectomy. She  is here today to review the details of surgery. The patient acknowledges and understands the risks, benefits, and options we have discussed, as outlined in the Additional Informed Consent for this procedure. Patient also understands the importance of surgical and post-operative recommendations, including the operative diet and regular post-operative follow up care. The importance of ambulation and incentive spirometry was also discussed. All questions of this patient and any family members present have been answered to their satisfaction.     Physical Examination:     /84 (Site: Right Upper Arm, Position: Sitting, Cuff Size: Large Adult)   Pulse 80   Resp 20   Ht 5' 3\" (1.6 m)   Wt 285 lb (129.3 kg)   LMP 08/17/2021   BMI 50.49 kg/m² General This patient is awake, alert, and oriented, and is in no apparent distress. Cardiac Regular rate and rhythm without evidence of murmur. Respiratory Clear to auscultation bilaterally. Abdomen Obese, soft, non-tender, non-distended without masses/ No  evidence of abdominal hernia / Incisions consistent with previous surgeries. Head and Neck Obese, normocephalic and atraumatic/soft and supple, no  lymphadenopathy or obvious bruits. Extremeties No cyanosis, clubbing or edema/ No calf tenderness/No  restrictions of movement, is ambulatory without assistance. Neurological Intact x 4 extremities, no focal deficits noted   Skin No rashes or lesions noted   Rectal Deferred     RECOMMENDATIONS:       Diagnosis Orders   1. Gastroesophageal reflux disease without esophagitis     2. Rheumatoid arthritis with rheumatoid factor, unspecified ([facility]     3. Morbid obesity with BMI of 50.0-59.9, adult ([facility]            We spent a great deal of time discussing the risks and benefits of Laparoscopic Sleeve Gastrectomy, including but not limited to injury to intra-abdominal organs, breakdown of the gastric staple line, the need for re-operative therapy,  prolonged hospitalization,  mechanical ventilation,  and death. We discussed the possibility of bleeding, the need for blood transfusions, blood clots, hospital-acquired and intra-abdominal infection, anastomotic stricture, and worsening GERD. And we discussed the need for post-operative visit compliance, behavior modifications and diet changes, protein and vitamin supplementation, as well as routine scheduled and dedicated exercise. We discussed the potential weight loss benefit of approximately 60-70% of her excess body weight at 12-18 months post-op, as well as the possibility of insufficient weight loss or weight gain after 2 years post-operative time.      The following was discussed with the patient:    DVT Prophylaxis    Risks of worsening GERD with sleeve discussed with patient. She would like to undergo sleeve gastrectomy    Rheumatoid arthritis medications discussed. States she has now been off prednisone, humira and methotrexate for the past month. Risks of surgery discussed if she is on medications. She is aware of overall higher risk of bleeding, leak, and infection. She is also aware of risks of DVT/PE. All questions answered, option of not doing surgery given to patient. She would like to move forward with surgery    Electronically signed by Marcelo Durand DO on 9/12/2021 at 12:46 PM    Please note that this chart was generated using voice recognition Dragon dictation software. Although every effort was made to ensure the accuracy of this automated transcription, some errors in transcription may have occurred.

## 2021-09-13 ENCOUNTER — TELEPHONE (OUTPATIENT)
Dept: BARIATRICS/WEIGHT MGMT | Age: 41
End: 2021-09-13

## 2021-09-13 RX ORDER — OXYCODONE HYDROCHLORIDE AND ACETAMINOPHEN 5; 325 MG/1; MG/1
1 TABLET ORAL EVERY 6 HOURS PRN
Qty: 28 TABLET | Refills: 0 | Status: SHIPPED | OUTPATIENT
Start: 2021-09-13 | End: 2021-09-20

## 2021-09-13 RX ORDER — PROMETHAZINE HYDROCHLORIDE 25 MG/1
25 TABLET ORAL EVERY 6 HOURS PRN
Qty: 20 TABLET | Refills: 1 | Status: SHIPPED | OUTPATIENT
Start: 2021-09-13 | End: 2021-09-20

## 2021-09-13 NOTE — TELEPHONE ENCOUNTER
Patient called and cancelled surgery for 9/14/21 due to  having COVID. Rescheduled surgery for 10/6/21 @ 1:50 pm to arrive at surgery center at 12:00 pm    COVID test 10/2/21 @ 11:15 am    Patient verbalized understanding and will continue pre-op diet.

## 2021-09-21 ENCOUNTER — HOSPITAL ENCOUNTER (OUTPATIENT)
Facility: MEDICAL CENTER | Age: 41
End: 2021-09-21
Payer: MEDICARE

## 2021-09-27 NOTE — TELEPHONE ENCOUNTER
Patient called to cancel surgery for 10/6/21 she is now COVID positive. She will call back at a later date and time to reschedule. Called surgery scheduling and cancelled surgery and all upcoming appts with Gian.

## 2021-10-18 ENCOUNTER — TELEPHONE (OUTPATIENT)
Dept: FAMILY MEDICINE CLINIC | Age: 41
End: 2021-10-18

## 2021-10-18 ENCOUNTER — TELEPHONE (OUTPATIENT)
Dept: BARIATRICS/WEIGHT MGMT | Age: 41
End: 2021-10-18

## 2021-10-18 DIAGNOSIS — R05.3 COVID-19 LONG HAULER MANIFESTING CHRONIC COUGH: Primary | ICD-10-CM

## 2021-10-18 DIAGNOSIS — U09.9 COVID-19 LONG HAULER MANIFESTING CHRONIC COUGH: Primary | ICD-10-CM

## 2021-10-21 RX ORDER — PREDNISONE 20 MG/1
20 TABLET ORAL 2 TIMES DAILY
Qty: 10 TABLET | Refills: 0 | Status: SHIPPED | OUTPATIENT
Start: 2021-10-21 | End: 2021-10-26

## 2021-10-21 RX ORDER — ALBUTEROL SULFATE 90 UG/1
2 AEROSOL, METERED RESPIRATORY (INHALATION) 4 TIMES DAILY PRN
Qty: 18 G | Refills: 0 | Status: SHIPPED | OUTPATIENT
Start: 2021-10-21 | End: 2021-12-06

## 2021-10-21 RX ORDER — BENZONATATE 100 MG/1
100 CAPSULE ORAL 3 TIMES DAILY PRN
Qty: 30 CAPSULE | Refills: 0 | Status: SHIPPED | OUTPATIENT
Start: 2021-10-21 | End: 2021-10-28

## 2021-10-27 ENCOUNTER — HOSPITAL ENCOUNTER (OUTPATIENT)
Facility: MEDICAL CENTER | Age: 41
End: 2021-10-27
Payer: MEDICARE

## 2021-11-01 ENCOUNTER — TELEPHONE (OUTPATIENT)
Dept: ONCOLOGY | Age: 41
End: 2021-11-01

## 2021-11-01 NOTE — TELEPHONE ENCOUNTER
PATIENT CALLED AND LEFT A VM @ 10:48 CANCELLING HER APPOINTMENT BECAUSE SHE CAN NOT MAKE IT. WRITER TRIED CALLING PATIENT TO RESCHEDULED BUT NO ANSWER. VM WAS LEFT FOR PATIENT TO CALL TO GET RESCHEDULED.

## 2021-11-04 RX ORDER — SODIUM CHLORIDE, SODIUM LACTATE, POTASSIUM CHLORIDE, CALCIUM CHLORIDE 600; 310; 30; 20 MG/100ML; MG/100ML; MG/100ML; MG/100ML
1000 INJECTION, SOLUTION INTRAVENOUS CONTINUOUS
Status: CANCELLED | OUTPATIENT
Start: 2021-11-04

## 2021-11-09 ENCOUNTER — HOSPITAL ENCOUNTER (OUTPATIENT)
Age: 41
Discharge: HOME | End: 2021-11-09
Payer: MEDICARE

## 2021-11-09 ENCOUNTER — HOSPITAL ENCOUNTER (OUTPATIENT)
Dept: PREADMISSION TESTING | Age: 41
Discharge: HOME | End: 2021-11-13
Payer: MEDICARE

## 2021-11-09 ENCOUNTER — HOSPITAL ENCOUNTER (OUTPATIENT)
Dept: GENERAL RADIOLOGY | Age: 41
Discharge: HOME | End: 2021-11-11
Payer: MEDICARE

## 2021-11-09 VITALS
HEART RATE: 106 BPM | SYSTOLIC BLOOD PRESSURE: 129 MMHG | HEIGHT: 63 IN | RESPIRATION RATE: 18 BRPM | WEIGHT: 269 LBS | OXYGEN SATURATION: 98 % | BODY MASS INDEX: 47.66 KG/M2 | TEMPERATURE: 97.2 F | DIASTOLIC BLOOD PRESSURE: 92 MMHG

## 2021-11-09 DIAGNOSIS — R59.1 LYMPHADENOPATHY: ICD-10-CM

## 2021-11-09 DIAGNOSIS — E66.01 MORBID OBESITY DUE TO EXCESS CALORIES (HCC): ICD-10-CM

## 2021-11-09 DIAGNOSIS — R76.0 ANTICARDIOLIPIN ANTIBODY POSITIVE: ICD-10-CM

## 2021-11-09 DIAGNOSIS — E61.1 IRON DEFICIENCY: ICD-10-CM

## 2021-11-09 DIAGNOSIS — O88.212 PULMONARY EMBOLISM AFFECTING PREGNANCY IN SECOND TRIMESTER: ICD-10-CM

## 2021-11-09 DIAGNOSIS — Z01.811 PRE-OP CHEST EXAM: ICD-10-CM

## 2021-11-09 LAB
ABSOLUTE EOS #: 0.77 K/UL (ref 0–0.44)
ABSOLUTE IMMATURE GRANULOCYTE: 0.04 K/UL (ref 0–0.3)
ABSOLUTE LYMPH #: 2.65 K/UL (ref 1.1–3.7)
ABSOLUTE MONO #: 0.58 K/UL (ref 0.1–1.2)
ANION GAP SERPL CALCULATED.3IONS-SCNC: 14 MMOL/L (ref 9–17)
BASOPHILS # BLD: 0 % (ref 0–2)
BASOPHILS ABSOLUTE: 0.04 K/UL (ref 0–0.2)
BUN BLDV-MCNC: 13 MG/DL (ref 6–20)
BUN/CREAT BLD: ABNORMAL (ref 9–20)
CALCIUM SERPL-MCNC: 9.1 MG/DL (ref 8.6–10.4)
CHLORIDE BLD-SCNC: 101 MMOL/L (ref 98–107)
CO2: 20 MMOL/L (ref 20–31)
CREAT SERPL-MCNC: 0.47 MG/DL (ref 0.5–0.9)
DIFFERENTIAL TYPE: ABNORMAL
EOSINOPHILS RELATIVE PERCENT: 6 % (ref 1–4)
FERRITIN: 108 UG/L (ref 13–150)
FOLATE: 9.2 NG/ML
GFR AFRICAN AMERICAN: >60 ML/MIN
GFR NON-AFRICAN AMERICAN: >60 ML/MIN
GFR SERPL CREATININE-BSD FRML MDRD: ABNORMAL ML/MIN/{1.73_M2}
GFR SERPL CREATININE-BSD FRML MDRD: ABNORMAL ML/MIN/{1.73_M2}
GLUCOSE BLD-MCNC: 97 MG/DL (ref 70–99)
HCT VFR BLD CALC: 42.1 % (ref 36.3–47.1)
HEMOGLOBIN: 13.5 G/DL (ref 11.9–15.1)
IMMATURE GRANULOCYTES: 0 %
IRON SATURATION: 20 % (ref 20–55)
IRON: 55 UG/DL (ref 37–145)
LYMPHOCYTES # BLD: 21 % (ref 24–43)
MCH RBC QN AUTO: 27.4 PG (ref 25.2–33.5)
MCHC RBC AUTO-ENTMCNC: 32.1 G/DL (ref 28.4–34.8)
MCV RBC AUTO: 85.4 FL (ref 82.6–102.9)
MONOCYTES # BLD: 5 % (ref 3–12)
NRBC AUTOMATED: 0 PER 100 WBC
PDW BLD-RTO: 14.6 % (ref 11.8–14.4)
PLATELET # BLD: 400 K/UL (ref 138–453)
PLATELET ESTIMATE: ABNORMAL
PMV BLD AUTO: 9.6 FL (ref 8.1–13.5)
POTASSIUM SERPL-SCNC: 4.1 MMOL/L (ref 3.7–5.3)
RBC # BLD: 4.93 M/UL (ref 3.95–5.11)
RBC # BLD: ABNORMAL 10*6/UL
SEG NEUTROPHILS: 68 % (ref 36–65)
SEGMENTED NEUTROPHILS ABSOLUTE COUNT: 8.87 K/UL (ref 1.5–8.1)
SODIUM BLD-SCNC: 135 MMOL/L (ref 135–144)
TOTAL IRON BINDING CAPACITY: 278 UG/DL (ref 250–450)
UNSATURATED IRON BINDING CAPACITY: 223 UG/DL (ref 112–347)
VITAMIN B-12: 506 PG/ML (ref 232–1245)
WBC # BLD: 13 K/UL (ref 3.5–11.3)
WBC # BLD: ABNORMAL 10*3/UL

## 2021-11-09 PROCEDURE — 83540 ASSAY OF IRON: CPT

## 2021-11-09 PROCEDURE — 36415 COLL VENOUS BLD VENIPUNCTURE: CPT

## 2021-11-09 PROCEDURE — 80048 BASIC METABOLIC PNL TOTAL CA: CPT

## 2021-11-09 PROCEDURE — 82746 ASSAY OF FOLIC ACID SERUM: CPT

## 2021-11-09 PROCEDURE — 71046 X-RAY EXAM CHEST 2 VIEWS: CPT

## 2021-11-09 PROCEDURE — 83550 IRON BINDING TEST: CPT

## 2021-11-09 PROCEDURE — 82728 ASSAY OF FERRITIN: CPT

## 2021-11-09 PROCEDURE — 82607 VITAMIN B-12: CPT

## 2021-11-09 PROCEDURE — G0480 DRUG TEST DEF 1-7 CLASSES: HCPCS

## 2021-11-09 PROCEDURE — 85025 COMPLETE CBC W/AUTO DIFF WBC: CPT

## 2021-11-09 ASSESSMENT — PAIN DESCRIPTION - ORIENTATION: ORIENTATION: RIGHT;LEFT

## 2021-11-09 ASSESSMENT — PAIN DESCRIPTION - LOCATION: LOCATION: SHOULDER

## 2021-11-09 ASSESSMENT — PAIN - FUNCTIONAL ASSESSMENT: PAIN_FUNCTIONAL_ASSESSMENT: ACTIVITIES ARE NOT PREVENTED

## 2021-11-09 ASSESSMENT — PAIN DESCRIPTION - PAIN TYPE: TYPE: CHRONIC PAIN

## 2021-11-09 ASSESSMENT — PAIN SCALES - GENERAL: PAINLEVEL_OUTOF10: 3

## 2021-11-09 ASSESSMENT — PAIN DESCRIPTION - DESCRIPTORS: DESCRIPTORS: ACHING

## 2021-11-09 NOTE — PROGRESS NOTES
Anesthesia Focused Assessment    Hx of anesthesia complications:  no  Family hx of anesthesia complications:  Mom- PONV      Prior + Covid-19 test? Yes, 9/15/21  Symptoms/Hospitalization?: BODY ACHE, COUGH, FEVER, CHEST PAIN, N&V X 3 WEEKS. STILL HAS COUGH. Denies SOB  Patient vaccinated: no      STOP-BANG Sleep Apnea Questionnaire    SNORE loudly (heard through closed doors)? Yes  TIRED, fatigued, sleepy during daytime? Yes  OBSERVED stopping breathing during sleep? Yes  High blood PRESSURE or being treated? No    BMI over 35? Yes  AGE over 48? No  NECK circumference over 16\"? Yes  GENDER (male)? No             Total 5  High risk 5-8  Intermediate risk 3-4  Low risk 0-2    ----------------------------------------------------------------------------------------------------------------------  SHELLEY:                                             yes  If yes, machine?:                          cpap- instructed to bring day of surgery    Type 1 DM:                                   no  T2DM:                                           Pre-DM    Coronary Artery Disease:             no  Hypertension:                               No, but h/o tachycardia, Rx:none  Defib / AICD / Pacemaker:           No  Hx PE with pregnancy, no longer on Lovenox, follows with Dr. 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 :                           Active smoker:                              no  Drinks Alcohol:                              no  Illicit drugs:                                    History of marijuana, last usage 4-5 months    Dentition:                                      Left lower tooth cracked     Past Medical History:   Diagnosis Date    Abnormal 1st trimester screen (Tri 21 1:117)--NIPT nml  11/30/2019    ADD (attention deficit disorder) without hyperactivity     Celestone 1/9 & 1/10 01/09/2020    Chronic back pain     Echogenic focus of heart of fetus affecting antepartum care of mother 11/30/2019   [place] early 1hr, nml 3hr  11/30/2019    1 elevated value, [facility] recommends repeat 3hr GTT in 2 weeks    Elevated umbilical artery dopplers  01/10/2020    Eosinopenia ([address]     Fetal ascites     Fetal heart rate decelerations affecting management of mother     GERD (gastroesophageal reflux disease)     Headache     Hypertension     Iron deficiency     follows with Dr. Faiza Vargas (hem/onc)    IUD (intrauterine device) in place 05/07/2020    Adina Mccallum    Lumbar radiculopathy 01/09/2019    Lumbar spondylosis 01/09/2019    Multigravida of advanced maternal age in third trimester     Obesity     SHELLEY (obstructive sleep apnea)     Cpap (was causing speech difficulties, resolved after started using cpap)    Personal history of other medical treatment     Axillary lympadenopathy    Pre-diabetes     Pulmonary embolism affecting pregnancy in second trimester 11/30/2019    Rh+/RI/GBSunk 01/08/2020    Rheumatoid arteritis ([address]     Tachycardia     Umbilical cord complication     Under care of team 09/07/2021    PCP: Dr. Hiren Hough, last visit 8/2021, clearance given already for surgery on 9/14/2021    Under care of team 09/07/2021    Neurology: Dr. Jasmyne Zendejas, last visit 7/2021    Under care of team 09/07/2021    Oncology: Dr. Faiza Vargas, Asmita Warner, last visit 3/2021, received clearance for surgery for 9/14/2021    Wears glasses          Patient was evaluated in PAT & anesthesia guidelines were applied.    NPO guidelines, medication instructions and scheduled arrival time were reviewed with patient. Patient was sent for post PAT anesthesia interview for covid + history     Anesthesia: Pt had been granted medical and hematology clearances for prior surgical date in September 2021. EKG on file from 9/2021. Any need for updated clearances? ? Thank you, Ro    Addendum at 1120: Pt met with anesthesia, no further orders. Copies of clearances already on file.                                                                                                                 MIKHAIL Preciado - CNP   11/9/21  10:06 AM

## 2021-11-09 NOTE — H&P
mother, Darene Nadeem early 1hr, nml 3hr , Elevated umbilical artery dopplers , Eosinopenia ([address] Fetal ascites, Fetal heart rate decelerations affecting management of mother, GERD (gastroesophageal reflux disease), Headache, Hypertension, Iron deficiency, IUD (intrauterine device) in place, Lumbar radiculopathy, Lumbar spondylosis, Multigravida of advanced maternal age in third trimester, Obesity, SHELLEY (obstructive sleep apnea), Personal history of other medical treatment, Pre-diabetes, Pulmonary embolism affecting pregnancy in second trimester, Rh+/RI/GBSunk, Rheumatoid arteritis ([address] Tachycardia, Umbilical cord complication, Under care of team, Under care of team, Under care of team, and Wears glasses. Past Surgical History   has a past surgical history that includes Tonsillectomy ();  section (Bilateral, 2020); intrauterine device insertion (2020); US BIOPSY LYMPH NODE (2020); Upper gastrointestinal endoscopy (N/A, 2021); Endoscopy, colon, diagnostic; and [place] tooth extraction. Social History   reports that she has never smoked. She has never used smokeless tobacco.    reports no history of alcohol use. reports previous drug use. Frequency: 2.00 times per week. Drug: Marijuana Pooja Gayle). Marital Status    Children 3  Occupation none  Family History  Family Status   Relation Name Status    Mother  Alive    Father  Albaro Resendez     family history includes Elevated Lipids in her mother; High Blood Pressure in her mother.     Review of Systems:  CONSTITUTIONAL:   negative for fevers, chills, fatigue and malaise    EYES:   negative for double vision, blurred vision and photophobia +glasses   HEENT:   negative for tinnitus, epistaxis and sore throat    RESPIRATORY:   + cough, productive at times, negative for shortness of breath, wheezing     CARDIOVASCULAR:   negative for chest pain, palpitations, syncope, edema     GASTROINTESTINAL:   negative for nausea, vomiting GENITOURINARY:   negative for incontinence     MUSCULOSKELETAL:   +RA, arthralgias    NEUROLOGICAL:   Negative for weakness and tingling  negative for headaches and dizziness     PSYCHIATRIC:   negative for anxiety       OBJECTIVE:   VITALS:  height is 5' 3\" (1.6 m) and weight is 269 lb (122 kg). Her temporal temperature is 97.2 °F (36.2 °C). Her blood pressure is 129/92 (abnormal) and her pulse is 106. Her respiration is 18 and oxygen saturation is 98%. CONSTITUTIONAL:alert & oriented x 3, no acute distress. Friendly. Quiet affect. SKIN:  Warm and dry, no rashes on exposed areas of skin   HEAD:  Normocephalic, atraumatic   EYES: EOMs intact. PERRL. Wearing glasses. EARS:  Equal bilaterally, no edema or thickening, skin is intact without lumps or lesions. No discharge. Hearing grossly WNL. NOSE:  Nares patent. No rhinorrhea   MOUTH/THROAT:  Mucous membranes moist, tongue is pink, uvula midline, teeth appear to be intact  NECK:supple, full ROM  LUNGS: Respirations even and non-labored. Clear to auscultation bilaterally, no wheezes, rales, or rhonchi. CARDIOVASCULAR: Regular rate and rhythm, no murmurs/rubs/gallops   ABDOMEN: soft, non-tender, non-distended, bowel sounds active x 4 , obese  EXTREMITIES: No edema bilateral lower extremities. No varicosities bilateral lower extremities. NEUROLOGIC: CN II-XII are grossly intact.  Gait is smooth, rhythmic and effortless     Testing:   EKG: on file from 9/7/21  Labs pending: drawn 11/9/2021 (Nicotine)  CMP and CBC with diff on file from 11/1/21 from rheumatologist   PTT, PT/INR on file from 9/7/21  Chest XRay:  11/9/21  Pt also brought outside lab orders from Dr. Gregory Gaines, orders given to lab  IMPRESSIONS:   Morbid obesity, GERD  PLANS:   XI ROBOTIC LAPAROSCOPIC GASTRECTOMY SLEEVE WITH EGD, LIVER BIOPSY, possible open    MIKHAIL RUGGIERO - CNP  Electronically signed 11/9/2021 at 10:41 AM

## 2021-11-09 NOTE — H&P (VIEW-ONLY)
History and Physical    Pt Name: Noam Chapin  MRN: [id number]  YOB: 1980  Date of evaluation: 11/9/2021  Primary Care Physician: Jess Liu MD    SUBJECTIVE:   History of Chief Complaint:    Noam Chapin is a 46 y.o. female who presents for PAT appointment. Patient complains of of difficulty with ability to lose weight and maintain that weight loss despite several attempts in the past.  She has several comorbid conditions that would be helped with weight loss. She has been following with bariatrics and has opted for surgical intervention. Patient's original surgery date was postponed d/t +covid on 9/15/21. Carmel Alejandro Patient has been scheduled for XI ROBOTIC LAPAROSCOPIC GASTRECTOMY SLEEVE WITH EGD, LIVER BIOPSY, possible open. Allergies  is allergic to iodine, other, cat hair extract, and dog epithelium. Medications  Prior to Admission medications    Medication Sig Start Date End Date Taking?  Authorizing Provider   albuterol sulfate HFA (VENTOLIN HFA) 108 (90 Base) MCG/ACT inhaler Inhale 2 puffs into the lungs 4 times daily as needed for Wheezing 10/21/21  Yes Jess Liu MD   FEROSUL 325 (65 Fe) MG tablet take 1 tablet by mouth every other day 7/7/21  Yes Fab Casarez MD   vitamin D (ERGOCALCIFEROL) 1.25 MG (49017 UT) CAPS capsule take 1 capsule by mouth every week 2/24/21  Yes MIKHAIL Medrano - CNP   albuterol (PROVENTIL) (2.5 MG/3ML) 0.083% nebulizer solution Take 3 mLs by nebulization daily as needed for Wheezing 12/2/19  Yes Davis Richard,    folic acid (FOLVITE) 1 MG tablet Take 1 mg by mouth daily   Yes Historical Provider, MD   cetirizine (ZYRTEC) 10 MG tablet daily as needed  11/18/20   Historical Provider, MD     Past Medical History    has a past medical history of Abnormal 1st trimester screen (Tri 21 1:117)--NIPT nml , ADD (attention deficit disorder) without hyperactivity, [place] 1/9 & 1/10, Chronic back pain, COVID, Echogenic focus of heart of fetus affecting antepartum care of mother, Norva Anderson early 1hr, nml 3hr , Elevated umbilical artery dopplers , Eosinopenia ([address] Fetal ascites, Fetal heart rate decelerations affecting management of mother, GERD (gastroesophageal reflux disease), Headache, Hypertension, Iron deficiency, IUD (intrauterine device) in place, Lumbar radiculopathy, Lumbar spondylosis, Multigravida of advanced maternal age in third trimester, Obesity, SHELLEY (obstructive sleep apnea), Personal history of other medical treatment, Pre-diabetes, Pulmonary embolism affecting pregnancy in second trimester, Rh+/RI/GBSunk, Rheumatoid arteritis ([address] Tachycardia, Umbilical cord complication, Under care of team, Under care of team, Under care of team, and Wears glasses. Past Surgical History   has a past surgical history that includes Tonsillectomy ();  section (Bilateral, 2020); intrauterine device insertion (2020); US BIOPSY LYMPH NODE (2020); Upper gastrointestinal endoscopy (N/A, 2021); Endoscopy, colon, diagnostic; and [place] tooth extraction. Social History   reports that she has never smoked. She has never used smokeless tobacco.    reports no history of alcohol use. reports previous drug use. Frequency: 2.00 times per week. Drug: Marijuana Brooke Mayco). Marital Status    Children 3  Occupation none  Family History  Family Status   Relation Name Status    Mother  Alive    Father  Donya Martino     family history includes Elevated Lipids in her mother; High Blood Pressure in her mother.     Review of Systems:  CONSTITUTIONAL:   negative for fevers, chills, fatigue and malaise    EYES:   negative for double vision, blurred vision and photophobia +glasses   HEENT:   negative for tinnitus, epistaxis and sore throat    RESPIRATORY:   + cough, productive at times, negative for shortness of breath, wheezing     CARDIOVASCULAR:   negative for chest pain, palpitations, syncope, edema     GASTROINTESTINAL:   negative for nausea, vomiting GENITOURINARY:   negative for incontinence     MUSCULOSKELETAL:   +RA, arthralgias    NEUROLOGICAL:   Negative for weakness and tingling  negative for headaches and dizziness     PSYCHIATRIC:   negative for anxiety       OBJECTIVE:   VITALS:  height is 5' 3\" (1.6 m) and weight is 269 lb (122 kg). Her temporal temperature is 97.2 °F (36.2 °C). Her blood pressure is 129/92 (abnormal) and her pulse is 106. Her respiration is 18 and oxygen saturation is 98%. CONSTITUTIONAL:alert & oriented x 3, no acute distress. Friendly. Quiet affect. SKIN:  Warm and dry, no rashes on exposed areas of skin   HEAD:  Normocephalic, atraumatic   EYES: EOMs intact. PERRL. Wearing glasses. EARS:  Equal bilaterally, no edema or thickening, skin is intact without lumps or lesions. No discharge. Hearing grossly WNL. NOSE:  Nares patent. No rhinorrhea   MOUTH/THROAT:  Mucous membranes moist, tongue is pink, uvula midline, teeth appear to be intact  NECK:supple, full ROM  LUNGS: Respirations even and non-labored. Clear to auscultation bilaterally, no wheezes, rales, or rhonchi. CARDIOVASCULAR: Regular rate and rhythm, no murmurs/rubs/gallops   ABDOMEN: soft, non-tender, non-distended, bowel sounds active x 4 , obese  EXTREMITIES: No edema bilateral lower extremities. No varicosities bilateral lower extremities. NEUROLOGIC: CN II-XII are grossly intact.  Gait is smooth, rhythmic and effortless     Testing:   EKG: on file from 9/7/21  Labs pending: drawn 11/9/2021 (Nicotine)  CMP and CBC with diff on file from 11/1/21 from rheumatologist   PTT, PT/INR on file from 9/7/21  Chest XRay:  11/9/21  Pt also brought outside lab orders from Dr. Army Connell, orders given to lab  IMPRESSIONS:   Morbid obesity, GERD  PLANS:   XI ROBOTIC LAPAROSCOPIC GASTRECTOMY SLEEVE WITH EGD, LIVER BIOPSY, possible open    MIKHAIL RUGGIERO CNP  Electronically signed 11/9/2021 at 10:41 AM

## 2021-11-12 LAB
3-OH-COTININE: <2 NG/ML
COTININE: <2 NG/ML
NICOTINE: <2 NG/ML

## 2021-11-17 ENCOUNTER — HOSPITAL ENCOUNTER (OUTPATIENT)
Facility: MEDICAL CENTER | Age: 41
End: 2021-11-17

## 2021-11-18 ENCOUNTER — TELEPHONE (OUTPATIENT)
Dept: BARIATRICS/WEIGHT MGMT | Age: 41
End: 2021-11-18

## 2021-11-18 DIAGNOSIS — E66.01 MORBID OBESITY DUE TO EXCESS CALORIES (HCC): ICD-10-CM

## 2021-11-18 DIAGNOSIS — E61.1 IRON DEFICIENCY: ICD-10-CM

## 2021-11-18 DIAGNOSIS — O88.212 PULMONARY EMBOLISM AFFECTING PREGNANCY IN SECOND TRIMESTER: ICD-10-CM

## 2021-11-18 DIAGNOSIS — R59.1 LYMPHADENOPATHY: ICD-10-CM

## 2021-11-18 DIAGNOSIS — D70.9 EOSINOPENIA (HCC): ICD-10-CM

## 2021-11-19 RX ORDER — FERROUS SULFATE 325(65) MG
TABLET ORAL
Qty: 30 TABLET | Refills: 6 | Status: ON HOLD | OUTPATIENT
Start: 2021-11-19 | End: 2021-11-24 | Stop reason: HOSPADM

## 2021-11-23 ENCOUNTER — ANESTHESIA (OUTPATIENT)
Dept: OPERATING ROOM | Age: 41
End: 2021-11-23
Payer: MEDICARE

## 2021-11-23 ENCOUNTER — ANESTHESIA EVENT (OUTPATIENT)
Dept: OPERATING ROOM | Age: 41
End: 2021-11-23
Payer: MEDICARE

## 2021-11-23 ENCOUNTER — HOSPITAL ENCOUNTER (OUTPATIENT)
Age: 41
Setting detail: OBSERVATION
Discharge: HOME | End: 2021-11-24
Attending: SURGERY | Admitting: SURGERY
Payer: MEDICARE

## 2021-11-23 VITALS — SYSTOLIC BLOOD PRESSURE: 107 MMHG | OXYGEN SATURATION: 92 % | DIASTOLIC BLOOD PRESSURE: 62 MMHG | TEMPERATURE: 98.1 F

## 2021-11-23 DIAGNOSIS — Z98.84 S/P LAPAROSCOPIC SLEEVE GASTRECTOMY: Primary | ICD-10-CM

## 2021-11-23 LAB
ANION GAP SERPL CALCULATED.3IONS-SCNC: 15 MMOL/L (ref 9–17)
BUN BLDV-MCNC: 7 MG/DL (ref 6–20)
BUN/CREAT BLD: ABNORMAL (ref 9–20)
CALCIUM SERPL-MCNC: 8.7 MG/DL (ref 8.6–10.4)
CHLORIDE BLD-SCNC: 102 MMOL/L (ref 98–107)
CO2: 21 MMOL/L (ref 20–31)
CREAT SERPL-MCNC: 0.63 MG/DL (ref 0.5–0.9)
GFR AFRICAN AMERICAN: >60 ML/MIN
GFR NON-AFRICAN AMERICAN: >60 ML/MIN
GFR SERPL CREATININE-BSD FRML MDRD: ABNORMAL ML/MIN/{1.73_M2}
GFR SERPL CREATININE-BSD FRML MDRD: ABNORMAL ML/MIN/{1.73_M2}
GLUCOSE BLD-MCNC: 207 MG/DL (ref 70–99)
HCG, PREGNANCY URINE (POC): NEGATIVE
HCT VFR BLD CALC: 38.4 % (ref 36.3–47.1)
HEMOGLOBIN: 12.3 G/DL (ref 11.9–15.1)
MCH RBC QN AUTO: 27 PG (ref 25.2–33.5)
MCHC RBC AUTO-ENTMCNC: 32 G/DL (ref 28.4–34.8)
MCV RBC AUTO: 84.4 FL (ref 82.6–102.9)
NRBC AUTOMATED: 0 PER 100 WBC
PDW BLD-RTO: 14.2 % (ref 11.8–14.4)
PLATELET # BLD: 381 K/UL (ref 138–453)
PMV BLD AUTO: 9.4 FL (ref 8.1–13.5)
POTASSIUM SERPL-SCNC: 3.6 MMOL/L (ref 3.7–5.3)
RBC # BLD: 4.55 M/UL (ref 3.95–5.11)
SODIUM BLD-SCNC: 138 MMOL/L (ref 135–144)
WBC # BLD: 12.9 K/UL (ref 3.5–11.3)

## 2021-11-23 PROCEDURE — 2500000003 HC RX 250 WO HCPCS

## 2021-11-23 PROCEDURE — 43775 LAP SLEEVE GASTRECTOMY: CPT | Performed by: SURGERY

## 2021-11-23 PROCEDURE — 6360000002 HC RX W HCPCS: Performed by: SURGERY

## 2021-11-23 PROCEDURE — 80048 BASIC METABOLIC PNL TOTAL CA: CPT

## 2021-11-23 PROCEDURE — 81025 URINE PREGNANCY TEST: CPT

## 2021-11-23 PROCEDURE — 3600000019 HC SURGERY ROBOT ADDTL 15MIN: Performed by: SURGERY

## 2021-11-23 PROCEDURE — G0378 HOSPITAL OBSERVATION PER HR: HCPCS

## 2021-11-23 PROCEDURE — 85027 COMPLETE CBC AUTOMATED: CPT

## 2021-11-23 PROCEDURE — 6360000002 HC RX W HCPCS: Performed by: NURSE ANESTHETIST, CERTIFIED REGISTERED

## 2021-11-23 PROCEDURE — 3700000000 HC ANESTHESIA ATTENDED CARE: Performed by: SURGERY

## 2021-11-23 PROCEDURE — 2720000010 HC SURG SUPPLY STERILE: Performed by: SURGERY

## 2021-11-23 PROCEDURE — 7100000000 HC PACU RECOVERY - FIRST 15 MIN: Performed by: SURGERY

## 2021-11-23 PROCEDURE — 88307 TISSUE EXAM BY PATHOLOGIST: CPT

## 2021-11-23 PROCEDURE — 2580000003 HC RX 258: Performed by: SURGERY

## 2021-11-23 PROCEDURE — S2900 ROBOTIC SURGICAL SYSTEM: HCPCS | Performed by: SURGERY

## 2021-11-23 PROCEDURE — 6360000002 HC RX W HCPCS

## 2021-11-23 PROCEDURE — 7100000001 HC PACU RECOVERY - ADDTL 15 MIN: Performed by: SURGERY

## 2021-11-23 PROCEDURE — 2500000003 HC RX 250 WO HCPCS: Performed by: SURGERY

## 2021-11-23 PROCEDURE — 2580000003 HC RX 258: Performed by: ANESTHESIOLOGY

## 2021-11-23 PROCEDURE — 6370000000 HC RX 637 (ALT 250 FOR IP): Performed by: SURGERY

## 2021-11-23 PROCEDURE — 2500000003 HC RX 250 WO HCPCS: Performed by: NURSE ANESTHETIST, CERTIFIED REGISTERED

## 2021-11-23 PROCEDURE — 3600000009 HC SURGERY ROBOT BASE: Performed by: SURGERY

## 2021-11-23 PROCEDURE — 76937 US GUIDE VASCULAR ACCESS: CPT

## 2021-11-23 PROCEDURE — 3700000001 HC ADD 15 MINUTES (ANESTHESIA): Performed by: SURGERY

## 2021-11-23 PROCEDURE — 2709999900 HC NON-CHARGEABLE SUPPLY: Performed by: SURGERY

## 2021-11-23 PROCEDURE — 6360000002 HC RX W HCPCS: Performed by: ANESTHESIOLOGY

## 2021-11-23 PROCEDURE — 96372 THER/PROPH/DIAG INJ SC/IM: CPT

## 2021-11-23 RX ORDER — FENTANYL CITRATE 50 UG/ML
INJECTION, SOLUTION INTRAMUSCULAR; INTRAVENOUS PRN
Status: DISCONTINUED | OUTPATIENT
Start: 2021-11-23 | End: 2021-11-23 | Stop reason: SDUPTHER

## 2021-11-23 RX ORDER — SODIUM CHLORIDE 9 MG/ML
25 INJECTION, SOLUTION INTRAVENOUS PRN
Status: DISCONTINUED | OUTPATIENT
Start: 2021-11-23 | End: 2021-11-24 | Stop reason: HOSPADM

## 2021-11-23 RX ORDER — PROMETHAZINE HYDROCHLORIDE 25 MG/ML
12.5 INJECTION, SOLUTION INTRAMUSCULAR; INTRAVENOUS EVERY 6 HOURS PRN
Status: DISCONTINUED | OUTPATIENT
Start: 2021-11-23 | End: 2021-11-24 | Stop reason: HOSPADM

## 2021-11-23 RX ORDER — MORPHINE SULFATE 2 MG/ML
2 INJECTION, SOLUTION INTRAMUSCULAR; INTRAVENOUS EVERY 5 MIN PRN
Status: DISCONTINUED | OUTPATIENT
Start: 2021-11-23 | End: 2021-11-23 | Stop reason: HOSPADM

## 2021-11-23 RX ORDER — DEXAMETHASONE SODIUM PHOSPHATE 10 MG/ML
INJECTION INTRAMUSCULAR; INTRAVENOUS PRN
Status: DISCONTINUED | OUTPATIENT
Start: 2021-11-23 | End: 2021-11-23 | Stop reason: SDUPTHER

## 2021-11-23 RX ORDER — SODIUM CHLORIDE 0.9 % (FLUSH) 0.9 %
5-40 SYRINGE (ML) INJECTION PRN
Status: DISCONTINUED | OUTPATIENT
Start: 2021-11-23 | End: 2021-11-24 | Stop reason: HOSPADM

## 2021-11-23 RX ORDER — IPRATROPIUM BROMIDE AND ALBUTEROL SULFATE 2.5; .5 MG/3ML; MG/3ML
1 SOLUTION RESPIRATORY (INHALATION)
Status: DISCONTINUED | OUTPATIENT
Start: 2021-11-23 | End: 2021-11-24 | Stop reason: HOSPADM

## 2021-11-23 RX ORDER — DIPHENHYDRAMINE HYDROCHLORIDE 50 MG/ML
12.5 INJECTION INTRAMUSCULAR; INTRAVENOUS
Status: DISCONTINUED | OUTPATIENT
Start: 2021-11-23 | End: 2021-11-23 | Stop reason: HOSPADM

## 2021-11-23 RX ORDER — SODIUM CHLORIDE, SODIUM LACTATE, POTASSIUM CHLORIDE, CALCIUM CHLORIDE 600; 310; 30; 20 MG/100ML; MG/100ML; MG/100ML; MG/100ML
INJECTION, SOLUTION INTRAVENOUS CONTINUOUS
Status: DISCONTINUED | OUTPATIENT
Start: 2021-11-23 | End: 2021-11-24

## 2021-11-23 RX ORDER — SCOLOPAMINE TRANSDERMAL SYSTEM 1 MG/1
1 PATCH, EXTENDED RELEASE TRANSDERMAL
Status: DISCONTINUED | OUTPATIENT
Start: 2021-11-23 | End: 2021-11-24 | Stop reason: HOSPADM

## 2021-11-23 RX ORDER — MIDAZOLAM HYDROCHLORIDE 1 MG/ML
INJECTION INTRAMUSCULAR; INTRAVENOUS PRN
Status: DISCONTINUED | OUTPATIENT
Start: 2021-11-23 | End: 2021-11-23 | Stop reason: SDUPTHER

## 2021-11-23 RX ORDER — FENTANYL CITRATE 50 UG/ML
25 INJECTION, SOLUTION INTRAMUSCULAR; INTRAVENOUS EVERY 5 MIN PRN
Status: DISCONTINUED | OUTPATIENT
Start: 2021-11-23 | End: 2021-11-23 | Stop reason: HOSPADM

## 2021-11-23 RX ORDER — ONDANSETRON 2 MG/ML
4 INJECTION INTRAMUSCULAR; INTRAVENOUS
Status: DISCONTINUED | OUTPATIENT
Start: 2021-11-23 | End: 2021-11-23 | Stop reason: HOSPADM

## 2021-11-23 RX ORDER — LABETALOL HYDROCHLORIDE 5 MG/ML
5 INJECTION, SOLUTION INTRAVENOUS EVERY 10 MIN PRN
Status: DISCONTINUED | OUTPATIENT
Start: 2021-11-23 | End: 2021-11-23 | Stop reason: HOSPADM

## 2021-11-23 RX ORDER — DIPHENHYDRAMINE HYDROCHLORIDE 50 MG/ML
INJECTION INTRAMUSCULAR; INTRAVENOUS PRN
Status: DISCONTINUED | OUTPATIENT
Start: 2021-11-23 | End: 2021-11-23 | Stop reason: SDUPTHER

## 2021-11-23 RX ORDER — CEFAZOLIN SODIUM 1 G/3ML
INJECTION, POWDER, FOR SOLUTION INTRAMUSCULAR; INTRAVENOUS PRN
Status: DISCONTINUED | OUTPATIENT
Start: 2021-11-23 | End: 2021-11-23 | Stop reason: SDUPTHER

## 2021-11-23 RX ORDER — FAMOTIDINE 20 MG/1
20 TABLET, FILM COATED ORAL 2 TIMES DAILY
Status: DISCONTINUED | OUTPATIENT
Start: 2021-11-23 | End: 2021-11-24 | Stop reason: HOSPADM

## 2021-11-23 RX ORDER — SODIUM CHLORIDE 0.9 % (FLUSH) 0.9 %
5-40 SYRINGE (ML) INJECTION EVERY 12 HOURS SCHEDULED
Status: DISCONTINUED | OUTPATIENT
Start: 2021-11-23 | End: 2021-11-24 | Stop reason: HOSPADM

## 2021-11-23 RX ORDER — ROCURONIUM BROMIDE 10 MG/ML
INJECTION, SOLUTION INTRAVENOUS PRN
Status: DISCONTINUED | OUTPATIENT
Start: 2021-11-23 | End: 2021-11-23 | Stop reason: SDUPTHER

## 2021-11-23 RX ORDER — OXYCODONE HYDROCHLORIDE AND ACETAMINOPHEN 5; 325 MG/1; MG/1
1 TABLET ORAL PRN
Status: DISCONTINUED | OUTPATIENT
Start: 2021-11-23 | End: 2021-11-23 | Stop reason: HOSPADM

## 2021-11-23 RX ORDER — ONDANSETRON 2 MG/ML
INJECTION INTRAMUSCULAR; INTRAVENOUS PRN
Status: DISCONTINUED | OUTPATIENT
Start: 2021-11-23 | End: 2021-11-23 | Stop reason: SDUPTHER

## 2021-11-23 RX ORDER — ONDANSETRON 2 MG/ML
4 INJECTION INTRAMUSCULAR; INTRAVENOUS EVERY 6 HOURS PRN
Status: DISCONTINUED | OUTPATIENT
Start: 2021-11-23 | End: 2021-11-24 | Stop reason: HOSPADM

## 2021-11-23 RX ORDER — OXYCODONE HYDROCHLORIDE AND ACETAMINOPHEN 5; 325 MG/1; MG/1
2 TABLET ORAL PRN
Status: DISCONTINUED | OUTPATIENT
Start: 2021-11-23 | End: 2021-11-23 | Stop reason: HOSPADM

## 2021-11-23 RX ORDER — HEPARIN SODIUM 5000 [USP'U]/ML
INJECTION, SOLUTION INTRAVENOUS; SUBCUTANEOUS
Status: COMPLETED
Start: 2021-11-23 | End: 2021-11-23

## 2021-11-23 RX ORDER — PROPOFOL 10 MG/ML
INJECTION, EMULSION INTRAVENOUS PRN
Status: DISCONTINUED | OUTPATIENT
Start: 2021-11-23 | End: 2021-11-23 | Stop reason: SDUPTHER

## 2021-11-23 RX ORDER — HEPARIN SODIUM 5000 [USP'U]/ML
5000 INJECTION, SOLUTION INTRAVENOUS; SUBCUTANEOUS EVERY 8 HOURS SCHEDULED
Status: DISCONTINUED | OUTPATIENT
Start: 2021-11-23 | End: 2021-11-24 | Stop reason: HOSPADM

## 2021-11-23 RX ORDER — GLYCOPYRROLATE 1 MG/5 ML
SYRINGE (ML) INTRAVENOUS PRN
Status: DISCONTINUED | OUTPATIENT
Start: 2021-11-23 | End: 2021-11-23 | Stop reason: SDUPTHER

## 2021-11-23 RX ORDER — SODIUM CHLORIDE, SODIUM LACTATE, POTASSIUM CHLORIDE, CALCIUM CHLORIDE 600; 310; 30; 20 MG/100ML; MG/100ML; MG/100ML; MG/100ML
1000 INJECTION, SOLUTION INTRAVENOUS CONTINUOUS
Status: DISCONTINUED | OUTPATIENT
Start: 2021-11-23 | End: 2021-11-23

## 2021-11-23 RX ORDER — HEPARIN SODIUM 5000 [USP'U]/ML
5000 INJECTION, SOLUTION INTRAVENOUS; SUBCUTANEOUS ONCE
Status: COMPLETED | OUTPATIENT
Start: 2021-11-23 | End: 2021-11-23

## 2021-11-23 RX ORDER — LIDOCAINE HYDROCHLORIDE 10 MG/ML
INJECTION, SOLUTION EPIDURAL; INFILTRATION; INTRACAUDAL; PERINEURAL PRN
Status: DISCONTINUED | OUTPATIENT
Start: 2021-11-23 | End: 2021-11-23 | Stop reason: SDUPTHER

## 2021-11-23 RX ORDER — NEOSTIGMINE METHYLSULFATE 5 MG/5 ML
SYRINGE (ML) INTRAVENOUS PRN
Status: DISCONTINUED | OUTPATIENT
Start: 2021-11-23 | End: 2021-11-23 | Stop reason: SDUPTHER

## 2021-11-23 RX ORDER — OXYCODONE HYDROCHLORIDE AND ACETAMINOPHEN 5; 325 MG/1; MG/1
1 TABLET ORAL ONCE
Status: DISCONTINUED | OUTPATIENT
Start: 2021-11-23 | End: 2021-11-24 | Stop reason: HOSPADM

## 2021-11-23 RX ORDER — MAGNESIUM HYDROXIDE 1200 MG/15ML
LIQUID ORAL CONTINUOUS PRN
Status: COMPLETED | OUTPATIENT
Start: 2021-11-23 | End: 2021-11-23

## 2021-11-23 RX ORDER — BUPIVACAINE HYDROCHLORIDE 5 MG/ML
INJECTION, SOLUTION PERINEURAL PRN
Status: DISCONTINUED | OUTPATIENT
Start: 2021-11-23 | End: 2021-11-23 | Stop reason: ALTCHOICE

## 2021-11-23 RX ADMIN — FAMOTIDINE 20 MG: 20 TABLET, FILM COATED ORAL at 21:37

## 2021-11-23 RX ADMIN — SODIUM CHLORIDE, POTASSIUM CHLORIDE, SODIUM LACTATE AND CALCIUM CHLORIDE: 600; 310; 30; 20 INJECTION, SOLUTION INTRAVENOUS at 15:41

## 2021-11-23 RX ADMIN — FENTANYL CITRATE 50 MCG: 50 INJECTION, SOLUTION INTRAMUSCULAR; INTRAVENOUS at 14:28

## 2021-11-23 RX ADMIN — MORPHINE SULFATE 2 MG: 2 INJECTION, SOLUTION INTRAMUSCULAR; INTRAVENOUS at 16:13

## 2021-11-23 RX ADMIN — Medication 12.5 MG: at 13:02

## 2021-11-23 RX ADMIN — HEPARIN SODIUM 5000 UNITS: 5000 INJECTION INTRAVENOUS; SUBCUTANEOUS at 21:37

## 2021-11-23 RX ADMIN — Medication 0.4 MG: at 14:20

## 2021-11-23 RX ADMIN — HEPARIN SODIUM 5000 UNITS: 5000 INJECTION INTRAVENOUS; SUBCUTANEOUS at 12:41

## 2021-11-23 RX ADMIN — ONDANSETRON 4 MG: 2 INJECTION INTRAMUSCULAR; INTRAVENOUS at 21:48

## 2021-11-23 RX ADMIN — DEXAMETHASONE SODIUM PHOSPHATE 10 MG: 10 INJECTION INTRAMUSCULAR; INTRAVENOUS at 13:02

## 2021-11-23 RX ADMIN — CEFAZOLIN 3000 MG: 1 INJECTION, POWDER, FOR SOLUTION INTRAMUSCULAR; INTRAVENOUS at 13:02

## 2021-11-23 RX ADMIN — SODIUM CHLORIDE, POTASSIUM CHLORIDE, SODIUM LACTATE AND CALCIUM CHLORIDE 1000 ML: 600; 310; 30; 20 INJECTION, SOLUTION INTRAVENOUS at 12:40

## 2021-11-23 RX ADMIN — PROPOFOL 200 MG: 10 INJECTION, EMULSION INTRAVENOUS at 12:48

## 2021-11-23 RX ADMIN — Medication 3 MG: at 14:20

## 2021-11-23 RX ADMIN — LIDOCAINE HYDROCHLORIDE 50 MG: 10 INJECTION, SOLUTION EPIDURAL; INFILTRATION; INTRACAUDAL; PERINEURAL at 12:48

## 2021-11-23 RX ADMIN — SODIUM CHLORIDE, PRESERVATIVE FREE 10 ML: 5 INJECTION INTRAVENOUS at 21:49

## 2021-11-23 RX ADMIN — FENTANYL CITRATE 50 MCG: 50 INJECTION, SOLUTION INTRAMUSCULAR; INTRAVENOUS at 14:25

## 2021-11-23 RX ADMIN — PROMETHAZINE HYDROCHLORIDE 12.5 MG: 25 INJECTION INTRAMUSCULAR; INTRAVENOUS at 15:29

## 2021-11-23 RX ADMIN — HEPARIN SODIUM 5000 UNITS: 5000 INJECTION INTRAVENOUS; SUBCUTANEOUS at 12:32

## 2021-11-23 RX ADMIN — MIDAZOLAM HYDROCHLORIDE 2 MG: 1 INJECTION, SOLUTION INTRAMUSCULAR; INTRAVENOUS at 12:43

## 2021-11-23 RX ADMIN — ONDANSETRON 4 MG: 2 INJECTION, SOLUTION INTRAMUSCULAR; INTRAVENOUS at 14:16

## 2021-11-23 RX ADMIN — FENTANYL CITRATE 100 MCG: 50 INJECTION, SOLUTION INTRAMUSCULAR; INTRAVENOUS at 12:56

## 2021-11-23 RX ADMIN — HYDROMORPHONE HYDROCHLORIDE 1 MG: 1 INJECTION, SOLUTION INTRAMUSCULAR; INTRAVENOUS; SUBCUTANEOUS at 18:13

## 2021-11-23 RX ADMIN — MORPHINE SULFATE 2 MG: 2 INJECTION, SOLUTION INTRAMUSCULAR; INTRAVENOUS at 16:30

## 2021-11-23 RX ADMIN — ROCURONIUM BROMIDE 50 MG: 10 INJECTION INTRAVENOUS at 12:48

## 2021-11-23 ASSESSMENT — PULMONARY FUNCTION TESTS
PIF_VALUE: 24
PIF_VALUE: 23
PIF_VALUE: 23
PIF_VALUE: 24
PIF_VALUE: 23
PIF_VALUE: 15
PIF_VALUE: 30
PIF_VALUE: 30
PIF_VALUE: 31
PIF_VALUE: 3
PIF_VALUE: 31
PIF_VALUE: 28
PIF_VALUE: 32
PIF_VALUE: 32
PIF_VALUE: 5
PIF_VALUE: 28
PIF_VALUE: 30
PIF_VALUE: 28
PIF_VALUE: 24
PIF_VALUE: 0
PIF_VALUE: 23
PIF_VALUE: 30
PIF_VALUE: 30
PIF_VALUE: 7
PIF_VALUE: 25
PIF_VALUE: 4
PIF_VALUE: 31
PIF_VALUE: 31
PIF_VALUE: 0
PIF_VALUE: 30
PIF_VALUE: 1
PIF_VALUE: 33
PIF_VALUE: 21
PIF_VALUE: 31
PIF_VALUE: 31
PIF_VALUE: 29
PIF_VALUE: 4
PIF_VALUE: 24
PIF_VALUE: 31
PIF_VALUE: 29
PIF_VALUE: 26
PIF_VALUE: 12
PIF_VALUE: 31
PIF_VALUE: 32
PIF_VALUE: 25
PIF_VALUE: 25
PIF_VALUE: 32
PIF_VALUE: 30
PIF_VALUE: 30
PIF_VALUE: 16
PIF_VALUE: 30
PIF_VALUE: 24
PIF_VALUE: 30
PIF_VALUE: 29
PIF_VALUE: 29
PIF_VALUE: 30
PIF_VALUE: 2
PIF_VALUE: 30
PIF_VALUE: 30
PIF_VALUE: 23
PIF_VALUE: 31
PIF_VALUE: 28
PIF_VALUE: 23
PIF_VALUE: 30
PIF_VALUE: 24
PIF_VALUE: 30
PIF_VALUE: 29
PIF_VALUE: 30
PIF_VALUE: 30
PIF_VALUE: 31
PIF_VALUE: 3
PIF_VALUE: 24
PIF_VALUE: 24
PIF_VALUE: 23
PIF_VALUE: 30
PIF_VALUE: 34
PIF_VALUE: 30
PIF_VALUE: 25
PIF_VALUE: 14
PIF_VALUE: 0
PIF_VALUE: 24
PIF_VALUE: 31
PIF_VALUE: 25
PIF_VALUE: 2
PIF_VALUE: 30
PIF_VALUE: 30
PIF_VALUE: 24
PIF_VALUE: 29
PIF_VALUE: 29
PIF_VALUE: 2
PIF_VALUE: 29
PIF_VALUE: 32
PIF_VALUE: 29
PIF_VALUE: 29
PIF_VALUE: 2
PIF_VALUE: 8
PIF_VALUE: 28
PIF_VALUE: 30
PIF_VALUE: 28
PIF_VALUE: 30
PIF_VALUE: 30
PIF_VALUE: 23
PIF_VALUE: 17
PIF_VALUE: 3
PIF_VALUE: 23
PIF_VALUE: 2
PIF_VALUE: 21
PIF_VALUE: 24
PIF_VALUE: 30
PIF_VALUE: 1
PIF_VALUE: 4
PIF_VALUE: 28
PIF_VALUE: 30
PIF_VALUE: 32
PIF_VALUE: 30
PIF_VALUE: 24
PIF_VALUE: 23
PIF_VALUE: 31
PIF_VALUE: 30
PIF_VALUE: 4
PIF_VALUE: 0
PIF_VALUE: 1
PIF_VALUE: 25

## 2021-11-23 ASSESSMENT — PAIN SCALES - GENERAL
PAINLEVEL_OUTOF10: 8
PAINLEVEL_OUTOF10: 6
PAINLEVEL_OUTOF10: 2
PAINLEVEL_OUTOF10: 9
PAINLEVEL_OUTOF10: 8
PAINLEVEL_OUTOF10: 7
PAINLEVEL_OUTOF10: 8
PAINLEVEL_OUTOF10: 9
PAINLEVEL_OUTOF10: 8
PAINLEVEL_OUTOF10: 9

## 2021-11-23 ASSESSMENT — PAIN DESCRIPTION - PAIN TYPE
TYPE: SURGICAL PAIN

## 2021-11-23 ASSESSMENT — PAIN DESCRIPTION - ORIENTATION
ORIENTATION: MID

## 2021-11-23 ASSESSMENT — PAIN DESCRIPTION - DESCRIPTORS: DESCRIPTORS: CONSTANT

## 2021-11-23 ASSESSMENT — PAIN DESCRIPTION - LOCATION
LOCATION: ABDOMEN

## 2021-11-23 ASSESSMENT — PAIN - FUNCTIONAL ASSESSMENT: PAIN_FUNCTIONAL_ASSESSMENT: 0-10

## 2021-11-23 NOTE — INTERVAL H&P NOTE
Pt Name: Lesia Vance  MRN: [id number]  YOB: 1980  Date of evaluation: 11/23/2021    I have reviewed the patient's history and physical examination completed in pre-admission testing on 11/9/21    No changes to history or on examination today, unless noted below. None.      Richard Tesfaye, MIKHAIL - CNP  11/23/21  12:47 PM

## 2021-11-23 NOTE — OP NOTE
Operative Note      Patient: Farooq Maharaj  YOB: 1980  MRN: [id number]    Date of Procedure: 11/23/2021    Pre-Op Diagnosis: MORBID OBESITY, BMI 47, OBSTRUCTIVE SLEEP APNEA    Post-Op Diagnosis: Same       Procedure(s):  XI ROBOTIC LAPAROSCOPIC GASTRECTOMY SLEEVE WITH EGD    Surgeon(s):  Yuriy Robins DO    Assistant:   Resident: Portillo Cooper DO    Anesthesia: General    Estimated Blood Loss (mL): Minimal    Complications: None    Specimens:   ID Type Source Tests Collected by Time Destination   A : PORTION OF STOMACH Tissue Stomach SURGICAL PATHOLOGY Yuriy Robins DO 11/23/2021 1416        Implants:  * No implants in log *      Drains: * No LDAs found *    Findings:   Hemostatic Staple lines  Negative leak test    Detailed Description of Procedure:     Operative narrative: The patient was taken to the operative suite and administered anesthesia by the anesthetic team.  Next we prepped and draped in normal sterile fashion. Incision was then made at Pope[place] point for a 12 mm port. The abdomen was surveyed and we entered the abdomen using a optical Visiport trocar of 12 mm in size. This was accomplished at Pope's point. Pneumoperitoneum was then established without complication, the underlying area surveyed. We then placed 4 other ports in standard fashion under direct laparoscopic visualization. Attention was then turned towards placement of the [facility] liver retractor. Small incision made just inferior to the xiphoid process for the liver retractor. The liver retractor was then placed under direct laparoscopic visualization in order to facilitate visualization of the stomach and hiatus. No visible hiatal hernia. We then turned our attention towards formation of the gastric sleeve. Starting at 6 cm proximal to the pylorus bite dissection was undertaken of the greater curvature and the short gastric vessels taken down using the Vessel Sealer.   We mobilized this from our 6 cm starting point to the left beka. Satisfactory mobilization was undertaken and there were noted to be no adhesions posteriorly on the stomach between the stomach and the pancreas or retroperitoneum. I then had anesthesia pass a 36 [place] bougie under direct visualization. This was visualized at the tip of the bougie as well as along the lesser curve. I then placed my first staple load a green 60 mm load to start sleeve formation. The bougie was noted be along the lesser curve and was freely mobile before the first green load was fired. A second green load 60 mm stapler was then placed again using the bougie for assistance and guidance along the lesser curvature. I then used blue 60 mm stapler loads to complete sleeve formation. The last staple load was noted to fire in proper orientation to prevent staple line formation along the esophageal fibers. Satisfactory sleeve formation was noted at that time and the staple line was hemostatic. I then had the bougie removed. I then reinforced my staple line with 3-0 Vicryl sutures along the length of the staple line at the staple line confluences. At that time leak test was then started. The patient was placed in a Trendelenburg position and we irrigated the upper abdomen with saline. I then passed an Olympus endoscope into the oropharynx down the esophagus under direct visualization. The scope was passed down through the esophagus down into the lumen of the new gastric sleeve the scope passed easily through the incisura and into the antrum. The scope was then passed into the duodenum through the pylorus. The pylorus and duodenum appeared intact and the scope was slowly withdrawn while visualizing the staple line throughout the course of the staple line. I then clamped distally by applying pressure near the pyloric region to allow for distention of the gastric sleeve.   We then further irrigated and there was no evidence of leak using a bubble test. The staple line on the inner lumen of the stomach appeared intact and hemostatic throughout. The scope was slowly withdrawn to the GE junction and no evidence of stricture noted. The scope was then withdrawn from the esophagus and no other lesion noted. Attention was then turned back towards the abdomen the abdomen was aspirated of the prior placed saline for leak test.  I then placed an anchoring suture using 3-0 Vicryl suture to prevent torsion of the sleeve and migration of the sleeve superiorly. Specimen was withdrawn from the left upper quadrant incision. We then irrigated this incision thoroughly with saline. Next counts reported to me as correct. The 12 mm port sites were then closed using a suture passer to pass 0 Vicryl suture under direct laparoscopic visualization for proper fascial reapproximation at each port site. All ports were then removed. Pneumoperitoneum was released in entirety. The skin was then closed using 4-0 Vicryl subcuticular stitches in interrupted fashion. The region was cleaned with sterile normal saline followed by placement of TinCoBen and Steri-Strips and sterile bandage. The patient tolerated the procedure well and was transferred to PACU. The patient's family was updated postoperatively.        Electronically signed by Fab Freitas DO on 11/23/2021 at 3:15 PM

## 2021-11-23 NOTE — ANESTHESIA PRE PROCEDURE
Department of Anesthesiology  Preprocedure Note       Name:  Raj Gill   Age:  46 y.o.  :  1980                                          MRN:  [id number]         Date:  2021      Surgeon: Juan Vigil):  Opal Griffiths DO    Procedure: Procedure(s):  XI ROBOTIC LAPAROSCOPIC GASTRECTOMY SLEEVE WITH EGD, LIVER BIOPSY, GI UNIT SCHEDULED, POSSIBLE OPEN    Department of Anesthesiology  Pre-Anesthesia Evaluation/Consultation         Name:  Raj Gill                                         Age:  46 y.o. MRN:  [id number]             Medications  No current facility-administered medications for this encounter. Allergies   Allergen Reactions    Iodine Hives and Itching    Other Itching and Swelling     Sensitive to bug bites, mosquitos. Skin discoloration X1 month.  Cat Hair Extract Itching     RUNNY NOSE, SNEEZING    Dog Epithelium Itching     RUNNY NOSE, SNEEZING.      Patient Active Problem List   Diagnosis    Chronic low back pain    Migraine without aura, not refractory    GERD without esophagitis    Prediabetes    Low antithrombin III x1, repeat WNL    Antiphospholipid syndrome in pregnancy ([facility] [address]    AMA    Rheumatoid arthritis with rheumatoid factor, unspecified (HCC)    Lumbar radiculopathy    Lumbar spondylosis    Muscle pain    Attention deficit hyperactivity disorder, predominantly inattentive type    Hidradenitis    Recurrent pregnancy loss    Anticardiolipin antibody low positive x1 (26.9 on 3/19/19    Elevated blood-pressure reading without diagnosis of hypertension    Anticardiolipin antibody affecting pregnancy, antepartum    H/O:  section    Impaired glucose regulation with features of insulin resistance    Morbid obesity (HCC)    Spondylosis without myelopathy or radiculopathy, lumbosacral region    IUD (intrauterine device) in place    SHELLEY (obstructive sleep apnea)     Past Medical History:   Diagnosis Date    Abnormal 1st trimester screen (Tri 21 1: 117)--NIPT nml  2019    ADD (attention deficit disorder) without hyperactivity     Celestone  & 1/10 2020    Chronic back pain     COVID 09/15/2021    BODY ACHE, COUGH, FEVER, CHEST PAIN, N&V X 3 WEEKS. STILL HAS COUGH.     Echogenic focus of heart of fetus affecting antepartum care of mother 2019   Rudolfo Cam early 1hr, nml 3hr  2019    1 elevated value, [facility] recommends repeat 3hr GTT in 2 weeks    Elevated umbilical artery dopplers  01/10/2020    Eosinopenia ([facility] [address]     Fetal ascites     Fetal heart rate decelerations affecting management of mother     GERD (gastroesophageal reflux disease)     Headache     Hypertension     Iron deficiency     follows with Dr. Bety Guzmán (hem/onc)    IUD (intrauterine device) in place 2020    Adina Banerjee    Lumbar radiculopathy 2019    Lumbar spondylosis 2019    Multigravida of advanced maternal age in third trimester     Obesity     SHELLEY (obstructive sleep apnea)     Cpap (was causing speech difficulties, resolved after started using cpap)    Personal history of other medical treatment     Axillary lympadenopathy    Pre-diabetes     Pulmonary embolism affecting pregnancy in second trimester 2019    Rh+/RI/GBSunk 2020    Rheumatoid arteritis ([facility] [address]     Tachycardia     Umbilical cord complication     Under care of team 2021    PCP: Dr. Avelino Arellano, last visit 2021, clearance given already for surgery on 2021    Under care of team 2021    Neurology: Dr. Lavon Cosme, last visit 2021    Under care of team 2021    Oncology: Dr. Bety Guzmán, [facility], last visit 3/2021, received clearance for surgery for 2021    Wears glasses      Past Surgical History:   Procedure Laterality Date     SECTION Bilateral 2020     SE[facility]    ENDOSCOPY, COLON, DIAGNOSTIC      Kelvin 05/07/2020    Mirena    TONSILLECTOMY  1996    UPPER GASTROINTESTINAL ENDOSCOPY N/A 01/22/2021    EGD BIOPSY OF GASTRIC performed by Shade Hays DO at [facility]  08/19/2020    US LYMPH NODE BIOPSY 8/19/2020 [facility]    WISDOM TOOTH EXTRACTION       Social History     Tobacco Use    Smoking status: Never Smoker    Smokeless tobacco: Never Used   Vaping Use    Vaping Use: Never used   Substance Use Topics    Alcohol use: No    Drug use: Not Currently     Frequency: 2.0 times per week     Types: Marijuana (Weed)     Comment: Last used 2 months ago         Vital Signs (Current) There were no vitals filed for this visit. Vital Signs Statistics (for past 48 hrs)     No data recorded  BP Readings from Last 3 Encounters:   11/09/21 (!) 129/92   09/07/21 122/87   09/08/21 122/84       BMI  There is no height or weight on file to calculate BMI.     CBC   Lab Results   Component Value Date    WBC 13.0 11/09/2021    RBC 4.93 11/09/2021    RBC 4.77 11/01/2021    HGB 13.5 11/09/2021    HCT 42.1 11/09/2021    MCV 85.4 11/09/2021    RDW 14.6 11/09/2021     11/09/2021       CMP    Lab Results   Component Value Date     11/09/2021    K 4.1 11/09/2021     11/09/2021    CO2 20 11/09/2021    BUN 13 11/09/2021    CREATININE 0.47 11/09/2021    GFRAA >60 11/09/2021    LABGLOM >60 11/09/2021    GLUCOSE 97 11/09/2021    GLUCOSE 90 11/01/2021    PROT 7.4 11/01/2021    CALCIUM 9.1 11/09/2021    BILITOT 0.4 11/01/2021    ALKPHOS 81 11/01/2021    AST 13 11/01/2021    ALT 6 11/01/2021       BMP    Lab Results   Component Value Date     11/09/2021    K 4.1 11/09/2021     11/09/2021    CO2 20 11/09/2021    BUN 13 11/09/2021    CREATININE 0.47 11/09/2021    CALCIUM 9.1 11/09/2021    GFRAA >60 11/09/2021    LABGLOM >60 11/09/2021    GLUCOSE 97 11/09/2021    GLUCOSE 90 11/01/2021       POC Testing  No results for input(s): POCGLU, POCNA, POCK, POCCL, POCBUN, Asherdo Irwin in the last 72 hours. Coags    Lab Results   Component Value Date    PROTIME 10.4 09/07/2021    INR 1.0 09/07/2021    APTT 24.9 09/07/2021       HCG (If Applicable)   Lab Results   Component Value Date    HCG NEGATIVE 01/22/2021    HCGQUANT <1 05/06/2020        ABGs No results found for: PHART, PO2ART, WYZ7YLM, PHY1NVA, BEART, B4BYEDTF     Type & Screen (If Applicable)  No results found for: [facility]    Radiology (If Applicable)    Cardiac Testing (If Applicable)     EKG (If Applicable) nl          Medications prior to admission:   Prior to Admission medications    Medication Sig Start Date End Date Taking?  Authorizing Provider   FEROSUL 325 (65 Fe) MG tablet take 1 tablet by mouth every other day 11/19/21   Fab Casarez MD   albuterol sulfate HFA (VENTOLIN HFA) 108 (90 Base) MCG/ACT inhaler Inhale 2 puffs into the lungs 4 times daily as needed for Wheezing 10/21/21   Jess Liu MD   vitamin D (ERGOCALCIFEROL) 1.25 MG (53624 UT) CAPS capsule take 1 capsule by mouth every week 2/24/21   MIKHAIL Medrano CNP   cetirizine (ZYRTEC) 10 MG tablet daily as needed  11/18/20   Historical Provider, MD   albuterol (PROVENTIL) (2.5 MG/3ML) 0.083% nebulizer solution Take 3 mLs by nebulization daily as needed for Wheezing 12/2/19   Davis Richard DO   folic acid (FOLVITE) 1 MG tablet Take 1 mg by mouth daily    Historical Provider, MD       Current medications:    Current Facility-Administered Medications   Medication Dose Route Frequency Provider Last Rate Last Admin    levonorgestrel (MIRENA) IUD 52 mg 1 each  1 each IntraUTERine Once MIKHAIL Weiner CNP   1 each at 05/07/20 9466     Current Outpatient Medications   Medication Sig Dispense Refill    FEROSUL 325 (65 Fe) MG tablet take 1 tablet by mouth every other day 30 tablet 6    albuterol sulfate HFA (VENTOLIN HFA) 108 (90 Base) MCG/ACT inhaler Inhale 2 puffs into the lungs 4 times daily as needed for Wheezing 18 g 0    vitamin D (ERGOCALCIFEROL) 1.25 MG (58407 UT) CAPS capsule take 1 capsule by mouth every week 8 capsule 0    cetirizine (ZYRTEC) 10 MG tablet daily as needed       albuterol (PROVENTIL) (2.5 MG/3ML) 0.083% nebulizer solution Take 3 mLs by nebulization daily as needed for Wheezing 421 each 3    folic acid (FOLVITE) 1 MG tablet Take 1 mg by mouth daily         Allergies: Allergies   Allergen Reactions    Iodine Hives and Itching    Other Itching and Swelling     Sensitive to bug bites, mosquitos. Skin discoloration X1 month.  Cat Hair Extract Itching     RUNNY NOSE, SNEEZING    Dog Epithelium Itching     RUNNY NOSE, SNEEZING.        Problem List:    Patient Active Problem List   Diagnosis Code    Chronic low back pain M54.50, G89.29    Migraine without aura, not refractory G43.009    GERD without esophagitis K21.9    Prediabetes R73.03    Low antithrombin III x1, repeat WNL D68.59    Antiphospholipid syndrome in pregnancy ([facility] [address] O99.119, D68.61    AMA O09.521    Rheumatoid arthritis with rheumatoid factor, unspecified (HCC) M05.9    Lumbar radiculopathy M54.16    Lumbar spondylosis M47.816    Muscle pain M79.10    Attention deficit hyperactivity disorder, predominantly inattentive type F90.0    Hidradenitis L73.2    Recurrent pregnancy loss N96    Anticardiolipin antibody low positive x1 (26.9 on 3/19/19 R76.0    Elevated blood-pressure reading without diagnosis of hypertension R03.0    Anticardiolipin antibody affecting pregnancy, antepartum O99.891, R76.0    H/O:  section Z98.891    Impaired glucose regulation with features of insulin resistance R73.09    Morbid obesity ([facility]) E66.01    Spondylosis without myelopathy or radiculopathy, lumbosacral region M47.817    IUD (intrauterine device) in place Z97.5    SHELLEY (obstructive sleep apnea) G47.33       Past Medical History:        Diagnosis Date    Abnormal 1st trimester screen (Tri 21 1:117)--NIPT nml  2019    ADD (attention deficit disorder) without hyperactivity     Celestone  & 1/10 2020    Chronic back pain     COVID 09/15/2021    BODY ACHE, COUGH, FEVER, CHEST PAIN, N&V X 3 WEEKS. STILL HAS COUGH.     Echogenic focus of heart of fetus affecting antepartum care of mother 2019   Chava Marcelo early 1hr, nml 3hr  2019    1 elevated value, [facility] recommends repeat 3hr GTT in 2 weeks    Elevated umbilical artery dopplers  01/10/2020    Eosinopenia ([address]     Fetal ascites     Fetal heart rate decelerations affecting management of mother     GERD (gastroesophageal reflux disease)     Headache     Hypertension     Iron deficiency     follows with Dr. Eugenia Ormond (hem/onc)    IUD (intrauterine device) in place 2020    Mirena Rosario Rather    Lumbar radiculopathy 2019    Lumbar spondylosis 2019    Multigravida of advanced maternal age in third trimester     Obesity     SHELLEY (obstructive sleep apnea)     Cpap (was causing speech difficulties, resolved after started using cpap)    Personal history of other medical treatment     Axillary lympadenopathy    Pre-diabetes     Pulmonary embolism affecting pregnancy in second trimester 2019    Rh+/RI/GBSunk 2020    Rheumatoid arteritis ([address]     Tachycardia     Umbilical cord complication     Under care of team 2021    PCP: Dr. Paulino Pulido, last visit 2021, clearance given already for surgery on 2021    Under care of team 2021    Neurology: Dr. Ivelisse Rosen, last visit 2021    Under care of team 2021    Oncology: Dr. Eugenia Ormond, Christelle Philippe, last visit 3/2021, received clearance for surgery for 2021    Wears glasses        Past Surgical History:        Procedure Laterality Date     SECTION Bilateral 2020     SECTION performed by Leilani Encarnacion DO at [facility]    ENDOSCOPY, COLON, DIAGNOSTIC      INTRAUTERINE DEVICE INSERTION  2020    Mirena    TONSILLECTOMY 1996    UPPER GASTROINTESTINAL ENDOSCOPY N/A 01/22/2021    EGD BIOPSY OF GASTRIC performed by Yuriy Robins DO at [facility]  08/19/2020    US LYMPH NODE BIOPSY 8/19/2020 [facility] ULTRASOUND    WISDOM TOOTH EXTRACTION         Social History:    Social History     Tobacco Use    Smoking status: Never Smoker    Smokeless tobacco: Never Used   Substance Use Topics    Alcohol use: No                                Counseling given: Not Answered      Vital Signs (Current): There were no vitals filed for this visit. BP Readings from Last 3 Encounters:   11/09/21 (!) 129/92   09/07/21 122/87   09/08/21 122/84       NPO Status:  gatorade 9:30 AM                                                                               BMI:   Wt Readings from Last 3 Encounters:   11/09/21 269 lb (122 kg)   09/07/21 288 lb (130.6 kg)   09/08/21 285 lb (129.3 kg)     There is no height or weight on file to calculate BMI.    CBC:   Lab Results   Component Value Date    WBC 13.0 11/09/2021    RBC 4.93 11/09/2021    RBC 4.77 11/01/2021    HGB 13.5 11/09/2021    HCT 42.1 11/09/2021    MCV 85.4 11/09/2021    RDW 14.6 11/09/2021     11/09/2021       CMP:   Lab Results   Component Value Date     11/09/2021    K 4.1 11/09/2021     11/09/2021    CO2 20 11/09/2021    BUN 13 11/09/2021    CREATININE 0.47 11/09/2021    GFRAA >60 11/09/2021    LABGLOM >60 11/09/2021    GLUCOSE 97 11/09/2021    GLUCOSE 90 11/01/2021    PROT 7.4 11/01/2021    CALCIUM 9.1 11/09/2021    BILITOT 0.4 11/01/2021    ALKPHOS 81 11/01/2021    AST 13 11/01/2021    ALT 6 11/01/2021       POC Tests: No results for input(s): POCGLU, POCNA, POCK, POCCL, POCBUN, POCHEMO, POCHCT in the last 72 hours.     Coags:   Lab Results   Component Value Date    PROTIME 10.4 09/07/2021    INR 1.0 09/07/2021    APTT 24.9 09/07/2021       HCG (If Applicable):   Lab Results   Component Value Date HCG NEGATIVE 01/22/2021    HCGQUANT <1 05/06/2020        ABGs: No results found for: PHART, PO2ART, YUX2FPI, PWN6DUX, BEART, L3SMGTFB     Type & Screen (If Applicable):  No results found for: LABABO, LABRH    Drug/Infectious Status (If Applicable):  Lab Results   Component Value Date    HEPCAB NONREACTIVE 07/23/2021       COVID-19 Screening (If Applicable):   Lab Results   Component Value Date    COVID19 POSITIVE 09/15/2021    COVID19 Not Detected 09/10/2021    COVID19 Not Detected 01/18/2021           Anesthesia Evaluation   no history of anesthetic complications:   Airway: Mallampati: III     Neck ROM: full   Dental:          Pulmonary:   (+) sleep apnea: on CPAP,      (-) asthma and recent URI                          ROS comment: 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 , .    (-) diabetes mellitus               Abdominal:             Vascular:   + PE. Other Findings:           Anesthesia Plan      general     ASA 4       Induction: intravenous.                           Farshad Bliss MD   11/23/2021

## 2021-11-24 VITALS
OXYGEN SATURATION: 94 % | HEART RATE: 61 BPM | WEIGHT: 269.4 LBS | DIASTOLIC BLOOD PRESSURE: 77 MMHG | HEIGHT: 63 IN | SYSTOLIC BLOOD PRESSURE: 116 MMHG | RESPIRATION RATE: 16 BRPM | BODY MASS INDEX: 47.73 KG/M2 | TEMPERATURE: 98.3 F

## 2021-11-24 LAB
ANION GAP SERPL CALCULATED.3IONS-SCNC: 13 MMOL/L (ref 9–17)
BUN BLDV-MCNC: 6 MG/DL (ref 6–20)
BUN/CREAT BLD: ABNORMAL (ref 9–20)
CALCIUM SERPL-MCNC: 8.7 MG/DL (ref 8.6–10.4)
CHLORIDE BLD-SCNC: 105 MMOL/L (ref 98–107)
CO2: 18 MMOL/L (ref 20–31)
CREAT SERPL-MCNC: 0.46 MG/DL (ref 0.5–0.9)
GFR AFRICAN AMERICAN: >60 ML/MIN
GFR NON-AFRICAN AMERICAN: >60 ML/MIN
GFR SERPL CREATININE-BSD FRML MDRD: ABNORMAL ML/MIN/{1.73_M2}
GFR SERPL CREATININE-BSD FRML MDRD: ABNORMAL ML/MIN/{1.73_M2}
GLUCOSE BLD-MCNC: 107 MG/DL (ref 70–99)
HCT VFR BLD CALC: 34.9 % (ref 36.3–47.1)
HEMOGLOBIN: 11.1 G/DL (ref 11.9–15.1)
MCH RBC QN AUTO: 27.1 PG (ref 25.2–33.5)
MCHC RBC AUTO-ENTMCNC: 31.8 G/DL (ref 28.4–34.8)
MCV RBC AUTO: 85.3 FL (ref 82.6–102.9)
NRBC AUTOMATED: 0 PER 100 WBC
PDW BLD-RTO: 14.3 % (ref 11.8–14.4)
PLATELET # BLD: 352 K/UL (ref 138–453)
PMV BLD AUTO: 9.7 FL (ref 8.1–13.5)
POTASSIUM SERPL-SCNC: 4.4 MMOL/L (ref 3.7–5.3)
RBC # BLD: 4.09 M/UL (ref 3.95–5.11)
SODIUM BLD-SCNC: 136 MMOL/L (ref 135–144)
WBC # BLD: 10.6 K/UL (ref 3.5–11.3)

## 2021-11-24 PROCEDURE — 94640 AIRWAY INHALATION TREATMENT: CPT

## 2021-11-24 PROCEDURE — 6370000000 HC RX 637 (ALT 250 FOR IP): Performed by: EMERGENCY MEDICINE

## 2021-11-24 PROCEDURE — 6370000000 HC RX 637 (ALT 250 FOR IP): Performed by: SURGERY

## 2021-11-24 PROCEDURE — 2580000003 HC RX 258: Performed by: SURGERY

## 2021-11-24 PROCEDURE — 96375 TX/PRO/DX INJ NEW DRUG ADDON: CPT

## 2021-11-24 PROCEDURE — 6360000002 HC RX W HCPCS: Performed by: SURGERY

## 2021-11-24 PROCEDURE — 36415 COLL VENOUS BLD VENIPUNCTURE: CPT

## 2021-11-24 PROCEDURE — 96372 THER/PROPH/DIAG INJ SC/IM: CPT

## 2021-11-24 PROCEDURE — 96374 THER/PROPH/DIAG INJ IV PUSH: CPT

## 2021-11-24 PROCEDURE — 85027 COMPLETE CBC AUTOMATED: CPT

## 2021-11-24 PROCEDURE — G0378 HOSPITAL OBSERVATION PER HR: HCPCS

## 2021-11-24 PROCEDURE — 96376 TX/PRO/DX INJ SAME DRUG ADON: CPT

## 2021-11-24 PROCEDURE — 80048 BASIC METABOLIC PNL TOTAL CA: CPT

## 2021-11-24 RX ORDER — OXYCODONE HYDROCHLORIDE AND ACETAMINOPHEN 5; 325 MG/1; MG/1
1 TABLET ORAL EVERY 6 HOURS PRN
Qty: 28 TABLET | Refills: 0 | Status: SHIPPED | OUTPATIENT
Start: 2021-11-24 | End: 2021-12-01

## 2021-11-24 RX ORDER — ONDANSETRON 4 MG/1
4 TABLET, FILM COATED ORAL EVERY 8 HOURS PRN
Qty: 90 TABLET | Refills: 0 | Status: SHIPPED | OUTPATIENT
Start: 2021-11-24 | End: 2021-12-06

## 2021-11-24 RX ORDER — PROMETHAZINE HYDROCHLORIDE 25 MG/1
25 TABLET ORAL 4 TIMES DAILY PRN
Qty: 20 TABLET | Refills: 0 | Status: SHIPPED | OUTPATIENT
Start: 2021-11-24 | End: 2021-11-24 | Stop reason: SDUPTHER

## 2021-11-24 RX ORDER — OXYCODONE HYDROCHLORIDE AND ACETAMINOPHEN 5; 325 MG/1; MG/1
1 TABLET ORAL EVERY 6 HOURS PRN
Qty: 28 TABLET | Refills: 0 | Status: SHIPPED | OUTPATIENT
Start: 2021-11-24 | End: 2021-11-24 | Stop reason: HOSPADM

## 2021-11-24 RX ORDER — CYCLOBENZAPRINE HCL 10 MG
10 TABLET ORAL 3 TIMES DAILY PRN
Qty: 21 TABLET | Refills: 0 | Status: SHIPPED | OUTPATIENT
Start: 2021-11-24 | End: 2021-11-24 | Stop reason: SDUPTHER

## 2021-11-24 RX ORDER — PROMETHAZINE HYDROCHLORIDE 25 MG/1
25 TABLET ORAL 4 TIMES DAILY PRN
Qty: 20 TABLET | Refills: 0 | Status: SHIPPED | OUTPATIENT
Start: 2021-11-24 | End: 2021-12-01

## 2021-11-24 RX ORDER — CYCLOBENZAPRINE HCL 10 MG
10 TABLET ORAL 3 TIMES DAILY PRN
Qty: 21 TABLET | Refills: 0 | Status: SHIPPED | OUTPATIENT
Start: 2021-11-24 | End: 2021-12-04

## 2021-11-24 RX ORDER — DIPHENHYDRAMINE HCL 25 MG
25 TABLET ORAL EVERY 6 HOURS PRN
Status: DISCONTINUED | OUTPATIENT
Start: 2021-11-24 | End: 2021-11-24 | Stop reason: HOSPADM

## 2021-11-24 RX ADMIN — PROMETHAZINE HYDROCHLORIDE 12.5 MG: 25 INJECTION INTRAMUSCULAR; INTRAVENOUS at 00:37

## 2021-11-24 RX ADMIN — FAMOTIDINE 20 MG: 20 TABLET, FILM COATED ORAL at 08:11

## 2021-11-24 RX ADMIN — SODIUM CHLORIDE, POTASSIUM CHLORIDE, SODIUM LACTATE AND CALCIUM CHLORIDE: 600; 310; 30; 20 INJECTION, SOLUTION INTRAVENOUS at 00:00

## 2021-11-24 RX ADMIN — ONDANSETRON 4 MG: 2 INJECTION INTRAMUSCULAR; INTRAVENOUS at 05:24

## 2021-11-24 RX ADMIN — IPRATROPIUM BROMIDE AND ALBUTEROL SULFATE 1 AMPULE: .5; 2.5 SOLUTION RESPIRATORY (INHALATION) at 08:02

## 2021-11-24 RX ADMIN — SODIUM CHLORIDE, PRESERVATIVE FREE 10 ML: 5 INJECTION INTRAVENOUS at 08:11

## 2021-11-24 RX ADMIN — HEPARIN SODIUM 5000 UNITS: 5000 INJECTION INTRAVENOUS; SUBCUTANEOUS at 05:19

## 2021-11-24 RX ADMIN — HYDROMORPHONE HYDROCHLORIDE 1 MG: 1 INJECTION, SOLUTION INTRAMUSCULAR; INTRAVENOUS; SUBCUTANEOUS at 05:17

## 2021-11-24 RX ADMIN — DIPHENHYDRAMINE HCL 25 MG: 25 TABLET ORAL at 07:31

## 2021-11-24 RX ADMIN — HYDROMORPHONE HYDROCHLORIDE 1 MG: 1 INJECTION, SOLUTION INTRAMUSCULAR; INTRAVENOUS; SUBCUTANEOUS at 00:24

## 2021-11-24 RX ADMIN — ONDANSETRON 4 MG: 2 INJECTION INTRAMUSCULAR; INTRAVENOUS at 10:52

## 2021-11-24 RX ADMIN — HYDROMORPHONE HYDROCHLORIDE 1 MG: 1 INJECTION, SOLUTION INTRAMUSCULAR; INTRAVENOUS; SUBCUTANEOUS at 10:52

## 2021-11-24 ASSESSMENT — PAIN DESCRIPTION - ORIENTATION
ORIENTATION: MID
ORIENTATION: MID

## 2021-11-24 ASSESSMENT — PAIN DESCRIPTION - LOCATION
LOCATION: ABDOMEN
LOCATION: ABDOMEN

## 2021-11-24 ASSESSMENT — PAIN SCALES - GENERAL
PAINLEVEL_OUTOF10: 8
PAINLEVEL_OUTOF10: 6
PAINLEVEL_OUTOF10: 7
PAINLEVEL_OUTOF10: 8
PAINLEVEL_OUTOF10: 6
PAINLEVEL_OUTOF10: 5

## 2021-11-24 ASSESSMENT — PAIN DESCRIPTION - ONSET: ONSET: ON-GOING

## 2021-11-24 ASSESSMENT — PAIN DESCRIPTION - PAIN TYPE
TYPE: SURGICAL PAIN
TYPE: ACUTE PAIN;SURGICAL PAIN

## 2021-11-24 ASSESSMENT — PAIN DESCRIPTION - DESCRIPTORS: DESCRIPTORS: ACHING;DISCOMFORT

## 2021-11-24 ASSESSMENT — PAIN DESCRIPTION - PROGRESSION: CLINICAL_PROGRESSION: NOT CHANGED

## 2021-11-24 ASSESSMENT — PAIN DESCRIPTION - FREQUENCY: FREQUENCY: CONTINUOUS

## 2021-11-24 NOTE — ANESTHESIA POSTPROCEDURE EVALUATION
Department of Anesthesiology  Postprocedure Note    Patient: Patrick Rodrigez  MRN: [id number]  YOB: 1980  Date of evaluation: 11/23/2021  Time:  7:53 PM     Procedure Summary     Date: 11/23/21 Room / Location: [address]    Anesthesia Start: 1243 Anesthesia Stop: 9486    Procedure: XI ROBOTIC LAPAROSCOPIC GASTRECTOMY SLEEVE WITH EGD (N/A Abdomen) Diagnosis: (MORBID OBESITY, OBSTRUCTIVE SLEEP APNEA)    Surgeons: Francisca Ozuna DO Responsible Provider: Calista Mercer MD    Anesthesia Type: general ASA Status: 4          Anesthesia Type: general    Tapan Phase I: Tapan Score: 8    Tapan Phase II:      Last vitals: Reviewed and per EMR flowsheets.        Anesthesia Post Evaluation    Patient location during evaluation: PACU  Patient participation: complete - patient participated  Level of consciousness: awake and alert  Pain score: 2  Nausea & Vomiting: no nausea  Cardiovascular status: hemodynamically stable  Respiratory status: nasal cannula

## 2021-11-24 NOTE — PROGRESS NOTES
Bariatric Surgery Progress Note            PATIENT NAME: Cullen Li     TODAY'S DATE: 11/24/2021, 7:05 AM      SUBJECTIVE:    Pt seen and examined at bedside. Tolerable pain with current pain regimen. Tolerating bariatric CLD. Ambulating. Denies N/V/F/C. Voiding without difficulty. No other complaints noted. OBJECTIVE:   VITALS:  /75   Pulse 64   Temp 98.6 °F (37 °C) (Oral)   Resp 20   Ht 5' 3\" (1.6 m)   Wt 269 lb 6.4 oz (122.2 kg)   LMP 10/23/2021   SpO2 94%   BMI 47.72 kg/m²      INTAKE/OUTPUT:      Intake/Output Summary (Last 24 hours) at 11/24/2021 7771  Last data filed at 11/23/2021 1700  Gross per 24 hour   Intake 723 ml   Output --   Net 723 ml       CONSTITUTIONAL:  NAD, A&O x 3  HEENT: EOMI, moist mucous membranes  LUNGS:  normal effort with symmetric rise and fall of chest wall  CARDIOVASCULAR:  regular rate and rhythm  ABDOMEN: Soft, nondistended, appropriately TTP, port sites I/C/D  EXTREMITIES: no rashes, lesions, edema.       Data:  CBC with Differential:    Lab Results   Component Value Date    WBC 10.6 11/24/2021    RBC 4.09 11/24/2021    RBC 4.77 11/01/2021    HGB 11.1 11/24/2021    HCT 34.9 11/24/2021     11/24/2021    MCV 85.3 11/24/2021    MCH 27.1 11/24/2021    MCHC 31.8 11/24/2021    RDW 14.3 11/24/2021    NRBC 0 11/01/2021    LYMPHOPCT 21 11/09/2021    LYMPHOPCT 21.4 11/01/2021    MONOPCT 5 11/09/2021    MONOPCT 2.7 11/01/2021    BASOPCT 0 11/09/2021    BASOPCT 0.4 11/01/2021    MONOSABS 0.58 11/09/2021    MONOSABS 0.3 11/01/2021    LYMPHSABS 2.65 11/09/2021    LYMPHSABS 2.3 11/01/2021    EOSABS 0.77 11/09/2021    EOSABS 2.3 11/01/2021    BASOSABS 0.04 11/09/2021    DIFFTYPE NOT REPORTED 11/09/2021     BMP:    Lab Results   Component Value Date     11/24/2021    K 4.4 11/24/2021     11/24/2021    CO2 18 11/24/2021    BUN 6 11/24/2021    LABALBU 3.8 11/01/2021    CREATININE 0.46 11/24/2021    CALCIUM 8.7 11/24/2021    GFRAA >60 11/24/2021    LABGLOM >60 2021    GLUCOSE 107 2021    GLUCOSE 90 2021         ASSESSMENT   Patient Active Problem List   Diagnosis    Chronic low back pain    Migraine without aura, not refractory    GERD without esophagitis    Prediabetes    Low antithrombin III x1, repeat WNL    Antiphospholipid syndrome in pregnancy ([facility] [address]    AMA    Rheumatoid arthritis with rheumatoid factor, unspecified (HCC)    Lumbar radiculopathy    Lumbar spondylosis    Muscle pain    Attention deficit hyperactivity disorder, predominantly inattentive type    Hidradenitis    Recurrent pregnancy loss    Anticardiolipin antibody low positive x1 (26.9 on 3/19/19    Elevated blood-pressure reading without diagnosis of hypertension    Anticardiolipin antibody affecting pregnancy, antepartum    H/O:  section    Impaired glucose regulation with features of insulin resistance    Morbid obesity (HCC)    Spondylosis without myelopathy or radiculopathy, lumbosacral region    IUD (intrauterine device) in place    SHELLEY (obstructive sleep apnea)    S/P laparoscopic sleeve gastrectomy       46 y.o. F POD#1 s/p robotic assisted laparoscopic gastric sleeve, EGD 2/2 MO      Plan  1. Continue bariatric CLD. 2. D/c IVF  3. Pain control with Percocet, Dilaudid PRN. 4. Nausea control with Zofran and Phenergan PRN  5. Encourage ambulation and IS usages. 1. OOB and onto chair    6. DVT prophylaxis with Heparin TID. 7. GI prophylaxis with Pepcid.   8. Dispo: likely home today    Electronically signed by Jhonny Rivera DO  on 2021 at 7:05 AM

## 2021-11-24 NOTE — PLAN OF CARE
Problem: Pain:  Goal: Pain level will decrease  Description: Pain level will decrease  11/24/2021 1433 by Eleno Wills RN  Outcome: Completed  11/24/2021 0710 by Karlene Holt RN  Outcome: Ongoing  Goal: Control of acute pain  Description: Control of acute pain  11/24/2021 1433 by Eleno Wills RN  Outcome: Completed  11/24/2021 0710 by Karlene Holt RN  Outcome: Ongoing  Goal: Control of chronic pain  Description: Control of chronic pain  11/24/2021 1433 by Eleno Wills RN  Outcome: Completed  11/24/2021 0710 by Karlene Holt RN  Outcome: Ongoing     Problem: Pain:  Goal: Pain level will decrease  Description: Pain level will decrease  11/24/2021 1433 by Eleno Wills RN  Outcome: Completed  11/24/2021 0710 by Karlene Holt RN  Outcome: Ongoing  Goal: Control of acute pain  Description: Control of acute pain  11/24/2021 1433 by Eleno Wills RN  Outcome: Completed  11/24/2021 0710 by Karlene Holt RN  Outcome: Ongoing  Goal: Control of chronic pain  Description: Control of chronic pain  11/24/2021 1433 by Eleno Wills RN  Outcome: Completed  11/24/2021 0710 by Karlene Holt RN  Outcome: Ongoing

## 2021-11-24 NOTE — PROGRESS NOTES
CLINICAL PHARMACY NOTE: MEDS TO BEDS    Total # of Prescriptions Filled: 5   The following medications were delivered to the patient:  · Percocet 5-325mg  · Promethazine 25mg  · Ondansetron 4mg  · Cyclobenzaprine 10mg  · Enoxaparin 40mg/0.4ml    Additional Documentation: delivered to patient in room 241 11/24 at 2:06pm. No co-pay.

## 2021-11-26 ENCOUNTER — TELEPHONE (OUTPATIENT)
Dept: BARIATRICS/WEIGHT MGMT | Age: 41
End: 2021-11-26

## 2021-11-26 LAB — SURGICAL PATHOLOGY REPORT: NORMAL

## 2021-11-26 NOTE — TELEPHONE ENCOUNTER
Post-op outreach call made to pt. Pt reports frequent ambulation around home. Pt wearing abd binder. No complaints of SOB or tachycardia. Laparoscopic incisional sites without problems. Pt has  not had a BM since surgery. Passing flatus. Agrees to use Milk of Magnesia or Miriaax and monitor for BM. Pt reports urine output of at least 4 times in a 24 hour period. Intake is described as water, protein    Rates pain as 6 on a 0-10 scale. Pt using pain medication as directed. Allowed pt the opportunity to ask questions. Reminded patient to reference the patient education materials as needed. Reminded pt that they may phone the office or the \"after hours telephone number\"  that is listed in the patient education binder as needed. Pt verbalized understanding. Pt without concerns at this time     Post op office appt date and time reviewed with pt.     Has Lovenox is going well

## 2021-11-29 ENCOUNTER — HOSPITAL ENCOUNTER (OUTPATIENT)
Facility: MEDICAL CENTER | Age: 41
End: 2021-11-29

## 2021-12-03 ENCOUNTER — OFFICE VISIT (OUTPATIENT)
Dept: BARIATRICS/WEIGHT MGMT | Age: 41
End: 2021-12-03

## 2021-12-03 VITALS
DIASTOLIC BLOOD PRESSURE: 66 MMHG | BODY MASS INDEX: 44.3 KG/M2 | WEIGHT: 250 LBS | SYSTOLIC BLOOD PRESSURE: 81 MMHG | HEIGHT: 63 IN | HEART RATE: 113 BPM

## 2021-12-03 DIAGNOSIS — Z98.84 S/P BARIATRIC SURGERY: Primary | ICD-10-CM

## 2021-12-03 PROCEDURE — 99024 POSTOP FOLLOW-UP VISIT: CPT | Performed by: SURGERY

## 2021-12-03 NOTE — PROGRESS NOTES
Medical Nutrition Therapy  Metabolic and Bariatric surgery  1 week Post operative follow up         Pt reports:         Vitals:   Vitals:    12/03/21 1549   BP: 81/66   Site: Right Upper Arm   Position: Sitting   Cuff Size: Large Adult   Pulse: 113   Weight: 250 lb (113.4 kg)   Height: 5' 3\" (1.6 m)      Body mass index is 44.29 kg/m². Labs reviewed:              Nutrition Assessment:   PES: Inadequate food and beverage intake r/t WLS as evidenced by loss of excess body weight lost 19 lbs over 1 week.       Goals   60-80gm of protein  48-64oz of fluid       [x] met     []  Not met        Plan:  Pt questions answered re diet advancement;  F/u 5 weeks post-op      Vangie Pereira MS, RD, LD

## 2021-12-05 NOTE — PROGRESS NOTES
[facility]  [facility]  [address] [place] [address] Blvd [facility] [place]  [address]  [address]  Dept: [phone]    SURGICAL WEIGHT LOSS MANAGEMENT PROGRAM  PROGRESS NOTE     CC: Weight Loss    Patient: Julian Chamorro      Service Date: 12/2/2021  Visit:   1 week    Medical Record #: [id number]  Date of Surgery:       History:    Patient here for 1 week visit after sleeve. No complains. No nausea, vomiting, fevers/chills. Had a BM. Pain controlled    Height: 5' 3\" (1.6 m)  Highest Weight:   282lbs      Current Visit Weight Information  Weight: 250 lb (113.4 kg)   BMI: Body mass index is 44.29 kg/m². Weight loss since surgery:  32    1 wk - D/C'd home on VTE Prohylaxis? [x] Yes   [] No   On VTE Proph as directed? [x] Yes   [] No    Liver pathology:    [] NA    [] No Gross path    [] Liver Steatosis   [x] Discussed w/ pt   [] Referred to GI    Exercising?    [] Yes    [x] No     Review of Systems:     General  Negative for: [] Weight Change   [x] Fatigue   [x] Fevers & Chills    [] Appetite change [] Other:    Positive for: [x] Weight Change   [] Fatigue   [] Fevers & Chills    [] Appetite change [] Other:   Cardiac  Negative for: [x] Chest Pain   [] Difficulty Breathing   [x] Leg Cramps [x] Edema of Lower Extremeties    [] Left   [] Right      Positive for: [] Chest Pain   [] Difficulty Breathing   [] Leg Cramps [] Edema of Lower Extremeties    [] Left   [] Right   Pulmonary  Negative for: [x] Shortness of Breath [x] Wheeze [x] Cough  [x] Calf Pain     Positive for: [] Shortness of Breath [] Wheeze [] Cough  [] Calf Pain   Gastro-Intestinal Negative for: [] Heartburn   [] Reflux   [] Dysphagia   [x] Melena   [] BRBPR  [x] Vomiting   [x] Abdominal Pain   [x] Diarrhea     [] Constipation  [] Other:     Positive for: [] Heartburn   [] Reflux   [] Dysphagia   [] Melena   [] BRBPR  [] Vomiting   [] Abdominal Pain   [] Diarrhea     [] Constipation  [] Other:    Muskuloskeletal Negative for: [] Joint pain [] Back pain   [] Knee Pain   [] Muscle weakness [x] Hernia   [x] Edema [] Other:     Positive for: [] Joint pain   [] Back pain   [] Knee Pain   [] Muscle weakness [] Hernia   [] Edema [] Other:    Neurologic Negative for: [x] Syncope   [] Insomnia   [x] Being treated for depression  [] Other:     Positive for: [] Syncope   [] Insomnia   [] Being treated for depression  [] Other:    Skin Negative for: [x] Wound:   [] Open   [] Draining   [] Incisional     [x] Rash   [] Hair Loss  [] Other:     Positive for: [] Wound:   [] Open   [] Draining    [] Incisional  [] Rash   [] Hair Loss  [] Other:          Physical Assessment:     BP 81/66 (Site: Right Upper Arm, Position: Sitting, Cuff Size: Large Adult)   Pulse 113   Ht 5' 3\" (1.6 m)   Wt 250 lb (113.4 kg)   BMI 44.29 kg/m²   General Negative for:  [x] Lapses in memory   [x] Unusual Stress   [x] Diffic Concen [] Unable to sleep   [] Eating in response to stress   [] Other:    Positive for:  [] Lapses in memory   [] Unusual Stress   [] Diffic Concen [] Unable to sleep   [] Eating in response to stress   [] Other:   Cardio-Pulmonary   [x] Heart RRR   [x] No murmurs   [] Pulses nl x4 extrem    [x] Good inspiratory effort   [x] No wheezing     [x] Lungs clear to auscultation bilaterally    [] Other:    Gastro-Intestinal   [x] Abd S/NT/ND/Benign   [x] No abdominal mass/hernia    [x] No Splenomegaly    [] Other:    Muskuloskeletal   [x] Good muscle strength x4 extremities   [x] nl gait and ambul    [x] Nl ROM x4 extremities    [] Other:    Neurologic   [x] Alert and oriented x3    [] Other:   Skin   [x] Intact w/ no open wounds   [x] Incisions C/D/I    [] Steri strips removed   [x] No drainage or Infection    [] Other:      Assessment & Plan:      1.  S/P bariatric surgery           [x] Advance Diet    [x] Daily protein (65-75gm/day)   [x] Take Vitamins   [x] Attend Support Group    [x] Exercise Regularly   [x] Use contraception    Hold RA medications for 1 month    Fulls    Ambulation    Lovenox    Start Vitamins    Overall doing well  Follow up: No follow-ups on file. Orders placed this encounter:   No orders of the defined types were placed in this encounter. New Prescriptions:   No orders of the defined types were placed in this encounter. Electronically signed by Rd Esteban DO on 12/5/2021 at 11:33 AM    Please note that this chart was generated using voice recognition Dragon dictation software. Although every effort was made to ensure the accuracy of this automated transcription, some errors in transcription may have occurred.

## 2021-12-06 ENCOUNTER — OFFICE VISIT (OUTPATIENT)
Dept: ONCOLOGY | Age: 41
End: 2021-12-06
Payer: MEDICARE

## 2021-12-06 ENCOUNTER — TELEPHONE (OUTPATIENT)
Dept: ONCOLOGY | Age: 41
End: 2021-12-06

## 2021-12-06 ENCOUNTER — HOSPITAL ENCOUNTER (OUTPATIENT)
Facility: MEDICAL CENTER | Age: 41
End: 2021-12-06

## 2021-12-06 VITALS
SYSTOLIC BLOOD PRESSURE: 116 MMHG | RESPIRATION RATE: 16 BRPM | HEART RATE: 101 BPM | DIASTOLIC BLOOD PRESSURE: 81 MMHG | TEMPERATURE: 97.1 F | WEIGHT: 249.6 LBS | BODY MASS INDEX: 44.21 KG/M2

## 2021-12-06 DIAGNOSIS — E61.1 IRON DEFICIENCY: ICD-10-CM

## 2021-12-06 DIAGNOSIS — O88.212 PULMONARY EMBOLISM AFFECTING PREGNANCY IN SECOND TRIMESTER: ICD-10-CM

## 2021-12-06 DIAGNOSIS — E66.01 MORBID OBESITY DUE TO EXCESS CALORIES (HCC): ICD-10-CM

## 2021-12-06 DIAGNOSIS — R76.0 ANTICARDIOLIPIN ANTIBODY POSITIVE: Primary | ICD-10-CM

## 2021-12-06 PROCEDURE — G8417 CALC BMI ABV UP PARAM F/U: HCPCS | Performed by: INTERNAL MEDICINE

## 2021-12-06 PROCEDURE — 99211 OFF/OP EST MAY X REQ PHY/QHP: CPT | Performed by: INTERNAL MEDICINE

## 2021-12-06 PROCEDURE — 99214 OFFICE O/P EST MOD 30 MIN: CPT | Performed by: INTERNAL MEDICINE

## 2021-12-06 PROCEDURE — G8427 DOCREV CUR MEDS BY ELIG CLIN: HCPCS | Performed by: INTERNAL MEDICINE

## 2021-12-06 PROCEDURE — 1036F TOBACCO NON-USER: CPT | Performed by: INTERNAL MEDICINE

## 2021-12-06 PROCEDURE — G8484 FLU IMMUNIZE NO ADMIN: HCPCS | Performed by: INTERNAL MEDICINE

## 2021-12-06 NOTE — PROGRESS NOTES
Karl Cadet                                                                                                                  12/6/2021  MRN:   [id number]  YOB: 1980  PCP:                           Mary Kenny MD  Referring Physician: No ref. 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 o.female is a patient with chief complaint of shortness of breath. Patient is 26-week pregnant. Presented with pelvic pain urinary frequency and dyspnea for about 2 days. Patient underwent CT chest which showed nonobstructive small left lower lobe pulmonary embolism. Patient denies any previous history of blood clots. Patient has had thrombophilia work-up done due to multiple pregnancy losses in the past which has not been clinically significant. Patient has been started on Lovenox which she is tolerating well. Patient has BMI of 51. Initial testing showed anticardiolipin IgG antibody to be 26.9 and in 12/2019 dropped to 3.7      Interim History:     Patient presents to the clinic for a follow-up visit. Patient underwent gastric sleeve surgery on 11/23/2021. She tolerated surgery well. Patient is currently on DVT prophylaxis dose. She is recovering from the surgery well. Recent hemoglobin was 11. 1. Denies unintentional weight loss drenching night sweats or noticing swollen glands. During this visit patient's allergy, social, medical, surgical history and medications were reviewed and updated.     Past Medical History:   Past Medical History:   Diagnosis Date    Abnormal 1st trimester screen (Tri 21 1:117)--NIPT nml  11/30/2019    ADD (attention deficit disorder) without hyperactivity     Antiphospholipid syndrome (HCC)     Celestone 1/9 & 1/10 01/09/2020    Chronic back pain     COVID 09/15/2021    BODY ACHE, COUGH, FEVER, CHEST PAIN, N&V X 3 WEEKS. STILL HAS COUGH.     Echogenic focus of heart of fetus affecting antepartum care of mother 11/30/2019   Montine Peal early 1hr, nml 3hr  11/30/2019    1 elevated value, [facility] recommends repeat 3hr GTT in 2 weeks    Elevated umbilical artery dopplers  01/10/2020    Eosinopenia ([facility] [address]     Fetal ascites     Fetal heart rate decelerations affecting management of mother     GERD (gastroesophageal reflux disease)     Headache     Hypertension     Iron deficiency     follows with Dr. Denver Monge (hem/onc)    IUD (intrauterine device) in place 05/07/2020    Mirena Raul Ralph    Lumbar radiculopathy 01/09/2019    Lumbar spondylosis 01/09/2019    Multigravida of advanced maternal age in third trimester     Obesity     SHELLEY (obstructive sleep apnea)     Cpap (was causing speech difficulties, resolved after started using cpap)    Personal history of other medical treatment     Axillary lympadenopathy    Pre-diabetes     Pulmonary embolism affecting pregnancy in second trimester 11/30/2019    Rh+/RI/GBSunk 01/08/2020    Rheumatoid arteritis ([facility] [address]     Tachycardia     Umbilical cord complication     Under care of team 09/07/2021    PCP: Dr. Illa Romberg, last visit 8/2021, clearance given already for surgery on 9/14/2021    Under care of team 09/07/2021    Neurology: Dr. Daryle Mellow, last visit 7/2021    Under care of team 09/07/2021    Oncology: Nirmal Box, last visit 3/2021, received clearance for surgery for 9/14/2021    Wears glasses        Past Surgical History:     Past Surgical History:   Procedure Laterality Date   [address] [facility] Bilateral 2020     SECTION performed by Melissa Iniguez DO at [place] [facility]    ENDOSCOPY, COLON, DIAGNOSTIC      INTRAUTERINE DEVICE INSERTION  2020    Mirena    SLEEVE GASTRECTOMY N/A 2021    XI ROBOTIC LAPAROSCOPIC GASTRECTOMY SLEEVE WITH EGD (N/A Abdomen)    SLEEVE GASTRECTOMY N/A 2021    XI ROBOTIC LAPAROSCOPIC GASTRECTOMY SLEEVE WITH EGD performed by Elza Mendez DO at [address] [facility] N/A 2021    EGD BIOPSY OF GASTRIC performed by Elza Mendez DO at [address] [facility]  2020    US LYMPH NODE BIOPSY 2020 [facility]TRAS[facility]    WISDOM TOOTH EXTRACTION         Patient Family Social History:     Social History     Socioeconomic History    Marital status:      Spouse name: None    Number of children: None    Years of education: None    Highest education level: None   Occupational History    None   Tobacco Use    Smoking status: Never Smoker    Smokeless tobacco: Never Used   Vaping Use    Vaping Use: Never used   Substance and Sexual Activity    Alcohol use: No    Drug use: Not Currently     Frequency: 2.0 times per week     Types: Marijuana (Weed)     Comment: Last used 2 months ago    Sexual activity: Yes     Partners: Male   Other Topics Concern    None   Social History Narrative    None     Social Determinants of Health     Financial Resource Strain: Low Risk     Difficulty of Paying Living Expenses: Not hard at all   Food Insecurity: No Food Insecurity    Worried About Running Out of Food in the Last Year: Never true    Dylan of Food in the Last Year: Never true   Transportation Needs: Unmet Transportation Needs    Lack of Transportation (Medical):  Yes    Lack of Transportation (Non-Medical): Yes   Physical Activity:     Days of Exercise per Week: Not on file    Minutes of Exercise per Session: Not on file   Stress:     Feeling of Stress : Not on file   Social Connections:     Frequency of Communication with Friends and Family: Not on file    Frequency of Social Gatherings with Friends and Family: Not on file    Attends Buddhism Services: Not on file    Active Member of [address] [place] [address] or Organizations: Not on file    Attends Club or Organization Meetings: Not on file    Marital Status: Not on file   Intimate Partner Violence:     Fear of Current or Ex-Partner: Not on file    Emotionally Abused: Not on file    Physically Abused: Not on file    Sexually Abused: Not on file   Housing Stability:     Unable to Pay for Housing in the Last Year: Not on file    Number of [place] in the Last Year: Not on file    Unstable Housing in the Last Year: Not on file       Family History   Problem Relation Age of Onset    Elevated Lipids Mother     High Blood Pressure Mother        Current Medications:     Current Outpatient Medications   Medication Sig Dispense Refill    enoxaparin (LOVENOX) 40 MG/0.4ML injection Inject 0.4 mLs into the skin 2 times daily for 14 days 11.2 mL 0    cetirizine (ZYRTEC) 10 MG tablet daily as needed        Current Facility-Administered Medications   Medication Dose Route Frequency Provider Last Rate Last Admin    levonorgestrel (MIRENA) IUD 52 mg 1 each  1 each IntraUTERine Once Jose Ar, APRN - CNP   1 each at 05/07/20 9763       Allergies:   Iodine, Other, Phenergan [promethazine], Cat hair extract, and Dog epithelium    Review of Systems:    Constitutional: No fever or chills.  No night sweats, no weight loss   Eyes: No eye discharge, double vision, or eye pain   HEENT: negative for sore mouth, sore throat, hoarseness and voice change   Respiratory: negative for cough , sputum, dyspnea, wheezing, hemoptysis, chest pain   Cardiovascular: negative for chest pain, dyspnea, palpitations, orthopnea, PND   Gastrointestinal: negative for nausea, vomiting, diarrhea, constipation, abdominal pain, Dysphagia, hematemesis and hematochezia   Genitourinary: negative for frequency, dysuria, nocturia, urinary incontinence, and hematuria   Integument: negative for rash, skin lesions, bruises. Hematologic/Lymphatic: negative for easy bruising, bleeding, lymphadenopathy or petechiae.    Endocrine: negative for heat or cold intolerance,weight changes, change in bowel habits and hair loss   Musculoskeletal: negative for myalgias, arthralgias, pain, joint swelling,and bone pain; +arm pain - resolved   Neurological: negative for headaches, dizziness, seizures, weakness, numbness        Physical Exam:    Vitals: /81   Pulse 101   Temp 97.1 °F (36.2 °C) (Temporal)   Resp 16   Wt 249 lb 9.6 oz (113.2 kg)   BMI 44.21 kg/m²   General appearance - well appearing, no in pain or distress  Mental status - AAO X3  Eyes - pupils equal and reactive, extraocular eye movements intact  Mouth - mucous membranes moist, pharynx normal without lesions  Neck - supple, no significant adenopathy  Lymphatics - no hepatosplenomegaly, lymphadenopathy unchanged  Chest - clear to auscultation, no wheezes, rales or rhonchi, symmetric air entry  Heart - normal rate, regular rhythm, normal S1, S2, no murmurs  Abdomen - soft, nontender, nondistended, no masses or organomegaly  Neurological - alert, oriented, normal speech, no focal findings or movement disorder noted  Extremities - peripheral pulses normal, no pedal edema, no clubbing or cyanosis  Skin - normal coloration and turgor, no rashes, no suspicious skin lesions noted  - small bruising around nails      DATA:  Lab Results   Component Value Date    WBC 10.6 11/24/2021    HGB 11.1 (L) 11/24/2021    HCT 34.9 (L) 11/24/2021    MCV 85.3 11/24/2021     11/24/2021       Chemistry        Component Value Date/Time     11/24/2021 0502    K 4.4 11/24/2021 0502     11/24/2021 0502    CO2 18 (L) 11/24/2021 0502    BUN 6 11/24/2021 0502    CREATININE 0.46 (L) 11/24/2021 0502        Component Value

## 2021-12-06 NOTE — TELEPHONE ENCOUNTER
SABRINA HERE FOR MD VISIT  RV 1 YR W/ LABS PRIOR  LABS CDP BMP FE TIBC FERRITIN VITAMIN B12 & FOLATE ORDERS GIVEN TO PT ON EXIT TO BE DONE ON 11/28/22  MD VISIT 12/5/22 @ 1PM  AVS PRINTED W/ INSTRUCTIONS AND GIVEN TO PT ON EXIT

## 2021-12-28 ENCOUNTER — OFFICE VISIT (OUTPATIENT)
Dept: BARIATRICS/WEIGHT MGMT | Age: 41
End: 2021-12-28

## 2021-12-28 VITALS
SYSTOLIC BLOOD PRESSURE: 105 MMHG | HEART RATE: 91 BPM | DIASTOLIC BLOOD PRESSURE: 74 MMHG | WEIGHT: 248 LBS | BODY MASS INDEX: 43.94 KG/M2 | HEIGHT: 63 IN

## 2021-12-28 DIAGNOSIS — E86.0 DEHYDRATION: ICD-10-CM

## 2021-12-28 DIAGNOSIS — K21.9 GERD WITHOUT ESOPHAGITIS: ICD-10-CM

## 2021-12-28 DIAGNOSIS — E66.01 MORBID OBESITY WITH BMI OF 45.0-49.9, ADULT (HCC): ICD-10-CM

## 2021-12-28 DIAGNOSIS — Z98.84 S/P LAPAROSCOPIC SLEEVE GASTRECTOMY: ICD-10-CM

## 2021-12-28 DIAGNOSIS — R11.0 NAUSEA: ICD-10-CM

## 2021-12-28 DIAGNOSIS — R73.03 PREDIABETES: ICD-10-CM

## 2021-12-28 DIAGNOSIS — M05.9 RHEUMATOID ARTHRITIS WITH RHEUMATOID FACTOR, UNSPECIFIED (HCC): ICD-10-CM

## 2021-12-28 DIAGNOSIS — G47.33 OSA (OBSTRUCTIVE SLEEP APNEA): Primary | ICD-10-CM

## 2021-12-28 PROCEDURE — 99024 POSTOP FOLLOW-UP VISIT: CPT | Performed by: NURSE PRACTITIONER

## 2021-12-28 RX ORDER — 0.9 % SODIUM CHLORIDE 0.9 %
1000 INTRAVENOUS SOLUTION INTRAVENOUS ONCE
Status: COMPLETED | OUTPATIENT
Start: 2021-12-29 | End: 2021-12-29

## 2021-12-28 RX ORDER — ONDANSETRON 4 MG/1
4 TABLET, FILM COATED ORAL EVERY 8 HOURS PRN
Qty: 30 TABLET | Refills: 2 | Status: SHIPPED | OUTPATIENT
Start: 2021-12-28 | End: 2022-02-21 | Stop reason: SDUPTHER

## 2021-12-28 NOTE — PROGRESS NOTES
Post-op Bariatric Surgery Note    Subjective     Patient is 1 monrh s/p laparoscopic sleeve gastrectomy, down 21 lbs. Overall, doing well. Incisions well healed. Consistent use of MVI and calcium. Physical activity includes walking. Some nausea with eating. Denies abdominal pain. Having BMs with use of Miralax. Tolerating po intake, but only getting 20-40 oz fluids daily. Allergies: Allergies   Allergen Reactions    Iodine Hives and Itching    Other Itching and Swelling     Sensitive to bug bites, mosquitos. Skin discoloration X1 month.  Phenergan [Promethazine] Itching    Cat Hair Extract Itching     RUNNY NOSE, SNEEZING    Dog Epithelium Itching     RUNNY NOSE, SNEEZING. Past Medical History:     Past Medical History:   Diagnosis Date    Abnormal 1st trimester screen (Tri 21 1:117)--NIPT nml  11/30/2019    ADD (attention deficit disorder) without hyperactivity     Antiphospholipid syndrome (HCC)     Celestone 1/9 & 1/10 01/09/2020    Chronic back pain     COVID 09/15/2021    BODY ACHE, COUGH, FEVER, CHEST PAIN, N&V X 3 WEEKS. STILL HAS COUGH.     Echogenic focus of heart of fetus affecting antepartum care of mother 11/30/2019   Jenise Prudent early 1hr, nml 3hr  11/30/2019    1 elevated value, [facility] recommends repeat 3hr GTT in 2 weeks    Elevated umbilical artery dopplers  01/10/2020    Eosinopenia ([address]     Fetal ascites     Fetal heart rate decelerations affecting management of mother     GERD (gastroesophageal reflux disease)     Headache     Hypertension     Iron deficiency     follows with Dr. Gregory Gaines (hem/onc)    IUD (intrauterine device) in place 05/07/2020    Adina Eli Cea    Lumbar radiculopathy 01/09/2019    Lumbar spondylosis 01/09/2019    Multigravida of advanced maternal age in third trimester     Obesity     SHELLEY (obstructive sleep apnea)     Cpap (was causing speech difficulties, resolved after started using cpap)    Personal history of other medical Substance and Sexual Activity    Alcohol use: No    Drug use: Not Currently     Frequency: 2.0 times per week     Types: Marijuana Brooke Mayco)     Comment: Last used 2 months ago    Sexual activity: Yes     Partners: Male   Other Topics Concern    Not on file   Social History Narrative    Not on file     Social Determinants of Health     Financial Resource Strain: Low Risk     Difficulty of Paying Living Expenses: Not hard at all   Food Insecurity: No Food Insecurity    Worried About Running Out of Food in the Last Year: Never true    Dylan of Food in the Last Year: Never true   Transportation Needs: Unmet Transportation Needs    Lack of Transportation (Medical):  Yes    Lack of Transportation (Non-Medical): Yes   Physical Activity:     Days of Exercise per Week: Not on file    Minutes of Exercise per Session: Not on file   Stress:     Feeling of Stress : Not on file   Social Connections:     Frequency of Communication with Friends and Family: Not on file    Frequency of Social Gatherings with Friends and Family: Not on file    Attends Caodaism Services: Not on file    Active Member of [address] [place] [address] or Organizations: Not on file    Attends Club or Organization Meetings: Not on file    Marital Status: Not on file   Intimate Partner Violence:     Fear of Current or Ex-Partner: Not on file    Emotionally Abused: Not on file    Physically Abused: Not on file    Sexually Abused: Not on file   Housing Stability:     Unable to Pay for Housing in the Last Year: Not on file    Number of [place] in the Last Year: Not on file    Unstable Housing in the Last Year: Not on file       Current Medications:  Current Outpatient Medications   Medication Sig Dispense Refill    ondansetron (ZOFRAN) 4 MG tablet Take 1 tablet by mouth every 8 hours as needed for Nausea or Vomiting 30 tablet 2    cetirizine (ZYRTEC) 10 MG tablet daily as needed        Current Facility-Administered Medications   Medication Dose Route Frequency Provider Last Rate Last Admin    levonorgestrel (MIRENA) IUD 52 mg 1 each  1 each IntraUTERine Once Timothy Matos, APRN - CNP   1 each at 05/07/20 1459       Vital Signs:  /74 (Site: Right Upper Arm, Position: Sitting, Cuff Size: Large Adult)   Pulse 91   Ht 5' 3\" (1.6 m)   Wt 248 lb (112.5 kg)   BMI 43.93 kg/m²     BMI/Height/Weight:  Body mass index is 43.93 kg/m². Review of Systems - A review of systems was performed. All was negative unless otherwise documented in HPI. Constitutional: Negative for fever, chills and diaphoresis. HENT: Negative for hearing loss and trouble swallowing. Eyes: Negative for photophobia and visual disturbance. Respiratory: Negative for cough, shortness of breath and wheezing. Cardiovascular: Negative for chest pain and palpitations. Gastrointestinal: Negative for vomiting, abdominal pain, diarrhea, constipation, blood in stool and abdominal distention. Positive for nausea. Endocrine: Negative for polydipsia, polyphagia and polyuria. Genitourinary: Negative for dysuria, frequency, hematuria and difficulty urinating. Musculoskeletal: Negative for myalgias, joint swelling. Skin: Negative for pallor and rash. Neurological: Negative for dizziness, tremors, light-headedness and headaches. Psychiatric/Behavioral: Negative for sleep disturbance and dysphoric mood. Objective:      Physical Exam   Vital signs reviewed. General: Well-developed and well-nourished. No acute distress. Skin: Warm, dry and intact. HEENT: Normocephalic. EOMs intact. Conjunctivae normal. Neck supple. Cardiovascular: Normal rate, regular rhythm. Pulmonary/Chest: Normal effort. Lungs clear to auscultation. No rales, rhonchi or wheezing. Abdominal: Positive bowel sounds. Soft, nontender. Nondistended. No rigidity, rebound, or guarding. Musculoskeletal: Movement x4. No edema.   Neurological: Gait normal. Alert and oriented to person, place, and tablet       Plan:    Dietitian visit today. Patient to continue to increase protein to obtain 60-80g/day, at least 48-64oz of fluid daily, and gradually increase exercise regimen. May resume RA meds. IV hydration. Zofran refilled. Call if no better. Follow-up  Return in about 2 months (around 2/28/2022). Orders this encounter:  Orders Placed This Encounter   Procedures    CBC Auto Differential     Standing Status:   Future     Standing Expiration Date:   12/28/2022    Comprehensive Metabolic Panel     Standing Status:   Future     Standing Expiration Date:   12/28/2022    Iron and TIBC     Standing Status:   Future     Standing Expiration Date:   12/28/2022     Order Specific Question:   Is Patient Fasting? Answer:   yes     Order Specific Question:   No of Hours?      Answer:   12    Hemoglobin A1C     Standing Status:   Future     Standing Expiration Date:   12/28/2022    Lipid Panel     Standing Status:   Future     Standing Expiration Date:   12/28/2022     Order Specific Question:   Is Patient Fasting?/# of Hours     Answer:   12    Magnesium     Standing Status:   Future     Standing Expiration Date:   12/28/2022    PTH, Intact     Standing Status:   Future     Standing Expiration Date:   12/28/2022    TSH without Reflex     Standing Status:   Future     Standing Expiration Date:   12/28/2022    Vitamin A     Standing Status:   Future     Standing Expiration Date:   12/28/2022    Vitamin B1     Standing Status:   Future     Standing Expiration Date:   12/28/2022    Vitamin B12 & Folate     Standing Status:   Future     Standing Expiration Date:   12/28/2022    Vitamin D 25 Hydroxy     Standing Status:   Future     Standing Expiration Date:   12/28/2022    Zinc     Standing Status:   Future     Standing Expiration Date:   12/28/2022       Prescriptions this encounter:  Orders Placed This Encounter   Medications    ondansetron (ZOFRAN) 4 MG tablet     Sig: Take 1 tablet by mouth every 8 hours as needed for Nausea or Vomiting     Dispense:  30 tablet     Refill:  2       Electronically signed by:  Zen Kapoor CNP

## 2021-12-28 NOTE — PROGRESS NOTES
Medical Nutrition Therapy  Metabolic and Bariatric surgery  1 month after surgery follow up note         Pt reports:         Vitals:   Vitals:    12/28/21 1048   BP: 105/74   Site: Right Upper Arm   Position: Sitting   Cuff Size: Large Adult   Pulse: 91   Weight: 248 lb (112.5 kg)   Height: 5' 3\" (1.6 m)      Body mass index is 43.93 kg/m². Labs reviewed:     Multivitamin/mineral intake:One daily  Calcium intake:2 calcium    Other:            Nutrition Assessment:   PES: Inadequate food and beverage intake r/t WLS as evidenced by loss of excess body weight 21lb.       Goals   60-80gm of protein  48-64oz of fluid     [x] met     []  Not met        Plan:  Questions answered re diet advancement and tolerance  F/u 3 months after surgery         Argelia Edmond RD, LD

## 2021-12-29 ENCOUNTER — HOSPITAL ENCOUNTER (OUTPATIENT)
Dept: ONCOLOGY | Age: 41
Discharge: HOME | End: 2021-12-29
Attending: SURGERY | Admitting: SURGERY
Payer: MEDICARE

## 2021-12-29 VITALS
DIASTOLIC BLOOD PRESSURE: 77 MMHG | HEART RATE: 97 BPM | TEMPERATURE: 99.1 F | OXYGEN SATURATION: 100 % | SYSTOLIC BLOOD PRESSURE: 106 MMHG | RESPIRATION RATE: 20 BRPM

## 2021-12-29 PROCEDURE — 96360 HYDRATION IV INFUSION INIT: CPT

## 2021-12-29 PROCEDURE — 96361 HYDRATE IV INFUSION ADD-ON: CPT

## 2021-12-29 PROCEDURE — 2580000003 HC RX 258: Performed by: SURGERY

## 2021-12-29 RX ADMIN — SODIUM CHLORIDE 1000 ML: 9 INJECTION, SOLUTION INTRAVENOUS at 14:51

## 2021-12-29 RX ADMIN — SODIUM CHLORIDE 1000 ML: 9 INJECTION, SOLUTION INTRAVENOUS at 13:51

## 2022-01-03 ENCOUNTER — HOSPITAL ENCOUNTER (OUTPATIENT)
Age: 42
Discharge: HOME | End: 2022-01-03
Payer: MEDICARE

## 2022-01-03 ENCOUNTER — OFFICE VISIT (OUTPATIENT)
Dept: FAMILY MEDICINE CLINIC | Age: 42
End: 2022-01-03
Payer: MEDICARE

## 2022-01-03 VITALS
OXYGEN SATURATION: 98 % | DIASTOLIC BLOOD PRESSURE: 70 MMHG | SYSTOLIC BLOOD PRESSURE: 110 MMHG | HEART RATE: 89 BPM | HEIGHT: 63 IN | TEMPERATURE: 97.1 F | BODY MASS INDEX: 42.7 KG/M2 | WEIGHT: 241 LBS

## 2022-01-03 DIAGNOSIS — E66.01 MORBID OBESITY WITH BMI OF 45.0-49.9, ADULT (HCC): ICD-10-CM

## 2022-01-03 DIAGNOSIS — R73.03 PREDIABETES: ICD-10-CM

## 2022-01-03 DIAGNOSIS — K21.9 GERD WITHOUT ESOPHAGITIS: ICD-10-CM

## 2022-01-03 DIAGNOSIS — E53.8 LOW FOLATE: Primary | ICD-10-CM

## 2022-01-03 DIAGNOSIS — E55.9 VITAMIN D DEFICIENCY: ICD-10-CM

## 2022-01-03 DIAGNOSIS — Z23 NEEDS FLU SHOT: ICD-10-CM

## 2022-01-03 DIAGNOSIS — M05.9 RHEUMATOID ARTHRITIS WITH RHEUMATOID FACTOR, UNSPECIFIED (HCC): ICD-10-CM

## 2022-01-03 DIAGNOSIS — U09.9 COVID-19 LONG HAULER MANIFESTING CHRONIC LOSS OF SMELL: Primary | ICD-10-CM

## 2022-01-03 DIAGNOSIS — G47.33 OSA (OBSTRUCTIVE SLEEP APNEA): ICD-10-CM

## 2022-01-03 DIAGNOSIS — Z98.84 S/P LAPAROSCOPIC SLEEVE GASTRECTOMY: ICD-10-CM

## 2022-01-03 DIAGNOSIS — R43.0 COVID-19 LONG HAULER MANIFESTING CHRONIC LOSS OF SMELL: Primary | ICD-10-CM

## 2022-01-03 PROBLEM — E86.0 DEHYDRATION: Status: RESOLVED | Noted: 2021-12-28 | Resolved: 2022-01-03

## 2022-01-03 PROBLEM — R03.0 ELEVATED BLOOD-PRESSURE READING WITHOUT DIAGNOSIS OF HYPERTENSION: Status: RESOLVED | Noted: 2019-11-30 | Resolved: 2022-01-03

## 2022-01-03 LAB
ABSOLUTE EOS #: 1.19 K/UL (ref 0–0.44)
ABSOLUTE IMMATURE GRANULOCYTE: <0.03 K/UL (ref 0–0.3)
ABSOLUTE LYMPH #: 2.29 K/UL (ref 1.1–3.7)
ABSOLUTE MONO #: 0.25 K/UL (ref 0.1–1.2)
ALBUMIN SERPL-MCNC: 3.4 G/DL (ref 3.5–5.2)
ALBUMIN/GLOBULIN RATIO: 1.1 (ref 1–2.5)
ALP BLD-CCNC: 74 U/L (ref 35–104)
ALT SERPL-CCNC: 8 U/L (ref 5–33)
ANION GAP SERPL CALCULATED.3IONS-SCNC: 11 MMOL/L (ref 9–17)
AST SERPL-CCNC: 11 U/L
BASOPHILS # BLD: 0 % (ref 0–2)
BASOPHILS ABSOLUTE: <0.03 K/UL (ref 0–0.2)
BILIRUB SERPL-MCNC: 0.4 MG/DL (ref 0.3–1.2)
BUN BLDV-MCNC: 7 MG/DL (ref 6–20)
BUN/CREAT BLD: ABNORMAL (ref 9–20)
CALCIUM SERPL-MCNC: 8.6 MG/DL (ref 8.6–10.4)
CHLORIDE BLD-SCNC: 105 MMOL/L (ref 98–107)
CHOLESTEROL/HDL RATIO: 6
CHOLESTEROL: 169 MG/DL
CO2: 21 MMOL/L (ref 20–31)
CREAT SERPL-MCNC: 0.49 MG/DL (ref 0.5–0.9)
DIFFERENTIAL TYPE: ABNORMAL
EOSINOPHILS RELATIVE PERCENT: 13 % (ref 1–4)
ESTIMATED AVERAGE GLUCOSE: 105 MG/DL
FOLATE: 4.3 NG/ML
GFR AFRICAN AMERICAN: >60 ML/MIN
GFR NON-AFRICAN AMERICAN: >60 ML/MIN
GFR SERPL CREATININE-BSD FRML MDRD: ABNORMAL ML/MIN/{1.73_M2}
GFR SERPL CREATININE-BSD FRML MDRD: ABNORMAL ML/MIN/{1.73_M2}
GLUCOSE BLD-MCNC: 86 MG/DL (ref 70–99)
HBA1C MFR BLD: 5.3 % (ref 4–6)
HCT VFR BLD CALC: 40.5 % (ref 36.3–47.1)
HDLC SERPL-MCNC: 28 MG/DL
HEMOGLOBIN: 12.8 G/DL (ref 11.9–15.1)
IMMATURE GRANULOCYTES: 0 %
IRON SATURATION: 29 % (ref 20–55)
IRON: 63 UG/DL (ref 37–145)
LDL CHOLESTEROL: 113 MG/DL (ref 0–130)
LYMPHOCYTES # BLD: 25 % (ref 24–43)
MAGNESIUM: 1.9 MG/DL (ref 1.6–2.6)
MCH RBC QN AUTO: 27.2 PG (ref 25.2–33.5)
MCHC RBC AUTO-ENTMCNC: 31.6 G/DL (ref 28.4–34.8)
MCV RBC AUTO: 86.2 FL (ref 82.6–102.9)
MONOCYTES # BLD: 3 % (ref 3–12)
NRBC AUTOMATED: 0 PER 100 WBC
PDW BLD-RTO: 15.3 % (ref 11.8–14.4)
PLATELET # BLD: 342 K/UL (ref 138–453)
PLATELET ESTIMATE: ABNORMAL
PMV BLD AUTO: 9.7 FL (ref 8.1–13.5)
POTASSIUM SERPL-SCNC: 3.8 MMOL/L (ref 3.7–5.3)
PTH INTACT: 47.67 PG/ML (ref 15–65)
RBC # BLD: 4.7 M/UL (ref 3.95–5.11)
RBC # BLD: ABNORMAL 10*6/UL
SEG NEUTROPHILS: 59 % (ref 36–65)
SEGMENTED NEUTROPHILS ABSOLUTE COUNT: 5.46 K/UL (ref 1.5–8.1)
SODIUM BLD-SCNC: 137 MMOL/L (ref 135–144)
TOTAL IRON BINDING CAPACITY: 215 UG/DL (ref 250–450)
TOTAL PROTEIN: 6.5 G/DL (ref 6.4–8.3)
TRIGL SERPL-MCNC: 142 MG/DL
TSH SERPL DL<=0.05 MIU/L-ACNC: 0.94 MIU/L (ref 0.3–5)
UNSATURATED IRON BINDING CAPACITY: 152 UG/DL (ref 112–347)
VITAMIN B-12: 490 PG/ML (ref 232–1245)
VITAMIN D 25-HYDROXY: 21.8 NG/ML (ref 30–100)
VLDLC SERPL CALC-MCNC: ABNORMAL MG/DL (ref 1–30)
WBC # BLD: 9.2 K/UL (ref 3.5–11.3)
WBC # BLD: ABNORMAL 10*3/UL

## 2022-01-03 PROCEDURE — 80053 COMPREHEN METABOLIC PANEL: CPT

## 2022-01-03 PROCEDURE — 83540 ASSAY OF IRON: CPT

## 2022-01-03 PROCEDURE — 90471 IMMUNIZATION ADMIN: CPT | Performed by: STUDENT IN AN ORGANIZED HEALTH CARE EDUCATION/TRAINING PROGRAM

## 2022-01-03 PROCEDURE — 83735 ASSAY OF MAGNESIUM: CPT

## 2022-01-03 PROCEDURE — G8427 DOCREV CUR MEDS BY ELIG CLIN: HCPCS | Performed by: STUDENT IN AN ORGANIZED HEALTH CARE EDUCATION/TRAINING PROGRAM

## 2022-01-03 PROCEDURE — 90674 CCIIV4 VAC NO PRSV 0.5 ML IM: CPT | Performed by: STUDENT IN AN ORGANIZED HEALTH CARE EDUCATION/TRAINING PROGRAM

## 2022-01-03 PROCEDURE — 82306 VITAMIN D 25 HYDROXY: CPT

## 2022-01-03 PROCEDURE — G8482 FLU IMMUNIZE ORDER/ADMIN: HCPCS | Performed by: STUDENT IN AN ORGANIZED HEALTH CARE EDUCATION/TRAINING PROGRAM

## 2022-01-03 PROCEDURE — 84443 ASSAY THYROID STIM HORMONE: CPT

## 2022-01-03 PROCEDURE — 83036 HEMOGLOBIN GLYCOSYLATED A1C: CPT

## 2022-01-03 PROCEDURE — 84425 ASSAY OF VITAMIN B-1: CPT

## 2022-01-03 PROCEDURE — 85025 COMPLETE CBC W/AUTO DIFF WBC: CPT

## 2022-01-03 PROCEDURE — 83550 IRON BINDING TEST: CPT

## 2022-01-03 PROCEDURE — 84630 ASSAY OF ZINC: CPT

## 2022-01-03 PROCEDURE — G8417 CALC BMI ABV UP PARAM F/U: HCPCS | Performed by: STUDENT IN AN ORGANIZED HEALTH CARE EDUCATION/TRAINING PROGRAM

## 2022-01-03 PROCEDURE — 36415 COLL VENOUS BLD VENIPUNCTURE: CPT

## 2022-01-03 PROCEDURE — 83970 ASSAY OF PARATHORMONE: CPT

## 2022-01-03 PROCEDURE — 84590 ASSAY OF VITAMIN A: CPT

## 2022-01-03 PROCEDURE — 99213 OFFICE O/P EST LOW 20 MIN: CPT | Performed by: STUDENT IN AN ORGANIZED HEALTH CARE EDUCATION/TRAINING PROGRAM

## 2022-01-03 PROCEDURE — 1036F TOBACCO NON-USER: CPT | Performed by: STUDENT IN AN ORGANIZED HEALTH CARE EDUCATION/TRAINING PROGRAM

## 2022-01-03 PROCEDURE — 82746 ASSAY OF FOLIC ACID SERUM: CPT

## 2022-01-03 PROCEDURE — 80061 LIPID PANEL: CPT

## 2022-01-03 PROCEDURE — 82607 VITAMIN B-12: CPT

## 2022-01-03 RX ORDER — PREDNISONE 2.5 MG
TABLET ORAL
COMMUNITY
Start: 2021-12-28

## 2022-01-03 RX ORDER — FOLIC ACID 1 MG/1
1 TABLET ORAL DAILY
Qty: 30 TABLET | Refills: 0 | Status: SHIPPED | OUTPATIENT
Start: 2022-01-03 | End: 2022-02-17

## 2022-01-03 RX ORDER — ERGOCALCIFEROL 1.25 MG/1
50000 CAPSULE ORAL WEEKLY
Qty: 8 CAPSULE | Refills: 0 | Status: SHIPPED | OUTPATIENT
Start: 2022-01-03 | End: 2022-05-17 | Stop reason: SDUPTHER

## 2022-01-03 RX ORDER — MULTIVIT-MIN/IRON/FOLIC ACID/K 18-600-40
CAPSULE ORAL
Status: ON HOLD | COMMUNITY
End: 2022-04-01

## 2022-01-03 ASSESSMENT — ENCOUNTER SYMPTOMS
SORE THROAT: 0
EYE DISCHARGE: 0
SHORTNESS OF BREATH: 0
DIARRHEA: 0
COUGH: 0
VOMITING: 0
WHEEZING: 0
CONSTIPATION: 0
ABDOMINAL PAIN: 0
CHEST TIGHTNESS: 0
NAUSEA: 0

## 2022-01-03 NOTE — PROGRESS NOTES
[address] PRIMARY CARE  [address] [place] [address] [address] [place] [address]  Dept: [phone]  Dept Fax: [phone]    Trace Powell is a 46 y.o. female who is a Established patient, who presents today for her medical conditions/complaints as noted below:  Chief Complaint   Patient presents with   Gloria Weber Established New Doctor    Other     no sense of smell since having covid          HPI:     She is here today to follow-up after her Covid infection in September. She says that since she had the infection she has had weird smell. She said that anything with protein smells like garbage to her. She she had a severe infection requiring oxygen at the time but says that her breathing is now good. She also had a bariatric surgery done recently and has recovered well from it. She denies any other issues or concerns. She has a history of rheumatoid arthritis for which she is following with rheumatology and is currently on methotrexate and prednisone. She says that she was taken off of the medications for several weeks for her surgery. She is due for pneumonia vaccine but declined stating that she wanted to recover first.  She is not vaccinated for Covid.        Hemoglobin A1C (%)   Date Value   07/23/2021 6.3 (H)   06/17/2021 5.7   11/02/2020 6.1 (H)             ( goal A1Cis < 7)   No results found for: LABMICR  LDL Cholesterol (mg/dL)   Date Value   01/03/2022 113   07/23/2021 98   11/02/2020 124       (goal LDL is <100)   AST (U/L)   Date Value   01/03/2022 11     ALT (U/L)   Date Value   01/03/2022 8     BUN (mg/dL)   Date Value   01/03/2022 7     BP Readings from Last 3 Encounters:   01/03/22 110/70   12/29/21 106/77   12/28/21 105/74          (goal 120/80)    Past Medical History:   Diagnosis Date    Abnormal 1st trimester screen (Tri 21 1:117)--NIPT nml  11/30/2019    ADD (attention deficit disorder) without hyperactivity     Antiphospholipid syndrome (HCC)     Celestone 1/9 & 1/10 2020    Chronic back pain     COVID 09/15/2021    BODY ACHE, COUGH, FEVER, CHEST PAIN, N&V X 3 WEEKS. STILL HAS COUGH.     Echogenic focus of heart of fetus affecting antepartum care of mother 2019   Cordie Gt early 1hr, nml 3hr  2019    1 elevated value, [facility] recommends repeat 3hr GTT in 2 weeks    Elevated umbilical artery dopplers  01/10/2020    Eosinopenia ([address]     Fetal ascites     Fetal heart rate decelerations affecting management of mother     GERD (gastroesophageal reflux disease)     Headache     Hypertension     Iron deficiency     follows with Dr. Sandie Marina (hem/onc)    IUD (intrauterine device) in place 2020    Mirena Exline Mourning    Lumbar radiculopathy 2019    Lumbar spondylosis 2019    Multigravida of advanced maternal age in third trimester     Obesity     SHELLEY (obstructive sleep apnea)     Cpap (was causing speech difficulties, resolved after started using cpap)    Personal history of other medical treatment     Axillary lympadenopathy    Pre-diabetes     Pulmonary embolism affecting pregnancy in second trimester 2019    Rh+/RI/GBSunk 2020    Rheumatoid arteritis ([address]     Tachycardia     Umbilical cord complication     Under care of team 2021    PCP: Dr. Rebecca Gracia, last visit 2021, clearance given already for surgery on 2021    Under care of team 2021    Neurology: Dr. Rhona Alcocer, last visit 2021    Under care of team 2021    Oncology: Dr. Sandie Marina, [facility], last visit 3/2021, received clearance for surgery for 2021    Wears glasses       Past Surgical History:   Procedure Laterality Date     SECTION Bilateral 2020     SECTION performed by Manuel Colón DO at [facility]    ENDOSCOPY, COLON, DIAGNOSTIC      INTRAUTERINE DEVICE INSERTION  2020    Mirena    SLEEVE GASTRECTOMY N/A 2021    XI ROBOTIC LAPAROSCOPIC GASTRECTOMY SLEEVE WITH EGD (N/A Abdomen)    SLEEVE GASTRECTOMY N/A 11/23/2021    XI ROBOTIC LAPAROSCOPIC GASTRECTOMY SLEEVE WITH EGD performed by Sudha Larry DO at [facility] N/A 01/22/2021    EGD BIOPSY OF GASTRIC performed by Sudha Larry DO at [facility]  08/19/2020    US LYMPH NODE BIOPSY 8/19/2020 [facility] ULTRAS[facility]    WISDOM TOOTH EXTRACTION         Family History   Problem Relation Age of Onset    Elevated Lipids Mother     High Blood Pressure Mother        Social History     Tobacco Use    Smoking status: Never Smoker    Smokeless tobacco: Never Used   Substance Use Topics    Alcohol use: No      Current Outpatient Medications   Medication Sig Dispense Refill    Cholecalciferol (VITAMIN D) 50 MCG (2000 UT) CAPS capsule Take by mouth      FOLIC ACID PO Take by mouth      Ferrous Sulfate (IRON PO) Take by mouth      Calcium Carbonate (CALCIUM 500 PO) Take by mouth      Multiple Vitamin (MULTIVITAMIN ADULT PO) Take by mouth      ondansetron (ZOFRAN) 4 MG tablet Take 1 tablet by mouth every 8 hours as needed for Nausea or Vomiting 30 tablet 2    cetirizine (ZYRTEC) 10 MG tablet daily as needed       predniSONE (DELTASONE) 2.5 MG tablet take 2 tablets by mouth every morning and 1 tablet by mouth every evening      methotrexate (RHEUMATREX) 2.5 MG chemo tablet take 8 tablets by mouth every week       Current Facility-Administered Medications   Medication Dose Route Frequency Provider Last Rate Last Admin    levonorgestrel (MIRENA) IUD 52 mg 1 each  1 each IntraUTERine Once Marianna Lazo, APRN - CNP   1 each at 05/07/20 2867     Allergies   Allergen Reactions    Iodine Hives and Itching    Other Itching and Swelling     Sensitive to bug bites, mosquitos. Skin discoloration X1 month.     Phenergan [Promethazine] Itching    Cat Hair Extract Itching     RUNNY NOSE, SNEEZING    Dog Epithelium Itching     RUNNY NOSE, SNEEZING. Health Maintenance   Topic Date Due    Varicella vaccine (1 of 2 - 2-dose childhood series) Never done    COVID-19 Vaccine (1) Never done    Pneumococcal 0-64 years Vaccine (1 of 2 - PPSV23) Never done    Depression Screen  06/17/2022    A1C test (Diabetic or Prediabetic)  07/23/2022    Cervical cancer screen  07/30/2024    DTaP/Tdap/Td vaccine (2 - Td or Tdap) 02/21/2027    Flu vaccine  Completed    Hepatitis C screen  Completed    HIV screen  Completed    Hepatitis A vaccine  Aged Out    Hepatitis B vaccine  Aged Out    Hib vaccine  Aged Out    Meningococcal (ACWY) vaccine  Aged Out       Subjective:     Review of Systems   Constitutional: Negative for appetite change, fatigue and fever. HENT: Negative for congestion, ear pain, hearing loss and sore throat. Eyes: Negative for discharge and visual disturbance. Respiratory: Negative for cough, chest tightness, shortness of breath and wheezing. Cardiovascular: Negative for chest pain, palpitations and leg swelling. Gastrointestinal: Negative for abdominal pain, constipation, diarrhea, nausea and vomiting. Genitourinary: Negative for flank pain, frequency, hematuria and urgency. Musculoskeletal: Negative for arthralgias, gait problem, joint swelling and myalgias. Skin: Negative. Neurological: Negative for dizziness, weakness, numbness and headaches. Psychiatric/Behavioral: Negative. Negative for dysphoric mood. The patient is not nervous/anxious. Objective:     Physical Exam  Vitals reviewed. Constitutional:       Appearance: Normal appearance. She is obese. HENT:      Head: Normocephalic and atraumatic. Nose: Nose normal.      Mouth/Throat:      Mouth: Mucous membranes are moist.      Pharynx: Oropharynx is clear. Eyes:      Extraocular Movements: Extraocular movements intact. Conjunctiva/sclera: Conjunctivae normal.      Pupils: Pupils are equal, round, and reactive to light.  INFLUENZA, MDCK QUADV, 2 YRS AND OLDER, IM, PF, PREFILL SYR OR SDV, 0.5ML (Sherley Gresham, PF)    NATALY - Sheng Hernandez MD, Otolaryngology, [place]     Referral Priority:   Routine     Referral Type:   Eval and Treat     Referral Reason:   Specialty Services Required     Referred to Provider:   Marely Dickson MD     Requested Specialty:   Otolaryngology     Number of Visits Requested:   1     No orders of the defined types were placed in this encounter. Patient given educational materials - see patient instructions. Discussed use, benefit, and side effects of prescribed medications. All patientquestions answered. Pt voiced understanding. Reviewed health maintenance. Instructedto continue current medications, diet and exercise. Patient agreed with treatmentplan. Follow up as directed.      Electronically signed by Katherine Munoz MD on 1/3/2022 at 1:31 PM

## 2022-01-05 LAB — ZINC: 74.6 UG/DL (ref 60–120)

## 2022-01-06 LAB
RETINYL PALMITATE: <0.02 MG/L (ref 0–0.1)
VITAMIN A LEVEL: 0.36 MG/L (ref 0.3–1.2)
VITAMIN A, INTERP: NORMAL

## 2022-01-07 LAB — VITAMIN B1 WHOLE BLOOD: 81 NMOL/L (ref 70–180)

## 2022-02-17 DIAGNOSIS — E53.8 LOW FOLATE: ICD-10-CM

## 2022-02-17 RX ORDER — FOLIC ACID 1 MG/1
TABLET ORAL
Qty: 30 TABLET | Refills: 0 | OUTPATIENT
Start: 2022-02-17

## 2022-02-21 ENCOUNTER — OFFICE VISIT (OUTPATIENT)
Dept: BARIATRICS/WEIGHT MGMT | Age: 42
End: 2022-02-21
Payer: MEDICARE

## 2022-02-21 VITALS
DIASTOLIC BLOOD PRESSURE: 73 MMHG | HEART RATE: 88 BPM | BODY MASS INDEX: 40.93 KG/M2 | WEIGHT: 231 LBS | HEIGHT: 63 IN | SYSTOLIC BLOOD PRESSURE: 101 MMHG

## 2022-02-21 DIAGNOSIS — E66.01 OBESITY, CLASS III, BMI 40-49.9 (MORBID OBESITY) (HCC): ICD-10-CM

## 2022-02-21 DIAGNOSIS — G47.33 OSA (OBSTRUCTIVE SLEEP APNEA): ICD-10-CM

## 2022-02-21 DIAGNOSIS — M54.16 LUMBAR RADICULOPATHY: ICD-10-CM

## 2022-02-21 DIAGNOSIS — R11.0 NAUSEA: ICD-10-CM

## 2022-02-21 DIAGNOSIS — Z98.84 S/P LAPAROSCOPIC SLEEVE GASTRECTOMY: ICD-10-CM

## 2022-02-21 DIAGNOSIS — R10.13 EPIGASTRIC PAIN: Primary | ICD-10-CM

## 2022-02-21 DIAGNOSIS — M05.9 RHEUMATOID ARTHRITIS WITH RHEUMATOID FACTOR, UNSPECIFIED (HCC): ICD-10-CM

## 2022-02-21 PROBLEM — E66.813 OBESITY, CLASS III, BMI 40-49.9 (MORBID OBESITY) (HCC): Status: ACTIVE | Noted: 2017-02-21

## 2022-02-21 PROCEDURE — G8417 CALC BMI ABV UP PARAM F/U: HCPCS | Performed by: NURSE PRACTITIONER

## 2022-02-21 PROCEDURE — G8427 DOCREV CUR MEDS BY ELIG CLIN: HCPCS | Performed by: NURSE PRACTITIONER

## 2022-02-21 PROCEDURE — 1036F TOBACCO NON-USER: CPT | Performed by: NURSE PRACTITIONER

## 2022-02-21 PROCEDURE — 99213 OFFICE O/P EST LOW 20 MIN: CPT | Performed by: NURSE PRACTITIONER

## 2022-02-21 PROCEDURE — G8482 FLU IMMUNIZE ORDER/ADMIN: HCPCS | Performed by: NURSE PRACTITIONER

## 2022-02-21 RX ORDER — ONDANSETRON 4 MG/1
4 TABLET, FILM COATED ORAL EVERY 8 HOURS PRN
Qty: 30 TABLET | Refills: 2 | Status: ON HOLD | OUTPATIENT
Start: 2022-02-21 | End: 2022-04-01

## 2022-02-21 NOTE — PROGRESS NOTES
Medical Nutrition Therapy  Metabolic and Bariatric surgery  3 month follow up        Pt reports:         Vitals:   Vitals:    02/21/22 1156   BP: 101/73   Site: Right Upper Arm   Position: Sitting   Cuff Size: Large Adult   Pulse: 88   Weight: 231 lb (104.8 kg)   Height: 5' 3\" (1.6 m)      Body mass index is 40.92 kg/m². Labs reviewed:     Multivitamin/mineral intake:one daily  Calcium intake:   2 daily  Other:            Nutrition Assessment:   PES: Inadequate food and beverage intake r/t WLS as evidenced by loss of excess body weight 38lb . Goals   60-80gm of protein  48-64oz of fluid  Vitamin adherance  Basic adherance to WLS behavious and this document has been scanned into the chart.        [x] met     []  Not met        Plan:   F/u 6 months after surgery         Daniela Rodríguez RD, LD

## 2022-02-21 NOTE — PROGRESS NOTES
Post-op Bariatric Surgery Note    Subjective     Patient is 3 months s/p laparoscopic sleeve gastrectomy, down 38 lbs. Overall, doing well. Incisions well healed. Consistent use of MVI and calcium. Physical activity includes walking. Some nausea and epigastric pain with eating. BMs regular. Food recall reveals high fat intake. Allergies: Allergies   Allergen Reactions    Iodine Hives and Itching    Other Itching and Swelling     Sensitive to bug bites, mosquitos. Skin discoloration X1 month.  Phenergan [Promethazine] Itching    Cat Hair Extract Itching     RUNNY NOSE, SNEEZING    Dog Epithelium Itching     RUNNY NOSE, SNEEZING. Past Medical History:     Past Medical History:   Diagnosis Date    Abnormal 1st trimester screen (Tri 21 1:117)--NIPT nml  11/30/2019    ADD (attention deficit disorder) without hyperactivity     Antiphospholipid syndrome (HCC)     Celestone 1/9 & 1/10 01/09/2020    Chronic back pain     COVID 09/15/2021    BODY ACHE, COUGH, FEVER, CHEST PAIN, N&V X 3 WEEKS. STILL HAS COUGH.     Dehydration 12/28/2021    Echogenic focus of heart of fetus affecting antepartum care of mother 11/30/2019   Cooper elizabeth 1hr, nml 3hr  11/30/2019    1 elevated value, [facility] recommends repeat 3hr GTT in 2 weeks    Elevated blood-pressure reading without diagnosis of hypertension 11/30/2019    Elevated umbilical artery dopplers  01/10/2020    Eosinopenia ([address]     Fetal ascites     Fetal heart rate decelerations affecting management of mother     GERD (gastroesophageal reflux disease)     Headache     Hypertension     Iron deficiency     follows with Dr. Irina Morel (hem/onc)    IUD (intrauterine device) in place 05/07/2020    Adina Tate    Lumbar radiculopathy 01/09/2019    Lumbar spondylosis 01/09/2019    Multigravida of advanced maternal age in third trimester     Obesity     SHELLEY (obstructive sleep apnea)     Cpap (was causing speech difficulties, resolved after started using cpap)    Personal history of other medical treatment     Axillary lympadenopathy    Pre-diabetes     Pulmonary embolism affecting pregnancy in second trimester 2019    Rh+/RI/GBSunk 2020    Rheumatoid arteritis ([address]     Tachycardia     Umbilical cord complication     Under care of team 2021    PCP: Dr. Beth Anaya, last visit 2021, clearance given already for surgery on 2021    Under care of team 2021    Neurology: Dr. Rosalie Mcintosh, last visit 2021    Under care of team 2021    Oncology: Dr. Karina Desai, [address], last visit 3/2021, received clearance for surgery for 2021    Wears glasses    .     Past Surgical History:  Past Surgical History:   Procedure Laterality Date     SECTION Bilateral 2020     SECTION performed by Jeimy Vraghese DO at [place] L&D OR    ENDOSCOPY, COLON, DIAGNOSTIC      INTRAUTERINE DEVICE INSERTION  2020    Mirena    SLEEVE GASTRECTOMY N/A 2021    XI ROBOTIC LAPAROSCOPIC GASTRECTOMY SLEEVE WITH EGD (N/A Abdomen)    SLEEVE GASTRECTOMY N/A 2021    XI ROBOTIC LAPAROSCOPIC GASTRECTOMY SLEEVE WITH EGD performed by Giorgi Nicolas DO at [facility] N/A 2021    EGD BIOPSY OF GASTRIC performed by Giorgi Nicolas DO at [facility]  2020    US LYMPH NODE BIOPSY 2020 [facility]TR[facility]    WISDOM TOOTH EXTRACTION         Family History:  Family History   Problem Relation Age of Onset    Elevated Lipids Mother     High Blood Pressure Mother        Social History:  Social History     Socioeconomic History    Marital status:      Spouse name: Not on file    Number of children: Not on file    Years of education: Not on file    Highest education level: Not on file   Occupational History    Not on file   Tobacco Use    Smoking status: Never Smoker    Smokeless tobacco: Never Used   Vaping Use    Vaping Use: Never used   Substance and Sexual Activity    Alcohol use: No    Drug use: Not Currently     Frequency: 2.0 times per week     Types: Marijuana Rondi Baba)     Comment: Last used 2 months ago    Sexual activity: Yes     Partners: Male   Other Topics Concern    Not on file   Social History Narrative    Not on file     Social Determinants of Health     Financial Resource Strain: Low Risk     Difficulty of Paying Living Expenses: Not hard at all   Food Insecurity: No Food Insecurity    Worried About Running Out of Food in the Last Year: Never true    Dylan of Food in the Last Year: Never true   Transportation Needs: Unmet Transportation Needs    Lack of Transportation (Medical):  Yes    Lack of Transportation (Non-Medical): Yes   Physical Activity:     Days of Exercise per Week: Not on file    Minutes of Exercise per Session: Not on file   Stress:     Feeling of Stress : Not on file   Social Connections:     Frequency of Communication with Friends and Family: Not on file    Frequency of Social Gatherings with Friends and Family: Not on file    Attends [employer] Services: Not on file    Active Member of [address] [place] [address] or Organizations: Not on file    Attends Club or Organization Meetings: Not on file    Marital Status: Not on file   Intimate Partner Violence:     Fear of Current or Ex-Partner: Not on file    Emotionally Abused: Not on file    Physically Abused: Not on file    Sexually Abused: Not on file   Housing Stability:     Unable to Pay for Housing in the Last Year: Not on file    Number of [place] in the Last Year: Not on file    Unstable Housing in the Last Year: Not on file       Current Medications:  Current Outpatient Medications   Medication Sig Dispense Refill    ondansetron (ZOFRAN) 4 MG tablet Take 1 tablet by mouth every 8 hours as needed for Nausea or Vomiting 30 tablet 2    predniSONE (DELTASONE) 2.5 MG tablet take 2 tablets by mouth every morning and 1 tablet by mouth every evening      methotrexate (RHEUMATREX) 2.5 MG chemo tablet take 8 tablets by mouth every week      Cholecalciferol (VITAMIN D) 50 MCG (2000 UT) CAPS capsule Take by mouth      Ferrous Sulfate (IRON PO) Take by mouth      Calcium Carbonate (CALCIUM 500 PO) Take by mouth      Multiple Vitamin (MULTIVITAMIN ADULT PO) Take by mouth      vitamin D (ERGOCALCIFEROL) 1.25 MG (67003 UT) CAPS capsule Take 1 capsule by mouth once a week for 8 doses 8 capsule 0    cetirizine (ZYRTEC) 10 MG tablet daily as needed        Current Facility-Administered Medications   Medication Dose Route Frequency Provider Last Rate Last Admin    levonorgestrel (MIRENA) IUD 52 mg 1 each  1 each IntraUTERine Once Fernando Dire, APRN - CNP   1 each at 05/07/20 1459       Vital Signs:  /73 (Site: Right Upper Arm, Position: Sitting, Cuff Size: Large Adult)   Pulse 88   Ht 5' 3\" (1.6 m)   Wt 231 lb (104.8 kg)   BMI 40.92 kg/m²     BMI/Height/Weight:  Body mass index is 40.92 kg/m². Review of Systems - A review of systems was performed. All was negative unless otherwise documented in HPI. Constitutional: Negative for fever, chills and diaphoresis. HENT: Negative for hearing loss and trouble swallowing. Eyes: Negative for photophobia and visual disturbance. Respiratory: Negative for cough, shortness of breath and wheezing. Cardiovascular: Negative for chest pain and palpitations. Gastrointestinal: Negative for vomiting, diarrhea, constipation, blood in stool and abdominal distention. Positive for nausea and abdominal pain. Endocrine: Negative for polydipsia, polyphagia and polyuria. Genitourinary: Negative for dysuria, frequency, hematuria and difficulty urinating. Musculoskeletal: Negative for myalgias, joint swelling. Skin: Negative for pallor and rash. Neurological: Negative for dizziness, tremors, light-headedness and headaches.    Psychiatric/Behavioral: Negative for sleep disturbance and dysphoric mood. Objective:      Physical Exam   Vital signs reviewed. General: Well-developed and well-nourished. No acute distress. Skin: Warm, dry and intact. HEENT: Normocephalic. EOMs intact. Conjunctivae normal. Neck supple. Cardiovascular: Normal rate, regular rhythm. Pulmonary/Chest: Normal effort. Lungs clear to auscultation. No rales, rhonchi or wheezing. Abdominal: Positive bowel sounds. Soft, nontender. Nondistended. No rigidity, rebound, or guarding. Musculoskeletal: Movement x4. No edema. Neurological: Gait normal. Alert and oriented to person, place, and time. Psychiatric: Normal mood and affect. Speech and behavior normal. Judgment and thought content normal. Cognition and memory intact. Assessment:       Diagnosis Orders   1. Epigastric pain  CBC with Auto Differential    Comprehensive Metabolic Panel    Hemoglobin A1C    Iron and TIBC    Lipid Panel    Magnesium    PTH, Intact    TSH    Vitamin A    Vitamin B1    Vitamin B12 & Folate    Vitamin D 25 Hydroxy    Zinc    US GALLBLADDER RUQ   2. Lumbar radiculopathy  CBC with Auto Differential    Comprehensive Metabolic Panel    Hemoglobin A1C    Iron and TIBC    Lipid Panel    Magnesium    PTH, Intact    TSH    Vitamin A    Vitamin B1    Vitamin B12 & Folate    Vitamin D 25 Hydroxy    Zinc    US GALLBLADDER RUQ   3. S/P laparoscopic sleeve gastrectomy  CBC with Auto Differential    Comprehensive Metabolic Panel    Hemoglobin A1C    Iron and TIBC    Lipid Panel    Magnesium    PTH, Intact    TSH    Vitamin A    Vitamin B1    Vitamin B12 & Folate    Vitamin D 25 Hydroxy    Zinc    US GALLBLADDER RUQ   4.  Rheumatoid arthritis with rheumatoid factor, unspecified (HCC)  CBC with Auto Differential    Comprehensive Metabolic Panel    Hemoglobin A1C    Iron and TIBC    Lipid Panel    Magnesium    PTH, Intact    TSH    Vitamin A    Vitamin B1    Vitamin B12 & Folate    Vitamin D 25 Hydroxy    Zinc US GALLBLADDER RUQ   5. SHELLEY (obstructive sleep apnea)  CBC with Auto Differential    Comprehensive Metabolic Panel    Hemoglobin A1C    Iron and TIBC    Lipid Panel    Magnesium    PTH, Intact    TSH    Vitamin A    Vitamin B1    Vitamin B12 & Folate    Vitamin D 25 Hydroxy    Zinc    US GALLBLADDER RUQ   6. Obesity, Class III, BMI 40-49.9 (morbid obesity) (HCC)  CBC with Auto Differential    Comprehensive Metabolic Panel    Hemoglobin A1C    Iron and TIBC    Lipid Panel    Magnesium    PTH, Intact    TSH    Vitamin A    Vitamin B1    Vitamin B12 & Folate    Vitamin D 25 Hydroxy    Zinc    US GALLBLADDER RUQ   7. Nausea  CBC with Auto Differential    Comprehensive Metabolic Panel    Hemoglobin A1C    Iron and TIBC    Lipid Panel    Magnesium    PTH, Intact    TSH    Vitamin A    Vitamin B1    Vitamin B12 & Folate    Vitamin D 25 Hydroxy    Zinc    US GALLBLADDER RUQ    ondansetron (ZOFRAN) 4 MG tablet       Plan:    Dietitian visit today. Patient to continue to increase protein to obtain 60-80g/day, at least 48-64oz of fluid daily, and gradually increase exercise regimen. Labs from 1/3/22 reviewed and discussed with patient. Vitamin D and folate low and already treated. Bariatric dietary behaviors reinforced. Chew food thoroughly, take 30 minutes to eat. Protein, fruits/veggies, whole grains. Avoid fatty and greasy foods. PPI offered, patient declined. States she will take Tums instead. Zofran refilled. Gallbladder ultrasound ordered. Call if no better. Follow-up  Return in about 3 months (around 5/21/2022).     Orders this encounter:  Orders Placed This Encounter   Procedures    US GALLBLADDER RUQ     Standing Status:   Future     Standing Expiration Date:   2/22/2023     Order Specific Question:   Reason for exam:     Answer:   Abdominal Pain     Order Specific Question:   Reason for exam:     Answer:   s/p gastric sleeve    CBC with Auto Differential     Standing Status:   Future

## 2022-03-02 ENCOUNTER — HOSPITAL ENCOUNTER (OUTPATIENT)
Dept: ULTRASOUND IMAGING | Age: 42
Discharge: HOME | End: 2022-03-04
Payer: MEDICARE

## 2022-03-02 DIAGNOSIS — R10.13 EPIGASTRIC PAIN: ICD-10-CM

## 2022-03-02 DIAGNOSIS — G47.33 OSA (OBSTRUCTIVE SLEEP APNEA): ICD-10-CM

## 2022-03-02 DIAGNOSIS — E66.01 OBESITY, CLASS III, BMI 40-49.9 (MORBID OBESITY) (HCC): ICD-10-CM

## 2022-03-02 DIAGNOSIS — M05.9 RHEUMATOID ARTHRITIS WITH RHEUMATOID FACTOR, UNSPECIFIED (HCC): ICD-10-CM

## 2022-03-02 DIAGNOSIS — Z98.84 S/P LAPAROSCOPIC SLEEVE GASTRECTOMY: ICD-10-CM

## 2022-03-02 DIAGNOSIS — R11.0 NAUSEA: ICD-10-CM

## 2022-03-02 DIAGNOSIS — M54.16 LUMBAR RADICULOPATHY: ICD-10-CM

## 2022-03-02 PROCEDURE — 76705 ECHO EXAM OF ABDOMEN: CPT

## 2022-03-03 ENCOUNTER — TELEPHONE (OUTPATIENT)
Dept: BARIATRICS/WEIGHT MGMT | Age: 42
End: 2022-03-03

## 2022-03-03 NOTE — TELEPHONE ENCOUNTER
Called patient, notified of results, would like to proceed with gisell. Smooth Ruiz , advised patient she will receive call back in 24-48 hours to discuss a date. Dr Albertina Aceves, same day testing or PAT ?

## 2022-03-10 NOTE — TELEPHONE ENCOUNTER
Spoke with patient  Surgery 3/29/22 @ 12:30 pm to arrive at 10:30 am NPO after midnight  COVID testing 3/25/22 @ 11:30 am [facility]

## 2022-03-14 DIAGNOSIS — E55.9 VITAMIN D DEFICIENCY: ICD-10-CM

## 2022-03-14 RX ORDER — ERGOCALCIFEROL 1.25 MG/1
CAPSULE ORAL
Qty: 8 CAPSULE | Refills: 0 | OUTPATIENT
Start: 2022-03-14

## 2022-03-14 NOTE — TELEPHONE ENCOUNTER
Pt. Called wanting to know how long with their stay be at the hospital for their upcoming sx.  Please advise

## 2022-03-25 ENCOUNTER — HOSPITAL ENCOUNTER (OUTPATIENT)
Dept: LAB | Age: 42
Setting detail: SPECIMEN
Discharge: HOME | End: 2022-03-25
Payer: MEDICARE

## 2022-03-25 DIAGNOSIS — Z20.822 COVID-19 RULED OUT BY LABORATORY TESTING: Primary | ICD-10-CM

## 2022-03-25 PROCEDURE — U0005 INFEC AGEN DETEC AMPLI PROBE: HCPCS

## 2022-03-25 PROCEDURE — U0003 INFECTIOUS AGENT DETECTION BY NUCLEIC ACID (DNA OR RNA); SEVERE ACUTE RESPIRATORY SYNDROME CORONAVIRUS 2 (SARS-COV-2) (CORONAVIRUS DISEASE [COVID-19]), AMPLIFIED PROBE TECHNIQUE, MAKING USE OF HIGH THROUGHPUT TECHNOLOGIES AS DESCRIBED BY CMS-2020-01-R: HCPCS

## 2022-03-26 LAB
SARS-COV-2: NORMAL
SARS-COV-2: NOT DETECTED
SOURCE: NORMAL

## 2022-04-02 ENCOUNTER — HOSPITAL ENCOUNTER (OUTPATIENT)
Age: 42
Setting detail: SPECIMEN
Discharge: HOME | End: 2022-04-02

## 2022-04-05 ENCOUNTER — TELEPHONE (OUTPATIENT)
Dept: BARIATRICS/WEIGHT MGMT | Age: 42
End: 2022-04-05

## 2022-04-05 NOTE — TELEPHONE ENCOUNTER
Spoke with patient regarding change in surgery time from 1:30 pm to 10:30 am to arrive at surgery center at 8:30 am.  Patient verbalized understanding.

## 2022-04-06 ENCOUNTER — ANESTHESIA EVENT (OUTPATIENT)
Dept: OPERATING ROOM | Age: 42
End: 2022-04-06
Payer: MEDICARE

## 2022-04-06 ENCOUNTER — ANESTHESIA (OUTPATIENT)
Dept: OPERATING ROOM | Age: 42
End: 2022-04-06
Payer: MEDICARE

## 2022-04-06 ENCOUNTER — HOSPITAL ENCOUNTER (OUTPATIENT)
Age: 42
Setting detail: OUTPATIENT SURGERY
Discharge: HOME | End: 2022-04-06
Attending: SURGERY | Admitting: SURGERY
Payer: MEDICARE

## 2022-04-06 VITALS
HEART RATE: 67 BPM | WEIGHT: 221.34 LBS | RESPIRATION RATE: 14 BRPM | HEIGHT: 63 IN | TEMPERATURE: 96.8 F | SYSTOLIC BLOOD PRESSURE: 106 MMHG | OXYGEN SATURATION: 92 % | BODY MASS INDEX: 39.22 KG/M2 | DIASTOLIC BLOOD PRESSURE: 71 MMHG

## 2022-04-06 VITALS — TEMPERATURE: 94.4 F | OXYGEN SATURATION: 96 % | DIASTOLIC BLOOD PRESSURE: 75 MMHG | SYSTOLIC BLOOD PRESSURE: 103 MMHG

## 2022-04-06 DIAGNOSIS — Z90.49 S/P CHOLECYSTECTOMY: Primary | ICD-10-CM

## 2022-04-06 LAB
ANION GAP SERPL CALCULATED.3IONS-SCNC: 9 MMOL/L (ref 9–17)
BUN BLDV-MCNC: 7 MG/DL (ref 6–20)
CALCIUM SERPL-MCNC: 9.2 MG/DL (ref 8.6–10.4)
CHLORIDE BLD-SCNC: 106 MMOL/L (ref 98–107)
CO2: 24 MMOL/L (ref 20–31)
CREAT SERPL-MCNC: 0.53 MG/DL (ref 0.5–0.9)
GFR AFRICAN AMERICAN: >60 ML/MIN
GFR NON-AFRICAN AMERICAN: >60 ML/MIN
GFR SERPL CREATININE-BSD FRML MDRD: NORMAL ML/MIN/{1.73_M2}
GLUCOSE BLD-MCNC: 88 MG/DL (ref 70–99)
HCG, PREGNANCY URINE (POC): NEGATIVE
INR BLD: 1
POTASSIUM SERPL-SCNC: 3.8 MMOL/L (ref 3.7–5.3)
PROTHROMBIN TIME: 10.7 SEC (ref 9.1–12.3)
SODIUM BLD-SCNC: 139 MMOL/L (ref 135–144)

## 2022-04-06 PROCEDURE — 88304 TISSUE EXAM BY PATHOLOGIST: CPT

## 2022-04-06 PROCEDURE — 7100000001 HC PACU RECOVERY - ADDTL 15 MIN: Performed by: SURGERY

## 2022-04-06 PROCEDURE — 6360000002 HC RX W HCPCS: Performed by: SURGERY

## 2022-04-06 PROCEDURE — 7100000010 HC PHASE II RECOVERY - FIRST 15 MIN: Performed by: SURGERY

## 2022-04-06 PROCEDURE — 2709999900 HC NON-CHARGEABLE SUPPLY: Performed by: SURGERY

## 2022-04-06 PROCEDURE — 2500000003 HC RX 250 WO HCPCS

## 2022-04-06 PROCEDURE — 2500000003 HC RX 250 WO HCPCS: Performed by: NURSE ANESTHETIST, CERTIFIED REGISTERED

## 2022-04-06 PROCEDURE — 2580000003 HC RX 258

## 2022-04-06 PROCEDURE — 85610 PROTHROMBIN TIME: CPT

## 2022-04-06 PROCEDURE — 6360000002 HC RX W HCPCS

## 2022-04-06 PROCEDURE — 3600000019 HC SURGERY ROBOT ADDTL 15MIN: Performed by: SURGERY

## 2022-04-06 PROCEDURE — 6360000002 HC RX W HCPCS: Performed by: ANESTHESIOLOGY

## 2022-04-06 PROCEDURE — 6360000002 HC RX W HCPCS: Performed by: NURSE ANESTHETIST, CERTIFIED REGISTERED

## 2022-04-06 PROCEDURE — 3700000000 HC ANESTHESIA ATTENDED CARE: Performed by: SURGERY

## 2022-04-06 PROCEDURE — 7100000011 HC PHASE II RECOVERY - ADDTL 15 MIN: Performed by: SURGERY

## 2022-04-06 PROCEDURE — 2580000003 HC RX 258: Performed by: SURGERY

## 2022-04-06 PROCEDURE — S2900 ROBOTIC SURGICAL SYSTEM: HCPCS | Performed by: SURGERY

## 2022-04-06 PROCEDURE — 80048 BASIC METABOLIC PNL TOTAL CA: CPT

## 2022-04-06 PROCEDURE — 47562 LAPAROSCOPIC CHOLECYSTECTOMY: CPT | Performed by: SURGERY

## 2022-04-06 PROCEDURE — 2500000003 HC RX 250 WO HCPCS: Performed by: SURGERY

## 2022-04-06 PROCEDURE — 3700000001 HC ADD 15 MINUTES (ANESTHESIA): Performed by: SURGERY

## 2022-04-06 PROCEDURE — 3600000009 HC SURGERY ROBOT BASE: Performed by: SURGERY

## 2022-04-06 PROCEDURE — 7100000000 HC PACU RECOVERY - FIRST 15 MIN: Performed by: SURGERY

## 2022-04-06 PROCEDURE — 81025 URINE PREGNANCY TEST: CPT

## 2022-04-06 PROCEDURE — 6370000000 HC RX 637 (ALT 250 FOR IP): Performed by: ANESTHESIOLOGY

## 2022-04-06 RX ORDER — PHENYLEPHRINE HCL IN 0.9% NACL 1 MG/10 ML
SYRINGE (ML) INTRAVENOUS PRN
Status: DISCONTINUED | OUTPATIENT
Start: 2022-04-06 | End: 2022-04-06 | Stop reason: SDUPTHER

## 2022-04-06 RX ORDER — ROCURONIUM BROMIDE 10 MG/ML
INJECTION, SOLUTION INTRAVENOUS PRN
Status: DISCONTINUED | OUTPATIENT
Start: 2022-04-06 | End: 2022-04-06 | Stop reason: SDUPTHER

## 2022-04-06 RX ORDER — LIDOCAINE HYDROCHLORIDE 10 MG/ML
INJECTION, SOLUTION EPIDURAL; INFILTRATION; INTRACAUDAL; PERINEURAL PRN
Status: DISCONTINUED | OUTPATIENT
Start: 2022-04-06 | End: 2022-04-06 | Stop reason: SDUPTHER

## 2022-04-06 RX ORDER — SODIUM CHLORIDE 0.9 % (FLUSH) 0.9 %
5-40 SYRINGE (ML) INJECTION EVERY 12 HOURS SCHEDULED
Status: DISCONTINUED | OUTPATIENT
Start: 2022-04-06 | End: 2022-04-06 | Stop reason: HOSPADM

## 2022-04-06 RX ORDER — SCOLOPAMINE TRANSDERMAL SYSTEM 1 MG/1
1 PATCH, EXTENDED RELEASE TRANSDERMAL
Status: DISCONTINUED | OUTPATIENT
Start: 2022-04-06 | End: 2022-04-06 | Stop reason: HOSPADM

## 2022-04-06 RX ORDER — FENTANYL CITRATE 50 UG/ML
25 INJECTION, SOLUTION INTRAMUSCULAR; INTRAVENOUS EVERY 5 MIN PRN
Status: DISCONTINUED | OUTPATIENT
Start: 2022-04-06 | End: 2022-04-06 | Stop reason: HOSPADM

## 2022-04-06 RX ORDER — SODIUM CHLORIDE 0.9 % (FLUSH) 0.9 %
5-40 SYRINGE (ML) INJECTION PRN
Status: DISCONTINUED | OUTPATIENT
Start: 2022-04-06 | End: 2022-04-06 | Stop reason: HOSPADM

## 2022-04-06 RX ORDER — OXYCODONE HYDROCHLORIDE AND ACETAMINOPHEN 5; 325 MG/1; MG/1
1 TABLET ORAL EVERY 6 HOURS PRN
Qty: 28 TABLET | Refills: 0 | Status: SHIPPED | OUTPATIENT
Start: 2022-04-06 | End: 2022-04-13

## 2022-04-06 RX ORDER — KETOROLAC TROMETHAMINE 30 MG/ML
INJECTION, SOLUTION INTRAMUSCULAR; INTRAVENOUS PRN
Status: DISCONTINUED | OUTPATIENT
Start: 2022-04-06 | End: 2022-04-06 | Stop reason: SDUPTHER

## 2022-04-06 RX ORDER — SODIUM CHLORIDE 9 MG/ML
INJECTION, SOLUTION INTRAVENOUS PRN
Status: DISCONTINUED | OUTPATIENT
Start: 2022-04-06 | End: 2022-04-06 | Stop reason: HOSPADM

## 2022-04-06 RX ORDER — MAGNESIUM HYDROXIDE 1200 MG/15ML
LIQUID ORAL CONTINUOUS PRN
Status: COMPLETED | OUTPATIENT
Start: 2022-04-06 | End: 2022-04-06

## 2022-04-06 RX ORDER — TOFACITINIB 5 MG/1
TABLET, FILM COATED ORAL
COMMUNITY
Start: 2022-04-04 | End: 2022-05-04 | Stop reason: ALTCHOICE

## 2022-04-06 RX ORDER — ONDANSETRON 4 MG/1
4 TABLET, FILM COATED ORAL EVERY 8 HOURS PRN
Qty: 30 TABLET | Refills: 0 | Status: SHIPPED | OUTPATIENT
Start: 2022-04-06 | End: 2022-06-23 | Stop reason: ALTCHOICE

## 2022-04-06 RX ORDER — BUPIVACAINE HYDROCHLORIDE 5 MG/ML
INJECTION, SOLUTION PERINEURAL PRN
Status: DISCONTINUED | OUTPATIENT
Start: 2022-04-06 | End: 2022-04-06 | Stop reason: ALTCHOICE

## 2022-04-06 RX ORDER — HEPARIN SODIUM 5000 [USP'U]/ML
5000 INJECTION, SOLUTION INTRAVENOUS; SUBCUTANEOUS ONCE
Status: COMPLETED | OUTPATIENT
Start: 2022-04-06 | End: 2022-04-06

## 2022-04-06 RX ORDER — NEOSTIGMINE METHYLSULFATE 5 MG/5 ML
SYRINGE (ML) INTRAVENOUS PRN
Status: DISCONTINUED | OUTPATIENT
Start: 2022-04-06 | End: 2022-04-06 | Stop reason: SDUPTHER

## 2022-04-06 RX ORDER — FENTANYL CITRATE 50 UG/ML
INJECTION, SOLUTION INTRAMUSCULAR; INTRAVENOUS PRN
Status: DISCONTINUED | OUTPATIENT
Start: 2022-04-06 | End: 2022-04-06 | Stop reason: SDUPTHER

## 2022-04-06 RX ORDER — DIPHENHYDRAMINE HYDROCHLORIDE 50 MG/ML
25 INJECTION INTRAMUSCULAR; INTRAVENOUS ONCE
Status: COMPLETED | OUTPATIENT
Start: 2022-04-06 | End: 2022-04-06

## 2022-04-06 RX ORDER — DIMENHYDRINATE 50 MG/1
50 TABLET ORAL ONCE
Status: COMPLETED | OUTPATIENT
Start: 2022-04-06 | End: 2022-04-06

## 2022-04-06 RX ORDER — METOCLOPRAMIDE HYDROCHLORIDE 5 MG/ML
10 INJECTION INTRAMUSCULAR; INTRAVENOUS
Status: COMPLETED | OUTPATIENT
Start: 2022-04-06 | End: 2022-04-06

## 2022-04-06 RX ORDER — SODIUM CHLORIDE, SODIUM LACTATE, POTASSIUM CHLORIDE, CALCIUM CHLORIDE 600; 310; 30; 20 MG/100ML; MG/100ML; MG/100ML; MG/100ML
INJECTION, SOLUTION INTRAVENOUS CONTINUOUS
Status: DISCONTINUED | OUTPATIENT
Start: 2022-04-06 | End: 2022-04-06 | Stop reason: HOSPADM

## 2022-04-06 RX ORDER — OXYCODONE HYDROCHLORIDE 5 MG/1
5 TABLET ORAL
Status: DISCONTINUED | OUTPATIENT
Start: 2022-04-06 | End: 2022-04-06 | Stop reason: HOSPADM

## 2022-04-06 RX ORDER — SODIUM CHLORIDE 9 MG/ML
25 INJECTION, SOLUTION INTRAVENOUS PRN
Status: DISCONTINUED | OUTPATIENT
Start: 2022-04-06 | End: 2022-04-06 | Stop reason: HOSPADM

## 2022-04-06 RX ORDER — CYCLOBENZAPRINE HCL 10 MG
10 TABLET ORAL
Status: DISCONTINUED | OUTPATIENT
Start: 2022-04-06 | End: 2022-04-06 | Stop reason: HOSPADM

## 2022-04-06 RX ORDER — GLYCOPYRROLATE 1 MG/5 ML
SYRINGE (ML) INTRAVENOUS PRN
Status: DISCONTINUED | OUTPATIENT
Start: 2022-04-06 | End: 2022-04-06 | Stop reason: SDUPTHER

## 2022-04-06 RX ORDER — ONDANSETRON 2 MG/ML
INJECTION INTRAMUSCULAR; INTRAVENOUS PRN
Status: DISCONTINUED | OUTPATIENT
Start: 2022-04-06 | End: 2022-04-06 | Stop reason: SDUPTHER

## 2022-04-06 RX ORDER — DEXAMETHASONE SODIUM PHOSPHATE 10 MG/ML
INJECTION INTRAMUSCULAR; INTRAVENOUS PRN
Status: DISCONTINUED | OUTPATIENT
Start: 2022-04-06 | End: 2022-04-06 | Stop reason: SDUPTHER

## 2022-04-06 RX ORDER — PROPOFOL 10 MG/ML
INJECTION, EMULSION INTRAVENOUS PRN
Status: DISCONTINUED | OUTPATIENT
Start: 2022-04-06 | End: 2022-04-06 | Stop reason: SDUPTHER

## 2022-04-06 RX ORDER — MIDAZOLAM HYDROCHLORIDE 1 MG/ML
INJECTION INTRAMUSCULAR; INTRAVENOUS PRN
Status: DISCONTINUED | OUTPATIENT
Start: 2022-04-06 | End: 2022-04-06 | Stop reason: SDUPTHER

## 2022-04-06 RX ORDER — DROPERIDOL 2.5 MG/ML
0.62 INJECTION, SOLUTION INTRAMUSCULAR; INTRAVENOUS ONCE
Status: DISCONTINUED | OUTPATIENT
Start: 2022-04-06 | End: 2022-04-06 | Stop reason: HOSPADM

## 2022-04-06 RX ADMIN — FENTANYL CITRATE 25 MCG: 50 INJECTION, SOLUTION INTRAMUSCULAR; INTRAVENOUS at 13:00

## 2022-04-06 RX ADMIN — Medication 3 MG: at 12:01

## 2022-04-06 RX ADMIN — Medication 0.6 MG: at 12:01

## 2022-04-06 RX ADMIN — SODIUM CHLORIDE, POTASSIUM CHLORIDE, SODIUM LACTATE AND CALCIUM CHLORIDE: 600; 310; 30; 20 INJECTION, SOLUTION INTRAVENOUS at 13:20

## 2022-04-06 RX ADMIN — DIMENHYDRINATE 50 MG: 50 TABLET ORAL at 10:10

## 2022-04-06 RX ADMIN — FENTANYL CITRATE 50 MCG: 50 INJECTION, SOLUTION INTRAMUSCULAR; INTRAVENOUS at 11:10

## 2022-04-06 RX ADMIN — Medication 100 MCG: at 10:52

## 2022-04-06 RX ADMIN — PROPOFOL 200 MG: 10 INJECTION, EMULSION INTRAVENOUS at 10:42

## 2022-04-06 RX ADMIN — LIDOCAINE HYDROCHLORIDE 50 MG: 10 INJECTION, SOLUTION EPIDURAL; INFILTRATION; INTRACAUDAL at 10:42

## 2022-04-06 RX ADMIN — DIPHENHYDRAMINE HYDROCHLORIDE 25 MG: 50 INJECTION, SOLUTION INTRAMUSCULAR; INTRAVENOUS at 13:40

## 2022-04-06 RX ADMIN — ROCURONIUM BROMIDE 10 MG: 10 INJECTION INTRAVENOUS at 11:25

## 2022-04-06 RX ADMIN — WATER 2000 MG: 1 INJECTION INTRAMUSCULAR; INTRAVENOUS; SUBCUTANEOUS at 10:56

## 2022-04-06 RX ADMIN — ROCURONIUM BROMIDE 50 MG: 10 INJECTION INTRAVENOUS at 10:42

## 2022-04-06 RX ADMIN — MIDAZOLAM HYDROCHLORIDE 2 MG: 1 INJECTION, SOLUTION INTRAMUSCULAR; INTRAVENOUS at 10:33

## 2022-04-06 RX ADMIN — FENTANYL CITRATE 100 MCG: 50 INJECTION, SOLUTION INTRAMUSCULAR; INTRAVENOUS at 10:42

## 2022-04-06 RX ADMIN — KETOROLAC TROMETHAMINE 30 MG: 30 INJECTION, SOLUTION INTRAMUSCULAR at 11:52

## 2022-04-06 RX ADMIN — ONDANSETRON 4 MG: 2 INJECTION INTRAMUSCULAR; INTRAVENOUS at 11:49

## 2022-04-06 RX ADMIN — FENTANYL CITRATE 25 MCG: 50 INJECTION, SOLUTION INTRAMUSCULAR; INTRAVENOUS at 13:10

## 2022-04-06 RX ADMIN — METOCLOPRAMIDE 10 MG: 5 INJECTION, SOLUTION INTRAMUSCULAR; INTRAVENOUS at 13:41

## 2022-04-06 RX ADMIN — FENTANYL CITRATE 50 MCG: 50 INJECTION, SOLUTION INTRAMUSCULAR; INTRAVENOUS at 12:24

## 2022-04-06 RX ADMIN — SODIUM CHLORIDE, POTASSIUM CHLORIDE, SODIUM LACTATE AND CALCIUM CHLORIDE: 600; 310; 30; 20 INJECTION, SOLUTION INTRAVENOUS at 11:32

## 2022-04-06 RX ADMIN — SODIUM CHLORIDE, POTASSIUM CHLORIDE, SODIUM LACTATE AND CALCIUM CHLORIDE: 600; 310; 30; 20 INJECTION, SOLUTION INTRAVENOUS at 09:40

## 2022-04-06 RX ADMIN — HEPARIN SODIUM 5000 UNITS: 5000 INJECTION INTRAVENOUS; SUBCUTANEOUS at 09:33

## 2022-04-06 RX ADMIN — DEXAMETHASONE SODIUM PHOSPHATE 10 MG: 10 INJECTION INTRAMUSCULAR; INTRAVENOUS at 10:55

## 2022-04-06 ASSESSMENT — PULMONARY FUNCTION TESTS
PIF_VALUE: 24
PIF_VALUE: 20
PIF_VALUE: 26
PIF_VALUE: 20
PIF_VALUE: 1
PIF_VALUE: 20
PIF_VALUE: 7
PIF_VALUE: 26
PIF_VALUE: 26
PIF_VALUE: 20
PIF_VALUE: 23
PIF_VALUE: 25
PIF_VALUE: 17
PIF_VALUE: 19
PIF_VALUE: 6
PIF_VALUE: 20
PIF_VALUE: 1
PIF_VALUE: 23
PIF_VALUE: 19
PIF_VALUE: 26
PIF_VALUE: 20
PIF_VALUE: 17
PIF_VALUE: 26
PIF_VALUE: 24
PIF_VALUE: 19
PIF_VALUE: 1
PIF_VALUE: 19
PIF_VALUE: 1
PIF_VALUE: 20
PIF_VALUE: 19
PIF_VALUE: 19
PIF_VALUE: 17
PIF_VALUE: 26
PIF_VALUE: 1
PIF_VALUE: 19
PIF_VALUE: 25
PIF_VALUE: 17
PIF_VALUE: 18
PIF_VALUE: 1
PIF_VALUE: 26
PIF_VALUE: 17
PIF_VALUE: 24
PIF_VALUE: 18
PIF_VALUE: 25
PIF_VALUE: 25
PIF_VALUE: 18
PIF_VALUE: 25
PIF_VALUE: 23
PIF_VALUE: 19
PIF_VALUE: 26
PIF_VALUE: 26
PIF_VALUE: 25
PIF_VALUE: 1
PIF_VALUE: 1
PIF_VALUE: 17
PIF_VALUE: 26
PIF_VALUE: 17
PIF_VALUE: 25
PIF_VALUE: 1
PIF_VALUE: 19
PIF_VALUE: 1
PIF_VALUE: 19
PIF_VALUE: 17
PIF_VALUE: 18
PIF_VALUE: 26
PIF_VALUE: 17
PIF_VALUE: 26
PIF_VALUE: 0
PIF_VALUE: 23
PIF_VALUE: 25
PIF_VALUE: 18
PIF_VALUE: 8
PIF_VALUE: 0
PIF_VALUE: 25
PIF_VALUE: 0
PIF_VALUE: 18
PIF_VALUE: 19
PIF_VALUE: 26
PIF_VALUE: 19
PIF_VALUE: 17
PIF_VALUE: 24
PIF_VALUE: 26
PIF_VALUE: 19
PIF_VALUE: 0
PIF_VALUE: 26
PIF_VALUE: 17
PIF_VALUE: 22
PIF_VALUE: 18
PIF_VALUE: 18
PIF_VALUE: 25
PIF_VALUE: 26
PIF_VALUE: 20
PIF_VALUE: 21
PIF_VALUE: 20
PIF_VALUE: 17
PIF_VALUE: 1
PIF_VALUE: 20
PIF_VALUE: 26
PIF_VALUE: 19
PIF_VALUE: 26
PIF_VALUE: 19
PIF_VALUE: 26
PIF_VALUE: 1
PIF_VALUE: 20
PIF_VALUE: 26
PIF_VALUE: 26
PIF_VALUE: 24
PIF_VALUE: 25
PIF_VALUE: 5
PIF_VALUE: 19
PIF_VALUE: 20

## 2022-04-06 ASSESSMENT — PAIN DESCRIPTION - LOCATION: LOCATION: ABDOMEN

## 2022-04-06 ASSESSMENT — PAIN SCALES - GENERAL
PAINLEVEL_OUTOF10: 6
PAINLEVEL_OUTOF10: 5
PAINLEVEL_OUTOF10: 6

## 2022-04-06 ASSESSMENT — PAIN DESCRIPTION - PAIN TYPE: TYPE: SURGICAL PAIN

## 2022-04-06 ASSESSMENT — PAIN - FUNCTIONAL ASSESSMENT: PAIN_FUNCTIONAL_ASSESSMENT: 0-10

## 2022-04-06 ASSESSMENT — PAIN DESCRIPTION - DESCRIPTORS: DESCRIPTORS: ACHING

## 2022-04-06 NOTE — H&P
History and Physical    Pt Name: Sherif Vicente  MRN: [id number]  YOB: 1980  Date of evaluation: 4/6/2022  Primary Care Physician: Tai Reyna MD    SUBJECTIVE:   History of Chief Complaint:    Sherif Vicente is a 46 y.o. female who presents for scheduled XI ROBOTIC LAPAROSCOPIC CHOLECYSTECTOMY, POSSIBLE OPEN. Patient complains of PAT appointment. Patient complains of nausea and epigastric pain with eating for several months and has been found to have gallstones. She is s/p sleeve gastrectomy 11/23/21 and reports she has lost 76 lbs since. Allergies  is allergic to iodine, other, phenergan [promethazine], cat hair extract, and dog epithelium. Medications  Prior to Admission medications    Medication Sig Start Date End Date Taking?  Authorizing Provider   Wilma Dine 5 MG TABS  4/4/22   Historical Provider, MD   predniSONE (DELTASONE) 2.5 MG tablet take 2 tablets by mouth every morning and 1 tablet by mouth every evening 12/28/21   Historical Provider, MD   methotrexate (RHEUMATREX) 2.5 MG chemo tablet Takes on Saturday 12/28/21   Historical Provider, MD   Ferrous Sulfate (IRON PO) Take by mouth QOD    Historical Provider, MD   Calcium Carbonate (CALCIUM 500 PO) Take by mouth    Historical Provider, MD   Multiple Vitamin (MULTIVITAMIN ADULT PO) Take by mouth    Historical Provider, MD   vitamin D (ERGOCALCIFEROL) 1.25 MG (79207 UT) CAPS capsule Take 1 capsule by mouth once a week for 8 doses 1/3/22 2/22/22  Teresa Bhatti, APRN - CNP   cetirizine (ZYRTEC) 10 MG tablet daily as needed   Patient not taking: Reported on 3/28/2022 11/18/20   Historical Provider, MD     Past Medical History    has a past medical history of Abnormal 1st trimester screen (Tri 21 1:117)--NIPT nml , ADD (attention deficit disorder) without hyperactivity, Antiphospholipid syndrome ([facility] [address] Celestone 1/9 & 1/10, Chronic back pain, COVID, Dehydration, Echogenic focus of heart of fetus affecting antepartum care of mother, Galindo Gomez early 1hr, nml 3hr , Elevated blood-pressure reading without diagnosis of hypertension, Elevated umbilical artery dopplers , Eosinopenia ([address] Fetal ascites, Fetal heart rate decelerations affecting management of mother, GERD (gastroesophageal reflux disease), Headache, Iron deficiency, IUD (intrauterine device) in place, Lumbar radiculopathy, Lumbar spondylosis, Multigravida of advanced maternal age in third trimester, Obesity, SHELLEY (obstructive sleep apnea), Personal history of other medical treatment, PONV (postoperative nausea and vomiting), Pulmonary embolism affecting pregnancy in second trimester, Rh+/RI/GBSunk, Rheumatoid arteritis ([address] Tachycardia, Umbilical cord complication, Under care of team, Under care of team, Under care of team, Wears glasses, Wellness examination, and Wellness examination. Past Surgical History   has a past surgical history that includes Tonsillectomy ();  section (Bilateral, 2020); intrauterine device insertion (2020); US BIOPSY LYMPH NODE (2020); Upper gastrointestinal endoscopy (N/A, 2021); [place] tooth extraction; and Sleeve Gastrectomy (N/A, 2021). Social History   reports that she has never smoked. She has never used smokeless tobacco.    reports no history of alcohol use. reports previous drug use. Frequency: 2.00 times per week. Drug: Marijuana Lynn Kristina). Marital Status    Children 3  Occupation none  Family History  Family Status   Relation Name Status    Mother  Alive    Father  Beatriz Carrizales     family history includes Elevated Lipids in her mother; High Blood Pressure in her mother.     Review of Systems:  CONSTITUTIONAL:   negative for fevers, chills, fatigue and malaise    EYES:   negative for double vision, blurred vision and photophobia +glasses   HEENT:   negative for tinnitus, epistaxis and sore throat    RESPIRATORY:   negative for shortness of breath, wheezing     CARDIOVASCULAR:   negative for chest pain, palpitations, syncope, edema     GASTROINTESTINAL:   +nausea, negative for vomiting     GENITOURINARY:   negative for incontinence     MUSCULOSKELETAL:   +RA, arthralgias    NEUROLOGICAL:   Negative for weakness and tingling  negative for headaches and dizziness     PSYCHIATRIC:   negative for anxiety       OBJECTIVE:   VITALS:  height is 5' 3\" (1.6 m) and weight is 221 lb 5.5 oz (100.4 kg). Her temporal temperature is 96.8 °F (36 °C). Her blood pressure is 109/73 and her pulse is 92. Her respiration is 16 and oxygen saturation is 96%. CONSTITUTIONAL:alert & oriented x 3, no acute distress. Friendly. Quiet affect. SKIN:  Warm and dry, no rashes on exposed areas of skin   HEAD:  Normocephalic, atraumatic   EYES: EOMs intact. PERRL. EARS:  Equal bilaterally, no edema or thickening, skin is intact without lumps or lesions. No discharge. Hearing grossly WNL. NOSE:  Nares patent. No rhinorrhea   MOUTH/THROAT:  Mucous membranes moist, tongue is pink, uvula midline, teeth appear to be intact  NECK:supple, full ROM  LUNGS: Respirations even and non-labored. Clear to auscultation bilaterally, no wheezes, rales, or rhonchi. CARDIOVASCULAR: Regular rate and rhythm, no murmurs/rubs/gallops   ABDOMEN: soft, non-tender, non-distended, bowel sounds active x 4   EXTREMITIES: No edema bilateral lower extremities. No varicosities bilateral lower extremities. NEUROLOGIC: CN II-XII are grossly intact. Gait not assessed.    IMPRESSIONS:   Gallstones   PLANS:   XI ROBOTIC LAPAROSCOPIC CHOLECYSTECTOMY, POSSIBLE OPEN    MIKHAIL RUGGIERO CNP  Electronically signed 4/6/2022 at 9:47 AM

## 2022-04-06 NOTE — ANESTHESIA PRE PROCEDURE
fentaNYL (SUBLIMAZE) injection 25 mcg  25 mcg IntraVENous Q5 Min PRN Fan Coronado MD        HYDROmorphone (DILAUDID) injection 0.5 mg  0.5 mg IntraVENous Q10 Min PRN Fan Beyer MD        oxyCODONE (ROXICODONE) immediate release tablet 5 mg  5 mg Oral Once PRN Mine Jones MD        metoclopramide (REGLAN) injection 10 mg  10 mg IntraVENous Once PRN Mine Jones MD        prochlorperazine (COMPAZINE) 5 mg in sodium chloride (PF) 10 mL injection  5 mg IntraVENous Once PRN Mine Jones MD           Allergies: Allergies   Allergen Reactions    Iodine Hives and Itching     X-ray dye    Other Itching and Swelling     Sensitive to bug bites, mosquitos. Skin discoloration X1 month.  Phenergan [Promethazine] Itching    Cat Hair Extract Itching     RUNNY NOSE, SNEEZING    Dog Epithelium Itching     RUNNY NOSE, SNEEZING.        Problem List:    Patient Active Problem List   Diagnosis Code    Chronic low back pain M54.50, G89.29    Migraine without aura, not refractory G43.009    GERD without esophagitis K21.9    Prediabetes R73.03    Low antithrombin III x1, repeat WNL D68.59    Antiphospholipid syndrome in pregnancy ([facility] [address] O99.119, D68.61    AMA O09.521    Rheumatoid arthritis with rheumatoid factor, unspecified ([facility]) M05.9    Lumbar radiculopathy M54.16    Lumbar spondylosis M47.816    Muscle pain M79.10    Attention deficit hyperactivity disorder, predominantly inattentive type F90.0    Hidradenitis L73.2    Recurrent pregnancy loss N96    Anticardiolipin antibody low positive x1 (26.9 on 3/19/19 R76.0    Anticardiolipin antibody affecting pregnancy, antepartum O99.891, R76.0    H/O:  section Z98.891    Impaired glucose regulation with features of insulin resistance R73.09    Obesity, Class III, BMI 40-49.9 (morbid obesity) ([facility]) E66.01    Spondylosis without myelopathy or radiculopathy, lumbosacral region M47.817    IUD (intrauterine device) in place Z97.5    SHELLEY (obstructive sleep apnea) G47.33    S/P laparoscopic sleeve gastrectomy Z98.84    Nausea R11.0    Epigastric pain R10.13       Past Medical History:        Diagnosis Date    Abnormal 1st trimester screen (Tri 21 1:117)--NIPT nml  11/30/2019    ADD (attention deficit disorder) without hyperactivity     Antiphospholipid syndrome (HCC)     Celestone 1/9 & 1/10 01/09/2020    Chronic back pain     COVID 09/15/2021    BODY ACHE, COUGH, FEVER, CHEST PAIN, N&V X 3 WEEKS. STILL HAS COUGH.     Dehydration 12/28/2021    Echogenic focus of heart of fetus affecting antepartum care of mother 11/30/2019   Dewanda Pontiff early 1hr, nml 3hr  11/30/2019    1 elevated value, [facility] recommends repeat 3hr GTT in 2 weeks    Elevated blood-pressure reading without diagnosis of hypertension 11/30/2019    Elevated umbilical artery dopplers  01/10/2020    Eosinopenia ([facility] [address]     Fetal ascites     Fetal heart rate decelerations affecting management of mother     GERD (gastroesophageal reflux disease)     Headache     Iron deficiency     follows with Dr. Elicia Olivo (hem/onc)    IUD (intrauterine device) in place 05/07/2020    Adina Suárez    Lumbar radiculopathy 01/09/2019    Lumbar spondylosis 01/09/2019    Multigravida of advanced maternal age in third trimester     Obesity     SHELLEY (obstructive sleep apnea)     Cpap (was causing speech difficulties, resolved after started using cpap)  Dr. Jatin Helton located [place], last seen 1/2021    Personal history of other medical treatment     Axillary lympadenopathy    PONV (postoperative nausea and vomiting)     Pulmonary embolism affecting pregnancy in second trimester 11/30/2019    Rh+/RI/GBSunk 01/08/2020    Rheumatoid arteritis ([facility] [address]     Dr. Vida Montemayor located [facility] seeing 3/28/2022    Tachycardia     Umbilical cord complication     Under care of team 09/07/2021    PCP: Dr. Jf Valiente, last visit 8/2021, clearance given already for surgery on 9/14/2021    Under care of team 2021    Neurology: Dr. Rocky Parker, last visit 2021    Under care of team 2021    Oncology: Arnold Aguilar, last visit 3/2021, received clearance for surgery for 2021    was seeing for nodules in armpit. Pt. sees yearly . Last seen 2022    Wears glasses     Wellness examination     last seen 2022  Mireya Aw examination     Dr. Emelina Stevens, PCP       Past Surgical History:        Procedure Laterality Date     SECTION Bilateral 2020     [facility]    INTRAUTERINE DEVICE INSERTION  2020    Mirena    SLEEVE GASTRECTOMY N/A 2021    XI ROBOTIC LAPAROSCOPIC GASTRECTOMY SLEEVE WITH EGD performed by Anjel Laurent DO at [facility] N/A 2021    EGD BIOPSY OF GASTRIC performed by Anjel Laurent DO at [address] [place] [address]  2020    US LYMPH NODE BIOPSY 2020 [facility]TRAS[facility]    WISDOM TOOTH EXTRACTION         Social History:    Social History     Tobacco Use    Smoking status: Never Smoker    Smokeless tobacco: Never Used   Substance Use Topics    Alcohol use:  No                                Counseling given: Not Answered      Vital Signs (Current):   Vitals:    22 0902 22 0919   BP:  109/73   Pulse:  92   Resp:  16   Temp:  96.8 °F (36 °C)   TempSrc:  Temporal   SpO2:  96%   Weight: 221 lb 5.5 oz (100.4 kg)    Height: 5' 3\" (1.6 m)                                               BP Readings from Last 3 Encounters:   22 109/73   22 101/73   22 110/70       NPO Status: Time of last liquid consumption:                         Time of last solid consumption:                         Date of last liquid consumption: 22                        Date of last solid food consumption: 22    BMI:   Wt Readings from Last 3 Encounters:   22 221 lb 5.5 oz (100.4 kg)   03/28/22 223 lb (101.2 kg)   02/21/22 231 lb (104.8 kg)     Body mass index is 39.21 kg/m². CBC:   Lab Results   Component Value Date    WBC 9.2 01/03/2022    RBC 4.70 01/03/2022    RBC 4.77 11/01/2021    HGB 12.8 01/03/2022    HCT 40.5 01/03/2022    MCV 86.2 01/03/2022    RDW 15.3 01/03/2022     01/03/2022       CMP:   Lab Results   Component Value Date     04/06/2022    K 3.8 04/06/2022     04/06/2022    CO2 24 04/06/2022    BUN 7 04/06/2022    CREATININE 0.53 04/06/2022    GFRAA >60 04/06/2022    LABGLOM >60 04/06/2022    GLUCOSE 88 04/06/2022    GLUCOSE 90 11/01/2021    PROT 6.5 01/03/2022    PROT 7.4 11/01/2021    CALCIUM 9.2 04/06/2022    BILITOT 0.40 01/03/2022    ALKPHOS 74 01/03/2022    AST 11 01/03/2022    ALT 8 01/03/2022       POC Tests: No results for input(s): POCGLU, POCNA, POCK, POCCL, POCBUN, POCHEMO, POCHCT in the last 72 hours.     Coags:   Lab Results   Component Value Date    PROTIME 10.7 04/06/2022    INR 1.0 04/06/2022    APTT 24.9 09/07/2021       HCG (If Applicable):   Lab Results   Component Value Date    HCG NEGATIVE 11/23/2021    HCGQUANT <1 05/06/2020        ABGs: No results found for: PHART, PO2ART, ZXB5FOC, BXC5OUM, BEART, R5YYTCRN     Type & Screen (If Applicable):  No results found for: LABABO, LABRH    Drug/Infectious Status (If Applicable):  Lab Results   Component Value Date    HEPCAB NONREACTIVE 07/23/2021       COVID-19 Screening (If Applicable):   Lab Results   Component Value Date    COVID19 Not Detected 04/02/2022    COVID19 Not Detected 01/18/2021           Anesthesia Evaluation  Patient summary reviewed history of anesthetic complications:   Airway: Mallampati: II        Dental:          Pulmonary: breath sounds clear to auscultation  (+) sleep apnea:                             Cardiovascular:            Rhythm: regular  Rate: normal                    Neuro/Psych:   (+) neuromuscular disease:, headaches:, psychiatric history: GI/Hepatic/Renal:   (+) GERD:, morbid obesity     (-) liver disease       Endo/Other:    (+) Diabetes, : arthritis:., .                 Abdominal:   (+) obese,           Vascular: Other Findings:             Anesthesia Plan      general     ASA 2       Induction: intravenous. MIPS: Postoperative opioids intended and Prophylactic antiemetics administered. Anesthetic plan and risks discussed with patient. Plan discussed with CRNA.     Attending anesthesiologist reviewed and agrees with Ethan Christine MD   4/6/2022

## 2022-04-06 NOTE — OP NOTE
Operative Note      Patient: Jeanna Jack  YOB: 1980  MRN: [id number]    Date of Procedure: 4/6/2022    Pre-Op Diagnosis: CHRONIC CHOLECYSTITIS WITH GALLSTONES    Post-Op Diagnosis: Same       Procedure(s):  XI ROBOTIC LAPAROSCOPIC CHOLECYSTECTOMY, POSSIBLE OPEN    Surgeon(s):  Renu Marx DO    Assistant:   First Assistant: Norma Gill    Anesthesia: General    Estimated Blood Loss (mL): Minimal    Complications: None    Specimens:   ID Type Source Tests Collected by Time Destination   A : Gallbladder and contents Tissue Gallbladder SURGICAL PATHOLOGY Renu Marx DO 4/6/2022 1143        Implants:  * No implants in log *      Drains: * No LDAs found *    Findings:   HEMOSTATIC LIVER BED  CRITICAL VIEW OBTAINED  VERY LARGE LIVER WITH INTRAHEPATIC GALLBLADDER    Detailed Description of Procedure:   ICG USED TO CONFIRM ANATOMY  GALLBLADDER FOSSA HEMOSTATIC     Detailed Description of Procedure:   Operative narrative: The risks and benefits of the procedure were explained in detail to the patient who agreed and consented with the procedure. Patient was taken to the operative suite and administered general anesthetic by the anesthetic team.       Using an optical access trocar and a 12 mm port, the peritoneal cavity was entered under direct visualization from Pope's point. Pneumoperitoneum was established at that time without complication. Next, three 8 mm ports were then placed in standard fashion for robotic assisted cholecystectomy. All ports placed under direct laparoscopic visualization. The robot docked. We surveyed our entry site into the abdomen at Pope's point. No evidence of underlying injury to the viscera noted. The Gallbladder was then elevated up and over the liver to expose the gallbladder in it's entirety. Blunt dissection as well as electrocautery was used to skeletonize the cystic duct. Appropriate lateral retraction was undertaken.   Other dissection was then undertaken to expose the cystic duct and cystic artery further. Critical view obtained. Of note there was significant fibrotic chronic inflammation around the cystic duct and gallbladder wall edema. There was edema in the gallbladder wall. Cystic duct was noted to travel up into the gallbladder and down into the common bile duct. The cystic duct and cystic artery were then clipped using robotic hemo-lock clips. The anatomy confirmed with ICG. The cystic duct and cystic artery were each clipped with 3 hemo-lock clips. The cystic duct was then cut using the laparoscopic scissors. There was some sludge in the lumen of the cystic duct. A single-lumen noted on each side. The cystic artery was also cut with the scissors and appropriate hemostasis noted. The gallbladder was then dissected from the liver bed using electrocautery and proper countertraction. The liver bed was noted to be hemostatic. The laparoscopic clips were reinspected and noted be intact on the cystic duct and cystic artery. An Endo Catch bag was advanced into the abdomen and the gallbladder placed within the bag. Gallbladder was then removed from the left upper quadrant port site under direct laparoscopic visualization. Proper hemostasis again noted along the liver bed. The robot undocked. Counts reported to me as correct. We closed the 12 mm port and 8 mm port with 0-vicryl sutures under direct visualization with a suture passer. All other 8 mm ports removed under direct visualization. The 8 mm ports in the right upper quadrant were then removed under direct visualization and hemostasis noted. Pneumoperitoneum was then released from the left upper quadrant port site. Hemostasis noted. The skin was then closed using interrupted 4-0 Monocryl sutures in subcuticular interrupted fashion. The region was cleaned with sterile normal saline. Skin glue applied. Counts reported to me as correct.     Family updated.       Electronically signed by Donna Garcia DO on 4/6/2022 at 12:02 PM

## 2022-04-06 NOTE — ANESTHESIA POSTPROCEDURE EVALUATION
Department of Anesthesiology  Postprocedure Note    Patient: Karina Farias  MRN: [id number]  YOB: 1980  Date of evaluation: 4/6/2022  Time:  6:14 PM     Procedure Summary     Date: 04/06/22 Room / Location: [address]    Anesthesia Start: 1030 Anesthesia Stop: 465    Procedure: XI ROBOTIC LAPAROSCOPIC CHOLECYSTECTOMY, POSSIBLE OPEN (N/A ) Diagnosis: (GALLSTONES)    Surgeons: Shefali Cole DO Responsible Provider: Jeimy Casillas MD    Anesthesia Type: general ASA Status: 2          Anesthesia Type: general    Tapan Phase I: Tapan Score: 10    Tapan Phase II: Tapan Score: 10    Last vitals: Reviewed and per EMR flowsheets.        Anesthesia Post Evaluation    Patient location during evaluation: PACU  Patient participation: complete - patient participated  Level of consciousness: awake and alert  Airway patency: patent  Nausea & Vomiting: no nausea and no vomiting  Complications: no  Cardiovascular status: blood pressure returned to baseline  Respiratory status: acceptable  Hydration status: euvolemic  Comments: No known anesthesia related complication  Multimodal analgesia pain management approach

## 2022-04-07 NOTE — OP NOTE
Operative Note      Patient: Phillip Tracy  YOB: 1980  MRN: [id number]    Date of Procedure: 4/6/2022    Pre-Op Diagnosis: CHRONIC CHOLECYSTITIS WITH CALCULOUS    Post-Op Diagnosis: Same       Procedure(s):  XI ROBOTIC LAPAROSCOPIC CHOLECYSTECTOMY, POSSIBLE OPEN    Surgeon(s):  Justyn Saini DO    Assistant:   First Assistant: Efrain Escobar    Anesthesia: General    Estimated Blood Loss (mL): Minimal    Complications: None    Specimens:   ID Type Source Tests Collected by Time Destination   A : Gallbladder and contents Tissue Gallbladder SURGICAL PATHOLOGY Justyn Saini DO 4/6/2022 1143        Implants:  * No implants in log *      Drains: * No LDAs found *    Findings:   CRITICAL VIEW OBTAINED  HEMOSTATIC LIVER BED  COUNTS REPORTED TO ME AS CORRECT     Detailed Description of Procedure:   Operative narrative: The risks and benefits of the procedure were explained in detail to the patient who agreed and consented with the procedure. Patient was taken to the operative suite and administered general anesthetic by the anesthetic team.       Using an optical access trocar and a 12 mm port we attempted to access the peritoneal cavity. The insufflator malfunctioned and due to this we decided to do a cut down above the umbilicus. Incision made, subcutaneous tissues dissected down to the fascia. Fascia elevated with two hemostats and incision made. Peritoneal cavity was entered under direct visualization. Pneumoperitoneum was established at that time without complication with a 12 mm port. Next, three 8 mm ports were then placed in standard fashion for robotic assisted cholecystectomy. All ports placed under direct laparoscopic visualization. The robot docked. No evidence of underlying injury to the viscera noted. The Gallbladder was then elevated up and over the liver to expose the gallbladder in it's entirety.   Blunt dissection as well as electrocautery was used to skeletonize the cystic duct. Appropriate lateral retraction was undertaken. Other dissection was then undertaken to expose the cystic duct and cystic artery further. Critical view obtained. Of note there was significant fibrotic chronic inflammation around the cystic duct and gallbladder wall edema. There was edema in the gallbladder wall. Cystic duct was noted to travel up into the gallbladder and down into the common bile duct. The cystic duct and cystic artery were then clipped using robotic hemo-lock clips. The cystic duct and cystic artery were each clipped with 3 hemo-lock clips. The cystic duct was then cut using the laparoscopic scissors. There was some sludge in the lumen of the cystic duct. A single-lumen noted on each side. The cystic artery was also cut with the scissors and appropriate hemostasis noted. The gallbladder was then dissected from the liver bed using electrocautery and proper countertraction. The liver bed was noted to be hemostatic. The laparoscopic clips were reinspected and noted be intact on the cystic duct and cystic artery. An Endo Catch bag was advanced into the abdomen and the gallbladder placed within the bag. Gallbladder was then removed from the left upper quadrant port site under direct laparoscopic visualization. Proper hemostasis again noted along the liver bed. The robot undocked. Counts reported to me as correct. We closed the 12 mm port and 8 mm port with 0-vicryl sutures under direct visualization with a suture passer. All other 8 mm ports removed under direct visualization. The 8 mm ports in the right upper quadrant were then removed under direct visualization and hemostasis noted. Pneumoperitoneum was then released from the left upper quadrant port site. Hemostasis noted. The skin was then closed using interrupted 4-0 Monocryl sutures in subcuticular interrupted fashion. The region was cleaned with sterile normal saline. Skin glue applied. Counts reported to me as correct.      updated      Electronically signed by Renu Marx DO on 4/6/2022 at 10:55 PM Yes

## 2022-04-08 LAB — SURGICAL PATHOLOGY REPORT: NORMAL

## 2022-04-12 NOTE — ANESTHESIA PRE PROCEDURE
Department of Anesthesiology  Preprocedure Note       Name:  Jovanni Nettles   Age:  47 y.o.  :  1980                                          MRN:  [id number]         Date:  2021      Surgeon: Antonio Whitaker):  Obed De León DO    Procedure: Procedure(s):  EGD ESOPHAGOGASTRODUODENOSCOPY    Medications prior to admission:   Prior to Admission medications    Medication Sig Start Date End Date Taking? Authorizing Provider   amitriptyline (ELAVIL) 25 MG tablet take 1 tablet by mouth nightly 1/11/21 2/10/21  Ten Paredes MD   vitamin D (ERGOCALCIFEROL) 1.25 MG (87279 UT) CAPS capsule take 1 capsule by mouth every week 21   MIKHAIL Champion CNP   famotidine (PEPCID) 20 MG tablet Take 1 tablet by mouth nightly  Patient taking differently: Take 40 mg by mouth nightly  20   MIKHAIL Champion CNP   Adalimumab (HUMIRA PEN) 40 MG/0.4ML PNKT Humira(CF) Pen 40 mg/0.4 mL subcutaneous kit    Historical Provider, MD   cetirizine (ZYRTEC) 10 MG tablet take 1 tablet by mouth once daily 20   Historical Provider, MD   pantoprazole (PROTONIX) 40 MG tablet Take 1 tablet by mouth every morning (before breakfast) 12/10/20   Cecilio Soni MD   nystatin (MYCOSTATIN) 732558 UNIT/GM cream Apply topically 2 times daily. 12/10/20   Cecilio Soni MD   ferrous sulfate (IRON 325) 325 (65 Fe) MG tablet Take 1 tablet by mouth every other day 20   Glenn Grove MD   Cqzzto-EyYwp-YtCrg-FA-CA-Omega (TRIVEEN-DUO DHA) 29-1-200 & 400 MG MISC take 1 capsule and take 1 tablet by mouth once daily 20   Tai Barajas DO   methotrexate (RHEUMATREX) 2.5 MG chemo tablet Take 5 tab every week orally for 90 days    Historical Provider, MD   nabumetone (RELAFEN) 750 MG tablet nabumetone 750 mg tablet   Take 1 tablet twice a day by oral route for 30 days.     Historical Provider, MD   triamcinolone (KENALOG) 0.1 % cream Apply topically 2 times daily 20   Historical Provider, MD Patient called back and said she had moved to [place] and was under the care of doctor there.   predniSONE (DELTASONE) 5 MG tablet Take 2.5 mg by mouth 3 times daily     Historical Provider, MD   ammonium lactate (LAC-HYDRIN) 12 % lotion Apply topically daily. 1/24/20   Tai Barajas DO   albuterol (PROVENTIL) (2.5 MG/3ML) 0.083% nebulizer solution Take 3 mLs by nebulization daily as needed for Wheezing 12/2/19   Aurea Mahajan DO   Cholecalciferol (VITAMIN D) 2000 units CAPS capsule Take by mouth    Historical Provider, MD   folic acid (FOLVITE) 1 MG tablet Take 1 mg by mouth daily    Historical Provider, MD       Current medications:    Current Facility-Administered Medications   Medication Dose Route Frequency Provider Last Rate Last Admin    0.9 % sodium chloride infusion   Intravenous Continuous Mathew Wharton MD        lactated ringers infusion   Intravenous Continuous Mathew Wharton MD        sodium chloride flush 0.9 % injection 10 mL  10 mL Intravenous 2 times per day Mathew Wharton MD        sodium chloride flush 0.9 % injection 10 mL  10 mL Intravenous PRN Mathew Wharton MD           Allergies:     Allergies   Allergen Reactions    Cat Hair Extract     Dog Epithelium     Iodine Hives and Itching    Other      Sensitive to bug bites, mosquitos etc.       Problem List:    Patient Active Problem List   Diagnosis Code    Chronic low back pain M54.5, G89.29    Migraine without aura, not refractory G43.009    GERD K21.9    Prediabetes R73.03    Low antithrombin III x1, repeat WNL D68.59    Antiphospholipid syndrome in pregnancy ([facility] [address] O99.119, D68.61    AMA O09.521    Rheumatoid arthritis with rheumatoid factor, unspecified (HCC) M05.9    Lumbar radiculopathy M54.16    Lumbar spondylosis M47.816    Muscle pain M79.10    Attention deficit hyperactivity disorder, predominantly inattentive type F90.0    Hidradenitis L73.2    Recurrent pregnancy loss N96    Anticardiolipin antibody low positive x1 (26.9 on 3/19/19 R76.0    Elevated blood-pressure reading without diagnosis of hypertension R03.0  Anticardiolipin antibody affecting pregnancy, antepartum O99.891, R76.0    H/O:  section Z98.891    Impaired glucose regulation with features of insulin resistance R73.09    Morbid obesity (HCC) E66.01    Spondylosis without myelopathy or radiculopathy, lumbosacral region M47.817    IUD (intrauterine device) in place Z97.5    Headache disorder R51.9    SHELLEY (obstructive sleep apnea) G47.33       Past Medical History:        Diagnosis Date    Abnormal 1st trimester screen (Tri 21 1:117)--NIPT nml  2019    ADD (attention deficit disorder) without hyperactivity     Celestone  & 1/10 2020    Chronic back pain     Echogenic focus of heart of fetus affecting antepartum care of mother 2019   Hetal Tello early 1hr, nml 3hr  2019    1 elevated value, [facility] recommends repeat 3hr GTT in 2 weeks    Elevated umbilical artery dopplers  1/10/2020    Fetal ascites     Fetal heart rate decelerations affecting management of mother     GERD (gastroesophageal reflux disease)     Headache     Hypertension     IUD (intrauterine device) in place 2020    Adina Granados    Lumbar radiculopathy 2019    Lumbar spondylosis 2019    Multigravida of advanced maternal age in third trimester     Obesity     Obesity affecting pregnancy in first trimester 2018    Obesity affecting pregnancy in third trimester     SHELLEY (obstructive sleep apnea)     Pulmonary embolism affecting pregnancy in second trimester 2019    Rh+/RI/GBSunk 2020    Rheumatoid arteritis ([address]     Umbilical cord complication        Past Surgical History:        Procedure Laterality Date     SECTION Bilateral 2020     SECTION performed by [address] DO Kevin at [facility] [address]  2020    Mirena    OTHER SURGICAL HISTORY  12/10/2018    Fluoroscopy- with esophagas- possible delay of gastric emptying    [address] [place] [address], [place]  US LYMPH NODE BIOPSY  8/19/2020    US LYMPH NODE BIOPSY 8/19/2020 [facility]       Social History:    Social History     Tobacco Use    Smoking status: Never Smoker    Smokeless tobacco: Never Used   Substance Use Topics    Alcohol use: No                                Counseling given: Not Answered      Vital Signs (Current): There were no vitals filed for this visit. BP Readings from Last 3 Encounters:   01/07/21 (!) 132/90   12/14/20 110/72   12/10/20 124/76       NPO Status: Time of last liquid consumption: 2000                        Time of last solid consumption: 2000                        Date of last liquid consumption: 01/21/21                        Date of last solid food consumption: 01/21/21    BMI:   Wt Readings from Last 3 Encounters:   01/07/21 279 lb (126.6 kg)   12/14/20 289 lb (131.1 kg)   12/10/20 294 lb (133.4 kg)     There is no height or weight on file to calculate BMI.    CBC:   Lab Results   Component Value Date    WBC 13.90 11/19/2020    WBC 13.4 10/01/2020    RBC 4.76 11/19/2020    HGB 12.6 11/19/2020    HCT 39.9 11/19/2020    MCV 83.8 11/19/2020    RDW 15.2 11/19/2020     11/19/2020       CMP:   Lab Results   Component Value Date     11/19/2020    K 3.4 11/19/2020     11/19/2020    CO2 26 11/19/2020    BUN 14 11/19/2020    CREATININE 0.59 11/19/2020    GFRAA >60 11/02/2020    LABGLOM >60 11/02/2020    GLUCOSE 94 11/19/2020    PROT 7.3 11/19/2020    CALCIUM 9.2 11/19/2020    BILITOT 0.3 11/19/2020    ALKPHOS 95 11/19/2020    AST 18 11/19/2020    ALT 35 11/19/2020       POC Tests: No results for input(s): POCGLU, POCNA, POCK, POCCL, POCBUN, POCHEMO, POCHCT in the last 72 hours.     Coags:   Lab Results   Component Value Date    PROTIME 9.4 01/09/2020    INR 0.9 01/09/2020    APTT 23.3 01/09/2020       HCG (If Applicable):   Lab Results   Component Value Date    HCGQUANT <1 05/06/2020 ABGs: No results found for: PHART, PO2ART, AKM9JAH, CXI1GAD, BEART, T7JXOUFR     Type & Screen (If Applicable):  No results found for: LABABO, LABRH    Drug/Infectious Status (If Applicable):  No results found for: HIV, HEPCAB    COVID-19 Screening (If Applicable):   Lab Results   Component Value Date    COVID19 Not Detected 01/18/2021         Anesthesia Evaluation  Patient summary reviewed and Nursing notes reviewed no history of anesthetic complications:   Airway: Mallampati: III  TM distance: >3 FB   Neck ROM: full  Mouth opening: > = 3 FB Dental: normal exam         Pulmonary:normal exam  breath sounds clear to auscultation  (+) sleep apnea:                             Cardiovascular:  Exercise tolerance: no interval change,   (+) hypertension: no interval change,         Rhythm: regular  Rate: normal           Beta Blocker:  Not on Beta Blocker         Neuro/Psych:   (+) neuromuscular disease:, headaches: migraine headaches, psychiatric history:            GI/Hepatic/Renal:   (+) GERD:, morbid obesity          Endo/Other:    (+) : arthritis: rheumatoid and no interval change. , . Abdominal:   (+) obese,     Abdomen: soft. Vascular: negative vascular ROS. Anesthesia Plan      MAC     ASA 3             Anesthetic plan and risks discussed with patient. Plan discussed with CRNA.                   Jodee Marx MD   1/22/2021

## 2022-04-19 LAB
SARS-COV-2: ABNORMAL
SARS-COV-2: ABNORMAL
SARS-COV-2: NORMAL
SARS-COV-2: NOT DETECTED
SOURCE: ABNORMAL
SOURCE: NORMAL

## 2022-04-21 ENCOUNTER — OFFICE VISIT (OUTPATIENT)
Dept: BARIATRICS/WEIGHT MGMT | Age: 42
End: 2022-04-21

## 2022-04-21 VITALS
HEIGHT: 63 IN | BODY MASS INDEX: 37.92 KG/M2 | SYSTOLIC BLOOD PRESSURE: 126 MMHG | DIASTOLIC BLOOD PRESSURE: 70 MMHG | WEIGHT: 214 LBS | RESPIRATION RATE: 18 BRPM | HEART RATE: 78 BPM

## 2022-04-21 DIAGNOSIS — K80.10 CHRONIC CHOLECYSTITIS WITH CALCULUS: Primary | ICD-10-CM

## 2022-04-21 PROCEDURE — 99024 POSTOP FOLLOW-UP VISIT: CPT | Performed by: SURGERY

## 2022-04-27 NOTE — PROGRESS NOTES
restart RA meds    Regular diet    Pathology reviewed with patient    Ambulation    Overall doing well  Follow up: No follow-ups on file. Orders placed this encounter:   No orders of the defined types were placed in this encounter. New Prescriptions:   No orders of the defined types were placed in this encounter. Electronically signed by Donna Garcia DO on 4/27/2022 at 6:27 PM    Please note that this chart was generated using voice recognition Dragon dictation software. Although every effort was made to ensure the accuracy of this automated transcription, some errors in transcription may have occurred.

## 2022-04-30 DIAGNOSIS — E55.9 VITAMIN D DEFICIENCY: ICD-10-CM

## 2022-05-03 RX ORDER — ERGOCALCIFEROL 1.25 MG/1
CAPSULE ORAL
Qty: 8 CAPSULE | Refills: 0 | OUTPATIENT
Start: 2022-05-03

## 2022-05-04 ENCOUNTER — OFFICE VISIT (OUTPATIENT)
Dept: BARIATRICS/WEIGHT MGMT | Age: 42
End: 2022-05-04
Payer: MEDICARE

## 2022-05-04 VITALS
HEIGHT: 63 IN | DIASTOLIC BLOOD PRESSURE: 78 MMHG | RESPIRATION RATE: 18 BRPM | BODY MASS INDEX: 37.39 KG/M2 | HEART RATE: 72 BPM | WEIGHT: 211 LBS | SYSTOLIC BLOOD PRESSURE: 122 MMHG

## 2022-05-04 DIAGNOSIS — E66.9 OBESITY (BMI 30-39.9): ICD-10-CM

## 2022-05-04 DIAGNOSIS — M54.16 LUMBAR RADICULOPATHY: ICD-10-CM

## 2022-05-04 DIAGNOSIS — Z90.49 S/P CHOLECYSTECTOMY: ICD-10-CM

## 2022-05-04 DIAGNOSIS — K21.9 GERD WITHOUT ESOPHAGITIS: Primary | ICD-10-CM

## 2022-05-04 DIAGNOSIS — M05.9 RHEUMATOID ARTHRITIS WITH RHEUMATOID FACTOR, UNSPECIFIED (HCC): ICD-10-CM

## 2022-05-04 DIAGNOSIS — Z98.84 S/P LAPAROSCOPIC SLEEVE GASTRECTOMY: ICD-10-CM

## 2022-05-04 PROBLEM — E66.01 OBESITY, CLASS III, BMI 40-49.9 (MORBID OBESITY) (HCC): Status: RESOLVED | Noted: 2017-02-21 | Resolved: 2022-05-04

## 2022-05-04 PROBLEM — R10.13 EPIGASTRIC PAIN: Status: RESOLVED | Noted: 2022-02-21 | Resolved: 2022-05-04

## 2022-05-04 PROBLEM — R11.0 NAUSEA: Status: RESOLVED | Noted: 2021-12-28 | Resolved: 2022-05-04

## 2022-05-04 PROBLEM — E66.813 OBESITY, CLASS III, BMI 40-49.9 (MORBID OBESITY) (HCC): Status: RESOLVED | Noted: 2017-02-21 | Resolved: 2022-05-04

## 2022-05-04 PROCEDURE — G8427 DOCREV CUR MEDS BY ELIG CLIN: HCPCS | Performed by: NURSE PRACTITIONER

## 2022-05-04 PROCEDURE — 99213 OFFICE O/P EST LOW 20 MIN: CPT | Performed by: NURSE PRACTITIONER

## 2022-05-04 PROCEDURE — 1036F TOBACCO NON-USER: CPT | Performed by: NURSE PRACTITIONER

## 2022-05-04 PROCEDURE — G8417 CALC BMI ABV UP PARAM F/U: HCPCS | Performed by: NURSE PRACTITIONER

## 2022-05-04 NOTE — PROGRESS NOTES
Post-op Bariatric Surgery Note    Subjective     Patient is 6 months s/p laparoscopic sleeve gastrectomy, down 58 lbs. Also, 1 month s/p cholecystectomy. Overall, doing well. Incisions well healed. Consistent use of MVI and calcium. Physical activity includes home exercises. No current issues. Needs to have labs drawn which were previously ordered. Allergies: Allergies   Allergen Reactions    Iodine Hives and Itching     X-ray dye    Other Itching and Swelling     Sensitive to bug bites, mosquitos. Skin discoloration X1 month.  Phenergan [Promethazine] Itching    Cat Hair Extract Itching     RUNNY NOSE, SNEEZING    Dog Epithelium Itching     RUNNY NOSE, SNEEZING. Past Medical History:     Past Medical History:   Diagnosis Date    Abnormal 1st trimester screen (Tri 21 1:117)--NIPT nml  11/30/2019    ADD (attention deficit disorder) without hyperactivity     Antiphospholipid syndrome (HCC)     Celestone 1/9 & 1/10 01/09/2020    Chronic back pain     COVID 09/15/2021    BODY ACHE, COUGH, FEVER, CHEST PAIN, N&V X 3 WEEKS. STILL HAS COUGH.     Dehydration 12/28/2021    Echogenic focus of heart of fetus affecting antepartum care of mother 11/30/2019   Dewanda Pontiff early 1hr, nml 3hr  11/30/2019    1 elevated value, [facility] recommends repeat 3hr GTT in 2 weeks    Elevated blood-pressure reading without diagnosis of hypertension 11/30/2019    Elevated umbilical artery dopplers  01/10/2020    Eosinopenia ([address]     Fetal ascites     Fetal heart rate decelerations affecting management of mother     GERD (gastroesophageal reflux disease)     Headache     migraines    Iron deficiency     follows with Dr. Elicia Olivo (hem/onc)    IUD (intrauterine device) in place 05/07/2020    Adina Suárez    Lumbar radiculopathy 01/09/2019    Lumbar spondylosis 01/09/2019    Multigravida of advanced maternal age in third trimester     Obesity     SHELLEY (obstructive sleep apnea)     Cpap (was causing speech difficulties, resolved after started using cpap)  Dr. Emily Martell located [facility], last seen 2021- [address] [facility] [address] Personal history of other medical treatment     Axillary lympadenopathy    PONV (postoperative nausea and vomiting)     Pulmonary embolism affecting pregnancy in second trimester 2019    Rh+/RI/GBSunk 2020    Rheumatoid arteritis ([address]     Dr. Oneal Calvert located [facility] seeing 3/28/2022    Tachycardia     Umbilical cord complication     Under care of team 2021    PCP: Dr. Maya Castro, last visit 2021, clearance given already for surgery on 2021    Under care of team 2021    Neurology: Dr. Hortencia Hodges, last visit 2021    Under care of team 2021    Oncology: Dr. Luz Maria Bennett, Natalee Solorio, last visit 3/2021, received clearance for surgery for 2021    was seeing for nodules in armpit. Pt. sees yearly . Last seen 2022    Wears glasses     Wellness examination     last seen 2022  [facility] examination     Dr. Arianne Merritt, PCP   .     Past Surgical History:  Past Surgical History:   Procedure Laterality Date     SECTION Bilateral 2020     SECTION performed by Tristan Johnson DO at [place] [facility] OR    CHOLECYSTECTOMY  2022    ROBOTIC LAPAROSCOPIC CHOLECYSTECTOMY,    CHOLECYSTECTOMY, LAPAROSCOPIC N/A 2022    XI ROBOTIC LAPAROSCOPIC CHOLECYSTECTOMY, POSSIBLE OPEN performed by Hailee Vick DO at [facility]  2020    Mirena    SLEEVE GASTRECTOMY N/A 2021    XI ROBOTIC LAPAROSCOPIC GASTRECTOMY SLEEVE WITH EGD performed by Hailee Vick DO at [address] N/A 2021    EGD BIOPSY OF GASTRIC performed by Hailee Vick DO at [facility]  2020    US LYMPH NODE BIOPSY 2020 [facility]    WISDOM TOOTH EXTRACTION Family History:  Family History   Problem Relation Age of Onset    Elevated Lipids Mother     High Blood Pressure Mother        Social History:  Social History     Socioeconomic History    Marital status:      Spouse name: Not on file    Number of children: Not on file    Years of education: Not on file    Highest education level: Not on file   Occupational History    Not on file   Tobacco Use    Smoking status: Never Smoker    Smokeless tobacco: Never Used   Vaping Use    Vaping Use: Never used   Substance and Sexual Activity    Alcohol use: No    Drug use: Not Currently     Frequency: 2.0 times per week     Types: Marijuana Yoon Ing)     Comment: last use 3/2022    Sexual activity: Yes     Partners: Male   Other Topics Concern    Not on file   Social History Narrative    Not on file     Social Determinants of Health     Financial Resource Strain: Low Risk     Difficulty of Paying Living Expenses: Not hard at all   Food Insecurity: No Food Insecurity    Worried About Running Out of Food in the Last Year: Never true    Dylan of Food in the Last Year: Never true   Transportation Needs: Unmet Transportation Needs    Lack of Transportation (Medical):  Yes    Lack of Transportation (Non-Medical): Yes   Physical Activity:     Days of Exercise per Week: Not on file    Minutes of Exercise per Session: Not on file   Stress:     Feeling of Stress : Not on file   Social Connections:     Frequency of Communication with Friends and Family: Not on file    Frequency of Social Gatherings with Friends and Family: Not on file    Attends Jain Services: Not on file    Active Member of Clubs or Organizations: Not on file    Attends Club or Organization Meetings: Not on file    Marital Status: Not on file   Intimate Partner Violence:     Fear of Current or Ex-Partner: Not on file    Emotionally Abused: Not on file    Physically Abused: Not on file    Sexually Abused: Not on file   Housing Stability:     Unable to Pay for Housing in the Last Year: Not on file    Number of Places Lived in the Last Year: Not on file    Unstable Housing in the Last Year: Not on file       Current Medications:  Current Outpatient Medications   Medication Sig Dispense Refill    ondansetron (ZOFRAN) 4 MG tablet Take 1 tablet by mouth every 8 hours as needed for Nausea or Vomiting 30 tablet 0    predniSONE (DELTASONE) 2.5 MG tablet take 2 tablets by mouth every morning and 1 tablet by mouth every evening      methotrexate (RHEUMATREX) 2.5 MG chemo tablet Takes on Saturday      Ferrous Sulfate (IRON PO) Take by mouth QOD      Calcium Carbonate (CALCIUM 500 PO) Take by mouth      Multiple Vitamin (MULTIVITAMIN ADULT PO) Take by mouth      vitamin D (ERGOCALCIFEROL) 1.25 MG (99262 UT) CAPS capsule Take 1 capsule by mouth once a week for 8 doses 8 capsule 0     Current Facility-Administered Medications   Medication Dose Route Frequency Provider Last Rate Last Admin    levonorgestrel (MIRENA) IUD 52 mg 1 each  1 each IntraUTERine Once Kandice Hernandez, APRN - CNP   1 each at 05/07/20 1459       Vital Signs:  /78 (Site: Right Upper Arm, Position: Sitting, Cuff Size: Large Adult)   Pulse 72   Resp 18   Ht 5' 3\" (1.6 m)   Wt 211 lb (95.7 kg)   BMI 37.38 kg/m²     BMI/Height/Weight:  Body mass index is 37.38 kg/m². Review of Systems - A review of systems was performed. All was negative unless otherwise documented in HPI. Constitutional: Negative for fever, chills and diaphoresis. HENT: Negative for hearing loss and trouble swallowing. Eyes: Negative for photophobia and visual disturbance. Respiratory: Negative for cough, shortness of breath and wheezing. Cardiovascular: Negative for chest pain and palpitations. Gastrointestinal: Negative for vomiting, diarrhea, constipation, blood in stool and abdominal distention. Positive for nausea and abdominal pain.   Endocrine: Negative for polydipsia, polyphagia and polyuria. Genitourinary: Negative for dysuria, frequency, hematuria and difficulty urinating. Musculoskeletal: Negative for myalgias, joint swelling. Skin: Negative for pallor and rash. Neurological: Negative for dizziness, tremors, light-headedness and headaches. Psychiatric/Behavioral: Negative for sleep disturbance and dysphoric mood. Objective:      Physical Exam   Vital signs reviewed. General: Well-developed and well-nourished. No acute distress. Skin: Warm, dry and intact. HEENT: Normocephalic. EOMs intact. Conjunctivae normal. Neck supple. Cardiovascular: Normal rate, regular rhythm. Pulmonary/Chest: Normal effort. Lungs clear to auscultation. No rales, rhonchi or wheezing. Abdominal: Positive bowel sounds. Soft, nontender. Nondistended. No rigidity, rebound, or guarding. Musculoskeletal: Movement x4. No edema. Neurological: Gait normal. Alert and oriented to person, place, and time. Psychiatric: Normal mood and affect. Speech and behavior normal. Judgment and thought content normal. Cognition and memory intact. Assessment:       Diagnosis Orders   1. GERD without esophagitis     2. Lumbar radiculopathy     3. S/P laparoscopic sleeve gastrectomy     4. S/P cholecystectomy     5. Rheumatoid arthritis with rheumatoid factor, unspecified (HCC)     6. Obesity (BMI 30-39. 9)         Plan:    Dietitian visit today. Patient to continue to increase protein to obtain 60-80g/day, at least 48-64oz of fluid daily, and gradually increase exercise regimen. Patient encouraged to have labs drawn which were previously ordered. Follow-up  Return in about 3 months (around 8/4/2022). Orders this encounter:  No orders of the defined types were placed in this encounter. Prescriptions this encounter:  No orders of the defined types were placed in this encounter.       Electronically signed by:  Meme Moore CNP

## 2022-05-04 NOTE — PROGRESS NOTES
Medical Nutrition Therapy  Metabolic and Bariatric surgery  6 month follow up note            Vitals:   Vitals:    05/04/22 0942   BP: 122/78   Site: Right Upper Arm   Position: Sitting   Cuff Size: Large Adult   Pulse: 72   Resp: 18   Weight: 211 lb (95.7 kg)   Height: 5' 3\" (1.6 m)      Body mass index is 37.38 kg/m². Labs reviewed:     Multivitamin/mineral intake: Bariatric Vitamin daily  Calcium intake: Ca supple. daily              Nutrition Assessment:   PES: Inadequate food and beverage intake r/t WLS as evidenced by loss of excess body weight lost 58 lbs over 6 mo. Goals   60-80gm of protein  48-64oz of fluid  Vitamin adherance  Basic adherance to WLS behavious and this document has been scanned into the chart.        [x] met     []  Not met        Plan:   F/u 9 months after surgery         Nanine Dandy, MS, RD, LD

## 2022-05-17 ENCOUNTER — HOSPITAL ENCOUNTER (OUTPATIENT)
Age: 42
Discharge: HOME | End: 2022-05-17
Payer: MEDICARE

## 2022-05-17 DIAGNOSIS — M54.16 LUMBAR RADICULOPATHY: ICD-10-CM

## 2022-05-17 DIAGNOSIS — E66.01 OBESITY, CLASS III, BMI 40-49.9 (MORBID OBESITY) (HCC): ICD-10-CM

## 2022-05-17 DIAGNOSIS — G47.33 OSA (OBSTRUCTIVE SLEEP APNEA): ICD-10-CM

## 2022-05-17 DIAGNOSIS — R11.0 NAUSEA: ICD-10-CM

## 2022-05-17 DIAGNOSIS — E55.9 VITAMIN D DEFICIENCY: ICD-10-CM

## 2022-05-17 DIAGNOSIS — R10.13 EPIGASTRIC PAIN: ICD-10-CM

## 2022-05-17 DIAGNOSIS — Z98.84 S/P LAPAROSCOPIC SLEEVE GASTRECTOMY: ICD-10-CM

## 2022-05-17 DIAGNOSIS — M05.9 RHEUMATOID ARTHRITIS WITH RHEUMATOID FACTOR, UNSPECIFIED (HCC): ICD-10-CM

## 2022-05-17 LAB
ABSOLUTE EOS #: 0.49 K/UL (ref 0–0.44)
ABSOLUTE IMMATURE GRANULOCYTE: 0.04 K/UL (ref 0–0.3)
ABSOLUTE LYMPH #: 2.42 K/UL (ref 1.1–3.7)
ABSOLUTE MONO #: 0.34 K/UL (ref 0.1–1.2)
ALBUMIN SERPL-MCNC: 3.8 G/DL (ref 3.5–5.2)
ALBUMIN/GLOBULIN RATIO: 1.3 (ref 1–2.5)
ALP BLD-CCNC: 77 U/L (ref 35–104)
ALT SERPL-CCNC: 6 U/L (ref 5–33)
ANION GAP SERPL CALCULATED.3IONS-SCNC: 11 MMOL/L (ref 9–17)
AST SERPL-CCNC: 8 U/L
BASOPHILS # BLD: 0 % (ref 0–2)
BASOPHILS ABSOLUTE: <0.03 K/UL (ref 0–0.2)
BILIRUB SERPL-MCNC: 0.47 MG/DL (ref 0.3–1.2)
BUN BLDV-MCNC: 10 MG/DL (ref 6–20)
CALCIUM SERPL-MCNC: 8.8 MG/DL (ref 8.6–10.4)
CHLORIDE BLD-SCNC: 104 MMOL/L (ref 98–107)
CHOLESTEROL/HDL RATIO: 4.9
CHOLESTEROL: 167 MG/DL
CO2: 25 MMOL/L (ref 20–31)
CREAT SERPL-MCNC: 0.54 MG/DL (ref 0.5–0.9)
EOSINOPHILS RELATIVE PERCENT: 4 % (ref 1–4)
ESTIMATED AVERAGE GLUCOSE: 97 MG/DL
FOLATE: 7.1 NG/ML
GFR AFRICAN AMERICAN: >60 ML/MIN
GFR NON-AFRICAN AMERICAN: >60 ML/MIN
GFR SERPL CREATININE-BSD FRML MDRD: NORMAL ML/MIN/{1.73_M2}
GLUCOSE BLD-MCNC: 94 MG/DL (ref 70–99)
HBA1C MFR BLD: 5 % (ref 4–6)
HCT VFR BLD CALC: 42.1 % (ref 36.3–47.1)
HDLC SERPL-MCNC: 34 MG/DL
HEMOGLOBIN: 13.1 G/DL (ref 11.9–15.1)
IMMATURE GRANULOCYTES: 0 %
IRON SATURATION: 27 % (ref 20–55)
IRON: 62 UG/DL (ref 37–145)
LDL CHOLESTEROL: 106 MG/DL (ref 0–130)
LYMPHOCYTES # BLD: 20 % (ref 24–43)
MAGNESIUM: 2 MG/DL (ref 1.6–2.6)
MCH RBC QN AUTO: 28.2 PG (ref 25.2–33.5)
MCHC RBC AUTO-ENTMCNC: 31.1 G/DL (ref 28.4–34.8)
MCV RBC AUTO: 90.7 FL (ref 82.6–102.9)
MONOCYTES # BLD: 3 % (ref 3–12)
NRBC AUTOMATED: 0 PER 100 WBC
PDW BLD-RTO: 13.5 % (ref 11.8–14.4)
PLATELET # BLD: 387 K/UL (ref 138–453)
PMV BLD AUTO: 9.7 FL (ref 8.1–13.5)
POTASSIUM SERPL-SCNC: 3.7 MMOL/L (ref 3.7–5.3)
PTH INTACT: 58.32 PG/ML (ref 15–65)
RBC # BLD: 4.64 M/UL (ref 3.95–5.11)
SEG NEUTROPHILS: 73 % (ref 36–65)
SEGMENTED NEUTROPHILS ABSOLUTE COUNT: 9.08 K/UL (ref 1.5–8.1)
SODIUM BLD-SCNC: 140 MMOL/L (ref 135–144)
TOTAL IRON BINDING CAPACITY: 229 UG/DL (ref 250–450)
TOTAL PROTEIN: 6.7 G/DL (ref 6.4–8.3)
TRIGL SERPL-MCNC: 133 MG/DL
TSH SERPL DL<=0.05 MIU/L-ACNC: 1.06 UIU/ML (ref 0.3–5)
UNSATURATED IRON BINDING CAPACITY: 167 UG/DL (ref 112–347)
VITAMIN B-12: 399 PG/ML (ref 232–1245)
VITAMIN D 25-HYDROXY: 25.3 NG/ML
WBC # BLD: 12.4 K/UL (ref 3.5–11.3)

## 2022-05-17 PROCEDURE — 84590 ASSAY OF VITAMIN A: CPT

## 2022-05-17 PROCEDURE — 82746 ASSAY OF FOLIC ACID SERUM: CPT

## 2022-05-17 PROCEDURE — 84630 ASSAY OF ZINC: CPT

## 2022-05-17 PROCEDURE — 84425 ASSAY OF VITAMIN B-1: CPT

## 2022-05-17 PROCEDURE — 84443 ASSAY THYROID STIM HORMONE: CPT

## 2022-05-17 PROCEDURE — 83970 ASSAY OF PARATHORMONE: CPT

## 2022-05-17 PROCEDURE — 80053 COMPREHEN METABOLIC PANEL: CPT

## 2022-05-17 PROCEDURE — 83550 IRON BINDING TEST: CPT

## 2022-05-17 PROCEDURE — 82607 VITAMIN B-12: CPT

## 2022-05-17 PROCEDURE — 36415 COLL VENOUS BLD VENIPUNCTURE: CPT

## 2022-05-17 PROCEDURE — 80061 LIPID PANEL: CPT

## 2022-05-17 PROCEDURE — 82306 VITAMIN D 25 HYDROXY: CPT

## 2022-05-17 PROCEDURE — 83735 ASSAY OF MAGNESIUM: CPT

## 2022-05-17 PROCEDURE — 83036 HEMOGLOBIN GLYCOSYLATED A1C: CPT

## 2022-05-17 PROCEDURE — 83540 ASSAY OF IRON: CPT

## 2022-05-17 PROCEDURE — 85025 COMPLETE CBC W/AUTO DIFF WBC: CPT

## 2022-05-17 RX ORDER — ERGOCALCIFEROL 1.25 MG/1
50000 CAPSULE ORAL WEEKLY
Qty: 8 CAPSULE | Refills: 0 | Status: SHIPPED | OUTPATIENT
Start: 2022-05-17 | End: 2022-10-11 | Stop reason: SDUPTHER

## 2022-05-19 LAB
RETINYL PALMITATE: <0.02 MG/L (ref 0–0.1)
VITAMIN A LEVEL: 0.52 MG/L (ref 0.3–1.2)
VITAMIN A, INTERP: NORMAL
ZINC: 71.7 UG/DL (ref 60–120)

## 2022-05-21 LAB — VITAMIN B1 WHOLE BLOOD: 89 NMOL/L (ref 70–180)

## 2022-06-23 ENCOUNTER — OFFICE VISIT (OUTPATIENT)
Dept: FAMILY MEDICINE CLINIC | Age: 42
End: 2022-06-23
Payer: MEDICARE

## 2022-06-23 VITALS
SYSTOLIC BLOOD PRESSURE: 99 MMHG | HEART RATE: 109 BPM | WEIGHT: 201 LBS | BODY MASS INDEX: 35.61 KG/M2 | HEIGHT: 63 IN | TEMPERATURE: 97.7 F | DIASTOLIC BLOOD PRESSURE: 69 MMHG

## 2022-06-23 DIAGNOSIS — R00.0 TACHYCARDIA: ICD-10-CM

## 2022-06-23 DIAGNOSIS — F41.9 ANXIETY: Primary | ICD-10-CM

## 2022-06-23 PROBLEM — M79.10 MUSCLE PAIN: Status: RESOLVED | Noted: 2019-01-09 | Resolved: 2022-06-23

## 2022-06-23 PROCEDURE — G8427 DOCREV CUR MEDS BY ELIG CLIN: HCPCS | Performed by: STUDENT IN AN ORGANIZED HEALTH CARE EDUCATION/TRAINING PROGRAM

## 2022-06-23 PROCEDURE — 99214 OFFICE O/P EST MOD 30 MIN: CPT | Performed by: STUDENT IN AN ORGANIZED HEALTH CARE EDUCATION/TRAINING PROGRAM

## 2022-06-23 PROCEDURE — G8417 CALC BMI ABV UP PARAM F/U: HCPCS | Performed by: STUDENT IN AN ORGANIZED HEALTH CARE EDUCATION/TRAINING PROGRAM

## 2022-06-23 PROCEDURE — 1036F TOBACCO NON-USER: CPT | Performed by: STUDENT IN AN ORGANIZED HEALTH CARE EDUCATION/TRAINING PROGRAM

## 2022-06-23 RX ORDER — ESCITALOPRAM OXALATE 10 MG/1
10 TABLET ORAL DAILY
Qty: 30 TABLET | Refills: 3 | Status: SHIPPED | OUTPATIENT
Start: 2022-06-23 | End: 2022-07-25

## 2022-06-23 SDOH — ECONOMIC STABILITY: FOOD INSECURITY: WITHIN THE PAST 12 MONTHS, THE FOOD YOU BOUGHT JUST DIDN'T LAST AND YOU DIDN'T HAVE MONEY TO GET MORE.: NEVER TRUE

## 2022-06-23 SDOH — ECONOMIC STABILITY: TRANSPORTATION INSECURITY
IN THE PAST 12 MONTHS, HAS THE LACK OF TRANSPORTATION KEPT YOU FROM MEDICAL APPOINTMENTS OR FROM GETTING MEDICATIONS?: NO

## 2022-06-23 SDOH — ECONOMIC STABILITY: FOOD INSECURITY: WITHIN THE PAST 12 MONTHS, YOU WORRIED THAT YOUR FOOD WOULD RUN OUT BEFORE YOU GOT MONEY TO BUY MORE.: NEVER TRUE

## 2022-06-23 SDOH — ECONOMIC STABILITY: TRANSPORTATION INSECURITY
IN THE PAST 12 MONTHS, HAS LACK OF TRANSPORTATION KEPT YOU FROM MEETINGS, WORK, OR FROM GETTING THINGS NEEDED FOR DAILY LIVING?: NO

## 2022-06-23 ASSESSMENT — PATIENT HEALTH QUESTIONNAIRE - PHQ9
SUM OF ALL RESPONSES TO PHQ QUESTIONS 1-9: 0
SUM OF ALL RESPONSES TO PHQ9 QUESTIONS 1 & 2: 0
2. FEELING DOWN, DEPRESSED OR HOPELESS: 0
1. LITTLE INTEREST OR PLEASURE IN DOING THINGS: 0
SUM OF ALL RESPONSES TO PHQ QUESTIONS 1-9: 0

## 2022-06-23 ASSESSMENT — ENCOUNTER SYMPTOMS
ABDOMINAL PAIN: 0
EYE DISCHARGE: 0
DIARRHEA: 0
NAUSEA: 0
CHEST TIGHTNESS: 0
SHORTNESS OF BREATH: 0
CONSTIPATION: 0
VOMITING: 0
SORE THROAT: 0
COUGH: 0
WHEEZING: 0

## 2022-06-23 ASSESSMENT — ANXIETY QUESTIONNAIRES
6. BECOMING EASILY ANNOYED OR IRRITABLE: 1
4. TROUBLE RELAXING: 1
3. WORRYING TOO MUCH ABOUT DIFFERENT THINGS: 1
7. FEELING AFRAID AS IF SOMETHING AWFUL MIGHT HAPPEN: 0
1. FEELING NERVOUS, ANXIOUS, OR ON EDGE: 1
IF YOU CHECKED OFF ANY PROBLEMS ON THIS QUESTIONNAIRE, HOW DIFFICULT HAVE THESE PROBLEMS MADE IT FOR YOU TO DO YOUR WORK, TAKE CARE OF THINGS AT HOME, OR GET ALONG WITH OTHER PEOPLE: SOMEWHAT DIFFICULT
5. BEING SO RESTLESS THAT IT IS HARD TO SIT STILL: 0

## 2022-06-23 ASSESSMENT — SOCIAL DETERMINANTS OF HEALTH (SDOH): HOW HARD IS IT FOR YOU TO PAY FOR THE VERY BASICS LIKE FOOD, HOUSING, MEDICAL CARE, AND HEATING?: SOMEWHAT HARD

## 2022-06-23 NOTE — PROGRESS NOTES
[address] PRIMARY CARE  [address] [place] [address] [address] [place] [address]  Dept: [phone]  Dept Fax: [phone]    Navin Mcdermott is a 46 y.o. female who is a Established patient, who presents today for her medical conditions/complaints as noted below:  Chief Complaint   Patient presents with    Other     disability paperwork    Tachycardia     120-130         HPI:     She is here today to follow-up on her anxiety. She says that she has had longstanding anxiety and used to be on Lexapro previously but has not taken it in several years. She was also diagnosed with ADHD several years ago. She says that she is attending school at [email or web address] and sometimes it is difficult for her to complete assignments on time or sometimes she needs extra time on tests. She is requiring paperwork to be filled out for her school. She says that she does feel anxious frequently and has trouble sleeping at night. She also gets episodes fast heartbeat that lasts for a few minutes at a time. She says that it started after she got her COVID shot. She denies having any chest pain at the time but occasionally feels a pressure in her chest and lightheadedness.       Hemoglobin A1C (%)   Date Value   05/17/2022 5.0   01/03/2022 5.3   07/23/2021 6.3 (H)             ( goal A1Cis < 7)   No results found for: LABMICR  LDL Cholesterol (mg/dL)   Date Value   05/17/2022 106   01/03/2022 113   07/23/2021 98       (goal LDL is <100)   AST (U/L)   Date Value   05/17/2022 8     ALT (U/L)   Date Value   05/17/2022 6     BUN (mg/dL)   Date Value   05/17/2022 10     BP Readings from Last 3 Encounters:   06/23/22 99/69   05/04/22 122/78   04/21/22 126/70          (goal 120/80)    Past Medical History:   Diagnosis Date    Abnormal 1st trimester screen (Tri 21 1:117)--NIPT nml  11/30/2019    ADD (attention deficit disorder) without hyperactivity     Antiphospholipid syndrome (HCC)     Celestone 1/9 & 1/10 01/09/2020    Chronic back pain     COVID 09/15/2021    BODY ACHE, COUGH, FEVER, CHEST PAIN, N&V X 3 WEEKS. STILL HAS COUGH.  Dehydration 12/28/2021    Echogenic focus of heart of fetus affecting antepartum care of mother 11/30/2019   Ella Bimler early 1hr, nml 3hr  11/30/2019    1 elevated value, [facility] recommends repeat 3hr GTT in 2 weeks    Elevated blood-pressure reading without diagnosis of hypertension 11/30/2019    Elevated umbilical artery dopplers  01/10/2020    Eosinopenia ([facility] [address]     Fetal ascites     Fetal heart rate decelerations affecting management of mother     GERD (gastroesophageal reflux disease)     Headache     migraines    Iron deficiency     follows with Dr. Ab Moulton (hem/onc)    IUD (intrauterine device) in place 05/07/2020    Adina Alvarez    Lumbar radiculopathy 01/09/2019    Lumbar spondylosis 01/09/2019    Multigravida of advanced maternal age in third trimester     Obesity     SHELLEY (obstructive sleep apnea)     Cpap (was causing speech difficulties, resolved after started using cpap)  Dr. Hugh Urrutia located [place], last seen 1/2021- [facility] Personal history of other medical treatment     Axillary lympadenopathy    PONV (postoperative nausea and vomiting)     Pulmonary embolism affecting pregnancy in second trimester 11/30/2019    Rh+/RI/GBSunk 01/08/2020    Rheumatoid arteritis ([facility] [address]     Dr. Gregory Ruelas located [address] [facility] seeing 3/28/2022    Tachycardia     Umbilical cord complication     Under care of team 09/07/2021    PCP: Dr. Kari Ball, last visit 8/2021, clearance given already for surgery on 9/14/2021    Under care of team 09/07/2021    Neurology: Dr. Eliza Varghese, last visit 7/2021    Under care of team 09/07/2021    Oncology: Dr. Ab Moulton, [facility], last visit 3/2021, received clearance for surgery for 9/14/2021    was seeing for nodules in armpit. Pt. sees yearly .  Last seen Jan 2022    Wears glasses  Wellness examination     last seen 2022  [facility] examination     Dr. Marvin Walker, PCP      Past Surgical History:   Procedure Laterality Date     SECTION Bilateral 2020     SECTION performed by Kateryna Bernstein DO at [place] [facility] [facility]    CHOLECYSTECTOMY  2022    ROBOTIC LAPAROSCOPIC CHOLECYSTECTOMY,    CHOLECYSTECTOMY, LAPAROSCOPIC N/A 2022    XI ROBOTIC LAPAROSCOPIC CHOLECYSTECTOMY, POSSIBLE OPEN performed by Gissel Alegria DO at [facility]  2020    Mirena    SLEEVE GASTRECTOMY N/A 2021    XI ROBOTIC LAPAROSCOPIC GASTRECTOMY SLEEVE WITH EGD performed by Gissel Alegria DO at [facility] ENDOSCOPY N/A 2021    EGD BIOPSY OF GASTRIC performed by Gissel Alegria DO at [facility]  2020    US LYMPH NODE BIOPSY 2020 [facility] ULTRASOUND    WISDOM TOOTH EXTRACTION         Family History   Problem Relation Age of Onset    Elevated Lipids Mother     High Blood Pressure Mother        Social History     Tobacco Use    Smoking status: Never Smoker    Smokeless tobacco: Never Used   Substance Use Topics    Alcohol use: No      Current Outpatient Medications   Medication Sig Dispense Refill    FOLIC ACID PO Take by mouth      escitalopram (LEXAPRO) 10 MG tablet Take 1 tablet by mouth daily 30 tablet 3    vitamin D (ERGOCALCIFEROL) 1.25 MG (09759 UT) CAPS capsule Take 1 capsule by mouth once a week for 8 doses 8 capsule 0    predniSONE (DELTASONE) 2.5 MG tablet take 2 tablets by mouth every morning and 1 tablet by mouth every evening      methotrexate (RHEUMATREX) 2.5 MG chemo tablet Takes on Saturday      Ferrous Sulfate (IRON PO) Take by mouth QOD      Calcium Carbonate (CALCIUM 500 PO) Take by mouth      Multiple Vitamin (MULTIVITAMIN ADULT PO) Take by mouth       Current Facility-Administered Medications   Medication Dose Route Frequency Provider Last Rate Last Admin    levonorgestrel (MIRENA) IUD 52 mg 1 each  1 each IntraUTERine Once Nav Guardado, APRN - CNP   1 each at 05/07/20 7709     Allergies   Allergen Reactions    Iodine Hives and Itching     X-ray dye    Other Itching and Swelling     Sensitive to bug bites, mosquitos. Skin discoloration X1 month.  Phenergan [Promethazine] Itching    Cat Hair Extract Itching     RUNNY NOSE, SNEEZING    Dog Epithelium Itching     RUNNY NOSE, SNEEZING. Health Maintenance   Topic Date Due    COVID-19 Vaccine (2 - Inadvertent 3-dose series) 06/03/2022    Depression Screen  06/17/2022    Varicella vaccine (2 of 2 - 13+ 2-dose series) 06/24/2022    A1C test (Diabetic or Prediabetic)  05/17/2023    Cervical cancer screen  07/30/2024    DTaP/Tdap/Td vaccine (2 - Td or Tdap) 02/21/2027    Flu vaccine  Completed    Hepatitis C screen  Completed    HIV screen  Completed    Hepatitis A vaccine  Aged Out    Hepatitis B vaccine  Aged Out    Hib vaccine  Aged Out    Meningococcal (ACWY) vaccine  Aged Out    Pneumococcal 0-64 years Vaccine  Aged Out       Subjective:     Review of Systems   Constitutional: Negative for appetite change, fatigue and fever. HENT: Negative for congestion, ear pain, hearing loss and sore throat. Eyes: Negative for discharge and visual disturbance. Respiratory: Negative for cough, chest tightness, shortness of breath and wheezing. Cardiovascular: Positive for palpitations. Negative for chest pain and leg swelling. Gastrointestinal: Negative for abdominal pain, constipation, diarrhea, nausea and vomiting. Genitourinary: Negative for flank pain, frequency, hematuria and urgency. Musculoskeletal: Negative for arthralgias, gait problem, joint swelling and myalgias. Skin: Negative. Neurological: Negative for dizziness, weakness, numbness and headaches. Psychiatric/Behavioral: Positive for dysphoric mood and sleep disturbance. The patient is nervous/anxious. Objective:     Physical Exam  Vitals reviewed. Constitutional:       Appearance: Normal appearance. She is obese. HENT:      Head: Normocephalic and atraumatic. Nose: Nose normal.      Mouth/Throat:      Mouth: Mucous membranes are moist.      Pharynx: Oropharynx is clear. Eyes:      Extraocular Movements: Extraocular movements intact. Conjunctiva/sclera: Conjunctivae normal.      Pupils: Pupils are equal, round, and reactive to light. Cardiovascular:      Rate and Rhythm: Regular rhythm. Tachycardia present. Heart sounds: Normal heart sounds. No murmur heard. No gallop. Pulmonary:      Effort: Pulmonary effort is normal. No respiratory distress. Breath sounds: Normal breath sounds. No stridor. No wheezing. Abdominal:      General: Bowel sounds are normal. There is no distension. Palpations: Abdomen is soft. Tenderness: There is no abdominal tenderness. There is no guarding or rebound. Musculoskeletal:         General: No swelling or tenderness. Normal range of motion. Cervical back: Normal range of motion and neck supple. Skin:     General: Skin is warm and dry. Coloration: Skin is not jaundiced. Findings: No rash. Neurological:      General: No focal deficit present. Mental Status: She is alert and oriented to person, place, and time. Psychiatric:         Mood and Affect: Mood normal.         Behavior: Behavior normal.         Thought Content: Thought content normal.         Judgment: Judgment normal.       BP 99/69 (Site: Left Upper Arm, Position: Sitting, Cuff Size: Large Adult)   Pulse (!) 109   Temp 97.7 °F (36.5 °C) (Temporal)   Ht 5' 3\" (1.6 m)   Wt 201 lb (91.2 kg)   BMI 35.61 kg/m²     Assessment/Plan:   1. Anxiety  -     escitalopram (LEXAPRO) 10 MG tablet; Take 1 tablet by mouth daily, Disp-30 tablet, R-3Normal  -     [facility] ([address] [place] [address])  2.  Tachycardia  -     Holter Monitor 48 Hour; Future    Anxiety-started on Lexapro 10 mg daily and referral placed for psychotherapy    Tachycardia-Holter monitor ordered for evaluation    Return in about 1 month (around 7/23/2022) for anxiety and depression. Orders Placed This Encounter   Procedures   [address] ([address] [place] [address])     Referral Priority:   Routine     Referral Type:   Behavioral Health     Referral Reason:   Specialty Services Required     Requested Specialty:   Behavioral Health     Number of Visits Requested:   1    Holter Monitor 48 Hour     Standing Status:   Future     Standing Expiration Date:   12/23/2022     Order Specific Question:   Reason for Exam?     Answer: Tachycardia     Orders Placed This Encounter   Medications    escitalopram (LEXAPRO) 10 MG tablet     Sig: Take 1 tablet by mouth daily     Dispense:  30 tablet     Refill:  3       Patient given educational materials - see patient instructions. Discussed use, benefit, and side effects of prescribed medications. All patientquestions answered. Pt voiced understanding. Reviewed health maintenance. Instructedto continue current medications, diet and exercise. Patient agreed with treatmentplan. Follow up as directed.      Electronically signed by Karina Miranda MD on 6/23/2022 at 4:26 PM

## 2022-06-24 ENCOUNTER — TELEPHONE (OUTPATIENT)
Dept: BARIATRICS/WEIGHT MGMT | Age: 42
End: 2022-06-24

## 2022-06-24 NOTE — TELEPHONE ENCOUNTER
Next Visit Date:  8/3/2022    Patient Surgery Date 11/23/21    Type of  Surgery:  sleeve    Patient calls complaining of dehydration. Thirsty. Dizziness. She states 20 oz of fluid/day. Onset: 4 day(s) ago  Timing: constant, intermittent  Severity: Mild      Patient advised:   If  Symptoms worsen seek treatment at  Emergency Room. You will receive a call back from the office within 24-48 hours.     Is it ok to leave a message if we call back yes

## 2022-06-25 ENCOUNTER — HOSPITAL ENCOUNTER (OUTPATIENT)
Dept: ONCOLOGY | Age: 42
Discharge: HOME | End: 2022-06-25
Attending: SURGERY | Admitting: SURGERY
Payer: MEDICARE

## 2022-06-25 VITALS
RESPIRATION RATE: 18 BRPM | SYSTOLIC BLOOD PRESSURE: 125 MMHG | HEART RATE: 92 BPM | OXYGEN SATURATION: 99 % | TEMPERATURE: 98.1 F | DIASTOLIC BLOOD PRESSURE: 80 MMHG

## 2022-06-25 DIAGNOSIS — E86.0 DEHYDRATION: ICD-10-CM

## 2022-06-25 PROCEDURE — 76937 US GUIDE VASCULAR ACCESS: CPT

## 2022-06-25 PROCEDURE — 96365 THER/PROPH/DIAG IV INF INIT: CPT

## 2022-06-25 PROCEDURE — 96366 THER/PROPH/DIAG IV INF ADDON: CPT

## 2022-06-25 PROCEDURE — 2580000003 HC RX 258: Performed by: SURGERY

## 2022-06-25 RX ORDER — SODIUM CHLORIDE 9 MG/ML
INJECTION, SOLUTION INTRAVENOUS ONCE
Status: COMPLETED | OUTPATIENT
Start: 2022-06-25 | End: 2022-06-25

## 2022-06-25 RX ADMIN — SODIUM CHLORIDE: 9 INJECTION, SOLUTION INTRAVENOUS at 10:07

## 2022-06-25 RX ADMIN — SODIUM CHLORIDE: 9 INJECTION, SOLUTION INTRAVENOUS at 11:12

## 2022-06-27 ENCOUNTER — TELEPHONE (OUTPATIENT)
Dept: FAMILY MEDICINE CLINIC | Age: 42
End: 2022-06-27

## 2022-06-29 ENCOUNTER — TELEPHONE (OUTPATIENT)
Dept: FAMILY MEDICINE CLINIC | Age: 42
End: 2022-06-29

## 2022-06-29 ENCOUNTER — HOSPITAL ENCOUNTER (OUTPATIENT)
Age: 42
Discharge: HOME | End: 2022-06-29

## 2022-06-29 PROCEDURE — 36415 COLL VENOUS BLD VENIPUNCTURE: CPT

## 2022-06-29 PROCEDURE — 86481 TB AG RESPONSE T-CELL SUSP: CPT

## 2022-06-29 NOTE — TELEPHONE ENCOUNTER
[facility] ([facility]) ED Follow up Call    Reason for ED visit:  dehydration            Did you receive discharge instructions? Yes  Do you understand the discharge instructions? Yes  Did the ED give you any new prescriptions? No:   Were you able to fill your prescriptions? No:       Do you have one of our red, yellow and green  Zone sheets that help you to determine when you should go to the ED? Not Applicable      Do you need or want to make a follow up appt with your PCP? No    Do you have any further needs in the home i.e. Equipment?   Not Applicable        FU appts/Provider:    Future Appointments   Date Time Provider Ted Echeverria   6/30/2022  1:00 PM STV EKG MONITOR RM [facility]   7/25/2022 12:15 PM Uvaldo Cha MD [facility]   7/26/2022 10:00 AM Evelina Olvera MD Neuro Spec [facility]   8/3/2022  9:30 AM SCHEDULE, [facility] BARIATRIC DIETICIAN bariatric heredia [facility]   12/5/2022  1:00 PM Justyn Yap MD [facility]       VOICEMAIL DOCUMENTATION - ERASE IF NOT USED

## 2022-06-30 ENCOUNTER — HOSPITAL ENCOUNTER (OUTPATIENT)
Dept: NON INVASIVE DIAGNOSTICS | Age: 42
Discharge: HOME | End: 2022-06-30
Payer: MEDICARE

## 2022-06-30 ENCOUNTER — TELEPHONE (OUTPATIENT)
Dept: FAMILY MEDICINE CLINIC | Age: 42
End: 2022-06-30

## 2022-06-30 DIAGNOSIS — R00.0 TACHYCARDIA: ICD-10-CM

## 2022-06-30 PROCEDURE — 93242 EXT ECG>48HR<7D RECORDING: CPT

## 2022-06-30 PROCEDURE — 93243 EXT ECG>48HR<7D SCAN A/R: CPT

## 2022-06-30 NOTE — TELEPHONE ENCOUNTER
Patient would like to know if you do letters for Covid vaccine  Exemption. Patient states that the 1st one gave her heart palpations. But her employer is saying she has to have the second one or she will be let go.

## 2022-07-05 LAB — T-SPOT TB TEST: NORMAL

## 2022-07-08 DIAGNOSIS — R00.1 SINUS BRADYCARDIA: Primary | ICD-10-CM

## 2022-07-08 NOTE — PROCEDURES
[address]                                 HOLTER MONITOR    PATIENT NAME: Andrade White                      :        1980  MED REC NO:   [id number]                             ROOM:  ACCOUNT NO:   [de-identified]                           ADMIT DATE: 2022  PROVIDER:     Kiet Damon    HOLTER MONITOR  48-HOURS    DATE OF STUDY:  2022    AUTHORIZING PROVIDER:  Adelita Russo MD    INTERPRETING PHYSICIAN:  Hardy Doe    INDICATION:  Tachycardia            PHYSICIAN INTERPRETATION:  1:  Normal Sinus Rhythm  2:  Sinus Bradycardia  3:  Occasional PAC's/ PVC's. 4:  No Atrial Fibrillation/ Flutter.                   [facility]    D: 2022 13:59:49       T: 2022 14:03:05     CHERI/NOLAN  Job#: [id number]     Doc#: Unknown    CC:

## 2022-07-17 DIAGNOSIS — E55.9 VITAMIN D DEFICIENCY: ICD-10-CM

## 2022-07-18 RX ORDER — ERGOCALCIFEROL 1.25 MG/1
CAPSULE ORAL
Qty: 8 CAPSULE | Refills: 0 | OUTPATIENT
Start: 2022-07-18

## 2022-07-25 ENCOUNTER — OFFICE VISIT (OUTPATIENT)
Dept: FAMILY MEDICINE CLINIC | Age: 42
End: 2022-07-25
Payer: MEDICARE

## 2022-07-25 VITALS
WEIGHT: 199 LBS | BODY MASS INDEX: 35.25 KG/M2 | DIASTOLIC BLOOD PRESSURE: 74 MMHG | SYSTOLIC BLOOD PRESSURE: 108 MMHG | HEART RATE: 62 BPM

## 2022-07-25 DIAGNOSIS — F41.9 ANXIETY: Primary | ICD-10-CM

## 2022-07-25 DIAGNOSIS — R00.1 SINUS BRADYCARDIA: ICD-10-CM

## 2022-07-25 PROBLEM — E86.0 DEHYDRATION: Status: RESOLVED | Noted: 2022-06-25 | Resolved: 2022-07-25

## 2022-07-25 PROCEDURE — 99212 OFFICE O/P EST SF 10 MIN: CPT | Performed by: STUDENT IN AN ORGANIZED HEALTH CARE EDUCATION/TRAINING PROGRAM

## 2022-07-26 ASSESSMENT — ENCOUNTER SYMPTOMS
NAUSEA: 0
SORE THROAT: 0
CHEST TIGHTNESS: 0
SHORTNESS OF BREATH: 0
CONSTIPATION: 0
DIARRHEA: 0
ABDOMINAL PAIN: 0
VOMITING: 0
EYE DISCHARGE: 0
COUGH: 0
WHEEZING: 0

## 2022-07-28 ENCOUNTER — TELEMEDICINE (OUTPATIENT)
Dept: BEHAVIORAL/MENTAL HEALTH CLINIC | Age: 42
End: 2022-07-28
Payer: MEDICARE

## 2022-07-28 DIAGNOSIS — F41.9 ANXIETY: Primary | ICD-10-CM

## 2022-07-28 PROCEDURE — 90791 PSYCH DIAGNOSTIC EVALUATION: CPT | Performed by: SOCIAL WORKER

## 2022-07-28 PROCEDURE — 1036F TOBACCO NON-USER: CPT | Performed by: SOCIAL WORKER

## 2022-07-28 NOTE — PROGRESS NOTES
ADULT BEHAVIORAL HEALTH ASSESSMENT  EDUARDO Machado  Licensed Independent        Visit Date: 7/28/2022   Time of appointment: 10:10 AM   Time spent with Patient: 30 minutes. This is patient's first appointment. Patient Location: [facility]/Clinician Location: [address] [place] [address]  This was a tele-health visit conducted via interactive/real time audio and video. Reason for Consult:  Anxiety     PCP:  Luis Irene MD      Pt provided informed consent for the behavioral health program. Discussed with patient model of service to include the limits of confidentiality (i.e. abuse reporting, suicide intervention, etc.) and short-term intervention focused approach. Pt indicated understanding. Pt provided verbal consent to engage in tele-health psychotherapy, this visit was completed using My Chart. PRESENTING PROBLEM AND HISTORY  Anthony Lamas is a 46 y.o. female who presents for new evaluation and treatment of anxiety. Anthony Lamas presented to session, referred by PCP, due to having a lot of anxiety. She feels this has worsened as of 6 months ago. Her PCP has recently prescribed Lexapro to assist with anxious symptoms, Anthony Lamas has not started this medication yet. She identified the following contributors; recent diagnosis of Autism for 3year-old son - figuring out his therapies and appointments, trying to go back to school for nursing, and \"worry about everything\". She has the following symptoms: decreased sleep, fatigue/lack of energy, lack of motivation, feeling nervous, anxious, or on edge, racing worry thoughts, excessive anxiety and worry about specific stressors, inability to stop or control worry, unexpected panic attacks, persistent worry about additional panic attacks, feeling fidgety or restless, difficulty with attention/concentration, and irritability, self-defeating thoughts . Onset of symptoms was approximately several years ago.  Symptoms have been gradually worsening since that time. She denies current suicidal and homicidal ideation. Family history significant for no psychiatric illness. Risk factors: none. Previous treatment includes Lexapro. She complains of the following medication side effects: none. MENTAL STATUS EXAM  Mood was within normal limits with anxious affect. Suicidal ideation was denied. Homicidal ideation was denied. Hygiene was good . Dress was appropriate. Behavior was Within Normal Limits with No self-report of difficulties ambulating. Attitude was Cooperative. Eye-contact was good. Speech: rate - WNL, rhythm -  WNL, volume - WNL  Verbalizations were coherent. Thought processes were intact and goal-oriented without evidence of delusions, hallucinations, obsessions, or robin; with no cognitive distortions. Associations were characterized by intact cognitive processes. Pt was oriented to person, place, time, and general circumstances;  recent:  good. Insight and judgment were estimated to be good, AEB, a fair  understanding of cyclical maladaptive patterns, and the ability to use insight to inform behavior change. CURRENT MEDICATIONS    Current Outpatient Medications:     FOLIC ACID PO, Take by mouth, Disp: , Rfl:     vitamin D (ERGOCALCIFEROL) 1.25 MG (27983 UT) CAPS capsule, Take 1 capsule by mouth once a week for 8 doses, Disp: 8 capsule, Rfl: 0    predniSONE (DELTASONE) 2.5 MG tablet, take 2 tablets by mouth every morning and 1 tablet by mouth every evening, Disp: , Rfl:     methotrexate (RHEUMATREX) 2.5 MG chemo tablet, Takes on Saturday, Disp: , Rfl:     Ferrous Sulfate (IRON PO), Take by mouth QOD, Disp: , Rfl:     Calcium Carbonate (CALCIUM 500 PO), Take by mouth, Disp: , Rfl:     Multiple Vitamin (MULTIVITAMIN ADULT PO), Take by mouth, Disp: , Rfl:      FAMILY MEDICAL/MH HISTORY   Her family history includes Elevated Lipids in her mother; High Blood Pressure in her mother.     [address] HISTORY  Pt previously attended therapy many years ago. She reports that she was diagnosed with ADD and anxiety in the past, could not recall where/when this was completed. PSYCHOSOCIAL HISTORY  Current living situation: Pt resides in her own home with  and 3 children (ages 3, 15, 23). 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 She has anxiety about the future - managing work and school. Support system: Pt reported her main supports include her  and her mother, sometimes spends time with her cousin. She is also working out 1-2x/week. Sabianist/Spirituality: Pt did not disclose. DRUG AND ALCOHOL CURRENT USE/HISTORY  TOBACCO:  She reports that she has never smoked. She has never used smokeless tobacco.  ALCOHOL:  She reports no history of alcohol use. OTHER SUBSTANCES: She reports that she does not currently use drugs after having used the following drugs: Marijuana Saman Guanaco). Frequency: 2.00 times per week. ASSESSMENT  Singleton Handsome presented to the appointment today for evaluation and treatment of symptoms of anxiety. She is currently deemed no risk to herself or others and meets criteria for Anxiety. Flower's anxious symptoms are well controlled at this time. She will also benefit from brief and solution-focused consultation to address cognitive and behavioral interventions for anxious symptoms. Hua Mayberry was in agreement with recommendations. PHQ Scores 6/23/2022 6/17/2021 3/11/2020 6/19/2018 6/6/2017   PHQ2 Score 0 0 0 0 0   PHQ9 Score 0 0 0 0 0     Interpretation of Total Score Depression Severity: 1-4 = Minimal depression, 5-9 = Mild depression, 10-14 = Moderate depression, 15-19 = Moderately severe depression, 20-27 = Severe depression    How often pt has had thoughts of death or hurting self (if PHQ positive for depression):       No flowsheet data found.   Interpretation of CHAUNCEY-7 score: 5-9 = mild anxiety, 10-14 = moderate anxiety, 15+ = severe anxiety. Recommend referral to behavioral health for scores 10 or greater. DIAGNOSIS  Flower was seen today for anxiety. Diagnoses and all orders for this visit:    Anxiety      INTERVENTION  Discussed various factors related to the development and maintenance of  anxiety (including biological, cognitive, behavioral, and environmental factors), Discussed potential treatments for  anxiety, Discussed self-care (sleep, nutrition, rewarding activities, social support, exercise), Conducted functional assessment, and [place]-setting to identify pt's primary goals for [facility] visit / overall health. PLAN  Pt's Goal: \"just to learn how to manage my anxiety\"   Anxiety Workbook sent via My Chart for reference on homework assignments and helpful interventions. Discussed Autism Support Group for parents as helpful ancillary support. Follow-up scheduled in 1 month. INTERACTIVE COMPLEXITY  Is interactive complexity present?   No  Reason:  N/A  Additional Supporting Information:  N/A       Electronically signed by EDUARDO Whatley on 7/28/2022 at 1:17 PM

## 2022-08-03 ENCOUNTER — TELEPHONE (OUTPATIENT)
Dept: BARIATRICS/WEIGHT MGMT | Age: 42
End: 2022-08-03

## 2022-08-05 ENCOUNTER — OFFICE VISIT (OUTPATIENT)
Dept: BARIATRICS/WEIGHT MGMT | Age: 42
End: 2022-08-05
Payer: MEDICARE

## 2022-08-05 VITALS
BODY MASS INDEX: 34.91 KG/M2 | HEART RATE: 90 BPM | SYSTOLIC BLOOD PRESSURE: 90 MMHG | HEIGHT: 63 IN | DIASTOLIC BLOOD PRESSURE: 60 MMHG | WEIGHT: 197 LBS

## 2022-08-05 DIAGNOSIS — L73.2 HIDRADENITIS: ICD-10-CM

## 2022-08-05 DIAGNOSIS — M54.16 LUMBAR RADICULOPATHY: ICD-10-CM

## 2022-08-05 DIAGNOSIS — K21.9 GERD WITHOUT ESOPHAGITIS: Primary | ICD-10-CM

## 2022-08-05 DIAGNOSIS — E66.9 OBESITY (BMI 30-39.9): ICD-10-CM

## 2022-08-05 DIAGNOSIS — M05.9 RHEUMATOID ARTHRITIS WITH RHEUMATOID FACTOR, UNSPECIFIED (HCC): ICD-10-CM

## 2022-08-05 DIAGNOSIS — Z98.84 S/P LAPAROSCOPIC SLEEVE GASTRECTOMY: ICD-10-CM

## 2022-08-05 DIAGNOSIS — R73.03 PREDIABETES: ICD-10-CM

## 2022-08-05 PROCEDURE — G8417 CALC BMI ABV UP PARAM F/U: HCPCS | Performed by: NURSE PRACTITIONER

## 2022-08-05 PROCEDURE — 1036F TOBACCO NON-USER: CPT | Performed by: NURSE PRACTITIONER

## 2022-08-05 PROCEDURE — 99213 OFFICE O/P EST LOW 20 MIN: CPT | Performed by: NURSE PRACTITIONER

## 2022-08-05 PROCEDURE — G8427 DOCREV CUR MEDS BY ELIG CLIN: HCPCS | Performed by: NURSE PRACTITIONER

## 2022-08-05 NOTE — PROGRESS NOTES
Medical Nutrition Therapy  Metabolic and Bariatric surgery  9 months after surgery follow up note         Pt reports:         Vitals:   Vitals:    08/05/22 1549   BP: 90/60   Site: Right Upper Arm   Position: Sitting   Cuff Size: Large Adult   Pulse: 90   Weight: 197 lb (89.4 kg)   Height: 5' 3\" (1.6 m)      Body mass index is 34.9 kg/m². Labs reviewed:     Multivitamin/mineral intake:2  Calcium intake:   2  Other:            Nutrition Assessment:   PES: Inadequate food and beverage intake r/t WLS as evidenced by loss of excess body weight 72lb. Goals   60-80gm of protein  48-64oz of fluid  Vitamin adherance  Basic adherance to WLS behavious and this document has been scanned into the chart.        [] met     []  Not met        Plan:   F/u one year         Darryn Hernandez RD, LD

## 2022-08-05 NOTE — PROGRESS NOTES
Post-op Bariatric Surgery Note    Subjective     Patient is 9 months s/p laparoscopic sleeve gastrectomy, down 72 lbs. Overall, doing well. Incisions well healed. Consistent use of MVI and calcium. Physical activity includes strength training. No current issues. Allergies: Allergies   Allergen Reactions    Iodine Hives and Itching     X-ray dye    Other Itching and Swelling     Sensitive to bug bites, mosquitos. Skin discoloration X1 month. Phenergan [Promethazine] Itching    Cat Hair Extract Itching     RUNNY NOSE, SNEEZING    Dog Epithelium Itching     RUNNY NOSE, SNEEZING. Past Medical History:     Past Medical History:   Diagnosis Date    Abnormal 1st trimester screen (Tri 21 1:117)--NIPT nml  11/30/2019    ADD (attention deficit disorder) without hyperactivity     Antiphospholipid syndrome ([facility] [address]     [place] 1/9 & 1/10 01/09/2020    Chronic back pain     COVID 09/15/2021    BODY ACHE, COUGH, FEVER, CHEST PAIN, N&V X 3 WEEKS. STILL HAS COUGH.     Dehydration 12/28/2021    Echogenic focus of heart of fetus affecting antepartum care of mother 11/30/2019    Elev early 1hr, nml 3hr  11/30/2019    1 elevated value, [facility] recommends repeat 3hr GTT in 2 weeks    Elevated blood-pressure reading without diagnosis of hypertension 11/30/2019    Elevated umbilical artery dopplers  01/10/2020    Eosinopenia (HCC)     Fetal ascites     Fetal heart rate decelerations affecting management of mother     GERD (gastroesophageal reflux disease)     Headache     migraines    Iron deficiency     follows with Dr. Manfrde Villegas (hem/onc)    IUD (intrauterine device) in place 05/07/2020    Adina LEONG    Lumbar radiculopathy 01/09/2019    Lumbar spondylosis 01/09/2019    Multigravida of advanced maternal age in third trimester     Muscle pain 1/9/2019    Obesity     SHELLEY (obstructive sleep apnea)     Cpap (was causing speech difficulties, resolved after started using cpap)  Dr. Еелна Molina located [place], last seen 2021- STATES ONLY OCCASIONAL USE SINCE WT LOSS BARIATRIC SURGERY    Personal history of other medical treatment     Axillary lympadenopathy    PONV (postoperative nausea and vomiting)     Pulmonary embolism affecting pregnancy in second trimester 2019    Rh+/RI/GBSunk 2020    Rheumatoid arteritis ([facility] [address]     Dr. Gena Salcedo located [facility] seeing 3/28/2022    Tachycardia     Umbilical cord complication     Under care of team 2021    PCP: Dr. Conchis Magana, last visit 2021, clearance given already for surgery on 2021    Under care of team 2021    Neurology: Dr. Diego Roberts, last visit 2021    Under care of team 2021    Oncology: Dr. Lonnie Phillips, [facility], last visit 3/2021, received clearance for surgery for 2021    was seeing for nodules in armpit. Pt. sees yearly . Last seen 2022    Wears glasses     Wellness examination     last seen 2022  [place] examination     Dr. Alyssia Cisneros, PCP   .     Past Surgical History:  Past Surgical History:   Procedure Laterality Date     SECTION Bilateral 2020     SECTION performed by Corine Islas DO at [place] [facility] [facility]    CHOLECYSTECTOMY  2022    ROBOTIC LAPAROSCOPIC CHOLECYSTECTOMY,    CHOLECYSTECTOMY, LAPAROSCOPIC N/A 2022    XI ROBOTIC LAPAROSCOPIC CHOLECYSTECTOMY, POSSIBLE OPEN performed by Rosario De Jesus DO at [address] [place] [address]  2020    Mirena    SLEEVE GASTRECTOMY N/A 2021    XI ROBOTIC LAPAROSCOPIC GASTRECTOMY SLEEVE WITH EGD performed by Rosario De Jesus DO at [address] N/A 2021    EGD BIOPSY OF GASTRIC performed by Rosario De Jesus DO at [facility]  2020    US LYMPH NODE BIOPSY 2020 [facility]    WISDOM TOOTH EXTRACTION         Family History:  Family History   Problem Relation Age of Onset    Elevated Lipids Mother     High Blood Pressure Mother        Social History:  Social History     Socioeconomic History    Marital status:      Spouse name: Not on file    Number of children: Not on file    Years of education: Not on file    Highest education level: Not on file   Occupational History    Not on file   Tobacco Use    Smoking status: Never    Smokeless tobacco: Never   Vaping Use    Vaping Use: Never used   Substance and Sexual Activity    Alcohol use: No    Drug use: Not Currently     Frequency: 2.0 times per week     Types: Marijuana [place] Misa)     Comment: last use 3/2022    Sexual activity: Yes     Partners: Male   Other Topics Concern    Not on file   Social History Narrative    Not on file     Social Determinants of Health     Financial Resource Strain: Medium Risk    Difficulty of Paying Living Expenses: Somewhat hard   Food Insecurity: No Food Insecurity    Worried About Running Out of Food in the Last Year: Never true    Ran Out of Food in the Last Year: Never true   Transportation Needs: No Transportation Needs    Lack of Transportation (Medical): No    Lack of Transportation (Non-Medical):  No   Physical Activity: Not on file   Stress: Not on file   Social Connections: Not on file   Intimate Partner Violence: Not on file   Housing Stability: Not on file       Current Medications:  Current Outpatient Medications   Medication Sig Dispense Refill    FOLIC ACID PO Take by mouth      predniSONE (DELTASONE) 2.5 MG tablet take 2 tablets by mouth every morning and 1 tablet by mouth every evening      methotrexate (RHEUMATREX) 2.5 MG chemo tablet Takes on Saturday      Ferrous Sulfate (IRON PO) Take by mouth QOD      Calcium Carbonate (CALCIUM 500 PO) Take by mouth      Multiple Vitamin (MULTIVITAMIN ADULT PO) Take by mouth      vitamin D (ERGOCALCIFEROL) 1.25 MG (95235 UT) CAPS capsule Take 1 capsule by mouth once a week for 8 doses 8 capsule 0     Current Facility-Administered Medications   Medication Dose Route Frequency Provider Last Rate Last Admin    levonorgestrel (MIRENA) IUD 52 mg 1 each  1 each IntraUTERine Once Carloz Harp, APRN - CNP   1 each at 05/07/20 1459       Vital Signs:  BP 90/60 (Site: Right Upper Arm, Position: Sitting, Cuff Size: Large Adult)   Pulse 90   Ht 5' 3\" (1.6 m)   Wt 197 lb (89.4 kg)   BMI 34.90 kg/m²     BMI/Height/Weight:  Body mass index is 34.9 kg/m². Review of Systems - A review of systems was performed. All was negative unless otherwise documented in HPI. Constitutional: Negative for fever, chills and diaphoresis. HENT: Negative for hearing loss and trouble swallowing. Eyes: Negative for photophobia and visual disturbance. Respiratory: Negative for cough, shortness of breath and wheezing. Cardiovascular: Negative for chest pain and palpitations. Gastrointestinal: Negative for vomiting, diarrhea, constipation, blood in stool and abdominal distention. Positive for nausea and abdominal pain. Endocrine: Negative for polydipsia, polyphagia and polyuria. Genitourinary: Negative for dysuria, frequency, hematuria and difficulty urinating. Musculoskeletal: Negative for myalgias, joint swelling. Skin: Negative for pallor and rash. Neurological: Negative for dizziness, tremors, light-headedness and headaches. Psychiatric/Behavioral: Negative for sleep disturbance and dysphoric mood. Objective:      Physical Exam   Vital signs reviewed. General: Well-developed and well-nourished. No acute distress. Skin: Warm, dry and intact. HEENT: Normocephalic. EOMs intact. Conjunctivae normal. Neck supple. Cardiovascular: Normal rate, regular rhythm. Pulmonary/Chest: Normal effort. Lungs clear to auscultation. No rales, rhonchi or wheezing. Abdominal: Positive bowel sounds. Soft, nontender. Nondistended. No rigidity, rebound, or guarding. Musculoskeletal: Movement x4. No edema. Neurological: Gait normal. Alert and oriented to person, place, and time.    Psychiatric: Normal mood and affect. Speech and behavior normal. Judgment and thought content normal. Cognition and memory intact. Assessment:       Diagnosis Orders   1. GERD without esophagitis  CBC with Auto Differential    Comprehensive Metabolic Panel    Ferritin    Hemoglobin A1C    Iron and TIBC    Lipid Panel    Magnesium    PTH, Intact    TSH    Vitamin A    Vitamin B1    Vitamin B12 & Folate    Vitamin D 25 Hydroxy    Zinc      2. Obesity (BMI 30-39. 9)  CBC with Auto Differential    Comprehensive Metabolic Panel    Ferritin    Hemoglobin A1C    Iron and TIBC    Lipid Panel    Magnesium    PTH, Intact    TSH    Vitamin A    Vitamin B1    Vitamin B12 & Folate    Vitamin D 25 Hydroxy    Zinc      3. Rheumatoid arthritis with rheumatoid factor, unspecified (HCC)  CBC with Auto Differential    Comprehensive Metabolic Panel    Ferritin    Hemoglobin A1C    Iron and TIBC    Lipid Panel    Magnesium    PTH, Intact    TSH    Vitamin A    Vitamin B1    Vitamin B12 & Folate    Vitamin D 25 Hydroxy    Zinc      4. S/P laparoscopic sleeve gastrectomy  CBC with Auto Differential    Comprehensive Metabolic Panel    Ferritin    Hemoglobin A1C    Iron and TIBC    Lipid Panel    Magnesium    PTH, Intact    TSH    Vitamin A    Vitamin B1    Vitamin B12 & Folate    Vitamin D 25 Hydroxy    Zinc      5. Lumbar radiculopathy  CBC with Auto Differential    Comprehensive Metabolic Panel    Ferritin    Hemoglobin A1C    Iron and TIBC    Lipid Panel    Magnesium    PTH, Intact    TSH    Vitamin A    Vitamin B1    Vitamin B12 & Folate    Vitamin D 25 Hydroxy    Zinc      6. Hidradenitis  CBC with Auto Differential    Comprehensive Metabolic Panel    Ferritin    Hemoglobin A1C    Iron and TIBC    Lipid Panel    Magnesium    PTH, Intact    TSH    Vitamin A    Vitamin B1    Vitamin B12 & Folate    Vitamin D 25 Hydroxy    Zinc      7.  Prediabetes  CBC with Auto Differential    Comprehensive Metabolic Panel    Ferritin    Hemoglobin A1C    Iron and TIBC    Lipid Panel    Magnesium    PTH, Intact    TSH    Vitamin A    Vitamin B1    Vitamin B12 & Folate    Vitamin D 25 Hydroxy    Zinc          Plan:    Dietitian visit today. Patient to continue to increase protein to obtain 60-80g/day, at least 48-64oz of fluid daily, and gradually increase exercise regimen. Bariatric post-op labs ordered. Follow-up  Return in about 3 months (around 11/5/2022). Orders this encounter:  Orders Placed This Encounter   Procedures    CBC with Auto Differential     Standing Status:   Future     Standing Expiration Date:   8/5/2023    Comprehensive Metabolic Panel     Standing Status:   Future     Standing Expiration Date:   8/5/2023    Ferritin     Standing Status:   Future     Standing Expiration Date:   8/5/2023    Hemoglobin A1C     Standing Status:   Future     Standing Expiration Date:   8/5/2023    Iron and TIBC     Standing Status:   Future     Standing Expiration Date:   8/5/2023     Order Specific Question:   Is Patient Fasting? Answer:   yes     Order Specific Question:   No of Hours?      Answer:   12    Lipid Panel     Standing Status:   Future     Standing Expiration Date:   8/5/2023     Order Specific Question:   Is Patient Fasting?/# of Hours     Answer:   12    Magnesium     Standing Status:   Future     Standing Expiration Date:   8/5/2023    PTH, Intact     Standing Status:   Future     Standing Expiration Date:   8/5/2023    TSH     Standing Status:   Future     Standing Expiration Date:   8/5/2023    Vitamin A     Standing Status:   Future     Standing Expiration Date:   8/5/2023    Vitamin B1     Standing Status:   Future     Standing Expiration Date:   8/5/2023    Vitamin B12 & Folate     Standing Status:   Future     Standing Expiration Date:   8/5/2023    Vitamin D 25 Hydroxy     Standing Status:   Future     Standing Expiration Date:   8/5/2023    Zinc     Standing Status:   Future     Standing Expiration Date:   8/5/2023 Prescriptions this encounter:  No orders of the defined types were placed in this encounter.       Electronically signed by:  William Mccoy CNP

## 2022-08-13 DIAGNOSIS — E53.8 LOW FOLATE: ICD-10-CM

## 2022-08-13 DIAGNOSIS — E55.9 VITAMIN D DEFICIENCY: ICD-10-CM

## 2022-08-15 RX ORDER — ERGOCALCIFEROL 1.25 MG/1
CAPSULE ORAL
Qty: 8 CAPSULE | Refills: 0 | OUTPATIENT
Start: 2022-08-15

## 2022-08-15 RX ORDER — FOLIC ACID 1 MG/1
TABLET ORAL
Qty: 30 TABLET | Refills: 0 | OUTPATIENT
Start: 2022-08-15

## 2022-09-09 ENCOUNTER — TELEMEDICINE (OUTPATIENT)
Dept: BEHAVIORAL/MENTAL HEALTH CLINIC | Age: 42
End: 2022-09-09
Payer: MEDICARE

## 2022-09-09 DIAGNOSIS — F41.9 ANXIETY: Primary | ICD-10-CM

## 2022-09-09 PROCEDURE — 90832 PSYTX W PT 30 MINUTES: CPT | Performed by: SOCIAL WORKER

## 2022-09-09 PROCEDURE — 1036F TOBACCO NON-USER: CPT | Performed by: SOCIAL WORKER

## 2022-09-09 NOTE — PROGRESS NOTES
ADULT BEHAVIORAL HEALTH FOLLOW UP  EDUARDO Gardner  Licensed Independent          Visit Date: 9/9/2022   Time of appointment: 12:02 PM    Time spent with Patient: 18 minutes. This is patient's second appointment. Patient Location: [facility]in[facility] Location: [address] [place] [address]  This visit was conducted via interactive/real time audio and video. Reason for Consult:  Anxiety     PCP:  Crescencio Mcdonald MD      Pt provided informed consent for the behavioral health program. Discussed with patient model of service to include the limits of confidentiality (i.e. abuse reporting, suicide intervention, etc.) and short-term intervention focused approach. Pt indicated understanding. Pt provided verbal consent to engage in tele-health psychotherapy. This visit was completed using My Chart due to patient preference at time of scheduling. Isabel Flynn is a 38 y.o. female who presents for follow up of anxiety. Hernan Willis presented to session with report of decreased anxious symptoms, expressing she is feeling much better since previous visit. 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 She has been staying busy with work and home life, she expressed she feels less anxious about managing everything. She expressed now that she has had time to get a schedule together for her son's services she is feeling she can better cope with changes she is going through with him as well. She denied anxious symptoms interrupting functioning. Previous Recommendations: Flower was recommended to begin psychotherapy to address anxiety/learn tools to manage anxious symptoms. MENTAL STATUS EXAM  Mood was within normal limits with calm affect. Suicidal ideation was denied. Homicidal ideation was denied. Hygiene was good . Dress was appropriate.    Behavior was Within Normal Limits with No observation or self-report of difficulties ambulating. Attitude was Engageable. Eye-contact was good. Speech: rate - WNL, rhythm - WNL, volume - WNL. Verbalizations were goal directed. Thought processes were intact and goal-oriented without evidence of delusions, hallucinations, obsessions, or robin; with no cognitive distortions. Associations were characterized by intact cognitive processes. Pt was oriented to person, place, time, and general circumstances;  recent:  good. Insight and judgment were estimated to be good, AEB, a fair  understanding of cyclical maladaptive patterns, and the ability to use insight to inform behavior change. ASSESSMENT  Brittney Jolley presented to the appointment today for evaluation and treatment of symptoms of anxiety. She is currently deemed no risk to herself or others and meets criteria for Anxiety. Elizabeth Prim was in agreement with recommendations to discontinue psychotherapy at this time and re-engage if needed in the future. Wilberts anxious symptoms are stable at this time and well managed. Elizabeth Cha is discontinuing psychotherapy, with successful discharge. PHQ Scores 6/23/2022 6/17/2021 3/11/2020 6/19/2018 6/6/2017   PHQ2 Score 0 0 0 0 0   PHQ9 Score 0 0 0 0 0     Interpretation of Total Score Depression Severity: 1-4 = Minimal depression, 5-9 = Mild depression, 10-14 = Moderate depression, 15-19 = Moderately severe depression, 20-27 = Severe depression    How often pt has had thoughts of death or hurting self (if PHQ positive for depression):       No flowsheet data found. Interpretation of CHAUNCEY-7 score: 5-9 = mild anxiety, 10-14 = moderate anxiety, 15+ = severe anxiety. Recommend referral to behavioral health for scores 10 or greater. DIAGNOSIS  Flower was seen today for anxiety.     Diagnoses and all orders for this visit:    Anxiety      INTERVENTION  Discussed various factors related to the development and maintenance of  anxiety (including biological, cognitive, behavioral, and environmental factors), Trained in strategies for increasing balanced thinking, Supportive techniques, Emphasized self-care as important for managing overall health, and Problem-solving re: to-do lists, tutoring services for additional help with school, scheduling time/utilize planner, priority setting for tasks, self-care interventions, social support & relaxation techniques, breathing exercises, benefits of healthy diet/nutrition and exercise. Sam Lai is successfully discharging from short-term/solution-focused program at this time. Yuriy Robins may re-engage in psychotherapy in the future if symptoms worsen or change. [facility] sent Anxiety Workbook that discusses helpful tips/tools as discussed in session for ongoing management of anxiety. INTERACTIVE COMPLEXITY  Is interactive complexity present?   No  Reason:  N/A  Additional Supporting Information:  N/A       Electronically signed by Randi Acevedo on 9/18/2022 at 8:45 PM

## 2022-09-16 DIAGNOSIS — E55.9 VITAMIN D DEFICIENCY: ICD-10-CM

## 2022-09-16 RX ORDER — ERGOCALCIFEROL 1.25 MG/1
CAPSULE ORAL
Qty: 8 CAPSULE | Refills: 0 | OUTPATIENT
Start: 2022-09-16

## 2022-10-05 ENCOUNTER — HOSPITAL ENCOUNTER (OUTPATIENT)
Age: 42
Discharge: HOME | End: 2022-10-05
Payer: MEDICARE

## 2022-10-05 DIAGNOSIS — Z98.84 S/P LAPAROSCOPIC SLEEVE GASTRECTOMY: ICD-10-CM

## 2022-10-05 DIAGNOSIS — M05.9 RHEUMATOID ARTHRITIS WITH RHEUMATOID FACTOR, UNSPECIFIED (HCC): ICD-10-CM

## 2022-10-05 DIAGNOSIS — E66.9 OBESITY (BMI 30-39.9): ICD-10-CM

## 2022-10-05 DIAGNOSIS — R73.03 PREDIABETES: ICD-10-CM

## 2022-10-05 DIAGNOSIS — K21.9 GERD WITHOUT ESOPHAGITIS: ICD-10-CM

## 2022-10-05 DIAGNOSIS — M54.16 LUMBAR RADICULOPATHY: ICD-10-CM

## 2022-10-05 DIAGNOSIS — L73.2 HIDRADENITIS: ICD-10-CM

## 2022-10-05 LAB
ABSOLUTE EOS #: 0.71 K/UL (ref 0–0.44)
ABSOLUTE IMMATURE GRANULOCYTE: 0.03 K/UL (ref 0–0.3)
ABSOLUTE LYMPH #: 2.56 K/UL (ref 1.1–3.7)
ABSOLUTE MONO #: 0.32 K/UL (ref 0.1–1.2)
ALBUMIN SERPL-MCNC: 3.3 G/DL (ref 3.5–5.2)
ALBUMIN/GLOBULIN RATIO: 1.1 (ref 1–2.5)
ALP BLD-CCNC: 68 U/L (ref 35–104)
ALT SERPL-CCNC: 6 U/L (ref 5–33)
ANION GAP SERPL CALCULATED.3IONS-SCNC: 10 MMOL/L (ref 9–17)
AST SERPL-CCNC: 10 U/L
BASOPHILS # BLD: 0 % (ref 0–2)
BASOPHILS ABSOLUTE: <0.03 K/UL (ref 0–0.2)
BILIRUB SERPL-MCNC: 0.5 MG/DL (ref 0.3–1.2)
BUN BLDV-MCNC: 9 MG/DL (ref 6–20)
CALCIUM SERPL-MCNC: 8.6 MG/DL (ref 8.6–10.4)
CHLORIDE BLD-SCNC: 107 MMOL/L (ref 98–107)
CHOLESTEROL/HDL RATIO: 4.8
CHOLESTEROL: 157 MG/DL
CO2: 24 MMOL/L (ref 20–31)
CREAT SERPL-MCNC: 0.54 MG/DL (ref 0.5–0.9)
EOSINOPHILS RELATIVE PERCENT: 8 % (ref 1–4)
FERRITIN: 118 NG/ML (ref 13–150)
FOLATE: 9 NG/ML
GFR SERPL CREATININE-BSD FRML MDRD: >60 ML/MIN/1.73M2
GLUCOSE BLD-MCNC: 79 MG/DL (ref 70–99)
HCT VFR BLD CALC: 43.3 % (ref 36.3–47.1)
HDLC SERPL-MCNC: 33 MG/DL
HEMOGLOBIN: 13.3 G/DL (ref 11.9–15.1)
IMMATURE GRANULOCYTES: 0 %
IRON SATURATION: 33 % (ref 20–55)
IRON: 72 UG/DL (ref 37–145)
LDL CHOLESTEROL: 102 MG/DL (ref 0–130)
LYMPHOCYTES # BLD: 28 % (ref 24–43)
MAGNESIUM: 2 MG/DL (ref 1.6–2.6)
MCH RBC QN AUTO: 28.9 PG (ref 25.2–33.5)
MCHC RBC AUTO-ENTMCNC: 30.7 G/DL (ref 28.4–34.8)
MCV RBC AUTO: 93.9 FL (ref 82.6–102.9)
MONOCYTES # BLD: 4 % (ref 3–12)
NRBC AUTOMATED: 0 PER 100 WBC
PDW BLD-RTO: 13.3 % (ref 11.8–14.4)
PLATELET # BLD: 365 K/UL (ref 138–453)
PMV BLD AUTO: 10.2 FL (ref 8.1–13.5)
POTASSIUM SERPL-SCNC: 3.8 MMOL/L (ref 3.7–5.3)
PTH INTACT: 39.8 PG/ML (ref 14–72)
RBC # BLD: 4.61 M/UL (ref 3.95–5.11)
SEG NEUTROPHILS: 60 % (ref 36–65)
SEGMENTED NEUTROPHILS ABSOLUTE COUNT: 5.43 K/UL (ref 1.5–8.1)
SODIUM BLD-SCNC: 141 MMOL/L (ref 135–144)
TOTAL IRON BINDING CAPACITY: 218 UG/DL (ref 250–450)
TOTAL PROTEIN: 6.4 G/DL (ref 6.4–8.3)
TRIGL SERPL-MCNC: 109 MG/DL
TSH SERPL DL<=0.05 MIU/L-ACNC: 0.87 UIU/ML (ref 0.3–5)
UNSATURATED IRON BINDING CAPACITY: 146 UG/DL (ref 112–347)
VITAMIN B-12: 473 PG/ML (ref 232–1245)
VITAMIN D 25-HYDROXY: 27.8 NG/ML
WBC # BLD: 9.1 K/UL (ref 3.5–11.3)

## 2022-10-05 PROCEDURE — 82306 VITAMIN D 25 HYDROXY: CPT

## 2022-10-05 PROCEDURE — 83036 HEMOGLOBIN GLYCOSYLATED A1C: CPT

## 2022-10-05 PROCEDURE — 84443 ASSAY THYROID STIM HORMONE: CPT

## 2022-10-05 PROCEDURE — 82746 ASSAY OF FOLIC ACID SERUM: CPT

## 2022-10-05 PROCEDURE — 84630 ASSAY OF ZINC: CPT

## 2022-10-05 PROCEDURE — 83540 ASSAY OF IRON: CPT

## 2022-10-05 PROCEDURE — 82607 VITAMIN B-12: CPT

## 2022-10-05 PROCEDURE — 84425 ASSAY OF VITAMIN B-1: CPT

## 2022-10-05 PROCEDURE — 80053 COMPREHEN METABOLIC PANEL: CPT

## 2022-10-05 PROCEDURE — 85025 COMPLETE CBC W/AUTO DIFF WBC: CPT

## 2022-10-05 PROCEDURE — 36415 COLL VENOUS BLD VENIPUNCTURE: CPT

## 2022-10-05 PROCEDURE — 83970 ASSAY OF PARATHORMONE: CPT

## 2022-10-05 PROCEDURE — 82728 ASSAY OF FERRITIN: CPT

## 2022-10-05 PROCEDURE — 83550 IRON BINDING TEST: CPT

## 2022-10-05 PROCEDURE — 83735 ASSAY OF MAGNESIUM: CPT

## 2022-10-05 PROCEDURE — 80061 LIPID PANEL: CPT

## 2022-10-05 PROCEDURE — 84590 ASSAY OF VITAMIN A: CPT

## 2022-10-06 ENCOUNTER — TELEPHONE (OUTPATIENT)
Dept: BARIATRICS/WEIGHT MGMT | Age: 42
End: 2022-10-06

## 2022-10-06 LAB
ESTIMATED AVERAGE GLUCOSE: 91 MG/DL
HBA1C MFR BLD: 4.8 % (ref 4–6)

## 2022-10-06 NOTE — TELEPHONE ENCOUNTER
Next Visit Date:  11/4/2022    Patient Surgery Date  11/23/21    Type of  Surgery  sleeve    Patient calls complaining of  headaches, dry mouth , not urinating a lot. Onset: 2 week(s) ago  Timing: constant, intermittent  Severity: Mild    Progression: stable    Associated Symptoms: no fever, no chills, off and on abdominal pain - Constipated x 2 days. Any allergy  To medications     iodine, phenegran    Current  Pharmacy   [facility] on [address]    Patient advised:   If  Symptoms worsen seek treatment at  Emergency Room. You will receive a call back from the office within 24-48 hours.     Is it ok to leave a message if we call back yes

## 2022-10-07 LAB — ZINC: 76 UG/DL (ref 60–120)

## 2022-10-07 NOTE — TELEPHONE ENCOUNTER
Alcides Gages from infusion center called back  Monday 10/10 22 at 11:00 am (3C)  Orders faxed. Called patient and she states that would be fine with her. She is pushing fluids and if would feel worse will go to the ER.

## 2022-10-08 DIAGNOSIS — E53.8 LOW FOLATE: ICD-10-CM

## 2022-10-08 LAB
RETINYL PALMITATE: <0.02 MG/L (ref 0–0.1)
VITAMIN A LEVEL: 0.43 MG/L (ref 0.3–1.2)
VITAMIN A, INTERP: NORMAL

## 2022-10-10 RX ORDER — FOLIC ACID 1 MG/1
TABLET ORAL
Qty: 30 TABLET | Refills: 0 | OUTPATIENT
Start: 2022-10-10

## 2022-10-11 ENCOUNTER — HOSPITAL ENCOUNTER (OUTPATIENT)
Dept: ONCOLOGY | Age: 42
Discharge: HOME | End: 2022-10-11
Attending: SURGERY | Admitting: SURGERY
Payer: MEDICARE

## 2022-10-11 VITALS
TEMPERATURE: 98.6 F | HEART RATE: 88 BPM | SYSTOLIC BLOOD PRESSURE: 86 MMHG | RESPIRATION RATE: 16 BRPM | OXYGEN SATURATION: 100 % | DIASTOLIC BLOOD PRESSURE: 58 MMHG

## 2022-10-11 DIAGNOSIS — E55.9 VITAMIN D DEFICIENCY: ICD-10-CM

## 2022-10-11 PROBLEM — E86.0 DEHYDRATION: Status: ACTIVE | Noted: 2022-10-11

## 2022-10-11 PROCEDURE — 2580000003 HC RX 258: Performed by: SURGERY

## 2022-10-11 PROCEDURE — 96365 THER/PROPH/DIAG IV INF INIT: CPT

## 2022-10-11 PROCEDURE — 94761 N-INVAS EAR/PLS OXIMETRY MLT: CPT

## 2022-10-11 PROCEDURE — 96366 THER/PROPH/DIAG IV INF ADDON: CPT

## 2022-10-11 RX ORDER — SODIUM CHLORIDE 9 MG/ML
INJECTION, SOLUTION INTRAVENOUS CONTINUOUS
Status: DISCONTINUED | OUTPATIENT
Start: 2022-10-11 | End: 2022-10-12 | Stop reason: HOSPADM

## 2022-10-11 RX ORDER — SODIUM CHLORIDE 9 MG/ML
INJECTION, SOLUTION INTRAVENOUS ONCE
Status: COMPLETED | OUTPATIENT
Start: 2022-10-11 | End: 2022-10-11

## 2022-10-11 RX ORDER — ERGOCALCIFEROL 1.25 MG/1
50000 CAPSULE ORAL WEEKLY
Qty: 8 CAPSULE | Refills: 0 | Status: SHIPPED | OUTPATIENT
Start: 2022-10-11 | End: 2022-11-30

## 2022-10-11 RX ADMIN — SODIUM CHLORIDE: 9 INJECTION, SOLUTION INTRAVENOUS at 17:56

## 2022-10-11 RX ADMIN — SODIUM CHLORIDE: 9 INJECTION, SOLUTION INTRAVENOUS at 19:04

## 2022-10-11 NOTE — PLAN OF CARE
[id number] Patient arrived for infusion. Patient requests IV-US team to place IV. Faxed dinner order to dietary. Called to confirm dinner order was received. IV hydration started at 1800. Patient tolerating well. Report given to [facility].

## 2022-10-13 LAB — VITAMIN B1 WHOLE BLOOD: 98 NMOL/L (ref 70–180)

## 2022-11-04 ENCOUNTER — OFFICE VISIT (OUTPATIENT)
Dept: BARIATRICS/WEIGHT MGMT | Age: 42
End: 2022-11-04
Payer: MEDICARE

## 2022-11-04 VITALS
WEIGHT: 180 LBS | HEIGHT: 63 IN | SYSTOLIC BLOOD PRESSURE: 90 MMHG | BODY MASS INDEX: 31.89 KG/M2 | DIASTOLIC BLOOD PRESSURE: 60 MMHG | HEART RATE: 90 BPM

## 2022-11-04 DIAGNOSIS — Z98.84 S/P LAPAROSCOPIC SLEEVE GASTRECTOMY: ICD-10-CM

## 2022-11-04 DIAGNOSIS — K21.9 GERD WITHOUT ESOPHAGITIS: Primary | ICD-10-CM

## 2022-11-04 DIAGNOSIS — E66.9 OBESITY (BMI 30-39.9): ICD-10-CM

## 2022-11-04 DIAGNOSIS — M05.9 RHEUMATOID ARTHRITIS WITH RHEUMATOID FACTOR, UNSPECIFIED (HCC): ICD-10-CM

## 2022-11-04 DIAGNOSIS — R73.03 PREDIABETES: ICD-10-CM

## 2022-11-04 DIAGNOSIS — E55.9 VITAMIN D DEFICIENCY: ICD-10-CM

## 2022-11-04 DIAGNOSIS — M54.16 LUMBAR RADICULOPATHY: ICD-10-CM

## 2022-11-04 PROCEDURE — G8417 CALC BMI ABV UP PARAM F/U: HCPCS | Performed by: NURSE PRACTITIONER

## 2022-11-04 PROCEDURE — 99213 OFFICE O/P EST LOW 20 MIN: CPT | Performed by: NURSE PRACTITIONER

## 2022-11-04 PROCEDURE — G8427 DOCREV CUR MEDS BY ELIG CLIN: HCPCS | Performed by: NURSE PRACTITIONER

## 2022-11-04 PROCEDURE — G8484 FLU IMMUNIZE NO ADMIN: HCPCS | Performed by: NURSE PRACTITIONER

## 2022-11-04 PROCEDURE — 1036F TOBACCO NON-USER: CPT | Performed by: NURSE PRACTITIONER

## 2022-11-04 RX ORDER — MINERAL OIL, LIGHT/MINERAL OIL 1%-4.5%
DROPS OPHTHALMIC (EYE)
COMMUNITY
Start: 2022-10-07

## 2022-11-04 RX ORDER — AZELASTINE HYDROCHLORIDE 0.5 MG/ML
SOLUTION/ DROPS OPHTHALMIC
COMMUNITY
Start: 2022-10-07

## 2022-11-04 RX ORDER — PREDNISOLONE ACETATE 10 MG/ML
SUSPENSION/ DROPS OPHTHALMIC
COMMUNITY
Start: 2022-10-07

## 2022-11-04 RX ORDER — OLOPATADINE HYDROCHLORIDE 1 MG/ML
SOLUTION/ DROPS OPHTHALMIC
COMMUNITY
Start: 2022-08-13

## 2022-11-04 RX ORDER — CETIRIZINE HYDROCHLORIDE 10 MG/1
1 TABLET ORAL PRN
COMMUNITY

## 2022-11-04 NOTE — PROGRESS NOTES
Post-op Bariatric Surgery Note    Subjective     Patient is 1 year s/p laparoscopic sleeve gastrectomy, down 89 lbs. Overall, doing well. Incisions well healed. Consistent use of MVI and calcium. Physical activity includes strength training. No current issues. Allergies: Allergies   Allergen Reactions    Iodine Hives and Itching     X-ray dye    Other Itching and Swelling     Sensitive to bug bites, mosquitos. Skin discoloration X1 month. Phenergan [Promethazine] Itching    Cat Hair Extract Itching     RUNNY NOSE, SNEEZING    Dog Epithelium Itching     RUNNY NOSE, SNEEZING. Past Medical History:     Past Medical History:   Diagnosis Date    Abnormal 1st trimester screen (Tri 21 1:117)--NIPT nml  11/30/2019    ADD (attention deficit disorder) without hyperactivity     Antiphospholipid syndrome ([facility] [address]     [place] 1/9 & 1/10 01/09/2020    Chronic back pain     COVID 09/15/2021    BODY ACHE, COUGH, FEVER, CHEST PAIN, N&V X 3 WEEKS. STILL HAS COUGH.     Dehydration 12/28/2021    Echogenic focus of heart of fetus affecting antepartum care of mother 11/30/2019    Elev early 1hr, nml 3hr  11/30/2019    1 elevated value, [facility] recommends repeat 3hr GTT in 2 weeks    Elevated blood-pressure reading without diagnosis of hypertension 11/30/2019    Elevated umbilical artery dopplers  01/10/2020    Eosinopenia (HCC)     Fetal ascites     Fetal heart rate decelerations affecting management of mother     GERD (gastroesophageal reflux disease)     Headache     migraines    Iron deficiency     follows with Dr. Teri Wayne (hem/onc)    IUD (intrauterine device) in place 05/07/2020    Adina LEONG    Lumbar radiculopathy 01/09/2019    Lumbar spondylosis 01/09/2019    Multigravida of advanced maternal age in third trimester     Muscle pain 1/9/2019    Obesity     SHELLEY (obstructive sleep apnea)     Cpap (was causing speech difficulties, resolved after started using cpap)  Dr. Rehana Arthur located [place], last seen 1/2021- STATES ONLY OCCASIONAL USE SINCE WT LOSS BARIATRIC SURGERY    Personal history of other medical treatment     Axillary lympadenopathy    PONV (postoperative nausea and vomiting)     Pulmonary embolism affecting pregnancy in second trimester 2019    Rh+/RI/GBSunk 2020    Rheumatoid arteritis ([facility] [address]     Dr. Gunjan Brink located [facility] seeing 3/28/2022    Tachycardia     Umbilical cord complication     Under care of team 2021    PCP: Dr. Pasha Retana, last visit 2021, clearance given already for surgery on 2021    Under care of team 2021    Neurology: Dr. Lala Bullard, last visit 2021    Under care of team 2021    Oncology: Dr. Mansfield Boas, [place], last visit 3/2021, received clearance for surgery for 2021    was seeing for nodules in armpit. Pt. sees yearly . Last seen 2022    Wears glasses     Wellness examination     last seen 2022  Alberto Parnell examination     Dr. Saint Som, PCP   .     Past Surgical History:  Past Surgical History:   Procedure Laterality Date     SECTION Bilateral 2020     SECTION performed by Manny Webb DO at [facility] L[facility]D OR    CHOLECYSTECTOMY  2022    ROBOTIC LAPAROSCOPIC CHOLECYSTECTOMY,    CHOLECYSTECTOMY, LAPAROSCOPIC N/A 2022    XI ROBOTIC LAPAROSCOPIC CHOLECYSTECTOMY, POSSIBLE OPEN performed by Maria D Butts DO at [address] [place] [address]  2020    Mirena    SLEEVE GASTRECTOMY N/A 2021    XI ROBOTIC LAPAROSCOPIC GASTRECTOMY SLEEVE WITH EGD performed by Maria D Butts DO at [address] [place] [address] N/A 2021    EGD BIOPSY OF GASTRIC performed by Maria D Butts DO at [facility]  2020    US LYMPH NODE BIOPSY 2020 [facility] ULTRASOUND    WISDOM TOOTH EXTRACTION         Family History:  Family History   Problem Relation Age of Onset    Elevated Lipids Mother     High Blood Pressure Mother        Social History:  Social History     Socioeconomic History    Marital status:      Spouse name: Not on file    Number of children: Not on file    Years of education: Not on file    Highest education level: Not on file   Occupational History    Not on file   Tobacco Use    Smoking status: Never    Smokeless tobacco: Never   Vaping Use    Vaping Use: Never used   Substance and Sexual Activity    Alcohol use: No    Drug use: Not Currently     Frequency: 2.0 times per week     Types: Marijuana Gerardo Shoemaker)     Comment: last use 3/2022    Sexual activity: Yes     Partners: Male   Other Topics Concern    Not on file   Social History Narrative    Not on file     Social Determinants of Health     Financial Resource Strain: Medium Risk    Difficulty of Paying Living Expenses: Somewhat hard   Food Insecurity: No Food Insecurity    Worried About Running Out of Food in the Last Year: Never true    Ran Out of Food in the Last Year: Never true   Transportation Needs: No Transportation Needs    Lack of Transportation (Medical): No    Lack of Transportation (Non-Medical):  No   Physical Activity: Not on file   Stress: Not on file   Social Connections: Not on file   Intimate Partner Violence: Not on file   Housing Stability: Not on file       Current Medications:  Current Outpatient Medications   Medication Sig Dispense Refill    olopatadine (PATANOL) 0.1 % ophthalmic solution instill 1-2 drops into both eyes once daily      prednisoLONE acetate (PRED FORTE) 1 % ophthalmic suspension       azelastine (OPTIVAR) 0.05 % ophthalmic solution       Artificial Tear Solution (SOOTHE XP XTRA PROTECTION) SOLN       cetirizine (ZYRTEC) 10 MG tablet Take 1 tablet by mouth as needed      vitamin D (ERGOCALCIFEROL) 1.25 MG (86447 UT) CAPS capsule Take 1 capsule by mouth once a week for 8 doses 8 capsule 0    FOLIC ACID PO Take by mouth      predniSONE (DELTASONE) 2.5 MG tablet take 2 tablets by mouth every morning and 1 tablet by mouth every evening      methotrexate (RHEUMATREX) 2.5 MG chemo tablet Takes on Saturday      Ferrous Sulfate (IRON PO) Take by mouth QOD      Calcium Carbonate (CALCIUM 500 PO) Take by mouth      Multiple Vitamin (MULTIVITAMIN ADULT PO) Take by mouth       Current Facility-Administered Medications   Medication Dose Route Frequency Provider Last Rate Last Admin    levonorgestrel (MIRENA) IUD 52 mg 1 each  1 each IntraUTERine Once Julianna Vallejo, APRN - CNP   1 each at 05/07/20 1459       Vital Signs:  BP 90/60 (Site: Right Upper Arm, Position: Sitting, Cuff Size: Medium Adult)   Pulse 90   Ht 5' 3\" (1.6 m)   Wt 180 lb (81.6 kg)   BMI 31.89 kg/m²     BMI/Height/Weight:  Body mass index is 31.89 kg/m². Review of Systems - A review of systems was performed. All was negative unless otherwise documented in HPI. Constitutional: Negative for fever, chills and diaphoresis. HENT: Negative for hearing loss and trouble swallowing. Eyes: Negative for photophobia and visual disturbance. Respiratory: Negative for cough, shortness of breath and wheezing. Cardiovascular: Negative for chest pain and palpitations. Gastrointestinal: Negative for vomiting, diarrhea, constipation, blood in stool and abdominal distention. Positive for nausea and abdominal pain. Endocrine: Negative for polydipsia, polyphagia and polyuria. Genitourinary: Negative for dysuria, frequency, hematuria and difficulty urinating. Musculoskeletal: Negative for myalgias, joint swelling. Skin: Negative for pallor and rash. Neurological: Negative for dizziness, tremors, light-headedness and headaches. Psychiatric/Behavioral: Negative for sleep disturbance and dysphoric mood. Objective:      Physical Exam   Vital signs reviewed. General: Well-developed and well-nourished. No acute distress. Skin: Warm, dry and intact. HEENT: Normocephalic. EOMs intact. Conjunctivae normal. Neck supple.    Cardiovascular: Normal rate, regular rhythm. Pulmonary/Chest: Normal effort. Lungs clear to auscultation. No rales, rhonchi or wheezing. Abdominal: Positive bowel sounds. Soft, nontender. Nondistended. No rigidity, rebound, or guarding. Musculoskeletal: Movement x4. No edema. Neurological: Gait normal. Alert and oriented to person, place, and time. Psychiatric: Normal mood and affect. Speech and behavior normal. Judgment and thought content normal. Cognition and memory intact. Assessment:       Diagnosis Orders   1. GERD without esophagitis  CBC with Auto Differential    Comprehensive Metabolic Panel    Ferritin    Hemoglobin A1C    Lipid Panel    Magnesium    Iron and TIBC    PTH, Intact    TSH    Vitamin A    Vitamin B1    Vitamin B12 & Folate    Vitamin D 25 Hydroxy    Zinc      2. Obesity (BMI 30-39. 9)  CBC with Auto Differential    Comprehensive Metabolic Panel    Ferritin    Hemoglobin A1C    Lipid Panel    Magnesium    Iron and TIBC    PTH, Intact    TSH    Vitamin A    Vitamin B1    Vitamin B12 & Folate    Vitamin D 25 Hydroxy    Zinc      3. Prediabetes  CBC with Auto Differential    Comprehensive Metabolic Panel    Ferritin    Hemoglobin A1C    Lipid Panel    Magnesium    Iron and TIBC    PTH, Intact    TSH    Vitamin A    Vitamin B1    Vitamin B12 & Folate    Vitamin D 25 Hydroxy    Zinc      4. Rheumatoid arthritis with rheumatoid factor, unspecified (HCC)  CBC with Auto Differential    Comprehensive Metabolic Panel    Ferritin    Hemoglobin A1C    Lipid Panel    Magnesium    Iron and TIBC    PTH, Intact    TSH    Vitamin A    Vitamin B1    Vitamin B12 & Folate    Vitamin D 25 Hydroxy    Zinc      5. Lumbar radiculopathy  CBC with Auto Differential    Comprehensive Metabolic Panel    Ferritin    Hemoglobin A1C    Lipid Panel    Magnesium    Iron and TIBC    PTH, Intact    TSH    Vitamin A    Vitamin B1    Vitamin B12 & Folate    Vitamin D 25 Hydroxy    Zinc      6.  S/P laparoscopic sleeve gastrectomy  CBC with Auto Differential    Comprehensive Metabolic Panel    Ferritin    Hemoglobin A1C    Lipid Panel    Magnesium    Iron and TIBC    PTH, Intact    TSH    Vitamin A    Vitamin B1    Vitamin B12 & Folate    Vitamin D 25 Hydroxy    Zinc      7. Vitamin D deficiency  CBC with Auto Differential    Comprehensive Metabolic Panel    Ferritin    Hemoglobin A1C    Lipid Panel    Magnesium    Iron and TIBC    PTH, Intact    TSH    Vitamin A    Vitamin B1    Vitamin B12 & Folate    Vitamin D 25 Hydroxy    Zinc          Plan:    No dietitian visit today. Patient to continue to increase protein to obtain 60-80g/day, at least 48-64oz of fluid daily, and gradually increase exercise regimen. Labs from 10/5/22 reviewed and discussed with patient. Vitamin D level low and already prescribed. Bariatric post-op labs ordered for next visit. Follow-up  Return in about 6 months (around 5/4/2023). Orders this encounter:  Orders Placed This Encounter   Procedures    CBC with Auto Differential     Standing Status:   Future     Standing Expiration Date:   11/4/2023    Comprehensive Metabolic Panel     Standing Status:   Future     Standing Expiration Date:   11/4/2023    Ferritin     Standing Status:   Future     Standing Expiration Date:   11/4/2023    Hemoglobin A1C     Standing Status:   Future     Standing Expiration Date:   11/4/2023    Lipid Panel     Standing Status:   Future     Standing Expiration Date:   11/4/2023     Order Specific Question:   Is Patient Fasting?/# of Hours     Answer:   12    Magnesium     Standing Status:   Future     Standing Expiration Date:   11/4/2023    Iron and TIBC     Standing Status:   Future     Standing Expiration Date:   11/4/2023     Order Specific Question:   Is Patient Fasting? Answer:   no     Order Specific Question:   No of Hours?      Answer:   0    PTH, Intact     Standing Status:   Future     Standing Expiration Date:   11/4/2023    TSH     Standing Status:   Future     Standing Expiration Date:   11/4/2023    Vitamin A     Standing Status:   Future     Standing Expiration Date:   11/4/2023    Vitamin B1     Standing Status:   Future     Standing Expiration Date:   11/4/2023    Vitamin B12 & Folate     Standing Status:   Future     Standing Expiration Date:   11/4/2023    Vitamin D 25 Hydroxy     Standing Status:   Future     Standing Expiration Date:   11/4/2023    Zinc     Standing Status:   Future     Standing Expiration Date:   11/4/2023       Prescriptions this encounter:  No orders of the defined types were placed in this encounter.       Electronically signed by:  Scarlet Dandy, CNP

## 2022-11-10 PROBLEM — E86.0 DEHYDRATION: Status: RESOLVED | Noted: 2022-10-11 | Resolved: 2022-11-10

## 2022-11-17 ENCOUNTER — OFFICE VISIT (OUTPATIENT)
Dept: NEUROLOGY | Age: 42
End: 2022-11-17
Payer: MEDICARE

## 2022-11-17 VITALS
WEIGHT: 179 LBS | HEART RATE: 88 BPM | SYSTOLIC BLOOD PRESSURE: 96 MMHG | HEIGHT: 63 IN | BODY MASS INDEX: 31.71 KG/M2 | DIASTOLIC BLOOD PRESSURE: 68 MMHG

## 2022-11-17 DIAGNOSIS — R51.9 HEADACHE DISORDER: Primary | ICD-10-CM

## 2022-11-17 PROCEDURE — G8484 FLU IMMUNIZE NO ADMIN: HCPCS | Performed by: PSYCHIATRY & NEUROLOGY

## 2022-11-17 PROCEDURE — 99214 OFFICE O/P EST MOD 30 MIN: CPT | Performed by: PSYCHIATRY & NEUROLOGY

## 2022-11-17 PROCEDURE — 1036F TOBACCO NON-USER: CPT | Performed by: PSYCHIATRY & NEUROLOGY

## 2022-11-17 PROCEDURE — G8417 CALC BMI ABV UP PARAM F/U: HCPCS | Performed by: PSYCHIATRY & NEUROLOGY

## 2022-11-17 PROCEDURE — G8427 DOCREV CUR MEDS BY ELIG CLIN: HCPCS | Performed by: PSYCHIATRY & NEUROLOGY

## 2022-11-28 ENCOUNTER — HOSPITAL ENCOUNTER (OUTPATIENT)
Dept: NEUROLOGY | Age: 42
Discharge: HOME | End: 2022-11-28
Payer: MEDICARE

## 2022-11-28 DIAGNOSIS — R51.9 HEADACHE DISORDER: ICD-10-CM

## 2022-11-28 PROCEDURE — 95813 EEG EXTND MNTR 61-119 MIN: CPT

## 2022-11-29 NOTE — PROGRESS NOTES
[facility], [address], [address] [facility]  Ph: [phone] or [phone]  FAX: [phone]    Chief Complaint: migraines, forgetfulness, episodes of slurred speech     Dear Khalif Malloy MD     I had the pleasure of seeing your patient Addis Johnson in neurologic evaluation. As you would recall Addis Johnson is a 38 y.o. female. The patient is here with three distinct neurological issues. She describes episodes during normal speech where she will become confused and slur her speech. These episodes will last a few seconds. This symptom began happened daily. She is unsure if she loses track of time or stares during these episodes. She denies numbness or tingling in her extremities except when she is laying prone. She also reports difficulty with her memory for many years. She states that she will forget things told to her and needs to write things down. She was having daily headaches. She wouldn't feel rested after sleeping. She denies SHELLEY. She recently gave birth andaffirms depression and anxiety- more anxiety than depression. Today, the patient reports that's she experienced an ER visit on 06/20/2021 due to shortness of breath. She reports experiencing headaches more than 15 days a month. Patient admits to using a peppermint oil ointment around her ears to relief headaches. Patient admits to phonophobia and photophobia. No nausea or emesis associated with headaches. She admits to blurred vision which may be due to allergies. The patient reports that her CPAP machine has improved sleep and cognition. The patient is satisfied with her current mediation regiment. EEG on 10/23/2020: This EEG is within normal limits for an awake, drowsy and sleepy patient. No lateralized or epileptiform disturbance is seen. MRI brain 3/27/2020 Normal MRI brain. No acute intracranial abnormality.     REVIEW OF SYSTEMS   Constitutional Weight: absent, Appetite: absent, Fatigue: present   HEENT Visual disturbance: absent, Ears: normal   Respiratory Shortness of breath: absent, Cough: absent   Cardiovascular Chest pain: absent, Leg swelling :present   GI Constipation: absent, Diarrhea: present, Swallowing change: absent    Urinary frequency: present, Urinary urgency: present,    Musculoskeletal Neck pain: absent, Back pain: present, Stiffness: present, Muscle pain: present, Joint pain: present   Dermatological Hair loss: present, Skin changes: absent   Neurological Memory loss: present, Confusion: absent, Seizures: absent, Trouble walking or imbalance: present, Dizziness: present, Weakness: present, Numbness: present Tremor: absent, Spasm: present, Speech difficulty: present, Headache: present, Light sensitivity: present   Psychiatric Anxiety: present, Hallucination: absent, Depression, present   Hematologic Abnormal bleeding/Bruising: absent, Anemia: absent       Neurological work up:  CT head  CTA head and neck  MRI brain 3/27/2020 Normal MRI brain. No acute intracranial abnormality. 2 D echo  EEG 8/17/2020 This EEG does show mild intermittent right parietotemporal slowing and sharp wave formation, which may be concordant with underlying possible structural process. Sharp wave formation may bepotentially epileptiform. Clinical correlation is warranted     Past Medical History:   Diagnosis Date    Abnormal 1st trimester screen (Tri 21 1:117)--NIPT nml  11/30/2019    ADD (attention deficit disorder) without hyperactivity     Antiphospholipid syndrome ([facility] [address]     [place] 1/9 & 1/10 01/09/2020    Chronic back pain     COVID 09/15/2021    BODY ACHE, COUGH, FEVER, CHEST PAIN, N&V X 3 WEEKS. STILL HAS COUGH.     Dehydration 12/28/2021    Echogenic focus of heart of fetus affecting antepartum care of mother 11/30/2019    Elev early 1hr, nml 3hr  11/30/2019    1 elevated value, [facility] recommends repeat 3hr GTT in 2 weeks    Elevated blood-pressure reading without diagnosis of hypertension 2019    Elevated umbilical artery dopplers  01/10/2020    Eosinopenia (HCC)     Fetal ascites     Fetal heart rate decelerations affecting management of mother     GERD (gastroesophageal reflux disease)     Headache     migraines    Iron deficiency     follows with Dr. Rema Fry (hem/onc)    IUD (intrauterine device) in place 2020    Adina Avilaen DANG    Lumbar radiculopathy 2019    Lumbar spondylosis 2019    Multigravida of advanced maternal age in third trimester     Muscle pain 2019    Obesity     SHELLEY (obstructive sleep apnea)     Cpap (was causing speech difficulties, resolved after started using cpap)  Dr. Severo Lindsay located [facility], last seen 2021- [address]    Personal history of other medical treatment     Axillary lympadenopathy    PONV (postoperative nausea and vomiting)     Pulmonary embolism affecting pregnancy in second trimester 2019    Rh+/RI/GBSunk 2020    Rheumatoid arteritis ([address]     Dr. Blaine Galarza located  [facility] seeing 3/28/2022    Tachycardia     Umbilical cord complication     Under care of team 2021    PCP: Dr. Victor Manuel Aguila, last visit 2021, clearance given already for surgery on 2021    Under care of team 2021    Neurology: Dr. Nadiya Noyola, last visit 2021    Under care of team 2021    Oncology: Dr. Rema Fry, [facility], last visit 3/2021, received clearance for surgery for 2021    was seeing for nodules in armpit. Pt. sees yearly .  Last seen 2022    Wears glasses     Wellness examination     last seen 2022  Joaquin Chahal examination     Dr. Marlon Pinto, PCP     Past Surgical History:   Procedure Laterality Date     SECTION Bilateral 2020     SECTION performed by Jes Richards DO at [place] [facility] [facility]    CHOLECYSTECTOMY  2022    ROBOTIC LAPAROSCOPIC CHOLECYSTECTOMY,    CHOLECYSTECTOMY, LAPAROSCOPIC N/A 2022    XI ROBOTIC LAPAROSCOPIC CHOLECYSTECTOMY, POSSIBLE OPEN performed by Stephanie Coon DO at [facility]  05/07/2020    Mirena    SLEEVE GASTRECTOMY N/A 11/23/2021    XI ROBOTIC LAPAROSCOPIC GASTRECTOMY SLEEVE WITH EGD performed by Stephanie Coon DO at [facility] N/A 01/22/2021    EGD BIOPSY OF GASTRIC performed by Stephanie Coon DO at [facility]  08/19/2020    US LYMPH NODE BIOPSY 8/19/2020 [facility]TRAS[facility]    WISDOM TOOTH EXTRACTION       Allergies   Allergen Reactions    Iodine Hives and Itching     X-ray dye    Other Itching and Swelling     Sensitive to bug bites, mosquitos. Skin discoloration X1 month. Phenergan [Promethazine] Itching    Cat Hair Extract Itching     RUNNY NOSE, SNEEZING    Dog Epithelium Itching     RUNNY NOSE, SNEEZING.      Family History   Problem Relation Age of Onset    Elevated Lipids Mother     High Blood Pressure Mother       Social History     Socioeconomic History    Marital status:      Spouse name: Not on file    Number of children: Not on file    Years of education: Not on file    Highest education level: Not on file   Occupational History    Not on file   Tobacco Use    Smoking status: Never    Smokeless tobacco: Never   Vaping Use    Vaping Use: Never used   Substance and Sexual Activity    Alcohol use: No    Drug use: Not Currently     Frequency: 2.0 times per week     Types: Marijuana [place])     Comment: last use 3/2022    Sexual activity: Yes     Partners: Male   Other Topics Concern    Not on file   Social History Narrative    Not on file     Social Determinants of Health     Financial Resource Strain: Medium Risk    Difficulty of Paying Living Expenses: Somewhat hard   Food Insecurity: No Food Insecurity    Worried About Running Out of Food in the Last Year: Never true    Ran Out of Food in the Last Year: Never true   Transportation Needs: No Transportation Needs Lack of Transportation (Medical): No    Lack of Transportation (Non-Medical): No   Physical Activity: Not on file   Stress: Not on file   Social Connections: Not on file   Intimate Partner Violence: Not on file   Housing Stability: Not on file      BP 96/68 (Site: Left Upper Arm, Position: Sitting, Cuff Size: Medium Adult)   Pulse 88   Ht 5' 3\" (1.6 m)   Wt 179 lb (81.2 kg)   BMI 31.71 kg/m²      HEENT [] Hearing Loss  [x] Visual Disturbance  [] Tinnitus  [] Eye pain   Respiratory [] Shortness of Breath  [] Cough  [] Snoring   Cardiovascular [] Chest Pain  [] Palpitations  [] Lightheaded   GI [] Constipation  [] Diarrhea  [] Swallowing change  [] Nausea/vomiting    [] Urinary Frequency  [] Urinary Urgency   Musculoskeletal [] Neck pain  [] Back pain  [] Muscle pain  [] Restless legs   Dermatologic [] Skin changes   Neurologic [x] Memory loss/confusion  [] Seizures  [] Trouble walking or imbalance  [] Dizziness  [x] Sleep disturbance  [] Weakness  [] Numbness  [] Tremors  [] Speech Difficulty  [x] Headaches  [x] Light Sensitivity  [x] Sound Sensitivity   Endocrinology []Excessive thirst  []Excessive hunger   Psychiatric [] Anxiety/Depression  [] Hallucination   Allergy/immunology []Hives/environmental allergies   Hematologic/lymph [] Abnormal bleeding  [] Abnormal bruising       Physical examination:  General appearance: Normal. Well groomed.  In no acute distress    Head: Normocephalic, atraumatic  Eyes: Extraocular movements intact, eye lids normal  Lungs: Respirations unlabored, chest wall no deformity  ENT: Normal external ear canals, no sinus tenderness  Heart: Regular rate rhythm  Abdomen: No masses, tenderness  Extremities: No cyanosis or edema, 2+ pulses  Musculoskeletal: Normal range of motion in all joints  Skin: Intact, normal skin color    Neurological examination:    Mental status   Alert and oriented; intact memory with no confusion, speech or language problems; no hallucinations or delusions Cranial nerves   II - visual fields intact to confrontation                                                III, IV, VI - extra-ocular muscles full: no pupillary defect; no SHEILA, no nystagmus, no ptosis   V - normal facial sensation                                                               VII - normal facial symmetry                                                             VIII - intact hearing                                                                             IX, X - symmetrical palate                                                                  XI - symmetrical shoulder shrug                                                       XII - midline tongue without atrophy or fasciculation     Motor function  Normal muscle bulk and tone; normal power 5/5, including fine motor movements     Sensory function Intact to touch, pin, vibration, proprioception     Cerebellar Intact fine motor movement. No involuntary movements or tremors     Reflex function Intact 2+ DTR and symmetric. Negative Babinski     Gait                  Normal station and gait       Lab Results   Component Value Date    LDLCHOLESTEROL 102 10/05/2022     No components found for: CHLPL  Lab Results   Component Value Date    TRIG 109 10/05/2022    TRIG 133 05/17/2022    TRIG 142 01/03/2022     Lab Results   Component Value Date    HDL 33 (L) 10/05/2022    HDL 34 (L) 05/17/2022    HDL 28 (L) 01/03/2022     No results found for: LDLCALC  No results found for: LABVLDL  Lab Results   Component Value Date    LABA1C 4.8 10/05/2022     Lab Results   Component Value Date    EAG 91 10/05/2022     Lab Results   Component Value Date    BDQAJBTT60 473 10/05/2022        Maximum score 5 Score 5 What is the year, season, date, day, month   Maximum score 5 Score 5 Where are we: State, county, town, hospital, floor? Maximum score 3 Score 3-0 Name 3 objects: ball, pen, car. Ask patient to repeat 3 objects.    Maximum score 5 Score 5-2 Serial sevens from 100:93, 86, 79, 72, 65   Maximum score 3 Score 3-0 Recall 3 objects   Maximum score 2 Score 2-0 Name a pencil and watch. Maximum score 1 Score 1 Repeat the following: No ifs ands or buts.      Maximum score 3 Score 3 Follow 3 stage command take a paper in your hand, fold it in half, and put it on the floor. Maximum score 1  Score 1 Read and obey the following: Close your eyes     Maximum score 1 Score 1 Write a sentence. Maximum score 1 Score 1 Copy a design below. Max 30   Patients score 30    All of patient's labs were personally reviewed. All the imaging studies were personally reviewed and discussed with the patient. Assessment Recommendations:  Episodes of speech dysarthria with confusion, etiology unclear, now resolved  Subjective cognitive impairment  Chronic migraine WO Aura, intractable, WO Status Migrainosus    On today's evaluation, the patient reports improvement in her mental status. Her MMSE today is normal.  The patient reports improved stressors in her life which in turn has caused improvement in her symptoms. Her neurological work-up including MRI scan of the brain and lab work-up has been unremarkable. Will get EEG brain to rule out subclinical seizures     She will return in 1 year or sooner if the patient's symptoms worsen. Frank Thomas MD I would like to thank you for the consult. Please do not hesitate if you have any questions about the patient care.        Electronically Signed: Lee Castillo 11/29/2022 6:05 PM    Diplomate, American Board of Psychiatry and Neurology  Diplomate, American Board of Clinical Neurophysiology  Diplomate, American Board of Epilepsy

## 2022-11-30 ENCOUNTER — CLINICAL DOCUMENTATION (OUTPATIENT)
Dept: NEUROLOGY | Age: 42
End: 2022-11-30

## 2022-11-30 ENCOUNTER — PROCEDURE VISIT (OUTPATIENT)
Dept: NEUROLOGY | Age: 42
End: 2022-11-30

## 2022-11-30 DIAGNOSIS — R46.89 SPELL OF ABNORMAL BEHAVIOR: Primary | ICD-10-CM

## 2022-11-30 NOTE — PROGRESS NOTES
EEG REPORT       Patient: Jayme Adams Age: 38 y.o. MRN: [id number]    Date: 11/30/2022  Referring Provider: No ref. provider found    History: This routine 1 hour scalp EEG was recorded with video- monitoring for a 38 y.o. Les Pimple female  who presented with episodic speech difficulty. This EEG was performed to evaluate for focal and epileptiform abnormalities. Jayme Adams   Current Outpatient Medications   Medication Sig Dispense Refill    olopatadine (PATANOL) 0.1 % ophthalmic solution instill 1-2 drops into both eyes once daily      prednisoLONE acetate (PRED FORTE) 1 % ophthalmic suspension       azelastine (OPTIVAR) 0.05 % ophthalmic solution       Artificial Tear Solution (SOOTHE XP XTRA PROTECTION) SOLN       cetirizine (ZYRTEC) 10 MG tablet Take 1 tablet by mouth as needed      vitamin D (ERGOCALCIFEROL) 1.25 MG (96852 UT) CAPS capsule Take 1 capsule by mouth once a week for 8 doses 8 capsule 0    FOLIC ACID PO Take by mouth      predniSONE (DELTASONE) 2.5 MG tablet take 2 tablets by mouth every morning and 1 tablet by mouth every evening      methotrexate (RHEUMATREX) 2.5 MG chemo tablet Takes on Saturday      Ferrous Sulfate (IRON PO) Take by mouth QOD      Calcium Carbonate (CALCIUM 500 PO) Take by mouth      Multiple Vitamin (MULTIVITAMIN ADULT PO) Take by mouth       Current Facility-Administered Medications   Medication Dose Route Frequency Provider Last Rate Last Admin    levonorgestrel (MIRENA) IUD 52 mg 1 each  1 each IntraUTERine Once MIKHAIL Baker CNP   1 each at 05/07/20 6186        Technical Description: This is a 18 channel digital EEG recording with time-locked video. Electrodes were placed in accordance with the 10-20 International System of Electrode Placement. Single lead EKG monitoring was included    EEG was obtained with the patient in awake and asleep state. Photic stimulation hyperventilation was performed. This was a 1 hour EEG recording.   EEG Description: The dominant background activity during maximal recorded wakefulness consisted of bioccipitally dominant 9-10 Hz, 25-35 uV symmetric, regular activity that was reactive to eye opening. During drowsiness, the background rhythm waxed and waned and there were periods of slowing. During stage II sleep symmetric theta activity was observed. 1 or 2 episodes of sleep spindles were noted. There were symmetric. There was appropriate diffuse delta activity during slow wave sleep. Photic stimulation - stepwise photic stimulation at 2-30 Hz was performed and there was a biposterior, symmetric, driving response. Hyperventilation -demonstrated mild buildup and theta activity normal recovery. No abnormalities were activated by photic stimulation     The EKG channel demonstrated a normal sinus rhythm. Interpretation  This EEG was normal in wakefulness and sleep. Clinical correlation  This EEG was normal. No focal or epileptiform abnormalities were seen.     Gagandeep Nielson MD  Diplomate, American Board of Psychiatry and Neurology

## 2022-12-02 DIAGNOSIS — R76.0 ANTICARDIOLIPIN ANTIBODY POSITIVE: Primary | ICD-10-CM

## 2022-12-03 ENCOUNTER — HOSPITAL ENCOUNTER (OUTPATIENT)
Age: 42
Discharge: HOME | End: 2022-12-03
Payer: MEDICARE

## 2022-12-03 DIAGNOSIS — E55.9 VITAMIN D DEFICIENCY: ICD-10-CM

## 2022-12-03 DIAGNOSIS — R76.0 ANTICARDIOLIPIN ANTIBODY POSITIVE: ICD-10-CM

## 2022-12-03 LAB
ABSOLUTE EOS #: 1.27 K/UL (ref 0–0.44)
ABSOLUTE IMMATURE GRANULOCYTE: 0.04 K/UL (ref 0–0.3)
ABSOLUTE LYMPH #: 1.85 K/UL (ref 1.1–3.7)
ABSOLUTE MONO #: 0.27 K/UL (ref 0.1–1.2)
ANION GAP SERPL CALCULATED.3IONS-SCNC: 11 MMOL/L (ref 9–17)
BASOPHILS # BLD: 0 % (ref 0–2)
BASOPHILS ABSOLUTE: <0.03 K/UL (ref 0–0.2)
BUN BLDV-MCNC: 10 MG/DL (ref 6–20)
CALCIUM SERPL-MCNC: 8.5 MG/DL (ref 8.6–10.4)
CHLORIDE BLD-SCNC: 104 MMOL/L (ref 98–107)
CO2: 20 MMOL/L (ref 20–31)
CREAT SERPL-MCNC: 0.55 MG/DL (ref 0.5–0.9)
EOSINOPHILS RELATIVE PERCENT: 14 % (ref 1–4)
FERRITIN: 140 NG/ML (ref 13–150)
FOLATE: 12.5 NG/ML
GFR SERPL CREATININE-BSD FRML MDRD: >60 ML/MIN/1.73M2
GLUCOSE BLD-MCNC: 75 MG/DL (ref 70–99)
HCT VFR BLD CALC: 40.4 % (ref 36.3–47.1)
HEMOGLOBIN: 12.7 G/DL (ref 11.9–15.1)
IMMATURE GRANULOCYTES: 0 %
IRON SATURATION: 31 % (ref 20–55)
IRON: 75 UG/DL (ref 37–145)
LYMPHOCYTES # BLD: 20 % (ref 24–43)
MCH RBC QN AUTO: 27.9 PG (ref 25.2–33.5)
MCHC RBC AUTO-ENTMCNC: 31.4 G/DL (ref 28.4–34.8)
MCV RBC AUTO: 88.8 FL (ref 82.6–102.9)
MONOCYTES # BLD: 3 % (ref 3–12)
PDW BLD-RTO: 13.1 % (ref 11.8–14.4)
PLATELET # BLD: 289 K/UL (ref 138–453)
PMV BLD AUTO: 9.1 FL (ref 8.1–13.5)
POTASSIUM SERPL-SCNC: 3.7 MMOL/L (ref 3.7–5.3)
RBC # BLD: 4.55 M/UL (ref 3.95–5.11)
SEG NEUTROPHILS: 62 % (ref 36–65)
SEGMENTED NEUTROPHILS ABSOLUTE COUNT: 5.69 K/UL (ref 1.5–8.1)
SODIUM BLD-SCNC: 135 MMOL/L (ref 135–144)
TOTAL IRON BINDING CAPACITY: 239 UG/DL (ref 250–450)
UNSATURATED IRON BINDING CAPACITY: 164 UG/DL (ref 112–347)
VITAMIN B-12: 428 PG/ML (ref 232–1245)
WBC # BLD: 9.1 K/UL (ref 3.5–11.3)

## 2022-12-03 PROCEDURE — 82746 ASSAY OF FOLIC ACID SERUM: CPT

## 2022-12-03 PROCEDURE — 83540 ASSAY OF IRON: CPT

## 2022-12-03 PROCEDURE — 82607 VITAMIN B-12: CPT

## 2022-12-03 PROCEDURE — 85025 COMPLETE CBC W/AUTO DIFF WBC: CPT

## 2022-12-03 PROCEDURE — 36415 COLL VENOUS BLD VENIPUNCTURE: CPT

## 2022-12-03 PROCEDURE — 82728 ASSAY OF FERRITIN: CPT

## 2022-12-03 PROCEDURE — 80048 BASIC METABOLIC PNL TOTAL CA: CPT

## 2022-12-03 PROCEDURE — 83550 IRON BINDING TEST: CPT

## 2022-12-05 ENCOUNTER — TELEPHONE (OUTPATIENT)
Dept: ONCOLOGY | Age: 42
End: 2022-12-05

## 2022-12-05 ENCOUNTER — OFFICE VISIT (OUTPATIENT)
Dept: ONCOLOGY | Age: 42
End: 2022-12-05
Payer: MEDICARE

## 2022-12-05 VITALS
DIASTOLIC BLOOD PRESSURE: 64 MMHG | HEART RATE: 88 BPM | BODY MASS INDEX: 31.58 KG/M2 | RESPIRATION RATE: 16 BRPM | SYSTOLIC BLOOD PRESSURE: 98 MMHG | WEIGHT: 178.3 LBS

## 2022-12-05 DIAGNOSIS — R76.0 ANTICARDIOLIPIN ANTIBODY POSITIVE: ICD-10-CM

## 2022-12-05 DIAGNOSIS — O88.212 PULMONARY EMBOLISM AFFECTING PREGNANCY IN SECOND TRIMESTER: ICD-10-CM

## 2022-12-05 DIAGNOSIS — D70.9 EOSINOPENIA (HCC): Primary | ICD-10-CM

## 2022-12-05 DIAGNOSIS — R59.1 LYMPHADENOPATHY: ICD-10-CM

## 2022-12-05 PROCEDURE — G8427 DOCREV CUR MEDS BY ELIG CLIN: HCPCS | Performed by: INTERNAL MEDICINE

## 2022-12-05 PROCEDURE — G8484 FLU IMMUNIZE NO ADMIN: HCPCS | Performed by: INTERNAL MEDICINE

## 2022-12-05 PROCEDURE — 1036F TOBACCO NON-USER: CPT | Performed by: INTERNAL MEDICINE

## 2022-12-05 PROCEDURE — G8417 CALC BMI ABV UP PARAM F/U: HCPCS | Performed by: INTERNAL MEDICINE

## 2022-12-05 PROCEDURE — 99214 OFFICE O/P EST MOD 30 MIN: CPT | Performed by: INTERNAL MEDICINE

## 2022-12-05 PROCEDURE — 99211 OFF/OP EST MAY X REQ PHY/QHP: CPT | Performed by: INTERNAL MEDICINE

## 2022-12-05 RX ORDER — ERGOCALCIFEROL 1.25 MG/1
CAPSULE ORAL
Qty: 8 CAPSULE | Refills: 0 | Status: SHIPPED | OUTPATIENT
Start: 2022-12-05

## 2022-12-05 NOTE — PROGRESS NOTES
Madhu Grayson                                                                                                                  12/5/2022  MRN:   [id number]  YOB: 1980  PCP:                           Anne Teresa MD  Referring Physician: No ref. 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 o.female is a patient with chief complaint of shortness of breath. Patient is 26-week pregnant. Presented with pelvic pain urinary frequency and dyspnea for about 2 days. Patient underwent CT chest which showed nonobstructive small left lower lobe pulmonary embolism. Patient denies any previous history of blood clots. Patient has had thrombophilia work-up done due to multiple pregnancy losses in the past which has not been clinically significant. Patient has been started on Lovenox which she is tolerating well. Patient has BMI of 51. Initial testing showed anticardiolipin IgG antibody to be 26.9 and in 12/2019 dropped to 3.7      Interim History:     Patient presents to the clinic for a follow-up visit with anticoagulation for anticardiolipin antibody. She is off all anticoagulation and denies any recurrent thrombosis. She had recent EEG as part of work up for headaches. She had 1 year follow up with gastric bypass surgeon, she has lost significant weight as hoped. She is following with rheumatology for RA, she is not taking Methotrexate as it has not been effective and has elected against Myra Chung, she is using prednisone to manage.  Her swollen lymph nodes have resolved. During this visit patient's allergy, social, medical, surgical history and medications were reviewed and updated. Past Medical History:   Past Medical History:   Diagnosis Date    Abnormal 1st trimester screen (Tri 21 1:117)--NIPT nml  11/30/2019    ADD (attention deficit disorder) without hyperactivity     Antiphospholipid syndrome ([facility] [address]     Celestone 1/9 & 1/10 01/09/2020    Chronic back pain     COVID 09/15/2021    BODY ACHE, COUGH, FEVER, CHEST PAIN, N&V X 3 WEEKS. STILL HAS COUGH.     Dehydration 12/28/2021    Echogenic focus of heart of fetus affecting antepartum care of mother 11/30/2019    Elev early 1hr, nml 3hr  11/30/2019    1 elevated value, [facility] recommends repeat 3hr GTT in 2 weeks    Elevated blood-pressure reading without diagnosis of hypertension 11/30/2019    Elevated umbilical artery dopplers  01/10/2020    Eosinopenia (HCC)     Fetal ascites     Fetal heart rate decelerations affecting management of mother     GERD (gastroesophageal reflux disease)     Headache     migraines    Iron deficiency     follows with Dr. Shen Loss (hem/onc)    IUD (intrauterine device) in place 05/07/2020    Adina LEONG    Lumbar radiculopathy 01/09/2019    Lumbar spondylosis 01/09/2019    Multigravida of advanced maternal age in third trimester     Muscle pain 1/9/2019    Obesity     SHELLEY (obstructive sleep apnea)     Cpap (was causing speech difficulties, resolved after started using cpap)  Dr. Nathanael Hewitt located [place], last seen 1/2021- [address]    Personal history of other medical treatment     Axillary lympadenopathy    PONV (postoperative nausea and vomiting)     Pulmonary embolism affecting pregnancy in second trimester 11/30/2019    Rh+/RI/GBSunk 01/08/2020    Rheumatoid arteritis ([facility] [address]     Dr. Rsoa Gusman located [facility] seeing 3/28/2022    Tachycardia     Umbilical cord complication     Under care of team 09/07/2021    PCP: Dr. Quan Ramos, last visit 2021, clearance given already for surgery on 2021    Under care of team 2021    Neurology: Dr. Rahul Serrano, last visit 2021    Under care of team 2021    Oncology: Dr. Angela Starr, [facility], last visit 3/2021, received clearance for surgery for 2021    was seeing for nodules in armpit. Pt. sees yearly .  Last seen 2022    Wears glasses     Wellness examination     last seen 2022  [facility]    Wellness examination     Dr. Antonia Dewey, PCP       Past Surgical History:     Past Surgical History:   Procedure Laterality Date     SECTION Bilateral 2020     [facility]    CHOLECYSTECTOMY  2022    ROBOTIC LAPAROSCOPIC CHOLECYSTECTOMY,    CHOLECYSTECTOMY, LAPAROSCOPIC N/A 2022    XI ROBOTIC LAPAROSCOPIC CHOLECYSTECTOMY, POSSIBLE OPEN performed by Tree Hernandez DO at [address]  2020    Mirena    SLEEVE GASTRECTOMY N/A 2021    XI ROBOTIC LAPAROSCOPIC GASTRECTOMY SLEEVE WITH EGD performed by Tree Hernandez DO at [address] N/A 2021    EGD BIOPSY OF GASTRIC performed [facility] Tree [facility] DO [facility]  2020    US LYMPH NODE BIOPSY 2020 [facility] ULTRASOUND    WISDOM TOOTH EXTRACTION         Patient Family Social History:     Social History     Socioeconomic History    Marital status:      Spouse name: None    Number of children: None    Years of education: None    Highest education level: None   Tobacco Use    Smoking status: Never    Smokeless tobacco: Never   Vaping Use    Vaping Use: Never used   Substance and Sexual Activity    Alcohol use: No    Drug use: Not Currently     Frequency: 2.0 times per week     Types: Marijuana [place] Mildred)     Comment: last use 3/2022    Sexual activity: Yes     Partners: Male     Social Determinants of Health     Financial Resource Strain: Medium Risk    Difficulty of Paying Living Expenses: Somewhat hard   Food Insecurity: No Food Insecurity    Worried About Running Out of Food in the Last Year: Never true    Ran Out of Food in the Last Year: Never true   Transportation Needs: No Transportation Needs    Lack of Transportation (Medical): No    Lack of Transportation (Non-Medical): No       Family History   Problem Relation Age of Onset    Elevated Lipids Mother     High Blood Pressure Mother        Current Medications:     Current Outpatient Medications   Medication Sig Dispense Refill    vitamin D (ERGOCALCIFEROL) 1.25 MG (96666 UT) CAPS capsule take 1 capsule by mouth every week 8 capsule 0    olopatadine (PATANOL) 0.1 % ophthalmic solution instill 1-2 drops into both eyes once daily      prednisoLONE acetate (PRED FORTE) 1 % ophthalmic suspension       azelastine (OPTIVAR) 0.05 % ophthalmic solution       Artificial Tear Solution (SOOTHE XP XTRA PROTECTION) SOLN       cetirizine (ZYRTEC) 10 MG tablet Take 1 tablet by mouth as needed      FOLIC ACID PO Take by mouth      predniSONE (DELTASONE) 2.5 MG tablet take 2 tablets by mouth every morning and 1 tablet by mouth every evening      Ferrous Sulfate (IRON PO) Take by mouth QOD      Calcium Carbonate (CALCIUM 500 PO) Take by mouth      Multiple Vitamin (MULTIVITAMIN ADULT PO) Take by mouth      methotrexate (RHEUMATREX) 2.5 MG chemo tablet Takes on Saturday (Patient not taking: Reported on 12/5/2022)       Current Facility-Administered Medications   Medication Dose Route Frequency Provider Last Rate Last Admin    levonorgestrel (MIRENA) IUD 52 mg 1 each  1 each IntraUTERine Once Sid Overcast, APRN - CNP   1 each at 05/07/20 1208       Allergies:   Iodine, Other, Phenergan [promethazine], Cat hair extract, and Dog epithelium    Review of Systems:    Constitutional: No fever or chills. No night sweats.  + weight loss   Eyes: No eye discharge, double vision, or eye pain   HEENT: negative for sore mouth, sore throat, hoarseness and voice change   Respiratory: negative for cough , sputum, dyspnea, wheezing, hemoptysis, chest pain   Cardiovascular: negative for chest pain, dyspnea, palpitations, orthopnea, PND   Gastrointestinal: negative for nausea, vomiting, diarrhea, constipation, abdominal pain, Dysphagia, hematemesis and hematochezia   Genitourinary: negative for frequency, dysuria, nocturia, urinary incontinence, and hematuria   Integument: negative for rash, skin lesions, bruises. Hematologic/Lymphatic: negative for easy bruising, bleeding, lymphadenopathy or petechiae.    Endocrine: negative for heat or cold intolerance,weight changes, change in bowel habits and hair loss   Musculoskeletal: negative for myalgias, pain,and bone pain; +join pian and swelling with RA  Neurological: negative for headaches, dizziness, seizures, weakness, numbness        Physical Exam:    Vitals: BP 98/64   Pulse 88   Resp 16   Wt 178 lb 4.8 oz (80.9 kg)   BMI 31.58 kg/m²   General appearance - well appearing, no in pain or distress  Mental status - AAO X3  Eyes - pupils equal and reactive, extraocular eye movements intact  Mouth - mucous membranes moist, pharynx normal without lesions  Neck - supple, no significant adenopathy  Lymphatics - no hepatosplenomegaly, lymphadenopathy unchanged  Chest - clear to auscultation, no wheezes, rales or rhonchi, symmetric air entry  Heart - normal rate, regular rhythm, normal S1, S2, no murmurs  Abdomen - soft, nontender, nondistended, no masses or organomegaly  Neurological - alert, oriented, normal speech, no focal findings or movement disorder noted  Extremities - peripheral pulses normal, no pedal edema, no clubbing or cyanosis  Skin - normal coloration and turgor, no rashes, no suspicious skin lesions noted        DATA:  Lab Results   Component Value Date    WBC 9.1 12/03/2022    HGB 12.7 12/03/2022    HCT 40.4 12/03/2022    MCV 88.8 12/03/2022     12/03/2022       Chemistry        Component Value Date/Time     12/03/2022 1221    K 3.7 12/03/2022 1221     12/03/2022 1221    CO2 20 12/03/2022 1221    BUN 10 12/03/2022 1221    CREATININE 0.55 12/03/2022 1221        Component Value Date/Time    CALCIUM 8.5 (L) 12/03/2022 1221    ALKPHOS 68 10/05/2022 1208    AST 10 10/05/2022 1208    ALT 6 10/05/2022 1208    BILITOT 0.5 10/05/2022 1208          Impression:    Left lower lobe pulmonary embolism, nonobstructive, provoked by pregnancy-12/2019  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 Continues to be off anticoagulation  We had detailed review of her lab work, lymphopenia reflect steroid usage, eosinophils are elevated with allergies. Clinically she is doing well with no symptoms, no recurrent thrombosis. I recommend she have annual mammogram. We discussed her current supplements. We will continue with conservative surveillance. Return in 1 year. Scribe Attestation   This note was created by Amador Saini acting as scribe for the physician signing this note  Electronically Signed  Amador Saini, 12/5/2022  Scribe, [employer]. Attending Attestation   Note was reviewed and edited. I am in agreement with the note as entered    Vale Rodas MD      This note is created with the assistance of a speech recognition program.  While intending to generate a document that actually reflects the content of the visit, the document can still have some errors including those of syntax and sound a like substitutions which may escape proof reading.   It such instances, actual meaning can be extrapolated by contextual diversion.

## 2022-12-05 NOTE — TELEPHONE ENCOUNTER
SABRINA HERE FOR MD VISIT  LABS CDP BMP FE TIBC FERRITIN VITAMIN B12 & FOLATE ORDERS GIVEN TO PT ON EXIT TO BE DONE ON 11/27/23  MD VISIT 12/4/23 @ 11AM  AVS PRINTED W/ INSTRUCTIONS AND GIVEN TO PT ON EXIT

## 2022-12-15 ENCOUNTER — TELEPHONE (OUTPATIENT)
Dept: NEUROLOGY | Age: 42
End: 2022-12-15

## 2022-12-28 ENCOUNTER — TELEPHONE (OUTPATIENT)
Dept: FAMILY MEDICINE CLINIC | Age: 42
End: 2022-12-28

## 2022-12-28 DIAGNOSIS — E55.9 VITAMIN D DEFICIENCY: ICD-10-CM

## 2022-12-28 NOTE — TELEPHONE ENCOUNTER
----- Message from Rody Farr sent at 12/28/2022  3:40 PM EST -----  Subject: Appointment Request    Reason for Call: Established Patient Appointment needed: Flu Shot    QUESTIONS    Reason for appointment request? No appointments available during search     Additional Information for Provider? pt needs call back to set up flu shot  ---------------------------------------------------------------------------  --------------  [address]  [phone]; OK to leave message on voicemail  ---------------------------------------------------------------------------  --------------  SCRIPT ANSWERS  COVID Screen: Genny Clifton

## 2022-12-29 RX ORDER — ERGOCALCIFEROL 1.25 MG/1
CAPSULE ORAL
Qty: 8 CAPSULE | Refills: 0 | Status: SHIPPED | OUTPATIENT
Start: 2022-12-29

## 2023-01-10 ENCOUNTER — TELEMEDICINE (OUTPATIENT)
Dept: FAMILY MEDICINE CLINIC | Age: 43
End: 2023-01-10
Payer: MEDICARE

## 2023-01-10 DIAGNOSIS — N62 LARGE BREASTS: Primary | ICD-10-CM

## 2023-01-10 DIAGNOSIS — Z12.31 ENCOUNTER FOR SCREENING MAMMOGRAM FOR MALIGNANT NEOPLASM OF BREAST: ICD-10-CM

## 2023-01-10 PROCEDURE — G8484 FLU IMMUNIZE NO ADMIN: HCPCS | Performed by: STUDENT IN AN ORGANIZED HEALTH CARE EDUCATION/TRAINING PROGRAM

## 2023-01-10 PROCEDURE — 1036F TOBACCO NON-USER: CPT | Performed by: STUDENT IN AN ORGANIZED HEALTH CARE EDUCATION/TRAINING PROGRAM

## 2023-01-10 PROCEDURE — G8417 CALC BMI ABV UP PARAM F/U: HCPCS | Performed by: STUDENT IN AN ORGANIZED HEALTH CARE EDUCATION/TRAINING PROGRAM

## 2023-01-10 PROCEDURE — 99213 OFFICE O/P EST LOW 20 MIN: CPT | Performed by: STUDENT IN AN ORGANIZED HEALTH CARE EDUCATION/TRAINING PROGRAM

## 2023-01-10 PROCEDURE — G8427 DOCREV CUR MEDS BY ELIG CLIN: HCPCS | Performed by: STUDENT IN AN ORGANIZED HEALTH CARE EDUCATION/TRAINING PROGRAM

## 2023-01-10 RX ORDER — CYCLOSPORINE 0.5 MG/ML
EMULSION OPHTHALMIC
COMMUNITY
Start: 2022-12-21

## 2023-01-10 ASSESSMENT — ENCOUNTER SYMPTOMS
SORE THROAT: 0
EYE DISCHARGE: 0
NAUSEA: 0
WHEEZING: 0
ABDOMINAL PAIN: 0
VOMITING: 0
CHEST TIGHTNESS: 0
SHORTNESS OF BREATH: 0
CONSTIPATION: 0
DIARRHEA: 0
COUGH: 0
BACK PAIN: 1

## 2023-01-10 NOTE — PROGRESS NOTES
1/10/2023    TELEHEALTH EVALUATION -- Audio/Visual (During [id number] public health emergency)    HPI:    Theotis Alpers (:  1980) has requested an audio/video evaluation for the following concern(s):    She is following up on her chronic upper back and neck pain which she feels is due to her large size breast.  She says that it is getting worse over time and she would like to get surgical consult to get breast reduction surgery. She is also requesting order for mammogram for breast cancer screening. She is following with rheumatology for her rheumatoid arthritis, was taken off of methotrexate recently and put on injectable therapy. Review of Systems   Constitutional:  Negative for appetite change, fatigue and fever. HENT:  Negative for congestion, ear pain, hearing loss and sore throat. Eyes:  Negative for discharge and visual disturbance. Respiratory:  Negative for cough, chest tightness, shortness of breath and wheezing. Cardiovascular:  Negative for chest pain, palpitations and leg swelling. Gastrointestinal:  Negative for abdominal pain, constipation, diarrhea, nausea and vomiting. Genitourinary:  Negative for flank pain, frequency, hematuria and urgency. Musculoskeletal:  Positive for back pain and neck pain. Negative for arthralgias, gait problem, joint swelling and myalgias. Skin: Negative. Neurological:  Negative for dizziness, weakness, numbness and headaches. Psychiatric/Behavioral: Negative. Negative for dysphoric mood. The patient is not nervous/anxious. Prior to Visit Medications    Medication Sig Taking?  Authorizing Provider   Abatacept 125 MG/ML SOSY Inject 125 mg into the skin once a week Yes Historical Provider, MD   cycloSPORINE (RESTASIS) 0.05 % ophthalmic emulsion  Yes Historical Provider, MD   vitamin D (ERGOCALCIFEROL) 1.25 MG (96081 UT) CAPS capsule take 1 capsule by mouth every week Yes Brigida Porter, APRN - CNP   olopatadine (PATANOL) 0.1 % ophthalmic solution instill 1-2 drops into both eyes once daily Yes Historical Provider, MD   prednisoLONE acetate (PRED FORTE) 1 % ophthalmic suspension  Yes Historical Provider, MD   azelastine (OPTIVAR) 0.05 % ophthalmic solution  Yes Historical Provider, MD   Artificial Tear Solution (SOOTHE XP XTRA PROTECTION) SOLN  Yes Historical Provider, MD   cetirizine (ZYRTEC) 10 MG tablet Take 1 tablet by mouth as needed Yes Historical Provider, MD   FOLIC ACID PO Take by mouth Yes Historical Provider, MD   predniSONE (DELTASONE) 2.5 MG tablet take 2 tablets by mouth every morning and 1 tablet by mouth every evening Yes Historical Provider, MD   Ferrous Sulfate (IRON PO) Take by mouth QOD Yes Historical Provider, MD   Calcium Carbonate (CALCIUM 500 PO) Take by mouth Yes Historical Provider, MD   Multiple Vitamin (MULTIVITAMIN ADULT PO) Take by mouth Yes Historical Provider, MD       Social History     Tobacco Use    Smoking status: Never    Smokeless tobacco: Never   Vaping Use    Vaping Use: Never used   Substance Use Topics    Alcohol use: No    Drug use: Not Currently     Frequency: 2.0 times per week     Types: Marijuana [place] Cal[place]ity)     Comment: last use 3/2022        Allergies   Allergen Reactions    Iodine Hives and Itching     X-ray dye    Other Itching and Swelling     Sensitive to bug bites, mosquitos. Skin discoloration X1 month. Phenergan [Promethazine] Itching    Cat Hair Extract Itching     RUNNY NOSE, SNEEZING    Dog Epithelium Itching     RUNNY NOSE, SNEEZING.   ,   Past Medical History:   Diagnosis Date    Abnormal 1st trimester screen (Tri 21 1:117)--NIPT nml  11/30/2019    ADD (attention deficit disorder) without hyperactivity     Antiphospholipid syndrome ([facility] [address]     Celestone 1/9 & 1/10 01/09/2020    Chronic back pain     COVID 09/15/2021    BODY ACHE, COUGH, FEVER, CHEST PAIN, N&V X 3 WEEKS. STILL HAS COUGH.     Dehydration 12/28/2021    Echogenic focus of heart of fetus affecting antepartum care of mother 2019    Elev early 1hr, nml 3hr  2019    1 elevated value, [facility] recommends repeat 3hr GTT in 2 weeks    Elevated blood-pressure reading without diagnosis of hypertension 2019    Elevated umbilical artery dopplers  01/10/2020    Eosinopenia (HCC)     Fetal ascites     Fetal heart rate decelerations affecting management of mother     GERD (gastroesophageal reflux disease)     Headache     migraines    Iron deficiency     follows with Dr. Allyssa Selby (hem/onc)    IUD (intrauterine device) in place 2020    Adina Avilaen DANG    Lumbar radiculopathy 2019    Lumbar spondylosis 2019    Multigravida of advanced maternal age in third trimester     Muscle pain 2019    Obesity     SHELLEY (obstructive sleep apnea)     Cpap (was causing speech difficulties, resolved after started using cpap)  Dr. Luis Miguel Perla located [place], last seen 2021-  [address]    Personal history of other medical treatment     Axillary lympadenopathy    PONV (postoperative nausea and vomiting)     Pulmonary embolism affecting pregnancy in second trimester 2019    Rh+/RI/GBSunk 2020    Rheumatoid arteritis ([address]     Dr. Promise Soto located [facility] seeing 3/28/2022    Tachycardia     Umbilical cord complication     Under care of team 2021    PCP: Dr. Claudia Garrison, last visit 2021, clearance given already for surgery on 2021    Under care of team 2021    Neurology: Dr. Vahe Vee, last visit 2021    Under care of team 2021    Oncology: Dr. Allyssa Selby, [facility], last visit 3/2021, received clearance for surgery for 2021    was seeing for nodules in armpit. Pt. sees yearly .  Last seen 2022    Wears glasses     Wellness examination     last seen 2022  Luz Maria Allred examination     Dr. Luly Perez, PCP   ,   Past Surgical History:   Procedure Laterality Date     SECTION Bilateral 2020     SECTION performed by Vero Shipley DO at [place] [facility] OR    CHOLECYSTECTOMY  04/06/2022    ROBOTIC LAPAROSCOPIC CHOLECYSTECTOMY,    CHOLECYSTECTOMY, LAPAROSCOPIC N/A 4/6/2022    XI ROBOTIC LAPAROSCOPIC CHOLECYSTECTOMY, POSSIBLE OPEN performed by Leti Warner DO at [address]  05/07/2020    Mirena    SLEEVE GASTRECTOMY N/A 11/23/2021    XI ROBOTIC LAPAROSCOPIC GASTRECTOMY SLEEVE WITH EGD performed by Leti Warner DO at [facility] N/A 01/22/2021    EGD BIOPSY OF GASTRIC performed by Leti Warner DO at [place]  08/19/2020    US LYMPH NODE BIOPSY 8/19/2020 [facility] ULTRASOUND    WISDOM TOOTH EXTRACTION     ,   Social History     Tobacco Use    Smoking status: Never    Smokeless tobacco: Never   Vaping Use    Vaping Use: Never used   Substance Use Topics    Alcohol use: No    Drug use: Not Currently     Frequency: 2.0 times per week     Types: Marijuana Garrel Calender)     Comment: last use 3/2022   ,   Family History   Problem Relation Age of Onset    Elevated Lipids Mother     High Blood Pressure Mother    ,   Immunization History   Administered Date(s) Administered    COVID-19, [place] Vaccine, Vaccine Unspecified 05/06/2022    Hepatitis B 05/29/2017    Hepatitis B vaccine 05/29/2017    Influenza Vaccine, unspecified formulation 10/09/2015, 11/21/2016    Influenza Virus Vaccine 11/01/2016, 10/16/2018, 10/17/2019    Influenza, FLUARIX, FLULAVAL, FLUZONE (age 10 mo+) AND AFLURIA, (age 1 y+), PF, 0.5mL 09/19/2017, 12/10/2020    Influenza, FLUCELVAX, (age 10 mo+), MDCK, PF, 0.5mL 01/03/2022    PPD Test 06/29/2017    Tdap (Boostrix, Adacel) 02/21/2017   ,   Health Maintenance   Topic Date Due    Varicella vaccine (2 of 2 - 13+ 2-dose series) 06/24/2022    Flu vaccine (1) 08/01/2022    COVID-19 Vaccine (3 - Booster) 08/28/2022    Depression Screen  06/23/2023    A1C test (Diabetic or Prediabetic)  10/05/2023    Cervical cancer screen  07/30/2024    DTaP/Tdap/Td vaccine (2 - Td or Tdap) 02/21/2027    Lipids  10/05/2027    Hepatitis C screen  Completed    HIV screen  Completed    Hepatitis A vaccine  Aged Out    Hib vaccine  Aged Out    Meningococcal (ACWY) vaccine  Aged Out    Pneumococcal 0-64 years Vaccine  Aged Out       PHYSICAL EXAMINATION:  [ INSTRUCTIONS:  \"[x]\" Indicates a positive item  \"[]\" Indicates a negative item  -- DELETE ALL ITEMS NOT EXAMINED]  Vital Signs: (As obtained by patient/caregiver or practitioner observation)    Blood pressure-  Heart rate-    Respiratory rate-    Temperature-  Pulse oximetry-     Constitutional: [x] Appears well-developed and well-nourished [x] No apparent distress      [] Abnormal-   Mental status  [x] Alert and awake  [x] Oriented to person/place/time [x]Able to follow commands      Eyes:  EOM    [x]  Normal  [] Abnormal-  Sclera  [x]  Normal  [] Abnormal -         Discharge [x]  None visible  [] Abnormal -    HENT:   [x] Normocephalic, atraumatic. [] Abnormal   [x] Mouth/Throat: Mucous membranes are moist.     External Ears [x] Normal  [] Abnormal-     Neck: [x] No visualized mass     Pulmonary/Chest: [x] Respiratory effort normal.  [x] No visualized signs of difficulty breathing or respiratory distress        [] Abnormal-      Musculoskeletal:   [] Normal gait with no signs of ataxia         [x] Normal range of motion of neck        [] Abnormal-       Neurological:        [x] No Facial Asymmetry (Cranial nerve 7 motor function) (limited exam to video visit)          [x] No gaze palsy        [] Abnormal-         Skin:        [x] No significant exanthematous lesions or discoloration noted on facial skin         [] Abnormal-            Psychiatric:       [x] Normal Affect [x] No Hallucinations        [] Abnormal-     Other pertinent observable physical exam findings-     ASSESSMENT/PLAN:  1. Large breasts    - AFL (Epic) - Kelle Navas MD, Plastic Surgery, [place]    2. Encounter for screening mammogram for malignant neoplasm of breast    - CESAR DIGITAL SCREEN W OR WO CAD BILATERAL; Future    No follow-ups on file. Olaf Acuna, was evaluated through a synchronous (real-time) audio-video encounter. The patient (or guardian if applicable) is aware that this is a billable service, which includes applicable co-pays. This Virtual Visit was conducted with patient's (and/or legal guardian's) consent. The visit was conducted pursuant to the emergency declaration under the [address] [place] [id number], [address] [place] [address] authority and the "[employer]" and [employer] General Act. Patient identification was verified, and a caregiver was present when appropriate. The patient was located at Home: [address] [address]. Provider was located at [facility] (Appt Dept): [address] [place] [address],  [facility]. Total time spent on this encounter: Not billed by time    --Mariya Mullins MD on 1/10/2023 at 4:41 PM    An electronic signature was used to authenticate this note.

## 2023-01-11 ENCOUNTER — NURSE ONLY (OUTPATIENT)
Dept: FAMILY MEDICINE CLINIC | Age: 43
End: 2023-01-11
Payer: MEDICARE

## 2023-01-11 DIAGNOSIS — Z23 NEED FOR INFLUENZA VACCINATION: Primary | ICD-10-CM

## 2023-01-11 PROCEDURE — 90471 IMMUNIZATION ADMIN: CPT | Performed by: STUDENT IN AN ORGANIZED HEALTH CARE EDUCATION/TRAINING PROGRAM

## 2023-01-11 PROCEDURE — 90674 CCIIV4 VAC NO PRSV 0.5 ML IM: CPT | Performed by: STUDENT IN AN ORGANIZED HEALTH CARE EDUCATION/TRAINING PROGRAM

## 2023-01-11 PROCEDURE — 99999 PR OFFICE/OUTPT VISIT,PROCEDURE ONLY: CPT | Performed by: STUDENT IN AN ORGANIZED HEALTH CARE EDUCATION/TRAINING PROGRAM

## 2023-01-11 ASSESSMENT — PATIENT HEALTH QUESTIONNAIRE - PHQ9
SUM OF ALL RESPONSES TO PHQ QUESTIONS 1-9: 0
2. FEELING DOWN, DEPRESSED OR HOPELESS: 0
1. LITTLE INTEREST OR PLEASURE IN DOING THINGS: 0
SUM OF ALL RESPONSES TO PHQ QUESTIONS 1-9: 0
SUM OF ALL RESPONSES TO PHQ9 QUESTIONS 1 & 2: 0
SUM OF ALL RESPONSES TO PHQ QUESTIONS 1-9: 0
SUM OF ALL RESPONSES TO PHQ QUESTIONS 1-9: 0

## 2023-02-02 ENCOUNTER — OFFICE VISIT (OUTPATIENT)
Dept: FAMILY MEDICINE CLINIC | Age: 43
End: 2023-02-02
Payer: MEDICARE

## 2023-02-02 VITALS
BODY MASS INDEX: 31.18 KG/M2 | OXYGEN SATURATION: 97 % | HEART RATE: 88 BPM | SYSTOLIC BLOOD PRESSURE: 110 MMHG | HEIGHT: 63 IN | DIASTOLIC BLOOD PRESSURE: 70 MMHG | TEMPERATURE: 97.8 F | WEIGHT: 176 LBS

## 2023-02-02 DIAGNOSIS — R22.41 LOCALIZED SWELLING, MASS AND LUMP, LOWER LIMB, RIGHT: ICD-10-CM

## 2023-02-02 DIAGNOSIS — M05.9 RHEUMATOID ARTHRITIS WITH RHEUMATOID FACTOR, UNSPECIFIED (HCC): ICD-10-CM

## 2023-02-02 DIAGNOSIS — F41.9 ANXIETY: Primary | ICD-10-CM

## 2023-02-02 PROCEDURE — 99214 OFFICE O/P EST MOD 30 MIN: CPT | Performed by: STUDENT IN AN ORGANIZED HEALTH CARE EDUCATION/TRAINING PROGRAM

## 2023-02-02 RX ORDER — CONTAINER,EMPTY
EACH MISCELLANEOUS
COMMUNITY
Start: 2023-02-02

## 2023-02-02 RX ORDER — LIDOCAINE 40 MG/G
CREAM TOPICAL 4 TIMES DAILY PRN
Qty: 120 G | Refills: 1 | Status: SHIPPED | OUTPATIENT
Start: 2023-02-02 | End: 2024-02-02

## 2023-02-02 RX ORDER — LIDOCAINE 40 MG/G
CREAM TOPICAL 4 TIMES DAILY PRN
COMMUNITY
Start: 2023-02-02 | End: 2023-02-02 | Stop reason: SDUPTHER

## 2023-02-02 SDOH — ECONOMIC STABILITY: HOUSING INSECURITY
IN THE LAST 12 MONTHS, WAS THERE A TIME WHEN YOU DID NOT HAVE A STEADY PLACE TO SLEEP OR SLEPT IN A SHELTER (INCLUDING NOW)?: NO

## 2023-02-02 SDOH — ECONOMIC STABILITY: INCOME INSECURITY: HOW HARD IS IT FOR YOU TO PAY FOR THE VERY BASICS LIKE FOOD, HOUSING, MEDICAL CARE, AND HEATING?: NOT HARD AT ALL

## 2023-02-02 SDOH — ECONOMIC STABILITY: FOOD INSECURITY: WITHIN THE PAST 12 MONTHS, YOU WORRIED THAT YOUR FOOD WOULD RUN OUT BEFORE YOU GOT MONEY TO BUY MORE.: NEVER TRUE

## 2023-02-02 SDOH — ECONOMIC STABILITY: FOOD INSECURITY: WITHIN THE PAST 12 MONTHS, THE FOOD YOU BOUGHT JUST DIDN'T LAST AND YOU DIDN'T HAVE MONEY TO GET MORE.: NEVER TRUE

## 2023-02-02 NOTE — PROGRESS NOTES
[address] PRIMARY CARE  [address] [place] [address]  Dept: [phone]  Dept Fax: [phone]    Luke Smith is a 38 y.o. female who is a Established patient, who presents today for her medical conditions/complaints as noted below:  Chief Complaint   Patient presents with    Anxiety    Depression    6 Month Follow-Up         HPI:     She is here today to follow-up on anxiety. She says that she has been doing well and feels that her symptoms have resolved. Has rheumatoid arthritis for which she is following with rheumatology. She states that she needs prescription for lidocaine cream that she uses for pain relief. She also complains of a bump in her right leg that she noticed about a few weeks ago. Denies any injury to the leg. She also has a small lymph node in her neck that she noticed recently, denies any pain. Hemoglobin A1C (%)   Date Value   10/05/2022 4.8   05/17/2022 5.0   01/03/2022 5.3             ( goal A1Cis < 7)   No results found for: LABMICR  LDL Cholesterol (mg/dL)   Date Value   10/05/2022 102   05/17/2022 106   01/03/2022 113       (goal LDL is <100)   AST (U/L)   Date Value   10/05/2022 10     ALT (U/L)   Date Value   10/05/2022 6     BUN (mg/dL)   Date Value   12/03/2022 10     BP Readings from Last 3 Encounters:   02/02/23 110/70   12/05/22 98/64   11/17/22 96/68          (goal 120/80)    Past Medical History:   Diagnosis Date    Abnormal 1st trimester screen (Tri 21 1:117)--NIPT nml  11/30/2019    ADD (attention deficit disorder) without hyperactivity     Antiphospholipid syndrome (HCC)     Celestone 1/9 & 1/10 01/09/2020    Chronic back pain     COVID 09/15/2021    BODY ACHE, COUGH, FEVER, CHEST PAIN, N&V X 3 WEEKS. STILL HAS COUGH.     Dehydration 12/28/2021    Echogenic focus of heart of fetus affecting antepartum care of mother 11/30/2019    Elev early 1hr, nml 3hr  11/30/2019    1 elevated value, [facility] recommends repeat 3hr GTT in 2 weeks    Elevated blood-pressure reading without diagnosis of hypertension 2019    Elevated umbilical artery dopplers  01/10/2020    Eosinopenia (HCC)     Fetal ascites     Fetal heart rate decelerations affecting management of mother     GERD (gastroesophageal reflux disease)     Headache     migraines    Iron deficiency     follows with Dr. Dante Blevins (hem/onc)    IUD (intrauterine device) in place 2020    Adina Avilaen DANG    Lumbar radiculopathy 2019    Lumbar spondylosis 2019    Multigravida of advanced maternal age in third trimester     Muscle pain 2019    Obesity     SHELLEY (obstructive sleep apnea)     Cpap (was causing speech difficulties, resolved after started using cpap)  Dr. Dilan Vasques located [place], last seen 2021-  [address]    Personal history of other medical treatment     Axillary lympadenopathy    PONV (postoperative nausea and vomiting)     Pulmonary embolism affecting pregnancy in second trimester 2019    Rh+/RI/GBSunk 2020    Rheumatoid arteritis ([address]     Dr. Xu Sotelo located [facility] seeing 3/28/2022    Tachycardia     Umbilical cord complication     Under care of team 2021    PCP: Dr. Santosh Campos, last visit 2021, clearance given already for surgery on 2021    Under care of team 2021    Neurology: Dr. Sophia Delacruz, last visit 2021    Under care of team 2021    Oncology: Dr. Dante Blevins, [facility], last visit 3/2021, received clearance for surgery for 2021    was seeing for nodules in armpit. Pt. sees yearly .  Last seen 2022    Wears glasses     Wellness examination     last seen 2022  Shelby Shanks examination     Dr. Freddy Santos, PCP      Past Surgical History:   Procedure Laterality Date     SECTION Bilateral 2020     SECTION performed by Vaughn Martins DO at [place] [facility] OR    CHOLECYSTECTOMY  2022    ROBOTIC LAPAROSCOPIC CHOLECYSTECTOMY, CHOLECYSTECTOMY, LAPAROSCOPIC N/A 4/6/2022    XI ROBOTIC LAPAROSCOPIC CHOLECYSTECTOMY, POSSIBLE OPEN performed by Kamini Shirley DO at [address]  05/07/2020    Mirena    SLEEVE GASTRECTOMY N/A 11/23/2021    XI ROBOTIC LAPAROSCOPIC GASTRECTOMY SLEEVE WITH EGD performed by Kamini Shirley DO at [address] N/A 01/22/2021    EGD BIOPSY OF GASTRIC performed by Kamini Shirley DO at [facility]  08/19/2020    US LYMPH NODE BIOPSY 8/19/2020 [facility] ULTRASOUND    WISDOM TOOTH EXTRACTION         Family History   Problem Relation Age of Onset    Elevated Lipids Mother     High Blood Pressure Mother        Social History     Tobacco Use    Smoking status: Never    Smokeless tobacco: Never   Substance Use Topics    Alcohol use: No      Current Outpatient Medications   Medication Sig Dispense Refill    sharps container Use to dispose used Orencia syringes and needles      lidocaine (LMX) 4 % cream Apply topically 4 times daily as needed for Pain Apply topically 4 times daily as needed 120 g 1    Abatacept 125 MG/ML SOSY Inject 125 mg into the skin once a week      cycloSPORINE (RESTASIS) 0.05 % ophthalmic emulsion       vitamin D (ERGOCALCIFEROL) 1.25 MG (25223 UT) CAPS capsule take 1 capsule by mouth every week 8 capsule 0    olopatadine (PATANOL) 0.1 % ophthalmic solution instill 1-2 drops into both eyes once daily      prednisoLONE acetate (PRED FORTE) 1 % ophthalmic suspension       azelastine (OPTIVAR) 0.05 % ophthalmic solution       Artificial Tear Solution (SOOTHE XP XTRA PROTECTION) SOLN       FOLIC ACID PO Take by mouth      predniSONE (DELTASONE) 2.5 MG tablet take 2 tablets by mouth every morning and 1 tablet by mouth every evening      Ferrous Sulfate (IRON PO) Take by mouth QOD      Calcium Carbonate (CALCIUM 500 PO) Take by mouth      Multiple Vitamin (MULTIVITAMIN ADULT PO) Take by mouth      cetirizine (ZYRTEC) 10 MG tablet Take 1 tablet by mouth as needed (Patient not taking: Reported on 2/2/2023)       Current Facility-Administered Medications   Medication Dose Route Frequency Provider Last Rate Last Admin    levonorgestrel (MIRENA) IUD 52 mg 1 each  1 each IntraUTERine Once Brianna Meade, APRN - CNP   1 each at 05/07/20 1459     Allergies   Allergen Reactions    Iodine Hives and Itching     X-ray dye    Other Itching and Swelling     Sensitive to bug bites, mosquitos. Skin discoloration X1 month. Phenergan [Promethazine] Itching    Cat Hair Extract Itching     RUNNY NOSE, SNEEZING    Dog Epithelium Itching     RUNNY NOSE, SNEEZING. Health Maintenance   Topic Date Due    COVID-19 Vaccine (3 - Booster) 08/28/2022    Varicella vaccine (2 of 2 - 13+ 2-dose series) 02/23/2023 (Originally 6/24/2022)    A1C test (Diabetic or Prediabetic)  10/05/2023    Depression Screen  01/11/2024    Cervical cancer screen  07/30/2024    DTaP/Tdap/Td vaccine (2 - Td or Tdap) 02/21/2027    Lipids  10/05/2027    Flu vaccine  Completed    Hepatitis C screen  Completed    HIV screen  Completed    Hepatitis A vaccine  Aged Out    Hib vaccine  Aged Out    Meningococcal (ACWY) vaccine  Aged Out    Pneumococcal 0-64 years Vaccine  Aged Out       Subjective:     Review of Systems   Constitutional:  Negative for appetite change, fatigue and fever. HENT:  Negative for congestion, ear pain, hearing loss and sore throat. Eyes:  Negative for discharge and visual disturbance. Respiratory:  Negative for cough, chest tightness, shortness of breath and wheezing. Cardiovascular:  Negative for chest pain, palpitations and leg swelling. Gastrointestinal:  Negative for abdominal pain, constipation, diarrhea, nausea and vomiting. Genitourinary:  Negative for flank pain, frequency, hematuria and urgency. Musculoskeletal:  Positive for arthralgias. Negative for gait problem, joint swelling and myalgias. Skin: Negative. Neurological:  Negative for dizziness, weakness, numbness and headaches. Psychiatric/Behavioral: Negative. Negative for dysphoric mood. The patient is not nervous/anxious. Objective:     Physical Exam  Vitals reviewed. Constitutional:       Appearance: Normal appearance. She is normal weight. HENT:      Head: Normocephalic and atraumatic. Nose: Nose normal.      Mouth/Throat:      Mouth: Mucous membranes are moist.      Pharynx: Oropharynx is clear. Eyes:      Extraocular Movements: Extraocular movements intact. Conjunctiva/sclera: Conjunctivae normal.      Pupils: Pupils are equal, round, and reactive to light. Cardiovascular:      Rate and Rhythm: Normal rate and regular rhythm. Heart sounds: Normal heart sounds. No murmur heard. No gallop. Pulmonary:      Effort: Pulmonary effort is normal. No respiratory distress. Breath sounds: Normal breath sounds. No stridor. No wheezing. Abdominal:      General: Bowel sounds are normal. There is no distension. Palpations: Abdomen is soft. Tenderness: There is no abdominal tenderness. There is no guarding or rebound. Musculoskeletal:         General: No swelling or tenderness. Normal range of motion. Cervical back: Normal range of motion and neck supple. Right lower leg: Swelling present. Comments: Right leg-hard immobile lump with mild tenderness felt on anterior proximal end of tibia   Skin:     General: Skin is warm and dry. Coloration: Skin is not jaundiced. Findings: No rash. Neurological:      General: No focal deficit present. Mental Status: She is alert and oriented to person, place, and time. Psychiatric:         Mood and Affect: Mood normal.         Behavior: Behavior normal.         Thought Content:  Thought content normal.         Judgment: Judgment normal.     /70   Pulse 88   Temp 97.8 °F (36.6 °C)   Ht 5' 3\" (1.6 m)   Wt 176 lb (79.8 kg) SpO2 97%   BMI 31.18 kg/m²     Assessment/Plan:   1. Anxiety  2. Rheumatoid arthritis with rheumatoid factor, unspecified (HCC)  -     lidocaine (LMX) 4 % cream; Apply topically 4 times daily as needed for Pain Apply topically 4 times daily as needed, Topical, 4 TIMES DAILY PRN Starting Thu 2/2/2023, Until Fri 2/2/2024 at 2359, For 365 days, Disp-120 g, R-1, Normal  3. Localized swelling, mass and lump, lower limb, right  -     XR TIBIA FIBULA RIGHT (2 VIEWS); Future      Anxiety-well-controlled, not on any medications currently    Rheumatoid arthritis-stable, following with rheumatology. Requesting prescription for lidocaine cream.          Return if symptoms worsen or fail to improve. Orders Placed This Encounter   Procedures    XR TIBIA FIBULA RIGHT (2 VIEWS)     Standing Status:   Future     Standing Expiration Date:   2/2/2024     Orders Placed This Encounter   Medications    lidocaine (LMX) 4 % cream     Sig: Apply topically 4 times daily as needed for Pain Apply topically 4 times daily as needed     Dispense:  120 g     Refill:  1       Patient given educational materials - see patient instructions. Discussed use, benefit, and side effects of prescribed medications. All patientquestions answered. Pt voiced understanding. Reviewed health maintenance. Instructedto continue current medications, diet and exercise. Patient agreed with treatmentplan. Follow up as directed.      Electronically signed by Minoo Billy MD on 2/3/2023 at 4:11 PM

## 2023-02-03 ASSESSMENT — ENCOUNTER SYMPTOMS
VOMITING: 0
ABDOMINAL PAIN: 0
CONSTIPATION: 0
EYE DISCHARGE: 0
SHORTNESS OF BREATH: 0
COUGH: 0
SORE THROAT: 0
NAUSEA: 0
WHEEZING: 0
CHEST TIGHTNESS: 0
DIARRHEA: 0

## 2023-02-05 ENCOUNTER — HOSPITAL ENCOUNTER (OUTPATIENT)
Age: 43
Discharge: HOME | End: 2023-02-07
Payer: MEDICAID

## 2023-02-05 ENCOUNTER — HOSPITAL ENCOUNTER (OUTPATIENT)
Dept: GENERAL RADIOLOGY | Age: 43
Discharge: HOME | End: 2023-02-07
Payer: MEDICAID

## 2023-02-05 DIAGNOSIS — R22.41 LOCALIZED SWELLING, MASS AND LUMP, LOWER LIMB, RIGHT: ICD-10-CM

## 2023-02-05 PROCEDURE — 73590 X-RAY EXAM OF LOWER LEG: CPT

## 2023-02-13 ENCOUNTER — OFFICE VISIT (OUTPATIENT)
Dept: PODIATRY | Age: 43
End: 2023-02-13
Payer: MEDICAID

## 2023-02-13 ENCOUNTER — TELEPHONE (OUTPATIENT)
Dept: NEUROLOGY | Age: 43
End: 2023-02-13

## 2023-02-13 VITALS — HEIGHT: 63 IN | BODY MASS INDEX: 30.83 KG/M2 | WEIGHT: 174 LBS

## 2023-02-13 DIAGNOSIS — M79.671 PAIN IN RIGHT FOOT: ICD-10-CM

## 2023-02-13 DIAGNOSIS — M79.89 MASS OF SOFT TISSUE OF FOOT: Primary | ICD-10-CM

## 2023-02-13 PROCEDURE — 99203 OFFICE O/P NEW LOW 30 MIN: CPT | Performed by: PODIATRIST

## 2023-02-13 ASSESSMENT — ENCOUNTER SYMPTOMS
DIARRHEA: 0
COLOR CHANGE: 0
NAUSEA: 0
SHORTNESS OF BREATH: 0
BACK PAIN: 0

## 2023-02-13 NOTE — TELEPHONE ENCOUNTER
Pt called in stating that her headaches are coming back. She is still having the 'glitching' that has been happening. She was wondering if she could get a full body MRI to see if there something going that is being missed? Please advise, thanks.

## 2023-02-13 NOTE — PROGRESS NOTES
Bryan Salazar is a 38 y.o. female who presents to the office today with chief complaint of painful lump to the ball of the right foot. Chief Complaint   Patient presents with    Foot Pain     Lump on bottom of right foot     Toe Pain     B/l toes painful in joints    Symptoms began about 2 month(s) ago. Patient denies injury to the right foot. Patient states that the pain is greatest after being on her feet for awhile. Pain is rated 7 out of 10 at it's worst and is described as intermittent. Treatments prior to today's visit include: None. Patient also complains of pain to the joints of the toes. Patient states that she has a history of rheumatoid arthritis. Allergies   Allergen Reactions    Iodine Hives and Itching     X-ray dye    Other Itching and Swelling     Sensitive to bug bites, mosquitos. Skin discoloration X1 month. Phenergan [Promethazine] Itching    Cat Hair Extract Itching     RUNNY NOSE, SNEEZING    Dog Epithelium Itching     RUNNY NOSE, SNEEZING. Past Medical History:   Diagnosis Date    Abnormal 1st trimester screen (Tri 21 1:117)--NIPT nml  11/30/2019    ADD (attention deficit disorder) without hyperactivity     Antiphospholipid syndrome ([facility] [address]     Celestone 1/9 & 1/10 01/09/2020    Chronic back pain     COVID 09/15/2021    BODY ACHE, COUGH, FEVER, CHEST PAIN, N&V X 3 WEEKS. STILL HAS COUGH.     Dehydration 12/28/2021    Echogenic focus of heart of fetus affecting antepartum care of mother 11/30/2019    Elev early 1hr, nml 3hr  11/30/2019    1 elevated value, [facility] recommends repeat 3hr GTT in 2 weeks    Elevated blood-pressure reading without diagnosis of hypertension 11/30/2019    Elevated umbilical artery dopplers  01/10/2020    Eosinopenia (HCC)     Fetal ascites     Fetal heart rate decelerations affecting management of mother     GERD (gastroesophageal reflux disease)     Headache     migraines    Iron deficiency     follows with Dr. Farzana Quijano (hem/onc)    IUD (intrauterine device) in place 05/07/2020    Ishantila Emily LEONG    Lumbar radiculopathy 01/09/2019    Lumbar spondylosis 01/09/2019    Multigravida of advanced maternal age in third trimester     Muscle pain 1/9/2019    Obesity     SHELLEY (obstructive sleep apnea)     Cpap (was causing speech difficulties, resolved after started using cpap)  Dr. Teo Bosch located [place], last seen 1/2021- [address]    Personal history of other medical treatment     Axillary lympadenopathy    PONV (postoperative nausea and vomiting)     Pulmonary embolism affecting pregnancy in second trimester 11/30/2019    Rh+/RI/GBSunk 01/08/2020    Rheumatoid arteritis ([address]     Dr. Farhad Lopez located [facility] seeing 3/28/2022    Tachycardia     Umbilical cord complication     Under care of team 09/07/2021    PCP: Dr. Chinyere Wilde, last visit 8/2021, clearance given already for surgery on 9/14/2021    Under care of team 09/07/2021    Neurology: Dr. Frank Walker, last visit 7/2021    Under care of team 09/07/2021    Oncology: Dr. Gagandeep Reyes, [facility], last visit 3/2021, received clearance for surgery for 9/14/2021    was seeing for nodules in armpit. Pt. sees yearly . Last seen Jan 2022    Wears glasses     Wellness examination     last seen 1/2022  Mine Peña examination     Dr. Ronit Russell, PCP       Prior to Admission medications    Medication Sig Start Date End Date Taking?  Authorizing Provider   sharps container Use to dispose used Orencia syringes and needles 2/2/23  Yes Historical Provider, MD   lidocaine (LMX) 4 % cream Apply topically 4 times daily as needed for Pain Apply topically 4 times daily as needed 2/2/23 2/2/24 Yes Manuela Gold MD   Abatacept 125 MG/ML SOSY Inject 125 mg into the skin once a week 1/3/23 4/25/23 Yes Historical Provider, MD   cycloSPORINE (RESTASIS) 0.05 % ophthalmic emulsion  12/21/22  Yes Historical Provider, MD   vitamin D (ERGOCALCIFEROL) 1.25 MG (76338 UT) CAPS capsule take 1 capsule by mouth every week 22  Yes Joi Tiwari, APRN - CNP   olopatadine (PATANOL) 0.1 % ophthalmic solution instill 1-2 drops into both eyes once daily 22  Yes Historical Provider, MD   prednisoLONE acetate (PRED FORTE) 1 % ophthalmic suspension  10/7/22  Yes Historical Provider, MD   azelastine (OPTIVAR) 0.05 % ophthalmic solution  10/7/22  Yes Historical Provider, MD   Artificial Tear Solution (SOOTHE XP XTRA PROTECTION) SOLN  10/7/22  Yes Historical Provider, MD   cetirizine (ZYRTEC) 10 MG tablet Take 1 tablet by mouth as needed   Yes Historical Provider, MD   FOLIC ACID PO Take by mouth   Yes Historical Provider, MD   predniSONE (DELTASONE) 2.5 MG tablet take 2 tablets by mouth every morning and 1 tablet by mouth every evening 21  Yes Historical Provider, MD   Ferrous Sulfate (IRON PO) Take by mouth QOD   Yes Historical Provider, MD   Calcium Carbonate (CALCIUM 500 PO) Take by mouth   Yes Historical Provider, MD   Multiple Vitamin (MULTIVITAMIN ADULT PO) Take by mouth   Yes Historical Provider, MD       Past Surgical History:   Procedure Laterality Date     SECTION Bilateral 2020     SECTION performed by Tai Barajas DO at [facility] OR    CHOLECYSTECTOMY  2022    ROBOTIC LAPAROSCOPIC CHOLECYSTECTOMY,    CHOLECYSTECTOMY, LAPAROSCOPIC N/A 2022    XI ROBOTIC LAPAROSCOPIC CHOLECYSTECTOMY, POSSIBLE OPEN performed by Justice Solares DO at [facility] OR    INTRAUTERINE DEVICE INSERTION  2020    Mirena    SLEEVE GASTRECTOMY N/A 2021    XI ROBOTIC LAPAROSCOPIC GASTRECTOMY SLEEVE WITH EGD performed by Justice Solares DO at [facility] OR    TONSILLECTOMY      UPPER GASTROINTESTINAL ENDOSCOPY N/A 2021    EGD BIOPSY OF GASTRIC performed by Justice Solares DO at [facility] OR    US LYMPH NODE BIOPSY  2020    US LYMPH NODE BIOPSY 2020 [facility] ULTRASOUND    WISDOM TOOTH EXTRACTION         Family History   Problem Relation Age of Onset     Elevated Lipids Mother     High Blood Pressure Mother        Social History     Tobacco Use    Smoking status: Never    Smokeless tobacco: Never   Substance Use Topics    Alcohol use: No       Review of Systems   Constitutional:  Negative for activity change, appetite change, chills, diaphoresis, fatigue and fever. Respiratory:  Negative for shortness of breath. Cardiovascular:  Negative for leg swelling. Gastrointestinal:  Negative for diarrhea and nausea. Endocrine: Negative for cold intolerance, heat intolerance and polyuria. Musculoskeletal:  Positive for arthralgias. Negative for back pain, gait problem, joint swelling and myalgias. Skin:  Negative for color change, pallor, rash and wound. Allergic/Immunologic: Negative for environmental allergies and food allergies. Neurological:  Negative for dizziness, weakness, light-headedness and numbness. Hematological:  Does not bruise/bleed easily. Psychiatric/Behavioral:  Negative for behavioral problems, confusion and self-injury. The patient is not nervous/anxious. Vitals: There were no vitals filed for this visit. General: AAO x 3 in NAD. Integument: There are no rashes, ulcers, or breaks in the skin noted to the bilateral lower extremities. There is a subcutaneous nodule noted to the all of the right foot plantar to the fourth metatarsal head. This nodule is slightly moveable. There is pain with palpation and manipulation of this nodule. There is no associated erythema, calor, or open wound noted. Toenails 1-5 of the right foot do not present with thickness, elongation, discoloration, brittleness, subungual debris. Toenails 1-5 of the left foot do not present with thickness, elongation, discoloration, brittleness, subungual debris. Interdigital maceration absent to web spaces 1-4, Bilateral.     There are no preulcerative lesions noted to the right foot. There are no preulcerative lesions noted to the left foot. The skin to the bilateral feet is not thin and shiny. The skin to the bilateral feet is  warm, supple, and dry. Vascular: DP pulse of the right foot is  palpable. DP pulse of the left foot is  palpable. PT pulse of the right foot is  palpable. PT pulse of the left foot is  palpable. CFT is less than 3 secs to the digits of the right foot. CFT is less than 3 secs to the digits of the left foot. There is no edema noted to the bilateral foot or ankle. There is hair growth noted to the digits of the bilateral feet. There are no varicosities noted to the right foot/ankle. There are no varicosities noted to the left foot/ankle. Erythema is absent to the bilateral feet. Neurological: Reflexes are present to the right plantar foot and to the Achilles tendon. Reflexes are present to the left plantar foot and to the Achilles tendon. Epicritic sensation is  intact to the right foot. Epicritic sensation is  intact to the left foot. Musculoskeletal:  Muscle strength is +5/5 to all four muscle groups of the right lower extremity and +5/5 to all four muscle groups of the left lower extremity. There are no areas of subluxation, dislocation, or laxity noted to either lower extremity. Range of motion to the right ankle is  free of pain or grinding. Range of motion to the left ankle is  free of pain or grinding. Range of motion to the right subtalar joint is  free of pain or grinding. Range of motion to the left subtalar joint is  free of pain or grinding. No abnormalities, asymmetries, or misalignments are seen between the extremities. Weightbearing evaluation does not reveal rearfoot eversion, medial prominence of the talar head, loss of the medial longitudinal arch height, and too many toes sign bilaterally. The lesser digits of the right foot are contracted. The lesser digits of the left foot are contracted.      The toes are only slightly contracted and there is no pain with palpation or manipulation to any of the toes. There is prominence noted to the first metatarsal head without abduction of the hallux of the right foot. There is prominence noted to the first metatarsal head without abduction of the hallux of the left foot. Shoe examination was performed. Biomechanical Exam: normal bilaterally. X-ray's taken: AP, Lateral, and Medial Oblique of the right foot. Findings: No fracture or stress fracture is noted. Asessment: Patient is a 38 y.o. female with:    Diagnosis Orders   1. Mass of soft tissue of foot  XR FOOT RIGHT (MIN 3 VIEWS)      2. Pain in right foot  XR FOOT RIGHT (MIN 3 VIEWS)          Plan:  1. Clinical evaluation of the patient. 2. Patient informed that based on her symptoms, as well as my clinical and radiographic findings, I recommend surgery to remove the soft tissue mass of the right foot. I initially considered entering the plantar aspect of the right foot, but based on the location of the mass I believe I can enter the fourth intermetatarsal space and excise it. Patient to schedule this with the front office. 3. Contact office with any questions/problems/concerns. Return if symptoms worsen or fail to improve, for Patient to schedule surgery.    2/13/2023      Terrence Hugo DPM

## 2023-02-14 NOTE — TELEPHONE ENCOUNTER
If the patient continues to have headaches, I recommend verapamil 80 mg p.o. nightly to be taken for the headache prophylaxis. Please advise the patient about potential side effects including constipation and swelling in the lower extremities. Please advise her to contact us in about 2 weeks to report improvement the symptoms. I would recommend her to follow-up with her primary care physician to see if she needs any work-up for from neurological standpoint, her MRI of the brain was negative and I do not see a reason to obtain other imaging studies. I recommend her to contact her primary care physician in this regard.   Thank you

## 2023-02-21 NOTE — TELEPHONE ENCOUNTER
Aida Chavez called in regarding this message she was informed about this information. She does not want to try Verapamil. She will follow up with her PCP and then give us a call back if needed.

## 2023-03-02 ENCOUNTER — HOSPITAL ENCOUNTER (OUTPATIENT)
Dept: MAMMOGRAPHY | Age: 43
Discharge: HOME | End: 2023-03-04
Payer: MEDICAID

## 2023-03-02 DIAGNOSIS — Z12.31 ENCOUNTER FOR SCREENING MAMMOGRAM FOR MALIGNANT NEOPLASM OF BREAST: ICD-10-CM

## 2023-03-02 PROCEDURE — 77067 SCR MAMMO BI INCL CAD: CPT

## 2023-03-06 NOTE — H&P
HISTORY and [facility] [address]       NAME:  North Hong  MRN: [id number]   YOB: 1980   Date: 3/9/2023   Age: 38 y.o. Gender: female       COMPLAINT AND PRESENT HISTORY:     North Hong is 38 y.o. female, here for preadmission testing related to soft tissue mass right foot with scheduled EXCISION SOFT TISSUE MASS FOOT per Dr. Michelle Kohler. Below podiatry office visit note by Dr. Michelle Kohler dated 02/13/2023: Reviewed:   Progress Notes  Александр Howard DPM (Podiatrist)   Kevin Frye is a 38 y.o. female who presents to the office today with chief complaint of painful lump to the ball of the right foot. Chief Complaint   Patient presents with    Foot Pain       Lump on bottom of right foot     Toe Pain       B/l toes painful in joints    Symptoms began about 2 month(s) ago. Patient denies injury to the right foot. Patient states that the pain is greatest after being on her feet for awhile. Pain is rated 7 out of 10 at it's worst and is described as intermittent. Treatments prior to today's visit include: None. Patient also complains of pain to the joints of the toes. Patient states that she has a history of rheumatoid arthritis. UPDATE: Pt c/o pain to plantar aspect of ball of right foot. Denies radiation of pain. Describes pain as aching. Does not take any pain medications to treat this pain. Elevation helps alleviate symptoms. Denies numbness and tingling. PMHx includes:    Anemia: Associated medications include: Ferrous sulfate. Sees Dr. Gregory Gaines for hematology. Antiphospholipid syndrome: History of PE during pregnancy. Not currently on anticoagulation. Does not follow with specialist.     Tachycardia: Associated medications include: none. Denies recent or current chest pain/pressure, palpitations. RA: Associated medications include: prednisolone. Sees [facility]. Mague Larson at [facility].     SHELLEY. Uses CPAP    History of PONV. Otherwise, denies personal and family history of complications with anesthesia. RECENT LABS, IMAGING AND TESTING     EKG done today in PAT with prelim result: Normal sinus rhythm with sinus arrhythmia, normal EKG    Lab Results   Component Value Date    WBC 10.8 03/08/2023    RBC 4.70 03/08/2023    HGB 13.2 03/08/2023    HCT 41.0 03/08/2023    MCV 87.3 03/08/2023    MCH 28.2 03/08/2023    MCHC 32.2 03/08/2023    RDW 14.8 03/08/2023     03/08/2023    MPV 8.2 03/08/2023        Lab Results   Component Value Date     03/08/2023    K 4.0 03/08/2023     03/08/2023    CO2 24 03/08/2023    BUN 12 03/08/2023    CREATININE 0.66 03/08/2023    GLUCOSE 85 03/08/2023    CALCIUM 8.7 03/08/2023    PROT 6.4 10/05/2022    LABALBU 3.3 (L) 10/05/2022    BILITOT 0.5 10/05/2022    ALKPHOS 68 10/05/2022    AST 10 10/05/2022    ALT 6 10/05/2022         PAST MEDICAL HISTORY     Past Medical History:   Diagnosis Date    Abnormal 1st trimester screen (Tri 21 1:117)--NIPT nml  11/30/2019    ADD (attention deficit disorder) without hyperactivity     Antiphospholipid syndrome (HCC)     [facility]elestone 1/9 & 1/10 01/09/2020    Chronic back pain     COVID 09/15/2021    BODY ACHE, COUGH, FEVER, CHEST PAIN, N&V X 3 WEEKS. STILL HAS COUGH.     Dehydration 12/28/2021    Echogenic focus of heart of fetus affecting antepartum care of mother 11/30/2019    Elev early 1hr, nml 3hr  11/30/2019    1 elevated value, [facility] recommends repeat 3hr GTT in 2 weeks    Elevated blood-pressure reading without diagnosis of hypertension 11/30/2019    Elevated umbilical artery dopplers  01/10/2020    Eosinopenia (HCC)     Fetal ascites     Fetal heart rate decelerations affecting management of mother     GERD (gastroesophageal reflux disease)     Headache     migraines    Iron deficiency     follows with Dr. Karan Tucker (hem/onc)    IUD (intrauterine device) in place 05/07/2020    Adina LEONG    Lumbar radiculopathy 01/09/2019    Lumbar spondylosis 2019    Multigravida of advanced maternal age in third trimester     Muscle pain 2019    Obesity     SHELLEY (obstructive sleep apnea)     Cpap (was causing speech difficulties, resolved after started using cpap)  Dr. Gayathri Osborne located [place], last seen 2021- [address]    Personal history of other medical treatment     Axillary lympadenopathy    PONV (postoperative nausea and vomiting)     Pulmonary embolism affecting pregnancy in second trimester 2019    Rh+/RI/GBSunk 2020    Rheumatoid arteritis ([address]     Dr. Supa Joseph located [place] [facility] seeing 3/28/2022    Tachycardia     Umbilical cord complication     Under care of team 2021    PCP: Dr. Ashvin Martinez, last visit 2021, clearance given already for surgery on 2021    Under care of team 2021    Neurology: Dr. Chastity Lepe, last visit 2021    Under care of team 2021    Oncology: Dr. Tee Henry, [facility], last visit 3/2021, received clearance for surgery for 2021    was seeing for nodules in armpit. Pt. sees yearly .  Last seen 2022    Wears glasses     Wellness examination     last seen 2022  [facility]    Wellness examination     Dr. Noam Suárez, PCP       SURGICAL HISTORY       Past Surgical History:   Procedure Laterality Date     SECTION Bilateral 2020     SECTION performed by Ely Hendrickson DO at [place] [facility]    CHOLECYSTECTOMY  2022    ROBOTIC LAPAROSCOPIC CHOLECYSTECTOMY,    CHOLECYSTECTOMY, LAPAROSCOPIC N/A 2022    XI ROBOTIC LAPAROSCOPIC CHOLECYSTECTOMY, POSSIBLE OPEN performed by Hernan Willis DO at [address]  2020    Mirena    SLEEVE GASTRECTOMY N/A 2021    XI ROBOTIC LAPAROSCOPIC GASTRECTOMY SLEEVE WITH EGD performed by Hernan Willis DO at [address] N/A 2021    EGD BIOPSY OF GASTRIC performed by Esteban Councilman Alexsandra Butts DO at [facility]  08/19/2020     LYMPH NODE BIOPSY 8/19/2020 [facility]    WISDOM TOOTH EXTRACTION         FAMILY HISTORY       Family History   Problem Relation Age of Onset    Elevated Lipids Mother     High Blood Pressure Mother        SOCIAL HISTORY       Social History     Socioeconomic History    Marital status:      Spouse name: None    Number of children: None    Years of education: None    Highest education level: None   Tobacco Use    Smoking status: Never    Smokeless tobacco: Never   Vaping Use    Vaping Use: Never used   Substance and Sexual Activity    Alcohol use: Yes     Comment: rarely    Drug use: Yes     Frequency: 2.0 times per week     Types: Marijuana (Weed)     Comment: occasional    Sexual activity: Yes     Partners: Male     Social Determinants of Health     Financial Resource Strain: Low Risk     Difficulty of Paying Living Expenses: Not hard at all   Food Insecurity: No Food Insecurity    Worried About Running Out of Food in the Last Year: Never true    Ran Out of Food in the Last Year: Never true   Transportation Needs: No Transportation Needs    Lack of Transportation (Medical): No    Lack of Transportation (Non-Medical): No   Housing Stability: Unknown    Unstable Housing in the Last Year: No        REVIEW OF SYSTEMS      Allergies   Allergen Reactions    Iodine Hives and Itching     X-ray dye    Other Itching and Swelling     Sensitive to bug bites, mosquitos. Skin discoloration X1 month. Phenergan [Promethazine] Itching    Cat Hair Extract Itching     RUNNY NOSE, SNEEZING    Dog Epithelium Itching     RUNNY NOSE, SNEEZING.        Current Outpatient Medications on File Prior to Encounter   Medication Sig Dispense Refill    sharps container Use to dispose used Orencia syringes and needles      lidocaine (LMX) 4 % cream Apply topically 4 times daily as needed for Pain Apply topically 4 times daily as needed 120 g 1    Abatacept 125 MG/ML SOSY Inject 125 mg into the skin once a week      cycloSPORINE (RESTASIS) 0.05 % ophthalmic emulsion       vitamin D (ERGOCALCIFEROL) 1.25 MG (16773 UT) CAPS capsule take 1 capsule by mouth every week 8 capsule 0    olopatadine (PATANOL) 0.1 % ophthalmic solution instill 1-2 drops into both eyes once daily      prednisoLONE acetate (PRED FORTE) 1 % ophthalmic suspension       azelastine (OPTIVAR) 0.05 % ophthalmic solution       Artificial Tear Solution (SOOTHE XP XTRA PROTECTION) SOLN       cetirizine (ZYRTEC) 10 MG tablet Take 1 tablet by mouth as needed      FOLIC ACID PO Take by mouth      predniSONE (DELTASONE) 2.5 MG tablet take 2 tablets by mouth every morning and 1 tablet by mouth every evening      Ferrous Sulfate (IRON PO) Take by mouth QOD      Calcium Carbonate (CALCIUM 500 PO) Take by mouth      Multiple Vitamin (MULTIVITAMIN ADULT PO) Take by mouth       Current Facility-Administered Medications on File Prior to Encounter   Medication Dose Route Frequency Provider Last Rate Last Admin    levonorgestrel (MIRENA) IUD 52 mg 1 each  1 each IntraUTERine Once Oris Severs, APRN - CNP   1 each at 05/07/20 0354       Review of Systems   Constitutional:  Negative for appetite change, chills, fever and unexpected weight change. HENT:  Negative for congestion, dental problem, hearing loss and sore throat. Eyes:  Positive for visual disturbance (Glasses). Respiratory:  Negative for cough, shortness of breath and wheezing. Cardiovascular:  Negative for chest pain and palpitations. Gastrointestinal:  Negative for abdominal pain, constipation, diarrhea, nausea and vomiting. Genitourinary: Negative. Musculoskeletal:  Positive for arthralgias, back pain and neck pain. Skin:  Negative for rash and wound. Neurological:  Negative for dizziness, speech difficulty, light-headedness and headaches. Hematological:  Bruises/bleeds easily. Psychiatric/Behavioral: Negative. GENERAL PHYSICAL EXAM     Vitals: /69   Pulse 84   Temp 98 °F (36.7 °C)   Resp 16   Ht 5' 3\" (1.6 m)   Wt 176 lb (79.8 kg)   SpO2 100%   BMI 31.18 kg/m²  Body mass index is 31.18 kg/m². Physical Exam  Constitutional:       General: She is not in acute distress. Appearance: She is well-developed. She is not ill-appearing. HENT:      Head: Normocephalic and atraumatic. Nose: Nose normal.      Mouth/Throat:      Mouth: Mucous membranes are moist.      Pharynx: Oropharynx is clear. No oropharyngeal exudate or posterior oropharyngeal erythema. Eyes:      General: No scleral icterus. Right eye: No discharge. Left eye: No discharge. Pupils: Pupils are equal, round, and reactive to light. Neck:      Trachea: No tracheal deviation. Cardiovascular:      Rate and Rhythm: Normal rate and regular rhythm. Pulses:           Dorsalis pedis pulses are 2+ on the right side. Posterior tibial pulses are 2+ on the right side. Heart sounds: Normal heart sounds. No murmur heard. No friction rub. No gallop. Pulmonary:      Effort: Pulmonary effort is normal. No respiratory distress. Breath sounds: Normal breath sounds. No wheezing, rhonchi or rales. Abdominal:      General: Bowel sounds are normal. There is no distension. Palpations: Abdomen is soft. Tenderness: There is no abdominal tenderness. There is no guarding. Musculoskeletal:      Cervical back: Neck supple. Right lower leg: No edema. Left lower leg: No edema. Right foot: Normal range of motion. Feet:      Right foot:      Skin integrity: Skin integrity normal.      Toenail Condition: Right toenails are normal.      Comments: Subcutaneous, smooth, movable nodule right foot plantar aspect of 4th metatarsal head. No tenderness with palpation, no associated erythema, calor, or open wound noted. Skin:     General: Skin is warm and dry. Capillary Refill: Capillary refill takes less than 2 seconds. Coloration: Skin is not jaundiced. Findings: No bruising, erythema or rash. Neurological:      General: No focal deficit present. Mental Status: She is alert and oriented to person, place, and time. Cranial Nerves: No cranial nerve deficit. Gait: Gait normal.   Psychiatric:         Mood and Affect: Mood normal.      PROVISIONAL DIAGNOSES / SURGERY:      EXCISION SOFT TISSUE MASS FOOT    SOFT TISSUE MASS RIGHT FOOT    Patient Active Problem List    Diagnosis Date Noted    Vitamin D deficiency 2022    Anxiety 2022    S/P cholecystectomy 2022    Obesity (BMI 30-39.9) 2022    S/P laparoscopic sleeve gastrectomy 2021    IUD (intrauterine device) in place 2020    Impaired glucose regulation with features of insulin resistance 2020    H/O:  section 2020    Recurrent pregnancy loss 2019    Anticardiolipin antibody low positive x1 (26.9 on 3/19/19 2019    Attention deficit hyperactivity disorder, predominantly inattentive type 2019    Spondylosis without myelopathy or radiculopathy, lumbosacral region 2019    Rheumatoid arthritis with rheumatoid factor, unspecified ([address] 2019    Lumbar radiculopathy 2019    Lumbar spondylosis 2019    AMA 10/10/2018    Low antithrombin III x1, repeat WNL 2017    Antiphospholipid syndrome in pregnancy ([address] 2017    Prediabetes 2017    Chronic low back pain 2017    Migraine without aura, not refractory 2017    GERD without esophagitis 2017    Hidradenitis 2017         Based on my personal evaluation of patient including review of patient's chart, no clearance required for scheduled surgery.     Chuckie Ugarte, APRN - CNP on 3/9/2023 at 6:33 AM    Total time spent on encounter- PAT provider minutes: 31-40 minutes

## 2023-03-06 NOTE — H&P (VIEW-ONLY)
Otherwise, denies personal and family history of complications with anesthesia. RECENT LABS, IMAGING AND TESTING     EKG done today in PAT with prelim result: Normal sinus rhythm with sinus arrhythmia, normal EKG    Lab Results   Component Value Date    WBC 10.8 03/08/2023    RBC 4.70 03/08/2023    HGB 13.2 03/08/2023    HCT 41.0 03/08/2023    MCV 87.3 03/08/2023    MCH 28.2 03/08/2023    MCHC 32.2 03/08/2023    RDW 14.8 03/08/2023     03/08/2023    MPV 8.2 03/08/2023        Lab Results   Component Value Date     03/08/2023    K 4.0 03/08/2023     03/08/2023    CO2 24 03/08/2023    BUN 12 03/08/2023    CREATININE 0.66 03/08/2023    GLUCOSE 85 03/08/2023    CALCIUM 8.7 03/08/2023    PROT 6.4 10/05/2022    LABALBU 3.3 (L) 10/05/2022    BILITOT 0.5 10/05/2022    ALKPHOS 68 10/05/2022    AST 10 10/05/2022    ALT 6 10/05/2022         PAST MEDICAL HISTORY     Past Medical History:   Diagnosis Date    Abnormal 1st trimester screen (Tri 21 1:117)--NIPT nml  11/30/2019    ADD (attention deficit disorder) without hyperactivity     Antiphospholipid syndrome (HCC)     [facility]estone 1/9 & 1/10 01/09/2020    Chronic back pain     COVID 09/15/2021    BODY ACHE, COUGH, FEVER, CHEST PAIN, N&V X 3 WEEKS. STILL HAS COUGH.     Dehydration 12/28/2021    Echogenic focus of heart of fetus affecting antepartum care of mother 11/30/2019    Elev early 1hr, nml 3hr  11/30/2019    1 elevated value, [facility] recommends repeat 3hr GTT in 2 weeks    Elevated blood-pressure reading without diagnosis of hypertension 11/30/2019    Elevated umbilical artery dopplers  01/10/2020    Eosinopenia (HCC)     Fetal ascites     Fetal heart rate decelerations affecting management of mother     GERD (gastroesophageal reflux disease)     Headache     migraines    Iron deficiency     follows with Dr. Emily Heck (hem/onc)    IUD (intrauterine device) in place 05/07/2020    Adina LEONG    Lumbar radiculopathy 01/09/2019    Lumbar spondylosis Capillary Refill: Capillary refill takes less than 2 seconds. Coloration: Skin is not jaundiced. Findings: No bruising, erythema or rash. Neurological:      General: No focal deficit present. Mental Status: She is alert and oriented to person, place, and time. Cranial Nerves: No cranial nerve deficit. Gait: Gait normal.   Psychiatric:         Mood and Affect: Mood normal.      PROVISIONAL DIAGNOSES / SURGERY:      EXCISION SOFT TISSUE MASS FOOT    SOFT TISSUE MASS RIGHT FOOT    Patient Active Problem List    Diagnosis Date Noted    Vitamin D deficiency 2022    Anxiety 2022    S/P cholecystectomy 2022    Obesity (BMI 30-39.9) 2022    S/P laparoscopic sleeve gastrectomy 2021    IUD (intrauterine device) in place 2020    Impaired glucose regulation with features of insulin resistance 2020    H/O:  section 2020    Recurrent pregnancy loss 2019    Anticardiolipin antibody low positive x1 (26.9 on 3/19/19 2019    Attention deficit hyperactivity disorder, predominantly inattentive type 2019    Spondylosis without myelopathy or radiculopathy, lumbosacral region 2019    Rheumatoid arthritis with rheumatoid factor, unspecified ([address] 2019    Lumbar radiculopathy 2019    Lumbar spondylosis 2019    AMA 10/10/2018    Low antithrombin III x1, repeat WNL 2017    Antiphospholipid syndrome in pregnancy ([address] 2017    Prediabetes 2017    Chronic low back pain 2017    Migraine without aura, not refractory 2017    GERD without esophagitis 2017    Hidradenitis 2017         Based on my personal evaluation of patient including review of patient's chart, no clearance required for scheduled surgery.     MIKHAIL Arana - CNP on 3/9/2023 at 6:33 AM    Total time spent on encounter- PAT provider minutes: 31-40 minutes

## 2023-03-08 ENCOUNTER — HOSPITAL ENCOUNTER (OUTPATIENT)
Dept: PREADMISSION TESTING | Age: 43
Discharge: HOME | End: 2023-03-08
Attending: PODIATRIST | Admitting: PODIATRIST
Payer: MEDICAID

## 2023-03-08 VITALS
RESPIRATION RATE: 16 BRPM | SYSTOLIC BLOOD PRESSURE: 101 MMHG | WEIGHT: 176 LBS | DIASTOLIC BLOOD PRESSURE: 69 MMHG | HEIGHT: 63 IN | OXYGEN SATURATION: 100 % | BODY MASS INDEX: 31.18 KG/M2 | TEMPERATURE: 98 F | HEART RATE: 84 BPM

## 2023-03-08 LAB
ABSOLUTE EOS #: 0.7 K/UL (ref 0–0.4)
ABSOLUTE LYMPH #: 2.4 K/UL (ref 1–4.8)
ABSOLUTE MONO #: 0.4 K/UL (ref 0.1–1.3)
ANION GAP SERPL CALCULATED.3IONS-SCNC: 10 MMOL/L (ref 9–17)
BASOPHILS # BLD: 1 % (ref 0–2)
BASOPHILS ABSOLUTE: 0.1 K/UL (ref 0–0.2)
BUN SERPL-MCNC: 12 MG/DL (ref 6–20)
CALCIUM SERPL-MCNC: 8.7 MG/DL (ref 8.6–10.4)
CHLORIDE SERPL-SCNC: 106 MMOL/L (ref 98–107)
CO2 SERPL-SCNC: 24 MMOL/L (ref 20–31)
CREAT SERPL-MCNC: 0.66 MG/DL (ref 0.5–0.9)
EKG ATRIAL RATE: 74 BPM
EKG P AXIS: 39 DEGREES
EKG P-R INTERVAL: 128 MS
EKG Q-T INTERVAL: 372 MS
EKG QRS DURATION: 76 MS
EKG QTC CALCULATION (BAZETT): 412 MS
EKG R AXIS: 47 DEGREES
EKG T AXIS: 27 DEGREES
EKG VENTRICULAR RATE: 74 BPM
EOSINOPHILS RELATIVE PERCENT: 6 % (ref 0–4)
GFR SERPL CREATININE-BSD FRML MDRD: >60 ML/MIN/1.73M2
GLUCOSE SERPL-MCNC: 85 MG/DL (ref 70–99)
HCT VFR BLD AUTO: 41 % (ref 36–46)
HGB BLD-MCNC: 13.2 G/DL (ref 12–16)
LYMPHOCYTES # BLD: 22 % (ref 24–44)
MCH RBC QN AUTO: 28.2 PG (ref 26–34)
MCHC RBC AUTO-ENTMCNC: 32.2 G/DL (ref 31–37)
MCV RBC AUTO: 87.3 FL (ref 80–100)
MONOCYTES # BLD: 3 % (ref 1–7)
PDW BLD-RTO: 14.8 % (ref 11.5–14.9)
PLATELET # BLD AUTO: 363 K/UL (ref 150–450)
PMV BLD AUTO: 8.2 FL (ref 6–12)
POTASSIUM SERPL-SCNC: 4 MMOL/L (ref 3.7–5.3)
RBC # BLD: 4.7 M/UL (ref 4–5.2)
SEG NEUTROPHILS: 68 % (ref 36–66)
SEGMENTED NEUTROPHILS ABSOLUTE COUNT: 7.3 K/UL (ref 1.3–9.1)
SODIUM SERPL-SCNC: 140 MMOL/L (ref 135–144)
WBC # BLD AUTO: 10.8 K/UL (ref 3.5–11)

## 2023-03-08 PROCEDURE — APPSS45 APP SPLIT SHARED TIME 31-45 MINUTES: Performed by: NURSE PRACTITIONER

## 2023-03-08 PROCEDURE — 85025 COMPLETE CBC W/AUTO DIFF WBC: CPT

## 2023-03-08 PROCEDURE — 93005 ELECTROCARDIOGRAM TRACING: CPT | Performed by: ANESTHESIOLOGY

## 2023-03-08 PROCEDURE — 80048 BASIC METABOLIC PNL TOTAL CA: CPT

## 2023-03-08 PROCEDURE — 36415 COLL VENOUS BLD VENIPUNCTURE: CPT

## 2023-03-08 PROCEDURE — 93010 ELECTROCARDIOGRAM REPORT: CPT | Performed by: INTERNAL MEDICINE

## 2023-03-08 ASSESSMENT — ENCOUNTER SYMPTOMS
SORE THROAT: 0
DIARRHEA: 0
VOMITING: 0
BACK PAIN: 1
WHEEZING: 0
ABDOMINAL PAIN: 0
SHORTNESS OF BREATH: 0
NAUSEA: 0
COUGH: 0
CONSTIPATION: 0

## 2023-03-08 NOTE — DISCHARGE INSTRUCTIONS
Pre-op Instructions For Out-Patient Surgery    Medication Instructions:  Please stop herbs and any supplements now (includes vitamins and minerals). Please contact your surgeon and prescribing physician for pre-op instructions for any blood thinners. If you have inhalers/aerosol treatments at home, please use them the morning of your surgery and bring the inhalers with you to the hospital.    Please take the following medications the morning of your surgery with a sip of water:    Prednisone    Surgery Instructions:  After midnight before surgery:  Do not eat or drink anything, including water, mints, gum, and hard candy. You may brush your teeth without swallowing. No smoking, chewing tobacco, or street drugs. Please shower or bathe before surgery. If you were given Surgical Scrub Chlorhexidine Gluconate Liquid (CHG), please shower the night before and the morning of your surgery following the detailed instructions you received during your pre-admission visit. Please do not wear any cologne, lotion, powder, deodorant, jewelry, piercings, perfume, makeup, nail polish, hair accessories, or hair spray on the day of surgery. Wear loose comfortable clothing. Leave your valuables at home. Bring a storage case for any glasses/contacts. An adult who is responsible for you MUST drive you home and should be with you for the first 24 hours after surgery. If having out-patient knee and foot surgeries, please arrange for planned crutches, walker, or wheelchair before arriving to the hospital.    The Day of Surgery:  Arrive at [facility] AT [facility] Surgery Entr[facility] at the time directed by your surgeon and check in at the desk. If you have a living will or healthcare power of , please bring a copy. You will be taken to the pre-op holding area where you will be prepared for surgery.   A physical assessment will be performed by a nurse practitioner or house officer. Your IV will be started and you will meet your anesthesiologist.    When you go to surgery, your family will be directed to the surgical waiting room, where the doctor should speak with them after your surgery. After surgery, you will be taken to the recovery room then when you are awake and stable you will go to the short stay unit for preparation to be discharged. If you use a Bi-PAP or C-PAP machine, please bring it with you and leave it in the car in case it is needed in recovery room.

## 2023-03-21 ENCOUNTER — TELEPHONE (OUTPATIENT)
Dept: FAMILY MEDICINE CLINIC | Age: 43
End: 2023-03-21

## 2023-03-21 ENCOUNTER — ANESTHESIA EVENT (OUTPATIENT)
Dept: OPERATING ROOM | Age: 43
End: 2023-03-21
Payer: MEDICAID

## 2023-03-21 NOTE — TELEPHONE ENCOUNTER
----- Message from [employer] [facility] sent at 3/17/2023 10:24 AM EDT -----  Subject: Appointment Request    Reason for Call: Established Patient Appointment needed: Semi-Routine   Cough, Cold Symptoms    QUESTIONS    Reason for appointment request? No appointments available during search     Additional Information for Provider?  Patient requests call back for   appointment with Dr. Mike Bob for a cough she only has in the morning with   yellow or green mucas  ---------------------------------------------------------------------------  --------------  Cornelio Fears INFO  [phone]; OK to leave message on voicemail  ---------------------------------------------------------------------------  --------------  SCRIPT ANSWERS  COVID Screen: Red

## 2023-03-21 NOTE — TELEPHONE ENCOUNTER
Patient requests call back for   appointment with Dr. Marisabel Bach for a cough she only has in the morning with   yellow or green mucas    Lvm for pt to see if she still needs appt

## 2023-03-21 NOTE — PRE-PROCEDURE INSTRUCTIONS
No answer, left message ? Unable to leave message ? When were you told to arrive at hospital ?  -0700    Do you have a  ?-YES    Are you on any blood thinners ? -NONE                   If yes when did you stop taking ? Do you have your prep Rx filled and instruction ?  -N/A    Nothing to eat the day before , only clear liquids. -N/A    Are you experiencing any covid symptoms ? -NONE    Do you have any infections or rash we should be aware of ?-NONE      Do you have the Hibiclens soap to use the night before and the morning of surgery ? -YES    Nothing to eat or drink after midnight, only a sip of water to take any medication instructed to take the night before. -INSTRUCTED    Wear comfortable clothing, leave any valuables at home, remove any jewelry and body piercing . -INSTRUCTED

## 2023-03-22 ENCOUNTER — ANESTHESIA (OUTPATIENT)
Dept: OPERATING ROOM | Age: 43
End: 2023-03-22
Payer: MEDICAID

## 2023-03-22 ENCOUNTER — HOSPITAL ENCOUNTER (OUTPATIENT)
Age: 43
Setting detail: OUTPATIENT SURGERY
Discharge: HOME | End: 2023-03-22
Attending: PODIATRIST | Admitting: PODIATRIST
Payer: MEDICAID

## 2023-03-22 VITALS
HEIGHT: 63 IN | TEMPERATURE: 97.2 F | SYSTOLIC BLOOD PRESSURE: 90 MMHG | BODY MASS INDEX: 31.18 KG/M2 | RESPIRATION RATE: 16 BRPM | OXYGEN SATURATION: 100 % | HEART RATE: 64 BPM | WEIGHT: 176 LBS | DIASTOLIC BLOOD PRESSURE: 54 MMHG

## 2023-03-22 DIAGNOSIS — M79.89 MASS OF SOFT TISSUE OF FOOT: ICD-10-CM

## 2023-03-22 DIAGNOSIS — G89.18 POST-OP PAIN: Primary | ICD-10-CM

## 2023-03-22 LAB — HCG, PREGNANCY URINE (POC): NEGATIVE

## 2023-03-22 PROCEDURE — 6360000002 HC RX W HCPCS

## 2023-03-22 PROCEDURE — 7100000000 HC PACU RECOVERY - FIRST 15 MIN: Performed by: PODIATRIST

## 2023-03-22 PROCEDURE — 3600000002 HC SURGERY LEVEL 2 BASE: Performed by: PODIATRIST

## 2023-03-22 PROCEDURE — 88305 TISSUE EXAM BY PATHOLOGIST: CPT

## 2023-03-22 PROCEDURE — 3700000001 HC ADD 15 MINUTES (ANESTHESIA): Performed by: PODIATRIST

## 2023-03-22 PROCEDURE — 28041 EXC FOOT/TOE TUM DEP 1.5CM/>: CPT | Performed by: PODIATRIST

## 2023-03-22 PROCEDURE — 7100000001 HC PACU RECOVERY - ADDTL 15 MIN: Performed by: PODIATRIST

## 2023-03-22 PROCEDURE — 88342 IMHCHEM/IMCYTCHM 1ST ANTB: CPT

## 2023-03-22 PROCEDURE — 3600000012 HC SURGERY LEVEL 2 ADDTL 15MIN: Performed by: PODIATRIST

## 2023-03-22 PROCEDURE — 2500000003 HC RX 250 WO HCPCS: Performed by: PODIATRIST

## 2023-03-22 PROCEDURE — 7100000011 HC PHASE II RECOVERY - ADDTL 15 MIN: Performed by: PODIATRIST

## 2023-03-22 PROCEDURE — 81025 URINE PREGNANCY TEST: CPT

## 2023-03-22 PROCEDURE — 3700000000 HC ANESTHESIA ATTENDED CARE: Performed by: PODIATRIST

## 2023-03-22 PROCEDURE — 2580000003 HC RX 258: Performed by: ANESTHESIOLOGY

## 2023-03-22 PROCEDURE — 7100000031 HC ASPR PHASE II RECOVERY - ADDTL 15 MIN: Performed by: PODIATRIST

## 2023-03-22 PROCEDURE — 7100000030 HC ASPR PHASE II RECOVERY - FIRST 15 MIN: Performed by: PODIATRIST

## 2023-03-22 PROCEDURE — 7100000010 HC PHASE II RECOVERY - FIRST 15 MIN: Performed by: PODIATRIST

## 2023-03-22 PROCEDURE — 2709999900 HC NON-CHARGEABLE SUPPLY: Performed by: PODIATRIST

## 2023-03-22 PROCEDURE — 2500000003 HC RX 250 WO HCPCS: Performed by: ANESTHESIOLOGY

## 2023-03-22 RX ORDER — FENTANYL CITRATE 50 UG/ML
INJECTION, SOLUTION INTRAMUSCULAR; INTRAVENOUS PRN
Status: DISCONTINUED | OUTPATIENT
Start: 2023-03-22 | End: 2023-03-22 | Stop reason: SDUPTHER

## 2023-03-22 RX ORDER — LIDOCAINE HYDROCHLORIDE 10 MG/ML
INJECTION, SOLUTION INFILTRATION; PERINEURAL PRN
Status: DISCONTINUED | OUTPATIENT
Start: 2023-03-22 | End: 2023-03-22 | Stop reason: ALTCHOICE

## 2023-03-22 RX ORDER — MIDAZOLAM HYDROCHLORIDE 1 MG/ML
INJECTION INTRAMUSCULAR; INTRAVENOUS PRN
Status: DISCONTINUED | OUTPATIENT
Start: 2023-03-22 | End: 2023-03-22 | Stop reason: SDUPTHER

## 2023-03-22 RX ORDER — FENTANYL CITRATE 0.05 MG/ML
25 INJECTION, SOLUTION INTRAMUSCULAR; INTRAVENOUS EVERY 5 MIN PRN
Status: DISCONTINUED | OUTPATIENT
Start: 2023-03-22 | End: 2023-03-22 | Stop reason: HOSPADM

## 2023-03-22 RX ORDER — DOXYCYCLINE HYCLATE 100 MG
100 TABLET ORAL DAILY
Qty: 7 TABLET | Refills: 0 | Status: SHIPPED | OUTPATIENT
Start: 2023-03-22 | End: 2023-03-29

## 2023-03-22 RX ORDER — SODIUM CHLORIDE 0.9 % (FLUSH) 0.9 %
5-40 SYRINGE (ML) INJECTION EVERY 12 HOURS SCHEDULED
Status: DISCONTINUED | OUTPATIENT
Start: 2023-03-22 | End: 2023-03-22 | Stop reason: HOSPADM

## 2023-03-22 RX ORDER — PROPOFOL 10 MG/ML
INJECTION, EMULSION INTRAVENOUS PRN
Status: DISCONTINUED | OUTPATIENT
Start: 2023-03-22 | End: 2023-03-22 | Stop reason: SDUPTHER

## 2023-03-22 RX ORDER — SODIUM CHLORIDE 9 MG/ML
INJECTION, SOLUTION INTRAVENOUS PRN
Status: DISCONTINUED | OUTPATIENT
Start: 2023-03-22 | End: 2023-03-22 | Stop reason: HOSPADM

## 2023-03-22 RX ORDER — SODIUM CHLORIDE 0.9 % (FLUSH) 0.9 %
5-40 SYRINGE (ML) INJECTION PRN
Status: DISCONTINUED | OUTPATIENT
Start: 2023-03-22 | End: 2023-03-22 | Stop reason: HOSPADM

## 2023-03-22 RX ORDER — SODIUM CHLORIDE, SODIUM LACTATE, POTASSIUM CHLORIDE, CALCIUM CHLORIDE 600; 310; 30; 20 MG/100ML; MG/100ML; MG/100ML; MG/100ML
INJECTION, SOLUTION INTRAVENOUS CONTINUOUS
Status: DISCONTINUED | OUTPATIENT
Start: 2023-03-22 | End: 2023-03-22 | Stop reason: HOSPADM

## 2023-03-22 RX ORDER — METOCLOPRAMIDE HYDROCHLORIDE 5 MG/ML
10 INJECTION INTRAMUSCULAR; INTRAVENOUS
Status: DISCONTINUED | OUTPATIENT
Start: 2023-03-22 | End: 2023-03-22 | Stop reason: HOSPADM

## 2023-03-22 RX ORDER — KETOROLAC TROMETHAMINE 30 MG/ML
INJECTION, SOLUTION INTRAMUSCULAR; INTRAVENOUS PRN
Status: DISCONTINUED | OUTPATIENT
Start: 2023-03-22 | End: 2023-03-22 | Stop reason: SDUPTHER

## 2023-03-22 RX ORDER — OXYCODONE HYDROCHLORIDE AND ACETAMINOPHEN 5; 325 MG/1; MG/1
1 TABLET ORAL EVERY 6 HOURS PRN
Qty: 28 TABLET | Refills: 0 | Status: SHIPPED | OUTPATIENT
Start: 2023-03-22 | End: 2023-03-29

## 2023-03-22 RX ORDER — BUPIVACAINE HYDROCHLORIDE 5 MG/ML
INJECTION, SOLUTION EPIDURAL; INTRACAUDAL PRN
Status: DISCONTINUED | OUTPATIENT
Start: 2023-03-22 | End: 2023-03-22 | Stop reason: ALTCHOICE

## 2023-03-22 RX ORDER — LIDOCAINE HYDROCHLORIDE 10 MG/ML
1 INJECTION, SOLUTION EPIDURAL; INFILTRATION; INTRACAUDAL; PERINEURAL
Status: COMPLETED | OUTPATIENT
Start: 2023-03-22 | End: 2023-03-22

## 2023-03-22 RX ORDER — ONDANSETRON 2 MG/ML
4 INJECTION INTRAMUSCULAR; INTRAVENOUS
Status: DISCONTINUED | OUTPATIENT
Start: 2023-03-22 | End: 2023-03-22 | Stop reason: HOSPADM

## 2023-03-22 RX ORDER — DIPHENHYDRAMINE HYDROCHLORIDE 50 MG/ML
12.5 INJECTION INTRAMUSCULAR; INTRAVENOUS
Status: DISCONTINUED | OUTPATIENT
Start: 2023-03-22 | End: 2023-03-22 | Stop reason: HOSPADM

## 2023-03-22 RX ADMIN — LIDOCAINE HYDROCHLORIDE 1 ML: 10 INJECTION, SOLUTION EPIDURAL; INFILTRATION; INTRACAUDAL; PERINEURAL at 07:44

## 2023-03-22 RX ADMIN — SODIUM CHLORIDE, POTASSIUM CHLORIDE, SODIUM LACTATE AND CALCIUM CHLORIDE: 600; 310; 30; 20 INJECTION, SOLUTION INTRAVENOUS at 07:44

## 2023-03-22 RX ADMIN — PROPOFOL INJECTABLE EMULSION 50 MG: 10 INJECTION, EMULSION INTRAVENOUS at 09:03

## 2023-03-22 RX ADMIN — PROPOFOL INJECTABLE EMULSION 125 MCG/KG/MIN: 10 INJECTION, EMULSION INTRAVENOUS at 09:04

## 2023-03-22 RX ADMIN — FENTANYL CITRATE 25 MCG: 50 INJECTION, SOLUTION INTRAMUSCULAR; INTRAVENOUS at 09:07

## 2023-03-22 RX ADMIN — KETOROLAC TROMETHAMINE 30 MG: 30 INJECTION, SOLUTION INTRAMUSCULAR at 09:55

## 2023-03-22 RX ADMIN — MIDAZOLAM 2 MG: 1 INJECTION INTRAMUSCULAR; INTRAVENOUS at 08:59

## 2023-03-22 RX ADMIN — FENTANYL CITRATE 25 MCG: 50 INJECTION, SOLUTION INTRAMUSCULAR; INTRAVENOUS at 09:18

## 2023-03-22 ASSESSMENT — ENCOUNTER SYMPTOMS: STRIDOR: 0

## 2023-03-22 ASSESSMENT — PAIN - FUNCTIONAL ASSESSMENT: PAIN_FUNCTIONAL_ASSESSMENT: 0-10

## 2023-03-22 ASSESSMENT — PAIN SCALES - GENERAL: PAINLEVEL_OUTOF10: 0

## 2023-03-22 NOTE — ANESTHESIA POSTPROCEDURE EVALUATION
POST- ANESTHESIA EVALUATION       Pt Name: Samantha Weber  MRN: [id number]  YOB: 1980  Date of evaluation: 3/22/2023  Time:  10:40 AM      BP 83/60   Pulse 60   Temp 97.5 °F (36.4 °C) (Infrared)   Resp 13   Ht 5' 3\" (1.6 m)   Wt 176 lb (79.8 kg)   SpO2 99%   BMI 31.18 kg/m²      Consciousness Level  Awake  Cardiopulmonary Status  Stable  Pain Adequately Treated YES  Nausea / Vomiting  NO  Adequate Hydration  YES  Anesthesia Related Complications NONE      Electronically signed by Teddy Irene MD on 3/22/2023 at 10:40 AM       Department of Anesthesiology  Postprocedure Note    Patient: Samantha Weber  MRN: [id number]  YOB: 1980  Date of evaluation: 3/22/2023      Procedure Summary     Date: 03/22/23 Room / Location: [address] [place] [address] / [facility]: [facility]    Anesthesia Start: 2687 Anesthesia Stop: 3162    Procedure: EXCISION SOFT TISSUE MASS FOOT (Right: Foot) Diagnosis:       Mass of soft tissue of foot      (SOFT TISSUE MASS RIGHT FOOT)    Surgeons: Marissa Forrester DPM Responsible Provider: Teddy Irene MD    Anesthesia Type: general, MAC ASA Status: 3          Anesthesia Type: No value filed.     Tapan Phase I: Tapan Score: 9    Tapan Phase II:        Anesthesia Post Evaluation

## 2023-03-22 NOTE — OP NOTE
Operative Note    Patient: Seema Starr  YOB: 1980  MRN: [id number]     Date of Procedure: 3/22/2023     Pre-Op Diagnosis: SOFT TISSUE MASS RIGHT FOOT     Post-Op Diagnosis: Same      Procedures: Excision of soft tissue mass, right foot     Surgeon(s):  Te Good DPM     Assistant:  Resident: Alex Underwood DPM     Anesthesia: Monitor Anesthesia Care, 10 cc 0.5% Marcaine plain preoperatively, 5 cc 1% lidocaine plain intraoperatively     Hemostasis: Pneumatic ankle tourniquet was applied to right ankle and inflated to 250 mmHg throughout the entirety of the procedure. Additionally direct pressure and cauterization were used for hemostasis. Estimated Blood Loss (mL): Minimal     Injectables: None      Complications: None     Specimens:   ID Type Source Tests Collected by Time Destination   A : RIGHT FOOT SOFT TISSUE MASS Tissue Foot SURGICAL PATHOLOGY Te Good DPM 3/22/2023 0940           Implants:  * No implants in log *      Drains: * No LDAs found *     Findings: Soft tissue mass noted between fourth and fifth metatarsal spaces. Dorsal approach was used. Soft tissue mass was excised to its entirety. Care was taken to ensure neurovascular structures were left intact. Specimen was sent for pathology. See below for further details. Detailed Description of Procedure:   INDICATIONS FOR PROCEDURE: Patient is a 72-year-old female who is been dealing with pain of the right fourth interspace foot for quite some time. Plans to the plantar aspect of this area believed to be soft tissue mass of unknown etiology. Pain has continued to progress to the point of limiting ambulation. Patient has failed numerous conservative therapies at this point. Surgery is indicated to help reduce pain and ease ambulation. All risk  and benefits were discussed in detail with the patient. No guarantees were given or implied. Consent was signed and placed in chart.       PROCEDURE IN

## 2023-03-22 NOTE — INTERVAL H&P NOTE
Update History & Physical    The patient's History and Physical of March 8, 2023 was reviewed with the patient and I examined the patient. There was no change. The surgical site was confirmed by the patient and me. Pt undergoing for  EXCISION SOFT TISSUE MASS RIGHT FOOT per Dr. Annette Vickers. Pt denies fever/chills, chest pain or SOB  Pt NPO since the past midnight , no am medication today   Pt has hx of blood clots during pregnancy 2019  Pt denies taking any blood thinner medication   Pt denies hx of MRSA infection 2014  Pt has History of PONV. Otherwise, denies personal and family history of complications with anesthesia  Physical exam remains unchanged including cardiac and pulmonary assessment  See nursing flow sheet for vital signs    Lab Results   Component Value Date    WBC 10.8 03/08/2023    HGB 13.2 03/08/2023    HCT 41.0 03/08/2023    MCV 87.3 03/08/2023     03/08/2023     Lab Results   Component Value Date/Time     03/08/2023 04:26 PM    K 4.0 03/08/2023 04:26 PM     03/08/2023 04:26 PM    CO2 24 03/08/2023 04:26 PM    BUN 12 03/08/2023 04:26 PM    CREATININE 0.66 03/08/2023 04:26 PM    GLUCOSE 85 03/08/2023 04:26 PM    GLUCOSE 90 11/01/2021 02:15 PM    CALCIUM 8.7 03/08/2023 04:26 PM            Plan: The risks, benefits, expected outcome, and alternative to the recommended procedure have been discussed with the patient. Patient understands and wants to proceed with the procedure.      Electronically signed by MIKHAIL Valera CNP on 3/22/2023 at 7:09 AM

## 2023-03-22 NOTE — DISCHARGE INSTRUCTIONS
Podiatric Post Operative Instructions: You have had a surgical procedure on your right foot. Fluids and Diet:  Begin with clear liquids, broth, dry toast, and crackers. If not nauseated then resume your regular pre-operative diet when you are ready    Medications: Take your prescriptions as directed  You are receiving new prescriptions for Percocet for pain and doxycycline to prevent infection. If your pain is not severe then you may take the non-prescription medication that you normally take for aches and pains  You may resume your regularly scheduled medications (unless otherwise directed)  If any side effects or adverse reactions occur, discontinue the medication and contact your doctor. Review the patient drug information that is provided before you take any medication    Ambulation and Activity:  You are advised to go directly home from the hospital  Use crutches or scooter as needed  You may put weight on the operated foot but only lightly to the heel in a surgical shoe. Otherwise stay off operated foot. .  You should wear the surgical shoe on the operated foot at all times when awake. Avoid stairs if possible. Do not lift or move heavy objects  Do not drive until cleared by your physician    Bandage and Wound Care Instructions:  Keep bandage clean and dry  Do not shower or bathe the operative extremity  Do not remove the bandage (unless otherwise directed)   Do not attempt to put anything between the cast or dressing and your skin, some itching is normal.    Ice and Elevation:  Elevate operative extremity as much as possible to reduce swelling and discomfort. Elevate with 2 pillows at or above the level of the heart for the first 72 hours. Ice:  [facility] dispensed insulated ice bag over the bandage 20 minutes of every hour while awake for the first 72 hours. You may ice behind the knee as well.     Special Instructions: Call your doctor immediately if you develop any of the following. Fever over 100.4 degrees Fahrenheit by mouth - take your temperature daily until your first follow up visit. Pain not relieved by medication ordered  Swelling, increased redness, warmth, or hardness around operative area. Numb, tingling or cold toes. Toe(s) become white or bluish  Bandage becomes wet, soiled, or blood soaked (small amount of bleeding may be normal)  Increased or progressive drainage from surgical area. Follow up instructions: You will need to follow up with Dr. Suyapa Masterson DPM   Call when you get home to schedule or confirm your appointment. Call your Podiatrist office if you have any questions or concerns.

## 2023-03-22 NOTE — ANESTHESIA PRE PROCEDURE
last solid consumption: 2259                        Date of last liquid consumption: 03/21/23                        Date of last solid food consumption: 03/21/23    BMI:   Wt Readings from Last 3 Encounters:   03/22/23 176 lb (79.8 kg)   03/08/23 176 lb (79.8 kg)   02/13/23 174 lb (78.9 kg)     Body mass index is 31.18 kg/m². CBC:   Lab Results   Component Value Date/Time    WBC 10.8 03/08/2023 02:15 PM    RBC 4.70 03/08/2023 02:15 PM    RBC 4.77 11/01/2021 02:15 PM    HGB 13.2 03/08/2023 02:15 PM    HCT 41.0 03/08/2023 02:15 PM    MCV 87.3 03/08/2023 02:15 PM    RDW 14.8 03/08/2023 02:15 PM     03/08/2023 02:15 PM       CMP:   Lab Results   Component Value Date/Time     03/08/2023 04:26 PM    K 4.0 03/08/2023 04:26 PM     03/08/2023 04:26 PM    CO2 24 03/08/2023 04:26 PM    BUN 12 03/08/2023 04:26 PM    CREATININE 0.66 03/08/2023 04:26 PM    GFRAA >60 05/17/2022 09:42 AM    LABGLOM >60 03/08/2023 04:26 PM    GLUCOSE 85 03/08/2023 04:26 PM    GLUCOSE 90 11/01/2021 02:15 PM    PROT 6.4 10/05/2022 12:08 PM    PROT 7.4 11/01/2021 02:15 PM    CALCIUM 8.7 03/08/2023 04:26 PM    BILITOT 0.5 10/05/2022 12:08 PM    ALKPHOS 68 10/05/2022 12:08 PM    AST 10 10/05/2022 12:08 PM    ALT 6 10/05/2022 12:08 PM       POC Tests: No results for input(s): POCGLU, POCNA, POCK, POCCL, POCBUN, POCHEMO, POCHCT in the last 72 hours.     Coags:   Lab Results   Component Value Date/Time    PROTIME 10.7 04/06/2022 09:18 AM    INR 1.0 04/06/2022 09:18 AM    APTT 24.9 09/07/2021 12:05 PM       HCG (If Applicable):   Lab Results   Component Value Date    HCG NEGATIVE 03/22/2023    HCGQUANT <1 05/06/2020        ABGs: No results found for: PHART, PO2ART, BFW9GSM, LUE9FRY, BEART, H7CIGQZD     Type & Screen (If Applicable):  No results found for: LABABO, LABRH    Drug/Infectious Status (If Applicable):  Lab Results   Component Value Date/Time    HEPCAB NONREACTIVE 07/23/2021 11:42 AM       COVID-19 Screening (If Applicable):

## 2023-03-22 NOTE — PROGRESS NOTES
CLINICAL PHARMACY NOTE: MEDS TO BEDS    Total # of Prescriptions Filled: 2   The following medications were delivered to the patient:  Percocet 5/325  Doxycycline Hyclate 100mg    Additional Documentation:  Medication picked up in pharmacy by family

## 2023-03-22 NOTE — BRIEF OP NOTE
Brief Postoperative Note      Patient: Aysha Garcia  YOB: 1980  MRN: [id number]    Date of Procedure: 3/22/2023    Pre-Op Diagnosis: SOFT TISSUE MASS RIGHT FOOT    Post-Op Diagnosis: Same     Procedures: Excision of soft tissue mass, right foot    Surgeon(s):  Cristiane Pastrana DPM    Assistant:  Resident: Cecilia Lee DPM    Anesthesia: Monitor Anesthesia Care, 10 cc 0.5% Marcaine plain preoperatively, 5 cc 1% lidocaine plain intraoperatively    Hemostasis: Pneumatic ankle tourniquet was applied to right ankle and inflated to 250 mmHg throughout the entirety of the procedure. Additionally direct pressure and cauterization were used for hemostasis. Estimated Blood Loss (mL): Minimal    Injectables: None     Complications: None    Specimens:   ID Type Source Tests Collected by Time Destination   A : RIGHT FOOT SOFT TISSUE MASS Tissue Foot SURGICAL PATHOLOGY Cristiane Pastrana DPM 3/22/2023 0940        Implants:  * No implants in log *      Drains: * No LDAs found *    Findings: Soft tissue mass noted between fourth and fifth metatarsal spaces. Dorsal approach was used. Soft tissue mass was excised to its entirety. Care was taken to ensure neurovascular structures were left intact. Specimen was sent for pathology. See op note for further details.     Electronically signed by Cecilia Lee DPM on 3/22/2023 at 10:11 AM

## 2023-03-23 PROBLEM — M79.671 RIGHT FOOT PAIN: Status: ACTIVE | Noted: 2023-03-23

## 2023-03-23 PROBLEM — D49.2 SOFT TISSUE TUMOR OF RIGHT FOOT: Status: ACTIVE | Noted: 2023-03-23

## 2023-03-24 LAB — SURGICAL PATHOLOGY REPORT: NORMAL

## 2023-03-27 ENCOUNTER — OFFICE VISIT (OUTPATIENT)
Dept: PODIATRY | Age: 43
End: 2023-03-27

## 2023-03-27 VITALS — WEIGHT: 176 LBS | BODY MASS INDEX: 31.18 KG/M2 | HEIGHT: 63 IN

## 2023-03-27 DIAGNOSIS — M79.89 MASS OF SOFT TISSUE OF FOOT: Primary | ICD-10-CM

## 2023-03-27 DIAGNOSIS — M79.671 PAIN IN RIGHT FOOT: ICD-10-CM

## 2023-03-27 PROCEDURE — 99024 POSTOP FOLLOW-UP VISIT: CPT | Performed by: PODIATRIST

## 2023-03-27 ASSESSMENT — ENCOUNTER SYMPTOMS
DIARRHEA: 0
BACK PAIN: 0
COLOR CHANGE: 0
NAUSEA: 0
SHORTNESS OF BREATH: 0

## 2023-03-27 NOTE — PROGRESS NOTES
2. Pain in right foot          Plan: Antibiotic ointment was placed along the incision and a dry sterile dressing was applied. The patient was instructed to keep the dressing clean, dry, and intact until the next visit. Patient to continue with use of her surgical shoe on her right foot when weightbearing. The patient is to follow up in the office as previously scheduled for likely suture removal. Return in about 10 days (around 4/6/2023) for Second postoperative evaluation.    3/27/2023      Diane Ovalles DPM

## 2023-04-06 ENCOUNTER — OFFICE VISIT (OUTPATIENT)
Dept: PODIATRY | Age: 43
End: 2023-04-06

## 2023-04-06 VITALS — WEIGHT: 175 LBS | HEIGHT: 63 IN | BODY MASS INDEX: 31.01 KG/M2

## 2023-04-06 DIAGNOSIS — Z98.890 POST-OPERATIVE STATE: ICD-10-CM

## 2023-04-06 DIAGNOSIS — M79.89 MASS OF SOFT TISSUE OF FOOT: Primary | ICD-10-CM

## 2023-04-06 DIAGNOSIS — M79.671 PAIN IN RIGHT FOOT: ICD-10-CM

## 2023-04-06 PROCEDURE — 99024 POSTOP FOLLOW-UP VISIT: CPT | Performed by: PODIATRIST

## 2023-04-06 RX ORDER — DEXTROSE 4 G
TABLET,CHEWABLE ORAL
COMMUNITY
Start: 2023-03-21

## 2023-04-06 RX ORDER — LIDOCAINE HCL 4 %
LIQUID ROLL-ON (ML) TOPICAL
COMMUNITY
Start: 2023-03-21

## 2023-04-06 ASSESSMENT — ENCOUNTER SYMPTOMS
BACK PAIN: 0
DIARRHEA: 0
SHORTNESS OF BREATH: 0
NAUSEA: 0
COLOR CHANGE: 0

## 2023-04-06 NOTE — PROGRESS NOTES
was observed. Assessment:    Diagnosis Orders   1. Mass of soft tissue of foot        2. Pain in right foot        3. Post-operative state          Plan: The patient was advised that they may begin bathing their right lower extremity tomorrow. However, they are cautioned against any aggressive scrubbing of the incision site as this may reopen the wound. Patient may resume regular shoewear and activity at this time. Return if symptoms worsen or fail to improve.      Leopoldo Corral, DPM

## 2023-04-08 NOTE — TELEPHONE ENCOUNTER
After review, patient is getting IV infusions by onoclogy, Dr Martha Lucas.   Pre op was inquiring if she needed another medical clearance for surgery on 11-23-21, looks like medical clearance was already received on 11-18-21
IV infusion of what?
Taisha Ratliff from preop op processioning called wanting to know if pt.  Would need another medical clearance after receiving IV fusion , please advise
No

## 2023-04-21 ENCOUNTER — OFFICE VISIT (OUTPATIENT)
Dept: FAMILY MEDICINE CLINIC | Age: 43
End: 2023-04-21
Payer: MEDICAID

## 2023-04-21 ENCOUNTER — HOSPITAL ENCOUNTER (OUTPATIENT)
Dept: NON INVASIVE DIAGNOSTICS | Age: 43
Discharge: HOME | End: 2023-04-21

## 2023-04-21 ENCOUNTER — HOSPITAL ENCOUNTER (OUTPATIENT)
Age: 43
Discharge: HOME | End: 2023-04-21
Payer: MEDICAID

## 2023-04-21 VITALS
DIASTOLIC BLOOD PRESSURE: 54 MMHG | TEMPERATURE: 97.3 F | WEIGHT: 177.4 LBS | SYSTOLIC BLOOD PRESSURE: 92 MMHG | HEART RATE: 71 BPM | HEIGHT: 63 IN | OXYGEN SATURATION: 97 % | BODY MASS INDEX: 31.43 KG/M2

## 2023-04-21 DIAGNOSIS — R42 DIZZINESS: ICD-10-CM

## 2023-04-21 DIAGNOSIS — R42 DIZZINESS: Primary | ICD-10-CM

## 2023-04-21 DIAGNOSIS — E61.1 IRON DEFICIENCY: ICD-10-CM

## 2023-04-21 LAB
ABSOLUTE EOS #: 0.7 K/UL (ref 0–0.44)
ABSOLUTE IMMATURE GRANULOCYTE: 0.03 K/UL (ref 0–0.3)
ABSOLUTE LYMPH #: 3.41 K/UL (ref 1.1–3.7)
ABSOLUTE MONO #: 0.41 K/UL (ref 0.1–1.2)
ALBUMIN SERPL-MCNC: 3.7 G/DL (ref 3.5–5.2)
ALBUMIN/GLOBULIN RATIO: 1.2 (ref 1–2.5)
ALP SERPL-CCNC: 87 U/L (ref 35–104)
ALT SERPL-CCNC: 18 U/L (ref 5–33)
ANION GAP SERPL CALCULATED.3IONS-SCNC: 11 MMOL/L (ref 9–17)
AST SERPL-CCNC: 17 U/L
BASOPHILS # BLD: 0 % (ref 0–2)
BASOPHILS ABSOLUTE: 0.03 K/UL (ref 0–0.2)
BILIRUB SERPL-MCNC: 0.2 MG/DL (ref 0.3–1.2)
BUN SERPL-MCNC: 9 MG/DL (ref 6–20)
CALCIUM SERPL-MCNC: 8.9 MG/DL (ref 8.6–10.4)
CHLORIDE SERPL-SCNC: 105 MMOL/L (ref 98–107)
CO2 SERPL-SCNC: 26 MMOL/L (ref 20–31)
CREAT SERPL-MCNC: 0.51 MG/DL (ref 0.5–0.9)
EOSINOPHILS RELATIVE PERCENT: 7 % (ref 1–4)
FERRITIN SERPL-MCNC: 93 NG/ML (ref 13–150)
GFR SERPL CREATININE-BSD FRML MDRD: >60 ML/MIN/1.73M2
GLUCOSE SERPL-MCNC: 72 MG/DL (ref 70–99)
HCT VFR BLD AUTO: 38.7 % (ref 36.3–47.1)
HGB BLD-MCNC: 12.5 G/DL (ref 11.9–15.1)
IMMATURE GRANULOCYTES: 0 %
IRON SATURATION: 34 % (ref 20–55)
IRON SERPL-MCNC: 81 UG/DL (ref 37–145)
LYMPHOCYTES # BLD: 34 % (ref 24–43)
MCH RBC QN AUTO: 29.3 PG (ref 25.2–33.5)
MCHC RBC AUTO-ENTMCNC: 32.3 G/DL (ref 28.4–34.8)
MCV RBC AUTO: 90.8 FL (ref 82.6–102.9)
MONOCYTES # BLD: 4 % (ref 3–12)
NRBC AUTOMATED: 0 PER 100 WBC
PDW BLD-RTO: 13.2 % (ref 11.8–14.4)
PLATELET # BLD AUTO: 297 K/UL (ref 138–453)
PMV BLD AUTO: 9.8 FL (ref 8.1–13.5)
POTASSIUM SERPL-SCNC: 3.6 MMOL/L (ref 3.7–5.3)
PROT SERPL-MCNC: 6.9 G/DL (ref 6.4–8.3)
RBC # BLD: 4.26 M/UL (ref 3.95–5.11)
SEG NEUTROPHILS: 55 % (ref 36–65)
SEGMENTED NEUTROPHILS ABSOLUTE COUNT: 5.49 K/UL (ref 1.5–8.1)
SODIUM SERPL-SCNC: 142 MMOL/L (ref 135–144)
TIBC SERPL-MCNC: 240 UG/DL (ref 250–450)
UNSATURATED IRON BINDING CAPACITY: 159 UG/DL (ref 112–347)
WBC # BLD AUTO: 10.1 K/UL (ref 3.5–11.3)

## 2023-04-21 PROCEDURE — 83550 IRON BINDING TEST: CPT

## 2023-04-21 PROCEDURE — 99213 OFFICE O/P EST LOW 20 MIN: CPT | Performed by: STUDENT IN AN ORGANIZED HEALTH CARE EDUCATION/TRAINING PROGRAM

## 2023-04-21 PROCEDURE — 80053 COMPREHEN METABOLIC PANEL: CPT

## 2023-04-21 PROCEDURE — 36415 COLL VENOUS BLD VENIPUNCTURE: CPT

## 2023-04-21 PROCEDURE — 85025 COMPLETE CBC W/AUTO DIFF WBC: CPT

## 2023-04-21 PROCEDURE — 83540 ASSAY OF IRON: CPT

## 2023-04-21 PROCEDURE — 82728 ASSAY OF FERRITIN: CPT

## 2023-04-21 SDOH — ECONOMIC STABILITY: FOOD INSECURITY: WITHIN THE PAST 12 MONTHS, THE FOOD YOU BOUGHT JUST DIDN'T LAST AND YOU DIDN'T HAVE MONEY TO GET MORE.: NEVER TRUE

## 2023-04-21 SDOH — ECONOMIC STABILITY: INCOME INSECURITY: IN THE LAST 12 MONTHS, WAS THERE A TIME WHEN YOU WERE NOT ABLE TO PAY THE MORTGAGE OR RENT ON TIME?: NO

## 2023-04-21 SDOH — ECONOMIC STABILITY: FOOD INSECURITY: WITHIN THE PAST 12 MONTHS, YOU WORRIED THAT YOUR FOOD WOULD RUN OUT BEFORE YOU GOT MONEY TO BUY MORE.: NEVER TRUE

## 2023-04-21 ASSESSMENT — PATIENT HEALTH QUESTIONNAIRE - PHQ9
SUM OF ALL RESPONSES TO PHQ QUESTIONS 1-9: 0
1. LITTLE INTEREST OR PLEASURE IN DOING THINGS: 0
SUM OF ALL RESPONSES TO PHQ QUESTIONS 1-9: 0
SUM OF ALL RESPONSES TO PHQ9 QUESTIONS 1 & 2: 0
SUM OF ALL RESPONSES TO PHQ QUESTIONS 1-9: 0
SUM OF ALL RESPONSES TO PHQ QUESTIONS 1-9: 0
2. FEELING DOWN, DEPRESSED OR HOPELESS: 0

## 2023-04-21 ASSESSMENT — SOCIAL DETERMINANTS OF HEALTH (SDOH): HOW HARD IS IT FOR YOU TO PAY FOR THE VERY BASICS LIKE FOOD, HOUSING, MEDICAL CARE, AND HEATING?: NOT HARD AT ALL

## 2023-04-21 NOTE — PROGRESS NOTES
azelastine (OPTIVAR) 0.05 % ophthalmic solution       Artificial Tear Solution (SOOTHE XP XTRA PROTECTION) SOLN       cetirizine (ZYRTEC) 10 MG tablet Take 1 tablet by mouth as needed      FOLIC ACID PO Take by mouth      predniSONE (DELTASONE) 2.5 MG tablet take 2 tablets by mouth every morning and 1 tablet by mouth every evening      Ferrous Sulfate (IRON PO) Take by mouth QOD      Calcium Carbonate (CALCIUM 500 PO) Take by mouth      Multiple Vitamin (MULTIVITAMIN ADULT PO) Take by mouth       Current Facility-Administered Medications   Medication Dose Route Frequency Provider Last Rate Last Admin    levonorgestrel (MIRENA) IUD 52 mg 1 each  1 each IntraUTERine Once Franko Aragon, APRN - CNP   1 each at 05/07/20 1459     Allergies   Allergen Reactions    Iodine Hives and Itching     X-ray dye    Other Itching and Swelling     Sensitive to bug bites, mosquitos. Skin discoloration X1 month. Phenergan [Promethazine] Itching    Cat Hair Extract Itching     RUNNY NOSE, SNEEZING    Dog Epithelium Itching     RUNNY NOSE, SNEEZING. Health Maintenance   Topic Date Due    Varicella vaccine (2 of 2 - 13+ 2-dose series) 06/24/2022    COVID-19 Vaccine (3 - Booster for Moderna series) 08/28/2022    A1C test (Diabetic or Prediabetic)  10/05/2023    Depression Screen  04/21/2024    Cervical cancer screen  07/30/2024    DTaP/Tdap/Td vaccine (2 - Td or Tdap) 02/21/2027    Lipids  10/05/2027    Flu vaccine  Completed    Hepatitis C screen  Completed    HIV screen  Completed    Hepatitis A vaccine  Aged Out    Hib vaccine  Aged Out    Meningococcal (ACWY) vaccine  Aged Out    Pneumococcal 0-64 years Vaccine  Aged Out    Hepatitis B vaccine  Discontinued       Subjective:     Review of Systems   Constitutional:  Negative for appetite change, fatigue and fever. HENT:  Negative for congestion, ear pain, hearing loss and sore throat. Eyes:  Negative for discharge and visual disturbance.    Respiratory:  Negative

## 2023-04-23 ASSESSMENT — ENCOUNTER SYMPTOMS
EYE DISCHARGE: 0
DIARRHEA: 0
CONSTIPATION: 0
WHEEZING: 0
SORE THROAT: 0
COUGH: 0
VOMITING: 0
NAUSEA: 0
ABDOMINAL PAIN: 0
SHORTNESS OF BREATH: 0
CHEST TIGHTNESS: 0

## 2023-04-25 ENCOUNTER — HOSPITAL ENCOUNTER (OUTPATIENT)
Dept: NON INVASIVE DIAGNOSTICS | Age: 43
Discharge: HOME | End: 2023-04-25
Payer: MEDICAID

## 2023-04-25 PROCEDURE — 93242 EXT ECG>48HR<7D RECORDING: CPT

## 2023-04-25 PROCEDURE — 93243 EXT ECG>48HR<7D SCAN A/R: CPT

## 2023-05-01 ENCOUNTER — OFFICE VISIT (OUTPATIENT)
Dept: BARIATRICS/WEIGHT MGMT | Age: 43
End: 2023-05-01
Payer: MEDICAID

## 2023-05-01 ENCOUNTER — HOSPITAL ENCOUNTER (OUTPATIENT)
Age: 43
Setting detail: SPECIMEN
Discharge: HOME | End: 2023-05-01

## 2023-05-01 VITALS
HEART RATE: 74 BPM | WEIGHT: 176 LBS | BODY MASS INDEX: 31.18 KG/M2 | RESPIRATION RATE: 20 BRPM | DIASTOLIC BLOOD PRESSURE: 78 MMHG | SYSTOLIC BLOOD PRESSURE: 116 MMHG | HEIGHT: 63 IN

## 2023-05-01 DIAGNOSIS — E66.9 OBESITY (BMI 30-39.9): ICD-10-CM

## 2023-05-01 DIAGNOSIS — M54.16 LUMBAR RADICULOPATHY: ICD-10-CM

## 2023-05-01 DIAGNOSIS — M05.9 RHEUMATOID ARTHRITIS WITH RHEUMATOID FACTOR, UNSPECIFIED (HCC): ICD-10-CM

## 2023-05-01 DIAGNOSIS — R73.03 PREDIABETES: ICD-10-CM

## 2023-05-01 DIAGNOSIS — G43.009 MIGRAINE WITHOUT AURA, NOT REFRACTORY: ICD-10-CM

## 2023-05-01 DIAGNOSIS — Z98.84 S/P LAPAROSCOPIC SLEEVE GASTRECTOMY: ICD-10-CM

## 2023-05-01 DIAGNOSIS — R73.03 PREDIABETES: Primary | ICD-10-CM

## 2023-05-01 PROBLEM — K21.9 GERD WITHOUT ESOPHAGITIS: Status: RESOLVED | Noted: 2017-02-21 | Resolved: 2023-05-01

## 2023-05-01 LAB
25(OH)D3 SERPL-MCNC: 38.3 NG/ML
ABSOLUTE EOS #: 0.45 K/UL (ref 0–0.44)
ABSOLUTE IMMATURE GRANULOCYTE: 0.03 K/UL (ref 0–0.3)
ABSOLUTE LYMPH #: 2.46 K/UL (ref 1.1–3.7)
ABSOLUTE MONO #: 0.29 K/UL (ref 0.1–1.2)
ALBUMIN SERPL-MCNC: 3.9 G/DL (ref 3.5–5.2)
ALBUMIN/GLOBULIN RATIO: 1.1 (ref 1–2.5)
ALP SERPL-CCNC: 97 U/L (ref 35–104)
ALT SERPL-CCNC: 30 U/L (ref 5–33)
ANION GAP SERPL CALCULATED.3IONS-SCNC: 14 MMOL/L (ref 9–17)
AST SERPL-CCNC: 28 U/L
BASOPHILS # BLD: 0 % (ref 0–2)
BASOPHILS ABSOLUTE: <0.03 K/UL (ref 0–0.2)
BILIRUB SERPL-MCNC: 0.4 MG/DL (ref 0.3–1.2)
BUN SERPL-MCNC: 10 MG/DL (ref 6–20)
CALCIUM SERPL-MCNC: 9.5 MG/DL (ref 8.6–10.4)
CHLORIDE SERPL-SCNC: 107 MMOL/L (ref 98–107)
CHOLEST SERPL-MCNC: 158 MG/DL
CHOLESTEROL/HDL RATIO: 4.1
CO2 SERPL-SCNC: 23 MMOL/L (ref 20–31)
CREAT SERPL-MCNC: 0.56 MG/DL (ref 0.5–0.9)
EOSINOPHILS RELATIVE PERCENT: 6 % (ref 1–4)
EST. AVERAGE GLUCOSE BLD GHB EST-MCNC: 103 MG/DL
FERRITIN SERPL-MCNC: 88 NG/ML (ref 13–150)
FOLATE SERPL-MCNC: 13.3 NG/ML
GFR SERPL CREATININE-BSD FRML MDRD: >60 ML/MIN/1.73M2
GLUCOSE SERPL-MCNC: 78 MG/DL (ref 70–99)
HBA1C MFR BLD: 5.2 % (ref 4–6)
HCT VFR BLD AUTO: 43.1 % (ref 36.3–47.1)
HDLC SERPL-MCNC: 39 MG/DL
HGB BLD-MCNC: 13.3 G/DL (ref 11.9–15.1)
IMMATURE GRANULOCYTES: 0 %
IRON SATURATION: 26 % (ref 20–55)
IRON SERPL-MCNC: 71 UG/DL (ref 37–145)
LDLC SERPL CALC-MCNC: 101 MG/DL (ref 0–130)
LYMPHOCYTES # BLD: 30 % (ref 24–43)
MAGNESIUM SERPL-MCNC: 2.2 MG/DL (ref 1.6–2.6)
MCH RBC QN AUTO: 28.3 PG (ref 25.2–33.5)
MCHC RBC AUTO-ENTMCNC: 30.9 G/DL (ref 28.4–34.8)
MCV RBC AUTO: 91.7 FL (ref 82.6–102.9)
MONOCYTES # BLD: 4 % (ref 3–12)
NRBC AUTOMATED: 0 PER 100 WBC
PDW BLD-RTO: 13.2 % (ref 11.8–14.4)
PLATELET # BLD AUTO: 343 K/UL (ref 138–453)
PMV BLD AUTO: 10.1 FL (ref 8.1–13.5)
POTASSIUM SERPL-SCNC: 4.4 MMOL/L (ref 3.7–5.3)
PROT SERPL-MCNC: 7.4 G/DL (ref 6.4–8.3)
PTH-INTACT SERPL-MCNC: 37.5 PG/ML (ref 14–72)
RBC # BLD: 4.7 M/UL (ref 3.95–5.11)
SEG NEUTROPHILS: 60 % (ref 36–65)
SEGMENTED NEUTROPHILS ABSOLUTE COUNT: 4.86 K/UL (ref 1.5–8.1)
SODIUM SERPL-SCNC: 144 MMOL/L (ref 135–144)
T4 FREE SERPL-MCNC: 1.3 NG/DL (ref 0.9–1.7)
TIBC SERPL-MCNC: 273 UG/DL (ref 250–450)
TRIGL SERPL-MCNC: 91 MG/DL
TSH SERPL-ACNC: 1.32 UIU/ML (ref 0.3–5)
UNSATURATED IRON BINDING CAPACITY: 202 UG/DL (ref 112–347)
VIT B12 SERPL-MCNC: 745 PG/ML (ref 232–1245)
WBC # BLD AUTO: 8.1 K/UL (ref 3.5–11.3)

## 2023-05-01 PROCEDURE — 99213 OFFICE O/P EST LOW 20 MIN: CPT | Performed by: NURSE PRACTITIONER

## 2023-05-01 NOTE — PROGRESS NOTES
Post-op Bariatric Surgery Note    Subjective     Patient is 18 months s/p laparoscopic sleeve gastrectomy, down 93 lbs. Overall, doing well. Incisions well healed. Consistent use of MVI and calcium. Physical activity includes at home workouts. No current issues. Allergies: Allergies   Allergen Reactions    Iodine Hives and Itching     X-ray dye    Other Itching and Swelling     Sensitive to bug bites, mosquitos. Skin discoloration X1 month. Phenergan [Promethazine] Itching    Cat Hair Extract Itching     RUNNY NOSE, SNEEZING    Dog Epithelium Itching     RUNNY NOSE, SNEEZING. Past Medical History:     Past Medical History:   Diagnosis Date    Abnormal 1st trimester screen (Tri 21 1:117)--NIPT nml  11/30/2019    ADD (attention deficit disorder) without hyperactivity     Antiphospholipid syndrome ([facility] [address]     Celestone 1/9 & 1/10 01/09/2020    Chronic back pain     COVID 09/15/2021    BODY ACHE, COUGH, FEVER, CHEST PAIN, N&V X 3 WEEKS. STILL HAS COUGH.     Dehydration 12/28/2021    Echogenic focus of heart of fetus affecting antepartum care of mother 11/30/2019    Elev early 1hr, nml 3hr  11/30/2019    1 elevated value, [facility] recommends repeat 3hr GTT in 2 weeks    Elevated blood-pressure reading without diagnosis of hypertension 11/30/2019    Elevated umbilical artery dopplers  01/10/2020    Eosinopenia (HCC)     Fetal ascites     Fetal heart rate decelerations affecting management of mother     GERD (gastroesophageal reflux disease)     Headache     migraines    Iron deficiency     follows with Dr. Tiki Lua (hem/onc)    IUD (intrauterine device) in place 05/07/2020    Adina LEONG    Lumbar radiculopathy 01/09/2019    Lumbar spondylosis 01/09/2019    Multigravida of advanced maternal age in third trimester     Muscle pain 1/9/2019    Obesity     SHELLEY (obstructive sleep apnea)     Cpap (was causing speech difficulties, resolved after started using cpap)  Dr. Richie Eduardo located [place], last seen

## 2023-05-01 NOTE — PROGRESS NOTES
Medical Nutrition Therapy  Metabolic and Bariatric surgery  18 month follow up        Nutrition Assessment:    Patient is tolerating diet. Patient is eating protein first and eating small, frequent meals. Patient is drinking at least 64 oz of fluid and sugar free beverages. Consistently taking vitamins and minerals. Patient is doing at home workouts to remain active. 24-hr diet recall scanned into chart. Multivitamin/mineral intake: MVI with iron     Calcium intake: Calcium Citrate    Vitals:   Vitals:    05/01/23 0901   BP: 116/78   Site: Right Upper Arm   Position: Sitting   Cuff Size: Medium Adult   Pulse: 74   Resp: 20   Weight: 176 lb (79.8 kg)   Height: 5' 3\" (1.6 m)        Body mass index is 31.18 kg/m². Nutrition Diagnosis:     Inadequate food and beverage intake r/t WLS as evidenced by loss of excess body weight lost 93 lbs over 18 months. Nutrition Intervention:    Diet: Bariatric Diet, 60-80gm of protein, and 48-64oz of fluid    Nutrition Education: Bariatric Binder (diet guidelines and recipes)    Plan: Continue bariatric diet and continue to add variety to diet as tolerated. Sip on water or sugar-free fluid throughout the day. Eat small, frequent meals containing protein, as tolerated. Consistently take MVIs and minerals to prevent nutrient deficiencies. Discuss activity with physician and determine a plan for remaining active. Recommend bariatric support groups. Monitoring/Evaluating:  Diet tolerance, protein intake, vitamin adherence, fluid intake, frequency of meals/snacks, activity, and follow-up annually.       Jenny Flores, MS, RD, LD

## 2023-05-03 LAB
RETINYL PALMITATE: 0.02 MG/L (ref 0–0.1)
VITAMIN A LEVEL: 0.47 MG/L (ref 0.3–1.2)
VITAMIN A, INTERP: NORMAL
ZINC: 81.2 UG/DL (ref 60–120)

## 2023-05-04 LAB — VIT B1 PYROPHOSHATE BLD-SCNC: 113 NMOL/L (ref 70–180)

## 2023-05-11 RX ORDER — ERGOCALCIFEROL 1.25 MG/1
50000 CAPSULE ORAL WEEKLY
Qty: 8 CAPSULE | Refills: 1 | Status: SHIPPED | OUTPATIENT
Start: 2023-05-11

## 2023-07-28 ENCOUNTER — TELEPHONE (OUTPATIENT)
Dept: NEUROLOGY | Age: 43
End: 2023-07-28

## 2023-07-28 NOTE — TELEPHONE ENCOUNTER
I spoke to the patient and she did not want to schedule a follow up at this time. She will call the office back at a later date if she would like to schedule this appointment.   KS

## 2023-08-14 ENCOUNTER — TELEPHONE (OUTPATIENT)
Dept: FAMILY MEDICINE CLINIC | Age: 43
End: 2023-08-14

## 2023-08-14 ENCOUNTER — TELEPHONE (OUTPATIENT)
Dept: NEUROLOGY | Age: 43
End: 2023-08-14

## 2023-08-14 NOTE — TELEPHONE ENCOUNTER
----- Message from Clem Skelton sent at 8/14/2023  1:39 PM EDT -----  Subject: Referral Request    Reason for referral request? Patient needs to have a referral for Urology. Can Dr. Blanca Moffett reach out to the patient for this referral.   Provider patient wants to be referred to(if known):     Provider Phone Number(if known):     Additional Information for Provider?   ---------------------------------------------------------------------------  --------------  Willie [place]    [phone]; OK to leave message on voicemail  ---------------------------------------------------------------------------  --------------

## 2023-08-14 NOTE — TELEPHONE ENCOUNTER
Mrs. Leela Fuentes called the office today stating that she wanted to check on the status of her referral to [facility]. Writer advised that I didn't see anything regarding this. Patient stated that she called a week or so ago and spoke with someone. She was told that we would place the referral.  Patient could not remember who she spoke to in the office. Writer advised that I would send this request to Dr. Dotty Bales

## 2023-08-15 NOTE — TELEPHONE ENCOUNTER
It appears to me that patient did not schedule a follow up and I can not see any documentation for [facility] referral. If patient is interested in a second opinion, sure we can do that.  Thanks

## 2023-08-21 NOTE — PROGRESS NOTES
Cristobal Sanchez is a 42 y.o. female 13w0d    [id number]    OB History    Para Term  AB Living   7 2 2 0 4 2   SAB TAB Ectopic Molar Multiple Live Births   3 1              # Outcome Date GA Lbr Sebastián/2nd Weight Sex Delivery Anes PTL Lv   7 Current            6 SAB 18 11w1d          5 SAB            4 Term      Vag-Spont      3 Term      Vag-Spont      2 TAB            1 SAB                      Vitals  BP: 110/70  Weight: 276 lb 4 oz (125.3 kg)  Patient Position: Sitting  Albumin: Negative  Glucose: Negative      The patient was seen and evaluated. There was Positive fetal movements on ultrasound. No contractions or leakage of fluid. Signs and symptoms of  labor as well as labor were reviewed. The Nuchal Translucency testing was reviewed and found to be scheduled. A single marker MSAFP was discussed for a 15-20 week gestational age window. TOP ST OH Reviewed. Dates were reviewed with the patient. An 18-22 week anatomy ultrasound has been ordered. The patient will return to the office for her next visit in 4 weeks. See antepartum flow sheet. Dr. Kristen Giles- Gastroenterology    Elevated early 1 hour GTT-3 hour ordered         Assessment:  1. Cristobal Sanchez is a 42 y.o. female  2.  [id number]  3. 13w0d    Patient Active Problem List    Diagnosis Date Noted    Inflammatory polyarthropathy ([facility] [address] 2019    Lumbar radiculopathy 2019    Lumbar spondylosis 2019    Muscle pain 2019    Obesity affecting pregnancy in first trimester 2018    AMA (advanced maternal age) multigravida 35+, first trimester 10/10/2018    High-risk pregnancy in first trimester 10/10/2018    JOSE positive 2017    Antithrombin 3 deficiency ([address] 2017    Rheumatoid arteritis 2017    Pre-diabetes 2017    Chronic bilateral low back pain without sciatica 2017    Migraine without aura and without status migrainosus, not intractable 2017    Gastroesophageal
No

## 2023-10-24 ENCOUNTER — OFFICE VISIT (OUTPATIENT)
Dept: FAMILY MEDICINE CLINIC | Age: 43
End: 2023-10-24

## 2023-10-24 VITALS
SYSTOLIC BLOOD PRESSURE: 102 MMHG | OXYGEN SATURATION: 100 % | HEIGHT: 63 IN | TEMPERATURE: 97 F | BODY MASS INDEX: 32.78 KG/M2 | HEART RATE: 64 BPM | DIASTOLIC BLOOD PRESSURE: 66 MMHG | WEIGHT: 185 LBS

## 2023-10-24 DIAGNOSIS — I26.09 OTHER PULMONARY EMBOLISM WITH ACUTE COR PULMONALE, UNSPECIFIED CHRONICITY (HCC): ICD-10-CM

## 2023-10-24 DIAGNOSIS — R42 DIZZINESS: ICD-10-CM

## 2023-10-24 DIAGNOSIS — D68.61 ANTIPHOSPHOLIPID SYNDROME IN PREGNANCY (HCC): ICD-10-CM

## 2023-10-24 DIAGNOSIS — Z23 IMMUNIZATION DUE: ICD-10-CM

## 2023-10-24 DIAGNOSIS — O99.119 ANTIPHOSPHOLIPID SYNDROME IN PREGNANCY (HCC): ICD-10-CM

## 2023-10-24 DIAGNOSIS — M47.817 SPONDYLOSIS WITHOUT MYELOPATHY OR RADICULOPATHY, LUMBOSACRAL REGION: ICD-10-CM

## 2023-10-24 DIAGNOSIS — G43.009 MIGRAINE WITHOUT AURA, NOT REFRACTORY: Primary | ICD-10-CM

## 2023-10-24 SDOH — ECONOMIC STABILITY: FOOD INSECURITY: WITHIN THE PAST 12 MONTHS, YOU WORRIED THAT YOUR FOOD WOULD RUN OUT BEFORE YOU GOT MONEY TO BUY MORE.: NEVER TRUE

## 2023-10-24 SDOH — ECONOMIC STABILITY: INCOME INSECURITY: IN THE LAST 12 MONTHS, WAS THERE A TIME WHEN YOU WERE NOT ABLE TO PAY THE MORTGAGE OR RENT ON TIME?: NO

## 2023-10-24 SDOH — ECONOMIC STABILITY: FOOD INSECURITY: WITHIN THE PAST 12 MONTHS, THE FOOD YOU BOUGHT JUST DIDN'T LAST AND YOU DIDN'T HAVE MONEY TO GET MORE.: NEVER TRUE

## 2023-10-24 ASSESSMENT — SOCIAL DETERMINANTS OF HEALTH (SDOH): HOW HARD IS IT FOR YOU TO PAY FOR THE VERY BASICS LIKE FOOD, HOUSING, MEDICAL CARE, AND HEATING?: NOT HARD AT ALL

## 2023-10-24 ASSESSMENT — PATIENT HEALTH QUESTIONNAIRE - PHQ9
SUM OF ALL RESPONSES TO PHQ QUESTIONS 1-9: 0
SUM OF ALL RESPONSES TO PHQ QUESTIONS 1-9: 0
2. FEELING DOWN, DEPRESSED OR HOPELESS: 0
SUM OF ALL RESPONSES TO PHQ QUESTIONS 1-9: 0
SUM OF ALL RESPONSES TO PHQ QUESTIONS 1-9: 0
SUM OF ALL RESPONSES TO PHQ9 QUESTIONS 1 & 2: 0
1. LITTLE INTEREST OR PLEASURE IN DOING THINGS: 0

## 2023-10-24 NOTE — PROGRESS NOTES
[address] [place] [address] PRIMARY CARE  [address] [place] [address]  Dept: [phone]  Dept Fax: [phone]    Harshad Avendaño is a 41 y.o. female who is a Established patient, who presents today for her medical conditions/complaints as noted below:  Chief Complaint   Patient presents with    Dizziness    Discuss Labs         HPI:     She is here today to follow-up on her labs and to discuss her ongoing issues with headaches and dizziness. She had labs done few months ago which were normal, since then has had more blood work done by bariatric surgery as well as her rheumatologist.  She states that lately her headaches have been getting worse and she gets them practically every day and states that it lasts for a few hours. She states that it she feels better when she lies down. She also has chronic back pain which has been worsening over time. She used to follow with neurology for her migraines but states that she did not get any good results from the treatment and has not followed up with them. Hemoglobin A1C (%)   Date Value   05/01/2023 5.2   10/05/2022 4.8   05/17/2022 5.0             ( goal A1Cis < 7)   No components found for: \"LABMICR\"  LDL Cholesterol (mg/dL)   Date Value   05/01/2023 101   10/05/2022 102   05/17/2022 106       (goal LDL is <100)   AST (U/L)   Date Value   05/01/2023 28     ALT (U/L)   Date Value   05/01/2023 30     BUN (mg/dL)   Date Value   05/01/2023 10     BP Readings from Last 3 Encounters:   10/24/23 102/66   05/01/23 116/78   04/21/23 (!) 92/54          (goal 120/80)    Past Medical History:   Diagnosis Date    Abnormal 1st trimester screen (Tri 21 1:117)--NIPT nml  11/30/2019    ADD (attention deficit disorder) without hyperactivity     Antiphospholipid syndrome ([address])     Celestone 1/9 & 1/10 01/09/2020    Chronic back pain     COVID 09/15/2021    BODY ACHE, COUGH, FEVER, CHEST PAIN, N&V X 3 WEEKS. STILL HAS COUGH.     Dehydration

## 2023-10-25 ASSESSMENT — ENCOUNTER SYMPTOMS
COUGH: 0
CONSTIPATION: 0
SORE THROAT: 0
VOMITING: 0
WHEEZING: 0
DIARRHEA: 0
EYE DISCHARGE: 0
SHORTNESS OF BREATH: 0
ABDOMINAL PAIN: 0
CHEST TIGHTNESS: 0
NAUSEA: 0

## 2023-11-01 ENCOUNTER — HOSPITAL ENCOUNTER (OUTPATIENT)
Age: 43
Setting detail: SPECIMEN
Discharge: HOME | End: 2023-11-01

## 2023-11-01 ENCOUNTER — OFFICE VISIT (OUTPATIENT)
Dept: BARIATRICS/WEIGHT MGMT | Age: 43
End: 2023-11-01
Payer: COMMERCIAL

## 2023-11-01 VITALS
DIASTOLIC BLOOD PRESSURE: 70 MMHG | SYSTOLIC BLOOD PRESSURE: 98 MMHG | HEIGHT: 63 IN | WEIGHT: 183 LBS | HEART RATE: 76 BPM | BODY MASS INDEX: 32.43 KG/M2 | RESPIRATION RATE: 16 BRPM

## 2023-11-01 DIAGNOSIS — M05.9 RHEUMATOID ARTHRITIS WITH RHEUMATOID FACTOR, UNSPECIFIED (HCC): Primary | ICD-10-CM

## 2023-11-01 DIAGNOSIS — E66.9 OBESITY (BMI 30-39.9): ICD-10-CM

## 2023-11-01 DIAGNOSIS — K21.9 GERD WITHOUT ESOPHAGITIS: ICD-10-CM

## 2023-11-01 DIAGNOSIS — Z98.84 S/P LAPAROSCOPIC SLEEVE GASTRECTOMY: ICD-10-CM

## 2023-11-01 DIAGNOSIS — E55.9 VITAMIN D DEFICIENCY: ICD-10-CM

## 2023-11-01 DIAGNOSIS — M54.16 LUMBAR RADICULOPATHY: ICD-10-CM

## 2023-11-01 DIAGNOSIS — R73.03 PREDIABETES: ICD-10-CM

## 2023-11-01 DIAGNOSIS — M05.9 RHEUMATOID ARTHRITIS WITH RHEUMATOID FACTOR, UNSPECIFIED (HCC): ICD-10-CM

## 2023-11-01 LAB
25(OH)D3 SERPL-MCNC: 36.9 NG/ML
ALBUMIN SERPL-MCNC: 4.1 G/DL (ref 3.5–5.2)
ALBUMIN/GLOB SERPL: 1.4 {RATIO} (ref 1–2.5)
ALP SERPL-CCNC: 100 U/L (ref 35–104)
ALT SERPL-CCNC: 17 U/L (ref 5–33)
ANION GAP SERPL CALCULATED.3IONS-SCNC: 13 MMOL/L (ref 9–17)
AST SERPL-CCNC: 14 U/L
BASOPHILS # BLD: 0.03 K/UL (ref 0–0.2)
BASOPHILS NFR BLD: 0 % (ref 0–2)
BILIRUB SERPL-MCNC: 0.4 MG/DL (ref 0.3–1.2)
BUN SERPL-MCNC: 10 MG/DL (ref 6–20)
CALCIUM SERPL-MCNC: 9.3 MG/DL (ref 8.6–10.4)
CHLORIDE SERPL-SCNC: 102 MMOL/L (ref 98–107)
CHOLEST SERPL-MCNC: 162 MG/DL
CHOLESTEROL/HDL RATIO: 3.7
CO2 SERPL-SCNC: 24 MMOL/L (ref 20–31)
CREAT SERPL-MCNC: 0.5 MG/DL (ref 0.5–0.9)
EOSINOPHIL # BLD: 0.23 K/UL (ref 0–0.44)
EOSINOPHILS RELATIVE PERCENT: 3 % (ref 1–4)
ERYTHROCYTE [DISTWIDTH] IN BLOOD BY AUTOMATED COUNT: 12.8 % (ref 11.8–14.4)
EST. AVERAGE GLUCOSE BLD GHB EST-MCNC: 100 MG/DL
FERRITIN SERPL-MCNC: 85 NG/ML (ref 13–150)
FOLATE SERPL-MCNC: 14.6 NG/ML
GFR SERPL CREATININE-BSD FRML MDRD: >60 ML/MIN/1.73M2
GLUCOSE SERPL-MCNC: 74 MG/DL (ref 70–99)
HBA1C MFR BLD: 5.1 % (ref 4–6)
HCT VFR BLD AUTO: 44.1 % (ref 36.3–47.1)
HDLC SERPL-MCNC: 44 MG/DL
HGB BLD-MCNC: 13.8 G/DL (ref 11.9–15.1)
IMM GRANULOCYTES # BLD AUTO: <0.03 K/UL (ref 0–0.3)
IMM GRANULOCYTES NFR BLD: 0 %
IRON SATN MFR SERPL: 26 % (ref 20–55)
IRON SERPL-MCNC: 74 UG/DL (ref 37–145)
LDLC SERPL CALC-MCNC: 104 MG/DL (ref 0–130)
LYMPHOCYTES NFR BLD: 2.58 K/UL (ref 1.1–3.7)
LYMPHOCYTES RELATIVE PERCENT: 28 % (ref 24–43)
MAGNESIUM SERPL-MCNC: 2 MG/DL (ref 1.6–2.6)
MCH RBC QN AUTO: 29.5 PG (ref 25.2–33.5)
MCHC RBC AUTO-ENTMCNC: 31.3 G/DL (ref 28.4–34.8)
MCV RBC AUTO: 94.2 FL (ref 82.6–102.9)
MONOCYTES NFR BLD: 0.36 K/UL (ref 0.1–1.2)
MONOCYTES NFR BLD: 4 % (ref 3–12)
NEUTROPHILS NFR BLD: 65 % (ref 36–65)
NEUTS SEG NFR BLD: 5.89 K/UL (ref 1.5–8.1)
NRBC BLD-RTO: 0 PER 100 WBC
PLATELET # BLD AUTO: 315 K/UL (ref 138–453)
PMV BLD AUTO: 10.2 FL (ref 8.1–13.5)
POTASSIUM SERPL-SCNC: 4.2 MMOL/L (ref 3.7–5.3)
PROT SERPL-MCNC: 7 G/DL (ref 6.4–8.3)
PTH-INTACT SERPL-MCNC: 32.8 PG/ML (ref 14–72)
RBC # BLD AUTO: 4.68 M/UL (ref 3.95–5.11)
SODIUM SERPL-SCNC: 139 MMOL/L (ref 135–144)
TIBC SERPL-MCNC: 288 UG/DL (ref 250–450)
TRIGL SERPL-MCNC: 71 MG/DL
TSH SERPL DL<=0.05 MIU/L-ACNC: 0.58 UIU/ML (ref 0.3–5)
UNSATURATED IRON BINDING CAPACITY: 214 UG/DL (ref 112–347)
VIT B12 SERPL-MCNC: 672 PG/ML (ref 232–1245)
WBC OTHER # BLD: 9.1 K/UL (ref 3.5–11.3)

## 2023-11-01 PROCEDURE — 1036F TOBACCO NON-USER: CPT | Performed by: NURSE PRACTITIONER

## 2023-11-01 PROCEDURE — G8482 FLU IMMUNIZE ORDER/ADMIN: HCPCS | Performed by: NURSE PRACTITIONER

## 2023-11-01 PROCEDURE — 99213 OFFICE O/P EST LOW 20 MIN: CPT | Performed by: NURSE PRACTITIONER

## 2023-11-01 PROCEDURE — G8427 DOCREV CUR MEDS BY ELIG CLIN: HCPCS | Performed by: NURSE PRACTITIONER

## 2023-11-01 PROCEDURE — G8417 CALC BMI ABV UP PARAM F/U: HCPCS | Performed by: NURSE PRACTITIONER

## 2023-11-04 LAB
RETINYL PALMITATE: 0.02 MG/L (ref 0–0.1)
VITAMIN A LEVEL: 0.54 MG/L (ref 0.3–1.2)
VITAMIN A, INTERP: NORMAL
ZINC SERPL-MCNC: 73.1 UG/DL (ref 60–120)

## 2023-11-05 LAB — VIT B1 PYROPHOSHATE BLD-SCNC: 122 NMOL/L (ref 70–180)

## 2023-11-07 ENCOUNTER — TELEPHONE (OUTPATIENT)
Dept: FAMILY MEDICINE CLINIC | Age: 43
End: 2023-11-07

## 2023-11-07 NOTE — TELEPHONE ENCOUNTER
Her mri brain was approved but not the mri back they want her to complete pt first they are sending a fax

## 2023-11-09 ENCOUNTER — APPOINTMENT (OUTPATIENT)
Dept: MRI IMAGING | Age: 43
End: 2023-11-09
Payer: COMMERCIAL

## 2023-11-09 ENCOUNTER — HOSPITAL ENCOUNTER (OUTPATIENT)
Dept: MRI IMAGING | Age: 43
Discharge: HOME | End: 2023-11-11
Payer: COMMERCIAL

## 2023-11-09 DIAGNOSIS — R42 DIZZINESS: ICD-10-CM

## 2023-11-09 DIAGNOSIS — G43.009 MIGRAINE WITHOUT AURA, NOT REFRACTORY: ICD-10-CM

## 2023-11-09 PROCEDURE — A9579 GAD-BASE MR CONTRAST NOS,1ML: HCPCS | Performed by: STUDENT IN AN ORGANIZED HEALTH CARE EDUCATION/TRAINING PROGRAM

## 2023-11-09 PROCEDURE — 70553 MRI BRAIN STEM W/O & W/DYE: CPT

## 2023-11-09 PROCEDURE — 6360000004 HC RX CONTRAST MEDICATION: Performed by: STUDENT IN AN ORGANIZED HEALTH CARE EDUCATION/TRAINING PROGRAM

## 2023-11-09 RX ADMIN — GADOTERIDOL 16 ML: 279.3 INJECTION, SOLUTION INTRAVENOUS at 16:24

## 2024-01-23 NOTE — FLOWSHEET NOTE
CC: right knee, right elbow, and right foot pain after recent fall    70 y.o. Female [employer] who reports anterior knee pain refractory to conservative mgmt.    Knee pain was recently improved with euflexxa knee injection series that ended a few weeks ago. She recently suffered a slip and fall landing on her right side and hurting her knee, foot, and elbow. She reports some mild localized pain and swelling that has been improving. She reports everything is getting much better but is hear as a precaution.     She has tried NSAIDs, tylenol, and ice with no pain improvement.   She has DM2.     She reports that the pain is worse with steps.  It also bothers her at night.    Is affecting ADLs.     No mechanical symptoms, no instability    Review of Systems   Constitution: Negative. Negative for chills, fever and night sweats.   HENT: Negative for congestion and headaches.    Eyes: Negative for blurred vision, left vision loss and right vision loss.   Cardiovascular: Negative for chest pain and syncope.   Respiratory: Negative for cough and shortness of breath.    Endocrine: Negative for polydipsia, polyphagia and polyuria.   Hematologic/Lymphatic: Negative for bleeding problem. Does not bruise/bleed easily.   Skin: Negative for dry skin, itching and rash.   Musculoskeletal: Negative for falls and muscle weakness.   Gastrointestinal: Negative for abdominal pain and bowel incontinence.   Genitourinary: Negative for bladder incontinence and nocturia.   Neurological: Negative for disturbances in coordination, loss of balance and seizures.   Psychiatric/Behavioral: Negative for depression. The patient does not have insomnia.    Allergic/Immunologic: Negative for hives and persistent infections.   All other systems negative      PAST MEDICAL HISTORY:   Past Medical History:   Diagnosis Date    Anemia     Diabetes mellitus type II     H. pylori infection     Hyperlipidemia     Hypertension     Unspecified vitamin D  Pt sitting up eating diet tray deficiency 4/24/2013     PAST SURGICAL HISTORY:   Past Surgical History:   Procedure Laterality Date    CHONDROPLASTY OF KNEE Left 10/4/2022    Procedure: CHONDROPLASTY, KNEE;  Surgeon: Brigette Babin MD;  Location: [facility] OR;  Service: Orthopedics;  Laterality: Left;    ESOPHAGOGASTRODUODENOSCOPY N/A 8/8/2018    Procedure: EGD (ESOPHAGOGASTRODUODENOSCOPY);  Surgeon: Darius Gerardo MD;  Location: [address]);  Service: Endoscopy;  Laterality: N/A;    GASTRIC BYPASS      KNEE ARTHROSCOPY W/ MENISCECTOMY Left 10/4/2022    Procedure: ARTHROSCOPY, KNEE, WITH MENISCECTOMY;  Surgeon: Brigette Babin MD;  Location: [facility] OR;  Service: Orthopedics;  Laterality: Left;    SYNOVECTOMY OF KNEE Left 10/4/2022    Procedure: SYNOVECTOMY, KNEE;  Surgeon: Brigette Babin MD;  Location: [facility];  Service: Orthopedics;  Laterality: Left;     FAMILY HISTORY:   Family History   Problem Relation Age of Onset    COPD Mother     Heart disease Mother     Cancer Father         liver    Heart disease Father     Cancer Sister     Diabetes Sister     Hyperlipidemia Sister     Hypothyroidism Sister     No Known Problems Brother     No Known Problems Maternal Aunt     No Known Problems Maternal Uncle     No Known Problems Paternal Aunt     No Known Problems Paternal Uncle     No Known Problems Maternal Grandmother     No Known Problems Maternal Grandfather     No Known Problems Paternal Grandmother     No Known Problems Paternal Grandfather     Amblyopia Neg Hx     Blindness Neg Hx     Cataracts Neg Hx     Glaucoma Neg Hx     Hypertension Neg Hx     Macular degeneration Neg Hx     Retinal detachment Neg Hx     Strabismus Neg Hx     Stroke Neg Hx     Thyroid disease Neg Hx     Breast cancer Neg Hx     Colon cancer Neg Hx     Ovarian cancer Neg Hx     Stomach cancer Neg Hx     Rectal cancer Neg Hx      SOCIAL HISTORY:   Social History     Socioeconomic History    Marital status: Single   Occupational History     Employer: [employer]    Tobacco Use    Smoking status: Never    Smokeless tobacco: Never   Substance and Sexual Activity    Alcohol use: No    Drug use: No     Social Determinants of Health     Financial Resource Strain: Patient Declined (1/16/2024)    Overall Financial Resource Strain (CARDIA)     Difficulty of Paying Living Expenses: Patient declined   Food Insecurity: Patient Declined (1/16/2024)    Hunger Vital Sign     Worried About Running Out of Food in the Last Year: Patient declined     Ran Out of Food in the Last Year: Patient declined   Transportation Needs: No Transportation Needs (1/16/2024)    PRAPARE - Transportation     Lack of Transportation (Medical): No     Lack of Transportation (Non-Medical): No   Physical Activity: Insufficiently Active (1/16/2024)    Exercise Vital Sign     Days of Exercise per Week: 1 day     Minutes of Exercise per Session: 10 min   Stress: No Stress Concern Present (1/16/2024)    [employer] [place] [employer] - Occupational Stress Questionnaire     Feeling of Stress : Only a little   Social Connections: Unknown (1/16/2024)    Social Connection and Isolation Panel [NHANES]     Frequency of Communication with Friends and Family: More than three times a week     Frequency of Social Gatherings with Friends and Family: Once a week     Active Member of Clubs or Organizations: No     Attends Club or Organization Meetings: Never     Marital Status: Never    Housing Stability: Low Risk  (1/16/2024)    Housing Stability Vital Sign     Unable to Pay for Housing in the Last Year: No     Number of Places Lived in the Last Year: 1     Unstable Housing in the Last Year: No       MEDICATIONS:   Current Outpatient Medications:     aspirin (ECOTRIN) 81 MG EC tablet, Take 1 tablet (81 mg total) by mouth once daily. For 4 weeks starting the day after surgery., Disp: 28 tablet, Rfl: 0    benazepriL (LOTENSIN) 40 MG tablet, Take 1 tablet (40 mg total) by mouth once daily. (Patient not taking:  "Reported on 1/19/2024), Disp: 30 tablet, Rfl: 0    empagliflozin (JARDIANCE) 10 mg tablet, Take 1 tablet (10 mg total) by mouth once daily., Disp: 90 tablet, Rfl: 1    ergocalciferol (VITAMIN D2) 50,000 unit Cap, Take 1 capsule (50,000 Units total) by mouth every 7 days., Disp: 12 capsule, Rfl: 1    erythromycin (ROMYCIN) ophthalmic ointment, Apply to eyelid margins following lid scrubs in the evening for seven nights in succession every other week, Disp: 3.5 g, Rfl: 3    metFORMIN (GLUCOPHAGE) 1000 MG tablet, Take 1 tablet (1,000 mg total) by mouth 2 (two) times daily with meals., Disp: 180 tablet, Rfl: 3    promethazine-dextromethorphan (PROMETHAZINE-DM) 6.25-15 mg/5 mL Syrp, Take 5 mLs by mouth every 4 (four) hours as needed. (Patient not taking: Reported on 1/19/2024), Disp: 240 mL, Rfl: 0    rosuvastatin (CRESTOR) 20 MG tablet, Take 1 tablet (20 mg total) by mouth once daily., Disp: 90 tablet, Rfl: 3    semaglutide (OZEMPIC) 0.25 mg or 0.5 mg (2 mg/3 mL) pen injector, Inject 0.5 mg into the skin every 7 days. (Patient not taking: Reported on 1/19/2024), Disp: 3 mL, Rfl: 1  ALLERGIES:   Review of patient's allergies indicates:   Allergen Reactions    Codeine Nausea Only    Vicodin [hydrocodone-acetaminophen] Nausea Only       VITAL SIGNS: /71   Pulse 86   Ht 5' 7" (1.702 m)   Wt 124.7 kg (275 lb)   BMI 43.07 kg/m²      PHYSICAL EXAMINATION    General:  The patient is alert and oriented x 3.  Mood is pleasant.  Observation of ears, eyes and nose reveal no gross abnormalities.  HEENT: NCAT, sclera nonicteric  Lungs: Respirations are equal and unlabored.    right  KNEE EXAMINATION     OBSERVATION / INSPECTION   Gait:   Nonantalgic   Alignment:  Neutral   Scars:   None   Muscle atrophy: Mild  Effusion:  None   Warmth:  None   Discoloration:   none     TENDERNESS / CREPITUS (T / C):          T / C      T / C   Patella   - / -   Lateral joint line   - / -      Peripatellar medial  +  Medial joint line    - / " -   Peripatellar lateral -  Medial plica   - / -   Patellar tendon -   Popliteal fossa  - / -   Quad tendon   -   Gastrocnemius   -   Prepatellar Bursa - / -   Quadricep   -   Tibial tubercle  -  Thigh/hamstring  -   Pes anserine/HS -  Fibula    -   ITB   - / -  Tibia     -   Tib/fib joint  - / -  LCL    -     MFC   - / -   MCL: Proximal  -    LFC   - / -    Distal   -          ROM: (* = pain)  PASSIVE   ACTIVE    Left :   0 / 0 / 130   0 / 0 / 130    Right :    0 / 0 / 130   0 / 0 / 130    Patellofemoral examination:  See above noted areas of tenderness.   Patella position    Subluxation / dislocation: Centered           Sup. / Inf;   Normal   Crepitus (PF):    Absent   Patellar Mobility:       Medial-lateral:   Normal    Superior-inferior:  Normal    Inferior tilt   Normal    Patellar tendon:  Normal   Lateral tilt:    Normal   J-sign:     None   Patellofemoral grind:   neg       MENISCAL SIGNS:     Pain on terminal extension:  -  Pain on terminal flexion:  -  Anatoliys maneuver:  -  Squat     NT    LIGAMENT EXAMINATION:  ACL / Lachman:  normal (-1 to 2mm)    PCL-Post.  drawer: normal 0 to 2mm  MCL- Valgus:  normal 0 to 2mm  LCL- Varus:  normal 0 to 2mm        STRENGTH: (* = with pain) PAINFUL SIDE   Quadricep   5/5   Hamstrin/5    EXTREMITY NEURO-VASCULAR EXAMINATION:   Sensation:  Grossly intact to light touch all dermatomal regions.   Motor Function:  Fully intact motor function at hip, knee, foot and ankle    DTRs;  quadriceps and  achilles 2+.  No clonus and downgoing Babinski.    Vascular status:  DP and PT pulses 2+, brisk capillary refill, symmetric.     Other Findings:    right ELBOW / WRIST EXTREMITY EXAM:     OBSERVATION / INSPECTION    Swelling                                           none                                      Deformity                                         none  Discoloration                                   none                                      Scars                                                          none                                      Atrophy                                            none     TENDERNESS / CREPITUS (T / C):                                                                                             T/C                                                                                                                            Medial epicondyle                                                               - / -    Med. (Ulnar) collateral ligament                                        - / -    Flexor pronator Musculature                                              - / -   Biceps tendon                                                                    - / -   Head of radius                                                                   slight + / -    Lateral epicondyle                                                             - / -    Extensor Musculature                                                       - / -   Brachioradialis                                                                  - / -   Triceps tendon                                                                  - / -   Triceps muscle                                                                  - / -   Olecranon                                                                          - / -   Olecranon bursa                                                               - / -   Cubital fossa                                                                     - / -   Anterior jointline                                                                - / -   Radial tunnel                                                                     -/-                                                                                                                                                                                                       ROM:             ('*' = with pain)                                    Right Elbow                                     AROM (PROM)                                             Extension                                        0 deg  (5 deg)   Flexion                                            145 deg (145 deg)                                                                Pronation                                         90 deg  (90 deg)                                                                 Supination                                       80 deg  (80 deg)                                                                                                                                                                                 Left Elbow                                       AROM (PROM)                                             Extension                                        0 deg  (5 deg)   Flexion                                             145 deg (145 deg)                                                                Pronation                                         90 deg  (90 deg)                                                                 Supination                                       80 deg  (80 deg)                Right Wrist                                      AROM (PROM)                       Extension                                        80 deg (85 deg)   Flexion                                            80 deg (85 deg)                                                                    Ulnar Deviation                               35 deg (40 deg)  Radial Deviation                             35 deg (40 deg)                                                                                                                                      Left Wrist                                         AROM (PROM)                                             Extension                                        80 deg (85 deg)   Flexion                                              80 deg (85 deg)                                                                    Ulnar Deviation                                35 deg (40 deg)  Radial Deviation                              35 deg (40 deg)                                                                     STRENGTH:             ('*' = with pain)                        Elbow Flexion:                                                       5/5  Elbow Extension:                                                   5/5  Wrist Flexion:                                                         5/5  Wrist Extension:                                                     5/5  :                                                                       5/5  Intrinsics:                                                                5/5  EPL (Ext. pollicis longus):                                      5/5  Pinch Mechanism:                                                  5/5     ELBOW EXAMINATION:  See above noted areas of tenderness.   Test for Ligamentous Instability - UCL                     neg  Test for Ligamentous Instability - LUCL                   normal  PLRI                                                                         neg  Tinel's (Percussion) Test - Cubital                           neg  Tennis Elbow Test                                                   neg  Golfer's Elbow Test                                                  neg  Forearm pain with resisted supination                      neg  Yeargeson' s (elbow pain)                                         neg  Hook test                                                                   neg      right Foot and Ankle Exam    INSPECTION:      ALIGNMENT:  Gait:    Normal   Hindfoot  Normal    Scars:   None    Midfoot: Normal  Swelling:   none    Forefoot: Normal  Color:   Normal      Atrophy:  None    Collective Ankle-Hindfoot Alignment    Heel / Toe Walking: No  difficulty   Good -plantigrade (PG), well aligned           [Fair-PG, malaligned, asymptomatic]         [Poor-Non-PG,malaligned, has sxs]     TENDERNESS:  lATERAL:    anterior:  Sinus tarsi:  None  Anteromedial joint line:  none  Syndesmosis:  none  Anterolateral joint line:   none  ATFL:   none  Talonavicular:    none   CFL:   none  Anterior tibialis:   none  Anterolateral gutter: none  Extensor tendons:   none  Fibula:   none  Peroneal tendons: none  POSTERIOR:  Peroneal tubercle.  None  Medial/lateral achilles:   none       Medial/lateral achilles insertion: none  MEDIAL:      Deltoid:  none  CALCANEUS:  Malleolus:  none  Retrocalcaneal:   none  PTT:   none  Medial achilles:   none  Navicular:  none  Lateral achilles:   none       Calcaneal tuberosity:   none  FOOT:    Calcaneal cuboid  none MT / MT heads:  none   Navicular   none  Medial cord origin PF:  none  Cuneiforms:   none  Web space:   none  Lisfranc    none  Tarsal tunnel:   none  Base of the fifth metatarsal  none Tinels sign   neg        RANGE OF MOTION:  RIGHT/ LEFT   STRENGTH: (affected)  Ankle DF/PF:  15/45  15/45    Anterior tibialis: 5/5     Eversion/Inversion: 15/25 15/25  Posterior tibialis: 5/5   Midfoot ABD/ADD: 10/10 10/10  Gastroc-soleus: 5/5   First MTP DF/PF: 60/25 60/25  Peroneals:  5/5         EHL:   5/5   (* = pain)     FHL:   5/5         (* = pain)      SPECIAL TESTS:   ANKLE INSTABILITY: (*pain)    Anterior drawer:   Grade 2      (C-W contralateral side)     Inversion:   30°     Eversion  10°            Collective Instability: (Ant-post and varus-valgus)     Stable        PROVOCATIVE TESTING:    Forced DF/ER: No pain at syndesmosis.    Mid-leg squeeze  No pain at syndesmosis    Forced DF:  No pain anterior joint line.      Forced PF:  No pain posterior ankle.     Forced INV:  No pain lateral    Forced EV:  No pain medial     Banueloss sign: Normal ankle plantar flexion.     Resisted peroneal No subluxation or pain    1st-2nd MT  toggle No pain at Lisfranc    MT-T torque  No pain at Lisfranc     NEUROLOGIC TESTING:  All dermatomes foot, ankle and leg have normal sensation light touch  Ankle Reflexes 2+, symmetric   Negative Babinski and No Clonus    VASCULAR:  2+ pulses PT/DT with brisk capillary refill toes.    Other Findings:  Right  no swelling is present     Xrays:  Xrays of the 3 view right elbow were ordered and reviewed by me today. No fracture, subluxation, or other significant bony or joint abnormality is identified. Bony alignment is normal.     XRAYS:  Right foot 3 views were ordered and reviewed.   No evidence of any fracture or dislocation.  The osseous structures appear well mineralized and well aligned.     Xrays:  Xrays of the bilateral knees with flexion were ordered and reviewed by me today. No fracture, subluxation. Tricompartmental DJD noted of the right knee. Kellgren Leonard 2 or greater noted.      ASSESSMENT:    1. Right Knee pain, acute on chronic  2. Right knee osteoarthritis   3. Right elbow pain, acute  4. Right foot pain, acute      PLAN:    1. Reassurance given. Continue to given more time for things to improve and let me know if they do not.   2. Ice compresses prn pain  3. OTC pain control  RTC as needed.     All patients questions were answered. Patient was advised to call us with any concerns or questions.

## 2024-02-08 NOTE — PROGRESS NOTES
Please read the healthy family handout that you were given and share it with your family.       Please compare this printed medication list with your medications at home to be sure they are the same.  If you have any medications that are different please contact us immediately at [phone].     Also review your allergies that we have listed, these may also include medications that you have not been able to tolerate, make sure everything listed is correct. If you have any allergies that are different please contact us immediately at [phone].     You may receive a survey in the mail or by email asking about your experience during your visit today. Please complete and return to us so we know how we are serving you.     [address] PRIMARY CARE  [address] [place] [address] [facility]  Dept: [phone]  Dept Fax: [phone]    Karl Cadet is a 46 y.o. female who is a Established patient, who presents today for her medical conditions/complaints as noted below:  Chief Complaint   Patient presents with    Anxiety     Here for anxiety follow up, has not started medication         HPI:     She is here today to follow-up on anxiety and palpitations. She had a recent Holter monitor done which showed sinus bradycardia. She says that she has been tracking her heart rate and it has been fluctuating a lot going from low to high. She has an upcoming appointment with cardiology. She says that she never started taking Lexapro because she wanted to get her visit with cardiology done. She says her anxiety has improved somewhat and she has an upcoming appointment with psychotherapy as well. Denies any other issues. Hemoglobin A1C (%)   Date Value   05/17/2022 5.0   01/03/2022 5.3   07/23/2021 6.3 (H)             ( goal A1Cis < 7)   No results found for: LABMICR  LDL Cholesterol (mg/dL)   Date Value   05/17/2022 106   01/03/2022 113   07/23/2021 98       (goal LDL is <100)   AST (U/L)   Date Value   05/17/2022 8     ALT (U/L)   Date Value   05/17/2022 6     BUN (mg/dL)   Date Value   05/17/2022 10     BP Readings from Last 3 Encounters:   07/25/22 108/74   06/25/22 125/80   06/23/22 99/69          (goal 120/80)    Past Medical History:   Diagnosis Date    Abnormal 1st trimester screen (Tri 21 1:117)--NIPT nml  11/30/2019    ADD (attention deficit disorder) without hyperactivity     Antiphospholipid syndrome ([facility] [address]     [place] 1/9 & 1/10 01/09/2020    Chronic back pain     COVID 09/15/2021    BODY ACHE, COUGH, FEVER, CHEST PAIN, N&V X 3 WEEKS. STILL HAS COUGH.     Dehydration 12/28/2021    Echogenic focus of heart of fetus affecting antepartum care of mother 11/30/2019    Elev early 1hr, nml 3hr 2019    1 elevated value, MFM recommends repeat 3hr GTT in 2 weeks    Elevated blood-pressure reading without diagnosis of hypertension 2019    Elevated umbilical artery dopplers  01/10/2020    Eosinopenia (HCC)     Fetal ascites     Fetal heart rate decelerations affecting management of mother     GERD (gastroesophageal reflux disease)     Headache     migraines    Iron deficiency     follows with Dr. Gagandeep Reyes (hem/onc)    IUD (intrauterine device) in place 2020    Adina LEONG    Lumbar radiculopathy 2019    Lumbar spondylosis 2019    Multigravida of advanced maternal age in third trimester     Muscle pain 2019    Obesity     SHELLEY (obstructive sleep apnea)     Cpap (was causing speech difficulties, resolved after started using cpap)  Dr. Teo Bosch located [place], last seen 2021-  [address]    Personal history of other medical treatment     Axillary lympadenopathy    PONV (postoperative nausea and vomiting)     Pulmonary embolism affecting pregnancy in second trimester 2019    Rh+/RI/GBSunk 2020    Rheumatoid arteritis ([facility] [address]     Dr. Farhad Lopez located [facility] seeing 3/28/2022    Tachycardia     Umbilical cord complication     Under care of team 2021    PCP: Dr. Chinyere Wilde, last visit 2021, clearance given already for surgery on 2021    Under care of team 2021    Neurology: Dr. Frank Walker, last visit 2021    Under care of team 2021    Oncology: Dr. Gagandeep Reyes, [facility], last visit 3/2021, received clearance for surgery for 2021    was seeing for nodules in armpit. Pt. sees yearly .  Last seen 2022    Wears glasses     Wellness examination     last seen 2022  Mine Peña examination     Dr. Ronit Russell, PCP      Past Surgical History:   Procedure Laterality Date     SECTION Bilateral 2020     SECTION performed by Mimi Morales DO at [facility] L&D OR CHOLECYSTECTOMY  04/06/2022    ROBOTIC LAPAROSCOPIC CHOLECYSTECTOMY,    CHOLECYSTECTOMY, LAPAROSCOPIC N/A 4/6/2022    XI ROBOTIC LAPAROSCOPIC CHOLECYSTECTOMY, POSSIBLE OPEN performed by Eagle Alvarez DO at [address]  05/07/2020    Mirena    SLEEVE GASTRECTOMY N/A 11/23/2021    XI ROBOTIC LAPAROSCOPIC GASTRECTOMY SLEEVE WITH EGD performed by Eagle Alvarez DO at [address] N/A 01/22/2021    EGD BIOPSY OF GASTRIC performed by Eagle Alvarez DO at [facility]  08/19/2020    US LYMPH NODE BIOPSY 8/19/2020 [facility] ULTRASOUND    WISDOM TOOTH EXTRACTION         Family History   Problem Relation Age of Onset    Elevated Lipids Mother     High Blood Pressure Mother        Social History     Tobacco Use    Smoking status: Never    Smokeless tobacco: Never   Substance Use Topics    Alcohol use: No      Current Outpatient Medications   Medication Sig Dispense Refill    FOLIC ACID PO Take by mouth      predniSONE (DELTASONE) 2.5 MG tablet take 2 tablets by mouth every morning and 1 tablet by mouth every evening      methotrexate (RHEUMATREX) 2.5 MG chemo tablet Takes on Saturday      Ferrous Sulfate (IRON PO) Take by mouth QOD      Calcium Carbonate (CALCIUM 500 PO) Take by mouth      Multiple Vitamin (MULTIVITAMIN ADULT PO) Take by mouth      vitamin D (ERGOCALCIFEROL) 1.25 MG (15718 UT) CAPS capsule Take 1 capsule by mouth once a week for 8 doses 8 capsule 0     Current Facility-Administered Medications   Medication Dose Route Frequency Provider Last Rate Last Admin    levonorgestrel (MIRENA) IUD 52 mg 1 each  1 each IntraUTERine Once MIKHAIL Begum - CNP   1 each at 05/07/20 6029     Allergies   Allergen Reactions    Iodine Hives and Itching     X-ray dye    Other Itching and Swelling     Sensitive to bug bites, mosquitos. Skin discoloration X1 month.     Phenergan [Promethazine] Itching    Cat Hair Extract Itching     RUNNY NOSE, SNEEZING    Dog Epithelium Itching     RUNNY NOSE, SNEEZING. Health Maintenance   Topic Date Due    COVID-19 Vaccine (2 - Mixed Product series) 06/03/2022    Varicella vaccine (2 of 2 - 13+ 2-dose series) 06/24/2022    Flu vaccine (1) 09/01/2022    A1C test (Diabetic or Prediabetic)  05/17/2023    Depression Screen  06/23/2023    Cervical cancer screen  07/30/2024    DTaP/Tdap/Td vaccine (2 - Td or Tdap) 02/21/2027    Hepatitis C screen  Completed    HIV screen  Completed    Hepatitis A vaccine  Aged Out    Hepatitis B vaccine  Aged Out    Hib vaccine  Aged Out    Meningococcal (ACWY) vaccine  Aged Out    Pneumococcal 0-64 years Vaccine  Aged Out       Subjective:     Review of Systems   Constitutional:  Negative for appetite change, fatigue and fever. HENT:  Negative for congestion, ear pain, hearing loss and sore throat. Eyes:  Negative for discharge and visual disturbance. Respiratory:  Negative for cough, chest tightness, shortness of breath and wheezing. Cardiovascular:  Positive for palpitations. Negative for chest pain and leg swelling. Gastrointestinal:  Negative for abdominal pain, constipation, diarrhea, nausea and vomiting. Genitourinary:  Negative for flank pain, frequency, hematuria and urgency. Musculoskeletal:  Negative for arthralgias, gait problem, joint swelling and myalgias. Skin: Negative. Neurological:  Negative for dizziness, weakness, numbness and headaches. Psychiatric/Behavioral:  Negative for dysphoric mood. The patient is nervous/anxious. Objective:     Physical Exam  Vitals reviewed. Constitutional:       Appearance: Normal appearance. She is normal weight. HENT:      Head: Normocephalic and atraumatic. Nose: Nose normal.      Mouth/Throat:      Mouth: Mucous membranes are moist.      Pharynx: Oropharynx is clear. Eyes:      Extraocular Movements: Extraocular movements intact. Conjunctiva/sclera: Conjunctivae normal.      Pupils: Pupils are equal, round, and reactive to light. Cardiovascular:      Rate and Rhythm: Normal rate and regular rhythm. Heart sounds: Normal heart sounds. No murmur heard. No gallop. Pulmonary:      Effort: Pulmonary effort is normal. No respiratory distress. Breath sounds: Normal breath sounds. No stridor. No wheezing. Abdominal:      General: Bowel sounds are normal. There is no distension. Palpations: Abdomen is soft. Tenderness: There is no abdominal tenderness. There is no guarding or rebound. Musculoskeletal:         General: No swelling or tenderness. Normal range of motion. Cervical back: Normal range of motion and neck supple. Skin:     General: Skin is warm and dry. Coloration: Skin is not jaundiced. Findings: No rash. Neurological:      General: No focal deficit present. Mental Status: She is alert and oriented to person, place, and time. Psychiatric:         Mood and Affect: Mood normal.         Behavior: Behavior normal.         Thought Content: Thought content normal.         Judgment: Judgment normal.     /74   Pulse 62   Wt 199 lb (90.3 kg)   BMI 35.25 kg/m²     Assessment/Plan:   1. Anxiety  2. Sinus bradycardia      Anxiety-slowly improving, has not started taking Lexapro. Plans to wait until cardiology appointment. Following with psychotherapy. Sinus bradycardia-has been having palpitations, recent Holter monitor showed sinus bradycardia. Upcoming appointment with cardiology. Return in about 6 months (around 1/25/2023) for anxiety and depression. No orders of the defined types were placed in this encounter. No orders of the defined types were placed in this encounter. Patient given educational materials - see patient instructions. Discussed use, benefit, and side effects of prescribed medications. All patientquestions answered. Pt voiced understanding.  Reviewed health maintenance. Instructedto continue current medications, diet and exercise. Patient agreed with treatmentplan. Follow up as directed.      Electronically signed by Gabriel Gonsales MD on 7/26/2022 at 9:26 AM

## 2024-04-04 ENCOUNTER — OFFICE VISIT (OUTPATIENT)
Dept: FAMILY MEDICINE CLINIC | Age: 44
End: 2024-04-04
Payer: COMMERCIAL

## 2024-04-04 VITALS
OXYGEN SATURATION: 98 % | HEART RATE: 70 BPM | SYSTOLIC BLOOD PRESSURE: 120 MMHG | WEIGHT: 186.6 LBS | DIASTOLIC BLOOD PRESSURE: 74 MMHG | BODY MASS INDEX: 33.06 KG/M2 | HEIGHT: 63 IN

## 2024-04-04 DIAGNOSIS — J30.2 SEASONAL ALLERGIES: Primary | ICD-10-CM

## 2024-04-04 PROCEDURE — 1036F TOBACCO NON-USER: CPT

## 2024-04-04 PROCEDURE — 99213 OFFICE O/P EST LOW 20 MIN: CPT

## 2024-04-04 PROCEDURE — G8417 CALC BMI ABV UP PARAM F/U: HCPCS

## 2024-04-04 PROCEDURE — G8427 DOCREV CUR MEDS BY ELIG CLIN: HCPCS

## 2024-04-04 RX ORDER — CETIRIZINE HYDROCHLORIDE 10 MG/1
10 TABLET ORAL PRN
Qty: 90 TABLET | Refills: 1 | Status: SHIPPED | OUTPATIENT
Start: 2024-04-04 | End: 2024-10-01

## 2024-04-04 ASSESSMENT — PATIENT HEALTH QUESTIONNAIRE - PHQ9
2. FEELING DOWN, DEPRESSED OR HOPELESS: NOT AT ALL
SUM OF ALL RESPONSES TO PHQ QUESTIONS 1-9: 0
SUM OF ALL RESPONSES TO PHQ QUESTIONS 1-9: 0
SUM OF ALL RESPONSES TO PHQ9 QUESTIONS 1 & 2: 0
SUM OF ALL RESPONSES TO PHQ QUESTIONS 1-9: 0
SUM OF ALL RESPONSES TO PHQ QUESTIONS 1-9: 0
1. LITTLE INTEREST OR PLEASURE IN DOING THINGS: NOT AT ALL

## 2024-04-04 NOTE — PROGRESS NOTES
(ZYRTEC) 10 MG tablet     Sig: Take 1 tablet by mouth as needed for Allergies or Rhinitis     Dispense:  90 tablet     Refill:  1       Reviewed health maintenance, prior labs and imaging.   Patient given educational materials - see patient instructions.    Discussed use, benefit, and side effects of prescribed medications. Barriers to medication compliance addressed.   All patient questions answered.  Pt voiced understanding to plan of care.   Instructed to continue medications as discussed, healthy diet and exercise.    Patient agreed with treatment plan. Follow up as directed below.     This note is created with the assistance of a speech-recognition program. While intending to generate a document that actually reflects the content of the visit, no guarantees can be provided that every mistake has been identified and corrected by editing.    Electronically signed by MIKHAIL Ronquillo CNP, APRN-CNP on 4/10/2024 at 10:30 AM

## 2024-04-10 ASSESSMENT — ENCOUNTER SYMPTOMS
VOMITING: 0
EYE REDNESS: 0
WHEEZING: 0
NAUSEA: 0
EYE DISCHARGE: 0
CHEST TIGHTNESS: 0
TROUBLE SWALLOWING: 0
SINUS PAIN: 0
SORE THROAT: 0
DIARRHEA: 0
SHORTNESS OF BREATH: 0
RHINORRHEA: 1
EYE ITCHING: 0
SINUS PRESSURE: 0
ABDOMINAL PAIN: 0
COUGH: 1

## 2024-04-23 ENCOUNTER — HOSPITAL ENCOUNTER (OUTPATIENT)
Age: 44
Setting detail: SPECIMEN
Discharge: HOME | End: 2024-04-23

## 2024-04-23 ENCOUNTER — OFFICE VISIT (OUTPATIENT)
Dept: FAMILY MEDICINE CLINIC | Age: 44
End: 2024-04-23
Payer: COMMERCIAL

## 2024-04-23 VITALS
WEIGHT: 184 LBS | BODY MASS INDEX: 32.6 KG/M2 | SYSTOLIC BLOOD PRESSURE: 117 MMHG | HEIGHT: 63 IN | DIASTOLIC BLOOD PRESSURE: 74 MMHG | TEMPERATURE: 97 F

## 2024-04-23 DIAGNOSIS — Z23 IMMUNIZATION DUE: ICD-10-CM

## 2024-04-23 DIAGNOSIS — Z98.84 S/P LAPAROSCOPIC SLEEVE GASTRECTOMY: ICD-10-CM

## 2024-04-23 DIAGNOSIS — G47.33 OBSTRUCTIVE SLEEP APNEA SYNDROME: ICD-10-CM

## 2024-04-23 DIAGNOSIS — F41.1 GENERALIZED ANXIETY DISORDER: Primary | ICD-10-CM

## 2024-04-23 DIAGNOSIS — M54.16 LUMBAR RADICULOPATHY: ICD-10-CM

## 2024-04-23 DIAGNOSIS — M05.9 RHEUMATOID ARTHRITIS WITH RHEUMATOID FACTOR, UNSPECIFIED (HCC): ICD-10-CM

## 2024-04-23 DIAGNOSIS — E66.9 OBESITY (BMI 30-39.9): ICD-10-CM

## 2024-04-23 DIAGNOSIS — R73.03 PREDIABETES: ICD-10-CM

## 2024-04-23 LAB
25(OH)D3 SERPL-MCNC: 28.1 NG/ML (ref 30–100)
ALBUMIN SERPL-MCNC: 3.9 G/DL (ref 3.5–5.2)
ALBUMIN/GLOB SERPL: 1 {RATIO} (ref 1–2.5)
ALP SERPL-CCNC: 78 U/L (ref 35–104)
ALT SERPL-CCNC: 11 U/L (ref 10–35)
ANION GAP SERPL CALCULATED.3IONS-SCNC: 6 MMOL/L (ref 9–16)
AST SERPL-CCNC: 17 U/L (ref 10–35)
BASOPHILS # BLD: <0.03 K/UL (ref 0–0.2)
BASOPHILS NFR BLD: 0 % (ref 0–2)
BILIRUB SERPL-MCNC: 0.3 MG/DL (ref 0–1.2)
BUN SERPL-MCNC: 13 MG/DL (ref 6–20)
CALCIUM SERPL-MCNC: 8.8 MG/DL (ref 8.6–10.4)
CHLORIDE SERPL-SCNC: 105 MMOL/L (ref 98–107)
CHOLEST SERPL-MCNC: 144 MG/DL (ref 0–199)
CHOLESTEROL/HDL RATIO: 4
CO2 SERPL-SCNC: 28 MMOL/L (ref 20–31)
CREAT SERPL-MCNC: 0.6 MG/DL (ref 0.5–0.9)
EOSINOPHIL # BLD: 0.91 K/UL (ref 0–0.44)
EOSINOPHILS RELATIVE PERCENT: 10 % (ref 1–4)
ERYTHROCYTE [DISTWIDTH] IN BLOOD BY AUTOMATED COUNT: 13.1 % (ref 11.8–14.4)
EST. AVERAGE GLUCOSE BLD GHB EST-MCNC: 103 MG/DL
FERRITIN SERPL-MCNC: 84 NG/ML (ref 13–150)
FOLATE SERPL-MCNC: 7.2 NG/ML (ref 4.8–24.2)
GFR SERPL CREATININE-BSD FRML MDRD: >90 ML/MIN/1.73M2
GLUCOSE SERPL-MCNC: 79 MG/DL (ref 74–99)
HBA1C MFR BLD: 5.2 %
HBA1C MFR BLD: 5.2 % (ref 4–6)
HCT VFR BLD AUTO: 41 % (ref 36.3–47.1)
HDLC SERPL-MCNC: 37 MG/DL
HGB BLD-MCNC: 13.2 G/DL (ref 11.9–15.1)
IMM GRANULOCYTES # BLD AUTO: <0.03 K/UL (ref 0–0.3)
IMM GRANULOCYTES NFR BLD: 0 %
IRON SATN MFR SERPL: 22 % (ref 20–55)
IRON SERPL-MCNC: 62 UG/DL (ref 37–145)
LDLC SERPL CALC-MCNC: 93 MG/DL (ref 0–100)
LYMPHOCYTES NFR BLD: 2.83 K/UL (ref 1.1–3.7)
LYMPHOCYTES RELATIVE PERCENT: 31 % (ref 24–43)
MAGNESIUM SERPL-MCNC: 2 MG/DL (ref 1.6–2.6)
MCH RBC QN AUTO: 28.8 PG (ref 25.2–33.5)
MCHC RBC AUTO-ENTMCNC: 32.2 G/DL (ref 28.4–34.8)
MCV RBC AUTO: 89.5 FL (ref 82.6–102.9)
MONOCYTES NFR BLD: 0.31 K/UL (ref 0.1–1.2)
MONOCYTES NFR BLD: 3 % (ref 3–12)
NEUTROPHILS NFR BLD: 56 % (ref 36–65)
NEUTS SEG NFR BLD: 5.09 K/UL (ref 1.5–8.1)
NRBC BLD-RTO: 0 PER 100 WBC
PLATELET # BLD AUTO: 358 K/UL (ref 138–453)
PMV BLD AUTO: 9.9 FL (ref 8.1–13.5)
POTASSIUM SERPL-SCNC: 4.1 MMOL/L (ref 3.7–5.3)
PROT SERPL-MCNC: 7.1 G/DL (ref 6.6–8.7)
PTH-INTACT SERPL-MCNC: 45 PG/ML (ref 15–65)
RBC # BLD AUTO: 4.58 M/UL (ref 3.95–5.11)
SODIUM SERPL-SCNC: 139 MMOL/L (ref 136–145)
T4 FREE SERPL-MCNC: 1.3 NG/DL (ref 0.92–1.68)
TIBC SERPL-MCNC: 276 UG/DL (ref 250–450)
TRIGL SERPL-MCNC: 71 MG/DL
TSH SERPL DL<=0.05 MIU/L-ACNC: 1.03 UIU/ML (ref 0.27–4.2)
UNSATURATED IRON BINDING CAPACITY: 214 UG/DL (ref 112–347)
VIT B12 SERPL-MCNC: 617 PG/ML (ref 232–1245)
VLDLC SERPL CALC-MCNC: 14 MG/DL
WBC OTHER # BLD: 9.2 K/UL (ref 3.5–11.3)

## 2024-04-23 PROCEDURE — 99213 OFFICE O/P EST LOW 20 MIN: CPT | Performed by: STUDENT IN AN ORGANIZED HEALTH CARE EDUCATION/TRAINING PROGRAM

## 2024-04-23 PROCEDURE — 90746 HEPB VACCINE 3 DOSE ADULT IM: CPT | Performed by: STUDENT IN AN ORGANIZED HEALTH CARE EDUCATION/TRAINING PROGRAM

## 2024-04-23 PROCEDURE — G8427 DOCREV CUR MEDS BY ELIG CLIN: HCPCS | Performed by: STUDENT IN AN ORGANIZED HEALTH CARE EDUCATION/TRAINING PROGRAM

## 2024-04-23 PROCEDURE — 83036 HEMOGLOBIN GLYCOSYLATED A1C: CPT | Performed by: STUDENT IN AN ORGANIZED HEALTH CARE EDUCATION/TRAINING PROGRAM

## 2024-04-23 PROCEDURE — G8417 CALC BMI ABV UP PARAM F/U: HCPCS | Performed by: STUDENT IN AN ORGANIZED HEALTH CARE EDUCATION/TRAINING PROGRAM

## 2024-04-23 PROCEDURE — 1036F TOBACCO NON-USER: CPT | Performed by: STUDENT IN AN ORGANIZED HEALTH CARE EDUCATION/TRAINING PROGRAM

## 2024-04-23 SDOH — ECONOMIC STABILITY: INCOME INSECURITY: IN THE LAST 12 MONTHS, WAS THERE A TIME WHEN YOU WERE NOT ABLE TO PAY THE MORTGAGE OR RENT ON TIME?: NO

## 2024-04-23 SDOH — ECONOMIC STABILITY: FOOD INSECURITY: WITHIN THE PAST 12 MONTHS, YOU WORRIED THAT YOUR FOOD WOULD RUN OUT BEFORE YOU GOT MONEY TO BUY MORE.: NEVER TRUE

## 2024-04-23 SDOH — ECONOMIC STABILITY: FOOD INSECURITY: WITHIN THE PAST 12 MONTHS, THE FOOD YOU BOUGHT JUST DIDN'T LAST AND YOU DIDN'T HAVE MONEY TO GET MORE.: NEVER TRUE

## 2024-04-23 ASSESSMENT — ENCOUNTER SYMPTOMS
COUGH: 0
SHORTNESS OF BREATH: 0
ABDOMINAL PAIN: 0
EYE DISCHARGE: 0
SORE THROAT: 0
CONSTIPATION: 0
DIARRHEA: 0
VOMITING: 0
WHEEZING: 0
CHEST TIGHTNESS: 0
NAUSEA: 0

## 2024-04-23 ASSESSMENT — SOCIAL DETERMINANTS OF HEALTH (SDOH): HOW HARD IS IT FOR YOU TO PAY FOR THE VERY BASICS LIKE FOOD, HOUSING, MEDICAL CARE, AND HEATING?: NOT HARD AT ALL

## 2024-04-23 ASSESSMENT — PATIENT HEALTH QUESTIONNAIRE - PHQ9
SUM OF ALL RESPONSES TO PHQ QUESTIONS 1-9: 0
2. FEELING DOWN, DEPRESSED OR HOPELESS: NOT AT ALL
1. LITTLE INTEREST OR PLEASURE IN DOING THINGS: NOT AT ALL
SUM OF ALL RESPONSES TO PHQ9 QUESTIONS 1 & 2: 0
SUM OF ALL RESPONSES TO PHQ QUESTIONS 1-9: 0

## 2024-04-23 NOTE — PROGRESS NOTES
understanding. Reviewed health maintenance.  Instructedto continue current medications, diet and exercise.  Patient agreed with treatmentplan. Follow up as directed.     Electronically signed by Monika Banegas MD on 4/23/2024 at 2:43 PM

## 2024-04-24 DIAGNOSIS — E55.9 VITAMIN D DEFICIENCY: Primary | ICD-10-CM

## 2024-04-24 RX ORDER — ERGOCALCIFEROL 1.25 MG/1
50000 CAPSULE ORAL WEEKLY
Qty: 8 CAPSULE | Refills: 0 | Status: SHIPPED | OUTPATIENT
Start: 2024-04-24 | End: 2024-06-13

## 2024-04-25 LAB — ZINC SERPL-MCNC: 74.8 UG/DL (ref 60–120)

## 2024-04-26 LAB
RETINYL PALMITATE: <0.02 MG/L (ref 0–0.1)
VITAMIN A LEVEL: 0.57 MG/L (ref 0.3–1.2)
VITAMIN A, INTERP: NORMAL

## 2024-04-27 LAB — VIT B1 PYROPHOSHATE BLD-SCNC: 99 NMOL/L (ref 70–180)

## 2024-05-06 ENCOUNTER — HOSPITAL ENCOUNTER (OUTPATIENT)
Age: 44
Setting detail: OBSERVATION
Discharge: HOME | End: 2024-05-07
Attending: EMERGENCY MEDICINE | Admitting: EMERGENCY MEDICINE
Payer: MEDICAID

## 2024-05-06 ENCOUNTER — APPOINTMENT (OUTPATIENT)
Dept: GENERAL RADIOLOGY | Age: 44
End: 2024-05-06
Payer: MEDICAID

## 2024-05-06 ENCOUNTER — APPOINTMENT (OUTPATIENT)
Dept: CT IMAGING | Age: 44
End: 2024-05-06
Payer: MEDICAID

## 2024-05-06 DIAGNOSIS — R07.9 CHEST PAIN, UNSPECIFIED TYPE: Primary | ICD-10-CM

## 2024-05-06 LAB
ALBUMIN SERPL-MCNC: 3.9 G/DL (ref 3.5–5.2)
ALBUMIN/GLOB SERPL: 1.3 {RATIO} (ref 1–2.5)
ALP SERPL-CCNC: 80 U/L (ref 35–104)
ALT SERPL-CCNC: 7 U/L (ref 10–35)
ANION GAP SERPL CALCULATED.3IONS-SCNC: 10 MMOL/L (ref 9–16)
AST SERPL-CCNC: 24 U/L (ref 10–35)
BASOPHILS # BLD: <0.03 K/UL (ref 0–0.2)
BASOPHILS NFR BLD: 0 % (ref 0–2)
BILIRUB SERPL-MCNC: <0.2 MG/DL (ref 0–1.2)
BUN SERPL-MCNC: 12 MG/DL (ref 6–20)
CALCIUM SERPL-MCNC: 7.5 MG/DL (ref 8.6–10.4)
CHLORIDE SERPL-SCNC: 109 MMOL/L (ref 98–107)
CO2 SERPL-SCNC: 25 MMOL/L (ref 20–31)
CREAT SERPL-MCNC: 0.6 MG/DL (ref 0.5–0.9)
EOSINOPHIL # BLD: 0.98 K/UL (ref 0–0.44)
EOSINOPHILS RELATIVE PERCENT: 9 % (ref 1–4)
ERYTHROCYTE [DISTWIDTH] IN BLOOD BY AUTOMATED COUNT: 13.2 % (ref 11.8–14.4)
GLUCOSE SERPL-MCNC: 89 MG/DL (ref 74–99)
HCT VFR BLD AUTO: 37.8 % (ref 36.3–47.1)
HGB BLD-MCNC: 12.2 G/DL (ref 11.9–15.1)
IMM GRANULOCYTES # BLD AUTO: 0.04 K/UL (ref 0–0.3)
IMM GRANULOCYTES NFR BLD: 0 %
LYMPHOCYTES NFR BLD: 2.65 K/UL (ref 1.1–3.7)
LYMPHOCYTES RELATIVE PERCENT: 23 % (ref 24–43)
MAGNESIUM SERPL-MCNC: 2.3 MG/DL (ref 1.6–2.6)
MCH RBC QN AUTO: 28.9 PG (ref 25.2–33.5)
MCHC RBC AUTO-ENTMCNC: 32.3 G/DL (ref 28.4–34.8)
MCV RBC AUTO: 89.6 FL (ref 82.6–102.9)
MONOCYTES NFR BLD: 0.33 K/UL (ref 0.1–1.2)
MONOCYTES NFR BLD: 3 % (ref 3–12)
NEUTROPHILS NFR BLD: 65 % (ref 36–65)
NEUTS SEG NFR BLD: 7.31 K/UL (ref 1.5–8.1)
NRBC BLD-RTO: 0 PER 100 WBC
PLATELET # BLD AUTO: 358 K/UL (ref 138–453)
PMV BLD AUTO: 9.6 FL (ref 8.1–13.5)
POTASSIUM SERPL-SCNC: 3.9 MMOL/L (ref 3.7–5.3)
PROT SERPL-MCNC: 7 G/DL (ref 6.6–8.7)
RBC # BLD AUTO: 4.22 M/UL (ref 3.95–5.11)
SODIUM SERPL-SCNC: 144 MMOL/L (ref 136–145)
TROPONIN I SERPL HS-MCNC: 12 NG/L (ref 0–14)
TROPONIN I SERPL HS-MCNC: 17 NG/L (ref 0–14)
WBC OTHER # BLD: 11.3 K/UL (ref 3.5–11.3)

## 2024-05-06 PROCEDURE — 71045 X-RAY EXAM CHEST 1 VIEW: CPT

## 2024-05-06 PROCEDURE — 96376 TX/PRO/DX INJ SAME DRUG ADON: CPT

## 2024-05-06 PROCEDURE — 93005 ELECTROCARDIOGRAM TRACING: CPT | Performed by: STUDENT IN AN ORGANIZED HEALTH CARE EDUCATION/TRAINING PROGRAM

## 2024-05-06 PROCEDURE — 83735 ASSAY OF MAGNESIUM: CPT

## 2024-05-06 PROCEDURE — 84703 CHORIONIC GONADOTROPIN ASSAY: CPT

## 2024-05-06 PROCEDURE — 84484 ASSAY OF TROPONIN QUANT: CPT

## 2024-05-06 PROCEDURE — 85025 COMPLETE CBC W/AUTO DIFF WBC: CPT

## 2024-05-06 PROCEDURE — 96375 TX/PRO/DX INJ NEW DRUG ADDON: CPT

## 2024-05-06 PROCEDURE — 99285 EMERGENCY DEPT VISIT HI MDM: CPT

## 2024-05-06 PROCEDURE — 6370000000 HC RX 637 (ALT 250 FOR IP): Performed by: STUDENT IN AN ORGANIZED HEALTH CARE EDUCATION/TRAINING PROGRAM

## 2024-05-06 PROCEDURE — 6360000004 HC RX CONTRAST MEDICATION: Performed by: EMERGENCY MEDICINE

## 2024-05-06 PROCEDURE — 71260 CT THORAX DX C+: CPT

## 2024-05-06 PROCEDURE — 96374 THER/PROPH/DIAG INJ IV PUSH: CPT

## 2024-05-06 PROCEDURE — 6360000002 HC RX W HCPCS: Performed by: STUDENT IN AN ORGANIZED HEALTH CARE EDUCATION/TRAINING PROGRAM

## 2024-05-06 PROCEDURE — 2580000003 HC RX 258: Performed by: STUDENT IN AN ORGANIZED HEALTH CARE EDUCATION/TRAINING PROGRAM

## 2024-05-06 PROCEDURE — 80053 COMPREHEN METABOLIC PANEL: CPT

## 2024-05-06 RX ORDER — ASPIRIN 81 MG/1
324 TABLET, CHEWABLE ORAL ONCE
Status: COMPLETED | OUTPATIENT
Start: 2024-05-06 | End: 2024-05-06

## 2024-05-06 RX ORDER — DIPHENHYDRAMINE HYDROCHLORIDE 50 MG/ML
50 INJECTION INTRAMUSCULAR; INTRAVENOUS ONCE
Status: COMPLETED | OUTPATIENT
Start: 2024-05-06 | End: 2024-05-06

## 2024-05-06 RX ADMIN — ASPIRIN 81 MG 324 MG: 81 TABLET ORAL at 23:05

## 2024-05-06 RX ADMIN — WATER 40 MG: 1 INJECTION INTRAMUSCULAR; INTRAVENOUS; SUBCUTANEOUS at 23:05

## 2024-05-06 RX ADMIN — DIPHENHYDRAMINE HYDROCHLORIDE 50 MG: 50 INJECTION INTRAMUSCULAR; INTRAVENOUS at 20:28

## 2024-05-06 RX ADMIN — IOPAMIDOL 75 ML: 755 INJECTION, SOLUTION INTRAVENOUS at 23:27

## 2024-05-06 RX ADMIN — WATER 40 MG: 1 INJECTION INTRAMUSCULAR; INTRAVENOUS; SUBCUTANEOUS at 19:01

## 2024-05-06 ASSESSMENT — LIFESTYLE VARIABLES
HOW MANY STANDARD DRINKS CONTAINING ALCOHOL DO YOU HAVE ON A TYPICAL DAY: PATIENT DOES NOT DRINK
HOW OFTEN DO YOU HAVE A DRINK CONTAINING ALCOHOL: NEVER

## 2024-05-06 ASSESSMENT — PAIN - FUNCTIONAL ASSESSMENT: PAIN_FUNCTIONAL_ASSESSMENT: NONE - DENIES PAIN

## 2024-05-07 ENCOUNTER — APPOINTMENT (OUTPATIENT)
Dept: NUCLEAR MEDICINE | Age: 44
End: 2024-05-07
Payer: MEDICAID

## 2024-05-07 ENCOUNTER — HOSPITAL ENCOUNTER (OUTPATIENT)
Age: 44
Setting detail: OBSERVATION
Discharge: HOME | End: 2024-05-09
Payer: MEDICAID

## 2024-05-07 VITALS
WEIGHT: 189.82 LBS | BODY MASS INDEX: 33.63 KG/M2 | TEMPERATURE: 98.1 F | RESPIRATION RATE: 20 BRPM | OXYGEN SATURATION: 98 % | SYSTOLIC BLOOD PRESSURE: 106 MMHG | DIASTOLIC BLOOD PRESSURE: 60 MMHG | HEART RATE: 62 BPM

## 2024-05-07 PROBLEM — R07.9 CHEST PAIN: Status: ACTIVE | Noted: 2024-05-07

## 2024-05-07 LAB
EKG ATRIAL RATE: 83 BPM
EKG ATRIAL RATE: 86 BPM
EKG ATRIAL RATE: 86 BPM
EKG P AXIS: 34 DEGREES
EKG P AXIS: 56 DEGREES
EKG P-R INTERVAL: 138 MS
EKG P-R INTERVAL: 138 MS
EKG P-R INTERVAL: 146 MS
EKG Q-T INTERVAL: 356 MS
EKG QRS DURATION: 74 MS
EKG QRS DURATION: 78 MS
EKG QRS DURATION: 78 MS
EKG QTC CALCULATION (BAZETT): 418 MS
EKG QTC CALCULATION (BAZETT): 426 MS
EKG QTC CALCULATION (BAZETT): 426 MS
EKG R AXIS: 10 DEGREES
EKG R AXIS: 10 DEGREES
EKG R AXIS: 17 DEGREES
EKG T AXIS: -3 DEGREES
EKG T AXIS: -3 DEGREES
EKG T AXIS: 3 DEGREES
EKG VENTRICULAR RATE: 83 BPM
EKG VENTRICULAR RATE: 86 BPM
EKG VENTRICULAR RATE: 86 BPM
HCG SERPL QL: NEGATIVE
HCG UR QL: NEGATIVE
STRESS BASELINE DIAS BP: 80 MMHG
STRESS BASELINE HR: 69 BPM
STRESS BASELINE SYS BP: 105 MMHG
STRESS ESTIMATED WORKLOAD: 1 METS
STRESS PEAK DIAS BP: 70 MMHG
STRESS PEAK SYS BP: 106 MMHG
STRESS PERCENT HR ACHIEVED: 77 %
STRESS POST PEAK HR: 137 BPM
STRESS RATE PRESSURE PRODUCT: NORMAL BPM*MMHG
STRESS TARGET HR: 179 BPM

## 2024-05-07 PROCEDURE — 93005 ELECTROCARDIOGRAM TRACING: CPT | Performed by: EMERGENCY MEDICINE

## 2024-05-07 PROCEDURE — G0378 HOSPITAL OBSERVATION PER HR: HCPCS

## 2024-05-07 PROCEDURE — 2580000003 HC RX 258: Performed by: INTERNAL MEDICINE

## 2024-05-07 PROCEDURE — 93017 CV STRESS TEST TRACING ONLY: CPT

## 2024-05-07 PROCEDURE — 93010 ELECTROCARDIOGRAM REPORT: CPT | Performed by: INTERNAL MEDICINE

## 2024-05-07 PROCEDURE — 3430000000 HC RX DIAGNOSTIC RADIOPHARMACEUTICAL: Performed by: INTERNAL MEDICINE

## 2024-05-07 PROCEDURE — 6360000002 HC RX W HCPCS: Performed by: STUDENT IN AN ORGANIZED HEALTH CARE EDUCATION/TRAINING PROGRAM

## 2024-05-07 PROCEDURE — 96376 TX/PRO/DX INJ SAME DRUG ADON: CPT

## 2024-05-07 PROCEDURE — A9500 TC99M SESTAMIBI: HCPCS | Performed by: INTERNAL MEDICINE

## 2024-05-07 PROCEDURE — 93018 CV STRESS TEST I&R ONLY: CPT | Performed by: INTERNAL MEDICINE

## 2024-05-07 PROCEDURE — 6360000002 HC RX W HCPCS: Performed by: INTERNAL MEDICINE

## 2024-05-07 PROCEDURE — 81025 URINE PREGNANCY TEST: CPT

## 2024-05-07 PROCEDURE — 2580000003 HC RX 258: Performed by: STUDENT IN AN ORGANIZED HEALTH CARE EDUCATION/TRAINING PROGRAM

## 2024-05-07 PROCEDURE — 99223 1ST HOSP IP/OBS HIGH 75: CPT | Performed by: INTERNAL MEDICINE

## 2024-05-07 PROCEDURE — 78452 HT MUSCLE IMAGE SPECT MULT: CPT

## 2024-05-07 RX ORDER — SODIUM CHLORIDE 9 MG/ML
500 INJECTION, SOLUTION INTRAVENOUS CONTINUOUS PRN
Status: DISCONTINUED | OUTPATIENT
Start: 2024-05-07 | End: 2024-05-07 | Stop reason: ALTCHOICE

## 2024-05-07 RX ORDER — METOPROLOL TARTRATE 1 MG/ML
5 INJECTION, SOLUTION INTRAVENOUS EVERY 5 MIN PRN
Status: ACTIVE | OUTPATIENT
Start: 2024-05-07 | End: 2024-05-07

## 2024-05-07 RX ORDER — SODIUM CHLORIDE 9 MG/ML
INJECTION, SOLUTION INTRAVENOUS PRN
Status: DISCONTINUED | OUTPATIENT
Start: 2024-05-07 | End: 2024-05-07 | Stop reason: HOSPADM

## 2024-05-07 RX ORDER — KETOROLAC TROMETHAMINE 30 MG/ML
30 INJECTION, SOLUTION INTRAMUSCULAR; INTRAVENOUS ONCE
Status: DISCONTINUED | OUTPATIENT
Start: 2024-05-07 | End: 2024-05-07 | Stop reason: HOSPADM

## 2024-05-07 RX ORDER — ONDANSETRON 4 MG/1
4 TABLET, ORALLY DISINTEGRATING ORAL EVERY 8 HOURS PRN
Status: DISCONTINUED | OUTPATIENT
Start: 2024-05-07 | End: 2024-05-07 | Stop reason: HOSPADM

## 2024-05-07 RX ORDER — REGADENOSON 0.08 MG/ML
0.4 INJECTION, SOLUTION INTRAVENOUS
Status: COMPLETED | OUTPATIENT
Start: 2024-05-07 | End: 2024-05-07

## 2024-05-07 RX ORDER — AMINOPHYLLINE 25 MG/ML
50 INJECTION, SOLUTION INTRAVENOUS PRN
Status: CANCELLED | OUTPATIENT
Start: 2024-05-07 | End: 2024-05-07

## 2024-05-07 RX ORDER — AMINOPHYLLINE 25 MG/ML
50 INJECTION, SOLUTION INTRAVENOUS PRN
Status: DISCONTINUED | OUTPATIENT
Start: 2024-05-07 | End: 2024-05-07 | Stop reason: ALTCHOICE

## 2024-05-07 RX ORDER — SODIUM CHLORIDE 9 MG/ML
500 INJECTION, SOLUTION INTRAVENOUS CONTINUOUS PRN
Status: CANCELLED | OUTPATIENT
Start: 2024-05-07 | End: 2024-05-07

## 2024-05-07 RX ORDER — SODIUM CHLORIDE 0.9 % (FLUSH) 0.9 %
5-40 SYRINGE (ML) INJECTION PRN
Status: CANCELLED | OUTPATIENT
Start: 2024-05-07 | End: 2024-05-07

## 2024-05-07 RX ORDER — SODIUM CHLORIDE 0.9 % (FLUSH) 0.9 %
5-40 SYRINGE (ML) INJECTION PRN
Status: DISCONTINUED | OUTPATIENT
Start: 2024-05-07 | End: 2024-05-07 | Stop reason: HOSPADM

## 2024-05-07 RX ORDER — ALBUTEROL SULFATE 90 UG/1
2 AEROSOL, METERED RESPIRATORY (INHALATION) PRN
Status: CANCELLED | OUTPATIENT
Start: 2024-05-07 | End: 2024-05-07

## 2024-05-07 RX ORDER — ENOXAPARIN SODIUM 100 MG/ML
40 INJECTION SUBCUTANEOUS DAILY
Status: DISCONTINUED | OUTPATIENT
Start: 2024-05-07 | End: 2024-05-07 | Stop reason: HOSPADM

## 2024-05-07 RX ORDER — NITROGLYCERIN 0.4 MG/1
0.4 TABLET SUBLINGUAL EVERY 5 MIN PRN
Status: DISCONTINUED | OUTPATIENT
Start: 2024-05-07 | End: 2024-05-07 | Stop reason: ALTCHOICE

## 2024-05-07 RX ORDER — SODIUM CHLORIDE 0.9 % (FLUSH) 0.9 %
5-40 SYRINGE (ML) INJECTION PRN
Status: DISCONTINUED | OUTPATIENT
Start: 2024-05-07 | End: 2024-05-07 | Stop reason: ALTCHOICE

## 2024-05-07 RX ORDER — TETRAKIS(2-METHOXYISOBUTYLISOCYANIDE)COPPER(I) TETRAFLUOROBORATE 1 MG/ML
36.3 INJECTION, POWDER, LYOPHILIZED, FOR SOLUTION INTRAVENOUS
Status: COMPLETED | OUTPATIENT
Start: 2024-05-07 | End: 2024-05-07

## 2024-05-07 RX ORDER — TETRAKIS(2-METHOXYISOBUTYLISOCYANIDE)COPPER(I) TETRAFLUOROBORATE 1 MG/ML
13.8 INJECTION, POWDER, LYOPHILIZED, FOR SOLUTION INTRAVENOUS
Status: COMPLETED | OUTPATIENT
Start: 2024-05-07 | End: 2024-05-07

## 2024-05-07 RX ORDER — METOPROLOL TARTRATE 1 MG/ML
5 INJECTION, SOLUTION INTRAVENOUS EVERY 5 MIN PRN
Status: CANCELLED | OUTPATIENT
Start: 2024-05-07 | End: 2024-05-07

## 2024-05-07 RX ORDER — SODIUM CHLORIDE 0.9 % (FLUSH) 0.9 %
5-40 SYRINGE (ML) INJECTION EVERY 12 HOURS SCHEDULED
Status: DISCONTINUED | OUTPATIENT
Start: 2024-05-07 | End: 2024-05-07 | Stop reason: HOSPADM

## 2024-05-07 RX ORDER — ALBUTEROL SULFATE 90 UG/1
2 AEROSOL, METERED RESPIRATORY (INHALATION) PRN
Status: DISCONTINUED | OUTPATIENT
Start: 2024-05-07 | End: 2024-05-07 | Stop reason: ALTCHOICE

## 2024-05-07 RX ORDER — ATROPINE SULFATE 0.1 MG/ML
0.5 INJECTION INTRAVENOUS EVERY 5 MIN PRN
Status: CANCELLED | OUTPATIENT
Start: 2024-05-07 | End: 2024-05-07

## 2024-05-07 RX ORDER — ONDANSETRON 2 MG/ML
4 INJECTION INTRAMUSCULAR; INTRAVENOUS EVERY 6 HOURS PRN
Status: DISCONTINUED | OUTPATIENT
Start: 2024-05-07 | End: 2024-05-07 | Stop reason: HOSPADM

## 2024-05-07 RX ORDER — SODIUM CHLORIDE 0.9 % (FLUSH) 0.9 %
10 SYRINGE (ML) INJECTION PRN
Status: DISCONTINUED | OUTPATIENT
Start: 2024-05-07 | End: 2024-05-07 | Stop reason: HOSPADM

## 2024-05-07 RX ORDER — REGADENOSON 0.08 MG/ML
0.4 INJECTION, SOLUTION INTRAVENOUS
Status: CANCELLED | OUTPATIENT
Start: 2024-05-07

## 2024-05-07 RX ORDER — NITROGLYCERIN 0.4 MG/1
0.4 TABLET SUBLINGUAL EVERY 5 MIN PRN
Status: CANCELLED | OUTPATIENT
Start: 2024-05-07 | End: 2024-05-07

## 2024-05-07 RX ORDER — ACETAMINOPHEN 325 MG/1
650 TABLET ORAL EVERY 4 HOURS PRN
Status: DISCONTINUED | OUTPATIENT
Start: 2024-05-07 | End: 2024-05-07 | Stop reason: HOSPADM

## 2024-05-07 RX ORDER — ATROPINE SULFATE 0.1 MG/ML
0.5 INJECTION INTRAVENOUS EVERY 5 MIN PRN
Status: DISCONTINUED | OUTPATIENT
Start: 2024-05-07 | End: 2024-05-07 | Stop reason: ALTCHOICE

## 2024-05-07 RX ADMIN — SODIUM CHLORIDE, PRESERVATIVE FREE 10 ML: 5 INJECTION INTRAVENOUS at 11:38

## 2024-05-07 RX ADMIN — REGADENOSON 0.4 MG: 0.08 INJECTION, SOLUTION INTRAVENOUS at 11:42

## 2024-05-07 RX ADMIN — SODIUM CHLORIDE, PRESERVATIVE FREE 10 ML: 5 INJECTION INTRAVENOUS at 10:10

## 2024-05-07 RX ADMIN — TETRAKIS(2-METHOXYISOBUTYLISOCYANIDE)COPPER(I) TETRAFLUOROBORATE 13.8 MILLICURIE: 1 INJECTION, POWDER, LYOPHILIZED, FOR SOLUTION INTRAVENOUS at 11:45

## 2024-05-07 RX ADMIN — TETRAKIS(2-METHOXYISOBUTYLISOCYANIDE)COPPER(I) TETRAFLUOROBORATE 36.3 MILLICURIE: 1 INJECTION, POWDER, LYOPHILIZED, FOR SOLUTION INTRAVENOUS at 11:45

## 2024-05-07 RX ADMIN — WATER 40 MG: 1 INJECTION INTRAMUSCULAR; INTRAVENOUS; SUBCUTANEOUS at 04:01

## 2024-05-07 RX ADMIN — SODIUM CHLORIDE, PRESERVATIVE FREE 10 ML: 5 INJECTION INTRAVENOUS at 11:45

## 2024-05-07 RX ADMIN — SODIUM CHLORIDE, PRESERVATIVE FREE 10 ML: 5 INJECTION INTRAVENOUS at 11:42

## 2024-05-07 ASSESSMENT — ENCOUNTER SYMPTOMS
VOMITING: 0
SHORTNESS OF BREATH: 0
NAUSEA: 0
ABDOMINAL PAIN: 0
BACK PAIN: 1

## 2024-05-07 ASSESSMENT — HEART SCORE
ECG: NORMAL

## 2024-05-07 NOTE — DISCHARGE SUMMARY
CDU Discharge Summary        Patient:  Flower Daniel  YOB: 1980    MRN: [id number]   Acct: [id number]    Primary Care Physician: Monika Banegas MD    Admit date:  5/6/2024  6:29 PM  Discharge date: 5/7/2024  5:44 PM     Discharge Diagnoses:     1.)  Patient had chest pain with acute onset due to unknown cause.  Treated with pain medication, IV fluids.  Patient's symptoms are improved with the plan to discharge home    Follow-up:  Call today/tomorrow for a follow up appointment with Monika Banegas MD , or return to the Emergency Room with worsening symptoms    Stressed to patient the importance of following up with primary care doctor for further workup/management of symptoms.  Pt verbalizes understanding and agrees with plan.    Discharge Medication Changes:       Medication List        CONTINUE taking these medications      * ORENCIA SC     * Abatacept 125 MG/ML Sosy     Aspercreme Lidocaine 4 % Crea  Generic drug: Lidocaine HCl     azelastine 0.05 % ophthalmic solution  Commonly known as: OPTIVAR     cetirizine 10 MG tablet  Commonly known as: ZYRTEC  Take 1 tablet by mouth as needed for Allergies or Rhinitis     IRON PO     MULTIVITAMIN ADULT PO     predniSONE 2.5 MG tablet  Commonly known as: DELTASONE     RA Alcohol Swabs 70 % Pads     sharps container     Soothe XP Xtra Protection Soln     vitamin D 1.25 MG (95977 UT) Caps capsule  Commonly known as: ERGOCALCIFEROL  Take 1 capsule by mouth once a week for 8 doses           * This list has 2 medication(s) that are the same as other medications prescribed for you. Read the directions carefully, and ask your doctor or other care provider to review them with you.                ASK your doctor about these medications      cycloSPORINE 0.05 % ophthalmic emulsion  Commonly known as: RESTASIS     fluticasone 50 MCG/ACT nasal spray  Commonly known as: FLONASE  2 sprays by Each Nostril route daily     olopatadine 0.1 % ophthalmic solution  Commonly

## 2024-05-07 NOTE — PROGRESS NOTES
[facility]  CDU / OBSERVATION eNCOUnter  Attending NOte       I performed a history and physical examination of the patient and discussed management with the resident.  This patient was placed in the observation unit for reevaluation for possible admission to the hospital. I reviewed the resident’s note and agree with the documented findings and plan of care. Any areas of disagreement are noted on the chart. I was personally present for the key portions of any procedures. I have documented in the chart those procedures where I was not present during the key portions. I have reviewed the nurses notes. I agree with the chief complaint, past medical history, past surgical history, allergies, medications, social and family history as documented unless otherwise noted below.    The patient was placed in the observation unit for reevaluation for possible admission to the hospital.      The Family history, social history, and ROS are effectively unchanged since admission unless noted elsewhere in the chart.    Patient seen in stress lab. No acute chest pain currently. We will follow up her results.     Tressa Godoy MD  Attending Emergency  Physician

## 2024-05-07 NOTE — CARE COORDINATION
HEALTHCARE COMMUNITY PL / Plan: [facility] COMMUNITY PLAN [place] / Product Type: *No Product type* /     Does insurance require precert for SNF: No    Potential assistance Purchasing Medications: Yes  Meds-to-Beds request: Yes      [id number] - [place], [place] - [address] - P [phone] - F [phone]  [address] [place] [address]  Phone: [phone] Fax: [phone]    [id number] - [place], [place] - [address] - P [phone] - F [phone]  [address] [facility] [place] [address]  Phone: [phone] Fax: [phone]    [facility] - [place], [place] - [address] - P [phone] - F [phone]  [address] [facility] [place] [address]  Phone: [phone] Fax: [phone]      Notes:    Factors facilitating achievement of predicted outcomes: Family support    Barriers to discharge: Medical complications    Additional Case Management Notes: home with     The Plan for Transition of Care is related to the following treatment goals of Chest pain [R07.9]  Chest pain, unspecified type [R07.9]    IF APPLICABLE: The Patient and/or patient representative Flower and her family were provided with a choice of provider and agrees with the discharge plan. Freedom of choice list with basic dialogue that supports the patient's individualized plan of care/goals and shares the quality data associated with the providers was provided to:     Patient Representative Name:       The Patient and/or Patient Representative Agree with the Discharge Plan?      REESE CASTILLO RN  Case Management Department  Ph: [phone] Fax:

## 2024-05-07 NOTE — DISCHARGE INSTRUCTIONS
Your workup from the emergency department did not show any significant pathology but you were admitted to the observation unit for ongoing evaluation.    Your testing included CT, which was negative for blood clot in your lungs. Your stress test resulted as low risk. You were seen by the cardiology team who would like to see you outpatient, please call their office to schedule an appointment.     We no longer need to keep you in the hospital but that does not mean you are done being treated  -Take your prescribed medications as directed  -Follow-up with your primary care physician or the specialist designated or both as scheduled    Please return if your condition worsens or there are new symptoms    If there are concerns that have not been addressed while you have been in the hospital please let the discharge nurse know so the physician can be informed -it is easier to fix problems while you are still here    If you have questions after you have left the hospital please call the unit at  and ask for the observation resident on-call

## 2024-05-07 NOTE — H&P
interpreted by the Observation Physician who either signs or Co-signs this chart in the absence of a cardiologist.    EKG Interpretation    Interpreted by observation physician    Rhythm: normal sinus   Rate: normal, 86  Axis: normal  Ectopy: none  Conduction: normal  ST Segments: no acute change  T Waves: no acute change  Q Waves: none    Clinical Impression: no acute changes    Candice Meadows, DO      RADIOLOGY:   I directly visualized the following  images and reviewed the radiologist interpretations:    CT CHEST PULMONARY EMBOLISM W CONTRAST    Result Date: 5/7/2024  EXAMINATION: CTA OF THE CHEST 5/6/2024 11:22 pm TECHNIQUE: CTA of the chest was performed after the administration of intravenous contrast.  Multiplanar reformatted images are provided for review.  MIP images are provided for review. Automated exposure control, iterative reconstruction, and/or weight based adjustment of the mA/kV was utilized to reduce the radiation dose to as low as reasonably achievable. COMPARISON: 06/20/2021 HISTORY: ORDERING SYSTEM PROVIDED HISTORY: hx of antiphospholipid, chest pain, short of breath TECHNOLOGIST PROVIDED HISTORY: hx of antiphospholipid, chest pain, short of breath Additional Contrast?->1 FINDINGS: Pulmonary Arteries: Pulmonary arteries are adequately opacified for evaluation.  No evidence of intraluminal filling defect to suggest pulmonary embolism.  Main pulmonary artery is normal in caliber. Mediastinum: No evidence of mediastinal lymphadenopathy.  The heart and pericardium demonstrate no acute abnormality.  There is no acute abnormality of the thoracic aorta. Lungs/pleura: The lungs are without acute process.  No focal consolidation or pulmonary edema.  No evidence of pleural effusion or pneumothorax. Upper Abdomen: Limited images of the upper abdomen are noted for prior sleeve gastrectomy. Soft Tissues/Bones: No acute bone or soft tissue abnormality.  Marked bilateral axillary adenopathy is again noted.

## 2024-05-07 NOTE — ED PROVIDER NOTES
[facility]   Emergency Department  Emergency Medicine Attending Sign-out   Note started: 11:34 PM EDT    Care of Flower Daniel was assumed from previous attending Dr. Orr at 11 PM and is being seen for Chest Pain  .  The patient's initial evaluation and plan have been discussed with the prior provider who initially evaluated the patient.     Attestation  I was available and discussed any additional care issues that arose and coordinated the management plans with the resident(s) caring for the patient during my duty period. Any areas of disagreement with resident's documentation of care or procedures are noted on the chart. I was personally present for the key portions of any/all procedures, during my duty period. I have documented in the chart those procedures where I was not present during the key portions.     BRIEF PATIENT SUMMARY/MDM COURSE PER INITIAL PROVIDER:   RECENT VITALS:     Temp: 98.5 °F (36.9 °C),  Pulse: 76, Respirations: 13, BP: 99/71, SpO2: 99 %    This patient is a 43 y.o. Female with chest pain, history of factor V Leiden and recent car travel awaiting CT rule out PE.    DIAGNOSTICS/MEDICATIONS:     MEDICATIONS GIVEN:  ED Medication Orders (From admission, onward)      Start Ordered     Status Ordering Provider    05/06/24 2326 05/06/24 2326  iopamidol (ISOVUE-370) 76 % injection 75 mL  IMG ONCE PRN         Last MAR action: Given - by JACINTO CEDEÑO on 05/06/24 at 2327 DREW ORR    05/06/24 2300 05/06/24 1849  methylPREDNISolone sodium succ (SOLU-MEDROL) 40 mg in sterile water 1 mL injection  EVERY 4 HOURS         Last MAR action: Given - by HANANE AYALA on 05/06/24 at 2305 ANGELO CLINE    05/06/24 2200 05/06/24 2146  aspirin chewable tablet 324 mg  ONCE         Last MAR action: Given - by HANANE AYALA on 05/06/24 at 2305 ANGELO CLINE    05/06/24 2000 05/06/24 1849  diphenhydrAMINE (BENADRYL) injection 50 mg  ONCE         Last MAR action: 
      [facility]     Emergency Department     Faculty Note/ Attestation      Pt Name: Flower Daniel                                       MRN: [id number]  Birthdate 1980  Date of evaluation: 5/6/2024  Note Started: 7:54 PM EDT    Patients PCP:    Monika Banegas MD    Attestation  I performed a history and physical examination of the patient and discussed management with the resident. I reviewed the resident’s note and agree with the documented findings and plan of care. Any areas of disagreement are noted on the chart. I was personally present for the key portions of any procedures. I have documented in the chart those procedures where I was not present during the key portions. I have reviewed the emergency nurses triage note. I agree with the chief complaint, past medical history, past surgical history, allergies, medications, social and family history as documented unless otherwise noted below.    For Physician Assistant/ Nurse Practitioner cases/documentation I have personally evaluated this patient and have completed at least one if not all key elements of the E/M (history, physical exam, and MDM). Additional findings are as noted.    Initial Screens:        [place] Coma Scale  Eye Opening: Spontaneous  Best Verbal Response: Oriented  Best Motor Response: Obeys commands  [place] Coma Scale Score: 15    Vitals:    Vitals:    05/06/24 1845 05/06/24 1852 05/06/24 1900 05/06/24 1905   BP: 114/69  97/73 99/71   Pulse: 79 70 73 76   Resp: 14 18 22 13   Temp:       TempSrc:       SpO2: 100% 99% 98% 99%   Weight:           CHIEF COMPLAINT       Chief Complaint   Patient presents with   • Chest Pain       The pt is a 44 YO F with PMH of prior clot and antiphospholipd syndrome with recent long car ride over 3 hours.  She had pain starting today pleuritic and concerned about PE given hx        EMERGENCY DEPARTMENT COURSE:     -------------------------  BP: 99/71, Temp: 98.5 °F (36.9 °C), Pulse: 76, 
improved since coming here but is still slightly present.  Patient is agreeable to staying in the observation unit for evaluation by cardiology and likely stress test.  Will make her n.p.o. for evaluation tomorrow. [KR]      ED Course User Index  [CP] Sergei Winters MD  [KR] Willy Grant MD  [WK] Rafa Orr, DO       OUTSTANDING TASKS / RECOMMENDATIONS:    Follow-up CT PE  Admission to obs if CT PE neg for cards eval     FINAL IMPRESSION:     1. Chest pain, unspecified type        DISPOSITION:         DISPOSITION:  []  Discharge   []  Transfer -    [x]  Admission -     []  Against Medical Advice   []  Eloped   FOLLOW-UP: No follow-up provider specified.   DISCHARGE MEDICATIONS: New Prescriptions    No medications on file           Willy Grant MD  Emergency Medicine Resident  [facility]   
MD  Emergency Medicine Resident    (Please note that portions of thisnote were completed with a voice recognition program.  Efforts were made to edit the dictations but occasionally words are mis-transcribed.)

## 2024-05-07 NOTE — PLAN OF CARE
Problem: Discharge Planning  Goal: Discharge to home or other facility with appropriate resources  Outcome: Adequate for Discharge

## 2024-05-07 NOTE — CONSULTS
test is negative, patient can be discharged from cardiology standpoint.      Discussed with patient and Nurse.    Electronically signed by  Wesley Glaser MD, MPH, PGY-3  Internal Medicine Resident  [facility],   [place], [place]

## 2024-05-07 NOTE — PROGRESS NOTES
[facility]  [place]    Shift date: 5.6.2024  Shift day: Monday   Shift # 2    Room # 15/15   Name: Flower Daniel                Anglican: unknown   Place of Sabianist: unknown    Referral: Routine Visit    Admit Date & Time: 5/6/2024  6:29 PM    Assessment:  Flower Daniel is a 43 y.o. female in the hospital. Upon entering the room writer observes the family bedside calm and coping.        Intervention:  Writer introduced self and title as  Writer offered space for the family  to express feelings, needs, and concerns and provided a ministry presence.     Outcome:  Family remains calm and coping at this time.     Plan:  Chaplains will remain available to offer spiritual and emotional support as needed.      Electronically signed by Chaplain Deanna, on 5/6/2024 at 11:43 PM.  [facility]  [phone]       05/06/24 2045   Encounter Summary   Encounter Overview/Reason Initial Encounter   Service Provided For Family   Referral/Consult From [facility] [place]   Support System Family members   Last Encounter  05/06/24   Complexity of Encounter Low   Begin Time 2045   End Time  2100   Total Time Calculated 15 min   Assessment/Intervention/Outcome   Assessment Calm;Coping   Intervention Active listening;Discussed illness injury and it’s impact;Explored/Affirmed feelings, thoughts, concerns   Outcome Engaged in conversation;Expressed feelings, needs, and concerns     Electronically signed by Sumanth Jarvis on 5/6/2024 at 11:43 PM

## 2024-05-07 NOTE — ED NOTES
Chest pressure since this am with pain with inspiration . Pain not better with rest. Was on a long trip last week in a car. Also c/o back spasms x 1 week . Pain is constant and does not radiate anywhere. Has hx of PE but not on meds. Placed on full cardiac monitor. Pain in back more to left side of back. States has been under more stress then usual. Assessment complete  
Pt. Up to the restroom, upright and steady gait   
Pt. Updated on plan of care for CT contrast dye allergy    
(postoperative nausea and vomiting)     Pulmonary embolism affecting pregnancy in second trimester 11/30/2019    Rh+/RI/GBSunk 01/08/2020    Rheumatoid arteritis (HCC)     Dr. Moreno located [facility] seeing 3/28/2022    Tachycardia     Umbilical cord complication     Under care of team 09/07/2021    PCP: Dr. Danielle Perez, [place], last visit 8/2021, clearance given already for surgery on 9/14/2021    Under care of team 09/07/2021    Neurology: Dr. Gayle, [facility], last visit 7/2021    Under care of team 09/07/2021    Oncology: Dr. Sandra, [facility], last visit 3/2021, received clearance for surgery for 9/14/2021    was seeing for nodules in armpit. Pt. sees yearly . Last seen Jan 2022    Wears glasses     Wellness examination     last seen 1/2022  [facility] HINKLE    Wellness examination     Dr. HELEN Banegas, PCP       Labs:  Labs Reviewed   CBC WITH AUTO DIFFERENTIAL - Abnormal; Notable for the following components:       Result Value    Lymphocytes % 23 (*)     Eosinophils % 9 (*)     Eosinophils Absolute 0.98 (*)     All other components within normal limits   COMPREHENSIVE METABOLIC PANEL - Abnormal; Notable for the following components:    Chloride 109 (*)     Calcium 7.5 (*)     ALT 7 (*)     All other components within normal limits   TROPONIN - Abnormal; Notable for the following components:    Troponin, High Sensitivity 17 (*)     All other components within normal limits   TROPONIN   MAGNESIUM       Electronically signed by Karlene Vargas RN on 5/7/2024 at 1:31 AM

## 2024-05-08 ENCOUNTER — TELEPHONE (OUTPATIENT)
Dept: FAMILY MEDICINE CLINIC | Age: 44
End: 2024-05-08

## 2024-05-08 NOTE — TELEPHONE ENCOUNTER
Care Transitions Initial Follow Up Call    Outreach made within 2 business days of discharge: Yes    Patient: Flower Daniel Patient : 1980   MRN: [id number]  Reason for Admission: There are no discharge diagnoses documented for the most recent discharge.  Discharge Date: 24       Spoke with: Patient     Discharge department/facility: [facility] Interactive Patient Contact:  Was patient able to fill all prescriptions: n/a  Was patient instructed to bring all medications to the follow-up visit: Yes  Is patient taking all medications as directed in the discharge summary? Yes  Does patient understand their discharge instructions: Yes  Does patient have questions or concerns that need addressed prior to 7-14 day follow up office visit: yes -     Scheduled appointment with PCP within 7-14 days    Follow Up  Future Appointments   Date Time Provider Department Center   5/15/2024  2:30 PM Monika Banegas MD [facility] [place]   2024  6:30 PM ST[facility] MAMMO RM 1 [facility]   2024  9:30 AM SCHEDULE, P BARIATRIC DIETICIAN bariatric hreedia [place] MA

## 2024-05-15 ENCOUNTER — OFFICE VISIT (OUTPATIENT)
Dept: FAMILY MEDICINE CLINIC | Age: 44
End: 2024-05-15
Payer: MEDICAID

## 2024-05-15 VITALS
DIASTOLIC BLOOD PRESSURE: 68 MMHG | OXYGEN SATURATION: 98 % | WEIGHT: 189 LBS | HEIGHT: 63 IN | SYSTOLIC BLOOD PRESSURE: 112 MMHG | BODY MASS INDEX: 33.49 KG/M2 | HEART RATE: 92 BPM

## 2024-05-15 DIAGNOSIS — Z09 HOSPITAL DISCHARGE FOLLOW-UP: Primary | ICD-10-CM

## 2024-05-15 DIAGNOSIS — G47.33 SEVERE OBSTRUCTIVE SLEEP APNEA: ICD-10-CM

## 2024-05-15 DIAGNOSIS — R59.0 AXILLARY LYMPHADENOPATHY: ICD-10-CM

## 2024-05-15 DIAGNOSIS — R07.9 CHEST PAIN, UNSPECIFIED TYPE: ICD-10-CM

## 2024-05-15 DIAGNOSIS — N62 LARGE BREASTS: ICD-10-CM

## 2024-05-15 PROCEDURE — 1111F DSCHRG MED/CURRENT MED MERGE: CPT | Performed by: STUDENT IN AN ORGANIZED HEALTH CARE EDUCATION/TRAINING PROGRAM

## 2024-05-15 PROCEDURE — 99214 OFFICE O/P EST MOD 30 MIN: CPT | Performed by: STUDENT IN AN ORGANIZED HEALTH CARE EDUCATION/TRAINING PROGRAM

## 2024-05-15 NOTE — PROGRESS NOTES
Post-Discharge Transitional Care Follow Up      Flower Daniel   YOB: 1980    Date of Office Visit:  5/15/2024  Date of Hospital Admission: 5/6/24  Date of Hospital Discharge: 5/7/24  Readmission Risk Score (high >=14%. Medium >=10%):No data recorded    Care management risk score Rising risk (score 2-5) and Complex Care (Scores >=6): No Risk Score On File     Non face to face  following discharge, date last encounter closed (first attempt may have been earlier): 05/08/2024     Call initiated 2 business days of discharge: Yes     Hospital discharge follow-up  -     [place] DISCHARGE MEDS RECONCILED W/ CURRENT OUTPATIENT MED LIST  Chest pain, unspecified type  -     AFL (Epic) - Omid Schneider DO, Cardiology, [place]  Axillary lymphadenopathy  -     Susan - Leonard Ogden MD, Hematology/Oncology, [place]  Large breasts  -     AFL (Epic) - KENYETTA Lutz MD, Plastic Surgery, [place]  Severe obstructive sleep apnea  -     DME Order for CPAP as OP  -     [facility]    Medical Decision Making: moderate complexity  Return in 3 months (on 8/15/2024).    On this date 5/15/2024 I have spent 45 minutes reviewing previous notes, test results and face to face with the patient discussing the diagnosis and importance of compliance with the treatment plan as well as documenting on the day of the visit.       Subjective:   She was admitted in hospital recently with complaints of chest pain.  Her initial workup was negative but she was kept for observation overnight because of being high risk.  Her CT chest was normal except for bilateral axillary lymph nodes and there was concern for lymphoma.  Her follow-up stress test next morning was normal except for borderline ST segment depression and she was advised cardiology follow-up as outpatient.         Inpatient course: Discharge summary reviewed- see chart.    Interval history/Current status: She states that she was doing

## 2024-05-16 ENCOUNTER — TELEPHONE (OUTPATIENT)
Dept: FAMILY MEDICINE CLINIC | Age: 44
End: 2024-05-16

## 2024-05-16 ASSESSMENT — ENCOUNTER SYMPTOMS
WHEEZING: 0
NAUSEA: 0
ABDOMINAL PAIN: 0
CHEST TIGHTNESS: 0
BACK PAIN: 1
SHORTNESS OF BREATH: 0
VOMITING: 0
CONSTIPATION: 0
COUGH: 0
EYE DISCHARGE: 0

## 2024-05-16 NOTE — TELEPHONE ENCOUNTER
Patient is requesting a script  for CPAP supplies  be sent  Medical services company please fax to     fax number [phone]

## 2024-05-28 ENCOUNTER — HOSPITAL ENCOUNTER (OUTPATIENT)
Dept: MAMMOGRAPHY | Age: 44
Discharge: HOME | End: 2024-05-30
Payer: MEDICAID

## 2024-05-28 VITALS — BODY MASS INDEX: 33.49 KG/M2 | HEIGHT: 63 IN | WEIGHT: 189 LBS

## 2024-05-28 DIAGNOSIS — Z12.31 VISIT FOR SCREENING MAMMOGRAM: ICD-10-CM

## 2024-05-28 PROCEDURE — 77063 BREAST TOMOSYNTHESIS BI: CPT

## 2024-06-17 ENCOUNTER — TELEPHONE (OUTPATIENT)
Dept: INFUSION THERAPY | Facility: MEDICAL CENTER | Age: 44
End: 2024-06-17

## 2024-06-17 ENCOUNTER — OFFICE VISIT (OUTPATIENT)
Dept: ONCOLOGY | Age: 44
End: 2024-06-17
Payer: MEDICAID

## 2024-06-17 VITALS
WEIGHT: 185.5 LBS | SYSTOLIC BLOOD PRESSURE: 97 MMHG | DIASTOLIC BLOOD PRESSURE: 69 MMHG | RESPIRATION RATE: 16 BRPM | BODY MASS INDEX: 32.86 KG/M2 | TEMPERATURE: 97.9 F | HEART RATE: 76 BPM

## 2024-06-17 DIAGNOSIS — D70.9 EOSINOPENIA (HCC): ICD-10-CM

## 2024-06-17 DIAGNOSIS — R59.1 LYMPHADENOPATHY: Primary | ICD-10-CM

## 2024-06-17 DIAGNOSIS — E55.9 VITAMIN D DEFICIENCY: ICD-10-CM

## 2024-06-17 PROCEDURE — 99214 OFFICE O/P EST MOD 30 MIN: CPT | Performed by: INTERNAL MEDICINE

## 2024-06-17 PROCEDURE — 1036F TOBACCO NON-USER: CPT | Performed by: INTERNAL MEDICINE

## 2024-06-17 PROCEDURE — G8427 DOCREV CUR MEDS BY ELIG CLIN: HCPCS | Performed by: INTERNAL MEDICINE

## 2024-06-17 PROCEDURE — 99211 OFF/OP EST MAY X REQ PHY/QHP: CPT | Performed by: INTERNAL MEDICINE

## 2024-06-17 PROCEDURE — G8417 CALC BMI ABV UP PARAM F/U: HCPCS | Performed by: INTERNAL MEDICINE

## 2024-06-17 RX ORDER — PREDNISONE 50 MG/1
TABLET ORAL
Qty: 3 TABLET | Refills: 0 | Status: SHIPPED | OUTPATIENT
Start: 2024-06-17 | End: 2024-06-20

## 2024-06-17 NOTE — PROGRESS NOTES
Flower Daniel                                                                                                                  6/17/2024  MRN:   [id number]  YOB: 1980  PCP:                           Monika Banegas MD  Referring Physician: No ref. 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  Patient is 26-week pregnant.  Presented with pelvic pain urinary frequency and dyspnea for about 2 days.  Patient underwent CT chest which showed nonobstructive small left lower lobe pulmonary embolism.  Patient denies any previous history of blood clots.  Patient has had thrombophilia work-up done due to multiple pregnancy losses in the past which has not been clinically significant.  Patient has been started on Lovenox which she is tolerating well. Patient has BMI of 51.  Initial testing showed anticardiolipin IgG antibody to be 26.9 and in 12/2019 dropped to 3.7    Interim History:   Patient presents to the clinic for follow-up visit.  Patient last seen in office in December 2022.  Patient went to the ER due to complaint of chest pressure.  CT chest shows worsening of lymphadenopathy.  Patient underwent stress test has a follow-up with cardiology coming up.  Does complain of dizziness.  Also underwent Holter monitor testing through primary care provider.    During this visit patient's allergy, social, medical, surgical history and

## 2024-06-17 NOTE — TELEPHONE ENCOUNTER
Patient was seen today  Refer to Dr Rogel for biopsy - Patient to make appt  Ct abd pelvis  Rv to review biopsy results - Patient to make follow up appt. After appt. With Dr Rogel  AVS printed and given to patient

## 2024-06-18 RX ORDER — ERGOCALCIFEROL 1.25 MG/1
50000 CAPSULE ORAL WEEKLY
Qty: 8 CAPSULE | Refills: 0 | Status: SHIPPED | OUTPATIENT
Start: 2024-06-18

## 2024-06-24 ENCOUNTER — HOSPITAL ENCOUNTER (OUTPATIENT)
Dept: INTERVENTIONAL RADIOLOGY/VASCULAR | Age: 44
Discharge: HOME | End: 2024-06-26

## 2024-06-24 ENCOUNTER — HOSPITAL ENCOUNTER (OUTPATIENT)
Dept: CT IMAGING | Age: 44
Discharge: HOME | End: 2024-06-26
Attending: INTERNAL MEDICINE

## 2024-06-24 DIAGNOSIS — R59.1 LYMPHADENOPATHY: ICD-10-CM

## 2024-06-24 DIAGNOSIS — D70.9 EOSINOPENIA (HCC): ICD-10-CM

## 2024-06-28 DIAGNOSIS — R59.1 LYMPHADENOPATHY: ICD-10-CM

## 2024-06-28 DIAGNOSIS — D70.9 EOSINOPENIA (HCC): ICD-10-CM

## 2024-06-28 RX ORDER — PREDNISONE 50 MG/1
TABLET ORAL
Qty: 3 TABLET | Refills: 0 | Status: SHIPPED | OUTPATIENT
Start: 2024-06-28

## 2024-07-01 ENCOUNTER — HOSPITAL ENCOUNTER (OUTPATIENT)
Dept: CT IMAGING | Age: 44
Discharge: HOME | End: 2024-07-03
Attending: INTERNAL MEDICINE
Payer: MEDICAID

## 2024-07-01 PROCEDURE — 6360000004 HC RX CONTRAST MEDICATION: Performed by: INTERNAL MEDICINE

## 2024-07-01 PROCEDURE — 74177 CT ABD & PELVIS W/CONTRAST: CPT

## 2024-07-01 RX ADMIN — IOPAMIDOL 75 ML: 755 INJECTION, SOLUTION INTRAVENOUS at 14:18

## 2024-07-09 ENCOUNTER — OFFICE VISIT (OUTPATIENT)
Dept: FAMILY MEDICINE CLINIC | Age: 44
End: 2024-07-09
Payer: MEDICAID

## 2024-07-09 VITALS
BODY MASS INDEX: 34.02 KG/M2 | HEIGHT: 63 IN | WEIGHT: 192 LBS | OXYGEN SATURATION: 98 % | SYSTOLIC BLOOD PRESSURE: 118 MMHG | HEART RATE: 80 BPM | DIASTOLIC BLOOD PRESSURE: 76 MMHG

## 2024-07-09 DIAGNOSIS — K22.89 PATULOUS LOWER ESOPHAGEAL SPHINCTER: Primary | ICD-10-CM

## 2024-07-09 DIAGNOSIS — F41.9 ANXIETY: ICD-10-CM

## 2024-07-09 DIAGNOSIS — I95.1 AUTONOMIC ORTHOSTATIC HYPOTENSION: ICD-10-CM

## 2024-07-09 PROCEDURE — G8417 CALC BMI ABV UP PARAM F/U: HCPCS | Performed by: STUDENT IN AN ORGANIZED HEALTH CARE EDUCATION/TRAINING PROGRAM

## 2024-07-09 PROCEDURE — 1036F TOBACCO NON-USER: CPT | Performed by: STUDENT IN AN ORGANIZED HEALTH CARE EDUCATION/TRAINING PROGRAM

## 2024-07-09 PROCEDURE — G8427 DOCREV CUR MEDS BY ELIG CLIN: HCPCS | Performed by: STUDENT IN AN ORGANIZED HEALTH CARE EDUCATION/TRAINING PROGRAM

## 2024-07-09 PROCEDURE — 99214 OFFICE O/P EST MOD 30 MIN: CPT | Performed by: STUDENT IN AN ORGANIZED HEALTH CARE EDUCATION/TRAINING PROGRAM

## 2024-07-09 RX ORDER — OMEPRAZOLE 20 MG/1
20 CAPSULE, DELAYED RELEASE ORAL
Qty: 30 CAPSULE | Refills: 1 | Status: SHIPPED | OUTPATIENT
Start: 2024-07-09

## 2024-07-09 RX ORDER — FERROUS SULFATE 325(65) MG
1 TABLET ORAL EVERY OTHER DAY
COMMUNITY

## 2024-07-09 RX ORDER — PREDNISOLONE ACETATE 10 MG/ML
SUSPENSION/ DROPS OPHTHALMIC
COMMUNITY

## 2024-07-09 RX ORDER — NAPHAZOLINE HCL/GLYCERIN 0.012-0.2%
DROPS OPHTHALMIC (EYE)
COMMUNITY
Start: 2024-07-01

## 2024-07-09 ASSESSMENT — ENCOUNTER SYMPTOMS
WHEEZING: 0
EYE DISCHARGE: 0
SHORTNESS OF BREATH: 0
SORE THROAT: 0
CONSTIPATION: 0
COUGH: 0
CHEST TIGHTNESS: 0
DIARRHEA: 0
NAUSEA: 0
TROUBLE SWALLOWING: 1
VOMITING: 0
ABDOMINAL PAIN: 0

## 2024-07-09 NOTE — PROGRESS NOTES
Instructedto continue current medications, diet and exercise.  Patient agreed with treatmentplan. Follow up as directed.     Electronically signed by Monika Banegas MD on 7/9/2024 at 2:54 PM

## 2024-07-11 ENCOUNTER — OFFICE VISIT (OUTPATIENT)
Age: 44
End: 2024-07-11
Payer: MEDICAID

## 2024-07-11 ENCOUNTER — TELEPHONE (OUTPATIENT)
Age: 44
End: 2024-07-11

## 2024-07-11 VITALS
DIASTOLIC BLOOD PRESSURE: 62 MMHG | WEIGHT: 190.6 LBS | SYSTOLIC BLOOD PRESSURE: 91 MMHG | TEMPERATURE: 98.4 F | OXYGEN SATURATION: 96 % | BODY MASS INDEX: 33.77 KG/M2 | HEIGHT: 63 IN | HEART RATE: 82 BPM

## 2024-07-11 DIAGNOSIS — R59.1 LYMPHADENOPATHY: Primary | ICD-10-CM

## 2024-07-11 DIAGNOSIS — M05.9 RHEUMATOID ARTHRITIS WITH RHEUMATOID FACTOR, UNSPECIFIED (HCC): ICD-10-CM

## 2024-07-11 PROCEDURE — 99203 OFFICE O/P NEW LOW 30 MIN: CPT | Performed by: NURSE PRACTITIONER

## 2024-07-11 PROCEDURE — 1036F TOBACCO NON-USER: CPT | Performed by: NURSE PRACTITIONER

## 2024-07-11 PROCEDURE — G8427 DOCREV CUR MEDS BY ELIG CLIN: HCPCS | Performed by: NURSE PRACTITIONER

## 2024-07-11 PROCEDURE — G8417 CALC BMI ABV UP PARAM F/U: HCPCS | Performed by: NURSE PRACTITIONER

## 2024-07-11 NOTE — PROGRESS NOTES
to  reduce the radiation dose to as low as reasonably achievable.     COMPARISON:  06/20/2021     HISTORY:  ORDERING SYSTEM PROVIDED HISTORY: hx of antiphospholipid, chest pain, short  of breath  TECHNOLOGIST PROVIDED HISTORY:  hx of antiphospholipid, chest pain, short of breath  Additional Contrast?->1     FINDINGS:  Pulmonary Arteries: Pulmonary arteries are adequately opacified for  evaluation.  No evidence of intraluminal filling defect to suggest pulmonary  embolism.  Main pulmonary artery is normal in caliber.     Mediastinum: No evidence of mediastinal lymphadenopathy.  The heart and  pericardium demonstrate no acute abnormality.  There is no acute abnormality  of the thoracic aorta.     Lungs/pleura: The lungs are without acute process.  No focal consolidation or  pulmonary edema.  No evidence of pleural effusion or pneumothorax.     Upper Abdomen: Limited images of the upper abdomen are noted for prior sleeve  gastrectomy.     Soft Tissues/Bones: No acute bone or soft tissue abnormality.  Marked  bilateral axillary adenopathy is again noted.  It may be slightly worse  compared to 06/20/2021.     IMPRESSION:  1. No evidence of pulmonary embolism or other acute cardiopulmonary process.  2. Marked bilateral axillary adenopathy may be slightly worse compared to  06/20/2021.  Although this does appear to be chronically stable, the finding  does raise concern for coexistent lymphoma.  Correlate clinically.  3. Status post sleeve gastrectomy.              Specimen Collected: 05/07/24 00:48 EDT Last Resulted: 05/07/24 01:06 EDT           Major medical hx: Antiphospholipid syndrome, rheumatoid arthritis, tachycardia.  Blood thinning medications: None.  Takes Abatacept injections r/t RA.    PAST MEDICAL HISTORY     Past Medical History:   Diagnosis Date    Abnormal 1st trimester screen (Tri 21 1:117)--NIPT nml  11/30/2019    ADD (attention deficit disorder) without hyperactivity     Antiphospholipid syndrome (HCC)

## 2024-07-11 NOTE — TELEPHONE ENCOUNTER
Please let patient know I spoke to Dr. Rogel regarding her case and will schedule her for the following procedure:    SURGERY/PROCEDURE SCHEDULING  PROCEDURE NAME: Left axillary lymph node excisional biopsy    PROCEDURE DIAGNOSIS: Lymphadenopathy    ANESTHESIA TYPE: General    BLOOD THINNERS: None    CLEARANCE: None    HOSPITAL: [facility]    Pre-admission testing per protocol with [facility]. Procedure risk and complications, risk-benefit alternatives, recovery restrictions were all discussed with the patient at length.  Pre-operative, intra-operative, post-operative details regarding procedure discussed with patient and all questions were answered. Patient understands and wants to proceed.

## 2024-07-13 PROBLEM — R59.1 LYMPHADENOPATHY: Status: ACTIVE | Noted: 2024-07-13

## 2024-07-13 ASSESSMENT — ENCOUNTER SYMPTOMS
RHINORRHEA: 0
VOMITING: 0
NAUSEA: 0
ABDOMINAL PAIN: 0
SHORTNESS OF BREATH: 0
ROS SKIN COMMENTS: SEE HPI.
COUGH: 0

## 2024-07-15 ENCOUNTER — TELEPHONE (OUTPATIENT)
Age: 44
End: 2024-07-15

## 2024-07-15 NOTE — TELEPHONE ENCOUNTER
Called patient to schedule procedure.  Pre op instructions were discussed. Patient will refer to My Chart for surgery information, we will mail as well.    EM/SC/24 at 9:45am/Excisional biopsy Left Axillary Lymph Node/Katelyn          PRE SURGERY INSTRUCTIONS    STOP ASPIRIN 7 DAYS PRIOR TO PROCEDURE  STOP ALL OTHER BLOOD THINNERS 5 DAYS PRIOR TO PROCEDURE  NOTHING TO EAT, DRINK, SMOKE, OR CHEW AFTER MIDNIGHT  You may brush your teeth, rinse gargle, and spit  Heart or blood pressure medication ONLY with a  small sip of water, unless otherwise directed  Shower with regular soap and water    YOU MUST HAVE AN ADULT EITHER DRIVE YOU OR RIDE IN A CAB WITH YOU    MY PROCEDURE IS SCHEDULED FOR: Excisional Biopsy Left Axillary Lymph Node     Pre-Admission Testin24 at 9:30am    Date of Procedure: 24    Time: 9:45am    Arrival Time: 7:45am    Location:  [facility]     Follow Up Appointment at [facility] General Surgery with Dr Rogel:  24 at 9:30am

## 2024-07-23 ENCOUNTER — HOSPITAL ENCOUNTER (OUTPATIENT)
Dept: PREADMISSION TESTING | Age: 44
Discharge: HOME | End: 2024-07-27
Payer: MEDICAID

## 2024-07-23 VITALS
BODY MASS INDEX: 33.66 KG/M2 | SYSTOLIC BLOOD PRESSURE: 97 MMHG | OXYGEN SATURATION: 99 % | DIASTOLIC BLOOD PRESSURE: 64 MMHG | TEMPERATURE: 98.1 F | RESPIRATION RATE: 20 BRPM | HEIGHT: 63 IN | WEIGHT: 190 LBS | HEART RATE: 77 BPM

## 2024-07-23 DIAGNOSIS — Z01.818 PREOP EXAMINATION: Primary | ICD-10-CM

## 2024-07-23 LAB
ANION GAP SERPL CALCULATED.3IONS-SCNC: 12 MMOL/L (ref 9–17)
BASOPHILS # BLD: 0 K/UL (ref 0–0.2)
BASOPHILS NFR BLD: 0 % (ref 0–2)
BUN SERPL-MCNC: 11 MG/DL (ref 6–20)
CALCIUM SERPL-MCNC: 8.9 MG/DL (ref 8.6–10.4)
CHLORIDE SERPL-SCNC: 103 MMOL/L (ref 98–107)
CO2 SERPL-SCNC: 25 MMOL/L (ref 20–31)
CREAT SERPL-MCNC: 0.6 MG/DL (ref 0.5–0.9)
EOSINOPHIL # BLD: 0.4 K/UL (ref 0–0.4)
EOSINOPHILS RELATIVE PERCENT: 5 % (ref 0–4)
ERYTHROCYTE [DISTWIDTH] IN BLOOD BY AUTOMATED COUNT: 13.3 % (ref 11.5–14.9)
GFR, ESTIMATED: >90 ML/MIN/1.73M2
GLUCOSE SERPL-MCNC: 86 MG/DL (ref 70–99)
HCT VFR BLD AUTO: 40.1 % (ref 36–46)
HGB BLD-MCNC: 13.1 G/DL (ref 12–16)
LYMPHOCYTES NFR BLD: 2.2 K/UL (ref 1–4.8)
LYMPHOCYTES RELATIVE PERCENT: 25 % (ref 24–44)
MCH RBC QN AUTO: 28.9 PG (ref 26–34)
MCHC RBC AUTO-ENTMCNC: 32.6 G/DL (ref 31–37)
MCV RBC AUTO: 88.5 FL (ref 80–100)
MONOCYTES NFR BLD: 0.4 K/UL (ref 0.1–1.3)
MONOCYTES NFR BLD: 4 % (ref 1–7)
NEUTROPHILS NFR BLD: 66 % (ref 36–66)
NEUTS SEG NFR BLD: 5.6 K/UL (ref 1.3–9.1)
PLATELET # BLD AUTO: 305 K/UL (ref 150–450)
PMV BLD AUTO: 8.1 FL (ref 6–12)
POTASSIUM SERPL-SCNC: 4.1 MMOL/L (ref 3.7–5.3)
RBC # BLD AUTO: 4.53 M/UL (ref 4–5.2)
SODIUM SERPL-SCNC: 140 MMOL/L (ref 135–144)
WBC OTHER # BLD: 8.5 K/UL (ref 3.5–11)

## 2024-07-23 PROCEDURE — 80048 BASIC METABOLIC PNL TOTAL CA: CPT

## 2024-07-23 PROCEDURE — 85025 COMPLETE CBC W/AUTO DIFF WBC: CPT

## 2024-07-23 PROCEDURE — APPSS45 APP SPLIT SHARED TIME 31-45 MINUTES: Performed by: NURSE PRACTITIONER

## 2024-07-23 ASSESSMENT — ENCOUNTER SYMPTOMS
VOMITING: 0
BACK PAIN: 1
COUGH: 1
EYES NEGATIVE: 1
BLOOD IN STOOL: 0
DIARRHEA: 0
WHEEZING: 1
ABDOMINAL DISTENTION: 0
APNEA: 1
SHORTNESS OF BREATH: 1
NAUSEA: 1
ABDOMINAL PAIN: 1

## 2024-07-23 NOTE — H&P
slightly worse compared to 06/20/2021.      1. No evidence of pulmonary embolism or other acute cardiopulmonary process. 2. Marked bilateral axillary adenopathy may be slightly worse compared to 06/20/2021.  Although this does appear to be chronically stable, the finding does raise concern for coexistent lymphoma.  Correlate clinically. 3. Status post sleeve gastrectomy.     Review of additional significant medical hx:  SHELLEY  on CPAP machine     Iron deficiency   Current medication iron 325    BP Readings from Last 3 Encounters:   07/23/24 97/64   07/11/24 91/62   07/09/24 118/76     ECG 5/7/2024  Normal sinus rhythm with sinus arrhythmia  Normal ECG  When compared with ECG of 06-MAY-2024 18:25,  No significant change was found    Activity level:    Functional Capacity per patient:   1. Patient is  able to walk 2 city blocks on level ground without SOB.       2. Patient is  able to climb 2 flights of stairs without SOB.    Denies hx of MRSA infection.  Denies hx of blood clots.  She has PONV, Denies hx of any personal or family hx of complications w/anesthesia.   PAST MEDICAL HISTORY     Past Medical History:   Diagnosis Date    Abnormal 1st trimester screen (Tri 21 1:117)--NIPT nml  11/30/2019    ADD (attention deficit disorder) without hyperactivity     Antiphospholipid syndrome (HCC)     [place] 1/9 & 1/10 01/09/2020    Chronic back pain     COVID 09/15/2021    BODY ACHE, COUGH, FEVER, CHEST PAIN, N&V X 3 WEEKS.  STILL HAS COUGH.    Dehydration 12/28/2021    Echogenic focus of heart of fetus affecting antepartum care of mother 11/30/2019    Elev early 1hr, nml 3hr  11/30/2019    1 elevated value, [facility] recommends repeat 3hr GTT in 2 weeks    Elevated blood-pressure reading without diagnosis of hypertension 11/30/2019    Elevated umbilical artery dopplers  01/10/2020    Eosinopenia (HCC)     Fetal ascites     Fetal heart rate decelerations affecting management of mother     GERD (gastroesophageal reflux disease)

## 2024-07-23 NOTE — DISCHARGE INSTRUCTIONS
where you will be prepared for surgery.  A physical assessment will be performed by a nurse practitioner or house officer.  Your IV will be started and you will meet your anesthesiologist.    When you go to surgery, your family will be directed to the surgical waiting room, where the doctor should speak with them after your surgery.    After surgery, you will be taken to the recovery area.  When you are alert and stable, you will receive instructions and be prepared for discharge.     If you use a Bi-PAP or C-PAP machine, please bring it with you and leave it in the car in case it is needed in recovery room.

## 2024-07-23 NOTE — H&P (VIEW-ONLY)
HISTORY and PHYSICAL  [facility]       NAME:  Flower Daniel  MRN: [id number]   YOB: 1980   Date: 7/23/2024   Age: 43 y.o.  Gender: female     COMPLAINT AND PRESENT HISTORY:   Flower Daniel is 43 y.o.,  female, presents for pre-anesthesia/admission testing for EXCISIONAL BIOPSY LEFT AXILLARY LYMPH NODE  per Dr. Rogel.  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  Patient is 26-week pregnant.  Presented with pelvic pain urinary frequency and dyspnea for about 2 days.  Patient underwent CT chest which showed nonobstructive small left lower lobe pulmonary embolism.  Patient denies any previous history of blood clots.  Patient has had thrombophilia work-up done due to multiple pregnancy losses in the past which has not been clinically significant.  Patient has been started on Lovenox which she is tolerating well. Patient has BMI of 51.  Initial testing showed anticardiolipin IgG antibody to be 26.9 and in 12/2019 dropped to 3.7     Interim History:   Patient presents to the clinic for follow-up visit.  Patient last seen in office in December 2022.  Patient went to the ER due to complaint of chest pressure.  CT chest shows worsening of lymphadenopathy.  Patient underwent stress test has a follow-up with cardiology coming up.  Does complain of dizziness.  Also underwent Holter monitor testing through primary care provider.     During this visit patient's allergy,  slightly worse compared to 06/20/2021.      1. No evidence of pulmonary embolism or other acute cardiopulmonary process. 2. Marked bilateral axillary adenopathy may be slightly worse compared to 06/20/2021.  Although this does appear to be chronically stable, the finding does raise concern for coexistent lymphoma.  Correlate clinically. 3. Status post sleeve gastrectomy.     Review of additional significant medical hx:  SHELLEY  on CPAP machine     Iron deficiency   Current medication iron 325    BP Readings from Last 3 Encounters:   07/23/24 97/64   07/11/24 91/62   07/09/24 118/76     ECG 5/7/2024  Normal sinus rhythm with sinus arrhythmia  Normal ECG  When compared with ECG of 06-MAY-2024 18:25,  No significant change was found    Activity level:    Functional Capacity per patient:   1. Patient is  able to walk 2 city blocks on level ground without SOB.       2. Patient is  able to climb 2 flights of stairs without SOB.    Denies hx of MRSA infection.  Denies hx of blood clots.  She has PONV, Denies hx of any personal or family hx of complications w/anesthesia.   PAST MEDICAL HISTORY     Past Medical History:   Diagnosis Date    Abnormal 1st trimester screen (Tri 21 1:117)--NIPT nml  11/30/2019    ADD (attention deficit disorder) without hyperactivity     Antiphospholipid syndrome (HCC)     Celestone 1/9 & 1/10 01/09/2020    Chronic back pain     COVID 09/15/2021    BODY ACHE, COUGH, FEVER, CHEST PAIN, N&V X 3 WEEKS.  STILL HAS COUGH.    Dehydration 12/28/2021    Echogenic focus of heart of fetus affecting antepartum care of mother 11/30/2019    Elev early 1hr, nml 3hr  11/30/2019    1 elevated value, [facility] recommends repeat 3hr GTT in 2 weeks    Elevated blood-pressure reading without diagnosis of hypertension 11/30/2019    Elevated umbilical artery dopplers  01/10/2020    Eosinopenia (HCC)     Fetal ascites     Fetal heart rate decelerations affecting management of mother     GERD (gastroesophageal reflux disease)

## 2024-07-29 ENCOUNTER — OFFICE VISIT (OUTPATIENT)
Dept: GASTROENTEROLOGY | Age: 44
End: 2024-07-29
Payer: MEDICAID

## 2024-07-29 VITALS
HEART RATE: 75 BPM | BODY MASS INDEX: 34.2 KG/M2 | OXYGEN SATURATION: 99 % | SYSTOLIC BLOOD PRESSURE: 97 MMHG | TEMPERATURE: 97.9 F | WEIGHT: 193 LBS | HEIGHT: 63 IN | DIASTOLIC BLOOD PRESSURE: 70 MMHG

## 2024-07-29 DIAGNOSIS — R19.4 CHANGE IN BOWEL HABIT: Primary | ICD-10-CM

## 2024-07-29 DIAGNOSIS — R19.7 DIARRHEA, UNSPECIFIED TYPE: ICD-10-CM

## 2024-07-29 PROCEDURE — 99204 OFFICE O/P NEW MOD 45 MIN: CPT | Performed by: STUDENT IN AN ORGANIZED HEALTH CARE EDUCATION/TRAINING PROGRAM

## 2024-07-29 PROCEDURE — G8417 CALC BMI ABV UP PARAM F/U: HCPCS | Performed by: STUDENT IN AN ORGANIZED HEALTH CARE EDUCATION/TRAINING PROGRAM

## 2024-07-29 PROCEDURE — G8427 DOCREV CUR MEDS BY ELIG CLIN: HCPCS | Performed by: STUDENT IN AN ORGANIZED HEALTH CARE EDUCATION/TRAINING PROGRAM

## 2024-07-29 PROCEDURE — 1036F TOBACCO NON-USER: CPT | Performed by: STUDENT IN AN ORGANIZED HEALTH CARE EDUCATION/TRAINING PROGRAM

## 2024-07-29 RX ORDER — SODIUM, POTASSIUM,MAG SULFATES 17.5-3.13G
SOLUTION, RECONSTITUTED, ORAL ORAL
Qty: 1 EACH | Refills: 0 | Status: SHIPPED | OUTPATIENT
Start: 2024-07-29

## 2024-07-29 NOTE — TELEPHONE ENCOUNTER
Procedure scheduled/Dr Wallace  Procedure:COLON/EGD  Dx: COLON-BOWEL HABIT CHANGE, EGD-DIARRHEA  Date:08/29/2024  Time:100 PM  Hospital:[facility] phone call:TBD  Bowel Prep instructions given:SUPREP  In office/via phone: IN OFFICE  Clearance needed:NONE

## 2024-07-29 NOTE — PROGRESS NOTES
Reason for Referral:     Monika Banegas MD  [address]  [address]  [place] [id number]    Chief Complaint   Patient presents with    New Patient    Gastroesophageal Reflux    Dysphagia    Diarrhea    Abdominal Pain       HISTORY OF PRESENT ILLNESS: Ms.Latoya MARIAELENA Daniel is a 43 y.o. female with a past history remarkable for intermittent dyspepsia symptoms, non-specific abdominal pain, and intermittent diarrhea, last seen Dr Thompson on 4/26/2019 referred as new consultation for evaluation of globus sensation.    Patient is a case of rheumatoid arthritis on [employer].  Previously she used to be on steroids and she saw Dr. Ken with diarrhea and lower abdominal pain.  He was thinking about partially treated IBD and recommended EGD and colonoscopy however it was not done.  Patient currently is off of steroids and she rarely has any diarrhea or abdominal pain.  Her main problem is globus sensation.  She does not have any dysphagia, odynophagia.  She was put on omeprazole 20 mg daily trial according to patient it was not effective.  She otherwise denies constipation or blood in stool.    Family medical history Sister colon cancer age of 49    Previous Endoscopies  None    Previous GI workup     Patient's PMH/PSH,SH,PSYCH Hx, MEDs, ALLERGIES, and ROS were all reviewed and updated in the appropriate sections.    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  STILL HAS COUGH.    Dehydration 12/28/2021    Echogenic focus of heart of fetus affecting antepartum care of mother 11/30/2019    Elev early 1hr, nml 3hr  11/30/2019    1 elevated value, [facility] recommends repeat 3hr GTT in 2 weeks    Elevated blood-pressure reading

## 2024-08-01 ENCOUNTER — PREP FOR PROCEDURE (OUTPATIENT)
Dept: GASTROENTEROLOGY | Age: 44
End: 2024-08-01

## 2024-08-02 ENCOUNTER — TELEPHONE (OUTPATIENT)
Age: 44
End: 2024-08-02

## 2024-08-02 NOTE — TELEPHONE ENCOUNTER
Pt called in regard to her excisional biopsy of left axillary lymph node procedure. The pt was confused, and explained to me that she thought  CHIQUI Henderson, said that she was going to be getting a biopsy done of her lymph nodes in her abdomen. I explained to the patient that in Apolonia's note, it says, left axillar lymph node excisional bx. I also explained to the pt that her most recent scan showed an increase in size of the lymph node in the left axillary. Pt was  understanding

## 2024-08-05 ENCOUNTER — TELEPHONE (OUTPATIENT)
Age: 44
End: 2024-08-05

## 2024-08-05 NOTE — TELEPHONE ENCOUNTER
Spoke with patient to confirm surgery time change  New time 11:15am on 8/8/24  Patient confirms and agrees to arrive at outpt at 9:15am

## 2024-08-07 ENCOUNTER — ANESTHESIA EVENT (OUTPATIENT)
Dept: OPERATING ROOM | Age: 44
End: 2024-08-07
Payer: MEDICAID

## 2024-08-07 NOTE — PRE-PROCEDURE INSTRUCTIONS
No answer, left message ?                             Unable to leave message ?    When were you told to arrive at hospital ? -0915     Do you have a  ?-YES    Are you on any blood thinners ?-NONE                     If yes when did you stop taking ?    Do you have your prep Rx filled and instruction ? -N/A     Nothing to eat the day before , only clear liquids.-N/A    Are you experiencing any covid symptoms ?-NONE     Do you have any infections or rash we should be aware of ?-NONE      Do you have the Hibiclens soap to use the night before and the morning of surgery ?-YES    Nothing to eat or drink after midnight, only a sip of water to take any medication instructed to take the night before.-INSTRUCTED    Wear comfortable clothing, leave any valuables at home, remove any jewelry and body piercing .-INSTRUCTED

## 2024-08-08 ENCOUNTER — HOSPITAL ENCOUNTER (OUTPATIENT)
Age: 44
Setting detail: OUTPATIENT SURGERY
Discharge: HOME | End: 2024-08-08
Attending: SURGERY | Admitting: SURGERY
Payer: MEDICAID

## 2024-08-08 ENCOUNTER — ANESTHESIA (OUTPATIENT)
Dept: OPERATING ROOM | Age: 44
End: 2024-08-08
Payer: MEDICAID

## 2024-08-08 VITALS
HEIGHT: 63 IN | DIASTOLIC BLOOD PRESSURE: 58 MMHG | BODY MASS INDEX: 33.66 KG/M2 | HEART RATE: 91 BPM | SYSTOLIC BLOOD PRESSURE: 92 MMHG | TEMPERATURE: 97.1 F | WEIGHT: 190 LBS | RESPIRATION RATE: 14 BRPM | OXYGEN SATURATION: 96 %

## 2024-08-08 DIAGNOSIS — R59.1 LYMPHADENOPATHY: Primary | ICD-10-CM

## 2024-08-08 PROCEDURE — 7100000030 HC ASPR PHASE II RECOVERY - FIRST 15 MIN: Performed by: SURGERY

## 2024-08-08 PROCEDURE — 2720000010 HC SURG SUPPLY STERILE: Performed by: SURGERY

## 2024-08-08 PROCEDURE — 87077 CULTURE AEROBIC IDENTIFY: CPT

## 2024-08-08 PROCEDURE — 2709999900 HC NON-CHARGEABLE SUPPLY: Performed by: SURGERY

## 2024-08-08 PROCEDURE — 3600000012 HC SURGERY LEVEL 2 ADDTL 15MIN: Performed by: SURGERY

## 2024-08-08 PROCEDURE — 3600000002 HC SURGERY LEVEL 2 BASE: Performed by: SURGERY

## 2024-08-08 PROCEDURE — 7100000001 HC PACU RECOVERY - ADDTL 15 MIN: Performed by: SURGERY

## 2024-08-08 PROCEDURE — 87116 MYCOBACTERIA CULTURE: CPT

## 2024-08-08 PROCEDURE — 6360000002 HC RX W HCPCS: Performed by: SURGERY

## 2024-08-08 PROCEDURE — 7100000011 HC PHASE II RECOVERY - ADDTL 15 MIN: Performed by: SURGERY

## 2024-08-08 PROCEDURE — 7100000010 HC PHASE II RECOVERY - FIRST 15 MIN: Performed by: SURGERY

## 2024-08-08 PROCEDURE — 87206 SMEAR FLUORESCENT/ACID STAI: CPT

## 2024-08-08 PROCEDURE — 87176 TISSUE HOMOGENIZATION CULTR: CPT

## 2024-08-08 PROCEDURE — 87102 FUNGUS ISOLATION CULTURE: CPT

## 2024-08-08 PROCEDURE — 38500 BIOPSY/REMOVAL LYMPH NODES: CPT | Performed by: SURGERY

## 2024-08-08 PROCEDURE — 87070 CULTURE OTHR SPECIMN AEROBIC: CPT

## 2024-08-08 PROCEDURE — 7100000031 HC ASPR PHASE II RECOVERY - ADDTL 15 MIN: Performed by: SURGERY

## 2024-08-08 PROCEDURE — 87015 SPECIMEN INFECT AGNT CONCNTJ: CPT

## 2024-08-08 PROCEDURE — 88333 PATH CONSLTJ SURG CYTO XM 1: CPT

## 2024-08-08 PROCEDURE — 6370000000 HC RX 637 (ALT 250 FOR IP): Performed by: ANESTHESIOLOGY

## 2024-08-08 PROCEDURE — 2580000003 HC RX 258: Performed by: ANESTHESIOLOGY

## 2024-08-08 PROCEDURE — 6360000002 HC RX W HCPCS: Performed by: ANESTHESIOLOGY

## 2024-08-08 PROCEDURE — 88184 FLOWCYTOMETRY/ TC 1 MARKER: CPT

## 2024-08-08 PROCEDURE — 88185 FLOWCYTOMETRY/TC ADD-ON: CPT

## 2024-08-08 PROCEDURE — 87205 SMEAR GRAM STAIN: CPT

## 2024-08-08 PROCEDURE — 6360000002 HC RX W HCPCS: Performed by: NURSE PRACTITIONER

## 2024-08-08 PROCEDURE — 2500000003 HC RX 250 WO HCPCS: Performed by: NURSE ANESTHETIST, CERTIFIED REGISTERED

## 2024-08-08 PROCEDURE — 3700000001 HC ADD 15 MINUTES (ANESTHESIA): Performed by: SURGERY

## 2024-08-08 PROCEDURE — 6360000002 HC RX W HCPCS: Performed by: NURSE ANESTHETIST, CERTIFIED REGISTERED

## 2024-08-08 PROCEDURE — 88307 TISSUE EXAM BY PATHOLOGIST: CPT

## 2024-08-08 PROCEDURE — 3700000000 HC ANESTHESIA ATTENDED CARE: Performed by: SURGERY

## 2024-08-08 PROCEDURE — 7100000000 HC PACU RECOVERY - FIRST 15 MIN: Performed by: SURGERY

## 2024-08-08 PROCEDURE — 87075 CULTR BACTERIA EXCEPT BLOOD: CPT

## 2024-08-08 PROCEDURE — 81025 URINE PREGNANCY TEST: CPT

## 2024-08-08 RX ORDER — OXYCODONE HYDROCHLORIDE AND ACETAMINOPHEN 5; 325 MG/1; MG/1
1 TABLET ORAL EVERY 6 HOURS PRN
Qty: 28 TABLET | Refills: 0 | Status: SHIPPED | OUTPATIENT
Start: 2024-08-08 | End: 2024-08-15

## 2024-08-08 RX ORDER — ACETAMINOPHEN 500 MG
1000 TABLET ORAL ONCE
Status: COMPLETED | OUTPATIENT
Start: 2024-08-08 | End: 2024-08-08

## 2024-08-08 RX ORDER — PROPOFOL 10 MG/ML
INJECTION, EMULSION INTRAVENOUS PRN
Status: DISCONTINUED | OUTPATIENT
Start: 2024-08-08 | End: 2024-08-08 | Stop reason: SDUPTHER

## 2024-08-08 RX ORDER — SCOLOPAMINE TRANSDERMAL SYSTEM 1 MG/1
1 PATCH, EXTENDED RELEASE TRANSDERMAL
Status: DISCONTINUED | OUTPATIENT
Start: 2024-08-08 | End: 2024-08-08 | Stop reason: HOSPADM

## 2024-08-08 RX ORDER — SODIUM CHLORIDE 9 MG/ML
INJECTION, SOLUTION INTRAVENOUS PRN
Status: DISCONTINUED | OUTPATIENT
Start: 2024-08-08 | End: 2024-08-08 | Stop reason: HOSPADM

## 2024-08-08 RX ORDER — OXYCODONE HYDROCHLORIDE AND ACETAMINOPHEN 5; 325 MG/1; MG/1
1 TABLET ORAL ONCE
Status: COMPLETED | OUTPATIENT
Start: 2024-08-08 | End: 2024-08-08

## 2024-08-08 RX ORDER — FENTANYL CITRATE 50 UG/ML
INJECTION, SOLUTION INTRAMUSCULAR; INTRAVENOUS PRN
Status: DISCONTINUED | OUTPATIENT
Start: 2024-08-08 | End: 2024-08-08 | Stop reason: SDUPTHER

## 2024-08-08 RX ORDER — SODIUM CHLORIDE 0.9 % (FLUSH) 0.9 %
5-40 SYRINGE (ML) INJECTION EVERY 12 HOURS SCHEDULED
Status: DISCONTINUED | OUTPATIENT
Start: 2024-08-08 | End: 2024-08-08 | Stop reason: HOSPADM

## 2024-08-08 RX ORDER — FENTANYL CITRATE 0.05 MG/ML
25 INJECTION, SOLUTION INTRAMUSCULAR; INTRAVENOUS EVERY 5 MIN PRN
Status: DISCONTINUED | OUTPATIENT
Start: 2024-08-08 | End: 2024-08-08 | Stop reason: HOSPADM

## 2024-08-08 RX ORDER — NALOXONE HYDROCHLORIDE 0.4 MG/ML
INJECTION, SOLUTION INTRAMUSCULAR; INTRAVENOUS; SUBCUTANEOUS PRN
Status: DISCONTINUED | OUTPATIENT
Start: 2024-08-08 | End: 2024-08-08 | Stop reason: HOSPADM

## 2024-08-08 RX ORDER — BUPIVACAINE HYDROCHLORIDE 5 MG/ML
INJECTION, SOLUTION EPIDURAL; INTRACAUDAL PRN
Status: DISCONTINUED | OUTPATIENT
Start: 2024-08-08 | End: 2024-08-08 | Stop reason: ALTCHOICE

## 2024-08-08 RX ORDER — SODIUM CHLORIDE 0.9 % (FLUSH) 0.9 %
5-40 SYRINGE (ML) INJECTION PRN
Status: DISCONTINUED | OUTPATIENT
Start: 2024-08-08 | End: 2024-08-08 | Stop reason: HOSPADM

## 2024-08-08 RX ORDER — SODIUM CHLORIDE, SODIUM LACTATE, POTASSIUM CHLORIDE, CALCIUM CHLORIDE 600; 310; 30; 20 MG/100ML; MG/100ML; MG/100ML; MG/100ML
INJECTION, SOLUTION INTRAVENOUS CONTINUOUS
Status: DISCONTINUED | OUTPATIENT
Start: 2024-08-08 | End: 2024-08-08 | Stop reason: HOSPADM

## 2024-08-08 RX ORDER — DEXAMETHASONE SODIUM PHOSPHATE 4 MG/ML
INJECTION, SOLUTION INTRA-ARTICULAR; INTRALESIONAL; INTRAMUSCULAR; INTRAVENOUS; SOFT TISSUE PRN
Status: DISCONTINUED | OUTPATIENT
Start: 2024-08-08 | End: 2024-08-08 | Stop reason: SDUPTHER

## 2024-08-08 RX ORDER — ONDANSETRON 2 MG/ML
INJECTION INTRAMUSCULAR; INTRAVENOUS PRN
Status: DISCONTINUED | OUTPATIENT
Start: 2024-08-08 | End: 2024-08-08 | Stop reason: SDUPTHER

## 2024-08-08 RX ORDER — ONDANSETRON 2 MG/ML
4 INJECTION INTRAMUSCULAR; INTRAVENOUS
Status: DISCONTINUED | OUTPATIENT
Start: 2024-08-08 | End: 2024-08-08 | Stop reason: HOSPADM

## 2024-08-08 RX ORDER — LIDOCAINE HYDROCHLORIDE 10 MG/ML
1 INJECTION, SOLUTION EPIDURAL; INFILTRATION; INTRACAUDAL; PERINEURAL
Status: DISCONTINUED | OUTPATIENT
Start: 2024-08-08 | End: 2024-08-08 | Stop reason: HOSPADM

## 2024-08-08 RX ORDER — CEPHALEXIN 500 MG/1
CAPSULE ORAL
Qty: 21 CAPSULE | Refills: 0 | Status: SHIPPED | OUTPATIENT
Start: 2024-08-08

## 2024-08-08 RX ORDER — MIDAZOLAM HYDROCHLORIDE 1 MG/ML
INJECTION INTRAMUSCULAR; INTRAVENOUS PRN
Status: DISCONTINUED | OUTPATIENT
Start: 2024-08-08 | End: 2024-08-08 | Stop reason: SDUPTHER

## 2024-08-08 RX ORDER — GABAPENTIN 300 MG/1
300 CAPSULE ORAL ONCE
Status: COMPLETED | OUTPATIENT
Start: 2024-08-08 | End: 2024-08-08

## 2024-08-08 RX ORDER — DIPHENHYDRAMINE HYDROCHLORIDE 50 MG/ML
12.5 INJECTION INTRAMUSCULAR; INTRAVENOUS
Status: DISCONTINUED | OUTPATIENT
Start: 2024-08-08 | End: 2024-08-08 | Stop reason: HOSPADM

## 2024-08-08 RX ORDER — METOCLOPRAMIDE HYDROCHLORIDE 5 MG/ML
10 INJECTION INTRAMUSCULAR; INTRAVENOUS
Status: DISCONTINUED | OUTPATIENT
Start: 2024-08-08 | End: 2024-08-08 | Stop reason: HOSPADM

## 2024-08-08 RX ORDER — ONDANSETRON 4 MG/1
TABLET, FILM COATED ORAL
Qty: 20 TABLET | Refills: 0 | Status: SHIPPED | OUTPATIENT
Start: 2024-08-08

## 2024-08-08 RX ORDER — LIDOCAINE HYDROCHLORIDE 20 MG/ML
INJECTION, SOLUTION EPIDURAL; INFILTRATION; INTRACAUDAL; PERINEURAL PRN
Status: DISCONTINUED | OUTPATIENT
Start: 2024-08-08 | End: 2024-08-08 | Stop reason: SDUPTHER

## 2024-08-08 RX ADMIN — ACETAMINOPHEN 1000 MG: 500 TABLET ORAL at 10:14

## 2024-08-08 RX ADMIN — SODIUM CHLORIDE, POTASSIUM CHLORIDE, SODIUM LACTATE AND CALCIUM CHLORIDE: 600; 310; 30; 20 INJECTION, SOLUTION INTRAVENOUS at 10:26

## 2024-08-08 RX ADMIN — GABAPENTIN 300 MG: 300 CAPSULE ORAL at 10:14

## 2024-08-08 RX ADMIN — Medication 2000 MG: at 12:06

## 2024-08-08 RX ADMIN — FENTANYL CITRATE 50 MCG: 50 INJECTION, SOLUTION INTRAMUSCULAR; INTRAVENOUS at 12:20

## 2024-08-08 RX ADMIN — MIDAZOLAM 2 MG: 1 INJECTION INTRAMUSCULAR; INTRAVENOUS at 11:55

## 2024-08-08 RX ADMIN — DEXAMETHASONE SODIUM PHOSPHATE 8 MG: 4 INJECTION INTRA-ARTICULAR; INTRALESIONAL; INTRAMUSCULAR; INTRAVENOUS; SOFT TISSUE at 12:08

## 2024-08-08 RX ADMIN — FENTANYL CITRATE 25 MCG: 50 INJECTION INTRAMUSCULAR; INTRAVENOUS at 13:25

## 2024-08-08 RX ADMIN — OXYCODONE HYDROCHLORIDE AND ACETAMINOPHEN 1 TABLET: 5; 325 TABLET ORAL at 14:07

## 2024-08-08 RX ADMIN — FENTANYL CITRATE 50 MCG: 50 INJECTION, SOLUTION INTRAMUSCULAR; INTRAVENOUS at 12:08

## 2024-08-08 RX ADMIN — ONDANSETRON 4 MG: 2 INJECTION INTRAMUSCULAR; INTRAVENOUS at 11:57

## 2024-08-08 RX ADMIN — PROPOFOL 200 MG: 10 INJECTION, EMULSION INTRAVENOUS at 11:58

## 2024-08-08 RX ADMIN — LIDOCAINE HYDROCHLORIDE 90 MG: 20 INJECTION, SOLUTION EPIDURAL; INFILTRATION; INTRACAUDAL; PERINEURAL at 11:57

## 2024-08-08 ASSESSMENT — PAIN SCALES - GENERAL
PAINLEVEL_OUTOF10: 6
PAINLEVEL_OUTOF10: 0
PAINLEVEL_OUTOF10: 0

## 2024-08-08 ASSESSMENT — PAIN DESCRIPTION - DESCRIPTORS: DESCRIPTORS: SORE

## 2024-08-08 ASSESSMENT — PAIN - FUNCTIONAL ASSESSMENT
PAIN_FUNCTIONAL_ASSESSMENT: 0-10

## 2024-08-08 ASSESSMENT — PAIN DESCRIPTION - LOCATION: LOCATION: OTHER (COMMENT)

## 2024-08-08 ASSESSMENT — PAIN DESCRIPTION - ORIENTATION: ORIENTATION: LEFT

## 2024-08-08 NOTE — INTERVAL H&P NOTE
Update History & Physical    The patient's History and Physical of July 23, 2024 was reviewed with the patient and I examined the patient. There was no change. The surgical site was confirmed by the patient and me.     Pt undergoing EXCISIONAL BIOPSY LEFT AXILLARY LYMPH NODE  per Dr. Rogel for Lymphadenopathy   Pt denies fever/chills, chest pain or SOB  Pt NPO since MN, No am medication today  Pt reports hx MRSA 8 years ago in nose  Pt reports h/o pulmonary embolism no longer on AC  Anticoagulation None  PONV Denies any other personal hx and family hx of complications with anesthesia  Physical exam remains unchanged including cardiac and pulmonary assessment  See nursing flow sheet for vital signs     Electronically signed by MIKHAIL Hatch CNP on 8/8/2024 at 9:14 AM

## 2024-08-08 NOTE — ANESTHESIA PRE PROCEDURE
Value Date/Time    PROTIME 10.7 04/06/2022 09:18 AM    INR 1.0 04/06/2022 09:18 AM    APTT 24.9 09/07/2021 12:05 PM       HCG (If Applicable):   Lab Results   Component Value Date    PREGTESTUR NEGATIVE 05/07/2024    PREGSERUM NEGATIVE 05/06/2024    HCGQUANT <1 05/06/2020        ABGs: No results found for: \"PHART\", \"PO2ART\", \"VPP4WEW\", \"LYU2YHF\", \"BEART\", \"V0WWPSOH\"     Type & Screen (If Applicable):  No results found for: \"LABABO\"    Drug/Infectious Status (If Applicable):  Lab Results   Component Value Date/Time    HEPCAB NONREACTIVE 07/23/2021 11:42 AM       COVID-19 Screening (If Applicable):   Lab Results   Component Value Date/Time    COVID19  04/02/2022 04:41 PM     DISREGARD RESULTS. SPECIMEN SUBMITTED WAS INCORRECTLY IDENTIFIED. TEST CREDITED.    COVID19  04/02/2022 04:41 PM     DISREGARD RESULTS. SPECIMEN SUBMITTED WAS INCORRECTLY IDENTIFIED. TEST CREDITED.    COVID19 Not Detected 04/02/2022 04:41 PM    COVID19 Not Detected 01/18/2021 08:46 AM           Anesthesia Evaluation  Patient summary reviewed and Nursing notes reviewed   history of anesthetic complications: PONV.  Airway: Mallampati: III  TM distance: >3 FB   Neck ROM: full  Mouth opening: > = 3 FB   Dental: normal exam         Pulmonary: breath sounds clear to auscultation  (+)     sleep apnea: on CPAP,                                  Cardiovascular:          ECG reviewed  Rhythm: regular      Stress test reviewed                Neuro/Psych:   (+) neuromuscular disease:, headaches:, psychiatric history:            GI/Hepatic/Renal:   (+) GERD:          Endo/Other:    (+) : arthritis:..                 Abdominal:             Vascular:          Other Findings:         Anesthesia Plan      general     ASA 3     (LMA)  Induction: intravenous.    MIPS: Postoperative opioids intended and Prophylactic antiemetics administered.  Anesthetic plan and risks discussed with patient.      Plan discussed with CRNA.                Thiago Hanna MD

## 2024-08-08 NOTE — ANESTHESIA POSTPROCEDURE EVALUATION
Department of Anesthesiology  Postprocedure Note    Patient: Flower Daniel  MRN: [id number]  YOB: 1980  Date of evaluation: 8/8/2024    Procedure Summary       Date: 08/08/24 Room / Location: [address]    Anesthesia Start: 1151 Anesthesia Stop: 1257    Procedure: EXCISIONAL BIOPSY LEFT AXILLARY LYMPH NODE (Left: Axilla) Diagnosis:       Lymphadenopathy      (Lymphadenopathy [R59.1])    Surgeons: Woody Rogel MD Responsible Provider: Thiago Hanna MD    Anesthesia Type: general ASA Status: 3            Anesthesia Type: No value filed.    Tapan Phase I: Tapan Score: 5    Tapan Phase II:      Anesthesia Post Evaluation    Patient location during evaluation: PACU  Patient participation: complete - patient participated  Level of consciousness: awake and alert  Airway patency: patent  Nausea & Vomiting: no vomiting  Cardiovascular status: hemodynamically stable  Respiratory status: acceptable  Hydration status: euvolemic  Comments: POST- ANESTHESIA EVALUATION       Pt Name: Flower Daniel  MRN: [id number]  YOB: 1980  Date of evaluation: 8/8/2024  Time:  1:47 PM      BP 92/73   Pulse 65   Temp 98.1 °F (36.7 °C) (Infrared)   Resp 11   Ht 1.6 m (5' 3\")   Wt 86.2 kg (190 lb)   SpO2 95%   BMI 33.66 kg/m²      Consciousness Level  Awake  Cardiopulmonary Status  Stable  Pain Adequately Treated YES  Nausea / Vomiting  NO  Adequate Hydration  YES  Anesthesia Related Complications NONE      Electronically signed by Thiago Hanna MD on 8/8/2024 at 1:47 PM         Pain management: satisfactory to patient    No notable events documented.

## 2024-08-08 NOTE — PROGRESS NOTES
CLINICAL PHARMACY NOTE: MEDS TO BEDS    Total # of Prescriptions Filled: 3   The following medications were delivered to the patient:  Cephalexin 500MG Capsules  Ondansetron HCL 4MG Tablets  Oxycodone/APAP 5-325MG Tablets   Additional Documentation:  picked up at the pharmacy by Home Daniel, spouse of the PT at 12:56PM 8/8/24

## 2024-08-08 NOTE — OP NOTE
[facility] General Surgery   Woody Rogel MD, FACS  Apolonia LEONGMaura Leonardo, APRN-CNP  [address], [address]  [place] [address]  P: [phone], F: [phone]      Preoperative diagnosis: Left axillary lymphadenopathy    Postoperative diagnosis: Same    Procedure: Excisional biopsy left axillary lymph nodes    Surgeon: Dr. Rogel    First Assist: None    Anesthesia: General    Preparation: Chloraprep    EBL: Less than 10 mL    Specimen: Left axillary lymph nodes    Procedure: Informed consent was obtained.  Site was marked and confirmed.  Preoperative antibiotic was given.  Patient was taken to the operating room.  General anesthesia was given.  Operative site was prepped and draped in usual sterile fashion.  Timeout was done.  Left axillary incision was made.  Incision was deepened with help of Bovie cautery.  Dissection was carried out.  Self-retaining retractor was placed.  Patient was found to have few mildly enlarged lymph nodes in the left axilla.  Careful dissection was continued and excisional biopsy of these mildly enlarged lymph nodes was performed.  Specimen was submitted to pathology fresh per lymph node protocol.  Wound was explored.  Hemostasis was confirmed.  Sponge needle instrument count was found to be correct.  David-Mcdonald drain was left in the area of dissection was brought out and secured.  After confirming hemostasis sponge needle instrument count wound was approximated using absorbable sutures.  Local anesthetic was infiltrated.  Dermabond was applied.  Steri-Strips applied.  Sterile dressing was applied.  Patient tolerated procedure well and was transferred to the recovery room in a stable condition.    Recommendations: Operative findings discussed with the family.  Postoperative course recovery restrictions follow-up were all discussed.  Prescriptions called in.  Discharge instructions in the chart.  Drain care was discussed.

## 2024-08-08 NOTE — DISCHARGE INSTRUCTIONS
Dr. Rogel Post-Op Orders for Outpatient    1.) Diet:    [x]  As tolerated  []  Special     2.) Activity:    [x]  No lifting more than five to ten pounds 1-2 weeks  [x]  May Shower tomorrow  [x]  No driving until off pain medications     3.) Dressing:    [x]  Remove top dressing in 48 hours   [x]  Leave steri strips on     [x]  Remove and change dressing sooner if soaked    4.) Medication:    [x]  Take medication as prescribed   []  Resume blood thinners in  days    [x]  Resume home medications per AVS Summary    5.) Office visit: Follow up with Dr. Rogel. Call to schedule an appointment if one has not been made.     6.) Call [phone] if you have any questions or concerns.    Electronically signed by Woody Rogel MD on 8/8/2024 at 12:03 PM    DISCHARGE INSTRUCTIONS FOR GENERAL ANESTHESIA    In order to continue your care at home, please follow the instructions below.    Anesthesia - Do not drink any alcoholic beverages or make any legal or important decisions for 24 hours. If your surgery was on an extremity or abdomen, do not drive or operate any machinery until your surgeon approves, otherwise do not drive or operate any machinery for 24 hours or as restricted by your surgeon.     Pain Medications - Please do not drive, operate machinery, or drink alcoholic beverages while taking any prescribed pain medication.     Diet -  Start out eating lightly (broth, soup, crackers, toast, etc.) advancing as tolerated to your usual diet.  Try to avoid spicy and greasy/fatty foods for 24 hours. Drink plenty of fluids after surgery, unless you are on a fluid restriction.  Avoid milk/milk product for several hours.    Call your surgeon for the following:  You have pain that does not get better after you take pain medicine.   For an oral temperature (by mouth) is 101 degrees or higher, chills, or excessive sweating.  You have increasing and progressive bleeding or drainage from surgery site.  Signs of an infection:

## 2024-08-09 LAB
MICROORGANISM SPEC CULT: NORMAL
MICROORGANISM SPEC CULT: NORMAL
MICROORGANISM/AGENT SPEC: NORMAL
MICROORGANISM/AGENT SPEC: NORMAL
SPECIMEN DESCRIPTION: NORMAL
SPECIMEN DESCRIPTION: NORMAL
SURGICAL PATHOLOGY REPORT: NORMAL

## 2024-08-11 LAB
MICROORGANISM SPEC CULT: ABNORMAL
MICROORGANISM SPEC CULT: ABNORMAL
MICROORGANISM/AGENT SPEC: ABNORMAL
MICROORGANISM/AGENT SPEC: ABNORMAL
SPECIMEN DESCRIPTION: ABNORMAL

## 2024-08-12 DIAGNOSIS — E55.9 VITAMIN D DEFICIENCY: ICD-10-CM

## 2024-08-12 LAB
MICROORGANISM SPEC CULT: NORMAL
MICROORGANISM SPEC CULT: NORMAL
MICROORGANISM/AGENT SPEC: NORMAL
MICROORGANISM/AGENT SPEC: NORMAL
SPECIMEN DESCRIPTION: NORMAL
SPECIMEN DESCRIPTION: NORMAL

## 2024-08-12 RX ORDER — ERGOCALCIFEROL 1.25 MG/1
50000 CAPSULE, LIQUID FILLED ORAL WEEKLY
Qty: 8 CAPSULE | Refills: 0 | OUTPATIENT
Start: 2024-08-12

## 2024-08-13 ENCOUNTER — TELEPHONE (OUTPATIENT)
Age: 44
End: 2024-08-13

## 2024-08-13 DIAGNOSIS — L08.9 STAPH SKIN INFECTION: Primary | ICD-10-CM

## 2024-08-13 DIAGNOSIS — B95.8 STAPH SKIN INFECTION: Primary | ICD-10-CM

## 2024-08-13 LAB
FLOW CYTOMETRY SOURCE: NORMAL
FLOW CYTOMETRY, NODE/FLUID: NORMAL
MICROORGANISM SPEC CULT: ABNORMAL
MICROORGANISM SPEC CULT: ABNORMAL
MICROORGANISM/AGENT SPEC: ABNORMAL
MICROORGANISM/AGENT SPEC: ABNORMAL
SPECIMEN DESCRIPTION: ABNORMAL

## 2024-08-13 NOTE — TELEPHONE ENCOUNTER
Patient called to inquire how long she needed to keep drain in. Post Excisional Left Axillary Lymph Node 8/8/24. Patient had follow up appointment on 8/28/24. She states there is still a small amount of fluid coming from drain. Please advise

## 2024-08-13 NOTE — TELEPHONE ENCOUNTER
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    Please advise

## 2024-08-14 NOTE — TELEPHONE ENCOUNTER
Patient calling back again about her culture results she states she is still on Keflex that was given at discharge but wondering if she needs anything else please advise thank you

## 2024-08-15 ENCOUNTER — HOSPITAL ENCOUNTER (OUTPATIENT)
Dept: PREADMISSION TESTING | Age: 44
Discharge: HOME | End: 2024-08-19

## 2024-08-15 VITALS — HEIGHT: 63 IN | BODY MASS INDEX: 34.2 KG/M2 | WEIGHT: 193 LBS

## 2024-08-15 NOTE — PROGRESS NOTES
Pre-op Instructions For Out-Patient Endoscopy Surgery    Medication Instructions:  Please stop herbs and any supplements now (includes vitamins and minerals).    Please contact your surgeon and prescribing physician for pre-op instructions for any blood thinners.    If you have inhalers/aerosol treatments at home, please use them the morning of your surgery and bring the inhalers with you to the hospital.    Please take the following medications the morning of your surgery with a sip of water:    None    Surgery Instructions:  After midnight before surgery:  Do not eat or drink anything, including water, mints, gum, and hard candy.  You may brush your teeth without swallowing.  No smoking, chewing tobacco, or street drugs.    Please shower or bathe before surgery.       Please do not wear any cologne, lotion, powder, jewelry, piercings, perfume, makeup, nail polish, hair accessories, or hair spray on the day of surgery.  Wear loose comfortable clothing.    Leave your valuables at home but bring a payment source for any after-surgery prescriptions you plan to fill at [facility].  Bring a storage case for any glasses/contacts.    An adult who is responsible for you MUST drive you home and should be with you for the first 24 hours after surgery.     The Day of Surgery:  Arrive at [facility] Entr[facility] at the time directed by your surgeon and check in at the desk.     If you have a living will or healthcare power of , please bring a copy.    You will be taken to the pre-op holding area where you will be prepared for surgery.  A physical assessment will be performed by a nurse practitioner or house officer.  Your IV will be started and you will meet your anesthesiologist.    When you go to surgery, your family will be directed to the surgical waiting room, where the doctor should speak with them after your surgery.    After surgery, you will be taken to the recovery area.  When you

## 2024-08-22 PROBLEM — Z01.818 PREOP EXAMINATION: Status: RESOLVED | Noted: 2024-07-23 | Resolved: 2024-08-22

## 2024-08-27 NOTE — PRE-PROCEDURE INSTRUCTIONS
No answer, left message ?                             Unable to leave message ?    When were you told to arrive at hospital ?  1047    Do you have a  ? YES    Are you on any blood thinners ?   NO                  If yes when did you stop taking ?    Do you have your prep Rx filled and instruction ?  WAITING ON PHARMACY TO FILL    Nothing to eat the day before , only clear liquids.    Are you experiencing any covid symptoms ? NO    Do you have any infections or rash we should be aware of ? NO      Do you have the Hibiclens soap to use the night before and the morning of surgery ? NA    Nothing to eat or drink after midnight, only a sip of water to take any medication instructed to take the night before.  Wear comfortable clothing, leave any valuables at home, remove any jewelry and body piercing .   INSTRUCTED.

## 2024-08-28 ENCOUNTER — ANESTHESIA EVENT (OUTPATIENT)
Dept: ENDOSCOPY | Age: 44
End: 2024-08-28
Payer: MEDICAID

## 2024-08-28 ENCOUNTER — OFFICE VISIT (OUTPATIENT)
Age: 44
End: 2024-08-28

## 2024-08-28 VITALS
OXYGEN SATURATION: 99 % | WEIGHT: 192 LBS | RESPIRATION RATE: 14 BRPM | HEIGHT: 63 IN | SYSTOLIC BLOOD PRESSURE: 94 MMHG | HEART RATE: 82 BPM | BODY MASS INDEX: 34.02 KG/M2 | DIASTOLIC BLOOD PRESSURE: 69 MMHG

## 2024-08-28 DIAGNOSIS — Z98.890 STATUS POST SURGERY: Primary | ICD-10-CM

## 2024-08-28 PROCEDURE — 99024 POSTOP FOLLOW-UP VISIT: CPT | Performed by: SURGERY

## 2024-08-28 NOTE — PROGRESS NOTES
Psychiatric:         Mood and Affect: Mood normal.                Data  Lab Results   Component Value Date    WBC 8.5 07/23/2024    HGB 13.1 07/23/2024    HCT 40.1 07/23/2024    MCV 88.5 07/23/2024     07/23/2024     Lab Results   Component Value Date     07/23/2024    K 4.1 07/23/2024     07/23/2024    CO2 25 07/23/2024    BUN 11 07/23/2024    CREATININE 0.6 07/23/2024    GLUCOSE 86 07/23/2024    CALCIUM 8.9 07/23/2024    BILITOT <0.2 05/06/2024    ALKPHOS 80 05/06/2024    AST 24 05/06/2024    ALT 7 (L) 05/06/2024    LABGLOM >90 07/23/2024    GFRAA >60 05/17/2022    GLOB NOT REPORTED 06/09/2020           ASSESSMENT   44 y.o. female status post excisional biopsy left axillary lymph nodes on 8/8/24.   Benign surgical pathology reviewed: follicular lymphoid hyperplasia, negative for B-cell monoclonality and T-cell aberrancy.   Overall, patient is recovering well without any complications  Drain removed today.   Surgical incisions are without any s/s infection, no erythema, no abnormal drainage, no swelling, patient is afebrile     PLAN  Overall patient is recovering well.    Drain removed today. Patient to continue local wound care.    Operative findings and pathology results reviewed and discussed with the patient.   She will be following up with Dr. Sandra later this week for further management.   Follow up with me as needed.    ENCOUNTER DIAGNOSES  No diagnosis found.    No follow-ups on file.    Scribe Attestation:   By signing my name below, I, Elva Miller, attest that this documentation has been prepared under the direction and in the presence of Woody Rogel MD.  Electronically Signed: ELVA MILLER. 08/28/24 9:41 AM     Electronically signed by Elva Miller  on 8/28/2024 at 9:36 AM

## 2024-08-29 ENCOUNTER — ANESTHESIA (OUTPATIENT)
Dept: ENDOSCOPY | Age: 44
End: 2024-08-29
Payer: MEDICAID

## 2024-08-29 ENCOUNTER — HOSPITAL ENCOUNTER (OUTPATIENT)
Age: 44
Setting detail: OUTPATIENT SURGERY
Discharge: HOME | End: 2024-08-29
Attending: STUDENT IN AN ORGANIZED HEALTH CARE EDUCATION/TRAINING PROGRAM | Admitting: STUDENT IN AN ORGANIZED HEALTH CARE EDUCATION/TRAINING PROGRAM
Payer: MEDICAID

## 2024-08-29 VITALS
OXYGEN SATURATION: 97 % | HEART RATE: 73 BPM | DIASTOLIC BLOOD PRESSURE: 71 MMHG | SYSTOLIC BLOOD PRESSURE: 90 MMHG | BODY MASS INDEX: 34.02 KG/M2 | TEMPERATURE: 97.5 F | HEIGHT: 63 IN | RESPIRATION RATE: 10 BRPM | WEIGHT: 192 LBS

## 2024-08-29 DIAGNOSIS — R19.7 DIARRHEA, UNSPECIFIED TYPE: ICD-10-CM

## 2024-08-29 DIAGNOSIS — R19.4 CHANGE IN BOWEL HABITS: ICD-10-CM

## 2024-08-29 DIAGNOSIS — J30.2 SEASONAL ALLERGIES: ICD-10-CM

## 2024-08-29 PROCEDURE — 3700000001 HC ADD 15 MINUTES (ANESTHESIA): Performed by: STUDENT IN AN ORGANIZED HEALTH CARE EDUCATION/TRAINING PROGRAM

## 2024-08-29 PROCEDURE — 6360000002 HC RX W HCPCS: Performed by: NURSE ANESTHETIST, CERTIFIED REGISTERED

## 2024-08-29 PROCEDURE — 3609012400 HC EGD TRANSORAL BIOPSY SINGLE/MULTIPLE: Performed by: STUDENT IN AN ORGANIZED HEALTH CARE EDUCATION/TRAINING PROGRAM

## 2024-08-29 PROCEDURE — 88342 IMHCHEM/IMCYTCHM 1ST ANTB: CPT

## 2024-08-29 PROCEDURE — 81025 URINE PREGNANCY TEST: CPT

## 2024-08-29 PROCEDURE — 3700000000 HC ANESTHESIA ATTENDED CARE: Performed by: STUDENT IN AN ORGANIZED HEALTH CARE EDUCATION/TRAINING PROGRAM

## 2024-08-29 PROCEDURE — 2500000003 HC RX 250 WO HCPCS: Performed by: NURSE ANESTHETIST, CERTIFIED REGISTERED

## 2024-08-29 PROCEDURE — 88312 SPECIAL STAINS GROUP 1: CPT

## 2024-08-29 PROCEDURE — 7100000011 HC PHASE II RECOVERY - ADDTL 15 MIN: Performed by: STUDENT IN AN ORGANIZED HEALTH CARE EDUCATION/TRAINING PROGRAM

## 2024-08-29 PROCEDURE — 43239 EGD BIOPSY SINGLE/MULTIPLE: CPT | Performed by: STUDENT IN AN ORGANIZED HEALTH CARE EDUCATION/TRAINING PROGRAM

## 2024-08-29 PROCEDURE — 7100000010 HC PHASE II RECOVERY - FIRST 15 MIN: Performed by: STUDENT IN AN ORGANIZED HEALTH CARE EDUCATION/TRAINING PROGRAM

## 2024-08-29 PROCEDURE — 2709999900 HC NON-CHARGEABLE SUPPLY: Performed by: STUDENT IN AN ORGANIZED HEALTH CARE EDUCATION/TRAINING PROGRAM

## 2024-08-29 PROCEDURE — 88305 TISSUE EXAM BY PATHOLOGIST: CPT

## 2024-08-29 PROCEDURE — 2580000003 HC RX 258: Performed by: ANESTHESIOLOGY

## 2024-08-29 RX ORDER — LIDOCAINE HYDROCHLORIDE 10 MG/ML
1 INJECTION, SOLUTION EPIDURAL; INFILTRATION; INTRACAUDAL; PERINEURAL
Status: DISCONTINUED | OUTPATIENT
Start: 2024-08-29 | End: 2024-08-29 | Stop reason: HOSPADM

## 2024-08-29 RX ORDER — SODIUM CHLORIDE, SODIUM LACTATE, POTASSIUM CHLORIDE, CALCIUM CHLORIDE 600; 310; 30; 20 MG/100ML; MG/100ML; MG/100ML; MG/100ML
INJECTION, SOLUTION INTRAVENOUS CONTINUOUS
Status: DISCONTINUED | OUTPATIENT
Start: 2024-08-29 | End: 2024-08-29 | Stop reason: HOSPADM

## 2024-08-29 RX ORDER — SODIUM CHLORIDE 0.9 % (FLUSH) 0.9 %
5-40 SYRINGE (ML) INJECTION PRN
Status: DISCONTINUED | OUTPATIENT
Start: 2024-08-29 | End: 2024-08-29 | Stop reason: HOSPADM

## 2024-08-29 RX ORDER — PANTOPRAZOLE SODIUM 40 MG/1
40 TABLET, DELAYED RELEASE ORAL 2 TIMES DAILY
Qty: 90 TABLET | Refills: 1 | Status: SHIPPED | OUTPATIENT
Start: 2024-08-29

## 2024-08-29 RX ORDER — PROPOFOL 10 MG/ML
INJECTION, EMULSION INTRAVENOUS PRN
Status: DISCONTINUED | OUTPATIENT
Start: 2024-08-29 | End: 2024-08-29 | Stop reason: SDUPTHER

## 2024-08-29 RX ORDER — DEXAMETHASONE SODIUM PHOSPHATE 4 MG/ML
INJECTION, SOLUTION INTRA-ARTICULAR; INTRALESIONAL; INTRAMUSCULAR; INTRAVENOUS; SOFT TISSUE PRN
Status: DISCONTINUED | OUTPATIENT
Start: 2024-08-29 | End: 2024-08-29 | Stop reason: SDUPTHER

## 2024-08-29 RX ORDER — ONDANSETRON 2 MG/ML
INJECTION INTRAMUSCULAR; INTRAVENOUS PRN
Status: DISCONTINUED | OUTPATIENT
Start: 2024-08-29 | End: 2024-08-29 | Stop reason: SDUPTHER

## 2024-08-29 RX ORDER — SODIUM CHLORIDE 9 MG/ML
INJECTION, SOLUTION INTRAVENOUS PRN
Status: DISCONTINUED | OUTPATIENT
Start: 2024-08-29 | End: 2024-08-29 | Stop reason: HOSPADM

## 2024-08-29 RX ORDER — SODIUM CHLORIDE 0.9 % (FLUSH) 0.9 %
5-40 SYRINGE (ML) INJECTION EVERY 12 HOURS SCHEDULED
Status: DISCONTINUED | OUTPATIENT
Start: 2024-08-29 | End: 2024-08-29 | Stop reason: HOSPADM

## 2024-08-29 RX ORDER — CETIRIZINE HYDROCHLORIDE 10 MG/1
10 TABLET ORAL PRN
Qty: 90 TABLET | Refills: 1 | Status: CANCELLED | OUTPATIENT
Start: 2024-08-29 | End: 2025-02-25

## 2024-08-29 RX ORDER — LIDOCAINE HYDROCHLORIDE 10 MG/ML
INJECTION, SOLUTION EPIDURAL; INFILTRATION; INTRACAUDAL; PERINEURAL PRN
Status: DISCONTINUED | OUTPATIENT
Start: 2024-08-29 | End: 2024-08-29 | Stop reason: SDUPTHER

## 2024-08-29 RX ADMIN — DEXAMETHASONE SODIUM PHOSPHATE 4 MG: 4 INJECTION INTRA-ARTICULAR; INTRALESIONAL; INTRAMUSCULAR; INTRAVENOUS; SOFT TISSUE at 13:14

## 2024-08-29 RX ADMIN — PROPOFOL 100 MG: 10 INJECTION, EMULSION INTRAVENOUS at 13:11

## 2024-08-29 RX ADMIN — PROPOFOL 50 MG: 10 INJECTION, EMULSION INTRAVENOUS at 13:16

## 2024-08-29 RX ADMIN — ONDANSETRON 4 MG: 2 INJECTION INTRAMUSCULAR; INTRAVENOUS at 13:14

## 2024-08-29 RX ADMIN — LIDOCAINE HYDROCHLORIDE 50 MG: 10 INJECTION, SOLUTION EPIDURAL; INFILTRATION; INTRACAUDAL; PERINEURAL at 13:11

## 2024-08-29 RX ADMIN — SODIUM CHLORIDE, POTASSIUM CHLORIDE, SODIUM LACTATE AND CALCIUM CHLORIDE: 600; 310; 30; 20 INJECTION, SOLUTION INTRAVENOUS at 12:05

## 2024-08-29 RX ADMIN — PROPOFOL 50 MG: 10 INJECTION, EMULSION INTRAVENOUS at 13:13

## 2024-08-29 ASSESSMENT — PAIN - FUNCTIONAL ASSESSMENT
PAIN_FUNCTIONAL_ASSESSMENT: 0-10
PAIN_FUNCTIONAL_ASSESSMENT: NONE - DENIES PAIN

## 2024-08-29 ASSESSMENT — ENCOUNTER SYMPTOMS
SINUS PRESSURE: 0
SHORTNESS OF BREATH: 0
NAUSEA: 0
ABDOMINAL PAIN: 0

## 2024-08-29 NOTE — OP NOTE
Esophagogastroduodenoscopy    DATE OF PROCEDURE: 8/29/2024     SURGEON: Jorge Luis Wallace MD  Facility: [facility]  ASSISTANT: None  Anesthesia: Monitored anesthesia care  PREOPERATIVE DIAGNOSIS: Globus sensation    Diagnosis:    POSTOPERATIVE DIAGNOSIS: As described below (see findings and impression)    OPERATION: Upper GI endoscopy with Biopsy    ANESTHESIA: Monitored Anesthesia Care (MAC)     ESTIMATED BLOOD LOSS: Less than 50 ml    COMPLICATIONS: None.     SPECIMENS:  Was Obtained:     ID Type Source Tests Collected by Time Destination   A : duodenal biopsy Tissue Duodenum SURGICAL PATHOLOGY Jorge Luis Wallace MD 8/29/2024 1314    B : stomach biopsy r/o gastritis Tissue Stomach SURGICAL PATHOLOGY Jorge Luis Wallace MD 8/29/2024 1316    C : GE junction r/o esophagitis Tissue GE Junction Biopsy SURGICAL PATHOLOGY Jorge Luis Wallace MD 8/29/2024 1317         HISTORY: The patient is a 44 y.o. year old female with history of above preop diagnosis.  I recommended esophagogastroduodenoscopy with possible biopsy and I explained the risk, benefits, expected outcome, and alternatives to the procedure.  Risks included but are not limited to bleeding, infection, respiratory distress, hypotension, and perforation of the esophagus, stomach, or duodenum.  Patient understands and is in agreement.      PROCEDURE: The patient was given IV Monitored Anesthesia Care (MAC) and vitals monitoring per anesthesia department.  The patient was placed in the left lateral decubitus position. The patient was monitored continuously with ECG tracing, pulse oximetry, blood pressure monitoring, and direct observation. The gastroscope was inserted into the mouth and advanced under direct vision to second portion of the duodenum.  A careful inspection was made as the gastroscope was withdrawn, including a retroflexed view of the proximal stomach; findings and interventions are described below. Appropriate photodocumentation was obtained.

## 2024-08-29 NOTE — ANESTHESIA POSTPROCEDURE EVALUATION
Department of Anesthesiology  Postprocedure Note    Patient: Flower Daniel  MRN: [id number]  YOB: 1980  Date of evaluation: 8/29/2024    Procedure Summary       Date: 08/29/24 Room / Location: [place] [address] / [facility]    Anesthesia Start: 1302 Anesthesia Stop: 1326    Procedure: ESOPHAGOGASTRODUODENOSCOPY BIOPSY (Esophagus) Diagnosis:       Change in bowel habits      Diarrhea, unspecified type      (Change in bowel habits [R19.4])      (Diarrhea, unspecified type [R19.7])    Surgeons: Jorge Luis Wallace MD Responsible Provider: Gavi Stephenson MD    Anesthesia Type: general ASA Status: 2            Anesthesia Type: No value filed.    Tapan Phase I:      Tapan Phase II: Tapan Score: 10    Anesthesia Post Evaluation    Comments: POST- ANESTHESIA EVALUATION       Pt Name: Flower Daniel  MRN: [id number]  YOB: 1980  Date of evaluation: 8/29/2024  Time:  2:37 PM      BP 90/71   Pulse 73   Temp 97.5 °F (36.4 °C)   Resp 10   Ht 1.6 m (5' 3\")   Wt 87.1 kg (192 lb)   LMP 08/01/2024 (Approximate) Comment: HCG urine negative  SpO2 97%   BMI 34.01 kg/m²      Consciousness Level  Awake  Cardiopulmonary Status  Stable  Pain Adequately Treated YES  Nausea / Vomiting  NO  Adequate Hydration  YES  Anesthesia Related Complications NONE      Electronically signed by Gavi Stephenson MD on 8/29/2024 at 2:37 PM      No notable events documented.

## 2024-08-29 NOTE — ANESTHESIA PRE PROCEDURE
Department of Anesthesiology  Preprocedure Note       Name:  Flower Daniel   Age:  44 y.o.  :  1980                                          MRN:  [id number]         Date:  2024      Surgeon: Surgeon(s):  Jorge Luis Wallace MD    Procedure: Procedure(s):  ESOPHAGOGASTRODUODENOSCOPY BIOPSY  COLONOSCOPY DIAGNOSTIC    Medications prior to admission:   Prior to Admission medications    Medication Sig Start Date End Date Taking? Authorizing Provider   ondansetron (ZOFRAN) 4 MG tablet Take every six hours as needed 24  Yes Woody Rogel MD   sodium-potassium-mag sulfate (SUPREP BOWEL PREP KIT) 17.5-3.13-1.6 GM/177ML SOLN solution Please follow instructions as given to you by your provider 24  Yes Jorge Luis Wallace MD   prednisoLONE acetate (PRED FORTE) 1 % ophthalmic suspension INSTILL 1 DROP IN BOTH EYES TWICE DAILY   Yes Feliz Hernanedz MD   diphenhydrAMINE (BENADRYL ALLERGY EXTRA STR) 50 MG tablet Take 1 hour before contrast is given 24  Yes Nawaf Sandra MD   cetirizine (ZYRTEC) 10 MG tablet Take 1 tablet by mouth as needed for Allergies or Rhinitis 4/4/24 10/1/24 Yes Julian Luis APRN - CNP   ASPERCREME LIDOCAINE 4 % CREA  3/21/23  Yes Feliz Hernandez MD   cephALEXin (KEFLEX) 500 MG capsule 500 mgTake three times daily 24   Woody Rogel MD   LUBRICANT EYE DROPS 0.5 % SOLN ophthalmic solution INSTILL 1 DROP IN BOTH EYES THREE TIMES DAILY 24   Feliz Hernandez MD   omeprazole (PRILOSEC) 20 MG delayed release capsule Take 1 capsule by mouth every morning (before breakfast) 24   Monika Banegas MD   fludrocortisone (FLORINEF) 0.1 MG tablet Take 1 tablet by mouth daily 6/20/24 6/15/25  Harmony Schneider DO   Abatacept 125 MG/ML SOSY Inject 125 mg into the skin once a week 1/3/23 8/15/24  Feliz Hernandez MD   Artificial Tear Solution (SOOTHE XP XTRA PROTECTION) SOLN  10/7/22   Feliz Hernandez MD   Multiple Vitamin (MULTIVITAMIN ADULT PO) Take by mouth

## 2024-08-29 NOTE — PROGRESS NOTES
Pt stated unable to finish bowel bowel prep and still having formed stool. Dr. Wallace discussed with pt and he will do the EGD and the colonoscopy will be rescheduled.

## 2024-08-29 NOTE — H&P
HISTORY and PHYSICAL  [facility]       NAME:  Flower Daniel  MRN: [id number]   YOB: 1980   Date: 8/29/2024   Age: 44 y.o.  Gender: female       COMPLAINT AND PRESENT HISTORY:       Flower Daniel is 44 y.o.,  female, will be having a   EGD ESOPHAGOGASTRODUODENOSCOPY, COLONOSCOPY.       Pt is being seen for hx of : Pre-Op Diagnosis Codes:      * Change in bowel habits      * Diarrhea     No prior Colonoscopy, this is the first time was done and EGD was done last in 2021.       Patient is s/p gallbladder, and gastric sleeve.     Pt  admits to occasional lower  abdominal pain .  Pt admits to nausea often.  No vomiting.       Pt denies any changes in bowel habits on the most part, but does have liquidity stools from time to time.    No diarrhea, constipation, blood in stools, black tarry stools. Pt denies  having hemorrhoids.     No changes in appetite and unintended weight loss.     No  heartburn, indigestion,  acid reflux.  Pt is taking Prilosec.   Pt denies any dysphagia .     Family Hx of colon cancer in her sister.  No FH of esophageal cancer.     Medical history reviewed:  hx of PE with pregnancy, tachycardia, SHELLEY, WILLIAM   Denies current chest pain/pressure/palpations. Pt reports no SOB.   No recent URI, fever or chills.     Patient has been NPO since midnight. No blood thinners I.    Patient states she was offered suprax , after trying she reports that she had vomiting and states it was intolerable,  she states that she tired calling the doctor's office to get a change in prep.  She states that she took Miralax and has since had  some lynette and a little of liquid stool, she also admits to some noodle soup with chicken upon yesterday.    Pre-op was informed and will speak with GI doctor.     Patient has hx of PONV,denies any personal or family problems with anesthesia .      RECENT LABS, IMAGING AND TESTING     Lab Results   Component Value Date    WBC 8.5 07/23/2024    RBC 4.53  10 MG tablet Take 1 tablet by mouth as needed for Allergies or Rhinitis 90 tablet 1    ASPERCREME LIDOCAINE 4 % CREA       Abatacept 125 MG/ML SOSY Inject 125 mg into the skin once a week (Patient not taking: Reported on 8/15/2024)      azelastine (OPTIVAR) 0.05 % ophthalmic solution       Artificial Tear Solution (SOOTHE XP XTRA PROTECTION) SOLN       Multiple Vitamin (MULTIVITAMIN ADULT PO) Take by mouth       Notation: Above medications are not currently reconciled at time of signing this H&P note, to be reconciled in pre-op per RN.     Review of Systems   Constitutional: Negative.    HENT:  Negative for congestion and sinus pressure.    Respiratory:  Negative for shortness of breath.    Cardiovascular:  Negative for leg swelling.   Gastrointestinal:  Negative for abdominal pain and nausea.        See HPI   Skin: Negative.    Neurological:  Negative for dizziness.   Psychiatric/Behavioral:  Negative for sleep disturbance.    All other systems reviewed and are negative.        GENERAL PHYSICAL EXAM     Vitals:   Review vitals per RN flowsheet.                              Physical Exam  Constitutional:       General: She is not in acute distress.  HENT:      Head: Normocephalic.      Right Ear: External ear normal.      Left Ear: External ear normal.      Nose: Nose normal.      Mouth/Throat:      Pharynx: No posterior oropharyngeal erythema.   Eyes:      General:         Right eye: No discharge.         Left eye: No discharge.   Cardiovascular:      Rate and Rhythm: Regular rhythm.      Heart sounds: Normal heart sounds.   Pulmonary:      Breath sounds: Normal breath sounds.   Abdominal:      General: Bowel sounds are normal.      Tenderness: There is no abdominal tenderness. There is no guarding.   Neurological:      Mental Status: She is alert and oriented to person, place, and time.   Psychiatric:         Mood and Affect: Mood normal.        PROVISIONAL DIAGNOSES / SURGERY:        ESOPHAGOGASTRODUODENOSCOPY  BIOPSY, COLONOSCOPY DIAGNOSTIC    Pre-Op Diagnosis Codes:      * Change in bowel habits [R19.4]     * Diarrhea, unspecified type [R19.7]     Patient Active Problem List    Diagnosis Date Noted    Vitamin D deficiency 2022    Anxiety 2022    S/P cholecystectomy 2022    Obesity (BMI 30-39.9) 2022    Lymphadenopathy 2024    Autonomic orthostatic hypotension 2024    Chest pain 2024    Generalized anxiety disorder 2024    Soft tissue tumor of right foot 2023    Right foot pain 2023    S/P laparoscopic sleeve gastrectomy 2021    IUD (intrauterine device) in place 2020    Impaired glucose regulation with features of insulin resistance 2020    H/O:  section 2020    Recurrent pregnancy loss 2019    Anticardiolipin antibody low positive x1 (26.9 on 3/19/19 2019    Attention deficit hyperactivity disorder, predominantly inattentive type 2019    Spondylosis without myelopathy or radiculopathy, lumbosacral region 2019    Rheumatoid arthritis with rheumatoid factor, unspecified (HCC) 2019    Lumbar radiculopathy 2019    Lumbar spondylosis 2019    AMA 10/10/2018    Low antithrombin III x1, repeat WNL 2017    Antiphospholipid syndrome in pregnancy (HCC) 2017    Prediabetes 2017    Chronic low back pain 2017    Migraine without aura, not refractory 2017    Hidradenitis 2017           RUFINA BYRNE, APRN - CNP on 2024 at 11:17 AM

## 2024-08-30 ENCOUNTER — TELEPHONE (OUTPATIENT)
Dept: ONCOLOGY | Age: 44
End: 2024-08-30

## 2024-08-30 ENCOUNTER — OFFICE VISIT (OUTPATIENT)
Dept: ONCOLOGY | Age: 44
End: 2024-08-30
Payer: MEDICAID

## 2024-08-30 VITALS
BODY MASS INDEX: 34.12 KG/M2 | WEIGHT: 192.6 LBS | DIASTOLIC BLOOD PRESSURE: 63 MMHG | HEART RATE: 69 BPM | SYSTOLIC BLOOD PRESSURE: 89 MMHG | RESPIRATION RATE: 18 BRPM | TEMPERATURE: 97.1 F | OXYGEN SATURATION: 100 %

## 2024-08-30 DIAGNOSIS — J30.2 SEASONAL ALLERGIES: ICD-10-CM

## 2024-08-30 DIAGNOSIS — R59.1 LYMPHADENOPATHY: Primary | ICD-10-CM

## 2024-08-30 PROCEDURE — G8417 CALC BMI ABV UP PARAM F/U: HCPCS | Performed by: INTERNAL MEDICINE

## 2024-08-30 PROCEDURE — 99211 OFF/OP EST MAY X REQ PHY/QHP: CPT | Performed by: INTERNAL MEDICINE

## 2024-08-30 PROCEDURE — G8427 DOCREV CUR MEDS BY ELIG CLIN: HCPCS | Performed by: INTERNAL MEDICINE

## 2024-08-30 PROCEDURE — 99213 OFFICE O/P EST LOW 20 MIN: CPT | Performed by: INTERNAL MEDICINE

## 2024-08-30 PROCEDURE — 1036F TOBACCO NON-USER: CPT | Performed by: INTERNAL MEDICINE

## 2024-08-30 RX ORDER — CETIRIZINE HYDROCHLORIDE 10 MG/1
10 TABLET ORAL PRN
Qty: 90 TABLET | Refills: 1 | Status: SHIPPED | OUTPATIENT
Start: 2024-08-30 | End: 2025-02-26

## 2024-08-30 NOTE — PROGRESS NOTES
Flower Daniel                                                                                                                  8/30/2024  MRN:   [id number]  YOB: 1980  PCP:                           Monika Banegas MD  Referring Physician: No ref. 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  Patient is 26-week pregnant.  Presented with pelvic pain urinary frequency and dyspnea for about 2 days.  Patient underwent CT chest which showed nonobstructive small left lower lobe pulmonary embolism.  Patient denies any previous history of blood clots.  Patient has had thrombophilia work-up done due to multiple pregnancy losses in the past which has not been clinically significant.  Patient has been started on Lovenox which she is tolerating well. Patient has BMI of 51.  Initial testing showed anticardiolipin IgG antibody to be 26.9 and in 12/2019 dropped to 3.7    Interim History:   Patient presents to the clinic for a follow-up visit and to discuss results of her lymph node biopsy results.  Patient lymph node biopsy shows follicular hyperplasia.  Anaerobic culture positive.  Patient denies any fever or chills.  Underwent EGD which showed esophagitis.    During this visit patient's allergy, social, medical, surgical history and  activity: Yes     Partners: Male     Social Determinants of Health     Financial Resource Strain: Low Risk  (4/23/2024)    Overall Financial Resource Strain (CARDIA)     Difficulty of Paying Living Expenses: Not hard at all   Food Insecurity: No Food Insecurity (4/23/2024)    Hunger Vital Sign     Worried About Running Out of Food in the Last Year: Never true     Ran Out of Food in the Last Year: Never true   Transportation Needs: No Transportation Needs (4/23/2024)    PRAPARE - Transportation     Lack of Transportation (Medical): No     Lack of Transportation (Non-Medical): No   Physical Activity: Insufficiently Active (10/18/2022)    [facility]    Exercise Vital Sign     Days of Exercise per Week: 2 days     Minutes of Exercise per Session: 60 min   Stress: No Stress Concern Present (10/18/2022)    Received from [facility], [facility]    [employer] [facility] [facility] [employer] - Occupational Stress Questionnaire     Feeling of Stress : Only a little   Social Connections: Moderately Isolated (10/18/2022)    Received from [facility], [facility]    Social Connection and Isolation Panel [NHANES]     Frequency of Communication with Friends and Family: Three times a week     Frequency of Social Gatherings with Friends and Family: Three times a week     Attends Yazidism Services: Never     Active Member of Clubs or Organizations: No     Attends Club or Organization Meetings: Never     Marital Status:     Received from [facility], [facility]    [place]   Housing Stability: Unknown (4/23/2024)    Housing Stability Vital Sign     Unable to Pay for Housing in the Last Year: No     Unstable Housing in the Last Year: No       Family History   Problem Relation Age of Onset    Elevated Lipids Mother     High Blood Pressure Mother     Colon Cancer Sister        Current

## 2024-08-30 NOTE — TELEPHONE ENCOUNTER
Flower Daniel is calling to request a refill on the following medication(s):    Medication Request:  Requested Prescriptions     Pending Prescriptions Disp Refills    cetirizine (ZYRTEC) 10 MG tablet 90 tablet 1     Sig: Take 1 tablet by mouth as needed for Allergies or Rhinitis       Last Visit Date (If Applicable):  7/9/2024    Next Visit Date:    Visit date not found

## 2024-09-04 LAB — SURGICAL PATHOLOGY REPORT: NORMAL

## 2024-09-09 DIAGNOSIS — R19.7 DIARRHEA, UNSPECIFIED TYPE: ICD-10-CM

## 2024-09-09 DIAGNOSIS — R19.4 CHANGE IN BOWEL HABIT: ICD-10-CM

## 2024-09-16 LAB
MICROORGANISM SPEC CULT: NORMAL
MICROORGANISM/AGENT SPEC: NORMAL
SPECIMEN DESCRIPTION: NORMAL

## 2024-09-18 DIAGNOSIS — E55.9 VITAMIN D DEFICIENCY: Primary | ICD-10-CM

## 2024-09-20 ENCOUNTER — OFFICE VISIT (OUTPATIENT)
Dept: GASTROENTEROLOGY | Age: 44
End: 2024-09-20
Payer: MEDICAID

## 2024-09-20 VITALS
WEIGHT: 190.4 LBS | HEART RATE: 92 BPM | BODY MASS INDEX: 33.73 KG/M2 | OXYGEN SATURATION: 94 % | TEMPERATURE: 98.6 F | RESPIRATION RATE: 16 BRPM | SYSTOLIC BLOOD PRESSURE: 99 MMHG | DIASTOLIC BLOOD PRESSURE: 64 MMHG | HEIGHT: 63 IN

## 2024-09-20 DIAGNOSIS — K20.90 ESOPHAGITIS: ICD-10-CM

## 2024-09-20 DIAGNOSIS — Z80.0 FAMILY HISTORY OF COLORECTAL CANCER: ICD-10-CM

## 2024-09-20 DIAGNOSIS — R19.7 DIARRHEA, UNSPECIFIED TYPE: ICD-10-CM

## 2024-09-20 DIAGNOSIS — R19.8 CHANGE IN BOWEL FUNCTION: Primary | ICD-10-CM

## 2024-09-20 PROCEDURE — G8427 DOCREV CUR MEDS BY ELIG CLIN: HCPCS | Performed by: STUDENT IN AN ORGANIZED HEALTH CARE EDUCATION/TRAINING PROGRAM

## 2024-09-20 PROCEDURE — G8417 CALC BMI ABV UP PARAM F/U: HCPCS | Performed by: STUDENT IN AN ORGANIZED HEALTH CARE EDUCATION/TRAINING PROGRAM

## 2024-09-20 PROCEDURE — 1036F TOBACCO NON-USER: CPT | Performed by: STUDENT IN AN ORGANIZED HEALTH CARE EDUCATION/TRAINING PROGRAM

## 2024-09-20 PROCEDURE — 99214 OFFICE O/P EST MOD 30 MIN: CPT | Performed by: STUDENT IN AN ORGANIZED HEALTH CARE EDUCATION/TRAINING PROGRAM

## 2024-09-23 LAB
MICROORGANISM SPEC CULT: NORMAL
MICROORGANISM/AGENT SPEC: NORMAL
SPECIMEN DESCRIPTION: NORMAL

## 2024-09-25 DIAGNOSIS — N62 LARGE BREASTS: Primary | ICD-10-CM

## 2024-10-03 ENCOUNTER — OFFICE VISIT (OUTPATIENT)
Dept: INFECTIOUS DISEASES | Age: 44
End: 2024-10-03

## 2024-10-03 VITALS
SYSTOLIC BLOOD PRESSURE: 96 MMHG | HEIGHT: 63 IN | DIASTOLIC BLOOD PRESSURE: 67 MMHG | WEIGHT: 189 LBS | HEART RATE: 90 BPM | TEMPERATURE: 97 F | BODY MASS INDEX: 33.49 KG/M2

## 2024-10-03 DIAGNOSIS — R59.1 LYMPHADENOPATHY: Primary | ICD-10-CM

## 2024-10-03 ASSESSMENT — ENCOUNTER SYMPTOMS
RESPIRATORY NEGATIVE: 1
GASTROINTESTINAL NEGATIVE: 1

## 2024-10-03 NOTE — PROGRESS NOTES
[facility]   Office Consult Note  Today's Date and Time: 10/3/2024, 9:27 AM    Impression:     1. Lymphadenopathy         Recommendations   The patient has follicular hyperplasia on lymph node biopsy and the culture had broth media growing Staphylococcus lugdunensis  Staphylococcus lugdunensis the coagulase-negative Staphylococcus and this is likely a contaminant  I do not at all believe that the lymphadenopathy is related to an infectious process and certainly not related to Staphylococcus lugdunensis infection  Infections do not also typically cause generalized lymphadenopathy  From an infectious disease standpoint this culture is considered a contaminant and the patient does not warrant any antimicrobial treatment    I have ordered the following medications/ labs:  No orders of the defined types were placed in this encounter.     No orders of the defined types were placed in this encounter.      Chief complaint/reason for consultation:     Chief Complaint   Patient presents with    Staph        History of Present Illness:   Flower Daniel is a 44 y.o.-year-old female who is seen at there request of Woody Rogel MD    Flower has a history of lymphadenopathy, left lower lobe pulmonary embolism-nonobstructive in 2019, rheumatological disease/rheumatoid arthritis previously on Orencia, obesity status post gastric sleeve surgery, obstructive sleep apnea on CPAP       The patient did have excisional biopsy showing follicular hyperplasia but the culture did come back with Staphylococcus lugdunensis and the patient was sent in to see me for this reason    The patient reports that she is known about the lymphadenopathy for a few years now and was initially found on routine testing and more recently had some chest pain and found again to have axillary lymphadenopathy that also has been present for some time.    The patient reports that she has previously had an axillary lymph node biopsy and just

## 2024-11-01 ENCOUNTER — OFFICE VISIT (OUTPATIENT)
Dept: BARIATRICS/WEIGHT MGMT | Age: 44
End: 2024-11-01
Payer: MEDICAID

## 2024-11-01 VITALS
HEIGHT: 63 IN | HEART RATE: 99 BPM | DIASTOLIC BLOOD PRESSURE: 82 MMHG | WEIGHT: 187 LBS | SYSTOLIC BLOOD PRESSURE: 108 MMHG | OXYGEN SATURATION: 100 % | BODY MASS INDEX: 33.13 KG/M2

## 2024-11-01 DIAGNOSIS — R73.03 PREDIABETES: Primary | ICD-10-CM

## 2024-11-01 DIAGNOSIS — M05.9 RHEUMATOID ARTHRITIS WITH RHEUMATOID FACTOR, UNSPECIFIED (HCC): ICD-10-CM

## 2024-11-01 DIAGNOSIS — E66.9 OBESITY (BMI 30-39.9): ICD-10-CM

## 2024-11-01 DIAGNOSIS — M54.16 LUMBAR RADICULOPATHY: ICD-10-CM

## 2024-11-01 PROCEDURE — G8417 CALC BMI ABV UP PARAM F/U: HCPCS | Performed by: NURSE PRACTITIONER

## 2024-11-01 PROCEDURE — G8484 FLU IMMUNIZE NO ADMIN: HCPCS | Performed by: NURSE PRACTITIONER

## 2024-11-01 PROCEDURE — 1036F TOBACCO NON-USER: CPT | Performed by: NURSE PRACTITIONER

## 2024-11-01 PROCEDURE — 99213 OFFICE O/P EST LOW 20 MIN: CPT | Performed by: NURSE PRACTITIONER

## 2024-11-01 PROCEDURE — G8427 DOCREV CUR MEDS BY ELIG CLIN: HCPCS | Performed by: NURSE PRACTITIONER

## 2024-11-01 NOTE — PROGRESS NOTES
Medical Nutrition Therapy for Metabolic and Bariatric Surgery  Annual Post-Op Gastric Sleeve Nutrition Follow-Up      Nutrition Assessment:  Patient reports:  tolerating diet, never eating small, frequent meals.  never eating protein first, eating protein portions with some meals and snacks.  Drinking at least 60 oz of fluid and sugar free beverages daily. Patient typically drinking water and tea with honey sometimes.  consistently taking vitamins and minerals.   walking and strength training at the gym to remain active.  Provided Lean Protein List Handout(s).    All questions answered. Patient aware of how to contact RD if needs arise. RD to remain available.    24-hr diet recall scanned into chart.    Multivitamin/Mineral Intake: Yes OTC MVI Gummy (planning to switch to [employer]ariLife)    Calcium Intake: Yes, 1000 mg not consistently    Vitals:   Vitals:    11/01/24 0940   BP: 108/82   Pulse: 99   SpO2: 100%   Weight: 84.8 kg (187 lb)   Height: 1.6 m (5' 3\")      Body mass index is 33.13 kg/m².    Nutrition Diagnosis:   Inadequate food and beverage intake r/t WLS as evidenced by loss of excess body weight lost 82 lbs over  3 Years    Nutrition Intervention:  Diet: Bariatric Diet, 60-80 gm of protein, and 48-64 oz of fluid    Nutrition Education: Bariatric Binder (diet guidelines and recipes)    Goal:   Continue bariatric diet and continue to add variety to diet as tolerated.   Sip on water or sugar-free fluid throughout the day. Aiming for a minimum of 64 oz per day.   Eat small, frequent meals containing protein, as tolerated.   Consistently take MVIs and minerals to prevent nutrient deficiencies.   Discuss activity with physician and determine a plan for remaining active.    Monitor/Evaluate:  Diet tolerance, protein intake, vitamin adherence, fluid intake, frequency of meals/snacks, activity, and follow-up at next post-op appointment    Jordan Gay, RD, LD  Clinical Bariatric Dietitian  [facility] 
Received from [facility], [facility]    [facility] - Occupational Stress Questionnaire     Feeling of Stress : Only a little   Social Connections: Moderately Isolated (10/18/2022)    Received from [facility], [facility]    Social Connection and Isolation Panel [NHANES]     Frequency of Communication with Friends and Family: Three times a week     Frequency of Social Gatherings with Friends and Family: Three times a week     Attends Protestant Services: Never     Active Member of Clubs or Organizations: No     Attends Club or Organization Meetings: Never     Marital Status:    Intimate Partner Violence: Unknown (2/22/2024)    Received from [facility], [facility] Safety & Environment     Fear of Current or Ex-Partner: Not on file     Emotionally Abused: Not on file     Physically Abused: Not on file     Sexually Abused: Not on file     Physically or Sexually Abused: Not on file   Housing Stability: Unknown (4/23/2024)    Housing Stability Vital Sign     Unable to Pay for Housing in the Last Year: No     Number of Places Lived in the Last Year: Not on file     Unstable Housing in the Last Year: No       Current Medications:  Current Outpatient Medications   Medication Sig Dispense Refill    azelastine (OPTIVAR) 0.05 % ophthalmic solution       diphenhydrAMINE (BENADRYL) 50 MG capsule TAKE 1 CAPSULE BY MOUTH 1 HOUR BEFORE CONTRAST IS GIVEN      psyllium (KONSYL) 28.3 % POWD powder Take 3.4 g by mouth daily 283 g 0    Cholecalciferol 50 MCG (2000 UT) TABS Take 1 tablet by mouth daily 30 tablet 2    cetirizine (ZYRTEC) 10 MG tablet Take 1 tablet by mouth as needed for Allergies or Rhinitis 90 tablet 1    pantoprazole (PROTONIX) 40 MG tablet Take 1 tablet by mouth in the morning and at bedtime 90 tablet 1    polyethylene glycol (GLYCOLAX) 17 GM/SCOOP powder Please follow instructions provided to you

## 2024-11-11 ENCOUNTER — HOSPITAL ENCOUNTER (OUTPATIENT)
Age: 44
Setting detail: SPECIMEN
Discharge: HOME | End: 2024-11-11

## 2024-11-11 DIAGNOSIS — M54.16 LUMBAR RADICULOPATHY: ICD-10-CM

## 2024-11-11 DIAGNOSIS — R73.03 PREDIABETES: ICD-10-CM

## 2024-11-11 DIAGNOSIS — E66.9 OBESITY (BMI 30-39.9): ICD-10-CM

## 2024-11-11 DIAGNOSIS — M05.9 RHEUMATOID ARTHRITIS WITH RHEUMATOID FACTOR, UNSPECIFIED (HCC): ICD-10-CM

## 2024-11-11 LAB
25(OH)D3 SERPL-MCNC: 23.2 NG/ML (ref 30–100)
ALBUMIN SERPL-MCNC: 4 G/DL (ref 3.5–5.2)
ALBUMIN/GLOB SERPL: 1.1 {RATIO} (ref 1–2.5)
ALP SERPL-CCNC: 101 U/L (ref 35–104)
ALT SERPL-CCNC: 10 U/L (ref 10–35)
ANION GAP SERPL CALCULATED.3IONS-SCNC: 9 MMOL/L (ref 9–16)
AST SERPL-CCNC: 16 U/L (ref 10–35)
BASOPHILS # BLD: <0.03 K/UL (ref 0–0.2)
BASOPHILS NFR BLD: 0 % (ref 0–2)
BILIRUB SERPL-MCNC: 0.4 MG/DL (ref 0–1.2)
BUN SERPL-MCNC: 12 MG/DL (ref 6–20)
CALCIUM SERPL-MCNC: 9.2 MG/DL (ref 8.6–10.4)
CHLORIDE SERPL-SCNC: 105 MMOL/L (ref 98–107)
CHOLEST SERPL-MCNC: 157 MG/DL (ref 0–199)
CHOLESTEROL/HDL RATIO: 4.5
CO2 SERPL-SCNC: 26 MMOL/L (ref 20–31)
CREAT SERPL-MCNC: 0.6 MG/DL (ref 0.6–0.9)
EOSINOPHIL # BLD: 0.52 K/UL (ref 0–0.44)
EOSINOPHILS RELATIVE PERCENT: 8 % (ref 1–4)
ERYTHROCYTE [DISTWIDTH] IN BLOOD BY AUTOMATED COUNT: 13 % (ref 11.8–14.4)
EST. AVERAGE GLUCOSE BLD GHB EST-MCNC: 91 MG/DL
FOLATE SERPL-MCNC: 4.8 NG/ML (ref 4.8–24.2)
GFR, ESTIMATED: >90 ML/MIN/1.73M2
GLUCOSE SERPL-MCNC: 71 MG/DL (ref 74–99)
HBA1C MFR BLD: 4.8 % (ref 4–6)
HCT VFR BLD AUTO: 41.2 % (ref 36.3–47.1)
HDLC SERPL-MCNC: 35 MG/DL
HGB BLD-MCNC: 13 G/DL (ref 11.9–15.1)
IMM GRANULOCYTES # BLD AUTO: <0.03 K/UL (ref 0–0.3)
IMM GRANULOCYTES NFR BLD: 0 %
IRON SATN MFR SERPL: 31 % (ref 20–55)
IRON SERPL-MCNC: 85 UG/DL (ref 37–145)
LDLC SERPL CALC-MCNC: 101 MG/DL (ref 0–100)
LYMPHOCYTES NFR BLD: 1.68 K/UL (ref 1.1–3.7)
LYMPHOCYTES RELATIVE PERCENT: 26 % (ref 24–43)
MAGNESIUM SERPL-MCNC: 2.1 MG/DL (ref 1.6–2.6)
MCH RBC QN AUTO: 28.5 PG (ref 25.2–33.5)
MCHC RBC AUTO-ENTMCNC: 31.6 G/DL (ref 28.4–34.8)
MCV RBC AUTO: 90.4 FL (ref 82.6–102.9)
MONOCYTES NFR BLD: 0.23 K/UL (ref 0.1–1.2)
MONOCYTES NFR BLD: 4 % (ref 3–12)
NEUTROPHILS NFR BLD: 62 % (ref 36–65)
NEUTS SEG NFR BLD: 4.07 K/UL (ref 1.5–8.1)
NRBC BLD-RTO: 0 PER 100 WBC
PLATELET # BLD AUTO: 320 K/UL (ref 138–453)
PMV BLD AUTO: 10.1 FL (ref 8.1–13.5)
POTASSIUM SERPL-SCNC: 3.7 MMOL/L (ref 3.7–5.3)
PROT SERPL-MCNC: 7.5 G/DL (ref 6.6–8.7)
PTH-INTACT SERPL-MCNC: 41 PG/ML (ref 15–65)
RBC # BLD AUTO: 4.56 M/UL (ref 3.95–5.11)
SODIUM SERPL-SCNC: 140 MMOL/L (ref 136–145)
TIBC SERPL-MCNC: 273 UG/DL (ref 250–450)
TRIGL SERPL-MCNC: 104 MG/DL
TSH SERPL DL<=0.05 MIU/L-ACNC: 0.82 UIU/ML (ref 0.27–4.2)
UNSATURATED IRON BINDING CAPACITY: 188 UG/DL (ref 112–347)
VIT B12 SERPL-MCNC: 550 PG/ML (ref 232–1245)
VLDLC SERPL CALC-MCNC: 21 MG/DL (ref 1–30)
WBC OTHER # BLD: 6.5 K/UL (ref 3.5–11.3)

## 2024-11-12 DIAGNOSIS — E55.9 VITAMIN D DEFICIENCY: Primary | ICD-10-CM

## 2024-11-12 RX ORDER — ERGOCALCIFEROL 1.25 MG/1
50000 CAPSULE, LIQUID FILLED ORAL WEEKLY
Qty: 8 CAPSULE | Refills: 0 | Status: SHIPPED | OUTPATIENT
Start: 2024-11-12 | End: 2025-01-01

## 2024-11-13 ENCOUNTER — HOSPITAL ENCOUNTER (OUTPATIENT)
Dept: PREADMISSION TESTING | Age: 44
Discharge: HOME | End: 2024-11-17

## 2024-11-13 VITALS — HEIGHT: 63 IN | WEIGHT: 187 LBS | BODY MASS INDEX: 33.13 KG/M2

## 2024-11-13 LAB — ZINC SERPL-MCNC: 88.7 UG/DL (ref 60–120)

## 2024-11-13 NOTE — PROGRESS NOTES
Pre-op Instructions For Out-Patient Endoscopy Surgery    Medication Instructions:  Please stop herbs and any supplements now (includes vitamins and minerals).    Please contact your surgeon and prescribing physician for pre-op instructions for any blood thinners.    If you have inhalers/aerosol treatments at home, please use them the morning of your surgery and bring the inhalers with you to the hospital.    Please take the following medications the morning of your surgery with a sip of water:    none    Surgery Instructions:  After midnight before surgery:  Do not eat or drink anything, including water, mints, gum, and hard candy.  You may brush your teeth without swallowing.  No smoking, chewing tobacco, or street drugs.    Please shower or bathe before surgery.       Please do not wear any cologne, lotion, powder, jewelry, piercings, perfume, makeup, nail polish, hair accessories, or hair spray on the day of surgery.  Wear loose comfortable clothing.    Leave your valuables at home but bring a payment source for any after-surgery prescriptions you plan to fill at [facility].  Bring a storage case for any glasses/contacts.    An adult who is responsible for you MUST drive you home and should be with you for the first 24 hours after surgery.     The Day of Surgery:  Arrive at [facility] Surgery Entrance at the time directed by your surgeon and check in at the desk.     If you have a living will or healthcare power of , please bring a copy.    You will be taken to the pre-op holding area where you will be prepared for surgery.  A physical assessment will be performed by a nurse practitioner or house officer.  Your IV will be started and you will meet your anesthesiologist.    When you go to surgery, your family will be directed to the surgical waiting room, where the doctor should speak with them after your surgery.    After surgery, you will be taken to the recovery area.  When you

## 2024-11-14 ENCOUNTER — HOSPITAL ENCOUNTER (OUTPATIENT)
Age: 44
Setting detail: SPECIMEN
Discharge: HOME | End: 2024-11-14

## 2024-11-14 ENCOUNTER — OFFICE VISIT (OUTPATIENT)
Dept: OBGYN CLINIC | Age: 44
End: 2024-11-14
Payer: MEDICAID

## 2024-11-14 VITALS
SYSTOLIC BLOOD PRESSURE: 116 MMHG | WEIGHT: 186 LBS | HEIGHT: 63 IN | BODY MASS INDEX: 32.96 KG/M2 | DIASTOLIC BLOOD PRESSURE: 74 MMHG

## 2024-11-14 DIAGNOSIS — Z01.419 WELL WOMAN EXAM: Primary | ICD-10-CM

## 2024-11-14 DIAGNOSIS — N94.6 DYSMENORRHEA: ICD-10-CM

## 2024-11-14 DIAGNOSIS — Z97.5 IUD (INTRAUTERINE DEVICE) IN PLACE: ICD-10-CM

## 2024-11-14 LAB
RETINYL PALMITATE: <0.02 MG/L (ref 0–0.1)
VIT B1 PYROPHOSHATE BLD-SCNC: 85 NMOL/L (ref 70–180)
VITAMIN A LEVEL: 0.39 MG/L (ref 0.3–1.2)
VITAMIN A, INTERP: NORMAL

## 2024-11-14 PROCEDURE — 99386 PREV VISIT NEW AGE 40-64: CPT | Performed by: ADVANCED PRACTICE MIDWIFE

## 2024-11-14 PROCEDURE — G8484 FLU IMMUNIZE NO ADMIN: HCPCS | Performed by: ADVANCED PRACTICE MIDWIFE

## 2024-11-14 ASSESSMENT — ENCOUNTER SYMPTOMS
ABDOMINAL PAIN: 0
NAUSEA: 0
DIARRHEA: 0
VOMITING: 0
SHORTNESS OF BREATH: 0

## 2024-11-14 NOTE — PROGRESS NOTES
[facility], [facility] [address]  [address]  [facility] [address]  Dept: [phone]    Patient Name: Flower Daniel  Patient Age: 44 y.o.  Date of Visit: 2024    Subjective  Chief Complaint   Patient presents with    New Patient     Patient's last menstrual period was 10/23/2024 (approximate).    Chaperone for Intimate Exam  Chaperone was offered as part of the rooming process. Patient declined and agrees to continue with exam without a chaperone.  Chaperone: n/a    HPI  Patient arrives for annual exam as a new patient to the practice, and new to me.  Flower Daniel does not admit to any concerns today.     Reported previous gyn history:  Menstrual cycles began at the age of 14  Flower Daniel reports having monthly menstrual cycles    Flower Daniel reports a history of dysmenorrhea and heavy menstrual bleeding. And her current treatment plan includes Mirena IUD. Almost 5 years. 2nd or 3rd IUD    Patient reports is  sexually active with 1 male partner(s).   Patient denies  pain with sex.  Reports is  protecting against a pregnancy. Current method(s) including has a Mirena IUD  Patient is not using a barrier method with sexual encounters   Patient denies a history of sexually transmitted infection(s)   If yes, including none  Patient does not want screening for sexually transmitted infection(s).   Flower Daniel requesting testing for n/a    Reported previous OB history:   Flower Daniel           2024    10:42 AM 2024     1:41 PM 10/24/2023     3:09 PM 2023     9:15 AM 2023     9:28 AM 2022     3:36 PM 2021    10:22 AM   PHQ Scores   PHQ2 Score 0 0 0 0 0 0 0   PHQ9 Score 0 0 0 0 0 0 0     Interpretation of Total Score Depression Severity: 1-4 = Minimal depression, 5-9 = Mild depression, 10-14 = Moderate depression, 15-19 = Moderately severe depression, 20-27 = Severe depression       No data to display

## 2024-11-16 LAB
HPV I/H RISK 4 DNA CVX QL NAA+PROBE: NOT DETECTED
HPV SAMPLE: NORMAL
HPV, INTERPRETATION: NORMAL
HPV16 DNA CVX QL NAA+PROBE: NOT DETECTED
HPV18 DNA CVX QL NAA+PROBE: NOT DETECTED
SPECIMEN DESCRIPTION: NORMAL

## 2024-11-18 ENCOUNTER — OFFICE VISIT (OUTPATIENT)
Dept: FAMILY MEDICINE CLINIC | Age: 44
End: 2024-11-18
Payer: MEDICAID

## 2024-11-18 VITALS
HEART RATE: 44 BPM | RESPIRATION RATE: 14 BRPM | OXYGEN SATURATION: 96 % | SYSTOLIC BLOOD PRESSURE: 102 MMHG | DIASTOLIC BLOOD PRESSURE: 80 MMHG

## 2024-11-18 DIAGNOSIS — R42 VERTIGO: ICD-10-CM

## 2024-11-18 DIAGNOSIS — H66.90 ACUTE OTITIS MEDIA, UNSPECIFIED OTITIS MEDIA TYPE: Primary | ICD-10-CM

## 2024-11-18 PROCEDURE — G8484 FLU IMMUNIZE NO ADMIN: HCPCS | Performed by: NURSE PRACTITIONER

## 2024-11-18 PROCEDURE — G8427 DOCREV CUR MEDS BY ELIG CLIN: HCPCS | Performed by: NURSE PRACTITIONER

## 2024-11-18 PROCEDURE — 1036F TOBACCO NON-USER: CPT | Performed by: NURSE PRACTITIONER

## 2024-11-18 PROCEDURE — G8417 CALC BMI ABV UP PARAM F/U: HCPCS | Performed by: NURSE PRACTITIONER

## 2024-11-18 PROCEDURE — 99213 OFFICE O/P EST LOW 20 MIN: CPT | Performed by: NURSE PRACTITIONER

## 2024-11-18 RX ORDER — FLUCONAZOLE 150 MG/1
TABLET ORAL
Qty: 2 TABLET | Refills: 0 | Status: SHIPPED | OUTPATIENT
Start: 2024-11-18

## 2024-11-18 RX ORDER — MECLIZINE HYDROCHLORIDE 25 MG/1
25 TABLET ORAL 3 TIMES DAILY PRN
Qty: 30 TABLET | Refills: 0 | Status: SHIPPED | OUTPATIENT
Start: 2024-11-18 | End: 2024-11-28

## 2024-11-18 ASSESSMENT — ENCOUNTER SYMPTOMS
COUGH: 0
SHORTNESS OF BREATH: 0
TROUBLE SWALLOWING: 0
SORE THROAT: 0
RHINORRHEA: 0
VOMITING: 0
ABDOMINAL PAIN: 0
NAUSEA: 0
WHEEZING: 0

## 2024-11-18 NOTE — PROGRESS NOTES
[facility]  [address]  [address]  [facility] [address]    Flower Daniel is a 44 y.o. female who presents today for her medical conditions/complaints of Dizziness (X 2 days, nausea )          HPI:     /80   Pulse (!) 44   Resp 14   LMP 10/23/2024 (Approximate)   SpO2 96%       Dizziness  Quality:  Vertigo  Severity:  Moderate  Onset quality:  Sudden  Timing:  Intermittent  Progression:  Worsening  Chronicity:  New  Context: bending over and head movement    Context comment:  Pt states she has vertigo associated with ear infections.  Relieved by:  Nothing  Worsened by:  Movement  Associated symptoms: no chest pain, no headaches, no nausea, no palpitations, no shortness of breath and no vomiting    Risk factors: hx of vertigo (with ear infections)        Past Medical History:   Diagnosis Date    Abnormal 1st trimester screen (Tri 21 1:117)--NIPT nml  11/30/2019    ADD (attention deficit disorder) without hyperactivity     Antiphospholipid syndrome (HCC)     Celestone 1/9 & 1/10 01/09/2020    Chronic back pain     COVID 09/15/2021    BODY ACHE, COUGH, FEVER, CHEST PAIN, N&V X 3 WEEKS.  STILL HAS COUGH.    Dehydration 12/28/2021    Echogenic focus of heart of fetus affecting antepartum care of mother 11/30/2019    Elev early 1hr, nml 3hr  11/30/2019    1 elevated value, [facility] recommends repeat 3hr GTT in 2 weeks    Elevated blood-pressure reading without diagnosis of hypertension 11/30/2019    Elevated umbilical artery dopplers  01/10/2020    Eosinopenia (HCC)     Fetal ascites     Fetal heart rate decelerations affecting management of mother     GERD (gastroesophageal reflux disease)     Headache     migraines    Iron deficiency     follows with Dr. Sandra (hem/onc)    IUD (intrauterine device) in place 05/07/2020    Adina LEONG    Lumbar radiculopathy 01/09/2019    Lumbar spondylosis 01/09/2019    Multigravida of advanced maternal age in third trimester     Muscle pain

## 2024-11-19 ENCOUNTER — TELEPHONE (OUTPATIENT)
Dept: PRIMARY CARE CLINIC | Age: 44
End: 2024-11-19

## 2024-11-19 RX ORDER — AMOXICILLIN 400 MG/5ML
800 POWDER, FOR SUSPENSION ORAL 2 TIMES DAILY
Qty: 200 ML | Refills: 0 | Status: SHIPPED | OUTPATIENT
Start: 2024-11-19 | End: 2024-11-29

## 2024-11-25 NOTE — PRE-PROCEDURE INSTRUCTIONS
No answer, left message ?                             Unable to leave message ?    When were you told to arrive at hospital ? 0800     Do you have a  ?yes    Are you on any blood thinners ?  no                   If yes when did you stop taking ?    Do you have your prep Rx filled and instruction ? yes     Nothing to eat the day before , only clear liquids.yes    Are you experiencing any covid symptoms ? no    Do you have any infections or rash we should be aware of ?no      Do you have the Hibiclens soap to use the night before and the morning of surgery ?n/a    Nothing to eat or drink after midnight, only a sip of water to take any medication instructed to take the night before.yes  Wear comfortable clothing, leave any valuables at home, remove any jewelry and body piercing . yes

## 2024-11-26 ENCOUNTER — ANESTHESIA EVENT (OUTPATIENT)
Dept: ENDOSCOPY | Age: 44
End: 2024-11-26
Payer: MEDICAID

## 2024-11-27 ENCOUNTER — HOSPITAL ENCOUNTER (OUTPATIENT)
Age: 44
Setting detail: OUTPATIENT SURGERY
Discharge: HOME | End: 2024-11-27
Attending: STUDENT IN AN ORGANIZED HEALTH CARE EDUCATION/TRAINING PROGRAM | Admitting: STUDENT IN AN ORGANIZED HEALTH CARE EDUCATION/TRAINING PROGRAM
Payer: MEDICAID

## 2024-11-27 ENCOUNTER — ANESTHESIA (OUTPATIENT)
Dept: ENDOSCOPY | Age: 44
End: 2024-11-27
Payer: MEDICAID

## 2024-11-27 VITALS
BODY MASS INDEX: 33.13 KG/M2 | SYSTOLIC BLOOD PRESSURE: 97 MMHG | HEART RATE: 62 BPM | WEIGHT: 187 LBS | DIASTOLIC BLOOD PRESSURE: 65 MMHG | TEMPERATURE: 97.6 F | RESPIRATION RATE: 16 BRPM | HEIGHT: 63 IN | OXYGEN SATURATION: 99 %

## 2024-11-27 DIAGNOSIS — K21.00 GASTROESOPHAGEAL REFLUX DISEASE WITH ESOPHAGITIS, UNSPECIFIED WHETHER HEMORRHAGE: ICD-10-CM

## 2024-11-27 DIAGNOSIS — R19.4 ALTERED BOWEL HABITS: ICD-10-CM

## 2024-11-27 DIAGNOSIS — R13.10 DYSPHAGIA, UNSPECIFIED TYPE: ICD-10-CM

## 2024-11-27 PROBLEM — K52.9 CHRONIC DIARRHEA: Status: ACTIVE | Noted: 2018-11-19

## 2024-11-27 PROBLEM — K20.90 ESOPHAGITIS: Status: ACTIVE | Noted: 2024-11-27

## 2024-11-27 LAB — HCG, PREGNANCY URINE (POC): NEGATIVE

## 2024-11-27 PROCEDURE — 6360000002 HC RX W HCPCS: Performed by: NURSE ANESTHETIST, CERTIFIED REGISTERED

## 2024-11-27 PROCEDURE — 88305 TISSUE EXAM BY PATHOLOGIST: CPT

## 2024-11-27 PROCEDURE — 81025 URINE PREGNANCY TEST: CPT

## 2024-11-27 PROCEDURE — 45380 COLONOSCOPY AND BIOPSY: CPT | Performed by: STUDENT IN AN ORGANIZED HEALTH CARE EDUCATION/TRAINING PROGRAM

## 2024-11-27 PROCEDURE — 2580000003 HC RX 258: Performed by: ANESTHESIOLOGY

## 2024-11-27 PROCEDURE — 6360000002 HC RX W HCPCS: Performed by: ANESTHESIOLOGY

## 2024-11-27 PROCEDURE — 3609010300 HC COLONOSCOPY W/BIOPSY SINGLE/MULTIPLE: Performed by: STUDENT IN AN ORGANIZED HEALTH CARE EDUCATION/TRAINING PROGRAM

## 2024-11-27 PROCEDURE — 2709999900 HC NON-CHARGEABLE SUPPLY: Performed by: STUDENT IN AN ORGANIZED HEALTH CARE EDUCATION/TRAINING PROGRAM

## 2024-11-27 PROCEDURE — 3609012400 HC EGD TRANSORAL BIOPSY SINGLE/MULTIPLE: Performed by: STUDENT IN AN ORGANIZED HEALTH CARE EDUCATION/TRAINING PROGRAM

## 2024-11-27 PROCEDURE — 7100000010 HC PHASE II RECOVERY - FIRST 15 MIN: Performed by: STUDENT IN AN ORGANIZED HEALTH CARE EDUCATION/TRAINING PROGRAM

## 2024-11-27 PROCEDURE — 7100000011 HC PHASE II RECOVERY - ADDTL 15 MIN: Performed by: STUDENT IN AN ORGANIZED HEALTH CARE EDUCATION/TRAINING PROGRAM

## 2024-11-27 PROCEDURE — 3700000000 HC ANESTHESIA ATTENDED CARE: Performed by: STUDENT IN AN ORGANIZED HEALTH CARE EDUCATION/TRAINING PROGRAM

## 2024-11-27 PROCEDURE — 3700000001 HC ADD 15 MINUTES (ANESTHESIA): Performed by: STUDENT IN AN ORGANIZED HEALTH CARE EDUCATION/TRAINING PROGRAM

## 2024-11-27 PROCEDURE — 43239 EGD BIOPSY SINGLE/MULTIPLE: CPT | Performed by: STUDENT IN AN ORGANIZED HEALTH CARE EDUCATION/TRAINING PROGRAM

## 2024-11-27 RX ORDER — PROPOFOL 10 MG/ML
INJECTION, EMULSION INTRAVENOUS
Status: DISCONTINUED | OUTPATIENT
Start: 2024-11-27 | End: 2024-11-27 | Stop reason: SDUPTHER

## 2024-11-27 RX ORDER — OMEPRAZOLE 40 MG/1
40 CAPSULE, DELAYED RELEASE ORAL
Qty: 60 CAPSULE | Refills: 1 | Status: SHIPPED | OUTPATIENT
Start: 2024-11-27

## 2024-11-27 RX ORDER — SODIUM CHLORIDE 0.9 % (FLUSH) 0.9 %
5-40 SYRINGE (ML) INJECTION EVERY 12 HOURS SCHEDULED
Status: DISCONTINUED | OUTPATIENT
Start: 2024-11-27 | End: 2024-11-27 | Stop reason: HOSPADM

## 2024-11-27 RX ORDER — SODIUM CHLORIDE 9 MG/ML
INJECTION, SOLUTION INTRAVENOUS CONTINUOUS
Status: DISCONTINUED | OUTPATIENT
Start: 2024-11-27 | End: 2024-11-27 | Stop reason: HOSPADM

## 2024-11-27 RX ORDER — PSYLLIUM HUSK/CALCIUM CARB 1 G-60 MG
1 CAPSULE ORAL 2 TIMES DAILY
Qty: 60 CAPSULE | Refills: 0 | Status: SHIPPED | OUTPATIENT
Start: 2024-11-27

## 2024-11-27 RX ORDER — SODIUM CHLORIDE 0.9 % (FLUSH) 0.9 %
5-40 SYRINGE (ML) INJECTION PRN
Status: DISCONTINUED | OUTPATIENT
Start: 2024-11-27 | End: 2024-11-27 | Stop reason: HOSPADM

## 2024-11-27 RX ORDER — LIDOCAINE HYDROCHLORIDE 10 MG/ML
1 INJECTION, SOLUTION EPIDURAL; INFILTRATION; INTRACAUDAL; PERINEURAL
Status: COMPLETED | OUTPATIENT
Start: 2024-11-27 | End: 2024-11-27

## 2024-11-27 RX ORDER — LIDOCAINE HYDROCHLORIDE 20 MG/ML
INJECTION, SOLUTION EPIDURAL; INFILTRATION; INTRACAUDAL; PERINEURAL
Status: DISCONTINUED | OUTPATIENT
Start: 2024-11-27 | End: 2024-11-27 | Stop reason: SDUPTHER

## 2024-11-27 RX ORDER — SODIUM CHLORIDE 9 MG/ML
INJECTION, SOLUTION INTRAVENOUS PRN
Status: DISCONTINUED | OUTPATIENT
Start: 2024-11-27 | End: 2024-11-27 | Stop reason: HOSPADM

## 2024-11-27 RX ADMIN — PROPOFOL 140 MCG/KG/MIN: 10 INJECTION, EMULSION INTRAVENOUS at 09:18

## 2024-11-27 RX ADMIN — PROPOFOL 80 MG: 10 INJECTION, EMULSION INTRAVENOUS at 09:18

## 2024-11-27 RX ADMIN — PROPOFOL 40 MG: 10 INJECTION, EMULSION INTRAVENOUS at 09:24

## 2024-11-27 RX ADMIN — LIDOCAINE HYDROCHLORIDE 100 MG: 20 INJECTION, SOLUTION EPIDURAL; INFILTRATION; INTRACAUDAL; PERINEURAL at 09:18

## 2024-11-27 RX ADMIN — LIDOCAINE HYDROCHLORIDE 1 ML: 10 INJECTION, SOLUTION EPIDURAL; INFILTRATION; INTRACAUDAL; PERINEURAL at 08:57

## 2024-11-27 RX ADMIN — SODIUM CHLORIDE: 9 INJECTION, SOLUTION INTRAVENOUS at 09:10

## 2024-11-27 ASSESSMENT — ENCOUNTER SYMPTOMS
SHORTNESS OF BREATH: 0
BACK PAIN: 1
SORE THROAT: 0
COUGH: 0

## 2024-11-27 ASSESSMENT — PAIN - FUNCTIONAL ASSESSMENT: PAIN_FUNCTIONAL_ASSESSMENT: NONE - DENIES PAIN

## 2024-11-27 NOTE — H&P
HISTORY and PHYSICAL  [facility]       NAME:  Flower Daniel  MRN: [id number]   YOB: 1980   Date: 11/27/2024   Age: 44 y.o.  Gender: female       COMPLAINT AND PRESENT HISTORY:       Flower Daniel is 44 y.o.  female, here for     Procedure(s):  ESOPHAGOGASTRODUODENOSCOPY BIOPSY  COLONOSCOPY DIAGNOSTIC    Pre-Op Diagnosis Codes:      * Altered bowel habits [R19.4]     * Dysphagia, unspecified type [R13.10]     * Gastroesophageal reflux disease with esophagitis, unspecified whether hemorrhage [K21.00]    Below italics a portion of office visit note by Dr. Wallace dated 09/20/24: Reviewed   HISTORY OF PRESENT ILLNESS: Ms.Latoya MARIAELENA Daniel is a 44 y.o. female with a past history remarkable for  intermittent dyspepsia symptoms, non-specific abdominal pain, and intermittent diarrhea, last seen Dr Thompson on 4/26/2019 referred as follow-up postprocedure that showed.     9/20/24: colono was not done as she was throwing up the prep however EGD showed  LA C esophagitis. Pantoprazole 40 BID was started and she feels significantly better on this medication.  She continues to have intermittent constipation and diarrhea.  She has narrow stool.  She has not tried fiber medications in the past.  She is worried as her sister had colon cancer in her 49.  She denies any blood in stool.       7/29/24: Patient is a case of rheumatoid arthritis on Biologics.  Previously she used to be on steroids and she saw Dr. Ken with diarrhea and lower abdominal pain.  He was thinking about partially treated IBD and recommended EGD and colonoscopy however it was not done.  Patient currently is off of steroids and she rarely has any diarrhea or abdominal pain.  Her main problem is globus sensation.  She does not have any dysphagia, odynophagia.  She was put on omeprazole 20 mg daily trial according to patient it was not effective.  She otherwise denies constipation or blood in stool.     Family medical history Sister colon  instructions given to you by your provider. 4 tablet 0    cephALEXin (KEFLEX) 500 MG capsule 500 mgTake three times daily 21 capsule 0    ondansetron (ZOFRAN) 4 MG tablet Take every six hours as needed 20 tablet 0    sodium-potassium-mag sulfate (SUPREP BOWEL PREP KIT) 17.5-3.13-1.6 GM/177ML SOLN solution Please follow instructions as given to you by your provider 1 each 0    LUBRICANT EYE DROPS 0.5 % SOLN ophthalmic solution INSTILL 1 DROP IN BOTH EYES THREE TIMES DAILY      prednisoLONE acetate (PRED FORTE) 1 % ophthalmic suspension INSTILL 1 DROP IN BOTH EYES TWICE DAILY      ASPERCREME LIDOCAINE 4 % CREA       Artificial Tear Solution (SOOTHE XP XTRA PROTECTION) SOLN       Multiple Vitamin (MULTIVITAMIN ADULT PO) Take by mouth       Notation: Above medications are not currently reconciled at time of signing this H&P note, to be reconciled in pre-op per RN.     Review of Systems   Constitutional:  Negative for chills and fever.   HENT:  Negative for dental problem, hearing loss and sore throat.    Eyes:  Negative for visual disturbance.   Respiratory:  Negative for cough and shortness of breath.    Cardiovascular:  Negative for chest pain and palpitations.   Gastrointestinal:         See HPI   Genitourinary:  Negative for dysuria and hematuria.   Musculoskeletal:  Positive for back pain (chronic). Negative for neck pain.   Skin:  Negative for rash and wound.   Neurological:  Positive for dizziness (with recent ear infection, has since resolved). Negative for speech difficulty.         GENERAL PHYSICAL EXAM     Vitals: Review vitals per RN flowsheet.                              Physical Exam  Constitutional:       General: She is not in acute distress.     Appearance: She is well-developed. She is not ill-appearing.   HENT:      Head: Normocephalic and atraumatic.      Nose: Nose normal.      Mouth/Throat:      Mouth: Mucous membranes are moist.      Pharynx: Oropharynx is clear. No oropharyngeal exudate or

## 2024-11-27 NOTE — OP NOTE
Esophagogastroduodenoscopy    DATE OF PROCEDURE: 11/27/2024     SURGEON: Jorge Luis Wallace MD  Facility: [facility]  ASSISTANT: None  Anesthesia: Monitored anesthesia care  PREOPERATIVE DIAGNOSIS: History of severe esophagitis, GERD    Diagnosis:    POSTOPERATIVE DIAGNOSIS: As described below (see findings and impression)    OPERATION: Upper GI endoscopy with Biopsy    ANESTHESIA: Monitored Anesthesia Care (MAC)     ESTIMATED BLOOD LOSS: Less than 50 ml    COMPLICATIONS: None.     SPECIMENS:  Was Obtained:     ID Type Source Tests Collected by Time Destination   1 :  Tissue Duodenum  Jorge Luis Wallace MD 11/27/2024 0922    2 :  Tissue Stomach  Jorge Luis Wallace MD 11/27/2024 0922    A : ESOPHAGUS BIOPSY Tissue Esophagus SURGICAL PATHOLOGY Jorge Luis Wallace MD 11/27/2024 0922         HISTORY: The patient is a 44 y.o. year old female with history of above preop diagnosis.  I recommended esophagogastroduodenoscopy with possible biopsy and I explained the risk, benefits, expected outcome, and alternatives to the procedure.  Risks included but are not limited to bleeding, infection, respiratory distress, hypotension, and perforation of the esophagus, stomach, or duodenum.  Patient understands and is in agreement.      PROCEDURE: The patient was given IV Monitored Anesthesia Care (MAC) and vitals monitoring per anesthesia department.  The patient was placed in the left lateral decubitus position. The patient was monitored continuously with ECG tracing, pulse oximetry, blood pressure monitoring, and direct observation. The gastroscope was inserted into the mouth and advanced under direct vision to second portion of the duodenum.  A careful inspection was made as the gastroscope was withdrawn, including a retroflexed view of the proximal stomach; findings and interventions are described below. Appropriate photodocumentation was obtained. Post sedation patient was stable with stable vital signs and stable O2

## 2024-11-27 NOTE — OP NOTE
COLONOSCOPY    DATE OF PROCEDURE: 11/27/2024    SURGEON: Jorge Luis Wallace MD  Facility : [facility]  ASSISTANT: None  Anesthesia: Monitored anesthesia care  PREOPERATIVE DIAGNOSIS: Change in bowel habits, chronic diarrhea, family history of colorectal cancer in sister in her 40s    POSTOPERATIVE DIAGNOSIS: as described below    OPERATION: Total colonoscopy with biopsy    ANESTHESIA: Monitored Anesthesia Care (MAC)    ESTIMATED BLOOD LOSS: less than 50 cc    COMPLICATIONS: None.     SPECIMENS:  Was Obtained:     ID Type Source Tests Collected by Time Destination   A : ESOPHAGUS BIOPSY Tissue Esophagus SURGICAL PATHOLOGY Jorge Luis Wallace MD 11/27/2024 0922    B : RANDOM COLON BIOPSIES Tissue Colon SURGICAL PATHOLOGY Jorge Luis Wallace MD 11/27/2024 0940         HISTORY: The patient is a 44 y.o. year old female with history of above preop diagnosis.  I recommended colonoscopy with possible biopsy or polypectomy and I explained the risk, benefits, expected outcome, and alternatives to the procedure.  Risks included but are not limited to medication allergy, medication reaction, cardiovascular and respiratory problems, bleeding, perforation, infection, and/or missed diagnosis.  The patient understands and is in agreement.        PROCEDURE: Following arrival in the endoscopy room, the patient was placed in the left lateral decubitus position and final time-out accomplished in the presence of the nursing staff. Baseline vital signs were obtained and reviewed. The patient was given IV Monitored Anesthesia Care (MAC) and vitals monitoring per anesthesia department.     Digital rectal exam- normal    The colonoscope was inserted per rectum and advanced under direct vision to the cecum without difficulty.  Photodocumentation of the maximal extent reached (cecum, appendiceal orifice, ileocecal valve, and terminal ileum if indicated) and other findings was obtained.     Post sedation note :The patient's SPO2 remained

## 2024-11-27 NOTE — ANESTHESIA POSTPROCEDURE EVALUATION
Department of Anesthesiology  Postprocedure Note    Patient: Flower Daniel  MRN: [id number]  YOB: 1980  Date of evaluation: 11/27/2024    Procedure Summary       Date: 11/27/24 Room / Location: [place] [address] / [facility]    Anesthesia Start: 0915 Anesthesia Stop: 1000    Procedures:       ESOPHAGOGASTRODUODENOSCOPY BIOPSY (Esophagus)      COLONOSCOPY POLYPECTOMY SNARE/BIOPSY Diagnosis:       Altered bowel habits      Dysphagia, unspecified type      Gastroesophageal reflux disease with esophagitis, unspecified whether hemorrhage      (Altered bowel habits [R19.4])      (Dysphagia, unspecified type [R13.10])      (Gastroesophageal reflux disease with esophagitis, unspecified whether hemorrhage [K21.00])    Surgeons: Jorge Luis Wallace MD Responsible Provider: Praveen Conway MD    Anesthesia Type: general ASA Status: 2            Anesthesia Type: No value filed.    Tapan Phase I:      Tapan Phase II: Tapan Score: 10    Anesthesia Post Evaluation    Comments: POST- ANESTHESIA EVALUATION       Pt Name: Flower Daniel  MRN: [id number]  YOB: 1980  Date of evaluation: 11/27/2024  Time:  1:31 PM      BP 97/65   Pulse 62   Temp 97.6 °F (36.4 °C)   Resp 16   Ht 1.6 m (5' 3\")   Wt 84.8 kg (187 lb)   SpO2 99%   BMI 33.13 kg/m²      Consciousness Level  Awake  Cardiopulmonary Status  Stable  Pain Adequately Treated YES  Nausea / Vomiting  NO  Adequate Hydration  YES  Anesthesia Related Complications NONE      Electronically signed by Praveen Conway MD on 11/27/2024 at 1:31 PM           No notable events documented.

## 2024-11-27 NOTE — DISCHARGE INSTRUCTIONS
moderation   Fats and oils do not provide fiber   Snacks, Sweets, and Condiments   Fruit Nuts Popcorn, whole-wheat pretzels, or trail mix made with dried fruits, nuts, and seeds Cakes, breads, and cookies made with oatmeal or whole-wheat flour   Most snack foods do not provide much fiber. Choose snacks with at least 2 grams of fiber per serving.     Last Reviewed: March 2011 Jolene Mendoza MS, MPH, RD   Updated: 3/29/2011     EGD DISCHARGE INSTRUCTIONS    Activity:  Rest today. No driving, operating machinery, or making any important decisions today. May resume normal activity tomorrow.    Diet:  Following EGD eat slowly, chew food well, cut food into small pieces-Avoid greasy, spicy, \"crunchy\" foods X 48 hours.     Call your Doctor if you have any of the following:  -Passing blood rectally or vomiting blood (it may be red or black).  -Persistent nausea/vomiting  -Severe abdominal or chest pain not relieved with passing gas  -Fever of 100 degrees or more  -Redness or swelling at the IV site  -Severe sore throat or neck pain         Hemorrhoids: Care Instructions  Overview     Hemorrhoids are swollen veins that develop in the anal canal. Bleeding during bowel movements, itching, and rectal pain are the most common symptoms. Hemorrhoids can be uncomfortable at times, but rarely are they a serious problem.  Most of the time, you can treat them with simple changes to your diet and bowel habits. These changes include eating more fiber and not straining to pass stools. Most hemorrhoids don't need surgery or other treatment unless they are very large and painful or bleed a lot.  Follow-up care is a key part of your treatment and safety. Be sure to make and go to all appointments, and call your doctor if you are having problems. It's also a good idea to know your test results and keep a list of the medicines you take.  How can you care for yourself at home?  Sit in a few inches of warm water (sitz bath) 3 times a day and

## 2024-11-27 NOTE — ANESTHESIA PRE PROCEDURE
Department of Anesthesiology  Preprocedure Note       Name:  Flower Daniel   Age:  44 y.o.  :  1980                                          MRN:  [id number]         Date:  2024      Surgeon: Surgeon(s):  Jorge Luis Wallace MD    Procedure: Procedure(s):  ESOPHAGOGASTRODUODENOSCOPY BIOPSY  COLONOSCOPY DIAGNOSTIC    Medications prior to admission:   Prior to Admission medications    Medication Sig Start Date End Date Taking? Authorizing Provider   polyethylene glycol (GLYCOLAX) 17 GM/SCOOP powder Please follow instructions as given to you by your provider 24   Jorge Luis Wallace MD   bisacodyl 5 MG EC tablet Please follow instructions as given to you by your provider 24   Jorge Luis Wallace MD   sodium-potassium-mag sulfate (SUPREP BOWEL PREP KIT) 17.5-3.13-1.6 GM/177ML SOLN solution Please follow instructions as given to you by your provider 24   Jorge Luis Wallace MD   amoxicillin (AMOXIL) 400 MG/5ML suspension Take 10 mLs by mouth 2 times daily for 10 days 24  Jennie Ha APRN - CNP   meclizine (ANTIVERT) 25 MG tablet Take 1 tablet by mouth 3 times daily as needed for Dizziness or Nausea 24  Amy Olea APRN - CNP   fluconazole (DIFLUCAN) 150 MG tablet Take 1 tablet as a single dose. Repeat in 72 hours if needed. 24   Amy Olea APRN - CNP   vitamin D (ERGOCALCIFEROL) 1.25 MG (26395 UT) CAPS capsule Take 1 capsule by mouth once a week for 8 doses 24  Joi Tiwari APRN - CNP   azelastine (OPTIVAR) 0.05 % ophthalmic solution  24   Feliz Hernandez MD   diphenhydrAMINE (BENADRYL) 50 MG capsule TAKE 1 CAPSULE BY MOUTH 1 HOUR BEFORE CONTRAST IS GIVEN 24   Feliz Hernandez MD   psyllium (KONSYL) 28.3 % POWD powder Take 3.4 g by mouth daily 24   Jorge Luis Wallace MD   Cholecalciferol 50 MCG (2000 UT) TABS Take 1 tablet by mouth daily 24   Monika Banegas MD   cetirizine (ZYRTEC) 10 MG tablet Take 1

## 2024-12-02 LAB
CYTOLOGY REPORT: NORMAL
SURGICAL PATHOLOGY REPORT: NORMAL

## 2024-12-05 DIAGNOSIS — K20.90 ESOPHAGITIS: ICD-10-CM

## 2024-12-05 DIAGNOSIS — R19.8 CHANGE IN BOWEL FUNCTION: ICD-10-CM

## 2024-12-09 LAB — SURGICAL PATHOLOGY REPORT: NORMAL

## 2025-01-09 ENCOUNTER — TELEPHONE (OUTPATIENT)
Dept: OBGYN CLINIC | Age: 45
End: 2025-01-09

## 2025-01-22 RX ORDER — OMEPRAZOLE 40 MG/1
40 CAPSULE, DELAYED RELEASE ORAL
Qty: 60 CAPSULE | Refills: 1 | Status: SHIPPED | OUTPATIENT
Start: 2025-01-22

## 2025-02-09 NOTE — PROGRESS NOTES
Reason for Referral:     No referring provider defined for this encounter.    Chief Complaint   Patient presents with    Follow-up     F/u egd     Gastroesophageal Reflux       1. Esophagitis    2. Change in bowel habit    3. History of sleeve gastrectomy        HISTORY OF PRESENT ILLNESS: Ms.Latoya MARIAELENA Daniel is a 44 y.o. female with a past history remarkable for  intermittent dyspepsia symptoms, non-specific abdominal pain, and intermittent diarrhea, last seen Dr Thompson on 4/26/2019 referred as follow-up postprocedure.    2/10/25: Patient reports only partial help in her GERD after omeprazole.  Due to persistence despite PPI BID will start rabeprazole and gaviscon with consideration of rygb conversion if above do not work.  She takes Metamucil and MiraLAX for constipation and she is happy with them.  Recommended good constipation control to avoid worsening of reflux     9/20/24: colono was not done as she was throwing up the prep however EGD showed  LA C esophagitis. Pantoprazole 40 BID was started and she feels significantly better on this medication.  She continues to have intermittent constipation and diarrhea.  She has narrow stool.  She has not tried fiber medications in the past.  She is worried as her sister had colon cancer in her 49.  She denies any blood in stool.       7/29/24: Patient is a case of rheumatoid arthritis on Biologics.  Previously she used to be on steroids and she saw Dr. Ken with diarrhea and lower abdominal pain.  He was thinking about partially treated IBD and recommended EGD and colonoscopy however it was not done.  Patient currently is off of steroids and she rarely has any diarrhea or abdominal pain.  Her main problem is globus sensation.  She does not have any dysphagia, odynophagia.  She was put on omeprazole 20 mg daily trial according to patient it was not effective.  She otherwise denies constipation or blood in stool.     Family medical history Sister colon cancer age of 49

## 2025-02-10 ENCOUNTER — OFFICE VISIT (OUTPATIENT)
Dept: GASTROENTEROLOGY | Age: 45
End: 2025-02-10
Payer: MEDICAID

## 2025-02-10 VITALS
HEIGHT: 63 IN | RESPIRATION RATE: 18 BRPM | BODY MASS INDEX: 31.71 KG/M2 | HEART RATE: 89 BPM | OXYGEN SATURATION: 99 % | SYSTOLIC BLOOD PRESSURE: 111 MMHG | WEIGHT: 179 LBS | DIASTOLIC BLOOD PRESSURE: 66 MMHG

## 2025-02-10 DIAGNOSIS — K20.90 ESOPHAGITIS: Primary | ICD-10-CM

## 2025-02-10 DIAGNOSIS — Z90.3 HISTORY OF SLEEVE GASTRECTOMY: ICD-10-CM

## 2025-02-10 DIAGNOSIS — R19.4 CHANGE IN BOWEL HABIT: ICD-10-CM

## 2025-02-10 PROCEDURE — 99214 OFFICE O/P EST MOD 30 MIN: CPT | Performed by: STUDENT IN AN ORGANIZED HEALTH CARE EDUCATION/TRAINING PROGRAM

## 2025-02-10 PROCEDURE — G8427 DOCREV CUR MEDS BY ELIG CLIN: HCPCS | Performed by: STUDENT IN AN ORGANIZED HEALTH CARE EDUCATION/TRAINING PROGRAM

## 2025-02-10 PROCEDURE — G8417 CALC BMI ABV UP PARAM F/U: HCPCS | Performed by: STUDENT IN AN ORGANIZED HEALTH CARE EDUCATION/TRAINING PROGRAM

## 2025-02-10 PROCEDURE — 1036F TOBACCO NON-USER: CPT | Performed by: STUDENT IN AN ORGANIZED HEALTH CARE EDUCATION/TRAINING PROGRAM

## 2025-02-10 RX ORDER — RABEPRAZOLE SODIUM 20 MG/1
20 TABLET, DELAYED RELEASE ORAL DAILY
Qty: 30 TABLET | Refills: 3 | Status: SHIPPED | OUTPATIENT
Start: 2025-02-10

## 2025-02-10 RX ORDER — CALCIUM CARBONATE 500 MG/1
1 TABLET, CHEWABLE ORAL 2 TIMES DAILY
Qty: 60 TABLET | Refills: 0 | Status: SHIPPED | OUTPATIENT
Start: 2025-02-10 | End: 2025-03-12

## 2025-02-12 ENCOUNTER — HOSPITAL ENCOUNTER (OUTPATIENT)
Age: 45
Setting detail: SPECIMEN
Discharge: HOME | End: 2025-02-12

## 2025-02-12 ENCOUNTER — OFFICE VISIT (OUTPATIENT)
Dept: OBGYN CLINIC | Age: 45
End: 2025-02-12
Payer: MEDICAID

## 2025-02-12 VITALS
WEIGHT: 178.6 LBS | DIASTOLIC BLOOD PRESSURE: 66 MMHG | BODY MASS INDEX: 31.64 KG/M2 | HEIGHT: 63 IN | SYSTOLIC BLOOD PRESSURE: 112 MMHG

## 2025-02-12 DIAGNOSIS — N89.8 VAGINAL ITCHING: ICD-10-CM

## 2025-02-12 DIAGNOSIS — Z30.433 ENCOUNTER FOR IUD REMOVAL AND REINSERTION: Primary | ICD-10-CM

## 2025-02-12 DIAGNOSIS — N89.8 VAGINAL DISCHARGE: ICD-10-CM

## 2025-02-12 DIAGNOSIS — N94.6 DYSMENORRHEA: ICD-10-CM

## 2025-02-12 PROCEDURE — 58300 INSERT INTRAUTERINE DEVICE: CPT | Performed by: NURSE PRACTITIONER

## 2025-02-12 PROCEDURE — 58301 REMOVE INTRAUTERINE DEVICE: CPT | Performed by: NURSE PRACTITIONER

## 2025-02-12 RX ORDER — FLUCONAZOLE 150 MG/1
150 TABLET ORAL ONCE
Qty: 1 TABLET | Refills: 0 | Status: SHIPPED | OUTPATIENT
Start: 2025-02-12 | End: 2025-02-12

## 2025-02-12 SDOH — ECONOMIC STABILITY: FOOD INSECURITY: WITHIN THE PAST 12 MONTHS, THE FOOD YOU BOUGHT JUST DIDN'T LAST AND YOU DIDN'T HAVE MONEY TO GET MORE.: PATIENT DECLINED

## 2025-02-12 SDOH — ECONOMIC STABILITY: FOOD INSECURITY: WITHIN THE PAST 12 MONTHS, YOU WORRIED THAT YOUR FOOD WOULD RUN OUT BEFORE YOU GOT MONEY TO BUY MORE.: PATIENT DECLINED

## 2025-02-12 ASSESSMENT — PATIENT HEALTH QUESTIONNAIRE - PHQ9: DEPRESSION UNABLE TO ASSESS: PT REFUSES

## 2025-02-12 NOTE — PROGRESS NOTES
Chaperone for Intimate Exam  Chaperone was offered and accepted as part of the rooming process.  Chaperone: yes  Examination chaperoned by Radha Herrera MA.

## 2025-02-12 NOTE — PROGRESS NOTES
SUBJECTIVE:    CC:    Chief Complaint   Patient presents with    Procedure     IUD removal and placement       Flower presents today for removal and reinsertion of Mirena IUD for her complaint of dysmenorrhea.  Muna understands the risks and benefits of the intrauterine device removal and insertion and desires to proceed.    OBJECTIVE:    /66 (Site: Left Upper Arm, Position: Sitting, Cuff Size: Medium Adult)   Ht 1.6 m (5' 3\")   Wt 81 kg (178 lb 9.6 oz)   LMP 01/15/2025   BMI 31.64 kg/m²      pregnancy test: Mirena has not  last one placed May 2020  Gyn history: Cultures: vag dna, declined GC  Medical history: No known contraindication to progestin use.  She is not a smoker. The patient denies any family member or herself as having a blood clot in their leg, lung, brain or that any member of her family has had a sudden cardiac death under the age of 41 yo.      Procedure:    The patient was positioned comfortably on the exam table. A sterile speculum was placed into the vagina and the cervix was identified.  Mirena IUD was removed without difficulty with aid of ring forcep.   Cervix was stabilized with an allis clamp.  It was cleansed with betadine and the uterine sound was then gently passed into the endometrial cavity measuring 8 cm.  The Mirena  introducer passed through to fundus without difficulty and Mirena  deployed per  specifications. The patient tolerated the procedure well. Post procedure restrictions were reviewed and given to the patient. .  All counts and instruments were correct at the end of the procedure.     The patient tolerated the procedure without difficulty. She was instructed nothing in vagina for 72 hours and extra form contraception for 30 days. She is to notify the office or go to the nearest Emergency Department if she experiences Abdominal Pain, Temperatures more than 101 F, Odiferous Vaginal Discharge, Dizziness or Shortness of

## 2025-02-13 DIAGNOSIS — Z30.433 ENCOUNTER FOR IUD REMOVAL AND REINSERTION: ICD-10-CM

## 2025-02-13 DIAGNOSIS — N89.8 VAGINAL DISCHARGE: ICD-10-CM

## 2025-02-13 DIAGNOSIS — N89.8 VAGINAL ITCHING: ICD-10-CM

## 2025-02-13 DIAGNOSIS — N94.6 DYSMENORRHEA: ICD-10-CM

## 2025-02-14 LAB
CANDIDA SPECIES: POSITIVE
GARDNERELLA VAGINALIS: POSITIVE
SOURCE: ABNORMAL
TRICHOMONAS: NEGATIVE

## 2025-02-14 RX ORDER — METRONIDAZOLE 500 MG/1
500 TABLET ORAL 2 TIMES DAILY
Qty: 14 TABLET | Refills: 0 | Status: SHIPPED | OUTPATIENT
Start: 2025-02-14 | End: 2025-02-21

## 2025-02-14 RX ORDER — FLUCONAZOLE 150 MG/1
TABLET ORAL
Qty: 1 TABLET | Refills: 0 | Status: SHIPPED | OUTPATIENT
Start: 2025-02-14

## 2025-04-29 ENCOUNTER — OFFICE VISIT (OUTPATIENT)
Dept: OBGYN CLINIC | Age: 45
End: 2025-04-29
Payer: MEDICAID

## 2025-04-29 VITALS
SYSTOLIC BLOOD PRESSURE: 106 MMHG | BODY MASS INDEX: 31.82 KG/M2 | DIASTOLIC BLOOD PRESSURE: 74 MMHG | WEIGHT: 179.6 LBS | HEIGHT: 63 IN

## 2025-04-29 DIAGNOSIS — N94.6 DYSMENORRHEA: Primary | ICD-10-CM

## 2025-04-29 DIAGNOSIS — Z30.431 IUD CHECK UP: ICD-10-CM

## 2025-04-29 PROCEDURE — 1036F TOBACCO NON-USER: CPT | Performed by: NURSE PRACTITIONER

## 2025-04-29 PROCEDURE — G8427 DOCREV CUR MEDS BY ELIG CLIN: HCPCS | Performed by: NURSE PRACTITIONER

## 2025-04-29 PROCEDURE — 99213 OFFICE O/P EST LOW 20 MIN: CPT | Performed by: NURSE PRACTITIONER

## 2025-04-29 PROCEDURE — G8417 CALC BMI ABV UP PARAM F/U: HCPCS | Performed by: NURSE PRACTITIONER

## 2025-04-29 ASSESSMENT — PATIENT HEALTH QUESTIONNAIRE - PHQ9: DEPRESSION UNABLE TO ASSESS: PT REFUSES

## 2025-04-29 ASSESSMENT — ENCOUNTER SYMPTOMS
WHEEZING: 0
NAUSEA: 0
VOMITING: 0
SHORTNESS OF BREATH: 0
COLOR CHANGE: 0

## 2025-04-29 NOTE — PROGRESS NOTES
[facility] [address]  [address]  [facility] [address]  Dept: [phone]  Dept Fax: [phone]    Flower Daniel is a 44 y.o. female who presents today for her medical conditions/complaintsas noted below.  Flower Daniel is c/o of Follow-up (IUD check)        HPI:     HPI  Pt is here for IUD checkup.  She had history of dysmenorrhea and had IUD removed and replaced on 2/12/2025.  This is her 3rd or 4th IUD she will use.  She states she just recently started to have 4 days of bleeding.  She states it has been light denies any significant cramping or pelvic pain.  She denies dyspareunia or postcoital bleeding.  Denies abnormal vaginal discharge odor or itching.  She states she did have bleeding with her last Mirena IUD.       Past Medical History:   Diagnosis Date    Abnormal 1st trimester screen (Tri 21 1:117)--NIPT nml  11/30/2019    ADD (attention deficit disorder) without hyperactivity     Antiphospholipid syndrome     Celestone 1/9 & 1/10 01/09/2020    Chronic back pain     COVID 09/15/2021    BODY ACHE, COUGH, FEVER, CHEST PAIN, N&V X 3 WEEKS.  STILL HAS COUGH.    Dehydration 12/28/2021    Echogenic focus of heart of fetus affecting antepartum care of mother 11/30/2019    Elev early 1hr, nml 3hr  11/30/2019    1 elevated value, [facility] recommends repeat 3hr GTT in 2 weeks    Elevated blood-pressure reading without diagnosis of hypertension 11/30/2019    Elevated umbilical artery dopplers  01/10/2020    Eosinopenia     Fetal ascites     Fetal heart rate decelerations affecting management of mother     GERD (gastroesophageal reflux disease)     Headache     migraines    Iron deficiency     follows with Dr. Sandra (hem/onc)    IUD (intrauterine device) in place 05/07/2020    Adina LEONG    Lumbar radiculopathy 01/09/2019    Lumbar spondylosis 01/09/2019    Multigravida of advanced maternal age in third trimester     Muscle pain

## 2025-08-04 DIAGNOSIS — E55.9 VITAMIN D DEFICIENCY: ICD-10-CM

## 2025-08-04 RX ORDER — ERGOCALCIFEROL 1.25 MG/1
50000 CAPSULE, LIQUID FILLED ORAL WEEKLY
Qty: 4 CAPSULE | Refills: 2 | Status: SHIPPED | OUTPATIENT
Start: 2025-08-04

## 2025-08-13 ENCOUNTER — E-VISIT (OUTPATIENT)
Dept: OBGYN CLINIC | Age: 45
End: 2025-08-13

## 2025-08-13 DIAGNOSIS — N89.8 VAGINAL ITCHING: Primary | ICD-10-CM

## 2025-08-13 RX ORDER — FLUCONAZOLE 150 MG/1
TABLET ORAL
Qty: 2 TABLET | Refills: 0 | Status: SHIPPED | OUTPATIENT
Start: 2025-08-13

## 2025-08-13 RX ORDER — METRONIDAZOLE 7.5 MG/G
1 GEL VAGINAL DAILY
Qty: 70 G | Refills: 0 | Status: SHIPPED | OUTPATIENT
Start: 2025-08-13 | End: 2025-08-20

## 2025-08-28 ENCOUNTER — HOSPITAL ENCOUNTER (OUTPATIENT)
Age: 45
Setting detail: SPECIMEN
Discharge: HOME | End: 2025-08-28

## 2025-08-28 ENCOUNTER — OFFICE VISIT (OUTPATIENT)
Dept: FAMILY MEDICINE CLINIC | Age: 45
End: 2025-08-28
Payer: MEDICAID

## 2025-08-28 VITALS
DIASTOLIC BLOOD PRESSURE: 54 MMHG | WEIGHT: 179.4 LBS | BODY MASS INDEX: 31.79 KG/M2 | HEART RATE: 80 BPM | SYSTOLIC BLOOD PRESSURE: 88 MMHG | OXYGEN SATURATION: 99 % | HEIGHT: 63 IN

## 2025-08-28 DIAGNOSIS — I95.1 AUTONOMIC ORTHOSTATIC HYPOTENSION: ICD-10-CM

## 2025-08-28 DIAGNOSIS — E55.9 VITAMIN D DEFICIENCY: ICD-10-CM

## 2025-08-28 DIAGNOSIS — R73.03 PREDIABETES: ICD-10-CM

## 2025-08-28 DIAGNOSIS — F41.1 GENERALIZED ANXIETY DISORDER: ICD-10-CM

## 2025-08-28 DIAGNOSIS — R22.1 LUMP IN NECK: Primary | ICD-10-CM

## 2025-08-28 LAB
25(OH)D3 SERPL-MCNC: 31.8 NG/ML (ref 30–100)
ALBUMIN SERPL-MCNC: 3.9 G/DL (ref 3.5–5.2)
ALBUMIN/GLOB SERPL: 1.2 {RATIO} (ref 1–2.5)
ALP SERPL-CCNC: 102 U/L (ref 35–104)
ALT SERPL-CCNC: 17 U/L (ref 10–35)
ANION GAP SERPL CALCULATED.3IONS-SCNC: 10 MMOL/L (ref 9–16)
AST SERPL-CCNC: 23 U/L (ref 10–35)
BASOPHILS # BLD: <0.03 K/UL (ref 0–0.2)
BASOPHILS NFR BLD: 0 % (ref 0–2)
BILIRUB SERPL-MCNC: 0.4 MG/DL (ref 0–1.2)
BUN SERPL-MCNC: 11 MG/DL (ref 6–20)
CALCIUM SERPL-MCNC: 9.2 MG/DL (ref 8.6–10.4)
CHLORIDE SERPL-SCNC: 106 MMOL/L (ref 98–107)
CHOLEST SERPL-MCNC: 135 MG/DL (ref 0–199)
CHOLESTEROL/HDL RATIO: 4.2
CO2 SERPL-SCNC: 24 MMOL/L (ref 20–31)
CREAT SERPL-MCNC: 0.6 MG/DL (ref 0.6–0.9)
EOSINOPHIL # BLD: 0.48 K/UL (ref 0–0.44)
EOSINOPHILS RELATIVE PERCENT: 7 % (ref 1–4)
ERYTHROCYTE [DISTWIDTH] IN BLOOD BY AUTOMATED COUNT: 13.2 % (ref 11.8–14.4)
EST. AVERAGE GLUCOSE BLD GHB EST-MCNC: 100 MG/DL
GFR, ESTIMATED: >90 ML/MIN/1.73M2
GLUCOSE SERPL-MCNC: 80 MG/DL (ref 74–99)
HBA1C MFR BLD: 5.1 % (ref 4–6)
HCT VFR BLD AUTO: 39.1 % (ref 36.3–47.1)
HDLC SERPL-MCNC: 32 MG/DL
HGB BLD-MCNC: 12.6 G/DL (ref 11.9–15.1)
IMM GRANULOCYTES # BLD AUTO: 0.06 K/UL (ref 0–0.3)
IMM GRANULOCYTES NFR BLD: 1 %
INSULIN COMMENT: NORMAL
INSULIN REFERENCE RANGE:: NORMAL
INSULIN: 6.3 MU/L
LDLC SERPL CALC-MCNC: 89 MG/DL (ref 0–100)
LYMPHOCYTES NFR BLD: 1.41 K/UL (ref 1.1–3.7)
LYMPHOCYTES RELATIVE PERCENT: 22 % (ref 24–43)
MCH RBC QN AUTO: 28.2 PG (ref 25.2–33.5)
MCHC RBC AUTO-ENTMCNC: 32.2 G/DL (ref 28.4–34.8)
MCV RBC AUTO: 87.5 FL (ref 82.6–102.9)
MONOCYTES NFR BLD: 0.24 K/UL (ref 0.1–1.2)
MONOCYTES NFR BLD: 4 % (ref 3–12)
NEUTROPHILS NFR BLD: 66 % (ref 36–65)
NEUTS SEG NFR BLD: 4.33 K/UL (ref 1.5–8.1)
NRBC BLD-RTO: 0 PER 100 WBC
PLATELET # BLD AUTO: 289 K/UL (ref 138–453)
PMV BLD AUTO: 10.2 FL (ref 8.1–13.5)
POTASSIUM SERPL-SCNC: 4 MMOL/L (ref 3.7–5.3)
PROT SERPL-MCNC: 7.1 G/DL (ref 6.6–8.7)
RBC # BLD AUTO: 4.47 M/UL (ref 3.95–5.11)
SODIUM SERPL-SCNC: 140 MMOL/L (ref 136–145)
TRIGL SERPL-MCNC: 72 MG/DL
TSH SERPL DL<=0.05 MIU/L-ACNC: 0.8 UIU/ML (ref 0.27–4.2)
VLDLC SERPL CALC-MCNC: 14 MG/DL (ref 1–30)
WBC OTHER # BLD: 6.5 K/UL (ref 3.5–11.3)

## 2025-08-28 PROCEDURE — 1036F TOBACCO NON-USER: CPT | Performed by: STUDENT IN AN ORGANIZED HEALTH CARE EDUCATION/TRAINING PROGRAM

## 2025-08-28 PROCEDURE — 99214 OFFICE O/P EST MOD 30 MIN: CPT | Performed by: STUDENT IN AN ORGANIZED HEALTH CARE EDUCATION/TRAINING PROGRAM

## 2025-08-28 PROCEDURE — G8417 CALC BMI ABV UP PARAM F/U: HCPCS | Performed by: STUDENT IN AN ORGANIZED HEALTH CARE EDUCATION/TRAINING PROGRAM

## 2025-08-28 PROCEDURE — G8427 DOCREV CUR MEDS BY ELIG CLIN: HCPCS | Performed by: STUDENT IN AN ORGANIZED HEALTH CARE EDUCATION/TRAINING PROGRAM

## 2025-08-28 ASSESSMENT — ENCOUNTER SYMPTOMS
SHORTNESS OF BREATH: 0
ABDOMINAL PAIN: 0
EYE DISCHARGE: 0
SORE THROAT: 0
DIARRHEA: 0
VOMITING: 0
CONSTIPATION: 0
NAUSEA: 0
WHEEZING: 0
COUGH: 0
CHEST TIGHTNESS: 0

## 2025-08-28 ASSESSMENT — PATIENT HEALTH QUESTIONNAIRE - PHQ9
1. LITTLE INTEREST OR PLEASURE IN DOING THINGS: NOT AT ALL
SUM OF ALL RESPONSES TO PHQ QUESTIONS 1-9: 0
2. FEELING DOWN, DEPRESSED OR HOPELESS: NOT AT ALL
SUM OF ALL RESPONSES TO PHQ QUESTIONS 1-9: 0

## 2025-09-03 ENCOUNTER — HOSPITAL ENCOUNTER (OUTPATIENT)
Dept: ULTRASOUND IMAGING | Age: 45
Discharge: HOME | End: 2025-09-05
Payer: MEDICAID

## 2025-09-03 DIAGNOSIS — R22.1 LUMP IN NECK: ICD-10-CM

## 2025-09-03 PROCEDURE — 76536 US EXAM OF HEAD AND NECK: CPT

## (undated) DEVICE — ADHESIVE SKIN CLOSURE TOP 36 CC HI VISC DERMBND MINI

## (undated) DEVICE — SUTURE VCRL SZ 2-0 L36IN ABSRB VLT L36MM CT-1 1/2 CIR J345H

## (undated) DEVICE — SOLUTION IRRIG 1000ML STRL H2O USP PLAS POUR BTL

## (undated) DEVICE — BINDER ABD 2XL W9IN CIRC 62 73IN 3 PNL E HK AND LOOP CLSR W

## (undated) DEVICE — BLADELESS OBTURATOR: Brand: WECK VISTA

## (undated) DEVICE — FORCEPS BX L240CM WRK CHN 2.8MM STD CAP W/ NDL MIC MESH

## (undated) DEVICE — GAUZE,SPONGE,FLUFF,6"X6.75",STRL,5/TRAY: Brand: MEDLINE

## (undated) DEVICE — DRAPE,T,LAPARO,TRANS,STERILE: Brand: MEDLINE

## (undated) DEVICE — Z DUP USE 2522782 SOLUTION IRRIG 1000ML STRL H2O PLAS CONTAINER UROMATIC

## (undated) DEVICE — GOWN,SURGICAL,AURORA,SLEEVE: Brand: MEDLINE

## (undated) DEVICE — CANNULA IV 18GA L15IN BLNT FILL LUERLOCK HUB MJCT

## (undated) DEVICE — SUTURE VCRL + SZ 0 L27IN ABSRB VLT L26MM UR-6 5/8 CIR VCP603H

## (undated) DEVICE — TUBING, SUCTION, 3/16" X 10', STRAIGHT: Brand: MEDLINE

## (undated) DEVICE — SUTURE PROL SZ 4-0 L18IN NONABSORBABLE BLU L19MM FS-2 3/8 8683G

## (undated) DEVICE — SOLUTION IRRIG 1000ML 0.9% SOD CHL USP POUR PLAS BTL

## (undated) DEVICE — GLOVE SURG SZ 65 THK91MIL LTX FREE SYN POLYISOPRENE

## (undated) DEVICE — ELECTRODE PT RET AD L9FT HI MOIST COND ADH HYDRGEL CORDED

## (undated) DEVICE — SEALER LAP SM L18.8CM OPN JAW HAND/FOOT SWCH FORCETRIAD

## (undated) DEVICE — SUTURE MCRYL + SZ 4-0 L18IN ABSRB UD L19MM PS-2 3/8 CIR MCP496G

## (undated) DEVICE — SUTURE SZ 0 27IN 5/8 CIR UR-6  TAPER PT VIOLET ABSRB VICRYL J603H

## (undated) DEVICE — SUTURE VICRYL + SZ 3-0 L27IN ABSRB UD L26MM SH 1/2 CIR VCP416H

## (undated) DEVICE — BANDAGE COMPR W4INXL5YD WHT BGE POLY COT M E WRP WV HK AND

## (undated) DEVICE — GLOVE ORANGE PI 8   MSG9080

## (undated) DEVICE — GLOVE SURG SZ 6 THK91MIL LTX FREE SYN POLYISOPRENE ANTI

## (undated) DEVICE — DRESSING PETRO W3XL3IN OIL EMUL N ADH GZ KNIT IMPREG CELOS

## (undated) DEVICE — REDUCER: Brand: ENDOWRIST

## (undated) DEVICE — Device

## (undated) DEVICE — FORCEPS BX L240CM JAW DIA2.8MM L CAP W/ NDL MIC MESH TOOTH

## (undated) DEVICE — DEFENDO AIR WATER SUCTION AND BIOPSY VALVE KIT FOR  OLYMPUS: Brand: DEFENDO AIR/WATER/SUCTION AND BIOPSY VALVE

## (undated) DEVICE — ENDO KIT W/SYRINGE: Brand: MEDLINE INDUSTRIES, INC.

## (undated) DEVICE — ZIMMER® STERILE DISPOSABLE TOURNIQUET CUFF WITH PLC, DUAL PORT, SINGLE BLADDER, 18 IN. (46 CM)

## (undated) DEVICE — Device: Brand: DEFENDO VALVE AND CONNECTOR KIT

## (undated) DEVICE — TROCAR: Brand: KII FIOS FIRST ENTRY

## (undated) DEVICE — SUTURE VIC + SH-13-0 27IN  VCP311H

## (undated) DEVICE — PAD,NON-ADHERENT,3X8,STERILE,LF,1/PK: Brand: MEDLINE

## (undated) DEVICE — BITEBLOCK 54FR W/ DENT RIM BLOX

## (undated) DEVICE — GLOVE SURG SZ 85 L12IN FNGR ORTHO 126MIL CRM LTX FREE

## (undated) DEVICE — GLOVE ORANGE PI 8 1/2   MSG9085

## (undated) DEVICE — ADAPTER,CATHETER/SYRINGE/LUER,STERILE: Brand: MEDLINE

## (undated) DEVICE — ARM DRAPE

## (undated) DEVICE — GLOVE ORANGE PI 7   MSG9070

## (undated) DEVICE — SINGLE PORT MANIFOLD: Brand: NEPTUNE 2

## (undated) DEVICE — STERILE POLYISOPRENE POWDER-FREE SURGICAL GLOVES WITH EMOLLIENT COATING: Brand: PROTEXIS

## (undated) DEVICE — SUTURE PERMAHAND SZ 0 L30IN NONABSORBABLE BLK FSL L30MM 3/8 680H

## (undated) DEVICE — INSUFFLATION TUBING SET WITH FILTER, FUNNEL CONNECTOR AND LUER LOCK: Brand: JOSNOE MEDICAL INC

## (undated) DEVICE — SEAL

## (undated) DEVICE — 4-PORT MANIFOLD: Brand: NEPTUNE 2

## (undated) DEVICE — PLUMEPORT SEO LAPAROSCOPIC SMOKE FILTRATION DEVICE: Brand: PLUMEPORT

## (undated) DEVICE — SOLUTION ANTIFOG VIS SYS CLEARIFY LAPSCP

## (undated) DEVICE — STAPLER 60: Brand: SUREFORM

## (undated) DEVICE — TOWEL SURG W16XL26IN WHT NONFENESTRATED ST 2 PER PK

## (undated) DEVICE — VISIGI 3D®  CALIBRATION SYSTEM  SIZE 36FR STD W/ BULB: Brand: BOEHRINGER® VISIGI 3D™ SLEEVE GASTRECTOMY CALIBRATION SYSTEM, SIZE 36FR W/BULB

## (undated) DEVICE — GOWN,AURORA,NONREINFORCED,LARGE: Brand: MEDLINE

## (undated) DEVICE — LIQUIBAND RAPID ADHESIVE 36/CS 0.8ML: Brand: MEDLINE

## (undated) DEVICE — CONNECTOR TBNG AUX H2O JET DISP FOR OLY 160/180 SER

## (undated) DEVICE — APPLICATOR MEDICATED 26 CC SOLUTION HI LT ORNG CHLORAPREP

## (undated) DEVICE — BASIN EMSIS 700ML GRAPHITE PLAS KID SHP GRAD

## (undated) DEVICE — SOLUTION SOD CHL 0.9% 1000ML

## (undated) DEVICE — CANNULA SEAL

## (undated) DEVICE — SPONGE LAP W18XL18IN WHT COT 4 PLY FLD STRUNG RADPQ DISP ST 2 PER PACK

## (undated) DEVICE — CLIP INT L POLYMER LOK LIG HEM O LOK

## (undated) DEVICE — SUTURE NONABSORBABLE MONOFILAMENT 3-0 PS-1 18 IN BLK ETHILON 1663H

## (undated) DEVICE — KENDALL SCD EXPRESS SLEEVES, KNEE LENGTH, MEDIUM: Brand: KENDALL SCD

## (undated) DEVICE — SIMPLICITY FLUFF UNDERPAD 23X36, MODERATE: Brand: SIMPLICITY

## (undated) DEVICE — SUTURE PROL SZ 3-0 L18IN NONABSORBABLE BLU L24MM FS-1 3/8 8684G

## (undated) DEVICE — ST. CHARLES BASIC SET UP: Brand: MEDLINE INDUSTRIES, INC.

## (undated) DEVICE — GLOVE ORTHO 7 1/2   MSG9475

## (undated) DEVICE — JELLY,LUBE,STERILE,FLIP TOP,TUBE,2-OZ: Brand: MEDLINE

## (undated) DEVICE — SPONGE DRN W4XL4IN RAYON/POLYESTER 6 PLY NONWOVEN PRECUT 2 PER PK

## (undated) DEVICE — TOWEL,OR,DSP,ST,NATURAL,DLX,4/PK,20PK/CS: Brand: MEDLINE

## (undated) DEVICE — DRESSING TRNSPAR W5XL4.5IN FLM SHT SEMIPERMEABLE WIND

## (undated) DEVICE — MERCY HEALTH ST CHARLES: Brand: MEDLINE INDUSTRIES, INC.

## (undated) DEVICE — KIT DRN FLAT W/ 100CC EVAC 10MM FULL PERF

## (undated) DEVICE — VESSEL SEALER EXTEND: Brand: ENDOWRIST

## (undated) DEVICE — BLADE ES ELASTOMERIC COAT INSUL DURABLE BEND UPTO 90DEG

## (undated) DEVICE — INTENDED FOR TISSUE SEPARATION, AND OTHER PROCEDURES THAT REQUIRE A SHARP SURGICAL BLADE TO PUNCTURE OR CUT.: Brand: BARD-PARKER ® CARBON RIB-BACK BLADES

## (undated) DEVICE — SUTURE MCRYL + SZ 4-0 L27IN ABSRB UD L19MM PS-2 3/8 CIR MCP426H

## (undated) DEVICE — COVER LT HNDL BLU PLAS

## (undated) DEVICE — CONTAINER,SPECIMEN,4OZ,OR STRL: Brand: MEDLINE

## (undated) DEVICE — BLANKET WRM W40.2XL55.9IN IORT LO BODY + MISTRAL AIR

## (undated) DEVICE — YANKAUER,FLEXIBLE HANDLE,REGLR CAPACITY: Brand: MEDLINE INDUSTRIES, INC.

## (undated) DEVICE — STAPLER 60 RELOAD BLUE: Brand: SUREFORM